# Patient Record
Sex: FEMALE | Race: ASIAN | NOT HISPANIC OR LATINO
[De-identification: names, ages, dates, MRNs, and addresses within clinical notes are randomized per-mention and may not be internally consistent; named-entity substitution may affect disease eponyms.]

---

## 2017-02-22 ENCOUNTER — RESULT REVIEW (OUTPATIENT)
Age: 77
End: 2017-02-22

## 2017-02-22 ENCOUNTER — INPATIENT (INPATIENT)
Facility: HOSPITAL | Age: 77
LOS: 11 days | Discharge: NURSING, REHAB OR LONG-TERM CARE | DRG: 239 | End: 2017-03-06
Attending: INTERNAL MEDICINE | Admitting: INTERNAL MEDICINE
Payer: MEDICAID

## 2017-02-22 VITALS
SYSTOLIC BLOOD PRESSURE: 112 MMHG | TEMPERATURE: 98 F | HEART RATE: 64 BPM | RESPIRATION RATE: 16 BRPM | OXYGEN SATURATION: 96 % | DIASTOLIC BLOOD PRESSURE: 64 MMHG

## 2017-02-22 DIAGNOSIS — E11.9 TYPE 2 DIABETES MELLITUS WITHOUT COMPLICATIONS: ICD-10-CM

## 2017-02-22 DIAGNOSIS — K92.2 GASTROINTESTINAL HEMORRHAGE, UNSPECIFIED: ICD-10-CM

## 2017-02-22 DIAGNOSIS — I96 GANGRENE, NOT ELSEWHERE CLASSIFIED: ICD-10-CM

## 2017-02-22 DIAGNOSIS — I25.10 ATHEROSCLEROTIC HEART DISEASE OF NATIVE CORONARY ARTERY WITHOUT ANGINA PECTORIS: ICD-10-CM

## 2017-02-22 PROBLEM — Z00.00 ENCOUNTER FOR PREVENTIVE HEALTH EXAMINATION: Status: ACTIVE | Noted: 2017-02-22

## 2017-02-22 LAB
ALBUMIN SERPL ELPH-MCNC: 3.1 G/DL — LOW (ref 3.3–5)
ALP SERPL-CCNC: 93 U/L — SIGNIFICANT CHANGE UP (ref 40–120)
ALT FLD-CCNC: 41 U/L RC — SIGNIFICANT CHANGE UP (ref 10–45)
ANION GAP SERPL CALC-SCNC: 13 MMOL/L — SIGNIFICANT CHANGE UP (ref 5–17)
APTT BLD: 30.4 SEC — SIGNIFICANT CHANGE UP (ref 27.5–37.4)
AST SERPL-CCNC: 47 U/L — HIGH (ref 10–40)
BASOPHILS # BLD AUTO: 0 K/UL — SIGNIFICANT CHANGE UP (ref 0–0.2)
BASOPHILS NFR BLD AUTO: 0 % — SIGNIFICANT CHANGE UP (ref 0–2)
BILIRUB SERPL-MCNC: 0.3 MG/DL — SIGNIFICANT CHANGE UP (ref 0.2–1.2)
BLD GP AB SCN SERPL QL: NEGATIVE — SIGNIFICANT CHANGE UP
BUN SERPL-MCNC: 26 MG/DL — HIGH (ref 7–23)
CALCIUM SERPL-MCNC: 9.5 MG/DL — SIGNIFICANT CHANGE UP (ref 8.4–10.5)
CHLORIDE SERPL-SCNC: 93 MMOL/L — LOW (ref 96–108)
CO2 SERPL-SCNC: 26 MMOL/L — SIGNIFICANT CHANGE UP (ref 22–31)
CREAT SERPL-MCNC: 0.76 MG/DL — SIGNIFICANT CHANGE UP (ref 0.5–1.3)
EOSINOPHIL # BLD AUTO: 0.1 K/UL — SIGNIFICANT CHANGE UP (ref 0–0.5)
EOSINOPHIL NFR BLD AUTO: 0.4 % — SIGNIFICANT CHANGE UP (ref 0–6)
GAS PNL BLDV: SIGNIFICANT CHANGE UP
GLUCOSE SERPL-MCNC: 140 MG/DL — HIGH (ref 70–99)
HCT VFR BLD CALC: 28.6 % — LOW (ref 34.5–45)
HCT VFR BLD CALC: 30.3 % — LOW (ref 34.5–45)
HGB BLD-MCNC: 10 G/DL — LOW (ref 11.5–15.5)
HGB BLD-MCNC: 9.4 G/DL — LOW (ref 11.5–15.5)
INR BLD: 1.24 RATIO — HIGH (ref 0.88–1.16)
LYMPHOCYTES # BLD AUTO: 13.6 % — SIGNIFICANT CHANGE UP (ref 13–44)
LYMPHOCYTES # BLD AUTO: 2 K/UL — SIGNIFICANT CHANGE UP (ref 1–3.3)
MCHC RBC-ENTMCNC: 31 PG — SIGNIFICANT CHANGE UP (ref 27–34)
MCHC RBC-ENTMCNC: 31 PG — SIGNIFICANT CHANGE UP (ref 27–34)
MCHC RBC-ENTMCNC: 32.9 GM/DL — SIGNIFICANT CHANGE UP (ref 32–36)
MCHC RBC-ENTMCNC: 32.9 GM/DL — SIGNIFICANT CHANGE UP (ref 32–36)
MCV RBC AUTO: 94.2 FL — SIGNIFICANT CHANGE UP (ref 80–100)
MCV RBC AUTO: 94.3 FL — SIGNIFICANT CHANGE UP (ref 80–100)
MONOCYTES # BLD AUTO: 0.7 K/UL — SIGNIFICANT CHANGE UP (ref 0–0.9)
MONOCYTES NFR BLD AUTO: 4.7 % — SIGNIFICANT CHANGE UP (ref 2–14)
NEUTROPHILS # BLD AUTO: 12 K/UL — HIGH (ref 1.8–7.4)
NEUTROPHILS NFR BLD AUTO: 81.3 % — HIGH (ref 43–77)
PLATELET # BLD AUTO: 389 K/UL — SIGNIFICANT CHANGE UP (ref 150–400)
PLATELET # BLD AUTO: 461 K/UL — HIGH (ref 150–400)
POTASSIUM SERPL-MCNC: 4.7 MMOL/L — SIGNIFICANT CHANGE UP (ref 3.5–5.3)
POTASSIUM SERPL-SCNC: 4.7 MMOL/L — SIGNIFICANT CHANGE UP (ref 3.5–5.3)
PROT SERPL-MCNC: 7.5 G/DL — SIGNIFICANT CHANGE UP (ref 6–8.3)
PROTHROM AB SERPL-ACNC: 13.6 SEC — HIGH (ref 10–13.1)
RBC # BLD: 3.03 M/UL — LOW (ref 3.8–5.2)
RBC # BLD: 3.22 M/UL — LOW (ref 3.8–5.2)
RBC # FLD: 13.7 % — SIGNIFICANT CHANGE UP (ref 10.3–14.5)
RBC # FLD: 13.8 % — SIGNIFICANT CHANGE UP (ref 10.3–14.5)
RH IG SCN BLD-IMP: NEGATIVE — SIGNIFICANT CHANGE UP
RH IG SCN BLD-IMP: NEGATIVE — SIGNIFICANT CHANGE UP
SODIUM SERPL-SCNC: 132 MMOL/L — LOW (ref 135–145)
TROPONIN T SERPL-MCNC: <0.01 NG/ML — SIGNIFICANT CHANGE UP (ref 0–0.06)
WBC # BLD: 14.8 K/UL — HIGH (ref 3.8–10.5)
WBC # BLD: 14.8 K/UL — HIGH (ref 3.8–10.5)
WBC # FLD AUTO: 14.8 K/UL — HIGH (ref 3.8–10.5)
WBC # FLD AUTO: 14.8 K/UL — HIGH (ref 3.8–10.5)

## 2017-02-22 PROCEDURE — 99285 EMERGENCY DEPT VISIT HI MDM: CPT | Mod: 25

## 2017-02-22 PROCEDURE — 93010 ELECTROCARDIOGRAM REPORT: CPT

## 2017-02-22 PROCEDURE — 71010: CPT | Mod: 26

## 2017-02-22 PROCEDURE — 74177 CT ABD & PELVIS W/CONTRAST: CPT | Mod: 26

## 2017-02-22 RX ORDER — GABAPENTIN 400 MG/1
600 CAPSULE ORAL
Qty: 0 | Refills: 0 | Status: DISCONTINUED | OUTPATIENT
Start: 2017-02-22 | End: 2017-02-27

## 2017-02-22 RX ORDER — PANTOPRAZOLE SODIUM 20 MG/1
8 TABLET, DELAYED RELEASE ORAL
Qty: 80 | Refills: 0 | Status: DISCONTINUED | OUTPATIENT
Start: 2017-02-22 | End: 2017-02-23

## 2017-02-22 RX ORDER — INSULIN LISPRO 100/ML
VIAL (ML) SUBCUTANEOUS
Qty: 0 | Refills: 0 | Status: DISCONTINUED | OUTPATIENT
Start: 2017-02-22 | End: 2017-02-27

## 2017-02-22 RX ORDER — SODIUM CHLORIDE 9 MG/ML
1000 INJECTION, SOLUTION INTRAVENOUS
Qty: 0 | Refills: 0 | Status: DISCONTINUED | OUTPATIENT
Start: 2017-02-22 | End: 2017-02-27

## 2017-02-22 RX ORDER — DEXTROSE 50 % IN WATER 50 %
25 SYRINGE (ML) INTRAVENOUS ONCE
Qty: 0 | Refills: 0 | Status: DISCONTINUED | OUTPATIENT
Start: 2017-02-22 | End: 2017-02-27

## 2017-02-22 RX ORDER — POLYETHYLENE GLYCOL 3350 17 G/17G
1 POWDER, FOR SOLUTION ORAL
Qty: 0 | Refills: 0 | COMMUNITY

## 2017-02-22 RX ORDER — OXYCODONE HYDROCHLORIDE 5 MG/1
5 TABLET ORAL EVERY 4 HOURS
Qty: 0 | Refills: 0 | Status: DISCONTINUED | OUTPATIENT
Start: 2017-02-22 | End: 2017-02-27

## 2017-02-22 RX ORDER — GLUCAGON INJECTION, SOLUTION 0.5 MG/.1ML
1 INJECTION, SOLUTION SUBCUTANEOUS ONCE
Qty: 0 | Refills: 0 | Status: DISCONTINUED | OUTPATIENT
Start: 2017-02-22 | End: 2017-02-27

## 2017-02-22 RX ORDER — PANTOPRAZOLE SODIUM 20 MG/1
80 TABLET, DELAYED RELEASE ORAL ONCE
Qty: 0 | Refills: 0 | Status: COMPLETED | OUTPATIENT
Start: 2017-02-22 | End: 2017-02-22

## 2017-02-22 RX ORDER — SODIUM CHLORIDE 9 MG/ML
1000 INJECTION INTRAMUSCULAR; INTRAVENOUS; SUBCUTANEOUS ONCE
Qty: 0 | Refills: 0 | Status: COMPLETED | OUTPATIENT
Start: 2017-02-22 | End: 2017-02-22

## 2017-02-22 RX ORDER — ATORVASTATIN CALCIUM 80 MG/1
40 TABLET, FILM COATED ORAL AT BEDTIME
Qty: 0 | Refills: 0 | Status: DISCONTINUED | OUTPATIENT
Start: 2017-02-22 | End: 2017-02-27

## 2017-02-22 RX ORDER — DEXTROSE 50 % IN WATER 50 %
1 SYRINGE (ML) INTRAVENOUS ONCE
Qty: 0 | Refills: 0 | Status: DISCONTINUED | OUTPATIENT
Start: 2017-02-22 | End: 2017-02-27

## 2017-02-22 RX ORDER — DEXTROSE 50 % IN WATER 50 %
12.5 SYRINGE (ML) INTRAVENOUS ONCE
Qty: 0 | Refills: 0 | Status: DISCONTINUED | OUTPATIENT
Start: 2017-02-22 | End: 2017-02-27

## 2017-02-22 RX ORDER — INSULIN LISPRO 100/ML
VIAL (ML) SUBCUTANEOUS AT BEDTIME
Qty: 0 | Refills: 0 | Status: DISCONTINUED | OUTPATIENT
Start: 2017-02-22 | End: 2017-02-27

## 2017-02-22 RX ORDER — OXYCODONE HYDROCHLORIDE 5 MG/1
10 TABLET ORAL EVERY 12 HOURS
Qty: 0 | Refills: 0 | Status: DISCONTINUED | OUTPATIENT
Start: 2017-02-22 | End: 2017-02-27

## 2017-02-22 RX ORDER — CARVEDILOL PHOSPHATE 80 MG/1
12.5 CAPSULE, EXTENDED RELEASE ORAL EVERY 12 HOURS
Qty: 0 | Refills: 0 | Status: DISCONTINUED | OUTPATIENT
Start: 2017-02-22 | End: 2017-02-27

## 2017-02-22 RX ORDER — SODIUM CHLORIDE 9 MG/ML
3 INJECTION INTRAMUSCULAR; INTRAVENOUS; SUBCUTANEOUS ONCE
Qty: 0 | Refills: 0 | Status: COMPLETED | OUTPATIENT
Start: 2017-02-22 | End: 2017-02-22

## 2017-02-22 RX ORDER — MORPHINE SULFATE 50 MG/1
4 CAPSULE, EXTENDED RELEASE ORAL ONCE
Qty: 0 | Refills: 0 | Status: DISCONTINUED | OUTPATIENT
Start: 2017-02-22 | End: 2017-02-22

## 2017-02-22 RX ORDER — LISINOPRIL 2.5 MG/1
5 TABLET ORAL DAILY
Qty: 0 | Refills: 0 | Status: DISCONTINUED | OUTPATIENT
Start: 2017-02-22 | End: 2017-02-27

## 2017-02-22 RX ORDER — HYDROMORPHONE HYDROCHLORIDE 2 MG/ML
1 INJECTION INTRAMUSCULAR; INTRAVENOUS; SUBCUTANEOUS EVERY 4 HOURS
Qty: 0 | Refills: 0 | Status: DISCONTINUED | OUTPATIENT
Start: 2017-02-22 | End: 2017-02-27

## 2017-02-22 RX ORDER — MORPHINE SULFATE 50 MG/1
0.5 CAPSULE, EXTENDED RELEASE ORAL ONCE
Qty: 0 | Refills: 0 | Status: DISCONTINUED | OUTPATIENT
Start: 2017-02-22 | End: 2017-02-22

## 2017-02-22 RX ADMIN — HYDROMORPHONE HYDROCHLORIDE 1 MILLIGRAM(S): 2 INJECTION INTRAMUSCULAR; INTRAVENOUS; SUBCUTANEOUS at 20:15

## 2017-02-22 RX ADMIN — MORPHINE SULFATE 4 MILLIGRAM(S): 50 CAPSULE, EXTENDED RELEASE ORAL at 15:03

## 2017-02-22 RX ADMIN — PANTOPRAZOLE SODIUM 10 MG/HR: 20 TABLET, DELAYED RELEASE ORAL at 09:43

## 2017-02-22 RX ADMIN — PANTOPRAZOLE SODIUM 10 MG/HR: 20 TABLET, DELAYED RELEASE ORAL at 15:09

## 2017-02-22 RX ADMIN — PANTOPRAZOLE SODIUM 10 MG/HR: 20 TABLET, DELAYED RELEASE ORAL at 11:12

## 2017-02-22 RX ADMIN — PANTOPRAZOLE SODIUM 10 MG/HR: 20 TABLET, DELAYED RELEASE ORAL at 12:02

## 2017-02-22 RX ADMIN — PANTOPRAZOLE SODIUM 80 MILLIGRAM(S): 20 TABLET, DELAYED RELEASE ORAL at 09:43

## 2017-02-22 RX ADMIN — SODIUM CHLORIDE 3 MILLILITER(S): 9 INJECTION INTRAMUSCULAR; INTRAVENOUS; SUBCUTANEOUS at 09:35

## 2017-02-22 RX ADMIN — OXYCODONE HYDROCHLORIDE 10 MILLIGRAM(S): 5 TABLET ORAL at 17:57

## 2017-02-22 RX ADMIN — PANTOPRAZOLE SODIUM 10 MG/HR: 20 TABLET, DELAYED RELEASE ORAL at 10:21

## 2017-02-22 RX ADMIN — PANTOPRAZOLE SODIUM 10 MG/HR: 20 TABLET, DELAYED RELEASE ORAL at 17:58

## 2017-02-22 RX ADMIN — HYDROMORPHONE HYDROCHLORIDE 1 MILLIGRAM(S): 2 INJECTION INTRAMUSCULAR; INTRAVENOUS; SUBCUTANEOUS at 19:56

## 2017-02-22 RX ADMIN — ATORVASTATIN CALCIUM 40 MILLIGRAM(S): 80 TABLET, FILM COATED ORAL at 22:10

## 2017-02-22 RX ADMIN — PANTOPRAZOLE SODIUM 10 MG/HR: 20 TABLET, DELAYED RELEASE ORAL at 15:57

## 2017-02-22 RX ADMIN — SODIUM CHLORIDE 1000 MILLILITER(S): 9 INJECTION INTRAMUSCULAR; INTRAVENOUS; SUBCUTANEOUS at 09:35

## 2017-02-22 RX ADMIN — PANTOPRAZOLE SODIUM 10 MG/HR: 20 TABLET, DELAYED RELEASE ORAL at 14:42

## 2017-02-22 RX ADMIN — PANTOPRAZOLE SODIUM 10 MG/HR: 20 TABLET, DELAYED RELEASE ORAL at 22:10

## 2017-02-22 RX ADMIN — MORPHINE SULFATE 4 MILLIGRAM(S): 50 CAPSULE, EXTENDED RELEASE ORAL at 15:09

## 2017-02-22 NOTE — ED PROVIDER NOTE - PMH
Coronary artery disease involving native coronary artery of native heart without angina pectoris    Gangrene of foot

## 2017-02-22 NOTE — H&P ADULT. - NEGATIVE CARDIOVASCULAR SYMPTOMS
no dyspnea on exertion/no orthopnea/no palpitations/no peripheral edema/no paroxysmal nocturnal dyspnea/no chest pain

## 2017-02-22 NOTE — PATIENT PROFILE ADULT. - VISION (WITH CORRECTIVE LENSES IF THE PATIENT USUALLY WEARS THEM):
Partially impaired: cannot see medication labels or newsprint, but can see obstacles in path, and the surrounding layout; can count fingers at arm's length/uses reading glasses but did not bring from home

## 2017-02-22 NOTE — H&P ADULT. - HISTORY OF PRESENT ILLNESS
77yof pmhx of HTN HLD CAD on ASA and plavix, hx of PVD, BIB EMS for nausea, vomiting with dark tarry stools since yesterday with hypotension. Pt is Ukrainian speaking, hx obtained from Daughter. No chest pain or sob. No sick contacts. remote hx of ?peptic ulcer per daughter. Recent RLE surgery for "blockage". No fever or chills.

## 2017-02-22 NOTE — H&P ADULT. - PROBLEM SELECTOR PLAN 2
Was seen at [facility] month agao and decided against surgery as very high risk patient per daughter. f/u with them .

## 2017-02-22 NOTE — PATIENT PROFILE ADULT. - NS PRO CONTRA FLU  NO ACCESS
daughter does not remember if she got it or not at this time- daughter will clarify with other siblings

## 2017-02-22 NOTE — ED PROVIDER NOTE - OBJECTIVE STATEMENT
77yof pmhx of HTN HLD CAD on ASA and plavix, hx of PVD, BIB EMS for nausea, vomiting with dark tarry stools since yesterday with hypotension. Pt is Luxembourgish speaking, hx obtained from Daughter. No chest pain or sob. No sick contacts. remote hx of ?peptic ulcer per daughter. Recent RLE surgery for "blockage". No fever or chills.

## 2017-02-22 NOTE — H&P ADULT. - ASSESSMENT
77yof pmhx of HTN HLD CAD on ASA and plavix, hx of PVD, BIB EMS for nausea, vomiting with dark tarry stools since yesterday with hypotension. Pt is Greenlandic speaking, hx obtained from Daughter. No chest pain or sob. No sick contacts. remote hx of ?peptic ulcer per daughter.  No fever or chills.

## 2017-02-22 NOTE — ED PROVIDER NOTE - ATTENDING CONTRIBUTION TO CARE
Patient from home.  Family noted large volume of dark stool in clothing this morning, called EMS.  Per family appears pale.  Possible history of prior GIB per daughter, history unclear, on ASA/plavix for CAD.  No sick contacts.  Reporting some nausea but no vomiting, no abdominal pain.  Per EMS hypotensive in field, improved with IVF.    On exam patient non toxic appearing, VS WNL, questionable pallor, RRR, CTABL, abdomen soft NT, ND.  Large amount of dark stool present in clothing, guiaic testing questionably positive.    DDx - GIB versus enteritis given unclear guaiac testing, given hypotensive in field and appearance of stool will start protonix IV, will also CT A/P to evaluate for evidence of colitis, labs, T&S, admission.

## 2017-02-22 NOTE — H&P ADULT. - RS GEN PE MLT RESP DETAILS PC
clear to auscultation bilaterally/good air movement/breath sounds equal/respirations non-labored/normal/airway patent

## 2017-02-22 NOTE — ED ADULT NURSE NOTE - OBJECTIVE STATEMENT
pt brought by ems for reported rectal bleeding this morning noted by family at home. arrives aao x 3, speaks Bulgarian, translating by family pt brought by ems for reported rectal bleeding this morning noted by family at home. arrives aao x 3, speaks Italian, translating by family. skin pale, cool, dry. no active bleeding, dry crusted dark brown stool to buttocks, skin intact. right leg bent, unable to straighten extremity. pt cachectic, weak. lethargic. family states pt has had similar episodes in the past for which she has receive transfusion. no c/o pain, no resp distress. placed on continuous cr monitor. iv line to left ac by ems patent, 2nd iv line established. Protonix gtt initiated as per MD order.

## 2017-02-23 ENCOUNTER — TRANSCRIPTION ENCOUNTER (OUTPATIENT)
Age: 77
End: 2017-02-23

## 2017-02-23 LAB
ANION GAP SERPL CALC-SCNC: 14 MMOL/L — SIGNIFICANT CHANGE UP (ref 5–17)
BUN SERPL-MCNC: 13 MG/DL — SIGNIFICANT CHANGE UP (ref 7–23)
CALCIUM SERPL-MCNC: 8.8 MG/DL — SIGNIFICANT CHANGE UP (ref 8.4–10.5)
CHLORIDE SERPL-SCNC: 101 MMOL/L — SIGNIFICANT CHANGE UP (ref 96–108)
CO2 SERPL-SCNC: 20 MMOL/L — LOW (ref 22–31)
CREAT SERPL-MCNC: 0.5 MG/DL — SIGNIFICANT CHANGE UP (ref 0.5–1.3)
GLUCOSE SERPL-MCNC: 80 MG/DL — SIGNIFICANT CHANGE UP (ref 70–99)
HBA1C BLD-MCNC: 6.3 % — HIGH (ref 4–5.6)
HCT VFR BLD CALC: 25.9 % — LOW (ref 34.5–45)
HCT VFR BLD CALC: 28.1 % — LOW (ref 34.5–45)
HCT VFR BLD CALC: 29 % — LOW (ref 34.5–45)
HCT VFR BLD CALC: 29.3 % — LOW (ref 34.5–45)
HGB BLD-MCNC: 8.7 G/DL — LOW (ref 11.5–15.5)
HGB BLD-MCNC: 8.9 G/DL — LOW (ref 11.5–15.5)
HGB BLD-MCNC: 9.4 G/DL — LOW (ref 11.5–15.5)
HGB BLD-MCNC: 9.7 G/DL — LOW (ref 11.5–15.5)
MCHC RBC-ENTMCNC: 30.1 PG — SIGNIFICANT CHANGE UP (ref 27–34)
MCHC RBC-ENTMCNC: 31.1 PG — SIGNIFICANT CHANGE UP (ref 27–34)
MCHC RBC-ENTMCNC: 31.5 PG — SIGNIFICANT CHANGE UP (ref 27–34)
MCHC RBC-ENTMCNC: 31.5 PG — SIGNIFICANT CHANGE UP (ref 27–34)
MCHC RBC-ENTMCNC: 31.7 GM/DL — LOW (ref 32–36)
MCHC RBC-ENTMCNC: 32.6 GM/DL — SIGNIFICANT CHANGE UP (ref 32–36)
MCHC RBC-ENTMCNC: 33.2 GM/DL — SIGNIFICANT CHANGE UP (ref 32–36)
MCHC RBC-ENTMCNC: 33.7 GM/DL — SIGNIFICANT CHANGE UP (ref 32–36)
MCV RBC AUTO: 93.7 FL — SIGNIFICANT CHANGE UP (ref 80–100)
MCV RBC AUTO: 94.9 FL — SIGNIFICANT CHANGE UP (ref 80–100)
MCV RBC AUTO: 94.9 FL — SIGNIFICANT CHANGE UP (ref 80–100)
MCV RBC AUTO: 95.3 FL — SIGNIFICANT CHANGE UP (ref 80–100)
PLATELET # BLD AUTO: 362 K/UL — SIGNIFICANT CHANGE UP (ref 150–400)
PLATELET # BLD AUTO: 385 K/UL — SIGNIFICANT CHANGE UP (ref 150–400)
PLATELET # BLD AUTO: 414 K/UL — HIGH (ref 150–400)
PLATELET # BLD AUTO: 414 K/UL — HIGH (ref 150–400)
POTASSIUM SERPL-MCNC: 4.3 MMOL/L — SIGNIFICANT CHANGE UP (ref 3.5–5.3)
POTASSIUM SERPL-SCNC: 4.3 MMOL/L — SIGNIFICANT CHANGE UP (ref 3.5–5.3)
RBC # BLD: 2.77 M/UL — LOW (ref 3.8–5.2)
RBC # BLD: 2.96 M/UL — LOW (ref 3.8–5.2)
RBC # BLD: 3.04 M/UL — LOW (ref 3.8–5.2)
RBC # BLD: 3.09 M/UL — LOW (ref 3.8–5.2)
RBC # FLD: 13.8 % — SIGNIFICANT CHANGE UP (ref 10.3–14.5)
RBC # FLD: 13.9 % — SIGNIFICANT CHANGE UP (ref 10.3–14.5)
RBC # FLD: 14 % — SIGNIFICANT CHANGE UP (ref 10.3–14.5)
RBC # FLD: 14.8 % — HIGH (ref 10.3–14.5)
SODIUM SERPL-SCNC: 135 MMOL/L — SIGNIFICANT CHANGE UP (ref 135–145)
WBC # BLD: 14.1 K/UL — HIGH (ref 3.8–10.5)
WBC # BLD: 15.5 K/UL — HIGH (ref 3.8–10.5)
WBC # BLD: 15.79 K/UL — HIGH (ref 3.8–10.5)
WBC # BLD: 15.8 K/UL — HIGH (ref 3.8–10.5)
WBC # FLD AUTO: 14.1 K/UL — HIGH (ref 3.8–10.5)
WBC # FLD AUTO: 15.5 K/UL — HIGH (ref 3.8–10.5)
WBC # FLD AUTO: 15.79 K/UL — HIGH (ref 3.8–10.5)
WBC # FLD AUTO: 15.8 K/UL — HIGH (ref 3.8–10.5)

## 2017-02-23 PROCEDURE — 88305 TISSUE EXAM BY PATHOLOGIST: CPT | Mod: 26

## 2017-02-23 PROCEDURE — 99221 1ST HOSP IP/OBS SF/LOW 40: CPT

## 2017-02-23 PROCEDURE — 88312 SPECIAL STAINS GROUP 1: CPT | Mod: 26

## 2017-02-23 RX ORDER — PANTOPRAZOLE SODIUM 20 MG/1
40 TABLET, DELAYED RELEASE ORAL DAILY
Qty: 0 | Refills: 0 | Status: DISCONTINUED | OUTPATIENT
Start: 2017-02-23 | End: 2017-02-27

## 2017-02-23 RX ADMIN — HYDROMORPHONE HYDROCHLORIDE 1 MILLIGRAM(S): 2 INJECTION INTRAMUSCULAR; INTRAVENOUS; SUBCUTANEOUS at 03:18

## 2017-02-23 RX ADMIN — CARVEDILOL PHOSPHATE 12.5 MILLIGRAM(S): 80 CAPSULE, EXTENDED RELEASE ORAL at 05:51

## 2017-02-23 RX ADMIN — GABAPENTIN 600 MILLIGRAM(S): 400 CAPSULE ORAL at 17:42

## 2017-02-23 RX ADMIN — HYDROMORPHONE HYDROCHLORIDE 1 MILLIGRAM(S): 2 INJECTION INTRAMUSCULAR; INTRAVENOUS; SUBCUTANEOUS at 21:39

## 2017-02-23 RX ADMIN — HYDROMORPHONE HYDROCHLORIDE 1 MILLIGRAM(S): 2 INJECTION INTRAMUSCULAR; INTRAVENOUS; SUBCUTANEOUS at 12:46

## 2017-02-23 RX ADMIN — HYDROMORPHONE HYDROCHLORIDE 1 MILLIGRAM(S): 2 INJECTION INTRAMUSCULAR; INTRAVENOUS; SUBCUTANEOUS at 22:00

## 2017-02-23 RX ADMIN — HYDROMORPHONE HYDROCHLORIDE 1 MILLIGRAM(S): 2 INJECTION INTRAMUSCULAR; INTRAVENOUS; SUBCUTANEOUS at 13:31

## 2017-02-23 RX ADMIN — PANTOPRAZOLE SODIUM 40 MILLIGRAM(S): 20 TABLET, DELAYED RELEASE ORAL at 12:46

## 2017-02-23 RX ADMIN — HYDROMORPHONE HYDROCHLORIDE 1 MILLIGRAM(S): 2 INJECTION INTRAMUSCULAR; INTRAVENOUS; SUBCUTANEOUS at 03:35

## 2017-02-23 RX ADMIN — CARVEDILOL PHOSPHATE 12.5 MILLIGRAM(S): 80 CAPSULE, EXTENDED RELEASE ORAL at 17:42

## 2017-02-23 RX ADMIN — ATORVASTATIN CALCIUM 40 MILLIGRAM(S): 80 TABLET, FILM COATED ORAL at 21:40

## 2017-02-23 RX ADMIN — LISINOPRIL 5 MILLIGRAM(S): 2.5 TABLET ORAL at 05:51

## 2017-02-23 RX ADMIN — OXYCODONE HYDROCHLORIDE 10 MILLIGRAM(S): 5 TABLET ORAL at 17:45

## 2017-02-23 RX ADMIN — OXYCODONE HYDROCHLORIDE 10 MILLIGRAM(S): 5 TABLET ORAL at 06:30

## 2017-02-23 RX ADMIN — OXYCODONE HYDROCHLORIDE 10 MILLIGRAM(S): 5 TABLET ORAL at 19:00

## 2017-02-23 RX ADMIN — OXYCODONE HYDROCHLORIDE 10 MILLIGRAM(S): 5 TABLET ORAL at 05:49

## 2017-02-23 RX ADMIN — GABAPENTIN 600 MILLIGRAM(S): 400 CAPSULE ORAL at 05:51

## 2017-02-23 NOTE — DISCHARGE NOTE ADULT - MEDICATION SUMMARY - MEDICATIONS TO CHANGE
I will SWITCH the dose or number of times a day I take the medications listed below when I get home from the hospital:    senna 17.2 mg oral tablet  -- 1 tab(s) by mouth once a day I will SWITCH the dose or number of times a day I take the medications listed below when I get home from the hospital:    lisinopril 5 mg oral tablet  -- 1 tab(s) by mouth once a day    MiraLax oral powder for reconstitution  -- 17 gram(s) by mouth 2 times a day    senna 17.2 mg oral tablet  -- 1 tab(s) by mouth once a day I will SWITCH the dose or number of times a day I take the medications listed below when I get home from the hospital:    MiraLax oral powder for reconstitution  -- 17 gram(s) by mouth 2 times a day    senna 17.2 mg oral tablet  -- 1 tab(s) by mouth once a day

## 2017-02-23 NOTE — DISCHARGE NOTE ADULT - PATIENT PORTAL LINK FT
“You can access the [facility] Patient Portal, offered by [facility], by registering with the following website: [email or web address]”

## 2017-02-23 NOTE — DISCHARGE NOTE ADULT - SECONDARY DIAGNOSIS.
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

## 2017-02-23 NOTE — DISCHARGE NOTE ADULT - HOSPITAL COURSE
Attending to complete 77yof pmhx of HTN HLD CAD on ASA and plavix, hx of PVD, BIB EMS for nausea, vomiting with dark tarry stools since yesterday with hypotension. Pt is Macedonian speaking, hx obtained from Daughter. No chest pain or sob. No sick contacts. remote hx of ?peptic ulcer per daughter. Recent RLE surgery for "blockage". No fever or chills.      Dxed with GI bleed s/p EGD,PVD with Gnagrene foot s/p AKA with CAD,Anemia with HTN. Followed by Vascular,GI,ID and Cardiology. EGD showed                                                                                          Impression:          - Normal esophagus.                       - A few recently bleeding angioectasias in the stomach. Treated with argon                        beam coagulation.                       - Mild gastritis.                       - Normal examined duodenum.  Recommendation:      - Return patient to hospital gay for ongoing care.                       - Resume regular diet.                       - Use Protonix (pantoprazole) 40 mg PO daily.                       - Await pathology results.                       - To treat accordingly if H. pylori present.                       - The findings and recommendations were discussed with the patient and the                        referring physician.                                                                                                        Attending Participation:       I personally performed the entire procedure.                                                                                                       ______________________  Joey Harris MD  2/23/2017 10:39:42 AM  This report has been signed electronically.   Had Echo and stress test before surgery . Did well postop so was dcd to Rehab.

## 2017-02-23 NOTE — DISCHARGE NOTE ADULT - PLAN OF CARE
No gi bleeding There are 2 common types of GI Bleed, Upper GI Bleed and Lower GI Bleed.  Upper GI Bleed affects the esophagus, stomach, and first part of the small intestine. Lower GI Bleed affects the colon and rectum.  Upper GI Bleed signs and symptoms to notify your Health Care Provider are vomiting blood, or coffee ground vomitus, and bowel movements that look like black tar.  Lower GI Bleed signs and symptoms to notify your health care provider are bright red bloody bowel movements.   Take your medications as prescribed by your Gastroenterologist.  If you have had an Endoscopy or Colonoscopy, follow up with your Gastroenterologist for Pathology results.  Avoid NSAIDs unless your Health Care Provider tells you that it is ok (Aspirin, Ibuprofen, Advil, Motrin, Aleve).  Follow up with your Gastroenterologist within 1-2 weeks of discharge. You had gastric AVMs cauterized Continue protonix  Follow up with GI in 2 weeks You had surgery done   complete antibiotics as [prescribed Follow up with Vascular surgery Dr Montaño in 10 days for staple removal Coronary artery disease is a condition where the arteries the supply the heart muscle get clogges with fatty deposits & puts you at risk for a heart attack  Call your doctor if you have any new pain, pressure, or discomfort in the center of your chest, pain, tingling or discomfort in arms, back, neck, jaw, or stomach, shortness of breath, nausea, vomiting, burping or heartburn, sweating, cold and clammy skin, racing or abnormal heartbeat for more than 10 minutes or if they keep coming & going.  Call 911 and do not tr to get to hospital by care  You can help yourself with lefestyle changes (quitting smoking if you smoke), eat lots of fruits & vegetables & low fat dairy products, not a lot of meat & fatty foods, walk or some form of physical activity most days of the week, lose weight if you are overweight  Take your cardiac medication as prescribed to lower cholesterol, to lower blood pressure, aspirin to prevent blood clots, and diabetes control  Make sure to keep appointments with doctor for cardiac follow up care Resolved;

## 2017-02-23 NOTE — DISCHARGE NOTE ADULT - MEDICATION SUMMARY - MEDICATIONS TO TAKE
I will START or STAY ON the medications listed below when I get home from the hospital:    oxyCODONE 15 mg oral tablet, extended release  -- 1 tab(s) by mouth every 12 hours  -- Indication: For Pain    aspirin 81 mg oral delayed release tablet  -- 1 tab(s) by mouth once a day  -- Indication: For CAD    lisinopril 5 mg oral tablet  -- 1 tab(s) by mouth once a day  -- Indication: For Hypertension    gabapentin 600 mg oral tablet  -- 1 tab(s) by mouth 2 times a day  -- Indication: For Neuropathy    metFORMIN 1000 mg oral tablet  -- 1 tab(s) by mouth 2 times a day  -- Indication: For Diabetes    atorvastatin 40 mg oral tablet  -- 1 tab(s) by mouth once a day (at bedtime)  -- Indication: For Hyperlipidemia    carvedilol 12.5 mg oral tablet  -- 1 tab(s) by mouth every 12 hours  -- Indication: For CAD    ferrous sulfate 324 mg (65 mg elemental iron) oral delayed release tablet  -- 1 tab(s) by mouth once a day  -- Indication: For Anemia    senna oral tablet  -- 2 tab(s) by mouth once a day (at bedtime)  -- Indication: For Constipation    MiraLax oral powder for reconstitution  -- 17 gram(s) by mouth 2 times a day  (hold for diarrhea)  -- Indication: For Costipation    Augmentin 875 mg-125 mg oral tablet  -- 1 tab(s) by mouth every 12 hours  x 5 days  -- Indication: For s/p AKA for gangrene    pantoprazole 40 mg oral delayed release tablet  -- 1 tab(s) by mouth once a day  -- Indication: For Gastritis    folic acid 1 mg oral tablet  -- 1 tab(s) by mouth once a day  -- Indication: For supplement I will START or STAY ON the medications listed below when I get home from the hospital:    oxyCODONE 15 mg oral tablet, extended release  -- 1 tab(s) by mouth every 12 hours  -- Indication: For Pain    aspirin 81 mg oral delayed release tablet  -- 1 tab(s) by mouth once a day  -- Indication: For CAD    oxyCODONE 5 mg oral tablet  -- 1 tab(s) by mouth every 6 hours, As Needed  breakthrough pain  -- Indication: For pain    lisinopril 5 mg oral tablet  -- 1 tab(s) by mouth once a day  -- Indication: For Hypertension    aluminum hydroxide-magnesium hydroxide 200 mg-200 mg/5 mL oral suspension  -- 30 milliliter(s) by mouth every 6 hours, As needed, Dyspepsia  -- Indication: For Dyspepsia    gabapentin 600 mg oral tablet  -- 1 tab(s) by mouth 2 times a day  -- Indication: For Neuropathy    metFORMIN 1000 mg oral tablet  -- 1 tab(s) by mouth 2 times a day  -- Indication: For Diabetes    atorvastatin 40 mg oral tablet  -- 1 tab(s) by mouth once a day (at bedtime)  -- Indication: For Hyperlipidemia    carvedilol 12.5 mg oral tablet  -- 1 tab(s) by mouth every 12 hours  -- Indication: For CAD    ferrous sulfate 324 mg (65 mg elemental iron) oral delayed release tablet  -- 1 tab(s) by mouth once a day  -- Indication: For Anemia    senna oral tablet  -- 2 tab(s) by mouth once a day (at bedtime)  -- Indication: For Constipation    MiraLax oral powder for reconstitution  -- 17 gram(s) by mouth 2 times a day  (hold for diarrhea)  -- Indication: For Costipation    Augmentin 875 mg-125 mg oral tablet  -- 1 tab(s) by mouth every 12 hours  x  4 days  -- Indication: For clint operative antibiotics    pantoprazole 40 mg oral delayed release tablet  -- 1 tab(s) by mouth once a day  -- Indication: For Gastritis    folic acid 1 mg oral tablet  -- 1 tab(s) by mouth once a day  -- Indication: For supplement I will START or STAY ON the medications listed below when I get home from the hospital:    oxyCODONE 15 mg oral tablet, extended release  -- 1 tab(s) by mouth every 12 hours  -- Indication: For Pain    aspirin 81 mg oral delayed release tablet  -- 1 tab(s) by mouth once a day  -- Indication: For CAD    oxyCODONE 5 mg oral tablet  -- 1 tab(s) by mouth every 6 hours, As Needed  breakthrough pain  -- Indication: For pain    lisinopril 20 mg oral tablet  -- 1 tab(s) by mouth once a day  -- Indication: For Hypertension    aluminum hydroxide-magnesium hydroxide 200 mg-200 mg/5 mL oral suspension  -- 30 milliliter(s) by mouth every 6 hours, As needed, Dyspepsia  -- Indication: For Dyspepsia    enoxaparin  -- 30 milligram(s) subcutaneously once a day  -- Indication: For DVT prophylaxis    gabapentin 600 mg oral tablet  -- 1 tab(s) by mouth 2 times a day  -- Indication: For Neuropathy    metFORMIN 1000 mg oral tablet  -- 1 tab(s) by mouth 2 times a day  -- Indication: For Diabetes    atorvastatin 40 mg oral tablet  -- 1 tab(s) by mouth once a day (at bedtime)  -- Indication: For Hyperlipidemia    carvedilol 12.5 mg oral tablet  -- 1 tab(s) by mouth every 12 hours  -- Indication: For CAD    ferrous sulfate 324 mg (65 mg elemental iron) oral delayed release tablet  -- 1 tab(s) by mouth once a day  -- Indication: For Anemia    senna oral tablet  -- 2 tab(s) by mouth once a day (at bedtime)  -- Indication: For Constipation    MiraLax oral powder for reconstitution  -- 17 gram(s) by mouth once a day  -- Indication: For Constipation    Augmentin 875 mg-125 mg oral tablet  -- 1 tab(s) by mouth every 12 hours  x 1 more day  -- Indication: For perioperative use    pantoprazole 40 mg oral delayed release tablet  -- 1 tab(s) by mouth once a day  -- Indication: For Gastritis    folic acid 1 mg oral tablet  -- 1 tab(s) by mouth once a day  -- Indication: For supplement I will START or STAY ON the medications listed below when I get home from the hospital:    oxyCODONE 15 mg oral tablet, extended release  -- 1 tab(s) by mouth every 12 hours  -- Indication: For Pain    aspirin 81 mg oral delayed release tablet  -- 1 tab(s) by mouth once a day  -- Indication: For CAD    oxyCODONE 5 mg oral tablet  -- 1 tab(s) by mouth every 6 hours, As Needed  breakthrough pain  -- Indication: For pain    lisinopril 5 mg oral tablet  -- 1 tab(s) by mouth once a day  -- Indication: For Hypertension    aluminum hydroxide-magnesium hydroxide 200 mg-200 mg/5 mL oral suspension  -- 30 milliliter(s) by mouth every 6 hours, As needed, Dyspepsia  -- Indication: For Dyspepsia    enoxaparin  -- 30 milligram(s) subcutaneously once a day  -- Indication: For DVT prophylaxis    gabapentin 600 mg oral tablet  -- 1 tab(s) by mouth 2 times a day  -- Indication: For Neuropathy    metFORMIN 1000 mg oral tablet  -- 1 tab(s) by mouth 2 times a day  -- Indication: For Diabetes    atorvastatin 40 mg oral tablet  -- 1 tab(s) by mouth once a day (at bedtime)  -- Indication: For Hyperlipidemia    carvedilol 12.5 mg oral tablet  -- 1 tab(s) by mouth every 12 hours  -- Indication: For CAD    ferrous sulfate 324 mg (65 mg elemental iron) oral delayed release tablet  -- 1 tab(s) by mouth once a day  -- Indication: For Anemia    senna oral tablet  -- 2 tab(s) by mouth once a day (at bedtime)  -- Indication: For Constipation    MiraLax oral powder for reconstitution  -- 17 gram(s) by mouth once a day  -- Indication: For Constipation    Augmentin 875 mg-125 mg oral tablet  -- 1 tab(s) by mouth every 12 hours  x 1 more day  -- Indication: For perioperative use    pantoprazole 40 mg oral delayed release tablet  -- 1 tab(s) by mouth once a day  -- Indication: For Gastritis    folic acid 1 mg oral tablet  -- 1 tab(s) by mouth once a day  -- Indication: For supplement

## 2017-02-23 NOTE — DISCHARGE NOTE ADULT - ADDITIONAL INSTRUCTIONS
Follow up with vascular surgery in 10 days;  Follow up with gastro enterology after Rehab Follow up with vascular surgery in 10 days;  Follow up with gastro enterology after Rehab  Follow up with cardiology for positive stress test Follow up with vascular surgery in 10 days for staple removal  Follow up with gastro enterology after Rehab  Follow up with cardiology for positive stress test

## 2017-02-23 NOTE — DISCHARGE NOTE ADULT - PROVIDER TOKENS
TOKEN:'[id number]:'[id number]' TOKEN:'[id number]:'[id number]:'[id number]'

## 2017-02-23 NOTE — DISCHARGE NOTE ADULT - VISION (WITH CORRECTIVE LENSES IF THE PATIENT USUALLY WEARS THEM):
uses reading glasses but did not bring from home/Partially impaired: cannot see medication labels or newsprint, but can see obstacles in path, and the surrounding layout; can count fingers at arm's length

## 2017-02-23 NOTE — DISCHARGE NOTE ADULT - CARE PROVIDER_API CALL
Pawan Montaño), Vascular Surgery  [address] [place] [address]  Phone: [phone]  Fax: [phone]    Florentino Chavira), Cardiovascular Disease; Internal Medicine  [address] [place] [address]  Phone: [phone]  Fax: [phone] Pawan Montaño), Vascular Surgery  [address] [place] [address]  Phone: [phone]  Fax: [phone]    Florentino Chavira), Cardiovascular Disease; Internal Medicine  [address] [place] [address]  Phone: [phone]  Fax: [phone]    Joey Harris), Gastroenterology; Internal Medicine  [address] [id number]  Phone: [phone]  Fax: [phone]

## 2017-02-23 NOTE — DISCHARGE NOTE ADULT - CARE PLAN
Principal Discharge DX:	Gastrointestinal hemorrhage, unspecified gastrointestinal hemorrhage type  Goal:	No gi bleeding  Instructions for follow-up, activity and diet:	There are 2 common types of GI Bleed, Upper GI Bleed and Lower GI Bleed.  Upper GI Bleed affects the esophagus, stomach, and first part of the small intestine. Lower GI Bleed affects the colon and rectum.  Upper GI Bleed signs and symptoms to notify your Health Care Provider are vomiting blood, or coffee ground vomitus, and bowel movements that look like black tar.  Lower GI Bleed signs and symptoms to notify your health care provider are bright red bloody bowel movements.   Take your medications as prescribed by your Gastroenterologist.  If you have had an Endoscopy or Colonoscopy, follow up with your Gastroenterologist for Pathology results.  Avoid NSAIDs unless your Health Care Provider tells you that it is ok (Aspirin, Ibuprofen, Advil, Motrin, Aleve).  Follow up with your Gastroenterologist within 1-2 weeks of discharge.  Secondary Diagnosis:	Gastric AVM  Instructions for follow-up, activity and diet:	You had gastric AVMs cauterized  Secondary Diagnosis:	Gastritis  Instructions for follow-up, activity and diet:	Continue protonix  Follow up with GI in 2 weeks  Secondary Diagnosis:	Gangrene of foot  Instructions for follow-up, activity and diet:	You had surgery done   complete antibiotics as [prescribed  Secondary Diagnosis:	S/P AKA (above knee amputation) unilateral, right  Instructions for follow-up, activity and diet:	Follow up with Vascular surgery Dr Montaño in 10 days for staple removal  Secondary Diagnosis:	Coronary artery disease involving native coronary artery of native heart without angina pectoris  Instructions for follow-up, activity and diet:	Coronary artery disease is a condition where the arteries the supply the heart muscle get clogges with fatty deposits & puts you at risk for a heart attack  Call your doctor if you have any new pain, pressure, or discomfort in the center of your chest, pain, tingling or discomfort in arms, back, neck, jaw, or stomach, shortness of breath, nausea, vomiting, burping or heartburn, sweating, cold and clammy skin, racing or abnormal heartbeat for more than 10 minutes or if they keep coming & going.  Call 911 and do not tr to get to hospital by care  You can help yourself with lefestyle changes (quitting smoking if you smoke), eat lots of fruits & vegetables & low fat dairy products, not a lot of meat & fatty foods, walk or some form of physical activity most days of the week, lose weight if you are overweight  Take your cardiac medication as prescribed to lower cholesterol, to lower blood pressure, aspirin to prevent blood clots, and diabetes control  Make sure to keep appointments with doctor for cardiac follow up care  Secondary Diagnosis:	Hyponatremia  Instructions for follow-up, activity and diet:	Resolved;

## 2017-02-23 NOTE — DISCHARGE NOTE ADULT - MEDICATION SUMMARY - MEDICATIONS TO STOP TAKING
I will STOP taking the medications listed below when I get home from the hospital:    clopidogrel 75 mg oral tablet  -- 1 tab(s) by mouth once a day    docusate sodium 100 mg oral capsule  -- 1 cap(s) by mouth 3 times a day    Percocet 10/325 oral tablet  -- 1 tab(s) by mouth every 4 hours, As Needed

## 2017-02-23 NOTE — DISCHARGE NOTE ADULT - CARE PROVIDERS DIRECT ADDRESSES
[email or web address],DirectAddress_Unknown [email or web address],DirectAddress_[email or web address],DirectAddress_Unknown

## 2017-02-24 LAB
ANION GAP SERPL CALC-SCNC: 12 MMOL/L — SIGNIFICANT CHANGE UP (ref 5–17)
APPEARANCE UR: CLEAR — SIGNIFICANT CHANGE UP
BILIRUB UR-MCNC: NEGATIVE — SIGNIFICANT CHANGE UP
BUN SERPL-MCNC: 9 MG/DL — SIGNIFICANT CHANGE UP (ref 7–23)
CALCIUM SERPL-MCNC: 8.4 MG/DL — SIGNIFICANT CHANGE UP (ref 8.4–10.5)
CHLORIDE SERPL-SCNC: 99 MMOL/L — SIGNIFICANT CHANGE UP (ref 96–108)
CO2 SERPL-SCNC: 21 MMOL/L — LOW (ref 22–31)
COLOR SPEC: YELLOW — SIGNIFICANT CHANGE UP
CREAT SERPL-MCNC: 0.35 MG/DL — LOW (ref 0.5–1.3)
DIFF PNL FLD: NEGATIVE — SIGNIFICANT CHANGE UP
GLUCOSE SERPL-MCNC: 95 MG/DL — SIGNIFICANT CHANGE UP (ref 70–99)
GLUCOSE UR QL: NEGATIVE — SIGNIFICANT CHANGE UP
HCT VFR BLD CALC: 25.1 % — LOW (ref 34.5–45)
HGB BLD-MCNC: 8.2 G/DL — LOW (ref 11.5–15.5)
KETONES UR-MCNC: ABNORMAL
LEUKOCYTE ESTERASE UR-ACNC: ABNORMAL
MCHC RBC-ENTMCNC: 30.1 PG — SIGNIFICANT CHANGE UP (ref 27–34)
MCHC RBC-ENTMCNC: 32.7 GM/DL — SIGNIFICANT CHANGE UP (ref 32–36)
MCV RBC AUTO: 92.3 FL — SIGNIFICANT CHANGE UP (ref 80–100)
NITRITE UR-MCNC: NEGATIVE — SIGNIFICANT CHANGE UP
PH UR: 6 — SIGNIFICANT CHANGE UP (ref 4.8–8)
PLATELET # BLD AUTO: 370 K/UL — SIGNIFICANT CHANGE UP (ref 150–400)
POTASSIUM SERPL-MCNC: 3.8 MMOL/L — SIGNIFICANT CHANGE UP (ref 3.5–5.3)
POTASSIUM SERPL-SCNC: 3.8 MMOL/L — SIGNIFICANT CHANGE UP (ref 3.5–5.3)
PROT UR-MCNC: SIGNIFICANT CHANGE UP
RBC # BLD: 2.72 M/UL — LOW (ref 3.8–5.2)
RBC # FLD: 14.8 % — HIGH (ref 10.3–14.5)
SODIUM SERPL-SCNC: 132 MMOL/L — LOW (ref 135–145)
SP GR SPEC: 1.02 — SIGNIFICANT CHANGE UP (ref 1.01–1.02)
SURGICAL PATHOLOGY STUDY: SIGNIFICANT CHANGE UP
UROBILINOGEN FLD QL: NEGATIVE — SIGNIFICANT CHANGE UP
WBC # BLD: 11.99 K/UL — HIGH (ref 3.8–10.5)
WBC # FLD AUTO: 11.99 K/UL — HIGH (ref 3.8–10.5)

## 2017-02-24 PROCEDURE — 99232 SBSQ HOSP IP/OBS MODERATE 35: CPT

## 2017-02-24 PROCEDURE — 78452 HT MUSCLE IMAGE SPECT MULT: CPT | Mod: 26

## 2017-02-24 PROCEDURE — 93016 CV STRESS TEST SUPVJ ONLY: CPT

## 2017-02-24 PROCEDURE — 93018 CV STRESS TEST I&R ONLY: CPT

## 2017-02-24 RX ADMIN — PANTOPRAZOLE SODIUM 40 MILLIGRAM(S): 20 TABLET, DELAYED RELEASE ORAL at 14:53

## 2017-02-24 RX ADMIN — OXYCODONE HYDROCHLORIDE 10 MILLIGRAM(S): 5 TABLET ORAL at 18:10

## 2017-02-24 RX ADMIN — OXYCODONE HYDROCHLORIDE 10 MILLIGRAM(S): 5 TABLET ORAL at 18:55

## 2017-02-24 RX ADMIN — GABAPENTIN 600 MILLIGRAM(S): 400 CAPSULE ORAL at 18:05

## 2017-02-24 RX ADMIN — CARVEDILOL PHOSPHATE 12.5 MILLIGRAM(S): 80 CAPSULE, EXTENDED RELEASE ORAL at 05:56

## 2017-02-24 RX ADMIN — GABAPENTIN 600 MILLIGRAM(S): 400 CAPSULE ORAL at 05:56

## 2017-02-24 RX ADMIN — HYDROMORPHONE HYDROCHLORIDE 1 MILLIGRAM(S): 2 INJECTION INTRAMUSCULAR; INTRAVENOUS; SUBCUTANEOUS at 05:54

## 2017-02-24 RX ADMIN — HYDROMORPHONE HYDROCHLORIDE 1 MILLIGRAM(S): 2 INJECTION INTRAMUSCULAR; INTRAVENOUS; SUBCUTANEOUS at 13:47

## 2017-02-24 RX ADMIN — OXYCODONE HYDROCHLORIDE 10 MILLIGRAM(S): 5 TABLET ORAL at 08:35

## 2017-02-24 RX ADMIN — CARVEDILOL PHOSPHATE 12.5 MILLIGRAM(S): 80 CAPSULE, EXTENDED RELEASE ORAL at 18:05

## 2017-02-24 RX ADMIN — HYDROMORPHONE HYDROCHLORIDE 1 MILLIGRAM(S): 2 INJECTION INTRAMUSCULAR; INTRAVENOUS; SUBCUTANEOUS at 13:32

## 2017-02-24 RX ADMIN — OXYCODONE HYDROCHLORIDE 10 MILLIGRAM(S): 5 TABLET ORAL at 09:20

## 2017-02-24 RX ADMIN — ATORVASTATIN CALCIUM 40 MILLIGRAM(S): 80 TABLET, FILM COATED ORAL at 22:12

## 2017-02-24 RX ADMIN — LISINOPRIL 5 MILLIGRAM(S): 2.5 TABLET ORAL at 05:56

## 2017-02-24 RX ADMIN — HYDROMORPHONE HYDROCHLORIDE 1 MILLIGRAM(S): 2 INJECTION INTRAMUSCULAR; INTRAVENOUS; SUBCUTANEOUS at 06:15

## 2017-02-24 NOTE — DIETITIAN INITIAL EVALUATION ADULT. - ORAL INTAKE PTA
Pt verifies poor po intakes PTA, but unable to provide reason for decreased po intakes and time frame. Pt states she consumes Korean cuisine at home - rice, vegetables, soups/poor

## 2017-02-24 NOTE — DIETITIAN INITIAL EVALUATION ADULT. - NS AS NUTRI INTERV MEALS SNACK
Encouraged pt to increase po intake of meals as tolerated and discussed importance of adequate nutrition intake. Recommend to liberalize diet to Mechanical Soft with no diet modification to help increase po intakes. Malnutrition sticker placed in chart, discussed with NP

## 2017-02-24 NOTE — DIETITIAN INITIAL EVALUATION ADULT. - PROBLEM SELECTOR PLAN 2
Was seen at [facility] month agao and decided against surgery as very high risk patient per daughter. f/u with them .

## 2017-02-24 NOTE — DIETITIAN INITIAL EVALUATION ADULT. - OTHER INFO
Nutrition consult received for BMI <18. Limited subjective information obtained at this time. Pt with poor appetite and po intakes, observed breakfast left untouched at visit. No GI distress at this time, +1 small BM yesterday. Pt missing all teeth and no dentures, has difficulty chewing. Denies swallowing difficulty. NKFA. PTA did not take any supplements and takes Metformin for diabetes.

## 2017-02-24 NOTE — DIETITIAN INITIAL EVALUATION ADULT. - ENERGY NEEDS
Height:62 inches, Weight: 86 pounds  BMI: 15.7 kg/m2 IBW: 110 pounds (+/-10%), %IBW:78%  Pertinent Info: Per chart, 76 y/o female with PMH of T2DM, CAD, HTN, admitted with upper GIB s/p EGD shows mild gastritis, right foot gangrene, and hypotention No edema, stage 1 bilateral heels pressure ulcers noted at this time.

## 2017-02-25 LAB
ANION GAP SERPL CALC-SCNC: 17 MMOL/L — SIGNIFICANT CHANGE UP (ref 5–17)
BUN SERPL-MCNC: 8 MG/DL — SIGNIFICANT CHANGE UP (ref 7–23)
CALCIUM SERPL-MCNC: 8.5 MG/DL — SIGNIFICANT CHANGE UP (ref 8.4–10.5)
CHLORIDE SERPL-SCNC: 99 MMOL/L — SIGNIFICANT CHANGE UP (ref 96–108)
CO2 SERPL-SCNC: 19 MMOL/L — LOW (ref 22–31)
CREAT SERPL-MCNC: 0.39 MG/DL — LOW (ref 0.5–1.3)
GLUCOSE SERPL-MCNC: 111 MG/DL — HIGH (ref 70–99)
HCT VFR BLD CALC: 30.1 % — LOW (ref 34.5–45)
HGB BLD-MCNC: 9.6 G/DL — LOW (ref 11.5–15.5)
MCHC RBC-ENTMCNC: 27.7 PG — SIGNIFICANT CHANGE UP (ref 27–34)
MCHC RBC-ENTMCNC: 31.9 GM/DL — LOW (ref 32–36)
MCV RBC AUTO: 87 FL — SIGNIFICANT CHANGE UP (ref 80–100)
PLATELET # BLD AUTO: 345 K/UL — SIGNIFICANT CHANGE UP (ref 150–400)
POTASSIUM SERPL-MCNC: 3.7 MMOL/L — SIGNIFICANT CHANGE UP (ref 3.5–5.3)
POTASSIUM SERPL-SCNC: 3.7 MMOL/L — SIGNIFICANT CHANGE UP (ref 3.5–5.3)
RBC # BLD: 3.46 M/UL — LOW (ref 3.8–5.2)
RBC # FLD: 21.4 % — HIGH (ref 10.3–14.5)
SODIUM SERPL-SCNC: 135 MMOL/L — SIGNIFICANT CHANGE UP (ref 135–145)
WBC # BLD: 13.48 K/UL — HIGH (ref 3.8–10.5)
WBC # FLD AUTO: 13.48 K/UL — HIGH (ref 3.8–10.5)

## 2017-02-25 RX ORDER — INSULIN LISPRO 100/ML
1 VIAL (ML) SUBCUTANEOUS ONCE
Qty: 0 | Refills: 0 | Status: DISCONTINUED | OUTPATIENT
Start: 2017-02-25 | End: 2017-02-25

## 2017-02-25 RX ORDER — ASPIRIN/CALCIUM CARB/MAGNESIUM 324 MG
81 TABLET ORAL DAILY
Qty: 0 | Refills: 0 | Status: DISCONTINUED | OUTPATIENT
Start: 2017-02-25 | End: 2017-02-27

## 2017-02-25 RX ORDER — INSULIN LISPRO 100/ML
1 VIAL (ML) SUBCUTANEOUS ONCE
Qty: 0 | Refills: 0 | Status: COMPLETED | OUTPATIENT
Start: 2017-02-25 | End: 2017-02-25

## 2017-02-25 RX ADMIN — HYDROMORPHONE HYDROCHLORIDE 1 MILLIGRAM(S): 2 INJECTION INTRAMUSCULAR; INTRAVENOUS; SUBCUTANEOUS at 08:02

## 2017-02-25 RX ADMIN — GABAPENTIN 600 MILLIGRAM(S): 400 CAPSULE ORAL at 05:52

## 2017-02-25 RX ADMIN — HYDROMORPHONE HYDROCHLORIDE 1 MILLIGRAM(S): 2 INJECTION INTRAMUSCULAR; INTRAVENOUS; SUBCUTANEOUS at 02:00

## 2017-02-25 RX ADMIN — HYDROMORPHONE HYDROCHLORIDE 1 MILLIGRAM(S): 2 INJECTION INTRAMUSCULAR; INTRAVENOUS; SUBCUTANEOUS at 16:23

## 2017-02-25 RX ADMIN — GABAPENTIN 600 MILLIGRAM(S): 400 CAPSULE ORAL at 17:49

## 2017-02-25 RX ADMIN — HYDROMORPHONE HYDROCHLORIDE 1 MILLIGRAM(S): 2 INJECTION INTRAMUSCULAR; INTRAVENOUS; SUBCUTANEOUS at 08:44

## 2017-02-25 RX ADMIN — CARVEDILOL PHOSPHATE 12.5 MILLIGRAM(S): 80 CAPSULE, EXTENDED RELEASE ORAL at 05:52

## 2017-02-25 RX ADMIN — LISINOPRIL 5 MILLIGRAM(S): 2.5 TABLET ORAL at 05:52

## 2017-02-25 RX ADMIN — OXYCODONE HYDROCHLORIDE 10 MILLIGRAM(S): 5 TABLET ORAL at 05:55

## 2017-02-25 RX ADMIN — Medication 1 UNIT(S): at 17:50

## 2017-02-25 RX ADMIN — OXYCODONE HYDROCHLORIDE 10 MILLIGRAM(S): 5 TABLET ORAL at 18:09

## 2017-02-25 RX ADMIN — PANTOPRAZOLE SODIUM 40 MILLIGRAM(S): 20 TABLET, DELAYED RELEASE ORAL at 12:00

## 2017-02-25 RX ADMIN — HYDROMORPHONE HYDROCHLORIDE 1 MILLIGRAM(S): 2 INJECTION INTRAMUSCULAR; INTRAVENOUS; SUBCUTANEOUS at 01:41

## 2017-02-25 RX ADMIN — OXYCODONE HYDROCHLORIDE 10 MILLIGRAM(S): 5 TABLET ORAL at 06:44

## 2017-02-25 RX ADMIN — Medication 81 MILLIGRAM(S): at 15:07

## 2017-02-25 RX ADMIN — OXYCODONE HYDROCHLORIDE 10 MILLIGRAM(S): 5 TABLET ORAL at 18:44

## 2017-02-25 RX ADMIN — ATORVASTATIN CALCIUM 40 MILLIGRAM(S): 80 TABLET, FILM COATED ORAL at 21:20

## 2017-02-25 RX ADMIN — HYDROMORPHONE HYDROCHLORIDE 1 MILLIGRAM(S): 2 INJECTION INTRAMUSCULAR; INTRAVENOUS; SUBCUTANEOUS at 15:14

## 2017-02-25 RX ADMIN — CARVEDILOL PHOSPHATE 12.5 MILLIGRAM(S): 80 CAPSULE, EXTENDED RELEASE ORAL at 21:20

## 2017-02-26 ENCOUNTER — RESULT REVIEW (OUTPATIENT)
Age: 77
End: 2017-02-26

## 2017-02-26 LAB
ANION GAP SERPL CALC-SCNC: 12 MMOL/L — SIGNIFICANT CHANGE UP (ref 5–17)
BUN SERPL-MCNC: 7 MG/DL — SIGNIFICANT CHANGE UP (ref 7–23)
CALCIUM SERPL-MCNC: 8.3 MG/DL — LOW (ref 8.4–10.5)
CHLORIDE SERPL-SCNC: 98 MMOL/L — SIGNIFICANT CHANGE UP (ref 96–108)
CO2 SERPL-SCNC: 22 MMOL/L — SIGNIFICANT CHANGE UP (ref 22–31)
CREAT SERPL-MCNC: 0.41 MG/DL — LOW (ref 0.5–1.3)
GLUCOSE SERPL-MCNC: 124 MG/DL — HIGH (ref 70–99)
HCT VFR BLD CALC: 30.7 % — LOW (ref 34.5–45)
HGB BLD-MCNC: 10 G/DL — LOW (ref 11.5–15.5)
MCHC RBC-ENTMCNC: 27.9 PG — SIGNIFICANT CHANGE UP (ref 27–34)
MCHC RBC-ENTMCNC: 32.6 GM/DL — SIGNIFICANT CHANGE UP (ref 32–36)
MCV RBC AUTO: 85.5 FL — SIGNIFICANT CHANGE UP (ref 80–100)
PLATELET # BLD AUTO: 385 K/UL — SIGNIFICANT CHANGE UP (ref 150–400)
POTASSIUM SERPL-MCNC: 3.8 MMOL/L — SIGNIFICANT CHANGE UP (ref 3.5–5.3)
POTASSIUM SERPL-SCNC: 3.8 MMOL/L — SIGNIFICANT CHANGE UP (ref 3.5–5.3)
RBC # BLD: 3.59 M/UL — LOW (ref 3.8–5.2)
RBC # FLD: 20.7 % — HIGH (ref 10.3–14.5)
SODIUM SERPL-SCNC: 132 MMOL/L — LOW (ref 135–145)
WBC # BLD: 14.22 K/UL — HIGH (ref 3.8–10.5)
WBC # FLD AUTO: 14.22 K/UL — HIGH (ref 3.8–10.5)

## 2017-02-26 PROCEDURE — 93926 LOWER EXTREMITY STUDY: CPT | Mod: 26,RT

## 2017-02-26 RX ORDER — SODIUM CHLORIDE 9 MG/ML
1000 INJECTION, SOLUTION INTRAVENOUS
Qty: 0 | Refills: 0 | Status: DISCONTINUED | OUTPATIENT
Start: 2017-02-26 | End: 2017-02-27

## 2017-02-26 RX ADMIN — CARVEDILOL PHOSPHATE 12.5 MILLIGRAM(S): 80 CAPSULE, EXTENDED RELEASE ORAL at 05:35

## 2017-02-26 RX ADMIN — GABAPENTIN 600 MILLIGRAM(S): 400 CAPSULE ORAL at 05:35

## 2017-02-26 RX ADMIN — HYDROMORPHONE HYDROCHLORIDE 1 MILLIGRAM(S): 2 INJECTION INTRAMUSCULAR; INTRAVENOUS; SUBCUTANEOUS at 19:59

## 2017-02-26 RX ADMIN — GABAPENTIN 600 MILLIGRAM(S): 400 CAPSULE ORAL at 17:14

## 2017-02-26 RX ADMIN — Medication 1: at 08:42

## 2017-02-26 RX ADMIN — OXYCODONE HYDROCHLORIDE 10 MILLIGRAM(S): 5 TABLET ORAL at 05:34

## 2017-02-26 RX ADMIN — ATORVASTATIN CALCIUM 40 MILLIGRAM(S): 80 TABLET, FILM COATED ORAL at 22:35

## 2017-02-26 RX ADMIN — Medication: at 12:51

## 2017-02-26 RX ADMIN — OXYCODONE HYDROCHLORIDE 10 MILLIGRAM(S): 5 TABLET ORAL at 18:14

## 2017-02-26 RX ADMIN — HYDROMORPHONE HYDROCHLORIDE 1 MILLIGRAM(S): 2 INJECTION INTRAMUSCULAR; INTRAVENOUS; SUBCUTANEOUS at 19:41

## 2017-02-26 RX ADMIN — OXYCODONE HYDROCHLORIDE 10 MILLIGRAM(S): 5 TABLET ORAL at 06:05

## 2017-02-26 RX ADMIN — LISINOPRIL 5 MILLIGRAM(S): 2.5 TABLET ORAL at 05:35

## 2017-02-26 RX ADMIN — PANTOPRAZOLE SODIUM 40 MILLIGRAM(S): 20 TABLET, DELAYED RELEASE ORAL at 11:27

## 2017-02-26 RX ADMIN — OXYCODONE HYDROCHLORIDE 10 MILLIGRAM(S): 5 TABLET ORAL at 17:14

## 2017-02-26 RX ADMIN — CARVEDILOL PHOSPHATE 12.5 MILLIGRAM(S): 80 CAPSULE, EXTENDED RELEASE ORAL at 17:14

## 2017-02-26 RX ADMIN — Medication 81 MILLIGRAM(S): at 11:26

## 2017-02-27 ENCOUNTER — TRANSCRIPTION ENCOUNTER (OUTPATIENT)
Age: 77
End: 2017-02-27

## 2017-02-27 LAB
ANION GAP SERPL CALC-SCNC: 12 MMOL/L — SIGNIFICANT CHANGE UP (ref 5–17)
ANION GAP SERPL CALC-SCNC: 12 MMOL/L — SIGNIFICANT CHANGE UP (ref 5–17)
APTT BLD: 31.1 SEC — SIGNIFICANT CHANGE UP (ref 27.5–37.4)
BLD GP AB SCN SERPL QL: NEGATIVE — SIGNIFICANT CHANGE UP
BUN SERPL-MCNC: 6 MG/DL — LOW (ref 7–23)
BUN SERPL-MCNC: 6 MG/DL — LOW (ref 7–23)
CALCIUM SERPL-MCNC: 8.1 MG/DL — LOW (ref 8.4–10.5)
CALCIUM SERPL-MCNC: 8.2 MG/DL — LOW (ref 8.4–10.5)
CHLORIDE SERPL-SCNC: 98 MMOL/L — SIGNIFICANT CHANGE UP (ref 96–108)
CHLORIDE SERPL-SCNC: 99 MMOL/L — SIGNIFICANT CHANGE UP (ref 96–108)
CO2 SERPL-SCNC: 22 MMOL/L — SIGNIFICANT CHANGE UP (ref 22–31)
CO2 SERPL-SCNC: 25 MMOL/L — SIGNIFICANT CHANGE UP (ref 22–31)
CREAT SERPL-MCNC: 0.31 MG/DL — LOW (ref 0.5–1.3)
CREAT SERPL-MCNC: 0.45 MG/DL — LOW (ref 0.5–1.3)
GLUCOSE SERPL-MCNC: 128 MG/DL — HIGH (ref 70–99)
GLUCOSE SERPL-MCNC: 154 MG/DL — HIGH (ref 70–99)
HCT VFR BLD CALC: 28 % — LOW (ref 34.5–45)
HCT VFR BLD CALC: 28.7 % — LOW (ref 34.5–45)
HGB BLD-MCNC: 9.1 G/DL — LOW (ref 11.5–15.5)
HGB BLD-MCNC: 9.6 G/DL — LOW (ref 11.5–15.5)
INR BLD: 1.34 RATIO — HIGH (ref 0.88–1.16)
MAGNESIUM SERPL-MCNC: 1.6 MG/DL — SIGNIFICANT CHANGE UP (ref 1.6–2.6)
MCHC RBC-ENTMCNC: 28.1 PG — SIGNIFICANT CHANGE UP (ref 27–34)
MCHC RBC-ENTMCNC: 29.6 PG — SIGNIFICANT CHANGE UP (ref 27–34)
MCHC RBC-ENTMCNC: 32.5 GM/DL — SIGNIFICANT CHANGE UP (ref 32–36)
MCHC RBC-ENTMCNC: 33.5 GM/DL — SIGNIFICANT CHANGE UP (ref 32–36)
MCV RBC AUTO: 86.4 FL — SIGNIFICANT CHANGE UP (ref 80–100)
MCV RBC AUTO: 88.3 FL — SIGNIFICANT CHANGE UP (ref 80–100)
PHOSPHATE SERPL-MCNC: 1.9 MG/DL — LOW (ref 2.5–4.5)
PHOSPHATE SERPL-MCNC: 2.4 MG/DL — LOW (ref 2.5–4.5)
PLATELET # BLD AUTO: 302 K/UL — SIGNIFICANT CHANGE UP (ref 150–400)
PLATELET # BLD AUTO: 304 K/UL — SIGNIFICANT CHANGE UP (ref 150–400)
POTASSIUM SERPL-MCNC: 3.7 MMOL/L — SIGNIFICANT CHANGE UP (ref 3.5–5.3)
POTASSIUM SERPL-MCNC: 3.9 MMOL/L — SIGNIFICANT CHANGE UP (ref 3.5–5.3)
POTASSIUM SERPL-SCNC: 3.7 MMOL/L — SIGNIFICANT CHANGE UP (ref 3.5–5.3)
POTASSIUM SERPL-SCNC: 3.9 MMOL/L — SIGNIFICANT CHANGE UP (ref 3.5–5.3)
PROTHROM AB SERPL-ACNC: 15.2 SEC — HIGH (ref 10–13.1)
RBC # BLD: 3.24 M/UL — LOW (ref 3.8–5.2)
RBC # BLD: 3.25 M/UL — LOW (ref 3.8–5.2)
RBC # FLD: 18.2 % — HIGH (ref 10.3–14.5)
RBC # FLD: 19.9 % — HIGH (ref 10.3–14.5)
RH IG SCN BLD-IMP: NEGATIVE — SIGNIFICANT CHANGE UP
SODIUM SERPL-SCNC: 133 MMOL/L — LOW (ref 135–145)
SODIUM SERPL-SCNC: 136 MMOL/L — SIGNIFICANT CHANGE UP (ref 135–145)
WBC # BLD: 11.69 K/UL — HIGH (ref 3.8–10.5)
WBC # BLD: 14.1 K/UL — HIGH (ref 3.8–10.5)
WBC # FLD AUTO: 11.69 K/UL — HIGH (ref 3.8–10.5)
WBC # FLD AUTO: 14.1 K/UL — HIGH (ref 3.8–10.5)

## 2017-02-27 PROCEDURE — 88307 TISSUE EXAM BY PATHOLOGIST: CPT | Mod: 26

## 2017-02-27 PROCEDURE — 88311 DECALCIFY TISSUE: CPT | Mod: 26

## 2017-02-27 PROCEDURE — 27590 AMPUTATE LEG AT THIGH: CPT | Mod: RT

## 2017-02-27 RX ORDER — MORPHINE SULFATE 50 MG/1
4 CAPSULE, EXTENDED RELEASE ORAL EVERY 4 HOURS
Qty: 0 | Refills: 0 | Status: DISCONTINUED | OUTPATIENT
Start: 2017-02-27 | End: 2017-03-03

## 2017-02-27 RX ORDER — MORPHINE SULFATE 50 MG/1
0.5 CAPSULE, EXTENDED RELEASE ORAL ONCE
Qty: 0 | Refills: 0 | Status: DISCONTINUED | OUTPATIENT
Start: 2017-02-27 | End: 2017-02-27

## 2017-02-27 RX ORDER — HYDROMORPHONE HYDROCHLORIDE 2 MG/ML
0.25 INJECTION INTRAMUSCULAR; INTRAVENOUS; SUBCUTANEOUS
Qty: 0 | Refills: 0 | Status: DISCONTINUED | OUTPATIENT
Start: 2017-02-27 | End: 2017-02-27

## 2017-02-27 RX ORDER — ONDANSETRON 8 MG/1
4 TABLET, FILM COATED ORAL EVERY 8 HOURS
Qty: 0 | Refills: 0 | Status: DISCONTINUED | OUTPATIENT
Start: 2017-02-27 | End: 2017-03-06

## 2017-02-27 RX ORDER — GABAPENTIN 400 MG/1
600 CAPSULE ORAL
Qty: 0 | Refills: 0 | Status: DISCONTINUED | OUTPATIENT
Start: 2017-02-27 | End: 2017-03-06

## 2017-02-27 RX ORDER — MORPHINE SULFATE 50 MG/1
2 CAPSULE, EXTENDED RELEASE ORAL ONCE
Qty: 0 | Refills: 0 | Status: DISCONTINUED | OUTPATIENT
Start: 2017-02-27 | End: 2017-02-27

## 2017-02-27 RX ORDER — MORPHINE SULFATE 50 MG/1
2 CAPSULE, EXTENDED RELEASE ORAL
Qty: 0 | Refills: 0 | Status: DISCONTINUED | OUTPATIENT
Start: 2017-02-27 | End: 2017-02-27

## 2017-02-27 RX ORDER — ONDANSETRON 8 MG/1
4 TABLET, FILM COATED ORAL EVERY 8 HOURS
Qty: 0 | Refills: 0 | Status: DISCONTINUED | OUTPATIENT
Start: 2017-02-27 | End: 2017-02-27

## 2017-02-27 RX ORDER — SODIUM CHLORIDE 9 MG/ML
1000 INJECTION, SOLUTION INTRAVENOUS
Qty: 0 | Refills: 0 | Status: DISCONTINUED | OUTPATIENT
Start: 2017-02-27 | End: 2017-02-28

## 2017-02-27 RX ORDER — INSULIN LISPRO 100/ML
VIAL (ML) SUBCUTANEOUS AT BEDTIME
Qty: 0 | Refills: 0 | Status: DISCONTINUED | OUTPATIENT
Start: 2017-02-27 | End: 2017-02-27

## 2017-02-27 RX ORDER — INSULIN LISPRO 100/ML
VIAL (ML) SUBCUTANEOUS AT BEDTIME
Qty: 0 | Refills: 0 | Status: DISCONTINUED | OUTPATIENT
Start: 2017-02-27 | End: 2017-03-06

## 2017-02-27 RX ORDER — ATORVASTATIN CALCIUM 80 MG/1
40 TABLET, FILM COATED ORAL AT BEDTIME
Qty: 0 | Refills: 0 | Status: DISCONTINUED | OUTPATIENT
Start: 2017-02-27 | End: 2017-03-06

## 2017-02-27 RX ORDER — PANTOPRAZOLE SODIUM 20 MG/1
40 TABLET, DELAYED RELEASE ORAL DAILY
Qty: 0 | Refills: 0 | Status: DISCONTINUED | OUTPATIENT
Start: 2017-02-27 | End: 2017-03-06

## 2017-02-27 RX ORDER — LISINOPRIL 2.5 MG/1
5 TABLET ORAL DAILY
Qty: 0 | Refills: 0 | Status: DISCONTINUED | OUTPATIENT
Start: 2017-02-27 | End: 2017-03-01

## 2017-02-27 RX ORDER — OXYCODONE HYDROCHLORIDE 5 MG/1
5 TABLET ORAL EVERY 4 HOURS
Qty: 0 | Refills: 0 | Status: DISCONTINUED | OUTPATIENT
Start: 2017-02-27 | End: 2017-02-27

## 2017-02-27 RX ORDER — INSULIN LISPRO 100/ML
VIAL (ML) SUBCUTANEOUS
Qty: 0 | Refills: 0 | Status: DISCONTINUED | OUTPATIENT
Start: 2017-02-27 | End: 2017-03-06

## 2017-02-27 RX ORDER — ASPIRIN/CALCIUM CARB/MAGNESIUM 324 MG
81 TABLET ORAL DAILY
Qty: 0 | Refills: 0 | Status: DISCONTINUED | OUTPATIENT
Start: 2017-02-27 | End: 2017-03-06

## 2017-02-27 RX ORDER — CARVEDILOL PHOSPHATE 80 MG/1
12.5 CAPSULE, EXTENDED RELEASE ORAL EVERY 12 HOURS
Qty: 0 | Refills: 0 | Status: DISCONTINUED | OUTPATIENT
Start: 2017-02-27 | End: 2017-03-06

## 2017-02-27 RX ORDER — CEFAZOLIN SODIUM 1 G
1000 VIAL (EA) INJECTION EVERY 8 HOURS
Qty: 0 | Refills: 0 | Status: COMPLETED | OUTPATIENT
Start: 2017-02-27 | End: 2017-02-28

## 2017-02-27 RX ORDER — MORPHINE SULFATE 50 MG/1
2 CAPSULE, EXTENDED RELEASE ORAL EVERY 4 HOURS
Qty: 0 | Refills: 0 | Status: DISCONTINUED | OUTPATIENT
Start: 2017-02-27 | End: 2017-03-03

## 2017-02-27 RX ORDER — ONDANSETRON 8 MG/1
4 TABLET, FILM COATED ORAL ONCE
Qty: 0 | Refills: 0 | Status: DISCONTINUED | OUTPATIENT
Start: 2017-02-27 | End: 2017-02-27

## 2017-02-27 RX ORDER — OXYCODONE HYDROCHLORIDE 5 MG/1
10 TABLET ORAL EVERY 6 HOURS
Qty: 0 | Refills: 0 | Status: DISCONTINUED | OUTPATIENT
Start: 2017-02-27 | End: 2017-02-27

## 2017-02-27 RX ADMIN — MORPHINE SULFATE 0.5 MILLIGRAM(S): 50 CAPSULE, EXTENDED RELEASE ORAL at 22:08

## 2017-02-27 RX ADMIN — LISINOPRIL 5 MILLIGRAM(S): 2.5 TABLET ORAL at 15:22

## 2017-02-27 RX ADMIN — GABAPENTIN 600 MILLIGRAM(S): 400 CAPSULE ORAL at 18:17

## 2017-02-27 RX ADMIN — HYDROMORPHONE HYDROCHLORIDE 1 MILLIGRAM(S): 2 INJECTION INTRAMUSCULAR; INTRAVENOUS; SUBCUTANEOUS at 00:33

## 2017-02-27 RX ADMIN — SODIUM CHLORIDE 100 MILLILITER(S): 9 INJECTION, SOLUTION INTRAVENOUS at 00:34

## 2017-02-27 RX ADMIN — MORPHINE SULFATE 2 MILLIGRAM(S): 50 CAPSULE, EXTENDED RELEASE ORAL at 13:59

## 2017-02-27 RX ADMIN — ATORVASTATIN CALCIUM 40 MILLIGRAM(S): 80 TABLET, FILM COATED ORAL at 21:53

## 2017-02-27 RX ADMIN — SODIUM CHLORIDE 75 MILLILITER(S): 9 INJECTION, SOLUTION INTRAVENOUS at 12:21

## 2017-02-27 RX ADMIN — MORPHINE SULFATE 4 MILLIGRAM(S): 50 CAPSULE, EXTENDED RELEASE ORAL at 23:47

## 2017-02-27 RX ADMIN — MORPHINE SULFATE 2 MILLIGRAM(S): 50 CAPSULE, EXTENDED RELEASE ORAL at 19:32

## 2017-02-27 RX ADMIN — Medication 100 MILLIGRAM(S): at 18:15

## 2017-02-27 RX ADMIN — HYDROMORPHONE HYDROCHLORIDE 1 MILLIGRAM(S): 2 INJECTION INTRAMUSCULAR; INTRAVENOUS; SUBCUTANEOUS at 00:58

## 2017-02-27 RX ADMIN — CARVEDILOL PHOSPHATE 12.5 MILLIGRAM(S): 80 CAPSULE, EXTENDED RELEASE ORAL at 18:17

## 2017-02-27 RX ADMIN — MORPHINE SULFATE 2 MILLIGRAM(S): 50 CAPSULE, EXTENDED RELEASE ORAL at 18:52

## 2017-02-27 RX ADMIN — MORPHINE SULFATE 2 MILLIGRAM(S): 50 CAPSULE, EXTENDED RELEASE ORAL at 13:29

## 2017-02-27 RX ADMIN — PANTOPRAZOLE SODIUM 40 MILLIGRAM(S): 20 TABLET, DELAYED RELEASE ORAL at 15:23

## 2017-02-27 RX ADMIN — MORPHINE SULFATE 2 MILLIGRAM(S): 50 CAPSULE, EXTENDED RELEASE ORAL at 15:44

## 2017-02-27 RX ADMIN — Medication 81 MILLIGRAM(S): at 15:22

## 2017-02-27 RX ADMIN — Medication 1: at 07:51

## 2017-02-27 RX ADMIN — MORPHINE SULFATE 0.5 MILLIGRAM(S): 50 CAPSULE, EXTENDED RELEASE ORAL at 21:38

## 2017-02-27 RX ADMIN — MORPHINE SULFATE 2 MILLIGRAM(S): 50 CAPSULE, EXTENDED RELEASE ORAL at 15:14

## 2017-02-27 RX ADMIN — HYDROMORPHONE HYDROCHLORIDE 0.25 MILLIGRAM(S): 2 INJECTION INTRAMUSCULAR; INTRAVENOUS; SUBCUTANEOUS at 13:33

## 2017-02-27 RX ADMIN — MORPHINE SULFATE 4 MILLIGRAM(S): 50 CAPSULE, EXTENDED RELEASE ORAL at 23:17

## 2017-02-27 RX ADMIN — HYDROMORPHONE HYDROCHLORIDE 0.25 MILLIGRAM(S): 2 INJECTION INTRAMUSCULAR; INTRAVENOUS; SUBCUTANEOUS at 13:03

## 2017-02-27 RX ADMIN — SODIUM CHLORIDE 75 MILLILITER(S): 9 INJECTION, SOLUTION INTRAVENOUS at 13:01

## 2017-02-28 LAB
ANION GAP SERPL CALC-SCNC: 11 MMOL/L — SIGNIFICANT CHANGE UP (ref 5–17)
BUN SERPL-MCNC: 6 MG/DL — LOW (ref 7–23)
CALCIUM SERPL-MCNC: 8.4 MG/DL — SIGNIFICANT CHANGE UP (ref 8.4–10.5)
CHLORIDE SERPL-SCNC: 96 MMOL/L — SIGNIFICANT CHANGE UP (ref 96–108)
CO2 SERPL-SCNC: 24 MMOL/L — SIGNIFICANT CHANGE UP (ref 22–31)
CREAT SERPL-MCNC: 0.4 MG/DL — LOW (ref 0.5–1.3)
GLUCOSE SERPL-MCNC: 138 MG/DL — HIGH (ref 70–99)
HCT VFR BLD CALC: 31.6 % — LOW (ref 34.5–45)
HGB BLD-MCNC: 10.3 G/DL — LOW (ref 11.5–15.5)
MCHC RBC-ENTMCNC: 27.9 PG — SIGNIFICANT CHANGE UP (ref 27–34)
MCHC RBC-ENTMCNC: 32.6 GM/DL — SIGNIFICANT CHANGE UP (ref 32–36)
MCV RBC AUTO: 85.6 FL — SIGNIFICANT CHANGE UP (ref 80–100)
PLATELET # BLD AUTO: 390 K/UL — SIGNIFICANT CHANGE UP (ref 150–400)
POTASSIUM SERPL-MCNC: 4 MMOL/L — SIGNIFICANT CHANGE UP (ref 3.5–5.3)
POTASSIUM SERPL-SCNC: 4 MMOL/L — SIGNIFICANT CHANGE UP (ref 3.5–5.3)
RBC # BLD: 3.69 M/UL — LOW (ref 3.8–5.2)
RBC # FLD: 19.7 % — HIGH (ref 10.3–14.5)
SODIUM SERPL-SCNC: 131 MMOL/L — LOW (ref 135–145)
WBC # BLD: 12.62 K/UL — HIGH (ref 3.8–10.5)
WBC # FLD AUTO: 12.62 K/UL — HIGH (ref 3.8–10.5)

## 2017-02-28 PROCEDURE — 93306 TTE W/DOPPLER COMPLETE: CPT | Mod: 26

## 2017-02-28 RX ORDER — CEFAZOLIN SODIUM 1 G
VIAL (EA) INJECTION
Qty: 0 | Refills: 0 | Status: DISCONTINUED | OUTPATIENT
Start: 2017-02-28 | End: 2017-03-06

## 2017-02-28 RX ORDER — CEFAZOLIN SODIUM 1 G
1000 VIAL (EA) INJECTION EVERY 12 HOURS
Qty: 0 | Refills: 0 | Status: DISCONTINUED | OUTPATIENT
Start: 2017-03-01 | End: 2017-03-06

## 2017-02-28 RX ORDER — OXYCODONE HYDROCHLORIDE 5 MG/1
15 TABLET ORAL EVERY 12 HOURS
Qty: 0 | Refills: 0 | Status: DISCONTINUED | OUTPATIENT
Start: 2017-02-28 | End: 2017-03-06

## 2017-02-28 RX ORDER — CEFAZOLIN SODIUM 1 G
1000 VIAL (EA) INJECTION ONCE
Qty: 0 | Refills: 0 | Status: COMPLETED | OUTPATIENT
Start: 2017-02-28 | End: 2017-02-28

## 2017-02-28 RX ORDER — SODIUM CHLORIDE 9 MG/ML
1000 INJECTION INTRAMUSCULAR; INTRAVENOUS; SUBCUTANEOUS
Qty: 0 | Refills: 0 | Status: DISCONTINUED | OUTPATIENT
Start: 2017-02-28 | End: 2017-03-02

## 2017-02-28 RX ADMIN — GABAPENTIN 600 MILLIGRAM(S): 400 CAPSULE ORAL at 17:29

## 2017-02-28 RX ADMIN — MORPHINE SULFATE 4 MILLIGRAM(S): 50 CAPSULE, EXTENDED RELEASE ORAL at 16:18

## 2017-02-28 RX ADMIN — Medication 100 MILLIGRAM(S): at 01:06

## 2017-02-28 RX ADMIN — OXYCODONE HYDROCHLORIDE 15 MILLIGRAM(S): 5 TABLET ORAL at 17:29

## 2017-02-28 RX ADMIN — MORPHINE SULFATE 4 MILLIGRAM(S): 50 CAPSULE, EXTENDED RELEASE ORAL at 11:46

## 2017-02-28 RX ADMIN — MORPHINE SULFATE 4 MILLIGRAM(S): 50 CAPSULE, EXTENDED RELEASE ORAL at 21:09

## 2017-02-28 RX ADMIN — LISINOPRIL 5 MILLIGRAM(S): 2.5 TABLET ORAL at 06:20

## 2017-02-28 RX ADMIN — MORPHINE SULFATE 4 MILLIGRAM(S): 50 CAPSULE, EXTENDED RELEASE ORAL at 20:38

## 2017-02-28 RX ADMIN — ATORVASTATIN CALCIUM 40 MILLIGRAM(S): 80 TABLET, FILM COATED ORAL at 21:51

## 2017-02-28 RX ADMIN — MORPHINE SULFATE 4 MILLIGRAM(S): 50 CAPSULE, EXTENDED RELEASE ORAL at 03:20

## 2017-02-28 RX ADMIN — OXYCODONE HYDROCHLORIDE 15 MILLIGRAM(S): 5 TABLET ORAL at 17:55

## 2017-02-28 RX ADMIN — MORPHINE SULFATE 4 MILLIGRAM(S): 50 CAPSULE, EXTENDED RELEASE ORAL at 03:50

## 2017-02-28 RX ADMIN — ONDANSETRON 4 MILLIGRAM(S): 8 TABLET, FILM COATED ORAL at 11:53

## 2017-02-28 RX ADMIN — MORPHINE SULFATE 2 MILLIGRAM(S): 50 CAPSULE, EXTENDED RELEASE ORAL at 09:00

## 2017-02-28 RX ADMIN — PANTOPRAZOLE SODIUM 40 MILLIGRAM(S): 20 TABLET, DELAYED RELEASE ORAL at 11:51

## 2017-02-28 RX ADMIN — CARVEDILOL PHOSPHATE 12.5 MILLIGRAM(S): 80 CAPSULE, EXTENDED RELEASE ORAL at 17:29

## 2017-02-28 RX ADMIN — MORPHINE SULFATE 4 MILLIGRAM(S): 50 CAPSULE, EXTENDED RELEASE ORAL at 12:15

## 2017-02-28 RX ADMIN — GABAPENTIN 600 MILLIGRAM(S): 400 CAPSULE ORAL at 06:20

## 2017-02-28 RX ADMIN — Medication 100 MILLIGRAM(S): at 13:01

## 2017-02-28 RX ADMIN — CARVEDILOL PHOSPHATE 12.5 MILLIGRAM(S): 80 CAPSULE, EXTENDED RELEASE ORAL at 06:20

## 2017-02-28 RX ADMIN — Medication 81 MILLIGRAM(S): at 11:51

## 2017-02-28 RX ADMIN — MORPHINE SULFATE 2 MILLIGRAM(S): 50 CAPSULE, EXTENDED RELEASE ORAL at 08:40

## 2017-02-28 RX ADMIN — SODIUM CHLORIDE 100 MILLILITER(S): 9 INJECTION INTRAMUSCULAR; INTRAVENOUS; SUBCUTANEOUS at 17:29

## 2017-02-28 RX ADMIN — MORPHINE SULFATE 2 MILLIGRAM(S): 50 CAPSULE, EXTENDED RELEASE ORAL at 14:00

## 2017-02-28 RX ADMIN — MORPHINE SULFATE 2 MILLIGRAM(S): 50 CAPSULE, EXTENDED RELEASE ORAL at 13:36

## 2017-02-28 RX ADMIN — MORPHINE SULFATE 4 MILLIGRAM(S): 50 CAPSULE, EXTENDED RELEASE ORAL at 16:50

## 2017-03-01 LAB
ANION GAP SERPL CALC-SCNC: 10 MMOL/L — SIGNIFICANT CHANGE UP (ref 5–17)
BUN SERPL-MCNC: 7 MG/DL — SIGNIFICANT CHANGE UP (ref 7–23)
CALCIUM SERPL-MCNC: 7.8 MG/DL — LOW (ref 8.4–10.5)
CHLORIDE SERPL-SCNC: 102 MMOL/L — SIGNIFICANT CHANGE UP (ref 96–108)
CO2 SERPL-SCNC: 23 MMOL/L — SIGNIFICANT CHANGE UP (ref 22–31)
CREAT SERPL-MCNC: 0.46 MG/DL — LOW (ref 0.5–1.3)
GLUCOSE SERPL-MCNC: 118 MG/DL — HIGH (ref 70–99)
HCT VFR BLD CALC: 27.3 % — LOW (ref 34.5–45)
HGB BLD-MCNC: 8.6 G/DL — LOW (ref 11.5–15.5)
MCHC RBC-ENTMCNC: 27.8 PG — SIGNIFICANT CHANGE UP (ref 27–34)
MCHC RBC-ENTMCNC: 31.5 GM/DL — LOW (ref 32–36)
MCV RBC AUTO: 88.3 FL — SIGNIFICANT CHANGE UP (ref 80–100)
PLATELET # BLD AUTO: 311 K/UL — SIGNIFICANT CHANGE UP (ref 150–400)
POTASSIUM SERPL-MCNC: 3.8 MMOL/L — SIGNIFICANT CHANGE UP (ref 3.5–5.3)
POTASSIUM SERPL-SCNC: 3.8 MMOL/L — SIGNIFICANT CHANGE UP (ref 3.5–5.3)
RBC # BLD: 3.09 M/UL — LOW (ref 3.8–5.2)
RBC # FLD: 19.8 % — HIGH (ref 10.3–14.5)
SODIUM SERPL-SCNC: 135 MMOL/L — SIGNIFICANT CHANGE UP (ref 135–145)
WBC # BLD: 8.92 K/UL — SIGNIFICANT CHANGE UP (ref 3.8–10.5)
WBC # FLD AUTO: 8.92 K/UL — SIGNIFICANT CHANGE UP (ref 3.8–10.5)

## 2017-03-01 RX ORDER — POLYETHYLENE GLYCOL 3350 17 G/17G
17 POWDER, FOR SOLUTION ORAL DAILY
Qty: 0 | Refills: 0 | Status: DISCONTINUED | OUTPATIENT
Start: 2017-03-01 | End: 2017-03-06

## 2017-03-01 RX ORDER — LISINOPRIL 2.5 MG/1
5 TABLET ORAL DAILY
Qty: 0 | Refills: 0 | Status: DISCONTINUED | OUTPATIENT
Start: 2017-03-01 | End: 2017-03-03

## 2017-03-01 RX ORDER — SENNA PLUS 8.6 MG/1
2 TABLET ORAL AT BEDTIME
Qty: 0 | Refills: 0 | Status: DISCONTINUED | OUTPATIENT
Start: 2017-03-01 | End: 2017-03-06

## 2017-03-01 RX ADMIN — SODIUM CHLORIDE 100 MILLILITER(S): 9 INJECTION INTRAMUSCULAR; INTRAVENOUS; SUBCUTANEOUS at 17:47

## 2017-03-01 RX ADMIN — MORPHINE SULFATE 2 MILLIGRAM(S): 50 CAPSULE, EXTENDED RELEASE ORAL at 00:57

## 2017-03-01 RX ADMIN — Medication 81 MILLIGRAM(S): at 12:22

## 2017-03-01 RX ADMIN — CARVEDILOL PHOSPHATE 12.5 MILLIGRAM(S): 80 CAPSULE, EXTENDED RELEASE ORAL at 17:47

## 2017-03-01 RX ADMIN — SENNA PLUS 2 TABLET(S): 8.6 TABLET ORAL at 21:21

## 2017-03-01 RX ADMIN — OXYCODONE HYDROCHLORIDE 15 MILLIGRAM(S): 5 TABLET ORAL at 17:47

## 2017-03-01 RX ADMIN — MORPHINE SULFATE 2 MILLIGRAM(S): 50 CAPSULE, EXTENDED RELEASE ORAL at 01:28

## 2017-03-01 RX ADMIN — OXYCODONE HYDROCHLORIDE 15 MILLIGRAM(S): 5 TABLET ORAL at 18:15

## 2017-03-01 RX ADMIN — Medication 100 MILLIGRAM(S): at 17:47

## 2017-03-01 RX ADMIN — OXYCODONE HYDROCHLORIDE 15 MILLIGRAM(S): 5 TABLET ORAL at 05:10

## 2017-03-01 RX ADMIN — LISINOPRIL 5 MILLIGRAM(S): 2.5 TABLET ORAL at 05:09

## 2017-03-01 RX ADMIN — Medication 100 MILLIGRAM(S): at 05:10

## 2017-03-01 RX ADMIN — MORPHINE SULFATE 4 MILLIGRAM(S): 50 CAPSULE, EXTENDED RELEASE ORAL at 14:22

## 2017-03-01 RX ADMIN — GABAPENTIN 600 MILLIGRAM(S): 400 CAPSULE ORAL at 17:47

## 2017-03-01 RX ADMIN — POLYETHYLENE GLYCOL 3350 17 GRAM(S): 17 POWDER, FOR SOLUTION ORAL at 12:22

## 2017-03-01 RX ADMIN — ATORVASTATIN CALCIUM 40 MILLIGRAM(S): 80 TABLET, FILM COATED ORAL at 21:21

## 2017-03-01 RX ADMIN — ONDANSETRON 4 MILLIGRAM(S): 8 TABLET, FILM COATED ORAL at 19:30

## 2017-03-01 RX ADMIN — MORPHINE SULFATE 4 MILLIGRAM(S): 50 CAPSULE, EXTENDED RELEASE ORAL at 22:00

## 2017-03-01 RX ADMIN — CARVEDILOL PHOSPHATE 12.5 MILLIGRAM(S): 80 CAPSULE, EXTENDED RELEASE ORAL at 05:09

## 2017-03-01 RX ADMIN — PANTOPRAZOLE SODIUM 40 MILLIGRAM(S): 20 TABLET, DELAYED RELEASE ORAL at 12:22

## 2017-03-01 RX ADMIN — MORPHINE SULFATE 4 MILLIGRAM(S): 50 CAPSULE, EXTENDED RELEASE ORAL at 14:50

## 2017-03-01 RX ADMIN — GABAPENTIN 600 MILLIGRAM(S): 400 CAPSULE ORAL at 05:09

## 2017-03-01 RX ADMIN — LISINOPRIL 5 MILLIGRAM(S): 2.5 TABLET ORAL at 17:47

## 2017-03-01 RX ADMIN — OXYCODONE HYDROCHLORIDE 15 MILLIGRAM(S): 5 TABLET ORAL at 06:30

## 2017-03-01 RX ADMIN — MORPHINE SULFATE 4 MILLIGRAM(S): 50 CAPSULE, EXTENDED RELEASE ORAL at 21:26

## 2017-03-01 RX ADMIN — Medication 1: at 12:22

## 2017-03-01 NOTE — PHYSICAL THERAPY INITIAL EVALUATION ADULT - ADDITIONAL COMMENTS
Pt speaks only Greek, social history obtained from daughter who was present at bedside. As per daughter, pt lives with her son and has been bedbound and has not ambulated since June. Pts daughter has been caring for her 8 hours a day 7 days a week and assisting her with ADLs. Pts daughter states that patient was not ambulating due to severe pain.

## 2017-03-01 NOTE — PHYSICAL THERAPY INITIAL EVALUATION ADULT - PERTINENT HX OF CURRENT PROBLEM, REHAB EVAL
Pt is a 77 y.o female with PMH of HTN, HLD, CAD, DM2, PVD, gangrene of R foot. Pt is s/p R AKA on 2/27. [facility] Arterial Duplex 2/26 indicated complete occlusive clot involving the right femoral artery bypass graft and popliteal artery

## 2017-03-02 LAB
ANION GAP SERPL CALC-SCNC: 10 MMOL/L — SIGNIFICANT CHANGE UP (ref 5–17)
BUN SERPL-MCNC: 5 MG/DL — LOW (ref 7–23)
CALCIUM SERPL-MCNC: 7.6 MG/DL — LOW (ref 8.4–10.5)
CHLORIDE SERPL-SCNC: 106 MMOL/L — SIGNIFICANT CHANGE UP (ref 96–108)
CO2 SERPL-SCNC: 23 MMOL/L — SIGNIFICANT CHANGE UP (ref 22–31)
CREAT SERPL-MCNC: 0.42 MG/DL — LOW (ref 0.5–1.3)
GLUCOSE SERPL-MCNC: 92 MG/DL — SIGNIFICANT CHANGE UP (ref 70–99)
HCT VFR BLD CALC: 25.4 % — LOW (ref 34.5–45)
HGB BLD-MCNC: 8.2 G/DL — LOW (ref 11.5–15.5)
MCHC RBC-ENTMCNC: 27.6 PG — SIGNIFICANT CHANGE UP (ref 27–34)
MCHC RBC-ENTMCNC: 32.3 GM/DL — SIGNIFICANT CHANGE UP (ref 32–36)
MCV RBC AUTO: 85.5 FL — SIGNIFICANT CHANGE UP (ref 80–100)
PLATELET # BLD AUTO: 319 K/UL — SIGNIFICANT CHANGE UP (ref 150–400)
POTASSIUM SERPL-MCNC: 3.7 MMOL/L — SIGNIFICANT CHANGE UP (ref 3.5–5.3)
POTASSIUM SERPL-SCNC: 3.7 MMOL/L — SIGNIFICANT CHANGE UP (ref 3.5–5.3)
RBC # BLD: 2.97 M/UL — LOW (ref 3.8–5.2)
RBC # FLD: 19.3 % — HIGH (ref 10.3–14.5)
SODIUM SERPL-SCNC: 139 MMOL/L — SIGNIFICANT CHANGE UP (ref 135–145)
SURGICAL PATHOLOGY STUDY: SIGNIFICANT CHANGE UP
WBC # BLD: 6.57 K/UL — SIGNIFICANT CHANGE UP (ref 3.8–10.5)
WBC # FLD AUTO: 6.57 K/UL — SIGNIFICANT CHANGE UP (ref 3.8–10.5)

## 2017-03-02 RX ORDER — LISINOPRIL 2.5 MG/1
1 TABLET ORAL
Qty: 0 | Refills: 0 | COMMUNITY
Start: 2017-03-02

## 2017-03-02 RX ORDER — SENNA PLUS 8.6 MG/1
1 TABLET ORAL
Qty: 0 | Refills: 0 | COMMUNITY

## 2017-03-02 RX ORDER — ASPIRIN/CALCIUM CARB/MAGNESIUM 324 MG
1 TABLET ORAL
Qty: 0 | Refills: 0 | COMMUNITY

## 2017-03-02 RX ORDER — NITROGLYCERIN 6.5 MG
1 CAPSULE, EXTENDED RELEASE ORAL
Qty: 0 | Refills: 0 | COMMUNITY

## 2017-03-02 RX ORDER — CLOPIDOGREL BISULFATE 75 MG/1
1 TABLET, FILM COATED ORAL
Qty: 0 | Refills: 0 | COMMUNITY

## 2017-03-02 RX ORDER — GABAPENTIN 400 MG/1
1 CAPSULE ORAL
Qty: 0 | Refills: 0 | COMMUNITY
Start: 2017-03-02

## 2017-03-02 RX ORDER — CARVEDILOL PHOSPHATE 80 MG/1
1 CAPSULE, EXTENDED RELEASE ORAL
Qty: 0 | Refills: 0 | COMMUNITY
Start: 2017-03-02

## 2017-03-02 RX ORDER — ASPIRIN/CALCIUM CARB/MAGNESIUM 324 MG
1 TABLET ORAL
Qty: 0 | Refills: 0 | COMMUNITY
Start: 2017-03-02

## 2017-03-02 RX ORDER — OXYCODONE HYDROCHLORIDE 5 MG/1
1 TABLET ORAL
Qty: 0 | Refills: 0 | COMMUNITY
Start: 2017-03-02

## 2017-03-02 RX ORDER — DOCUSATE SODIUM 100 MG
1 CAPSULE ORAL
Qty: 0 | Refills: 0 | COMMUNITY

## 2017-03-02 RX ORDER — CARVEDILOL PHOSPHATE 80 MG/1
1 CAPSULE, EXTENDED RELEASE ORAL
Qty: 0 | Refills: 0 | COMMUNITY

## 2017-03-02 RX ORDER — SENNA PLUS 8.6 MG/1
2 TABLET ORAL
Qty: 0 | Refills: 0 | COMMUNITY
Start: 2017-03-02

## 2017-03-02 RX ORDER — PANTOPRAZOLE SODIUM 20 MG/1
1 TABLET, DELAYED RELEASE ORAL
Qty: 0 | Refills: 0 | COMMUNITY

## 2017-03-02 RX ORDER — GABAPENTIN 400 MG/1
2 CAPSULE ORAL
Qty: 0 | Refills: 0 | COMMUNITY

## 2017-03-02 RX ORDER — POLYETHYLENE GLYCOL 3350 17 G/17G
17 POWDER, FOR SOLUTION ORAL
Qty: 0 | Refills: 0 | COMMUNITY

## 2017-03-02 RX ORDER — LISINOPRIL 2.5 MG/1
1 TABLET ORAL
Qty: 0 | Refills: 0 | COMMUNITY

## 2017-03-02 RX ORDER — PANTOPRAZOLE SODIUM 20 MG/1
1 TABLET, DELAYED RELEASE ORAL
Qty: 0 | Refills: 0 | COMMUNITY
Start: 2017-03-02

## 2017-03-02 RX ADMIN — MORPHINE SULFATE 4 MILLIGRAM(S): 50 CAPSULE, EXTENDED RELEASE ORAL at 15:00

## 2017-03-02 RX ADMIN — MORPHINE SULFATE 4 MILLIGRAM(S): 50 CAPSULE, EXTENDED RELEASE ORAL at 07:55

## 2017-03-02 RX ADMIN — POLYETHYLENE GLYCOL 3350 17 GRAM(S): 17 POWDER, FOR SOLUTION ORAL at 13:14

## 2017-03-02 RX ADMIN — ATORVASTATIN CALCIUM 40 MILLIGRAM(S): 80 TABLET, FILM COATED ORAL at 21:42

## 2017-03-02 RX ADMIN — MORPHINE SULFATE 4 MILLIGRAM(S): 50 CAPSULE, EXTENDED RELEASE ORAL at 08:15

## 2017-03-02 RX ADMIN — SENNA PLUS 2 TABLET(S): 8.6 TABLET ORAL at 21:42

## 2017-03-02 RX ADMIN — CARVEDILOL PHOSPHATE 12.5 MILLIGRAM(S): 80 CAPSULE, EXTENDED RELEASE ORAL at 06:09

## 2017-03-02 RX ADMIN — MORPHINE SULFATE 4 MILLIGRAM(S): 50 CAPSULE, EXTENDED RELEASE ORAL at 14:41

## 2017-03-02 RX ADMIN — OXYCODONE HYDROCHLORIDE 15 MILLIGRAM(S): 5 TABLET ORAL at 18:57

## 2017-03-02 RX ADMIN — Medication 81 MILLIGRAM(S): at 13:13

## 2017-03-02 RX ADMIN — Medication 100 MILLIGRAM(S): at 17:57

## 2017-03-02 RX ADMIN — LISINOPRIL 5 MILLIGRAM(S): 2.5 TABLET ORAL at 06:09

## 2017-03-02 RX ADMIN — OXYCODONE HYDROCHLORIDE 15 MILLIGRAM(S): 5 TABLET ORAL at 06:09

## 2017-03-02 RX ADMIN — MORPHINE SULFATE 4 MILLIGRAM(S): 50 CAPSULE, EXTENDED RELEASE ORAL at 02:34

## 2017-03-02 RX ADMIN — GABAPENTIN 600 MILLIGRAM(S): 400 CAPSULE ORAL at 06:09

## 2017-03-02 RX ADMIN — OXYCODONE HYDROCHLORIDE 15 MILLIGRAM(S): 5 TABLET ORAL at 17:57

## 2017-03-02 RX ADMIN — PANTOPRAZOLE SODIUM 40 MILLIGRAM(S): 20 TABLET, DELAYED RELEASE ORAL at 13:13

## 2017-03-02 RX ADMIN — OXYCODONE HYDROCHLORIDE 15 MILLIGRAM(S): 5 TABLET ORAL at 06:58

## 2017-03-02 RX ADMIN — MORPHINE SULFATE 4 MILLIGRAM(S): 50 CAPSULE, EXTENDED RELEASE ORAL at 03:04

## 2017-03-02 RX ADMIN — GABAPENTIN 600 MILLIGRAM(S): 400 CAPSULE ORAL at 17:57

## 2017-03-02 RX ADMIN — Medication 100 MILLIGRAM(S): at 06:09

## 2017-03-03 LAB
HCT VFR BLD CALC: 33.7 % — LOW (ref 34.5–45)
HGB BLD-MCNC: 11.4 G/DL — LOW (ref 11.5–15.5)
MCHC RBC-ENTMCNC: 30 PG — SIGNIFICANT CHANGE UP (ref 27–34)
MCHC RBC-ENTMCNC: 34 GM/DL — SIGNIFICANT CHANGE UP (ref 32–36)
MCV RBC AUTO: 88.5 FL — SIGNIFICANT CHANGE UP (ref 80–100)
PLATELET # BLD AUTO: 275 K/UL — SIGNIFICANT CHANGE UP (ref 150–400)
RBC # BLD: 3.81 M/UL — SIGNIFICANT CHANGE UP (ref 3.8–5.2)
RBC # FLD: 16.8 % — HIGH (ref 10.3–14.5)
WBC # BLD: 8.6 K/UL — SIGNIFICANT CHANGE UP (ref 3.8–10.5)
WBC # FLD AUTO: 8.6 K/UL — SIGNIFICANT CHANGE UP (ref 3.8–10.5)

## 2017-03-03 RX ORDER — OXYCODONE HYDROCHLORIDE 5 MG/1
5 TABLET ORAL EVERY 6 HOURS
Qty: 0 | Refills: 0 | Status: DISCONTINUED | OUTPATIENT
Start: 2017-03-03 | End: 2017-03-06

## 2017-03-03 RX ORDER — LISINOPRIL 2.5 MG/1
10 TABLET ORAL DAILY
Qty: 0 | Refills: 0 | Status: DISCONTINUED | OUTPATIENT
Start: 2017-03-03 | End: 2017-03-05

## 2017-03-03 RX ADMIN — OXYCODONE HYDROCHLORIDE 15 MILLIGRAM(S): 5 TABLET ORAL at 21:53

## 2017-03-03 RX ADMIN — CARVEDILOL PHOSPHATE 12.5 MILLIGRAM(S): 80 CAPSULE, EXTENDED RELEASE ORAL at 06:27

## 2017-03-03 RX ADMIN — MORPHINE SULFATE 2 MILLIGRAM(S): 50 CAPSULE, EXTENDED RELEASE ORAL at 04:45

## 2017-03-03 RX ADMIN — LISINOPRIL 10 MILLIGRAM(S): 2.5 TABLET ORAL at 17:34

## 2017-03-03 RX ADMIN — OXYCODONE HYDROCHLORIDE 5 MILLIGRAM(S): 5 TABLET ORAL at 19:24

## 2017-03-03 RX ADMIN — PANTOPRAZOLE SODIUM 40 MILLIGRAM(S): 20 TABLET, DELAYED RELEASE ORAL at 12:04

## 2017-03-03 RX ADMIN — Medication 100 MILLIGRAM(S): at 17:45

## 2017-03-03 RX ADMIN — MORPHINE SULFATE 2 MILLIGRAM(S): 50 CAPSULE, EXTENDED RELEASE ORAL at 04:06

## 2017-03-03 RX ADMIN — OXYCODONE HYDROCHLORIDE 15 MILLIGRAM(S): 5 TABLET ORAL at 22:55

## 2017-03-03 RX ADMIN — Medication 100 MILLIGRAM(S): at 06:27

## 2017-03-03 RX ADMIN — GABAPENTIN 600 MILLIGRAM(S): 400 CAPSULE ORAL at 06:26

## 2017-03-03 RX ADMIN — LISINOPRIL 5 MILLIGRAM(S): 2.5 TABLET ORAL at 06:27

## 2017-03-03 RX ADMIN — Medication 1: at 17:36

## 2017-03-03 RX ADMIN — POLYETHYLENE GLYCOL 3350 17 GRAM(S): 17 POWDER, FOR SOLUTION ORAL at 12:04

## 2017-03-03 RX ADMIN — SENNA PLUS 2 TABLET(S): 8.6 TABLET ORAL at 22:25

## 2017-03-03 RX ADMIN — Medication 81 MILLIGRAM(S): at 12:04

## 2017-03-03 RX ADMIN — ATORVASTATIN CALCIUM 40 MILLIGRAM(S): 80 TABLET, FILM COATED ORAL at 22:25

## 2017-03-03 RX ADMIN — OXYCODONE HYDROCHLORIDE 5 MILLIGRAM(S): 5 TABLET ORAL at 20:10

## 2017-03-03 RX ADMIN — CARVEDILOL PHOSPHATE 12.5 MILLIGRAM(S): 80 CAPSULE, EXTENDED RELEASE ORAL at 17:33

## 2017-03-03 RX ADMIN — GABAPENTIN 600 MILLIGRAM(S): 400 CAPSULE ORAL at 17:34

## 2017-03-04 RX ORDER — NIFEDIPINE 30 MG
30 TABLET, EXTENDED RELEASE 24 HR ORAL DAILY
Qty: 0 | Refills: 0 | Status: DISCONTINUED | OUTPATIENT
Start: 2017-03-04 | End: 2017-03-05

## 2017-03-04 RX ADMIN — CARVEDILOL PHOSPHATE 12.5 MILLIGRAM(S): 80 CAPSULE, EXTENDED RELEASE ORAL at 03:52

## 2017-03-04 RX ADMIN — SENNA PLUS 2 TABLET(S): 8.6 TABLET ORAL at 21:46

## 2017-03-04 RX ADMIN — POLYETHYLENE GLYCOL 3350 17 GRAM(S): 17 POWDER, FOR SOLUTION ORAL at 11:27

## 2017-03-04 RX ADMIN — PANTOPRAZOLE SODIUM 40 MILLIGRAM(S): 20 TABLET, DELAYED RELEASE ORAL at 11:27

## 2017-03-04 RX ADMIN — Medication 100 MILLIGRAM(S): at 17:40

## 2017-03-04 RX ADMIN — GABAPENTIN 600 MILLIGRAM(S): 400 CAPSULE ORAL at 17:13

## 2017-03-04 RX ADMIN — OXYCODONE HYDROCHLORIDE 15 MILLIGRAM(S): 5 TABLET ORAL at 06:00

## 2017-03-04 RX ADMIN — OXYCODONE HYDROCHLORIDE 5 MILLIGRAM(S): 5 TABLET ORAL at 16:55

## 2017-03-04 RX ADMIN — OXYCODONE HYDROCHLORIDE 15 MILLIGRAM(S): 5 TABLET ORAL at 19:24

## 2017-03-04 RX ADMIN — OXYCODONE HYDROCHLORIDE 15 MILLIGRAM(S): 5 TABLET ORAL at 18:42

## 2017-03-04 RX ADMIN — LISINOPRIL 10 MILLIGRAM(S): 2.5 TABLET ORAL at 05:11

## 2017-03-04 RX ADMIN — OXYCODONE HYDROCHLORIDE 15 MILLIGRAM(S): 5 TABLET ORAL at 05:11

## 2017-03-04 RX ADMIN — Medication 100 MILLIGRAM(S): at 05:12

## 2017-03-04 RX ADMIN — OXYCODONE HYDROCHLORIDE 5 MILLIGRAM(S): 5 TABLET ORAL at 17:55

## 2017-03-04 RX ADMIN — Medication 81 MILLIGRAM(S): at 11:27

## 2017-03-04 RX ADMIN — OXYCODONE HYDROCHLORIDE 5 MILLIGRAM(S): 5 TABLET ORAL at 02:15

## 2017-03-04 RX ADMIN — ATORVASTATIN CALCIUM 40 MILLIGRAM(S): 80 TABLET, FILM COATED ORAL at 21:46

## 2017-03-04 RX ADMIN — CARVEDILOL PHOSPHATE 12.5 MILLIGRAM(S): 80 CAPSULE, EXTENDED RELEASE ORAL at 17:13

## 2017-03-04 RX ADMIN — OXYCODONE HYDROCHLORIDE 5 MILLIGRAM(S): 5 TABLET ORAL at 01:29

## 2017-03-04 RX ADMIN — GABAPENTIN 600 MILLIGRAM(S): 400 CAPSULE ORAL at 05:11

## 2017-03-04 NOTE — PROVIDER CONTACT NOTE (OTHER) - ASSESSMENT
Pt has BP of 174/74, auscultated with stethoscope 172/74, HR 71. Pt is awake. Pt has been complaining of pain in her left knee

## 2017-03-04 NOTE — PROVIDER CONTACT NOTE (OTHER) - ACTION/TREATMENT ORDERED:
ZOIE Barksdale said give 1 BP med early then the other at regularly scheduled time. Re-evaluate in 1 hour.

## 2017-03-04 NOTE — PROVIDER CONTACT NOTE (OTHER) - ASSESSMENT
Pt had above knee amputation on right side but complaining of pain on left side. Pt on left knee has swelling and warmth. Pt on aspirin 81 mg as antiplatelet. Pt's daughter said pt hit the inside of her knee against the other leg which is the cause of the pain.

## 2017-03-05 LAB
ANION GAP SERPL CALC-SCNC: 12 MMOL/L — SIGNIFICANT CHANGE UP (ref 5–17)
BUN SERPL-MCNC: 7 MG/DL — SIGNIFICANT CHANGE UP (ref 7–23)
CALCIUM SERPL-MCNC: 8.2 MG/DL — LOW (ref 8.4–10.5)
CHLORIDE SERPL-SCNC: 102 MMOL/L — SIGNIFICANT CHANGE UP (ref 96–108)
CO2 SERPL-SCNC: 24 MMOL/L — SIGNIFICANT CHANGE UP (ref 22–31)
CREAT SERPL-MCNC: 0.29 MG/DL — LOW (ref 0.5–1.3)
GLUCOSE SERPL-MCNC: 90 MG/DL — SIGNIFICANT CHANGE UP (ref 70–99)
HCT VFR BLD CALC: 31.6 % — LOW (ref 34.5–45)
HGB BLD-MCNC: 10.5 G/DL — LOW (ref 11.5–15.5)
MCHC RBC-ENTMCNC: 28.7 PG — SIGNIFICANT CHANGE UP (ref 27–34)
MCHC RBC-ENTMCNC: 33.2 GM/DL — SIGNIFICANT CHANGE UP (ref 32–36)
MCV RBC AUTO: 86.3 FL — SIGNIFICANT CHANGE UP (ref 80–100)
PLATELET # BLD AUTO: 294 K/UL — SIGNIFICANT CHANGE UP (ref 150–400)
POTASSIUM SERPL-MCNC: 3.7 MMOL/L — SIGNIFICANT CHANGE UP (ref 3.5–5.3)
POTASSIUM SERPL-SCNC: 3.7 MMOL/L — SIGNIFICANT CHANGE UP (ref 3.5–5.3)
RBC # BLD: 3.66 M/UL — LOW (ref 3.8–5.2)
RBC # FLD: 17.9 % — HIGH (ref 10.3–14.5)
SODIUM SERPL-SCNC: 138 MMOL/L — SIGNIFICANT CHANGE UP (ref 135–145)
WBC # BLD: 7.81 K/UL — SIGNIFICANT CHANGE UP (ref 3.8–10.5)
WBC # FLD AUTO: 7.81 K/UL — SIGNIFICANT CHANGE UP (ref 3.8–10.5)

## 2017-03-05 RX ORDER — SODIUM CHLORIDE 9 MG/ML
500 INJECTION INTRAMUSCULAR; INTRAVENOUS; SUBCUTANEOUS ONCE
Qty: 0 | Refills: 0 | Status: COMPLETED | OUTPATIENT
Start: 2017-03-05 | End: 2017-03-05

## 2017-03-05 RX ORDER — LISINOPRIL 2.5 MG/1
20 TABLET ORAL DAILY
Qty: 0 | Refills: 0 | Status: DISCONTINUED | OUTPATIENT
Start: 2017-03-06 | End: 2017-03-06

## 2017-03-05 RX ORDER — ENOXAPARIN SODIUM 100 MG/ML
30 INJECTION SUBCUTANEOUS DAILY
Qty: 0 | Refills: 0 | Status: DISCONTINUED | OUTPATIENT
Start: 2017-03-05 | End: 2017-03-06

## 2017-03-05 RX ADMIN — CARVEDILOL PHOSPHATE 12.5 MILLIGRAM(S): 80 CAPSULE, EXTENDED RELEASE ORAL at 05:35

## 2017-03-05 RX ADMIN — ENOXAPARIN SODIUM 30 MILLIGRAM(S): 100 INJECTION SUBCUTANEOUS at 12:17

## 2017-03-05 RX ADMIN — Medication 81 MILLIGRAM(S): at 11:46

## 2017-03-05 RX ADMIN — Medication 1: at 12:15

## 2017-03-05 RX ADMIN — GABAPENTIN 600 MILLIGRAM(S): 400 CAPSULE ORAL at 05:35

## 2017-03-05 RX ADMIN — OXYCODONE HYDROCHLORIDE 15 MILLIGRAM(S): 5 TABLET ORAL at 06:11

## 2017-03-05 RX ADMIN — OXYCODONE HYDROCHLORIDE 15 MILLIGRAM(S): 5 TABLET ORAL at 19:23

## 2017-03-05 RX ADMIN — Medication 100 MILLIGRAM(S): at 17:39

## 2017-03-05 RX ADMIN — ATORVASTATIN CALCIUM 40 MILLIGRAM(S): 80 TABLET, FILM COATED ORAL at 22:37

## 2017-03-05 RX ADMIN — GABAPENTIN 600 MILLIGRAM(S): 400 CAPSULE ORAL at 17:39

## 2017-03-05 RX ADMIN — POLYETHYLENE GLYCOL 3350 17 GRAM(S): 17 POWDER, FOR SOLUTION ORAL at 11:46

## 2017-03-05 RX ADMIN — OXYCODONE HYDROCHLORIDE 15 MILLIGRAM(S): 5 TABLET ORAL at 17:39

## 2017-03-05 RX ADMIN — SODIUM CHLORIDE 1 MILLILITER(S): 9 INJECTION INTRAMUSCULAR; INTRAVENOUS; SUBCUTANEOUS at 12:17

## 2017-03-05 RX ADMIN — OXYCODONE HYDROCHLORIDE 15 MILLIGRAM(S): 5 TABLET ORAL at 05:34

## 2017-03-05 RX ADMIN — Medication 100 MILLIGRAM(S): at 05:35

## 2017-03-05 RX ADMIN — LISINOPRIL 10 MILLIGRAM(S): 2.5 TABLET ORAL at 05:35

## 2017-03-05 RX ADMIN — Medication 30 MILLIGRAM(S): at 05:35

## 2017-03-05 RX ADMIN — SENNA PLUS 2 TABLET(S): 8.6 TABLET ORAL at 22:37

## 2017-03-05 RX ADMIN — PANTOPRAZOLE SODIUM 40 MILLIGRAM(S): 20 TABLET, DELAYED RELEASE ORAL at 11:46

## 2017-03-06 VITALS
RESPIRATION RATE: 18 BRPM | HEART RATE: 55 BPM | TEMPERATURE: 98 F | DIASTOLIC BLOOD PRESSURE: 75 MMHG | OXYGEN SATURATION: 98 % | SYSTOLIC BLOOD PRESSURE: 120 MMHG

## 2017-03-06 LAB
ANION GAP SERPL CALC-SCNC: 13 MMOL/L — SIGNIFICANT CHANGE UP (ref 5–17)
BUN SERPL-MCNC: 7 MG/DL — SIGNIFICANT CHANGE UP (ref 7–23)
CALCIUM SERPL-MCNC: 8.4 MG/DL — SIGNIFICANT CHANGE UP (ref 8.4–10.5)
CHLORIDE SERPL-SCNC: 103 MMOL/L — SIGNIFICANT CHANGE UP (ref 96–108)
CO2 SERPL-SCNC: 23 MMOL/L — SIGNIFICANT CHANGE UP (ref 22–31)
CREAT SERPL-MCNC: 0.35 MG/DL — LOW (ref 0.5–1.3)
GLUCOSE SERPL-MCNC: 81 MG/DL — SIGNIFICANT CHANGE UP (ref 70–99)
HCT VFR BLD CALC: 31.7 % — LOW (ref 34.5–45)
HGB BLD-MCNC: 10 G/DL — LOW (ref 11.5–15.5)
MCHC RBC-ENTMCNC: 27.9 PG — SIGNIFICANT CHANGE UP (ref 27–34)
MCHC RBC-ENTMCNC: 31.5 GM/DL — LOW (ref 32–36)
MCV RBC AUTO: 88.3 FL — SIGNIFICANT CHANGE UP (ref 80–100)
PLATELET # BLD AUTO: 325 K/UL — SIGNIFICANT CHANGE UP (ref 150–400)
POTASSIUM SERPL-MCNC: 3.7 MMOL/L — SIGNIFICANT CHANGE UP (ref 3.5–5.3)
POTASSIUM SERPL-SCNC: 3.7 MMOL/L — SIGNIFICANT CHANGE UP (ref 3.5–5.3)
RBC # BLD: 3.59 M/UL — LOW (ref 3.8–5.2)
RBC # FLD: 18.2 % — HIGH (ref 10.3–14.5)
SODIUM SERPL-SCNC: 139 MMOL/L — SIGNIFICANT CHANGE UP (ref 135–145)
WBC # BLD: 6.09 K/UL — SIGNIFICANT CHANGE UP (ref 3.8–10.5)
WBC # FLD AUTO: 6.09 K/UL — SIGNIFICANT CHANGE UP (ref 3.8–10.5)

## 2017-03-06 RX ORDER — ENOXAPARIN SODIUM 100 MG/ML
30 INJECTION SUBCUTANEOUS
Qty: 0 | Refills: 0 | COMMUNITY
Start: 2017-03-06

## 2017-03-06 RX ORDER — POLYETHYLENE GLYCOL 3350 17 G/17G
17 POWDER, FOR SOLUTION ORAL
Qty: 0 | Refills: 0 | COMMUNITY

## 2017-03-06 RX ORDER — DOCUSATE SODIUM 100 MG
100 CAPSULE ORAL
Qty: 0 | Refills: 0 | Status: DISCONTINUED | OUTPATIENT
Start: 2017-03-06 | End: 2017-03-06

## 2017-03-06 RX ORDER — LISINOPRIL 2.5 MG/1
1 TABLET ORAL
Qty: 0 | Refills: 0 | COMMUNITY
Start: 2017-03-06

## 2017-03-06 RX ADMIN — ENOXAPARIN SODIUM 30 MILLIGRAM(S): 100 INJECTION SUBCUTANEOUS at 12:32

## 2017-03-06 RX ADMIN — OXYCODONE HYDROCHLORIDE 5 MILLIGRAM(S): 5 TABLET ORAL at 02:38

## 2017-03-06 RX ADMIN — OXYCODONE HYDROCHLORIDE 5 MILLIGRAM(S): 5 TABLET ORAL at 01:22

## 2017-03-06 RX ADMIN — Medication 10 MILLIGRAM(S): at 01:38

## 2017-03-06 RX ADMIN — OXYCODONE HYDROCHLORIDE 15 MILLIGRAM(S): 5 TABLET ORAL at 07:36

## 2017-03-06 RX ADMIN — GABAPENTIN 600 MILLIGRAM(S): 400 CAPSULE ORAL at 06:34

## 2017-03-06 RX ADMIN — OXYCODONE HYDROCHLORIDE 5 MILLIGRAM(S): 5 TABLET ORAL at 12:39

## 2017-03-06 RX ADMIN — OXYCODONE HYDROCHLORIDE 15 MILLIGRAM(S): 5 TABLET ORAL at 06:34

## 2017-03-06 RX ADMIN — Medication 81 MILLIGRAM(S): at 12:32

## 2017-03-06 RX ADMIN — Medication 100 MILLIGRAM(S): at 06:35

## 2017-03-06 RX ADMIN — POLYETHYLENE GLYCOL 3350 17 GRAM(S): 17 POWDER, FOR SOLUTION ORAL at 12:32

## 2017-03-06 RX ADMIN — PANTOPRAZOLE SODIUM 40 MILLIGRAM(S): 20 TABLET, DELAYED RELEASE ORAL at 12:32

## 2017-03-24 ENCOUNTER — APPOINTMENT (OUTPATIENT)
Dept: VASCULAR SURGERY | Facility: CLINIC | Age: 77
End: 2017-03-24

## 2017-03-24 VITALS — HEART RATE: 69 BPM | TEMPERATURE: 97.5 F | DIASTOLIC BLOOD PRESSURE: 65 MMHG | SYSTOLIC BLOOD PRESSURE: 108 MMHG

## 2017-03-24 DIAGNOSIS — I77.1 STRICTURE OF ARTERY: ICD-10-CM

## 2017-03-24 RX ORDER — METFORMIN HYDROCHLORIDE 1000 MG/1
1000 TABLET, COATED ORAL
Refills: 0 | Status: ACTIVE | COMMUNITY

## 2017-03-24 RX ORDER — LISINOPRIL 5 MG/1
5 TABLET ORAL
Refills: 0 | Status: ACTIVE | COMMUNITY

## 2017-03-24 RX ORDER — SENNOSIDES 8.6 MG TABLETS 8.6 MG/1
8.6 TABLET ORAL
Refills: 0 | Status: ACTIVE | COMMUNITY

## 2017-03-24 RX ORDER — ASPIRIN 81 MG
81 TABLET, DELAYED RELEASE (ENTERIC COATED) ORAL
Refills: 0 | Status: ACTIVE | COMMUNITY

## 2017-03-24 RX ORDER — LORATADINE 5 MG
17 TABLET,CHEWABLE ORAL
Refills: 0 | Status: ACTIVE | COMMUNITY

## 2017-03-24 RX ORDER — CARVEDILOL 12.5 MG/1
12.5 TABLET, FILM COATED ORAL
Refills: 0 | Status: ACTIVE | COMMUNITY

## 2017-04-18 ENCOUNTER — MEDICATION RENEWAL (OUTPATIENT)
Age: 77
End: 2017-04-18

## 2017-06-23 ENCOUNTER — APPOINTMENT (OUTPATIENT)
Dept: VASCULAR SURGERY | Facility: CLINIC | Age: 77
End: 2017-06-23

## 2017-07-23 PROCEDURE — 80048 BASIC METABOLIC PNL TOTAL CA: CPT

## 2017-07-23 PROCEDURE — 88305 TISSUE EXAM BY PATHOLOGIST: CPT

## 2017-07-23 PROCEDURE — 97162 PT EVAL MOD COMPLEX 30 MIN: CPT

## 2017-07-23 PROCEDURE — 84100 ASSAY OF PHOSPHORUS: CPT

## 2017-07-23 PROCEDURE — 86923 COMPATIBILITY TEST ELECTRIC: CPT

## 2017-07-23 PROCEDURE — 93926 LOWER EXTREMITY STUDY: CPT

## 2017-07-23 PROCEDURE — 99285 EMERGENCY DEPT VISIT HI MDM: CPT | Mod: 25

## 2017-07-23 PROCEDURE — 93923 UPR/LXTR ART STDY 3+ LVLS: CPT

## 2017-07-23 PROCEDURE — 86850 RBC ANTIBODY SCREEN: CPT

## 2017-07-23 PROCEDURE — P9016: CPT

## 2017-07-23 PROCEDURE — 83605 ASSAY OF LACTIC ACID: CPT

## 2017-07-23 PROCEDURE — 81001 URINALYSIS AUTO W/SCOPE: CPT

## 2017-07-23 PROCEDURE — 96376 TX/PRO/DX INJ SAME DRUG ADON: CPT

## 2017-07-23 PROCEDURE — 97110 THERAPEUTIC EXERCISES: CPT

## 2017-07-23 PROCEDURE — 86900 BLOOD TYPING SEROLOGIC ABO: CPT

## 2017-07-23 PROCEDURE — 97530 THERAPEUTIC ACTIVITIES: CPT

## 2017-07-23 PROCEDURE — 80053 COMPREHEN METABOLIC PANEL: CPT

## 2017-07-23 PROCEDURE — A9500: CPT

## 2017-07-23 PROCEDURE — 88311 DECALCIFY TISSUE: CPT

## 2017-07-23 PROCEDURE — 85730 THROMBOPLASTIN TIME PARTIAL: CPT

## 2017-07-23 PROCEDURE — 88307 TISSUE EXAM BY PATHOLOGIST: CPT

## 2017-07-23 PROCEDURE — 93017 CV STRESS TEST TRACING ONLY: CPT

## 2017-07-23 PROCEDURE — 74177 CT ABD & PELVIS W/CONTRAST: CPT

## 2017-07-23 PROCEDURE — A9505: CPT

## 2017-07-23 PROCEDURE — 96374 THER/PROPH/DIAG INJ IV PUSH: CPT | Mod: XU

## 2017-07-23 PROCEDURE — 82435 ASSAY OF BLOOD CHLORIDE: CPT

## 2017-07-23 PROCEDURE — 83735 ASSAY OF MAGNESIUM: CPT

## 2017-07-23 PROCEDURE — 93005 ELECTROCARDIOGRAM TRACING: CPT

## 2017-07-23 PROCEDURE — 84484 ASSAY OF TROPONIN QUANT: CPT

## 2017-07-23 PROCEDURE — 82803 BLOOD GASES ANY COMBINATION: CPT

## 2017-07-23 PROCEDURE — 93306 TTE W/DOPPLER COMPLETE: CPT

## 2017-07-23 PROCEDURE — 71045 X-RAY EXAM CHEST 1 VIEW: CPT

## 2017-07-23 PROCEDURE — 82272 OCCULT BLD FECES 1-3 TESTS: CPT

## 2017-07-23 PROCEDURE — 85027 COMPLETE CBC AUTOMATED: CPT

## 2017-07-23 PROCEDURE — 85610 PROTHROMBIN TIME: CPT

## 2017-07-23 PROCEDURE — 36430 TRANSFUSION BLD/BLD COMPNT: CPT

## 2017-07-23 PROCEDURE — 82947 ASSAY GLUCOSE BLOOD QUANT: CPT

## 2017-07-23 PROCEDURE — 84132 ASSAY OF SERUM POTASSIUM: CPT

## 2017-07-23 PROCEDURE — 85014 HEMATOCRIT: CPT

## 2017-07-23 PROCEDURE — 82330 ASSAY OF CALCIUM: CPT

## 2017-07-23 PROCEDURE — 83036 HEMOGLOBIN GLYCOSYLATED A1C: CPT

## 2017-07-23 PROCEDURE — 78452 HT MUSCLE IMAGE SPECT MULT: CPT

## 2017-07-23 PROCEDURE — 86901 BLOOD TYPING SEROLOGIC RH(D): CPT

## 2017-07-23 PROCEDURE — 88312 SPECIAL STAINS GROUP 1: CPT

## 2017-07-23 PROCEDURE — 84295 ASSAY OF SERUM SODIUM: CPT

## 2017-07-23 PROCEDURE — P9040: CPT

## 2017-08-27 NOTE — PHYSICAL THERAPY INITIAL EVALUATION ADULT - PHYSICAL ASSIST/NONPHYSICAL ASSIST: SIT/STAND, REHAB EVAL
1 person assist/verbal cues/nonverbal cues (demo/gestures) Principal Discharge DX:	Fall, initial encounter  Secondary Diagnosis:	Dementia with behavioral disturbance, unspecified dementia type

## 2017-08-30 ENCOUNTER — APPOINTMENT (OUTPATIENT)
Dept: UROLOGY | Facility: CLINIC | Age: 77
End: 2017-08-30

## 2017-09-06 ENCOUNTER — APPOINTMENT (OUTPATIENT)
Dept: UROLOGY | Facility: CLINIC | Age: 77
End: 2017-09-06
Payer: MEDICARE

## 2017-09-06 PROCEDURE — 99202 OFFICE O/P NEW SF 15 MIN: CPT

## 2017-09-06 PROCEDURE — 51798 US URINE CAPACITY MEASURE: CPT

## 2017-09-11 LAB
APPEARANCE: CLEAR
BACTERIA UR CULT: NORMAL
BACTERIA: NEGATIVE
BILIRUBIN URINE: NEGATIVE
BLOOD URINE: NEGATIVE
COLOR: YELLOW
CORE LAB FLUID CYTOLOGY: NORMAL
GLUCOSE QUALITATIVE U: NORMAL MG/DL
KETONES URINE: NEGATIVE
LEUKOCYTE ESTERASE URINE: NEGATIVE
MICROSCOPIC-UA: NORMAL
NITRITE URINE: NEGATIVE
PH URINE: 6
PROTEIN URINE: NEGATIVE MG/DL
RED BLOOD CELLS URINE: 1 /HPF
SPECIFIC GRAVITY URINE: 1.01
SQUAMOUS EPITHELIAL CELLS: 2 /HPF
UROBILINOGEN URINE: NORMAL MG/DL
WHITE BLOOD CELLS URINE: 1 /HPF

## 2017-11-28 ENCOUNTER — MEDICATION RENEWAL (OUTPATIENT)
Age: 77
End: 2017-11-28

## 2017-11-28 RX ORDER — CILOSTAZOL 50 MG/1
50 TABLET ORAL
Qty: 60 | Refills: 3 | Status: ACTIVE | COMMUNITY
Start: 2017-03-24 | End: 1900-01-01

## 2017-11-30 ENCOUNTER — RX RENEWAL (OUTPATIENT)
Age: 77
End: 2017-11-30

## 2018-04-06 RX ORDER — CIPROFLOXACIN LACTATE 400MG/40ML
1 VIAL (ML) INTRAVENOUS
Qty: 0 | Refills: 0 | COMMUNITY
Start: 2018-04-06

## 2018-04-10 ENCOUNTER — INPATIENT (INPATIENT)
Facility: HOSPITAL | Age: 78
LOS: 8 days | Discharge: NURSING, REHAB OR LONG-TERM CARE | DRG: 375 | End: 2018-04-19
Attending: INTERNAL MEDICINE | Admitting: INTERNAL MEDICINE
Payer: MEDICAID

## 2018-04-10 VITALS
OXYGEN SATURATION: 98 % | DIASTOLIC BLOOD PRESSURE: 55 MMHG | TEMPERATURE: 98 F | SYSTOLIC BLOOD PRESSURE: 113 MMHG | HEART RATE: 76 BPM | RESPIRATION RATE: 18 BRPM

## 2018-04-10 DIAGNOSIS — K92.2 GASTROINTESTINAL HEMORRHAGE, UNSPECIFIED: ICD-10-CM

## 2018-04-10 LAB
ALBUMIN SERPL ELPH-MCNC: 3.8 G/DL — SIGNIFICANT CHANGE UP (ref 3.3–5)
ALP SERPL-CCNC: 75 U/L — SIGNIFICANT CHANGE UP (ref 40–120)
ALT FLD-CCNC: 12 U/L RC — SIGNIFICANT CHANGE UP (ref 10–45)
ANION GAP SERPL CALC-SCNC: 12 MMOL/L — SIGNIFICANT CHANGE UP (ref 5–17)
APTT BLD: 32.5 SEC — SIGNIFICANT CHANGE UP (ref 27.5–37.4)
AST SERPL-CCNC: 13 U/L — SIGNIFICANT CHANGE UP (ref 10–40)
BASOPHILS # BLD AUTO: 0 K/UL — SIGNIFICANT CHANGE UP (ref 0–0.2)
BASOPHILS NFR BLD AUTO: 0.1 % — SIGNIFICANT CHANGE UP (ref 0–2)
BILIRUB SERPL-MCNC: 0.3 MG/DL — SIGNIFICANT CHANGE UP (ref 0.2–1.2)
BUN SERPL-MCNC: 15 MG/DL — SIGNIFICANT CHANGE UP (ref 7–23)
CALCIUM SERPL-MCNC: 9.1 MG/DL — SIGNIFICANT CHANGE UP (ref 8.4–10.5)
CHLORIDE SERPL-SCNC: 101 MMOL/L — SIGNIFICANT CHANGE UP (ref 96–108)
CO2 SERPL-SCNC: 26 MMOL/L — SIGNIFICANT CHANGE UP (ref 22–31)
CREAT SERPL-MCNC: 0.62 MG/DL — SIGNIFICANT CHANGE UP (ref 0.5–1.3)
EOSINOPHIL # BLD AUTO: 0.2 K/UL — SIGNIFICANT CHANGE UP (ref 0–0.5)
EOSINOPHIL NFR BLD AUTO: 1.5 % — SIGNIFICANT CHANGE UP (ref 0–6)
GAS PNL BLDV: SIGNIFICANT CHANGE UP
GLUCOSE SERPL-MCNC: 118 MG/DL — HIGH (ref 70–99)
HCT VFR BLD CALC: 33.8 % — LOW (ref 34.5–45)
HGB BLD-MCNC: 11.2 G/DL — LOW (ref 11.5–15.5)
INR BLD: 1.1 RATIO — SIGNIFICANT CHANGE UP (ref 0.88–1.16)
LYMPHOCYTES # BLD AUTO: 1.8 K/UL — SIGNIFICANT CHANGE UP (ref 1–3.3)
LYMPHOCYTES # BLD AUTO: 16.7 % — SIGNIFICANT CHANGE UP (ref 13–44)
MCHC RBC-ENTMCNC: 30.6 PG — SIGNIFICANT CHANGE UP (ref 27–34)
MCHC RBC-ENTMCNC: 33.2 GM/DL — SIGNIFICANT CHANGE UP (ref 32–36)
MCV RBC AUTO: 92.2 FL — SIGNIFICANT CHANGE UP (ref 80–100)
MONOCYTES # BLD AUTO: 0.7 K/UL — SIGNIFICANT CHANGE UP (ref 0–0.9)
MONOCYTES NFR BLD AUTO: 6.5 % — SIGNIFICANT CHANGE UP (ref 2–14)
NEUTROPHILS # BLD AUTO: 8.1 K/UL — HIGH (ref 1.8–7.4)
NEUTROPHILS NFR BLD AUTO: 75.2 % — SIGNIFICANT CHANGE UP (ref 43–77)
PLATELET # BLD AUTO: 298 K/UL — SIGNIFICANT CHANGE UP (ref 150–400)
POTASSIUM SERPL-MCNC: 3.6 MMOL/L — SIGNIFICANT CHANGE UP (ref 3.5–5.3)
POTASSIUM SERPL-SCNC: 3.6 MMOL/L — SIGNIFICANT CHANGE UP (ref 3.5–5.3)
PROT SERPL-MCNC: 7.6 G/DL — SIGNIFICANT CHANGE UP (ref 6–8.3)
PROTHROM AB SERPL-ACNC: 12 SEC — SIGNIFICANT CHANGE UP (ref 9.8–12.7)
RBC # BLD: 3.66 M/UL — LOW (ref 3.8–5.2)
RBC # FLD: 12.8 % — SIGNIFICANT CHANGE UP (ref 10.3–14.5)
SODIUM SERPL-SCNC: 139 MMOL/L — SIGNIFICANT CHANGE UP (ref 135–145)
TROPONIN T SERPL-MCNC: <0.01 NG/ML — SIGNIFICANT CHANGE UP (ref 0–0.06)
WBC # BLD: 10.8 K/UL — HIGH (ref 3.8–10.5)
WBC # FLD AUTO: 10.8 K/UL — HIGH (ref 3.8–10.5)

## 2018-04-10 PROCEDURE — 93010 ELECTROCARDIOGRAM REPORT: CPT

## 2018-04-10 PROCEDURE — 99285 EMERGENCY DEPT VISIT HI MDM: CPT | Mod: 25

## 2018-04-10 PROCEDURE — 74177 CT ABD & PELVIS W/CONTRAST: CPT | Mod: 26

## 2018-04-10 RX ORDER — POTASSIUM CHLORIDE 20 MEQ
10 PACKET (EA) ORAL ONCE
Qty: 0 | Refills: 0 | Status: DISCONTINUED | OUTPATIENT
Start: 2018-04-10 | End: 2018-04-10

## 2018-04-10 RX ORDER — PANTOPRAZOLE SODIUM 20 MG/1
40 TABLET, DELAYED RELEASE ORAL DAILY
Qty: 0 | Refills: 0 | Status: DISCONTINUED | OUTPATIENT
Start: 2018-04-10 | End: 2018-04-19

## 2018-04-10 RX ORDER — FERROUS SULFATE 325(65) MG
1 TABLET ORAL
Qty: 0 | Refills: 0 | COMMUNITY

## 2018-04-10 RX ORDER — METFORMIN HYDROCHLORIDE 850 MG/1
1 TABLET ORAL
Qty: 0 | Refills: 0 | COMMUNITY

## 2018-04-10 RX ORDER — SODIUM CHLORIDE 9 MG/ML
1000 INJECTION INTRAMUSCULAR; INTRAVENOUS; SUBCUTANEOUS ONCE
Qty: 0 | Refills: 0 | Status: COMPLETED | OUTPATIENT
Start: 2018-04-10 | End: 2018-04-10

## 2018-04-10 RX ORDER — POLYETHYLENE GLYCOL 3350 17 G/17G
17 POWDER, FOR SOLUTION ORAL
Qty: 0 | Refills: 0 | COMMUNITY

## 2018-04-10 RX ORDER — POTASSIUM CHLORIDE 20 MEQ
10 PACKET (EA) ORAL ONCE
Qty: 0 | Refills: 0 | Status: COMPLETED | OUTPATIENT
Start: 2018-04-10 | End: 2018-04-10

## 2018-04-10 RX ORDER — MORPHINE SULFATE 50 MG/1
2 CAPSULE, EXTENDED RELEASE ORAL EVERY 6 HOURS
Qty: 0 | Refills: 0 | Status: DISCONTINUED | OUTPATIENT
Start: 2018-04-10 | End: 2018-04-14

## 2018-04-10 RX ORDER — ACETAMINOPHEN 500 MG
1000 TABLET ORAL ONCE
Qty: 0 | Refills: 0 | Status: COMPLETED | OUTPATIENT
Start: 2018-04-10 | End: 2018-04-10

## 2018-04-10 RX ORDER — PANTOPRAZOLE SODIUM 20 MG/1
80 TABLET, DELAYED RELEASE ORAL ONCE
Qty: 0 | Refills: 0 | Status: COMPLETED | OUTPATIENT
Start: 2018-04-10 | End: 2018-04-10

## 2018-04-10 RX ORDER — MORPHINE SULFATE 50 MG/1
2 CAPSULE, EXTENDED RELEASE ORAL ONCE
Qty: 0 | Refills: 0 | Status: DISCONTINUED | OUTPATIENT
Start: 2018-04-10 | End: 2018-04-10

## 2018-04-10 RX ORDER — OXYCODONE HYDROCHLORIDE 5 MG/1
1 TABLET ORAL
Qty: 0 | Refills: 0 | COMMUNITY

## 2018-04-10 RX ORDER — POTASSIUM CHLORIDE 20 MEQ
40 PACKET (EA) ORAL ONCE
Qty: 0 | Refills: 0 | Status: DISCONTINUED | OUTPATIENT
Start: 2018-04-10 | End: 2018-04-10

## 2018-04-10 RX ORDER — SODIUM CHLORIDE 9 MG/ML
1000 INJECTION INTRAMUSCULAR; INTRAVENOUS; SUBCUTANEOUS
Qty: 0 | Refills: 0 | Status: DISCONTINUED | OUTPATIENT
Start: 2018-04-10 | End: 2018-04-19

## 2018-04-10 RX ADMIN — Medication 100 MILLIEQUIVALENT(S): at 11:35

## 2018-04-10 RX ADMIN — PANTOPRAZOLE SODIUM 80 MILLIGRAM(S): 20 TABLET, DELAYED RELEASE ORAL at 11:35

## 2018-04-10 RX ADMIN — Medication 1000 MILLIGRAM(S): at 19:19

## 2018-04-10 RX ADMIN — MORPHINE SULFATE 2 MILLIGRAM(S): 50 CAPSULE, EXTENDED RELEASE ORAL at 19:19

## 2018-04-10 RX ADMIN — MORPHINE SULFATE 2 MILLIGRAM(S): 50 CAPSULE, EXTENDED RELEASE ORAL at 18:02

## 2018-04-10 RX ADMIN — Medication 400 MILLIGRAM(S): at 18:02

## 2018-04-10 RX ADMIN — SODIUM CHLORIDE 1000 MILLILITER(S): 9 INJECTION INTRAMUSCULAR; INTRAVENOUS; SUBCUTANEOUS at 11:34

## 2018-04-10 NOTE — ED ADULT NURSE REASSESSMENT NOTE - NS ED NURSE REASSESS COMMENT FT1
Received report fromLaura RN in ER [facility]. No c/o pain or discomfort at this time, admitted and awaiting a bed assignment. Comfort and safety maintained.

## 2018-04-10 NOTE — ED PROVIDER NOTE - OBJECTIVE STATEMENT
79 yo female HTN DM CAD PVD s/p RLE amputation presenting with abdominal pain x 2 weeks worsening in the last few days.  endorses dark red to black stools. feels like achy sharp pain, 10/10 at its worst.  pain most severe at anus.  took tylenol at 6 am.  dark stools have been going on for a few months. 77 yo female HTN DM CAD PVD s/p RLE amputation presenting with abdominal pain x 2 weeks worsening in the last few days.  endorses dark red to black stools. feels like achy sharp pain, 10/10 at its worst, currently 1/10 in severity.  pain most severe at anus.  took tylenol at 6 am.  dark stools have been going on for a few months.  had colonoscopy 1.5 years ago.  does not want anything for pain right now.  has 10-20 episodes of bloody stools per day per daughter. patient has hx of hemorrhoids.    pcp- doctor ojvany

## 2018-04-10 NOTE — H&P ADULT - NSHPLABSRESULTS_GEN_ALL_CORE
11.2   10.8  )-----------( 298      ( 10 Apr 2018 11:02 )             33.8   04-10    139  |  101  |  15  ----------------------------<  118<H>  3.6   |  26  |  0.62    Ca    9.1      10 Apr 2018 11:02    TPro  7.6  /  Alb  3.8  /  TBili  0.3  /  DBili  x   /  AST  13  /  ALT  12  /  AlkPhos  75  04-10

## 2018-04-10 NOTE — ED ADULT NURSE NOTE - OBJECTIVE STATEMENT
78 y.o female presents c.o abdominal pain for weeks, increasing over the past few days. pain is in her lower abdomen and states there is a pressure there.  family states that she has been having dark red/black stools "for a while". patient is on blood thinning medication (unknown name). for pain patient has been taking tylenol, last took it around 0600 this morning. intermittent chills, constipation and diarrhea. had a colonoscopy a year and a half ago which family believes it was okay. poor historians.     hx of diabetes, htn, blockages in her heart (unknown), right lower extremity amputation s.p blockage, hld.  family wishes to translate for patient. 78 y.o female presents c.o abdominal pain for weeks, increasing over the past few days. pain is in her lower abdomen and states there is a pressure there.  family states that she has been having dark red/black stools "for a while". patient is on blood thinning medication (lovenox). for pain patient has been taking tylenol, last took it around 0600 this morning. intermittent chills, constipation and diarrhea. had a colonoscopy a year and a half ago which family believes it was okay. poor historians. abdomen is soft, wincing with palpation, vital signs are stable. denies chest pain/sob/urinary symptoms/fever.     hx of diabetes, htn, blockages in her heart (unknown), right lower extremity amputation s.p blockage, hld.  family wishes to translate for patient.

## 2018-04-10 NOTE — H&P ADULT - NSHPPHYSICALEXAM_GEN_ALL_CORE
pt. seen and examined, NAD    Vital Signs Last 24 Hrs  T(C): 36.7 (10 Apr 2018 22:58), Max: 37.1 (10 Apr 2018 19:01)  T(F): 98 (10 Apr 2018 22:58), Max: 98.7 (10 Apr 2018 19:01)  HR: 78 (10 Apr 2018 22:58) (63 - 82)  BP: 135/76 (10 Apr 2018 22:58) (105/69 - 137/55)  BP(mean): --  RR: 19 (10 Apr 2018 22:58) (16 - 22)  SpO2: 97% (10 Apr 2018 22:58) (90% - 99%)    heent: nc/at  neck: supple, no JVD  lungs: B/L clear, no w/r/r  heart: s1s2 nml  abd: soft, NABS, mild tenderness in LLQ  ext: RLE amputation  neuro: aaox3

## 2018-04-10 NOTE — ED ADULT NURSE REASSESSMENT NOTE - NS ED NURSE REASSESS COMMENT FT1
patient crying and states she is in pain. pain medications given per order and patient placed back on O2 for decreased room saturation.   oxygen saturation improved s.p intervention.   patient pending room placement.

## 2018-04-10 NOTE — ED PROVIDER NOTE - MEDICAL DECISION MAKING DETAILS
79 yo female with worsening abdominal pain; rule out intraabdominal pathology vs gi bleed; will obtain ctap labs fluids ekg --> re eval 79 yo female with worsening abdominal pain; rule out intraabdominal pathology vs gi bleed; will obtain ctap labs fluids ekg --> re duane mack - 74 f on loveox w dark stool - iv protoniox ck h/h, abd pain w constipationand diahrea - ct r/o colits - iv fluids achk lactate and admit

## 2018-04-10 NOTE — ED ADULT NURSE REASSESSMENT NOTE - NS ED NURSE REASSESS COMMENT FT1
patient is sitting up in bed, tolerating PO well. speaking with family. vital signs stable and patient in no acute distress. pending room assignment

## 2018-04-11 DIAGNOSIS — K92.2 GASTROINTESTINAL HEMORRHAGE, UNSPECIFIED: ICD-10-CM

## 2018-04-11 DIAGNOSIS — Z89.611 ACQUIRED ABSENCE OF RIGHT LEG ABOVE KNEE: Chronic | ICD-10-CM

## 2018-04-11 DIAGNOSIS — I25.10 ATHEROSCLEROTIC HEART DISEASE OF NATIVE CORONARY ARTERY WITHOUT ANGINA PECTORIS: ICD-10-CM

## 2018-04-11 DIAGNOSIS — E11.9 TYPE 2 DIABETES MELLITUS WITHOUT COMPLICATIONS: ICD-10-CM

## 2018-04-11 DIAGNOSIS — K62.9 DISEASE OF ANUS AND RECTUM, UNSPECIFIED: ICD-10-CM

## 2018-04-11 LAB
ALBUMIN SERPL ELPH-MCNC: 3.1 G/DL — LOW (ref 3.3–5)
ALP SERPL-CCNC: 63 U/L — SIGNIFICANT CHANGE UP (ref 40–120)
ALT FLD-CCNC: 10 U/L RC — SIGNIFICANT CHANGE UP (ref 10–45)
ANION GAP SERPL CALC-SCNC: 11 MMOL/L — SIGNIFICANT CHANGE UP (ref 5–17)
AST SERPL-CCNC: 9 U/L — LOW (ref 10–40)
BILIRUB SERPL-MCNC: 0.2 MG/DL — SIGNIFICANT CHANGE UP (ref 0.2–1.2)
BUN SERPL-MCNC: 8 MG/DL — SIGNIFICANT CHANGE UP (ref 7–23)
CALCIUM SERPL-MCNC: 8.4 MG/DL — SIGNIFICANT CHANGE UP (ref 8.4–10.5)
CHLORIDE SERPL-SCNC: 105 MMOL/L — SIGNIFICANT CHANGE UP (ref 96–108)
CO2 SERPL-SCNC: 25 MMOL/L — SIGNIFICANT CHANGE UP (ref 22–31)
CREAT SERPL-MCNC: 0.61 MG/DL — SIGNIFICANT CHANGE UP (ref 0.5–1.3)
GLUCOSE BLDC GLUCOMTR-MCNC: 107 MG/DL — HIGH (ref 70–99)
GLUCOSE BLDC GLUCOMTR-MCNC: 108 MG/DL — HIGH (ref 70–99)
GLUCOSE BLDC GLUCOMTR-MCNC: 147 MG/DL — HIGH (ref 70–99)
GLUCOSE SERPL-MCNC: 103 MG/DL — HIGH (ref 70–99)
HCT VFR BLD CALC: 30.2 % — LOW (ref 34.5–45)
HGB BLD-MCNC: 9.7 G/DL — LOW (ref 11.5–15.5)
MCHC RBC-ENTMCNC: 29.5 PG — SIGNIFICANT CHANGE UP (ref 27–34)
MCHC RBC-ENTMCNC: 32.1 GM/DL — SIGNIFICANT CHANGE UP (ref 32–36)
MCV RBC AUTO: 91.8 FL — SIGNIFICANT CHANGE UP (ref 80–100)
PLATELET # BLD AUTO: 262 K/UL — SIGNIFICANT CHANGE UP (ref 150–400)
POTASSIUM SERPL-MCNC: 3.6 MMOL/L — SIGNIFICANT CHANGE UP (ref 3.5–5.3)
POTASSIUM SERPL-SCNC: 3.6 MMOL/L — SIGNIFICANT CHANGE UP (ref 3.5–5.3)
PROT SERPL-MCNC: 6.5 G/DL — SIGNIFICANT CHANGE UP (ref 6–8.3)
RBC # BLD: 3.29 M/UL — LOW (ref 3.8–5.2)
RBC # FLD: 14.5 % — SIGNIFICANT CHANGE UP (ref 10.3–14.5)
SODIUM SERPL-SCNC: 141 MMOL/L — SIGNIFICANT CHANGE UP (ref 135–145)
WBC # BLD: 10.19 K/UL — SIGNIFICANT CHANGE UP (ref 3.8–10.5)
WBC # FLD AUTO: 10.19 K/UL — SIGNIFICANT CHANGE UP (ref 3.8–10.5)

## 2018-04-11 RX ORDER — DEXTROSE 50 % IN WATER 50 %
25 SYRINGE (ML) INTRAVENOUS ONCE
Qty: 0 | Refills: 0 | Status: DISCONTINUED | OUTPATIENT
Start: 2018-04-11 | End: 2018-04-19

## 2018-04-11 RX ORDER — INSULIN LISPRO 100/ML
VIAL (ML) SUBCUTANEOUS AT BEDTIME
Qty: 0 | Refills: 0 | Status: DISCONTINUED | OUTPATIENT
Start: 2018-04-11 | End: 2018-04-19

## 2018-04-11 RX ORDER — GLUCAGON INJECTION, SOLUTION 0.5 MG/.1ML
1 INJECTION, SOLUTION SUBCUTANEOUS ONCE
Qty: 0 | Refills: 0 | Status: DISCONTINUED | OUTPATIENT
Start: 2018-04-11 | End: 2018-04-19

## 2018-04-11 RX ORDER — CARVEDILOL PHOSPHATE 80 MG/1
12.5 CAPSULE, EXTENDED RELEASE ORAL EVERY 12 HOURS
Qty: 0 | Refills: 0 | Status: DISCONTINUED | OUTPATIENT
Start: 2018-04-11 | End: 2018-04-12

## 2018-04-11 RX ORDER — INSULIN LISPRO 100/ML
VIAL (ML) SUBCUTANEOUS
Qty: 0 | Refills: 0 | Status: DISCONTINUED | OUTPATIENT
Start: 2018-04-11 | End: 2018-04-19

## 2018-04-11 RX ORDER — SOD SULF/SODIUM/NAHCO3/KCL/PEG
4000 SOLUTION, RECONSTITUTED, ORAL ORAL ONCE
Qty: 0 | Refills: 0 | Status: COMPLETED | OUTPATIENT
Start: 2018-04-11 | End: 2018-04-11

## 2018-04-11 RX ORDER — SODIUM CHLORIDE 9 MG/ML
1000 INJECTION, SOLUTION INTRAVENOUS
Qty: 0 | Refills: 0 | Status: DISCONTINUED | OUTPATIENT
Start: 2018-04-11 | End: 2018-04-19

## 2018-04-11 RX ORDER — LISINOPRIL 2.5 MG/1
5 TABLET ORAL DAILY
Qty: 0 | Refills: 0 | Status: DISCONTINUED | OUTPATIENT
Start: 2018-04-11 | End: 2018-04-19

## 2018-04-11 RX ORDER — HYDROCORTISONE 1 %
1 OINTMENT (GRAM) TOPICAL DAILY
Qty: 0 | Refills: 0 | Status: DISCONTINUED | OUTPATIENT
Start: 2018-04-11 | End: 2018-04-19

## 2018-04-11 RX ORDER — SOD SULF/SODIUM/NAHCO3/KCL/PEG
1 SOLUTION, RECONSTITUTED, ORAL ORAL
Qty: 0 | Refills: 0 | Status: COMPLETED | OUTPATIENT
Start: 2018-04-11 | End: 2018-04-11

## 2018-04-11 RX ORDER — ATORVASTATIN CALCIUM 80 MG/1
40 TABLET, FILM COATED ORAL AT BEDTIME
Qty: 0 | Refills: 0 | Status: DISCONTINUED | OUTPATIENT
Start: 2018-04-11 | End: 2018-04-19

## 2018-04-11 RX ORDER — DEXTROSE 50 % IN WATER 50 %
12.5 SYRINGE (ML) INTRAVENOUS ONCE
Qty: 0 | Refills: 0 | Status: DISCONTINUED | OUTPATIENT
Start: 2018-04-11 | End: 2018-04-19

## 2018-04-11 RX ORDER — DEXTROSE 50 % IN WATER 50 %
1 SYRINGE (ML) INTRAVENOUS ONCE
Qty: 0 | Refills: 0 | Status: DISCONTINUED | OUTPATIENT
Start: 2018-04-11 | End: 2018-04-19

## 2018-04-11 RX ADMIN — MORPHINE SULFATE 2 MILLIGRAM(S): 50 CAPSULE, EXTENDED RELEASE ORAL at 17:57

## 2018-04-11 RX ADMIN — MORPHINE SULFATE 2 MILLIGRAM(S): 50 CAPSULE, EXTENDED RELEASE ORAL at 05:25

## 2018-04-11 RX ADMIN — Medication 4000 MILLILITER(S): at 15:01

## 2018-04-11 RX ADMIN — CARVEDILOL PHOSPHATE 12.5 MILLIGRAM(S): 80 CAPSULE, EXTENDED RELEASE ORAL at 05:25

## 2018-04-11 RX ADMIN — CARVEDILOL PHOSPHATE 12.5 MILLIGRAM(S): 80 CAPSULE, EXTENDED RELEASE ORAL at 17:11

## 2018-04-11 RX ADMIN — MORPHINE SULFATE 2 MILLIGRAM(S): 50 CAPSULE, EXTENDED RELEASE ORAL at 17:17

## 2018-04-11 RX ADMIN — PANTOPRAZOLE SODIUM 40 MILLIGRAM(S): 20 TABLET, DELAYED RELEASE ORAL at 11:10

## 2018-04-11 RX ADMIN — LISINOPRIL 5 MILLIGRAM(S): 2.5 TABLET ORAL at 05:25

## 2018-04-11 RX ADMIN — ATORVASTATIN CALCIUM 40 MILLIGRAM(S): 80 TABLET, FILM COATED ORAL at 21:27

## 2018-04-11 RX ADMIN — MORPHINE SULFATE 2 MILLIGRAM(S): 50 CAPSULE, EXTENDED RELEASE ORAL at 06:26

## 2018-04-11 RX ADMIN — Medication 1 APPLICATION(S): at 11:10

## 2018-04-11 NOTE — CONSULT NOTE ADULT - SUBJECTIVE AND OBJECTIVE BOX
CHIEF COMPLAINT:Patient is a 78y old  Female who presents with a chief complaint of rectal bleeding (10 Apr 2018 23:38)      HISTORY OF PRESENT ILLNESS:HPI:  78 year old woman with ?CAD, PAD s/p RLE amputation presents this admission with abdominal pain, bleeding per rectum for the past few months.  She has also noted decreased appetite and about a 7lb weight loss.  She was admitted to a hospital in [facility] a few years ago requiring ICU level care for her severe PAD and complications of vascular procedures.  At that time, her daughter believes she may have undergone a colonoscopy but she is not sure.  She has noted blood staining the toilet water and some clots on the toilet paper intermittently in the past few months and sometimes holds her mother's aspirin during these times.  She takes plavix daily.  She has also had decreased appetite with intermittent nausea in the past few months.  No shortness of breath, chest pain at this time.  Complains of mild right lower abdominal pain (10 Apr 2018 23:38)      PAST MEDICAL & SURGICAL HISTORY:  Gangrene of foot  Coronary artery disease involving native coronary artery of native heart without angina pectoris  Status post above knee amputation of right lower extremity          MEDICATIONS:  carvedilol 12.5 milliGRAM(s) Oral every 12 hours  lisinopril 5 milliGRAM(s) Oral daily        morphine  - Injectable 2 milliGRAM(s) IV Push every 6 hours PRN    pantoprazole  Injectable 40 milliGRAM(s) IV Push daily    atorvastatin 40 milliGRAM(s) Oral at bedtime    hydrocortisone 2.5% Rectal Cream 1 Application(s) Rectal daily  sodium chloride 0.9%. 1000 milliLiter(s) IV Continuous <Continuous>      FAMILY HISTORY:  No pertinent family history in first degree relatives      Non-contributory    SOCIAL HISTORY:    not active smoker    Allergies    No Known Allergies    Intolerances    	    REVIEW OF SYSTEMS:  CONSTITUTIONAL: No fever  EYES: No eye pain, visual disturbances, or discharge  ENMT:  No difficulty hearing, tinnitus  NECK: No pain or stiffness  RESPIRATORY: No cough, wheezing,  CARDIOVASCULAR: No chest pain, palpitations, passing out, dizziness, or leg swelling  GASTROINTESTINAL:  see hpi  GENITOURINARY: No dysuria, hematuria  NEUROLOGICAL: No stroke like symptoms  SKIN: No burning or lesions   LYMPH Nodes: No enlarged glands  ENDOCRINE: No heat or cold intolerance  MUSCULOSKELETAL: No joint pain or swelling  PSYCHIATRIC: No  anxiety, mood swings  HEME/LYMPH: No bleeding gums  ALLERY AND IMMUNOLOGIC: No hives or eczema	    All other ROS negative    PHYSICAL EXAM:  T(C): 36.7 (04-11-18 @ 21:53), Max: 36.9 (04-11-18 @ 13:23)  HR: 68 (04-11-18 @ 21:53) (66 - 78)  BP: 119/55 (04-11-18 @ 21:53) (97/60 - 135/76)  RR: 18 (04-11-18 @ 21:53) (18 - 20)  SpO2: 99% (04-11-18 @ 21:53) (96% - 99%)  Wt(kg): --  I&O's Summary    11 Apr 2018 07:01  -  11 Apr 2018 22:03  --------------------------------------------------------  IN: 1740 mL / OUT: 700 mL / NET: 1040 mL        Appearance: Normal	  HEENT:   Normal oral mucosa, EOMI	  Lymphatic: No lymphadenopathy  Cardiovascular: Normal S1 S2, No JVD, No murmurs, No edema  Respiratory: Lungs clear to auscultation	  Psychiatry: Alert, Mood & affect appropriate  Gastrointestinal:  Soft, Non-tender, + BS	  Skin: No rashes, No ecchymoses, No cyanosis	  Neurologic: Non-focal  Extremities:  AKA  Vascular: Peripheral pulses palpable	    	  	  CARDIAC MARKERS:  Troponin T, Serum: <0.01 ng/mL (04-10 @ 11:02)                                  9.7    10.19 )-----------( 262      ( 11 Apr 2018 07:27 )             30.2     04-11    141  |  105  |  8   ----------------------------<  103<H>  3.6   |  25  |  0.61    Ca    8.4      11 Apr 2018 06:31    TPro  6.5  /  Alb  3.1<L>  /  TBili  0.2  /  DBili  x   /  AST  9<L>  /  ALT  10  /  AlkPhos  63  04-11    HgA1c:   TSH:       EKG: nsr  Radiology:    CT Abdomen and Pelvis w/ Oral Cont and w/ IV Cont (04.10.18 @ 13:37) >  IMPRESSION:     Rectal neoplasm.    A 4 mm left lower lobe pulmonary nodule    Assessment /Plan:   Assessment and Plan:   · Assessment		  came in with [facility], CT shows rectal mass most likely malignancy     Problem/Plan - 1:  ·  Problem: Gastrointestinal hemorrhage, unspecified gastrointestinal hemorrhage type.  Plan: monitor H/H   s/p transfusion   Dr. Swartz scheduled for colonoscopy.      Problem/Plan - 2:  ·  Problem: Rectal mass.  Plan: seen by surgery  -GI w/u with biopsy for now.      Problem/Plan - 3:  ·  Problem: Coronary artery disease involving native coronary artery of native heart without angina pectoris.  Plan: stable  hold off on asa and Plavix, pt denies recent stent, would resume ASA after Bx. Will clarify CAD Hx with daughter      Problem/Plan - 4:  ·  Problem: Diabetes.  Plan: hold off on oral meds  FSBS with RIVERA ss.      Problem/Plan - 5:  ·  Problem: CAD (coronary artery disease).  Plan: stable, Will clarify CAD Hx with daughter and to determine need for DAPT   - TTE to further risk stratify           Florentino Feldman DO [facility]  [facility]  [phone]

## 2018-04-11 NOTE — CONSULT NOTE ADULT - ATTENDING COMMENTS
79 yo female w/ rectal mass found on CT highly suspicious for malignancy  -Colonoscopy for direct evaluation and biopsy  -I had a long conversation with the patient about her wishes and goals for treatment.  She does not wish to undergo extensive interventions, but does want to be comfortable.  We discussed the natural history of the disease process and possible treatments including chemotherapy, radiation and surgery  -will discuss further after colonscopy  -oncology and radiation evaluation

## 2018-04-11 NOTE — CONSULT NOTE ADULT - SUBJECTIVE AND OBJECTIVE BOX
Patient is a 78y Female     Patient is a 78y old  Turkish Female who presents with a chief complaint of rectal bleeding (10 Apr 2018 23:38) Speaks minimal English,  phone not available      HPI:  78 year old woman with CAD, PAD s/p RLE amputation presents this admission with abdominal pain, bleeding per rectum for the past few months.  She has also noted decreased appetite and about a 7lb weight loss.  She was admitted to a hospital in [facility] a few years ago requiring ICU level care for her severe PAD and complications of vascular procedures.  At that time, her daughter believes she may have undergone a colonoscopy but she is not sure.  She has noted blood staining the toilet water and some clots on the toilet paper intermittently in the past few months and sometimes holds her mother's aspirin during these times.  She takes plavix daily.  She has also had decreased appetite with intermittent nausea in the past few months.  No shortness of breath, chest pain at this time.  Complains of mild right lower abdominal pain (10 Apr 2018 23:38)      PAST MEDICAL & SURGICAL HISTORY:  Gangrene of foot  Coronary artery disease involving native coronary artery of native heart without angina pectoris  Status post above knee amputation of right lower extremity      MEDICATIONS  (STANDING):  atorvastatin 40 milliGRAM(s) Oral at bedtime  carvedilol 12.5 milliGRAM(s) Oral every 12 hours  hydrocortisone 2.5% Rectal Cream 1 Application(s) Rectal daily  lisinopril 5 milliGRAM(s) Oral daily  pantoprazole  Injectable 40 milliGRAM(s) IV Push daily  sodium chloride 0.9%. 1000 milliLiter(s) (75 mL/Hr) IV Continuous <Continuous>      Allergies    No Known Allergies    Intolerances        SOCIAL HISTORY:  Denies ETOh,Smoking,     FAMILY HISTORY:  No pertinent family history in first degree relatives      REVIEW OF SYSTEMS:    CONSTITUTIONAL: No weakness, fevers or chills  EYES/ENT: No visual changes;  No vertigo or throat pain   NECK: No pain or stiffness  RESPIRATORY: No cough, wheezing, hemoptysis; No shortness of breath  CARDIOVASCULAR: No chest pain or palpitations    GENITOURINARY: No dysuria, frequency or hematuria  NEUROLOGICAL: No numbness or weakness  SKIN: No itching, burning, rashes, or lesions   All other review of systems is negative unless indicated above.    VITAL:  T(C): , Max: 37.1 (04-10-18 @ 19:01)  T(F): , Max: 98.7 (04-10-18 @ 19:01)  HR: 71 (04-11-18 @ 04:26)  BP: 128/72 (04-11-18 @ 04:26)  BP(mean): --  RR: 20 (04-11-18 @ 04:26)  SpO2: 96% (04-11-18 @ 04:26)  Wt(kg): --    I and O's:    Height (cm): 160.02 (04-10 @ 22:58)  Weight (kg): 55.6 (04-10 @ 22:58)  BMI (kg/m2): 21.7 (04-10 @ 22:58)  BSA (m2): 1.57 (04-10 @ 22:58)    PHYSICAL EXAM:    Constitutional: NAD  HEENT: PERRLA,   Neck: No JVD  Respiratory: CTA B/L  Cardiovascular: S1 and S2  Gastrointestinal: BS+, soft, NT/ND  Extremities: No peripheral edema  Neurological: A/O x 3, no focal deficits  rectal-posterior wall mass, small amount of blood    LABS:                        11.2   10.8  )-----------( 298      ( 10 Apr 2018 11:02 )             33.8     04-11    141  |  105  |  8   ----------------------------<  103<H>  3.6   |  25  |  0.61    Ca    8.4      11 Apr 2018 06:31    TPro  6.5  /  Alb  3.1<L>  /  TBili  0.2  /  DBili  x   /  AST  9<L>  /  ALT  10  /  AlkPhos  63  04-11          RADIOLOGY & ADDITIONAL STUDIES:

## 2018-04-11 NOTE — CONSULT NOTE ADULT - SUBJECTIVE AND OBJECTIVE BOX
Colorectal Surgery Consult  Consulting surgical team: Colorectal surgery  Consulting attending: Dr. Sosa    HPI: 78 year old woman with CAD, PAD s/p RLE amputation presents this admission with abdominal pain, bleeding per rectum for the past few months.  She has also noted decreased appetite and about a 7lb weight loss.  She was admitted to a hospital in [facility] a few years ago requiring ICU level care for her severe PAD and complications of vascular procedures.  At that time, her daughter believes she may have undergone a colonoscopy but she is not sure.  She has noted blood staining the toilet water and some clots on the toilet paper intermittently in the past few months and sometimes holds her mother's aspirin during these times.  She takes plavix daily.  She has also had decreased appetite with intermittent nausea in the past few months.  No shortness of breath, chest pain at this time.  Complains of mild right lower abdominal pain.        PAST MEDICAL HISTORY:  Coronary artery disease involving native coronary artery on ASA/plavix      PAST SURGICAL HISTORY:  s/p RLE amputation    MEDICATIONS:  morphine  - Injectable 2 milliGRAM(s) IV Push every 6 hours PRN  pantoprazole  Injectable 40 milliGRAM(s) IV Push daily  sodium chloride 0.9%. 1000 milliLiter(s) IV Continuous <Continuous>    ALLERGIES:  No Known Allergies    PHYSICAL EXAM:  VITALS & I/Os:  Vital Signs Last 24 Hrs  T(C): 36.7 (10 Apr 2018 22:58), Max: 37.1 (10 Apr 2018 19:01)  T(F): 98 (10 Apr 2018 22:58), Max: 98.7 (10 Apr 2018 19:01)  HR: 78 (10 Apr 2018 22:58) (63 - 82)  BP: 135/76 (10 Apr 2018 22:58) (105/69 - 137/55)  BP(mean): --  RR: 19 (10 Apr 2018 22:58) (16 - 22)  SpO2: 97% (10 Apr 2018 22:58) (90% - 99%)    General: No acute distress, cachectic   Respiratory: Breathing unlabored  Cardiovascular: regular rate and rhythm  Abdominal: Soft, nondistended, nontender. No guarding.  Extremities: Warm, well perfused  Rectal: brown stool, no blood, external hemorrhoids, no masses appreciated    LABS:                        11.2   10.8  )-----------( 298      ( 10 Apr 2018 11:02 )             33.8     04-10    139  |  101  |  15  ----------------------------<  118<H>  3.6   |  26  |  0.62    Ca    9.1      10 Apr 2018 11:02    TPro  7.6  /  Alb  3.8  /  TBili  0.3  /  DBili  x   /  AST  13  /  ALT  12  /  AlkPhos  75  04-10    Lactate:  04-10 @ 11:02  1.4    PT/INR - ( 10 Apr 2018 11:02 )   PT: 12.0 sec;   INR: 1.10 ratio         PTT - ( 10 Apr 2018 11:02 )  PTT:32.5 sec    CARDIAC MARKERS ( 10 Apr 2018 11:02 )  x     / <0.01 ng/mL / x     / x     / x          IMAGING:  Ct abdomen pelvis showing a rectal neoplasm and a 4 mm left lower lobe pulmonary nodule. Colorectal Surgery Consult  Consulting surgical team: Colorectal surgery  Consulting attending: Dr. Sosa    HPI: 78 year old woman with CAD, PAD s/p RLE amputation presents this admission with abdominal pain, bleeding per rectum for the past few months.  She has also noted decreased appetite and about a 7lb weight loss.  She was admitted to a hospital in [facility] a few years ago requiring ICU level care for her severe PAD and complications of vascular procedures.  At that time, her daughter believes she may have undergone a colonoscopy but she is not sure.  She has noted blood staining the toilet water and some clots on the toilet paper intermittently in the past few months and sometimes holds her mother's aspirin during these times.  She takes plavix daily.  She has also had decreased appetite with intermittent nausea in the past few months.  No shortness of breath, chest pain at this time.  Complains of mild right lower abdominal pain.        PAST MEDICAL HISTORY:  Coronary artery disease involving native coronary artery on ASA/plavix      PAST SURGICAL HISTORY:  s/p RLE amputation    MEDICATIONS:  morphine  - Injectable 2 milliGRAM(s) IV Push every 6 hours PRN  pantoprazole  Injectable 40 milliGRAM(s) IV Push daily  sodium chloride 0.9%. 1000 milliLiter(s) IV Continuous <Continuous>    ALLERGIES:  No Known Allergies    PHYSICAL EXAM:  VITALS & I/Os:  Vital Signs Last 24 Hrs  T(C): 36.7 (10 Apr 2018 22:58), Max: 37.1 (10 Apr 2018 19:01)  T(F): 98 (10 Apr 2018 22:58), Max: 98.7 (10 Apr 2018 19:01)  HR: 78 (10 Apr 2018 22:58) (63 - 82)  BP: 135/76 (10 Apr 2018 22:58) (105/69 - 137/55)  BP(mean): --  RR: 19 (10 Apr 2018 22:58) (16 - 22)  SpO2: 97% (10 Apr 2018 22:58) (90% - 99%)    General: No acute distress, cachectic   Respiratory: Breathing unlabored  Cardiovascular: regular rate and rhythm  Abdominal: Soft, nondistended, nontender. No guarding.  Extremities: Warm, well perfused  Rectal: brown stool, no blood, external hemorrhoids, posterior rectal mass appreciated    LABS:                        11.2   10.8  )-----------( 298      ( 10 Apr 2018 11:02 )             33.8     04-10    139  |  101  |  15  ----------------------------<  118<H>  3.6   |  26  |  0.62    Ca    9.1      10 Apr 2018 11:02    TPro  7.6  /  Alb  3.8  /  TBili  0.3  /  DBili  x   /  AST  13  /  ALT  12  /  AlkPhos  75  04-10    Lactate:  04-10 @ 11:02  1.4    PT/INR - ( 10 Apr 2018 11:02 )   PT: 12.0 sec;   INR: 1.10 ratio         PTT - ( 10 Apr 2018 11:02 )  PTT:32.5 sec    CARDIAC MARKERS ( 10 Apr 2018 11:02 )  x     / <0.01 ng/mL / x     / x     / x          IMAGING:  Ct abdomen pelvis showing a rectal neoplasm and a 4 mm left lower lobe pulmonary nodule.

## 2018-04-11 NOTE — PROGRESS NOTE ADULT - SUBJECTIVE AND OBJECTIVE BOX
Patient is a 78y old  Female who presents with a chief complaint of rectal bleeding (10 Apr 2018 23:38)    pt. seen and examined, NAD    INTERVAL HPI/OVERNIGHT EVENTS:  T(C): 36.9 (04-11-18 @ 13:23), Max: 36.9 (04-11-18 @ 13:23)  HR: 68 (04-11-18 @ 17:06) (63 - 78)  BP: 127/57 (04-11-18 @ 17:06) (97/60 - 135/76)  RR: 20 (04-11-18 @ 13:23) (16 - 20)  SpO2: 97% (04-11-18 @ 13:23) (96% - 99%)  Wt(kg): --  I&O's Summary    11 Apr 2018 07:01  -  11 Apr 2018 20:01  --------------------------------------------------------  IN: 1740 mL / OUT: 700 mL / NET: 1040 mL        PAST MEDICAL & SURGICAL HISTORY:  Gangrene of foot  Coronary artery disease involving native coronary artery of native heart without angina pectoris  Status post above knee amputation of right lower extremity      SOCIAL HISTORY  Alcohol:  Tobacco:  Illicit substance use:    FAMILY HISTORY:    REVIEW OF SYSTEMS:  CONSTITUTIONAL: No fever, weight loss, or fatigue  EYES: No eye pain, visual disturbances, or discharge  ENMT:  No difficulty hearing, tinnitus, vertigo; No sinus or throat pain  NECK: No pain or stiffness  RESPIRATORY: No cough, wheezing, chills or hemoptysis; No shortness of breath  CARDIOVASCULAR: No chest pain, palpitations, dizziness, or leg swelling  GASTROINTESTINAL: No abdominal or epigastric pain. No nausea, vomiting, or hematemesis; No diarrhea or constipation. No melena or hematochezia.  GENITOURINARY: No dysuria, frequency, hematuria, or incontinence  NEUROLOGICAL: No headaches, memory loss, loss of strength, numbness, or tremors  SKIN: No itching, burning, rashes, or lesions   LYMPH NODES: No enlarged glands  ENDOCRINE: No heat or cold intolerance; No hair loss  MUSCULOSKELETAL: No joint pain or swelling; No muscle, back, or extremity pain  PSYCHIATRIC: No depression, anxiety, mood swings, or difficulty sleeping  HEME/LYMPH: No easy bruising, or bleeding gums  ALLERY AND IMMUNOLOGIC: No hives or eczema    RADIOLOGY & ADDITIONAL TESTS:    Imaging Personally Reviewed:  [ ] YES  [ ] NO    Consultant(s) Notes Reviewed:  [ ] YES  [ ] NO    PHYSICAL EXAM:  GENERAL: NAD, well-groomed, well-developed  HEAD:  Atraumatic, Normocephalic  EYES: EOMI, PERRLA, conjunctiva and sclera clear  ENMT: No tonsillar erythema, exudates, or enlargement; Moist mucous membranes, Good dentition, No lesions  NECK: Supple, No JVD, Normal thyroid  NERVOUS SYSTEM:  Alert & Oriented X3, Good concentration; Motor Strength 5/5 B/L upper and lower extremities; DTRs 2+ intact and symmetric  CHEST/LUNG: Clear to percussion bilaterally; No rales, rhonchi, wheezing, or rubs  HEART: Regular rate and rhythm; No murmurs, rubs, or gallops  ABDOMEN: Soft, Nontender, Nondistended; Bowel sounds present  EXTREMITIES: rt. LE amputation  LYMPH: No lymphadenopathy noted  SKIN: No rashes or lesions    LABS:                        9.7    10.19 )-----------( 262      ( 11 Apr 2018 07:27 )             30.2     04-11    141  |  105  |  8   ----------------------------<  103<H>  3.6   |  25  |  0.61    Ca    8.4      11 Apr 2018 06:31    TPro  6.5  /  Alb  3.1<L>  /  TBili  0.2  /  DBili  x   /  AST  9<L>  /  ALT  10  /  AlkPhos  63  04-11    PT/INR - ( 10 Apr 2018 11:02 )   PT: 12.0 sec;   INR: 1.10 ratio         PTT - ( 10 Apr 2018 11:02 )  PTT:32.5 sec    CAPILLARY BLOOD GLUCOSE      POCT Blood Glucose.: 147 mg/dL (11 Apr 2018 12:42)  POCT Blood Glucose.: 107 mg/dL (11 Apr 2018 08:31)            MEDICATIONS  (STANDING):  atorvastatin 40 milliGRAM(s) Oral at bedtime  carvedilol 12.5 milliGRAM(s) Oral every 12 hours  hydrocortisone 2.5% Rectal Cream 1 Application(s) Rectal daily  lisinopril 5 milliGRAM(s) Oral daily  pantoprazole  Injectable 40 milliGRAM(s) IV Push daily  sodium chloride 0.9%. 1000 milliLiter(s) (75 mL/Hr) IV Continuous <Continuous>    MEDICATIONS  (PRN):  morphine  - Injectable 2 milliGRAM(s) IV Push every 6 hours PRN Moderate Pain (4 - 6)      Care Discussed with Consultants/Other Providers [ ] YES  [ ] NO

## 2018-04-12 ENCOUNTER — RESULT REVIEW (OUTPATIENT)
Age: 78
End: 2018-04-12

## 2018-04-12 LAB
ANION GAP SERPL CALC-SCNC: 11 MMOL/L — SIGNIFICANT CHANGE UP (ref 5–17)
BUN SERPL-MCNC: 6 MG/DL — LOW (ref 7–23)
CALCIUM SERPL-MCNC: 7.8 MG/DL — LOW (ref 8.4–10.5)
CANCER AG19-9 SERPL-ACNC: 6 U/ML — SIGNIFICANT CHANGE UP
CEA SERPL-MCNC: 2.4 NG/ML — SIGNIFICANT CHANGE UP (ref 0–3.8)
CHLORIDE SERPL-SCNC: 104 MMOL/L — SIGNIFICANT CHANGE UP (ref 96–108)
CO2 SERPL-SCNC: 23 MMOL/L — SIGNIFICANT CHANGE UP (ref 22–31)
CREAT SERPL-MCNC: 0.58 MG/DL — SIGNIFICANT CHANGE UP (ref 0.5–1.3)
GLUCOSE BLDC GLUCOMTR-MCNC: 102 MG/DL — HIGH (ref 70–99)
GLUCOSE BLDC GLUCOMTR-MCNC: 269 MG/DL — HIGH (ref 70–99)
GLUCOSE BLDC GLUCOMTR-MCNC: 76 MG/DL — SIGNIFICANT CHANGE UP (ref 70–99)
GLUCOSE BLDC GLUCOMTR-MCNC: 88 MG/DL — SIGNIFICANT CHANGE UP (ref 70–99)
GLUCOSE BLDC GLUCOMTR-MCNC: 99 MG/DL — SIGNIFICANT CHANGE UP (ref 70–99)
GLUCOSE SERPL-MCNC: 104 MG/DL — HIGH (ref 70–99)
HBA1C BLD-MCNC: 7.2 % — HIGH (ref 4–5.6)
HCT VFR BLD CALC: 26.6 % — LOW (ref 34.5–45)
HGB BLD-MCNC: 8.7 G/DL — LOW (ref 11.5–15.5)
MCHC RBC-ENTMCNC: 29.6 PG — SIGNIFICANT CHANGE UP (ref 27–34)
MCHC RBC-ENTMCNC: 32.7 GM/DL — SIGNIFICANT CHANGE UP (ref 32–36)
MCV RBC AUTO: 90.5 FL — SIGNIFICANT CHANGE UP (ref 80–100)
PLATELET # BLD AUTO: 244 K/UL — SIGNIFICANT CHANGE UP (ref 150–400)
POTASSIUM SERPL-MCNC: 3.4 MMOL/L — LOW (ref 3.5–5.3)
POTASSIUM SERPL-SCNC: 3.4 MMOL/L — LOW (ref 3.5–5.3)
RBC # BLD: 2.94 M/UL — LOW (ref 3.8–5.2)
RBC # FLD: 14.7 % — HIGH (ref 10.3–14.5)
SODIUM SERPL-SCNC: 138 MMOL/L — SIGNIFICANT CHANGE UP (ref 135–145)
WBC # BLD: 8.65 K/UL — SIGNIFICANT CHANGE UP (ref 3.8–10.5)
WBC # FLD AUTO: 8.65 K/UL — SIGNIFICANT CHANGE UP (ref 3.8–10.5)

## 2018-04-12 PROCEDURE — 88305 TISSUE EXAM BY PATHOLOGIST: CPT | Mod: 26

## 2018-04-12 RX ORDER — POTASSIUM CHLORIDE 20 MEQ
10 PACKET (EA) ORAL ONCE
Qty: 0 | Refills: 0 | Status: COMPLETED | OUTPATIENT
Start: 2018-04-12 | End: 2018-04-12

## 2018-04-12 RX ORDER — CARVEDILOL PHOSPHATE 80 MG/1
12.5 CAPSULE, EXTENDED RELEASE ORAL EVERY 12 HOURS
Qty: 0 | Refills: 0 | Status: DISCONTINUED | OUTPATIENT
Start: 2018-04-12 | End: 2018-04-19

## 2018-04-12 RX ADMIN — ATORVASTATIN CALCIUM 40 MILLIGRAM(S): 80 TABLET, FILM COATED ORAL at 22:55

## 2018-04-12 RX ADMIN — PANTOPRAZOLE SODIUM 40 MILLIGRAM(S): 20 TABLET, DELAYED RELEASE ORAL at 11:11

## 2018-04-12 RX ADMIN — Medication 100 MILLIEQUIVALENT(S): at 11:11

## 2018-04-12 RX ADMIN — MORPHINE SULFATE 2 MILLIGRAM(S): 50 CAPSULE, EXTENDED RELEASE ORAL at 05:21

## 2018-04-12 RX ADMIN — Medication 1: at 22:55

## 2018-04-12 RX ADMIN — CARVEDILOL PHOSPHATE 12.5 MILLIGRAM(S): 80 CAPSULE, EXTENDED RELEASE ORAL at 05:06

## 2018-04-12 RX ADMIN — MORPHINE SULFATE 2 MILLIGRAM(S): 50 CAPSULE, EXTENDED RELEASE ORAL at 05:06

## 2018-04-12 RX ADMIN — MORPHINE SULFATE 2 MILLIGRAM(S): 50 CAPSULE, EXTENDED RELEASE ORAL at 18:33

## 2018-04-12 RX ADMIN — LISINOPRIL 5 MILLIGRAM(S): 2.5 TABLET ORAL at 05:06

## 2018-04-12 RX ADMIN — MORPHINE SULFATE 2 MILLIGRAM(S): 50 CAPSULE, EXTENDED RELEASE ORAL at 17:39

## 2018-04-12 RX ADMIN — Medication 1 APPLICATION(S): at 11:11

## 2018-04-12 NOTE — PROGRESS NOTE ADULT - SUBJECTIVE AND OBJECTIVE BOX
Pre-Endoscopy Evaluation      Referring Physician: Dr. Goldstein                                Procedure: Colonoscopy/Lower EUS    Indication for Procedure: Rectal bleeding, Abnormal CT    Pertinent History: 78 year old female with CAD, PAD s/p RLE amputation presents  with abdominal pain, bleeding per rectum for the past few months and was found to have rectal wall thickening on CT    Sedation by Anesthesia [X]    PAST MEDICAL & SURGICAL HISTORY:  Gangrene of foot  Coronary artery disease involving native coronary artery of native heart without angina pectoris  Status post above knee amputation of right lower extremity      PMH of Gastroparesis [ ]  Gastric Surgery [ ]  Gastric Outlet Obstruction [ ]    Allergies:    No Known Allergies    Intolerances:    Latex allergy: [ ] yes [X} no    Medications:MEDICATIONS  (STANDING):  atorvastatin 40 milliGRAM(s) Oral at bedtime  carvedilol 12.5 milliGRAM(s) Oral every 12 hours  dextrose 5%. 1000 milliLiter(s) (50 mL/Hr) IV Continuous <Continuous>  dextrose 50% Injectable 12.5 Gram(s) IV Push once  dextrose 50% Injectable 25 Gram(s) IV Push once  dextrose 50% Injectable 25 Gram(s) IV Push once  hydrocortisone 2.5% Rectal Cream 1 Application(s) Rectal daily  insulin lispro (HumaLOG) corrective regimen sliding scale   SubCutaneous three times a day before meals  insulin lispro (HumaLOG) corrective regimen sliding scale   SubCutaneous at bedtime  lisinopril 5 milliGRAM(s) Oral daily  pantoprazole  Injectable 40 milliGRAM(s) IV Push daily  sodium chloride 0.9%. 1000 milliLiter(s) (75 mL/Hr) IV Continuous <Continuous>    MEDICATIONS  (PRN):  dextrose Gel 1 Dose(s) Oral once PRN Blood Glucose LESS THAN 70 milliGRAM(s)/deciliter  glucagon  Injectable 1 milliGRAM(s) IntraMuscular once PRN Glucose LESS THAN 70 milligrams/deciliter  morphine  - Injectable 2 milliGRAM(s) IV Push every 6 hours PRN Moderate Pain (4 - 6)      Smoking: [ ] yes  [X] no    AICD/PPM: [ ] yes   [X] no    Pertinent lab data:                        8.7    8.65  )-----------( 244      ( 12 Apr 2018 09:30 )             26.6     04-12    138  |  104  |  6<L>  ----------------------------<  104<H>  3.4<L>   |  23  |  0.58    Ca    7.8<L>      12 Apr 2018 07:24    TPro  6.5  /  Alb  3.1<L>  /  TBili  0.2  /  DBili  x   /  AST  9<L>  /  ALT  10  /  AlkPhos  63  04-11    < from: Transthoracic Echocardiogram (02.28.17 @ 19:28) >  Conclusions:  1. Thickened mitral valve leaflets.  Prolapse of the  anterior leaflet of the mitral valve. Mild to moderate  mitral regurgitation(severity may be underestimated).  2. Normal left ventricular internal dimensions and wall  thicknesses.  3. Normal left ventricular systolic function. No segmental  wall motion abnormalities.  4. Mild diastolic dysfunction (Stage I).  5. Normal right ventricular size and function.  6. Mild tricuspid regurgitation.  7. Moderate pulmonic regurgitation.  ------------------------------------------------------------------------  Confirmed on  2/28/2017 - 16:12:59 by Andrew Goodman M.D.  ------------------------------------------------------------------------      < from: CT Abdomen and Pelvis w/ Oral Cont and w/ IV Cont (04.10.18 @ 13:37) >  IMPRESSION:     Rectal neoplasm.    A 4 mm left lower lobe pulmonary nodule.  ---------------------------------------------------------------------------------------------------      Physical Examination:  Daily Height in cm: 160.02 (12 Apr 2018 04:13)    Daily   Vital Signs Last 24 Hrs  T(C): 37.6 (12 Apr 2018 13:41), Max: 37.6 (12 Apr 2018 13:41)  T(F): 99.6 (12 Apr 2018 13:41), Max: 99.6 (12 Apr 2018 13:41)  HR: 78 (12 Apr 2018 13:41) (68 - 78)  BP: 151/76 (12 Apr 2018 13:41) (110/63 - 151/76)  BP(mean): --  RR: 18 (12 Apr 2018 13:41) (18 - 18)  SpO2: 99% (12 Apr 2018 13:41) (94% - 99%)    Drug Dosing Weight  Height (cm): 160.02 (12 Apr 2018 04:13)  Weight (kg): 56.6 (12 Apr 2018 04:13)  BMI (kg/m2): 22.1 (12 Apr 2018 04:13)  BSA (m2): 1.58 (12 Apr 2018 04:13)    Constitutional: NAD  Neck:  No JVD  Respiratory: CTAB/L  Cardiovascular: S1 and S2  Gastrointestinal: BS+, soft, NT/ND  Extremities: No peripheral edema  : No Saba  Skin: No rashes    Comments:    ASA Class: I [ ]  II [ ]  III [x]  IV [ ]    The patient is a suitable candidate for the planned procedure unless box checked [ ]  No, explain:

## 2018-04-12 NOTE — PROGRESS NOTE ADULT - SUBJECTIVE AND OBJECTIVE BOX
Patient is a 78y old  Female who presents with a chief complaint of rectal bleeding (10 Apr 2018 23:38)    pt. seen and examined, s/p colonoscopy    INTERVAL HPI/OVERNIGHT EVENTS:  T(C): 36.8 (04-12-18 @ 20:15), Max: 37.6 (04-12-18 @ 13:41)  HR: 85 (04-12-18 @ 20:15) (68 - 87)  BP: 150/79 (04-12-18 @ 20:15) (96/51 - 151/76)  RR: 18 (04-12-18 @ 20:16) (18 - 18)  SpO2: 95% (04-12-18 @ 20:16) (85% - 99%)  Wt(kg): --  I&O's Summary    11 Apr 2018 07:01  -  12 Apr 2018 07:00  --------------------------------------------------------  IN: 3360 mL / OUT: 1100 mL / NET: 2260 mL    12 Apr 2018 07:01  -  12 Apr 2018 21:42  --------------------------------------------------------  IN: 940 mL / OUT: 0 mL / NET: 940 mL        PAST MEDICAL & SURGICAL HISTORY:  Gangrene of foot  Coronary artery disease involving native coronary artery of native heart without angina pectoris  Status post above knee amputation of right lower extremity      SOCIAL HISTORY  Alcohol:  Tobacco:  Illicit substance use:    FAMILY HISTORY:    REVIEW OF SYSTEMS:  CONSTITUTIONAL: No fever, weight loss, or fatigue  EYES: No eye pain, visual disturbances, or discharge  ENMT:  No difficulty hearing, tinnitus, vertigo; No sinus or throat pain  NECK: No pain or stiffness  RESPIRATORY: No cough, wheezing, chills or hemoptysis; No shortness of breath  CARDIOVASCULAR: No chest pain, palpitations, dizziness, or leg swelling  GASTROINTESTINAL: No abdominal or epigastric pain. No nausea, vomiting, or hematemesis; No diarrhea or constipation. No melena or hematochezia.  GENITOURINARY: No dysuria, frequency, hematuria, or incontinence  NEUROLOGICAL: No headaches, memory loss, loss of strength, numbness, or tremors  SKIN: No itching, burning, rashes, or lesions   LYMPH NODES: No enlarged glands  ENDOCRINE: No heat or cold intolerance; No hair loss  MUSCULOSKELETAL: No joint pain or swelling; No muscle, back, or extremity pain  PSYCHIATRIC: No depression, anxiety, mood swings, or difficulty sleeping  HEME/LYMPH: No easy bruising, or bleeding gums  ALLERY AND IMMUNOLOGIC: No hives or eczema    RADIOLOGY & ADDITIONAL TESTS:    Imaging Personally Reviewed:  [ ] YES  [ ] NO    Consultant(s) Notes Reviewed:  [ ] YES  [ ] NO    PHYSICAL EXAM:  GENERAL: NAD, well-groomed, well-developed  HEAD:  Atraumatic, Normocephalic  EYES: EOMI, PERRLA, conjunctiva and sclera clear  ENMT: No tonsillar erythema, exudates, or enlargement; Moist mucous membranes, Good dentition, No lesions  NECK: Supple, No JVD, Normal thyroid  NERVOUS SYSTEM:  Alert & Oriented X3, Good concentration; Motor Strength 5/5 B/L upper and lower extremities; DTRs 2+ intact and symmetric  CHEST/LUNG: Clear to percussion bilaterally; No rales, rhonchi, wheezing, or rubs  HEART: Regular rate and rhythm; No murmurs, rubs, or gallops  ABDOMEN: Soft, Nontender, Nondistended; Bowel sounds present  EXTREMITIES:  2+ Peripheral Pulses, No clubbing, cyanosis, or edema  LYMPH: No lymphadenopathy noted  SKIN: No rashes or lesions    LABS:                        8.7    8.65  )-----------( 244      ( 12 Apr 2018 09:30 )             26.6     04-12    138  |  104  |  6<L>  ----------------------------<  104<H>  3.4<L>   |  23  |  0.58    Ca    7.8<L>      12 Apr 2018 07:24    TPro  6.5  /  Alb  3.1<L>  /  TBili  0.2  /  DBili  x   /  AST  9<L>  /  ALT  10  /  AlkPhos  63  04-11        CAPILLARY BLOOD GLUCOSE      POCT Blood Glucose.: 76 mg/dL (12 Apr 2018 17:32)  POCT Blood Glucose.: 88 mg/dL (12 Apr 2018 12:57)  POCT Blood Glucose.: 99 mg/dL (12 Apr 2018 08:38)  POCT Blood Glucose.: 102 mg/dL (12 Apr 2018 06:31)  POCT Blood Glucose.: 108 mg/dL (11 Apr 2018 22:27)            MEDICATIONS  (STANDING):  atorvastatin 40 milliGRAM(s) Oral at bedtime  carvedilol 12.5 milliGRAM(s) Oral every 12 hours  dextrose 5%. 1000 milliLiter(s) (50 mL/Hr) IV Continuous <Continuous>  dextrose 50% Injectable 12.5 Gram(s) IV Push once  dextrose 50% Injectable 25 Gram(s) IV Push once  dextrose 50% Injectable 25 Gram(s) IV Push once  hydrocortisone 2.5% Rectal Cream 1 Application(s) Rectal daily  insulin lispro (HumaLOG) corrective regimen sliding scale   SubCutaneous three times a day before meals  insulin lispro (HumaLOG) corrective regimen sliding scale   SubCutaneous at bedtime  lisinopril 5 milliGRAM(s) Oral daily  pantoprazole  Injectable 40 milliGRAM(s) IV Push daily  sodium chloride 0.9%. 1000 milliLiter(s) (75 mL/Hr) IV Continuous <Continuous>    MEDICATIONS  (PRN):  dextrose Gel 1 Dose(s) Oral once PRN Blood Glucose LESS THAN 70 milliGRAM(s)/deciliter  glucagon  Injectable 1 milliGRAM(s) IntraMuscular once PRN Glucose LESS THAN 70 milligrams/deciliter  morphine  - Injectable 2 milliGRAM(s) IV Push every 6 hours PRN Moderate Pain (4 - 6)      Care Discussed with Consultants/Other Providers [ ] YES  [ ] NO

## 2018-04-12 NOTE — PROGRESS NOTE ADULT - SUBJECTIVE AND OBJECTIVE BOX
[id number],   78yFemale followed for:  No Known Allergies    PAST MEDICAL & SURGICAL HISTORY:  Gangrene of foot  Coronary artery disease involving native coronary artery of native heart without angina pectoris  Status post above knee amputation of right lower extremity    FAMILY HISTORY:  No pertinent family history in first degree relatives    MEDICATIONS  (STANDING):  atorvastatin 40 milliGRAM(s) Oral at bedtime  carvedilol 12.5 milliGRAM(s) Oral every 12 hours  dextrose 5%. 1000 milliLiter(s) (50 mL/Hr) IV Continuous <Continuous>  dextrose 50% Injectable 12.5 Gram(s) IV Push once  dextrose 50% Injectable 25 Gram(s) IV Push once  dextrose 50% Injectable 25 Gram(s) IV Push once  hydrocortisone 2.5% Rectal Cream 1 Application(s) Rectal daily  insulin lispro (HumaLOG) corrective regimen sliding scale   SubCutaneous three times a day before meals  insulin lispro (HumaLOG) corrective regimen sliding scale   SubCutaneous at bedtime  lisinopril 5 milliGRAM(s) Oral daily  pantoprazole  Injectable 40 milliGRAM(s) IV Push daily  sodium chloride 0.9%. 1000 milliLiter(s) (75 mL/Hr) IV Continuous <Continuous>    MEDICATIONS  (PRN):  dextrose Gel 1 Dose(s) Oral once PRN Blood Glucose LESS THAN 70 milliGRAM(s)/deciliter  glucagon  Injectable 1 milliGRAM(s) IntraMuscular once PRN Glucose LESS THAN 70 milligrams/deciliter  morphine  - Injectable 2 milliGRAM(s) IV Push every 6 hours PRN Moderate Pain (4 - 6)      Vital Signs Last 24 Hrs  T(C): 36.7 (12 Apr 2018 05:02), Max: 36.9 (11 Apr 2018 13:23)  T(F): 98.1 (12 Apr 2018 05:02), Max: 98.4 (11 Apr 2018 13:23)  HR: 72 (12 Apr 2018 05:02) (66 - 72)  BP: 110/63 (12 Apr 2018 05:02) (97/60 - 127/57)  BP(mean): --  RR: 18 (12 Apr 2018 05:02) (18 - 20)  SpO2: 94% (12 Apr 2018 05:02) (94% - 99%)  nc/at  s1s2  cta  soft, nt, nd no guarding or rebound  no c/c/e    CBC Full  -  ( 12 Apr 2018 09:30 )  WBC Count : 8.65 K/uL  Hemoglobin : 8.7 g/dL  Hematocrit : 26.6 %  Platelet Count - Automated : 244 K/uL  Mean Cell Volume : 90.5 fl  Mean Cell Hemoglobin : 29.6 pg  Mean Cell Hemoglobin Concentration : 32.7 gm/dL  Auto Neutrophil # : x  Auto Lymphocyte # : x  Auto Monocyte # : x  Auto Eosinophil # : x  Auto Basophil # : x  Auto Neutrophil % : x  Auto Lymphocyte % : x  Auto Monocyte % : x  Auto Eosinophil % : x  Auto Basophil % : x    04-12    138  |  104  |  6<L>  ----------------------------<  104<H>  3.4<L>   |  23  |  0.58    Ca    7.8<L>      12 Apr 2018 07:24    TPro  6.5  /  Alb  3.1<L>  /  TBili  0.2  /  DBili  x   /  AST  9<L>  /  ALT  10  /  AlkPhos  63  04-11    PT/INR - ( 10 Apr 2018 11:02 )   PT: 12.0 sec;   INR: 1.10 ratio         PTT - ( 10 Apr 2018 11:02 )  PTT:32.5 sec

## 2018-04-12 NOTE — PROGRESS NOTE ADULT - SUBJECTIVE AND OBJECTIVE BOX
Patient is a 78y old  Female who presents with a chief complaint of rectal bleeding (10 Apr 2018 23:38)      INTERVAL HISTORY:  s/p colonoscopy    	  MEDICATIONS:  carvedilol 12.5 milliGRAM(s) Oral every 12 hours  lisinopril 5 milliGRAM(s) Oral daily        PHYSICAL EXAM:  T(C): 36.8 (04-12-18 @ 20:15), Max: 37.6 (04-12-18 @ 13:41)  HR: 85 (04-12-18 @ 20:15) (72 - 87)  BP: 150/79 (04-12-18 @ 20:15) (96/51 - 151/76)  RR: 18 (04-12-18 @ 20:16) (18 - 18)  SpO2: 95% (04-12-18 @ 20:16) (85% - 99%)  Wt(kg): --  I&O's Summary    11 Apr 2018 07:01  -  12 Apr 2018 07:00  --------------------------------------------------------  IN: 3360 mL / OUT: 1100 mL / NET: 2260 mL    12 Apr 2018 07:01  -  12 Apr 2018 22:01  --------------------------------------------------------  IN: 940 mL / OUT: 0 mL / NET: 940 mL      Height (cm): 160.02 (04-12 @ 04:13)  Weight (kg): 56.6 (04-12 @ 04:13)  BMI (kg/m2): 22.1 (04-12 @ 04:13)  BSA (m2): 1.58 (04-12 @ 04:13)    Appearance: Normal	  HEENT:    PERRL, EOMI	  Cardiovascular: Normal S1 S2, No JVD  Respiratory: Lungs clear to auscultation	  Psychiatry: Alert  Gastrointestinal:  Soft, Non-tender, + BS	  Skin: No rashes, No cyanosis  Extremities:  No edema of LE                                8.7    8.65  )-----------( 244      ( 12 Apr 2018 09:30 )             26.6     04-12    138  |  104  |  6<L>  ----------------------------<  104<H>  3.4<L>   |  23  |  0.58    Ca    7.8<L>      12 Apr 2018 07:24    TPro  6.5  /  Alb  3.1<L>  /  TBili  0.2  /  DBili  x   /  AST  9<L>  /  ALT  10  /  AlkPhos  63  04-11        Labs, imaging and telemetry personally reviewed      ASSESSMENT/PLAN: 	  Assessment and Plan:   · Assessment		  came in with [facility], CT shows rectal mass most likely malignancy     Problem/Plan - 1:  ·  Problem: Gastrointestinal hemorrhage, unspecified gastrointestinal hemorrhage type.  Plan: monitor H/H   s/p transfusion   s/p colonoscopy and Bx       Problem/Plan - 2:  ·  Problem: Rectal mass.  Plan: seen by surgery  -await pathology and onc/surg onc recs      Problem/Plan - 3:  ·  Problem: Coronary artery disease involving native coronary artery of native heart without angina pectoris.  Plan: stable  hold off on asa and Plavix, pt denies recent stent,   Please resume ASA .   Will clarify CAD Hx with daughter      Problem/Plan - 4:  ·  Problem: Diabetes.  Plan: hold off on oral meds  FSBS with RIVERA ss.      Problem/Plan - 5:  ·  Problem: CAD (coronary artery disease).  Plan: stable,   - resume ASA   - TTE to further risk stratify           Florentino Feldman DO [facility]  Cardiovascular Medicine  [phone]

## 2018-04-13 ENCOUNTER — TRANSCRIPTION ENCOUNTER (OUTPATIENT)
Age: 78
End: 2018-04-13

## 2018-04-13 ENCOUNTER — APPOINTMENT (OUTPATIENT)
Dept: ORTHOPEDIC SURGERY | Facility: CLINIC | Age: 78
End: 2018-04-13

## 2018-04-13 LAB
ANION GAP SERPL CALC-SCNC: 12 MMOL/L — SIGNIFICANT CHANGE UP (ref 5–17)
BUN SERPL-MCNC: 6 MG/DL — LOW (ref 7–23)
CALCIUM SERPL-MCNC: 8.1 MG/DL — LOW (ref 8.4–10.5)
CEA SERPL-MCNC: 2.3 NG/ML — SIGNIFICANT CHANGE UP (ref 0–3.8)
CHLORIDE SERPL-SCNC: 101 MMOL/L — SIGNIFICANT CHANGE UP (ref 96–108)
CO2 SERPL-SCNC: 23 MMOL/L — SIGNIFICANT CHANGE UP (ref 22–31)
CREAT SERPL-MCNC: 0.58 MG/DL — SIGNIFICANT CHANGE UP (ref 0.5–1.3)
GLUCOSE BLDC GLUCOMTR-MCNC: 101 MG/DL — HIGH (ref 70–99)
GLUCOSE BLDC GLUCOMTR-MCNC: 128 MG/DL — HIGH (ref 70–99)
GLUCOSE BLDC GLUCOMTR-MCNC: 137 MG/DL — HIGH (ref 70–99)
GLUCOSE BLDC GLUCOMTR-MCNC: 165 MG/DL — HIGH (ref 70–99)
GLUCOSE SERPL-MCNC: 92 MG/DL — SIGNIFICANT CHANGE UP (ref 70–99)
HCT VFR BLD CALC: 29.4 % — LOW (ref 34.5–45)
HGB BLD-MCNC: 9.5 G/DL — LOW (ref 11.5–15.5)
MCHC RBC-ENTMCNC: 29.7 PG — SIGNIFICANT CHANGE UP (ref 27–34)
MCHC RBC-ENTMCNC: 32.3 GM/DL — SIGNIFICANT CHANGE UP (ref 32–36)
MCV RBC AUTO: 91.9 FL — SIGNIFICANT CHANGE UP (ref 80–100)
PLATELET # BLD AUTO: 255 K/UL — SIGNIFICANT CHANGE UP (ref 150–400)
POTASSIUM SERPL-MCNC: 3.5 MMOL/L — SIGNIFICANT CHANGE UP (ref 3.5–5.3)
POTASSIUM SERPL-SCNC: 3.5 MMOL/L — SIGNIFICANT CHANGE UP (ref 3.5–5.3)
RBC # BLD: 3.2 M/UL — LOW (ref 3.8–5.2)
RBC # FLD: 13.9 % — SIGNIFICANT CHANGE UP (ref 10.3–14.5)
SODIUM SERPL-SCNC: 136 MMOL/L — SIGNIFICANT CHANGE UP (ref 135–145)
SURGICAL PATHOLOGY STUDY: SIGNIFICANT CHANGE UP
WBC # BLD: 9.76 K/UL — SIGNIFICANT CHANGE UP (ref 3.8–10.5)
WBC # FLD AUTO: 9.76 K/UL — SIGNIFICANT CHANGE UP (ref 3.8–10.5)

## 2018-04-13 PROCEDURE — 71250 CT THORAX DX C-: CPT | Mod: 26

## 2018-04-13 RX ORDER — ASPIRIN/CALCIUM CARB/MAGNESIUM 324 MG
81 TABLET ORAL DAILY
Qty: 0 | Refills: 0 | Status: DISCONTINUED | OUTPATIENT
Start: 2018-04-13 | End: 2018-04-19

## 2018-04-13 RX ADMIN — CARVEDILOL PHOSPHATE 12.5 MILLIGRAM(S): 80 CAPSULE, EXTENDED RELEASE ORAL at 17:49

## 2018-04-13 RX ADMIN — MORPHINE SULFATE 2 MILLIGRAM(S): 50 CAPSULE, EXTENDED RELEASE ORAL at 19:39

## 2018-04-13 RX ADMIN — MORPHINE SULFATE 2 MILLIGRAM(S): 50 CAPSULE, EXTENDED RELEASE ORAL at 03:34

## 2018-04-13 RX ADMIN — Medication 1: at 13:51

## 2018-04-13 RX ADMIN — MORPHINE SULFATE 2 MILLIGRAM(S): 50 CAPSULE, EXTENDED RELEASE ORAL at 09:15

## 2018-04-13 RX ADMIN — MORPHINE SULFATE 2 MILLIGRAM(S): 50 CAPSULE, EXTENDED RELEASE ORAL at 03:57

## 2018-04-13 RX ADMIN — ATORVASTATIN CALCIUM 40 MILLIGRAM(S): 80 TABLET, FILM COATED ORAL at 21:12

## 2018-04-13 RX ADMIN — LISINOPRIL 5 MILLIGRAM(S): 2.5 TABLET ORAL at 05:09

## 2018-04-13 RX ADMIN — Medication 1 APPLICATION(S): at 13:51

## 2018-04-13 RX ADMIN — Medication 0: at 22:35

## 2018-04-13 RX ADMIN — MORPHINE SULFATE 2 MILLIGRAM(S): 50 CAPSULE, EXTENDED RELEASE ORAL at 08:58

## 2018-04-13 RX ADMIN — MORPHINE SULFATE 2 MILLIGRAM(S): 50 CAPSULE, EXTENDED RELEASE ORAL at 19:03

## 2018-04-13 RX ADMIN — PANTOPRAZOLE SODIUM 40 MILLIGRAM(S): 20 TABLET, DELAYED RELEASE ORAL at 13:51

## 2018-04-13 RX ADMIN — Medication 81 MILLIGRAM(S): at 13:51

## 2018-04-13 RX ADMIN — CARVEDILOL PHOSPHATE 12.5 MILLIGRAM(S): 80 CAPSULE, EXTENDED RELEASE ORAL at 05:09

## 2018-04-13 NOTE — DISCHARGE NOTE ADULT - ADDITIONAL INSTRUCTIONS
Please call Gastroenterology office as listed below for an appointment for a followup gastroenterology procedure  Follow up with your Primary care doctor

## 2018-04-13 NOTE — DISCHARGE NOTE ADULT - CARE PROVIDER_API CALL
Alecia Davis), Hematology; Internal Medicine; Medical Oncology  [address] [place] [address]  Phone: [phone]  Fax: [phone]    Ryan Sosa), ColonRectal Surgery; Surgery  [facility]  [address] [place] [address]  Phone: [phone]  Fax: [phone] Alecia Davis), Hematology; Internal Medicine; Medical Oncology  [address] [place] [address]  Phone: [phone]  Fax: [phone]    Ryan Sosa), ColonRectal Surgery; Surgery  [facility]  [address] [place] [address]  Phone: [phone]  Fax: [phone]    Claudio Swartz (DO), Gastroenterology; Internal Medicine  [address] [place] [address]  Phone: [phone]  Fax: [phone]

## 2018-04-13 NOTE — PROGRESS NOTE ADULT - SUBJECTIVE AND OBJECTIVE BOX
COLORECTAL SURGERY  Pager: [phone]    INTERVAL EVENTS/SUBJECTIVE:   Underwent colonoscopy and lower endoscopy staging. Found to have a 10x5 circumferential, non-obstructing mid-rectum mass. Biopsied.  Reports having lower abdominal pain this AM.    ______________________________________________  OBJECTIVE:   T(C): 37.2 (04-13-18 @ 05:08), Max: 37.6 (04-12-18 @ 13:41)  HR: 74 (04-13-18 @ 05:08) (74 - 87)  BP: 144/62 (04-13-18 @ 05:08) (96/51 - 151/76)  RR: 18 (04-13-18 @ 05:08) (18 - 18)  SpO2: 94% (04-13-18 @ 05:08) (85% - 99%)  Wt(kg): --  CAPILLARY BLOOD GLUCOSE      POCT Blood Glucose.: 269 mg/dL (12 Apr 2018 22:16)  POCT Blood Glucose.: 76 mg/dL (12 Apr 2018 17:32)  POCT Blood Glucose.: 88 mg/dL (12 Apr 2018 12:57)  POCT Blood Glucose.: 99 mg/dL (12 Apr 2018 08:38)    I&O's Detail    12 Apr 2018 07:01  -  13 Apr 2018 07:00  --------------------------------------------------------  IN:    IV PiggyBack: 100 mL    Oral Fluid: 480 mL    sodium chloride 0.9%.: 1500 mL  Total IN: 2080 mL    OUT:  Total OUT: 0 mL    Total NET: 2080 mL          Physical exam:  Gen: NAD  Abdomen soft, nondistended, minimal lower abdominal tenderness  ______________________________________________  LABS:  CBC Full  -  ( 12 Apr 2018 09:30 )  WBC Count : 8.65 K/uL  Hemoglobin : 8.7 g/dL  Hematocrit : 26.6 %  Platelet Count - Automated : 244 K/uL  Mean Cell Volume : 90.5 fl  Mean Cell Hemoglobin : 29.6 pg  Mean Cell Hemoglobin Concentration : 32.7 gm/dL  Auto Neutrophil # : x  Auto Lymphocyte # : x  Auto Monocyte # : x  Auto Eosinophil # : x  Auto Basophil # : x  Auto Neutrophil % : x  Auto Lymphocyte % : x  Auto Monocyte % : x  Auto Eosinophil % : x  Auto Basophil % : x    04-12    138  |  104  |  6<L>  ----------------------------<  104<H>  3.4<L>   |  23  |  0.58    Ca    7.8<L>      12 Apr 2018 07:24    ______________________________________________  RADIOLOGY:  Colonoscopy (04.12.18 @ 15:37)   Findings:       An infiltrative non-obstructing large mass was found in the mid rectum. The mass was        circumferential. The mass measured ten cm in length. In addition,its diameter measured five        mm. Oozing was present. This was biopsied with a cold forceps for histology.                                                                                                        Impression:          - Malignant tumor in the mid rectum. Biopsied.                       - The examination was suspicious for a malignant-appearing tumor. Biopsied. COLORECTAL SURGERY  Pager: [phone]    INTERVAL EVENTS/SUBJECTIVE:   Underwent colonoscopy and lower endoscopy staging. Found to have a 10x5 circumferential, non-obstructing mid-rectum mass. Biopsied.  Reports having lower abdominal pain this AM.    ______________________________________________  OBJECTIVE:   T(C): 37.2 (04-13-18 @ 05:08), Max: 37.6 (04-12-18 @ 13:41)  HR: 74 (04-13-18 @ 05:08) (74 - 87)  BP: 144/62 (04-13-18 @ 05:08) (96/51 - 151/76)  RR: 18 (04-13-18 @ 05:08) (18 - 18)  SpO2: 94% (04-13-18 @ 05:08) (85% - 99%)  Wt(kg): --  CAPILLARY BLOOD GLUCOSE      POCT Blood Glucose.: 269 mg/dL (12 Apr 2018 22:16)  POCT Blood Glucose.: 76 mg/dL (12 Apr 2018 17:32)  POCT Blood Glucose.: 88 mg/dL (12 Apr 2018 12:57)  POCT Blood Glucose.: 99 mg/dL (12 Apr 2018 08:38)    I&O's Detail    12 Apr 2018 07:01  -  13 Apr 2018 07:00  --------------------------------------------------------  IN:    IV PiggyBack: 100 mL    Oral Fluid: 480 mL    sodium chloride 0.9%.: 1500 mL  Total IN: 2080 mL    OUT:  Total OUT: 0 mL    Total NET: 2080 mL          Physical exam:  Gen: NAD  Abdomen soft, nondistended, minimal lower abdominal tenderness  ______________________________________________  LABS:  CBC Full  -  ( 12 Apr 2018 09:30 )  WBC Count : 8.65 K/uL  Hemoglobin : 8.7 g/dL  Hematocrit : 26.6 %  Platelet Count - Automated : 244 K/uL  Mean Cell Volume : 90.5 fl  Mean Cell Hemoglobin : 29.6 pg  Mean Cell Hemoglobin Concentration : 32.7 gm/dL      04-12    138  |  104  |  6<L>  ----------------------------<  104<H>  3.4<L>   |  23  |  0.58    Ca    7.8<L>      12 Apr 2018 07:24    ______________________________________________  RADIOLOGY:  Colonoscopy (04.12.18 @ 15:37)   Findings:       An infiltrative non-obstructing large mass was found in the mid rectum. The mass was        circumferential. The mass measured ten cm in length. In addition,its diameter measured five        mm. Oozing was present. This was biopsied with a cold forceps for histology.                                                                                                        Impression:          - Malignant tumor in the mid rectum. Biopsied.                       - The examination was suspicious for a malignant-appearing tumor. Biopsied.

## 2018-04-13 NOTE — DISCHARGE NOTE ADULT - PATIENT PORTAL LINK FT
You can access the [facility] Patient Portal, offered by [facility], by registering with the following website: [email or web address]

## 2018-04-13 NOTE — DISCHARGE NOTE ADULT - PLAN OF CARE
resolved Follow up with your Primary care doctor in 1 week Follow up with Oncologist at [facility]-Dr. yRan Alston-[phone] in 1 week  For further work up and treatment  Follow up with Surgery- Dr. Sosa in 1 week Continue with Aspirin. Hold Plavix Condition resolved  Call Gastroenterology for followup procedure Follow up with Oncologist at [facility]-Dr. Ryan Alston-[phone]   For further work up and treatment  Follow up with Surgery- Dr. Sosa Condition resolved  Call Gastroenterology(Dr. Swartz) for appointment for possible sigmoidoscopy

## 2018-04-13 NOTE — DISCHARGE NOTE ADULT - SECONDARY DIAGNOSIS.
Rectal cancer Rectal mass Coronary artery disease involving native coronary artery of native heart without angina pectoris

## 2018-04-13 NOTE — DISCHARGE NOTE ADULT - HOSPITAL COURSE
came in with [facility], CT shows rectal mass most likely malignancy    Patient is a 77y/o Female who presents with a chief complaint of rectal bleeding     Bleeding resolved. cbc stabilized   s/p colonoscopy with biopsy of malignant tumor in the mid rectum on 4/12.   Seen by GI - biopsy not diagnostic - per GI pt will need repeat flex sig op for more tissue. Spoke wtih Pt grandson(David) to call for marcelo.  Seen by surgery/oncology

## 2018-04-13 NOTE — DISCHARGE NOTE ADULT - MEDICATION SUMMARY - MEDICATIONS TO TAKE
I will START or STAY ON the medications listed below when I get home from the hospital:    acetaminophen 325 mg oral tablet  -- 2 tab(s) by mouth every 6 hours, As Needed  -- Indication: For pain    aspirin 81 mg oral tablet, chewable  -- 1 tab(s) by mouth once a day  -- Indication: For blood thinner    lisinopril 5 mg oral tablet  -- 1 tab(s) by mouth once a day  -- Indication: For blood pressure    metFORMIN 1000 mg oral tablet  -- 1 tab(s) by mouth once a day  -- Indication: For Diabetes    atorvastatin 40 mg oral tablet  -- 1 tab(s) by mouth once a day (at bedtime)  -- Indication: For Cholesterol    carvedilol 12.5 mg oral tablet  -- 1 tab(s) by mouth every 12 hours  -- Indication: For blood pressure    hydrocortisone 2.5% rectal cream with applicator  -- 1 application rectally once a day  -- Indication: For Rectal cream    famotidine 20 mg oral tablet  -- 1 tab(s) by mouth once a day  -- Indication: For Stomach    ferrous sulfate 325 mg (65 mg elemental iron) oral tablet  -- 1 tab(s) by mouth once a day  -- Indication: For Anemia     docusate sodium 100 mg oral capsule  -- 1 cap(s) by mouth 2 times a day  -- Indication: For Stool softener    polyethylene glycol 3350 oral powder for reconstitution  -- 17 gram(s) by mouth once a day  -- Indication: For Constipation    senna oral tablet  -- 2 tab(s) by mouth once a day (at bedtime)  -- Indication: For Constipation    ocular lubricant ophthalmic solution  -- 1 drop(s) in each eye , As Needed  -- Indication: For eye drops    folic acid 1 mg oral tablet  -- 1 tab(s) by mouth once a day  -- Indication: For Supplement I will START or STAY ON the medications listed below when I get home from the hospital:    acetaminophen 325 mg oral tablet  -- 2 tab(s) by mouth every 6 hours, As Needed  -- Indication: For pain    aspirin 81 mg oral tablet, chewable  -- 1 tab(s) by mouth once a day  -- Indication: For blood thinner    oxyCODONE-acetaminophen 5 mg-325 mg oral tablet  -- 1 tab(s) by mouth every 6 hours, As needed, Moderate Pain (4 - 6)  -- Indication: For moderate pain    lisinopril 5 mg oral tablet  -- 1 tab(s) by mouth once a day  -- Indication: For blood pressure    metFORMIN 1000 mg oral tablet  -- 1 tab(s) by mouth once a day  -- Indication: For Diabetes    atorvastatin 40 mg oral tablet  -- 1 tab(s) by mouth once a day (at bedtime)  -- Indication: For Cholesterol    carvedilol 12.5 mg oral tablet  -- 1 tab(s) by mouth every 12 hours  -- Indication: For blood pressure    hydrocortisone 2.5% rectal cream with applicator  -- 1 application rectally once a day  -- Indication: For Rectal cream    famotidine 20 mg oral tablet  -- 1 tab(s) by mouth once a day  -- Indication: For Stomach    ferrous sulfate 325 mg (65 mg elemental iron) oral tablet  -- 1 tab(s) by mouth once a day  -- Indication: For Anemia     docusate sodium 100 mg oral capsule  -- 1 cap(s) by mouth 2 times a day  -- Indication: For Stool softener    polyethylene glycol 3350 oral powder for reconstitution  -- 17 gram(s) by mouth once a day  -- Indication: For Constipation    senna oral tablet  -- 2 tab(s) by mouth once a day (at bedtime)  -- Indication: For Constipation    ocular lubricant ophthalmic solution  -- 1 drop(s) in each eye , As Needed  -- Indication: For eye drops    folic acid 1 mg oral tablet  -- 1 tab(s) by mouth once a day  -- Indication: For Supplement

## 2018-04-13 NOTE — DISCHARGE NOTE ADULT - PROVIDER TOKENS
TOKEN:'[id number]',TOKEN:'[id number]' TOKEN:'[id number]',TOKEN:'[id number]',TOKEN:'[id number]'

## 2018-04-13 NOTE — PROGRESS NOTE ADULT - SUBJECTIVE AND OBJECTIVE BOX
Patient is a 78y old  Female who presents with a chief complaint of rectal bleeding (13 Apr 2018 17:01)      INTERVAL HISTORY:  feels ok  	  MEDICATIONS:  carvedilol 12.5 milliGRAM(s) Oral every 12 hours  lisinopril 5 milliGRAM(s) Oral daily        PHYSICAL EXAM:  T(C): 36.8 (04-13-18 @ 14:08), Max: 37.2 (04-13-18 @ 05:08)  HR: 67 (04-13-18 @ 14:08) (67 - 87)  BP: 131/69 (04-13-18 @ 14:08) (96/51 - 150/79)  RR: 18 (04-13-18 @ 14:08) (18 - 18)  SpO2: 96% (04-13-18 @ 14:08) (85% - 99%)  Wt(kg): --  I&O's Summary    12 Apr 2018 07:01  -  13 Apr 2018 07:00  --------------------------------------------------------  IN: 2080 mL / OUT: 0 mL / NET: 2080 mL    13 Apr 2018 07:01  -  13 Apr 2018 17:26  --------------------------------------------------------  IN: 360 mL / OUT: 300 mL / NET: 60 mL          Appearance: Normal	  HEENT:    PERRL, EOMI	  Cardiovascular: Normal S1 S2, No JVD  Respiratory: Lungs clear to auscultation	  Psychiatry: Alert  Gastrointestinal:  Soft, Non-tender, + BS	  Skin: No rashes, No cyanosis  Extremities:  No edema of LE                                9.5    9.76  )-----------( 255      ( 13 Apr 2018 09:36 )             29.4     04-13    136  |  101  |  6<L>  ----------------------------<  92  3.5   |  23  |  0.58    Ca    8.1<L>      13 Apr 2018 07:32          Labs, imaging and telemetry personally reviewed      Assessment and Plan:   · Assessment		  came in with [facility], CT shows rectal mass most likely malignancy     Problem/Plan - 1:  ·  Problem: Gastrointestinal hemorrhage, unspecified gastrointestinal hemorrhage type.  Plan: monitor H/H   s/p transfusion   s/p colonoscopy and Bx       Problem/Plan - 2:  ·  Problem: Rectal mass.  Plan: seen by surgery  -await pathology and onc/surg onc recs      Problem/Plan - 3:  ·  Problem: Coronary artery disease involving native coronary artery of native heart without angina pectoris.  Plan: stable  hold off on asa and Plavix,   Discussed with pts daughter, she confirms that pt has known CAD but has never been revascularized, no history of stent.   Given no stent or history of MI, will d/c Plavix to reduce incidence of bleed and continue with ASA 81mg          Problem/Plan - 4:  ·  Problem: Diabetes.  Plan: hold off on oral meds  FSBS with RIVERA ss.        Problem/Plan - 5:  ·  Problem: CAD (coronary artery disease).  Plan: stable,   - ASA   - TTE to further risk stratify   - Discussed with pts daughter, she confirms that pt has known CAD but has never been revascularized, no history of stent.   Given no stent or history of MI, will d/c Plavix to reduce incidence of bleed and continue with ASA 81mg             Florentino Feldman DO [facility]  Cardiovascular Medicine  [phone]

## 2018-04-13 NOTE — CONSULT NOTE ADULT - SUBJECTIVE AND OBJECTIVE BOX
Oncology Consult Note    History obtained from Hernandez dash, at patient's request    HPI:  78 year old Estonian-speaking woman with CAD, PAD s/p RLE amputation presents this admission with abdominal pain, bleeding per rectum for the past few months.  She has also noted decreased appetite and about a 7lb weight loss.  She was admitted to a hospital in [facility] a few years ago requiring ICU level care for her severe PAD and complications of vascular procedures.  At that time, her daughter believes she may have undergone a colonoscopy but she is not sure.  She has noted blood staining the toilet water and some clots on the toilet paper intermittently in the past few months and sometimes holds her mother's aspirin during these times.  She takes plavix daily.  She has also had decreased appetite with intermittent nausea in the past few months.  No shortness of breath, chest pain at this time.  Complains of mild right lower abdominal pain (10 Apr 2018 23:38)    Found to have rectal mass on CT imaging of abdomen/pelvis, underwent colonscopy, lower EUS with GI, which confirms large (10x5cm), circumferential rectal mass 4cm from anal verge, stage IIIB ([id number]) by EUS examination. Oncology consulted to discuss role of chemotherapy. Has home care from her daughter, not very ambulatory at home.       Allergies    No Known Allergies    Intolerances        MEDICATIONS  (STANDING):  aspirin  chewable 81 milliGRAM(s) Oral daily  atorvastatin 40 milliGRAM(s) Oral at bedtime  carvedilol 12.5 milliGRAM(s) Oral every 12 hours  dextrose 5%. 1000 milliLiter(s) (50 mL/Hr) IV Continuous <Continuous>  dextrose 50% Injectable 12.5 Gram(s) IV Push once  dextrose 50% Injectable 25 Gram(s) IV Push once  dextrose 50% Injectable 25 Gram(s) IV Push once  hydrocortisone 2.5% Rectal Cream 1 Application(s) Rectal daily  insulin lispro (HumaLOG) corrective regimen sliding scale   SubCutaneous three times a day before meals  insulin lispro (HumaLOG) corrective regimen sliding scale   SubCutaneous at bedtime  lisinopril 5 milliGRAM(s) Oral daily  pantoprazole  Injectable 40 milliGRAM(s) IV Push daily  sodium chloride 0.9%. 1000 milliLiter(s) (75 mL/Hr) IV Continuous <Continuous>    MEDICATIONS  (PRN):  dextrose Gel 1 Dose(s) Oral once PRN Blood Glucose LESS THAN 70 milliGRAM(s)/deciliter  glucagon  Injectable 1 milliGRAM(s) IntraMuscular once PRN Glucose LESS THAN 70 milligrams/deciliter  morphine  - Injectable 2 milliGRAM(s) IV Push every 6 hours PRN Moderate Pain (4 - 6)      PAST MEDICAL & SURGICAL HISTORY:  Gangrene of foot  Coronary artery disease involving native coronary artery of native heart without angina pectoris  Status post above knee amputation of right lower extremity      FAMILY HISTORY:  No pertinent family history in first degree relatives      SOCIAL HISTORY: No EtOH, no tobacco    REVIEW OF SYSTEMS:    CONSTITUTIONAL: No weakness, fevers or chills  EYES/ENT: No visual changes;  No vertigo or throat pain   NECK: No pain or stiffness  RESPIRATORY: No cough, wheezing, hemoptysis; No shortness of breath  CARDIOVASCULAR: No chest pain or palpitations  GASTROINTESTINAL: No abdominal or epigastric pain. No nausea, vomiting, or hematemesis; No diarrhea or constipation. +hematochezia  GENITOURINARY: No dysuria, frequency or hematuria  NEUROLOGICAL: No numbness or weakness  SKIN: No itching, burning, rashes, or lesions   All other review of systems is negative unless indicated above.        T(F): 98.9 (04-13-18 @ 05:08), Max: 99.6 (04-12-18 @ 13:41)  HR: 68 (04-13-18 @ 08:57)  BP: 132/63 (04-13-18 @ 08:57)  RR: 18 (04-13-18 @ 05:08)  SpO2: 94% (04-13-18 @ 05:08)  Wt(kg): --    GENERAL: Alert, awake, NAD, well-developed  HEAD:  Atraumatic, Normocephalic  EYES: EOMI, PERRLA, conjunctiva and sclera clear  NECK: Supple, No JVD  CHEST/LUNG: Clear to auscultation bilaterally; No wheeze  HEART: Regular rate and rhythm; No murmurs, rubs, or gallops  ABDOMEN: Soft, mildly tender to palpation diffusely, Nondistended; Bowel sounds present  EXTREMITIES:  RLE AKA  NEUROLOGY: non-focal  SKIN: No rashes or lesions                          9.5    9.76  )-----------( 255      ( 13 Apr 2018 09:36 )             29.4       04-13    136  |  101  |  6<L>  ----------------------------<  92  3.5   |  23  |  0.58    Ca    8.1<L>      13 Apr 2018 07:32      < from: CT Abdomen and Pelvis w/ Oral Cont and w/ IV Cont (04.10.18 @ 13:37) >  FINDINGS:    LOWER CHEST: Small calcifiedmediastinal and hilar nodes. A 4 mm left   lower lobe pulmonary nodule (2:13).    LIVER: Within normal limits.  BILE DUCTS: Normal caliber.  GALLBLADDER: Within normal limits.  SPLEEN: Within normal limits.  PANCREAS: Within normal limits.  ADRENALS:Mild thickening of the left adrenal gland.  KIDNEYS/URETERS: Within normal limits.    BLADDER: Within normal limits.  REPRODUCTIVE ORGANS: Hysterectomy.    BOWEL: No bowel obstruction. Markedly irregular rectal wall thickening   with nodularity in the mesorectal fascia, highly concerning for rectal   neoplasm. Appendix is normal.   PERITONEUM: No ascites.  VESSELS:  Atherosclerotic changes. Right lower extremity femoral artery   graft which appears occluded.  RETROPERITONEUM: A few subcentimeter short axis retroperitoneal nodes are   nonspecific.    ABDOMINAL WALL: Within normal limits.  BONES: Degenerative changes.    IMPRESSION:     Rectal neoplasm.    A 4 mm left lower lobe pulmonary nodule.    < end of copied text >    < from: Colonoscopy (04.12.18 @ 15:37) >    Findings:       An infiltrative non-obstructing large mass was found in the mid rectum. The mass was        circumferential. The mass measured ten cm in length. In addition,its diameter measured five        mm. Oozing was present. This was biopsied with a cold forceps for histology.                                                                                                        Impression:          - Malignant tumor in the mid rectum. Biopsied.                       - The examination was suspicious for a malignant-appearing tumor. Biopsied.  Recommendation:      - Observe patient in GI recovery unit for ongoing care.    < end of copied text >    < from: Lower EUS (04.12.18 @ 15:51) >         Impression:          - Rectal mass was visualized endosonographically. Tissue has not been                        obtained. However, the endosonographic appearance is highly suspicious for                        adenocarcinoma. This was staged uT3 uN1.                       - No specimens collected.    Recommendation:      - Await pathology results.    < end of copied text >

## 2018-04-13 NOTE — CONSULT NOTE ADULT - ASSESSMENT
suspicious for rectal cancer  pt will need colonoscopy once  services available  start prep today
77yo woman w/ CAD, PAD s/p AKA admitted with [facility], found to have large, non-obstructing rectal neoplasm concerning for malignancy.
78 year old woman with anemia, bleeding per rectum for many months presents with newly diagnosed rectal mass    - Recommend GI consult for colonoscopy, biopsy  - CEA level  - Discussed with colorectal surgery fellow    MARTY La MD PGY3 [phone]

## 2018-04-13 NOTE — DISCHARGE NOTE ADULT - CARE PROVIDERS DIRECT ADDRESSES
,[email or web address]

## 2018-04-13 NOTE — PROGRESS NOTE ADULT - SUBJECTIVE AND OBJECTIVE BOX
Patient is a 78y old  Female who presents with a chief complaint of rectal bleeding (13 Apr 2018 17:01)    pt. seen and examined, NAD    INTERVAL HPI/OVERNIGHT EVENTS:  T(C): 37.5 (04-13-18 @ 20:38), Max: 37.5 (04-13-18 @ 20:38)  HR: 68 (04-13-18 @ 20:38) (67 - 74)  BP: 138/55 (04-13-18 @ 20:38) (131/69 - 154/79)  RR: 18 (04-13-18 @ 20:38) (18 - 18)  SpO2: 95% (04-13-18 @ 20:38) (94% - 96%)  Wt(kg): --  I&O's Summary    12 Apr 2018 07:01  -  13 Apr 2018 07:00  --------------------------------------------------------  IN: 2080 mL / OUT: 0 mL / NET: 2080 mL    13 Apr 2018 07:01  -  14 Apr 2018 01:20  --------------------------------------------------------  IN: 600 mL / OUT: 700 mL / NET: -100 mL        PAST MEDICAL & SURGICAL HISTORY:  Gangrene of foot  Coronary artery disease involving native coronary artery of native heart without angina pectoris  Status post above knee amputation of right lower extremity      SOCIAL HISTORY  Alcohol:  Tobacco:  Illicit substance use:    FAMILY HISTORY:    REVIEW OF SYSTEMS:  CONSTITUTIONAL: No fever, weight loss, or fatigue  EYES: No eye pain, visual disturbances, or discharge  ENMT:  No difficulty hearing, tinnitus, vertigo; No sinus or throat pain  NECK: No pain or stiffness  RESPIRATORY: No cough, wheezing, chills or hemoptysis; No shortness of breath  CARDIOVASCULAR: No chest pain, palpitations, dizziness, or leg swelling  GASTROINTESTINAL: No abdominal or epigastric pain. No nausea, vomiting, or hematemesis; No diarrhea or constipation. No melena or hematochezia.  GENITOURINARY: No dysuria, frequency, hematuria, or incontinence  NEUROLOGICAL: No headaches, memory loss, loss of strength, numbness, or tremors  SKIN: No itching, burning, rashes, or lesions   LYMPH NODES: No enlarged glands  ENDOCRINE: No heat or cold intolerance; No hair loss  MUSCULOSKELETAL: No joint pain or swelling; No muscle, back, or extremity pain  PSYCHIATRIC: No depression, anxiety, mood swings, or difficulty sleeping  HEME/LYMPH: No easy bruising, or bleeding gums  ALLERY AND IMMUNOLOGIC: No hives or eczema    RADIOLOGY & ADDITIONAL TESTS:    Imaging Personally Reviewed:  [ ] YES  [ ] NO    Consultant(s) Notes Reviewed:  [ ] YES  [ ] NO    PHYSICAL EXAM:  GENERAL: NAD, well-groomed, well-developed  HEAD:  Atraumatic, Normocephalic  EYES: EOMI, PERRLA, conjunctiva and sclera clear  ENMT: No tonsillar erythema, exudates, or enlargement; Moist mucous membranes, Good dentition, No lesions  NECK: Supple, No JVD, Normal thyroid  NERVOUS SYSTEM:  Alert & Oriented X3, Good concentration; Motor Strength 5/5 B/L upper and lower extremities; DTRs 2+ intact and symmetric  CHEST/LUNG: Clear to percussion bilaterally; No rales, rhonchi, wheezing, or rubs  HEART: Regular rate and rhythm; No murmurs, rubs, or gallops  ABDOMEN: Soft, Nontender, Nondistended; Bowel sounds present  EXTREMITIES:  2+ Peripheral Pulses, No clubbing, cyanosis, or edema  LYMPH: No lymphadenopathy noted  SKIN: No rashes or lesions    LABS:                        9.5    9.76  )-----------( 255      ( 13 Apr 2018 09:36 )             29.4     04-13    136  |  101  |  6<L>  ----------------------------<  92  3.5   |  23  |  0.58    Ca    8.1<L>      13 Apr 2018 07:32          CAPILLARY BLOOD GLUCOSE      POCT Blood Glucose.: 128 mg/dL (13 Apr 2018 22:14)  POCT Blood Glucose.: 137 mg/dL (13 Apr 2018 17:58)  POCT Blood Glucose.: 165 mg/dL (13 Apr 2018 12:57)  POCT Blood Glucose.: 101 mg/dL (13 Apr 2018 08:32)            MEDICATIONS  (STANDING):  aspirin  chewable 81 milliGRAM(s) Oral daily  atorvastatin 40 milliGRAM(s) Oral at bedtime  carvedilol 12.5 milliGRAM(s) Oral every 12 hours  dextrose 5%. 1000 milliLiter(s) (50 mL/Hr) IV Continuous <Continuous>  dextrose 50% Injectable 12.5 Gram(s) IV Push once  dextrose 50% Injectable 25 Gram(s) IV Push once  dextrose 50% Injectable 25 Gram(s) IV Push once  hydrocortisone 2.5% Rectal Cream 1 Application(s) Rectal daily  insulin lispro (HumaLOG) corrective regimen sliding scale   SubCutaneous three times a day before meals  insulin lispro (HumaLOG) corrective regimen sliding scale   SubCutaneous at bedtime  lisinopril 5 milliGRAM(s) Oral daily  pantoprazole  Injectable 40 milliGRAM(s) IV Push daily  sodium chloride 0.9%. 1000 milliLiter(s) (75 mL/Hr) IV Continuous <Continuous>    MEDICATIONS  (PRN):  dextrose Gel 1 Dose(s) Oral once PRN Blood Glucose LESS THAN 70 milliGRAM(s)/deciliter  glucagon  Injectable 1 milliGRAM(s) IntraMuscular once PRN Glucose LESS THAN 70 milligrams/deciliter  morphine  - Injectable 2 milliGRAM(s) IV Push every 6 hours PRN Moderate Pain (4 - 6)      Care Discussed with Consultants/Other Providers [ ] YES  [ ] NO

## 2018-04-13 NOTE — DISCHARGE NOTE ADULT - CARE PLAN
Principal Discharge DX:	Gastrointestinal hemorrhage, unspecified gastrointestinal hemorrhage type  Goal:	resolved  Assessment and plan of treatment:	Follow up with your Primary care doctor in 1 week  Secondary Diagnosis:	Rectal cancer  Assessment and plan of treatment:	Follow up with Oncologist at [facility]-Dr. Ryan Alston-[phone] in 1 week  For further work up and treatment  Follow up with Surgery- Dr. Sosa in 1 week Principal Discharge DX:	Gastrointestinal hemorrhage, unspecified gastrointestinal hemorrhage type  Goal:	resolved  Assessment and plan of treatment:	Condition resolved  Call Gastroenterology for followup procedure  Secondary Diagnosis:	Rectal mass  Assessment and plan of treatment:	Follow up with Oncologist at [facility]-Dr. Ryan Alston-[phone]   For further work up and treatment  Follow up with Surgery- Dr. Sosa  Secondary Diagnosis:	Coronary artery disease involving native coronary artery of native heart without angina pectoris  Assessment and plan of treatment:	Continue with Aspirin. Hold Plavix Principal Discharge DX:	Gastrointestinal hemorrhage, unspecified gastrointestinal hemorrhage type  Goal:	resolved  Assessment and plan of treatment:	Condition resolved  Call Gastroenterology(Dr. Swartz) for appointment for possible sigmoidoscopy  Secondary Diagnosis:	Rectal mass  Assessment and plan of treatment:	Follow up with Oncologist at [facility]-Dr. Ryan Alston-[phone]   For further work up and treatment  Follow up with Surgery- Dr. Sosa  Secondary Diagnosis:	Coronary artery disease involving native coronary artery of native heart without angina pectoris  Assessment and plan of treatment:	Continue with Aspirin. Hold Plavix

## 2018-04-13 NOTE — CONSULT NOTE ADULT - PROBLEM SELECTOR RECOMMENDATION 9
-Awaiting pathology from colonoscopy. Highly suspicious for rectal cancer  -Please obtain CT chest (non-contrast) for complete staging  -Given apparent T3 nature of lesion, neoadjuvant chemotherapy is an appropriate consideration. An accurate assessment of the patient's performance status should be completed as an outpatient to allow for selection of a tolerable neoadjuvant regimen. She is definitely a candidate for some form of chemotherapy (infusional 5-FU, for instance).  -Pt to be referred to the [facility] ([phone]) by me to establish care. No plans for inpatient treatment.

## 2018-04-14 LAB
GLUCOSE BLDC GLUCOMTR-MCNC: 129 MG/DL — HIGH (ref 70–99)
GLUCOSE BLDC GLUCOMTR-MCNC: 156 MG/DL — HIGH (ref 70–99)
GLUCOSE BLDC GLUCOMTR-MCNC: 221 MG/DL — HIGH (ref 70–99)
GLUCOSE BLDC GLUCOMTR-MCNC: 231 MG/DL — HIGH (ref 70–99)

## 2018-04-14 RX ORDER — MORPHINE SULFATE 50 MG/1
2 CAPSULE, EXTENDED RELEASE ORAL EVERY 4 HOURS
Qty: 0 | Refills: 0 | Status: DISCONTINUED | OUTPATIENT
Start: 2018-04-14 | End: 2018-04-19

## 2018-04-14 RX ADMIN — PANTOPRAZOLE SODIUM 40 MILLIGRAM(S): 20 TABLET, DELAYED RELEASE ORAL at 13:05

## 2018-04-14 RX ADMIN — MORPHINE SULFATE 2 MILLIGRAM(S): 50 CAPSULE, EXTENDED RELEASE ORAL at 17:04

## 2018-04-14 RX ADMIN — MORPHINE SULFATE 2 MILLIGRAM(S): 50 CAPSULE, EXTENDED RELEASE ORAL at 10:03

## 2018-04-14 RX ADMIN — CARVEDILOL PHOSPHATE 12.5 MILLIGRAM(S): 80 CAPSULE, EXTENDED RELEASE ORAL at 05:36

## 2018-04-14 RX ADMIN — ATORVASTATIN CALCIUM 40 MILLIGRAM(S): 80 TABLET, FILM COATED ORAL at 21:21

## 2018-04-14 RX ADMIN — Medication 81 MILLIGRAM(S): at 13:04

## 2018-04-14 RX ADMIN — SODIUM CHLORIDE 75 MILLILITER(S): 9 INJECTION INTRAMUSCULAR; INTRAVENOUS; SUBCUTANEOUS at 05:37

## 2018-04-14 RX ADMIN — MORPHINE SULFATE 2 MILLIGRAM(S): 50 CAPSULE, EXTENDED RELEASE ORAL at 21:21

## 2018-04-14 RX ADMIN — MORPHINE SULFATE 2 MILLIGRAM(S): 50 CAPSULE, EXTENDED RELEASE ORAL at 21:48

## 2018-04-14 RX ADMIN — MORPHINE SULFATE 2 MILLIGRAM(S): 50 CAPSULE, EXTENDED RELEASE ORAL at 03:36

## 2018-04-14 RX ADMIN — CARVEDILOL PHOSPHATE 12.5 MILLIGRAM(S): 80 CAPSULE, EXTENDED RELEASE ORAL at 16:35

## 2018-04-14 RX ADMIN — MORPHINE SULFATE 2 MILLIGRAM(S): 50 CAPSULE, EXTENDED RELEASE ORAL at 16:34

## 2018-04-14 RX ADMIN — Medication 2: at 09:32

## 2018-04-14 RX ADMIN — Medication 1 APPLICATION(S): at 13:05

## 2018-04-14 RX ADMIN — MORPHINE SULFATE 2 MILLIGRAM(S): 50 CAPSULE, EXTENDED RELEASE ORAL at 10:19

## 2018-04-14 RX ADMIN — LISINOPRIL 5 MILLIGRAM(S): 2.5 TABLET ORAL at 05:36

## 2018-04-14 RX ADMIN — MORPHINE SULFATE 2 MILLIGRAM(S): 50 CAPSULE, EXTENDED RELEASE ORAL at 03:06

## 2018-04-14 NOTE — PROGRESS NOTE ADULT - SUBJECTIVE AND OBJECTIVE BOX
Patient is a 78y old  Female who presents with a chief complaint of rectal bleeding (13 Apr 2018 17:01)      INTERVAL HISTORY: feels ok    	  MEDICATIONS:  carvedilol 12.5 milliGRAM(s) Oral every 12 hours  lisinopril 5 milliGRAM(s) Oral daily        PHYSICAL EXAM:  T(C): 36.5 (04-14-18 @ 19:09), Max: 37.4 (04-14-18 @ 13:25)  HR: 78 (04-14-18 @ 19:09) (73 - 78)  BP: 168/71 (04-14-18 @ 19:09) (148/75 - 168/71)  RR: 18 (04-14-18 @ 19:09) (18 - 18)  SpO2: 95% (04-14-18 @ 19:09) (95% - 95%)  Wt(kg): --  I&O's Summary    13 Apr 2018 07:01  -  14 Apr 2018 07:00  --------------------------------------------------------  IN: 1740 mL / OUT: 1400 mL / NET: 340 mL    14 Apr 2018 07:01  -  14 Apr 2018 20:46  --------------------------------------------------------  IN: 600 mL / OUT: 0 mL / NET: 600 mL          Appearance: Normal	  HEENT:    PERRL, EOMI	  Cardiovascular: Normal S1 S2, No JVD  Respiratory: Lungs clear to auscultation	  Psychiatry: Alert  Gastrointestinal:  Soft, Non-tender, + BS	  Skin: No rashes, No cyanosis  Extremities:  No edema of LE                                9.5    9.76  )-----------( 255      ( 13 Apr 2018 09:36 )             29.4     04-13    136  |  101  |  6<L>  ----------------------------<  92  3.5   |  23  |  0.58    Ca    8.1<L>      13 Apr 2018 07:32          Labs, imaging and telemetry personally reviewed      ASSESSMENT/PLAN: 	  Assessment and Plan:   · Assessment		  came in with [facility], CT shows rectal mass most likely malignancy     Problem/Plan - 1:  ·  Problem: Gastrointestinal hemorrhage, unspecified gastrointestinal hemorrhage type.  Plan: monitor H/H   s/p transfusion   s/p colonoscopy and Bx       Problem/Plan - 2:  ·  Problem: Rectal mass.  Plan: seen by surgery  -await pathology and onc/surg onc recs      Problem/Plan - 3:  ·  Problem: Coronary artery disease involving native coronary artery of native heart without angina pectoris.  Plan: stable  hold off on asa and Plavix,   Discussed with pts daughter, she confirms that pt has known CAD but has never been revascularized, no history of stent.   Given no stent or history of MI, will d/c Plavix to reduce incidence of bleed and continue with ASA 81mg          Problem/Plan - 4:  ·  Problem: Diabetes.  Plan: hold off on oral meds  FSBS with [facility] ss.        Problem/Plan - 5:  ·  Problem: CAD (coronary artery disease).  Plan: stable,   - ASA   - TTE to further risk stratify   - Discussed with pts daughter, she confirms that pt has known CAD but has never been revascularized, no history of stent.   Given no stent or history of MI, will d/c Plavix to reduce incidence of bleed and continue with ASA 81mg           Florentino Feldman DO [facility]  Cardiovascular Medicine  [phone]

## 2018-04-14 NOTE — PROGRESS NOTE ADULT - SUBJECTIVE AND OBJECTIVE BOX
[facility] [id number],   78yFemale followed for:  No Known Allergies    PAST MEDICAL & SURGICAL HISTORY:  Gangrene of foot  Coronary artery disease involving native coronary artery of native heart without angina pectoris  Status post above knee amputation of right lower extremity    FAMILY HISTORY:  No pertinent family history in first degree relatives    MEDICATIONS  (STANDING):  aspirin  chewable 81 milliGRAM(s) Oral daily  atorvastatin 40 milliGRAM(s) Oral at bedtime  carvedilol 12.5 milliGRAM(s) Oral every 12 hours  dextrose 5%. 1000 milliLiter(s) (50 mL/Hr) IV Continuous <Continuous>  dextrose 50% Injectable 12.5 Gram(s) IV Push once  dextrose 50% Injectable 25 Gram(s) IV Push once  dextrose 50% Injectable 25 Gram(s) IV Push once  hydrocortisone 2.5% Rectal Cream 1 Application(s) Rectal daily  insulin lispro (HumaLOG) corrective regimen sliding scale   SubCutaneous three times a day before meals  insulin lispro (HumaLOG) corrective regimen sliding scale   SubCutaneous at bedtime  lisinopril 5 milliGRAM(s) Oral daily  pantoprazole  Injectable 40 milliGRAM(s) IV Push daily  sodium chloride 0.9%. 1000 milliLiter(s) (75 mL/Hr) IV Continuous <Continuous>    MEDICATIONS  (PRN):  dextrose Gel 1 Dose(s) Oral once PRN Blood Glucose LESS THAN 70 milliGRAM(s)/deciliter  glucagon  Injectable 1 milliGRAM(s) IntraMuscular once PRN Glucose LESS THAN 70 milligrams/deciliter  morphine  - Injectable 2 milliGRAM(s) IV Push every 4 hours PRN Moderate Pain (4 - 6)      Vital Signs Last 24 Hrs  T(C): 37 (14 Apr 2018 05:34), Max: 37.5 (13 Apr 2018 20:38)  T(F): 98.6 (14 Apr 2018 05:34), Max: 99.5 (13 Apr 2018 20:38)  HR: 73 (14 Apr 2018 05:34) (67 - 73)  BP: 149/72 (14 Apr 2018 05:34) (131/69 - 154/79)  BP(mean): --  RR: 18 (14 Apr 2018 05:34) (18 - 18)  SpO2: 95% (14 Apr 2018 05:34) (94% - 96%)  nc/at  s1s2  cta  soft, nt, nd no guarding or rebound  no c/c/e    CBC Full  -  ( 13 Apr 2018 09:36 )  WBC Count : 9.76 K/uL  Hemoglobin : 9.5 g/dL  Hematocrit : 29.4 %  Platelet Count - Automated : 255 K/uL  Mean Cell Volume : 91.9 fl  Mean Cell Hemoglobin : 29.7 pg  Mean Cell Hemoglobin Concentration : 32.3 gm/dL  Auto Neutrophil # : x  Auto Lymphocyte # : x  Auto Monocyte # : x  Auto Eosinophil # : x  Auto Basophil # : x  Auto Neutrophil % : x  Auto Lymphocyte % : x  Auto Monocyte % : x  Auto Eosinophil % : x  Auto Basophil % : x    04-13    136  |  101  |  6<L>  ----------------------------<  92  3.5   |  23  |  0.58    Ca    8.1<L>      13 Apr 2018 07:32

## 2018-04-14 NOTE — PROGRESS NOTE ADULT - SUBJECTIVE AND OBJECTIVE BOX
Patient is a 78y old  Female who presents with a chief complaint of rectal bleeding (13 Apr 2018 17:01)    pt. seen and examined, NAD      INTERVAL HPI/OVERNIGHT EVENTS:  T(C): 37.1 (04-14-18 @ 22:12), Max: 37.4 (04-14-18 @ 13:25)  HR: 81 (04-14-18 @ 22:12) (73 - 81)  BP: 168/79 (04-14-18 @ 22:12) (148/75 - 168/79)  RR: 18 (04-14-18 @ 22:12) (18 - 18)  SpO2: 93% (04-14-18 @ 22:12) (93% - 95%)  Wt(kg): --  I&O's Summary    13 Apr 2018 07:01  -  14 Apr 2018 07:00  --------------------------------------------------------  IN: 1740 mL / OUT: 1400 mL / NET: 340 mL    14 Apr 2018 07:01  -  15 Apr 2018 00:24  --------------------------------------------------------  IN: 840 mL / OUT: 0 mL / NET: 840 mL        PAST MEDICAL & SURGICAL HISTORY:  Gangrene of foot  Coronary artery disease involving native coronary artery of native heart without angina pectoris  Status post above knee amputation of right lower extremity      SOCIAL HISTORY  Alcohol:  Tobacco:  Illicit substance use:    FAMILY HISTORY:    REVIEW OF SYSTEMS:  CONSTITUTIONAL: No fever, weight loss, or fatigue  EYES: No eye pain, visual disturbances, or discharge  ENMT:  No difficulty hearing, tinnitus, vertigo; No sinus or throat pain  NECK: No pain or stiffness  RESPIRATORY: No cough, wheezing, chills or hemoptysis; No shortness of breath  CARDIOVASCULAR: No chest pain, palpitations, dizziness, or leg swelling  GASTROINTESTINAL: No abdominal or epigastric pain. No nausea, vomiting, or hematemesis; No diarrhea or constipation. No melena or hematochezia.  GENITOURINARY: No dysuria, frequency, hematuria, or incontinence  NEUROLOGICAL: No headaches, memory loss, loss of strength, numbness, or tremors  SKIN: No itching, burning, rashes, or lesions   LYMPH NODES: No enlarged glands  ENDOCRINE: No heat or cold intolerance; No hair loss  MUSCULOSKELETAL: No joint pain or swelling; No muscle, back, or extremity pain  PSYCHIATRIC: No depression, anxiety, mood swings, or difficulty sleeping  HEME/LYMPH: No easy bruising, or bleeding gums  ALLERY AND IMMUNOLOGIC: No hives or eczema    RADIOLOGY & ADDITIONAL TESTS:    Imaging Personally Reviewed:  [ ] YES  [ ] NO    Consultant(s) Notes Reviewed:  [ ] YES  [ ] NO    PHYSICAL EXAM:  GENERAL: NAD, well-groomed, well-developed  HEAD:  Atraumatic, Normocephalic  EYES: EOMI, PERRLA, conjunctiva and sclera clear  ENMT: No tonsillar erythema, exudates, or enlargement; Moist mucous membranes, Good dentition, No lesions  NECK: Supple, No JVD, Normal thyroid  NERVOUS SYSTEM:  Alert & Oriented X3, Good concentration; Motor Strength 5/5 B/L upper and lower extremities; DTRs 2+ intact and symmetric  CHEST/LUNG: Clear to percussion bilaterally; No rales, rhonchi, wheezing, or rubs  HEART: Regular rate and rhythm; No murmurs, rubs, or gallops  ABDOMEN: Soft, Nontender, Nondistended; Bowel sounds present  EXTREMITIES:  2+ Peripheral Pulses, No clubbing, cyanosis, or edema  LYMPH: No lymphadenopathy noted  SKIN: No rashes or lesions    LABS:                        9.5    9.76  )-----------( 255      ( 13 Apr 2018 09:36 )             29.4     04-13    136  |  101  |  6<L>  ----------------------------<  92  3.5   |  23  |  0.58    Ca    8.1<L>      13 Apr 2018 07:32          CAPILLARY BLOOD GLUCOSE      POCT Blood Glucose.: 221 mg/dL (14 Apr 2018 21:50)  POCT Blood Glucose.: 156 mg/dL (14 Apr 2018 17:55)  POCT Blood Glucose.: 129 mg/dL (14 Apr 2018 13:02)  POCT Blood Glucose.: 231 mg/dL (14 Apr 2018 09:25)            MEDICATIONS  (STANDING):  aspirin  chewable 81 milliGRAM(s) Oral daily  atorvastatin 40 milliGRAM(s) Oral at bedtime  carvedilol 12.5 milliGRAM(s) Oral every 12 hours  dextrose 5%. 1000 milliLiter(s) (50 mL/Hr) IV Continuous <Continuous>  dextrose 50% Injectable 12.5 Gram(s) IV Push once  dextrose 50% Injectable 25 Gram(s) IV Push once  dextrose 50% Injectable 25 Gram(s) IV Push once  hydrocortisone 2.5% Rectal Cream 1 Application(s) Rectal daily  insulin lispro (HumaLOG) corrective regimen sliding scale   SubCutaneous three times a day before meals  insulin lispro (HumaLOG) corrective regimen sliding scale   SubCutaneous at bedtime  lisinopril 5 milliGRAM(s) Oral daily  pantoprazole  Injectable 40 milliGRAM(s) IV Push daily  sodium chloride 0.9%. 1000 milliLiter(s) (75 mL/Hr) IV Continuous <Continuous>    MEDICATIONS  (PRN):  dextrose Gel 1 Dose(s) Oral once PRN Blood Glucose LESS THAN 70 milliGRAM(s)/deciliter  glucagon  Injectable 1 milliGRAM(s) IntraMuscular once PRN Glucose LESS THAN 70 milligrams/deciliter  morphine  - Injectable 2 milliGRAM(s) IV Push every 4 hours PRN Moderate Pain (4 - 6)      Care Discussed with Consultants/Other Providers [ ] YES  [ ] NO

## 2018-04-15 LAB
ANION GAP SERPL CALC-SCNC: 11 MMOL/L — SIGNIFICANT CHANGE UP (ref 5–17)
BUN SERPL-MCNC: <4 MG/DL — LOW (ref 7–23)
CALCIUM SERPL-MCNC: 8 MG/DL — LOW (ref 8.4–10.5)
CHLORIDE SERPL-SCNC: 103 MMOL/L — SIGNIFICANT CHANGE UP (ref 96–108)
CO2 SERPL-SCNC: 24 MMOL/L — SIGNIFICANT CHANGE UP (ref 22–31)
CREAT SERPL-MCNC: 0.55 MG/DL — SIGNIFICANT CHANGE UP (ref 0.5–1.3)
GLUCOSE BLDC GLUCOMTR-MCNC: 142 MG/DL — HIGH (ref 70–99)
GLUCOSE BLDC GLUCOMTR-MCNC: 157 MG/DL — HIGH (ref 70–99)
GLUCOSE BLDC GLUCOMTR-MCNC: 166 MG/DL — HIGH (ref 70–99)
GLUCOSE BLDC GLUCOMTR-MCNC: 237 MG/DL — HIGH (ref 70–99)
GLUCOSE SERPL-MCNC: 130 MG/DL — HIGH (ref 70–99)
HCT VFR BLD CALC: 27.4 % — LOW (ref 34.5–45)
HGB BLD-MCNC: 9.1 G/DL — LOW (ref 11.5–15.5)
MCHC RBC-ENTMCNC: 29.4 PG — SIGNIFICANT CHANGE UP (ref 27–34)
MCHC RBC-ENTMCNC: 33.2 GM/DL — SIGNIFICANT CHANGE UP (ref 32–36)
MCV RBC AUTO: 88.4 FL — SIGNIFICANT CHANGE UP (ref 80–100)
PLATELET # BLD AUTO: 254 K/UL — SIGNIFICANT CHANGE UP (ref 150–400)
POTASSIUM SERPL-MCNC: 3.2 MMOL/L — LOW (ref 3.5–5.3)
POTASSIUM SERPL-SCNC: 3.2 MMOL/L — LOW (ref 3.5–5.3)
RBC # BLD: 3.1 M/UL — LOW (ref 3.8–5.2)
RBC # FLD: 14 % — SIGNIFICANT CHANGE UP (ref 10.3–14.5)
SODIUM SERPL-SCNC: 138 MMOL/L — SIGNIFICANT CHANGE UP (ref 135–145)
WBC # BLD: 8.72 K/UL — SIGNIFICANT CHANGE UP (ref 3.8–10.5)
WBC # FLD AUTO: 8.72 K/UL — SIGNIFICANT CHANGE UP (ref 3.8–10.5)

## 2018-04-15 RX ORDER — SIMETHICONE 80 MG/1
80 TABLET, CHEWABLE ORAL ONCE
Qty: 0 | Refills: 0 | Status: COMPLETED | OUTPATIENT
Start: 2018-04-15 | End: 2018-04-15

## 2018-04-15 RX ADMIN — ATORVASTATIN CALCIUM 40 MILLIGRAM(S): 80 TABLET, FILM COATED ORAL at 21:08

## 2018-04-15 RX ADMIN — PANTOPRAZOLE SODIUM 40 MILLIGRAM(S): 20 TABLET, DELAYED RELEASE ORAL at 13:02

## 2018-04-15 RX ADMIN — MORPHINE SULFATE 2 MILLIGRAM(S): 50 CAPSULE, EXTENDED RELEASE ORAL at 03:12

## 2018-04-15 RX ADMIN — MORPHINE SULFATE 2 MILLIGRAM(S): 50 CAPSULE, EXTENDED RELEASE ORAL at 22:46

## 2018-04-15 RX ADMIN — LISINOPRIL 5 MILLIGRAM(S): 2.5 TABLET ORAL at 05:25

## 2018-04-15 RX ADMIN — Medication 1: at 18:42

## 2018-04-15 RX ADMIN — CARVEDILOL PHOSPHATE 12.5 MILLIGRAM(S): 80 CAPSULE, EXTENDED RELEASE ORAL at 05:25

## 2018-04-15 RX ADMIN — MORPHINE SULFATE 2 MILLIGRAM(S): 50 CAPSULE, EXTENDED RELEASE ORAL at 19:16

## 2018-04-15 RX ADMIN — CARVEDILOL PHOSPHATE 12.5 MILLIGRAM(S): 80 CAPSULE, EXTENDED RELEASE ORAL at 16:48

## 2018-04-15 RX ADMIN — MORPHINE SULFATE 2 MILLIGRAM(S): 50 CAPSULE, EXTENDED RELEASE ORAL at 01:59

## 2018-04-15 RX ADMIN — MORPHINE SULFATE 2 MILLIGRAM(S): 50 CAPSULE, EXTENDED RELEASE ORAL at 18:46

## 2018-04-15 RX ADMIN — Medication 81 MILLIGRAM(S): at 13:03

## 2018-04-15 RX ADMIN — SIMETHICONE 80 MILLIGRAM(S): 80 TABLET, CHEWABLE ORAL at 16:48

## 2018-04-15 RX ADMIN — Medication 2: at 13:02

## 2018-04-15 RX ADMIN — MORPHINE SULFATE 2 MILLIGRAM(S): 50 CAPSULE, EXTENDED RELEASE ORAL at 10:32

## 2018-04-15 RX ADMIN — MORPHINE SULFATE 2 MILLIGRAM(S): 50 CAPSULE, EXTENDED RELEASE ORAL at 06:20

## 2018-04-15 RX ADMIN — Medication 1 APPLICATION(S): at 13:02

## 2018-04-15 RX ADMIN — MORPHINE SULFATE 2 MILLIGRAM(S): 50 CAPSULE, EXTENDED RELEASE ORAL at 06:41

## 2018-04-15 RX ADMIN — MORPHINE SULFATE 2 MILLIGRAM(S): 50 CAPSULE, EXTENDED RELEASE ORAL at 10:16

## 2018-04-15 NOTE — PHYSICAL THERAPY INITIAL EVALUATION ADULT - PERTINENT HX OF CURRENT PROBLEM, REHAB EVAL
78 year old woman with CAD, PAD s/p JOSE BASSROBERTO presents this admission with abdominal pain, bleeding per rectum for the past few months.  She has also noted decreased appetite and about a 7lb weight loss.  She was admitted to a hospital in [facility] a few years ago requiring ICU level care for her severe PAD and complications of vascular procedures. CT shows rectal mass suspicious for malignancy. GI following.

## 2018-04-15 NOTE — PROGRESS NOTE ADULT - SUBJECTIVE AND OBJECTIVE BOX
Patient is a 78y old  Female who presents with a chief complaint of rectal bleeding (13 Apr 2018 17:01)    pt. seen and examined, NAD  INTERVAL HPI/OVERNIGHT EVENTS:  T(C): 37.2 (04-15-18 @ 16:47), Max: 37.2 (04-15-18 @ 16:47)  HR: 66 (04-15-18 @ 16:47) (66 - 76)  BP: 124/71 (04-15-18 @ 16:47) (118/51 - 169/65)  RR: 18 (04-15-18 @ 16:47) (18 - 18)  SpO2: 96% (04-15-18 @ 16:47) (93% - 96%)  Wt(kg): --  I&O's Summary    14 Apr 2018 07:01  -  15 Apr 2018 07:00  --------------------------------------------------------  IN: 1940 mL / OUT: 0 mL / NET: 1940 mL    15 Apr 2018 07:01  -  16 Apr 2018 03:10  --------------------------------------------------------  IN: 840 mL / OUT: 800 mL / NET: 40 mL        PAST MEDICAL & SURGICAL HISTORY:  Gangrene of foot  Coronary artery disease involving native coronary artery of native heart without angina pectoris  Status post above knee amputation of right lower extremity      SOCIAL HISTORY  Alcohol:  Tobacco:  Illicit substance use:    FAMILY HISTORY:    REVIEW OF SYSTEMS:  CONSTITUTIONAL: No fever, weight loss, or fatigue  EYES: No eye pain, visual disturbances, or discharge  ENMT:  No difficulty hearing, tinnitus, vertigo; No sinus or throat pain  NECK: No pain or stiffness  RESPIRATORY: No cough, wheezing, chills or hemoptysis; No shortness of breath  CARDIOVASCULAR: No chest pain, palpitations, dizziness, or leg swelling  GASTROINTESTINAL: No abdominal or epigastric pain. No nausea, vomiting, or hematemesis; No diarrhea or constipation. No melena or hematochezia.  GENITOURINARY: No dysuria, frequency, hematuria, or incontinence  NEUROLOGICAL: No headaches, memory loss, loss of strength, numbness, or tremors  SKIN: No itching, burning, rashes, or lesions   LYMPH NODES: No enlarged glands  ENDOCRINE: No heat or cold intolerance; No hair loss  MUSCULOSKELETAL: No joint pain or swelling; No muscle, back, or extremity pain  PSYCHIATRIC: No depression, anxiety, mood swings, or difficulty sleeping  HEME/LYMPH: No easy bruising, or bleeding gums  ALLERY AND IMMUNOLOGIC: No hives or eczema    RADIOLOGY & ADDITIONAL TESTS:    Imaging Personally Reviewed:  [ ] YES  [ ] NO    Consultant(s) Notes Reviewed:  [ ] YES  [ ] NO    PHYSICAL EXAM:  GENERAL: NAD, well-groomed, well-developed  HEAD:  Atraumatic, Normocephalic  EYES: EOMI, PERRLA, conjunctiva and sclera clear  ENMT: No tonsillar erythema, exudates, or enlargement; Moist mucous membranes, Good dentition, No lesions  NECK: Supple, No JVD, Normal thyroid  NERVOUS SYSTEM:  Alert & Oriented X3, Good concentration; Motor Strength 5/5 B/L upper and lower extremities; DTRs 2+ intact and symmetric  CHEST/LUNG: Clear to percussion bilaterally; No rales, rhonchi, wheezing, or rubs  HEART: Regular rate and rhythm; No murmurs, rubs, or gallops  ABDOMEN: Soft, Nontender, Nondistended; Bowel sounds present  EXTREMITIES:  2+ Peripheral Pulses, No clubbing, cyanosis, or edema  LYMPH: No lymphadenopathy noted  SKIN: No rashes or lesions    LABS:                        9.1    8.72  )-----------( 254      ( 15 Apr 2018 08:26 )             27.4     04-15    138  |  103  |  <4<L>  ----------------------------<  130<H>  3.2<L>   |  24  |  0.55    Ca    8.0<L>      15 Apr 2018 06:41          CAPILLARY BLOOD GLUCOSE      POCT Blood Glucose.: 166 mg/dL (15 Apr 2018 21:26)  POCT Blood Glucose.: 157 mg/dL (15 Apr 2018 18:23)  POCT Blood Glucose.: 237 mg/dL (15 Apr 2018 12:26)  POCT Blood Glucose.: 142 mg/dL (15 Apr 2018 09:01)            MEDICATIONS  (STANDING):  aspirin  chewable 81 milliGRAM(s) Oral daily  atorvastatin 40 milliGRAM(s) Oral at bedtime  carvedilol 12.5 milliGRAM(s) Oral every 12 hours  dextrose 5%. 1000 milliLiter(s) (50 mL/Hr) IV Continuous <Continuous>  dextrose 50% Injectable 12.5 Gram(s) IV Push once  dextrose 50% Injectable 25 Gram(s) IV Push once  dextrose 50% Injectable 25 Gram(s) IV Push once  docusate sodium 100 milliGRAM(s) Oral two times a day  hydrocortisone 2.5% Rectal Cream 1 Application(s) Rectal daily  insulin lispro (HumaLOG) corrective regimen sliding scale   SubCutaneous three times a day before meals  insulin lispro (HumaLOG) corrective regimen sliding scale   SubCutaneous at bedtime  lisinopril 5 milliGRAM(s) Oral daily  pantoprazole  Injectable 40 milliGRAM(s) IV Push daily  sodium chloride 0.9%. 1000 milliLiter(s) (75 mL/Hr) IV Continuous <Continuous>    MEDICATIONS  (PRN):  dextrose Gel 1 Dose(s) Oral once PRN Blood Glucose LESS THAN 70 milliGRAM(s)/deciliter  glucagon  Injectable 1 milliGRAM(s) IntraMuscular once PRN Glucose LESS THAN 70 milligrams/deciliter  morphine  - Injectable 2 milliGRAM(s) IV Push every 4 hours PRN Moderate Pain (4 - 6)      Care Discussed with Consultants/Other Providers [ ] YES  [ ] NO

## 2018-04-15 NOTE — PROVIDER CONTACT NOTE (MEDICATION) - ACTION/TREATMENT ORDERED:
NP will prescribe simethicone for now and will reassess.
have pt drink as much as she can. Stool must be clear

## 2018-04-15 NOTE — PHYSICAL THERAPY INITIAL EVALUATION ADULT - ADDITIONAL COMMENTS
(continuation of H&P) CT Chest: 6 mm spiculated nodule in the right upper lobe suspicious for primary lung neoplasm given background centrilobular emphysema instead of a metastasis. 3 mm groundglass nodule in the right upper lobe. (continuation of H&P) CT Chest: 6 mm spiculated nodule in the right upper lobe suspicious for primary lung neoplasm given background centrilobular emphysema instead of a metastasis. 3 mm groundglass nodule in the right upper lobe.  Per pt via Polish , pt lives with son in  a private house with 3 steps to enter and resided on main floor, used wheelchair for mobilization. Pt became sick and has not started prosthetic gait training but has prosthesis at home; pt unable to travel to outpatient.

## 2018-04-15 NOTE — PHYSICAL THERAPY INITIAL EVALUATION ADULT - ACTIVE RANGE OF MOTION EXAMINATION, REHAB EVAL
bilateral upper extremity Active ROM was WFL (within functional limits)/bilateral  lower extremity Active ROM was WFL (within functional limits)/R AKA

## 2018-04-15 NOTE — PROGRESS NOTE ADULT - SUBJECTIVE AND OBJECTIVE BOX
DINO SCHILLING:[id number],   78yFemale followed for:  No Known Allergies    PAST MEDICAL & SURGICAL HISTORY:  Gangrene of foot  Coronary artery disease involving native coronary artery of native heart without angina pectoris  Status post above knee amputation of right lower extremity    FAMILY HISTORY:  No pertinent family history in first degree relatives    MEDICATIONS  (STANDING):  aspirin  chewable 81 milliGRAM(s) Oral daily  atorvastatin 40 milliGRAM(s) Oral at bedtime  carvedilol 12.5 milliGRAM(s) Oral every 12 hours  dextrose 5%. 1000 milliLiter(s) (50 mL/Hr) IV Continuous <Continuous>  dextrose 50% Injectable 12.5 Gram(s) IV Push once  dextrose 50% Injectable 25 Gram(s) IV Push once  dextrose 50% Injectable 25 Gram(s) IV Push once  hydrocortisone 2.5% Rectal Cream 1 Application(s) Rectal daily  insulin lispro (HumaLOG) corrective regimen sliding scale   SubCutaneous three times a day before meals  insulin lispro (HumaLOG) corrective regimen sliding scale   SubCutaneous at bedtime  lisinopril 5 milliGRAM(s) Oral daily  pantoprazole  Injectable 40 milliGRAM(s) IV Push daily  sodium chloride 0.9%. 1000 milliLiter(s) (75 mL/Hr) IV Continuous <Continuous>    MEDICATIONS  (PRN):  dextrose Gel 1 Dose(s) Oral once PRN Blood Glucose LESS THAN 70 milliGRAM(s)/deciliter  glucagon  Injectable 1 milliGRAM(s) IntraMuscular once PRN Glucose LESS THAN 70 milligrams/deciliter  morphine  - Injectable 2 milliGRAM(s) IV Push every 4 hours PRN Moderate Pain (4 - 6)      Vital Signs Last 24 Hrs  T(C): 36.7 (15 Apr 2018 04:08), Max: 37.4 (14 Apr 2018 13:25)  T(F): 98 (15 Apr 2018 04:08), Max: 99.3 (14 Apr 2018 13:25)  HR: 76 (15 Apr 2018 04:08) (76 - 81)  BP: 145/78 (15 Apr 2018 04:08) (145/78 - 168/79)  BP(mean): --  RR: 18 (15 Apr 2018 04:08) (18 - 18)  SpO2: 95% (15 Apr 2018 04:08) (93% - 95%)  nc/at  s1s2  cta  soft, nt, nd no guarding or rebound  no c/c/e    CBC Full  -  ( 15 Apr 2018 08:26 )  WBC Count : 8.72 K/uL  Hemoglobin : 9.1 g/dL  Hematocrit : 27.4 %  Platelet Count - Automated : 254 K/uL  Mean Cell Volume : 88.4 fl  Mean Cell Hemoglobin : 29.4 pg  Mean Cell Hemoglobin Concentration : 33.2 gm/dL  Auto Neutrophil # : x  Auto Lymphocyte # : x  Auto Monocyte # : x  Auto Eosinophil # : x  Auto Basophil # : x  Auto Neutrophil % : x  Auto Lymphocyte % : x  Auto Monocyte % : x  Auto Eosinophil % : x  Auto Basophil % : x    04-15    138  |  103  |  <4<L>  ----------------------------<  130<H>  3.2<L>   |  24  |  0.55    Ca    8.0<L>      15 Apr 2018 06:41

## 2018-04-15 NOTE — PROGRESS NOTE ADULT - SUBJECTIVE AND OBJECTIVE BOX
Patient is a 78y old  Female who presents with a chief complaint of rectal bleeding (13 Apr 2018 17:01)      INTERVAL HISTORY:  feels ok  	  MEDICATIONS:  carvedilol 12.5 milliGRAM(s) Oral every 12 hours  lisinopril 5 milliGRAM(s) Oral daily        PHYSICAL EXAM:  T(C): 36.9 (04-15-18 @ 14:01), Max: 37.1 (04-14-18 @ 22:12)  HR: 70 (04-15-18 @ 14:01) (70 - 81)  BP: 118/51 (04-15-18 @ 14:01) (118/51 - 169/65)  RR: 18 (04-15-18 @ 14:01) (18 - 18)  SpO2: 93% (04-15-18 @ 14:01) (93% - 95%)  Wt(kg): --  I&O's Summary    14 Apr 2018 07:01  -  15 Apr 2018 07:00  --------------------------------------------------------  IN: 1940 mL / OUT: 0 mL / NET: 1940 mL    15 Apr 2018 07:01  -  15 Apr 2018 14:10  --------------------------------------------------------  IN: 600 mL / OUT: 0 mL / NET: 600 mL          Appearance: Normal	  HEENT:    PERRL, EOMI	  Cardiovascular: Normal S1 S2, No JVD  Respiratory: Lungs clear to auscultation	  Psychiatry: Alert  Gastrointestinal:  Soft, Non-tender, + BS	  Skin: No rashes, No cyanosis  Extremities:  No edema of LE                                9.1    8.72  )-----------( 254      ( 15 Apr 2018 08:26 )             27.4     04-15    138  |  103  |  <4<L>  ----------------------------<  130<H>  3.2<L>   |  24  |  0.55    Ca    8.0<L>      15 Apr 2018 06:41          Labs, imaging and telemetry personally reviewed      Assessment and Plan:   · Assessment		  came in with [facility], CT shows rectal mass most likely malignancy     Problem/Plan - 1:  ·  Problem: Gastrointestinal hemorrhage, unspecified gastrointestinal hemorrhage type.  Plan: monitor H/H   s/p transfusion   s/p colonoscopy and Bx       Problem/Plan - 2:  ·  Problem: Rectal mass.  Plan: seen by surgery  -await pathology and onc/surg onc recs      Problem/Plan - 3:  ·  Problem: Coronary artery disease involving native coronary artery of native heart without angina pectoris.  Plan: stable  hold off on asa and Plavix,   Discussed with pts daughter, she confirms that pt has known CAD but has never been revascularized, no history of stent.   Given no stent or history of MI, will d/c Plavix to reduce incidence of bleed and continue with ASA 81mg          Problem/Plan - 4:  ·  Problem: Diabetes.  Plan: hold off on oral meds  FSBS with RIVERA ss.        Problem/Plan - 5:  ·  Problem: CAD (coronary artery disease).  Plan: stable,   - ASA   - TTE to further risk stratify   - Discussed with pts daughter, she confirms that pt has known CAD but has never been revascularized, no history of stent.   Given no stent or history of MI, will d/c Plavix to reduce incidence of bleed and continue with ASA 81mg           Florentino Feldman DO [facility]  Cardiovascular Medicine  [phone]

## 2018-04-15 NOTE — PROVIDER CONTACT NOTE (MEDICATION) - SITUATION
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 Last BM on 4/13. Pt c/o feeling bloated, and abdominal pain.

## 2018-04-15 NOTE — PHYSICAL THERAPY INITIAL EVALUATION ADULT - BALANCE TRAINING, PT EVAL
Goal: Pt will demonstrate good sitting balance and fair static standing at RW (WB only LLE) in 2 weeks.

## 2018-04-16 LAB
ANION GAP SERPL CALC-SCNC: 9 MMOL/L — SIGNIFICANT CHANGE UP (ref 5–17)
BUN SERPL-MCNC: <4 MG/DL — LOW (ref 7–23)
CALCIUM SERPL-MCNC: 8 MG/DL — LOW (ref 8.4–10.5)
CHLORIDE SERPL-SCNC: 104 MMOL/L — SIGNIFICANT CHANGE UP (ref 96–108)
CO2 SERPL-SCNC: 24 MMOL/L — SIGNIFICANT CHANGE UP (ref 22–31)
CREAT SERPL-MCNC: 0.55 MG/DL — SIGNIFICANT CHANGE UP (ref 0.5–1.3)
GLUCOSE BLDC GLUCOMTR-MCNC: 106 MG/DL — HIGH (ref 70–99)
GLUCOSE BLDC GLUCOMTR-MCNC: 125 MG/DL — HIGH (ref 70–99)
GLUCOSE BLDC GLUCOMTR-MCNC: 164 MG/DL — HIGH (ref 70–99)
GLUCOSE BLDC GLUCOMTR-MCNC: 184 MG/DL — HIGH (ref 70–99)
GLUCOSE SERPL-MCNC: 118 MG/DL — HIGH (ref 70–99)
HCT VFR BLD CALC: 28 % — LOW (ref 34.5–45)
HGB BLD-MCNC: 9.1 G/DL — LOW (ref 11.5–15.5)
MCHC RBC-ENTMCNC: 29.8 PG — SIGNIFICANT CHANGE UP (ref 27–34)
MCHC RBC-ENTMCNC: 32.5 GM/DL — SIGNIFICANT CHANGE UP (ref 32–36)
MCV RBC AUTO: 91.8 FL — SIGNIFICANT CHANGE UP (ref 80–100)
PLATELET # BLD AUTO: 259 K/UL — SIGNIFICANT CHANGE UP (ref 150–400)
POTASSIUM SERPL-MCNC: 3.2 MMOL/L — LOW (ref 3.5–5.3)
POTASSIUM SERPL-SCNC: 3.2 MMOL/L — LOW (ref 3.5–5.3)
RBC # BLD: 3.05 M/UL — LOW (ref 3.8–5.2)
RBC # FLD: 14 % — SIGNIFICANT CHANGE UP (ref 10.3–14.5)
SODIUM SERPL-SCNC: 137 MMOL/L — SIGNIFICANT CHANGE UP (ref 135–145)
WBC # BLD: 7.78 K/UL — SIGNIFICANT CHANGE UP (ref 3.8–10.5)
WBC # FLD AUTO: 7.78 K/UL — SIGNIFICANT CHANGE UP (ref 3.8–10.5)

## 2018-04-16 PROCEDURE — 93306 TTE W/DOPPLER COMPLETE: CPT | Mod: 26

## 2018-04-16 RX ORDER — POLYETHYLENE GLYCOL 3350 17 G/17G
17 POWDER, FOR SOLUTION ORAL DAILY
Qty: 0 | Refills: 0 | Status: DISCONTINUED | OUTPATIENT
Start: 2018-04-16 | End: 2018-04-19

## 2018-04-16 RX ORDER — DOCUSATE SODIUM 100 MG
100 CAPSULE ORAL
Qty: 0 | Refills: 0 | Status: DISCONTINUED | OUTPATIENT
Start: 2018-04-16 | End: 2018-04-19

## 2018-04-16 RX ORDER — MORPHINE SULFATE 50 MG/1
2 CAPSULE, EXTENDED RELEASE ORAL ONCE
Qty: 0 | Refills: 0 | Status: DISCONTINUED | OUTPATIENT
Start: 2018-04-16 | End: 2018-04-16

## 2018-04-16 RX ORDER — POTASSIUM CHLORIDE 20 MEQ
20 PACKET (EA) ORAL
Qty: 0 | Refills: 0 | Status: COMPLETED | OUTPATIENT
Start: 2018-04-16 | End: 2018-04-16

## 2018-04-16 RX ORDER — SENNA PLUS 8.6 MG/1
2 TABLET ORAL AT BEDTIME
Qty: 0 | Refills: 0 | Status: DISCONTINUED | OUTPATIENT
Start: 2018-04-16 | End: 2018-04-19

## 2018-04-16 RX ADMIN — MORPHINE SULFATE 2 MILLIGRAM(S): 50 CAPSULE, EXTENDED RELEASE ORAL at 23:30

## 2018-04-16 RX ADMIN — Medication 1 APPLICATION(S): at 12:15

## 2018-04-16 RX ADMIN — MORPHINE SULFATE 2 MILLIGRAM(S): 50 CAPSULE, EXTENDED RELEASE ORAL at 23:13

## 2018-04-16 RX ADMIN — MORPHINE SULFATE 2 MILLIGRAM(S): 50 CAPSULE, EXTENDED RELEASE ORAL at 12:41

## 2018-04-16 RX ADMIN — Medication 20 MILLIEQUIVALENT(S): at 19:52

## 2018-04-16 RX ADMIN — Medication 100 MILLIGRAM(S): at 03:21

## 2018-04-16 RX ADMIN — Medication 81 MILLIGRAM(S): at 12:15

## 2018-04-16 RX ADMIN — SODIUM CHLORIDE 75 MILLILITER(S): 9 INJECTION INTRAMUSCULAR; INTRAVENOUS; SUBCUTANEOUS at 17:09

## 2018-04-16 RX ADMIN — MORPHINE SULFATE 2 MILLIGRAM(S): 50 CAPSULE, EXTENDED RELEASE ORAL at 04:57

## 2018-04-16 RX ADMIN — MORPHINE SULFATE 2 MILLIGRAM(S): 50 CAPSULE, EXTENDED RELEASE ORAL at 20:08

## 2018-04-16 RX ADMIN — CARVEDILOL PHOSPHATE 12.5 MILLIGRAM(S): 80 CAPSULE, EXTENDED RELEASE ORAL at 17:08

## 2018-04-16 RX ADMIN — PANTOPRAZOLE SODIUM 40 MILLIGRAM(S): 20 TABLET, DELAYED RELEASE ORAL at 12:16

## 2018-04-16 RX ADMIN — Medication 1: at 13:08

## 2018-04-16 RX ADMIN — ATORVASTATIN CALCIUM 40 MILLIGRAM(S): 80 TABLET, FILM COATED ORAL at 22:01

## 2018-04-16 RX ADMIN — MORPHINE SULFATE 2 MILLIGRAM(S): 50 CAPSULE, EXTENDED RELEASE ORAL at 00:29

## 2018-04-16 RX ADMIN — MORPHINE SULFATE 2 MILLIGRAM(S): 50 CAPSULE, EXTENDED RELEASE ORAL at 19:48

## 2018-04-16 RX ADMIN — SODIUM CHLORIDE 75 MILLILITER(S): 9 INJECTION INTRAMUSCULAR; INTRAVENOUS; SUBCUTANEOUS at 22:07

## 2018-04-16 RX ADMIN — Medication 20 MILLIEQUIVALENT(S): at 22:01

## 2018-04-16 RX ADMIN — SENNA PLUS 2 TABLET(S): 8.6 TABLET ORAL at 22:02

## 2018-04-16 RX ADMIN — LISINOPRIL 5 MILLIGRAM(S): 2.5 TABLET ORAL at 05:27

## 2018-04-16 RX ADMIN — CARVEDILOL PHOSPHATE 12.5 MILLIGRAM(S): 80 CAPSULE, EXTENDED RELEASE ORAL at 05:27

## 2018-04-16 RX ADMIN — POLYETHYLENE GLYCOL 3350 17 GRAM(S): 17 POWDER, FOR SOLUTION ORAL at 10:33

## 2018-04-16 RX ADMIN — Medication 100 MILLIGRAM(S): at 17:08

## 2018-04-16 RX ADMIN — Medication 1: at 17:51

## 2018-04-16 RX ADMIN — MORPHINE SULFATE 2 MILLIGRAM(S): 50 CAPSULE, EXTENDED RELEASE ORAL at 12:21

## 2018-04-16 RX ADMIN — Medication 20 MILLIEQUIVALENT(S): at 17:09

## 2018-04-16 RX ADMIN — MORPHINE SULFATE 2 MILLIGRAM(S): 50 CAPSULE, EXTENDED RELEASE ORAL at 03:22

## 2018-04-16 NOTE — PROGRESS NOTE ADULT - SUBJECTIVE AND OBJECTIVE BOX
[facility] [id number],   78yFemale followed for:  No Known Allergies    PAST MEDICAL & SURGICAL HISTORY:  Gangrene of foot  Coronary artery disease involving native coronary artery of native heart without angina pectoris  Status post above knee amputation of right lower extremity    FAMILY HISTORY:  No pertinent family history in first degree relatives    MEDICATIONS  (STANDING):  aspirin  chewable 81 milliGRAM(s) Oral daily  atorvastatin 40 milliGRAM(s) Oral at bedtime  carvedilol 12.5 milliGRAM(s) Oral every 12 hours  dextrose 5%. 1000 milliLiter(s) (50 mL/Hr) IV Continuous <Continuous>  dextrose 50% Injectable 12.5 Gram(s) IV Push once  dextrose 50% Injectable 25 Gram(s) IV Push once  dextrose 50% Injectable 25 Gram(s) IV Push once  docusate sodium 100 milliGRAM(s) Oral two times a day  hydrocortisone 2.5% Rectal Cream 1 Application(s) Rectal daily  insulin lispro (HumaLOG) corrective regimen sliding scale   SubCutaneous three times a day before meals  insulin lispro (HumaLOG) corrective regimen sliding scale   SubCutaneous at bedtime  lisinopril 5 milliGRAM(s) Oral daily  pantoprazole  Injectable 40 milliGRAM(s) IV Push daily  polyethylene glycol 3350 17 Gram(s) Oral daily  senna 2 Tablet(s) Oral at bedtime  sodium chloride 0.9%. 1000 milliLiter(s) (75 mL/Hr) IV Continuous <Continuous>    MEDICATIONS  (PRN):  dextrose Gel 1 Dose(s) Oral once PRN Blood Glucose LESS THAN 70 milliGRAM(s)/deciliter  glucagon  Injectable 1 milliGRAM(s) IntraMuscular once PRN Glucose LESS THAN 70 milligrams/deciliter  morphine  - Injectable 2 milliGRAM(s) IV Push every 4 hours PRN Moderate Pain (4 - 6)      Vital Signs Last 24 Hrs  T(C): 36.7 (16 Apr 2018 04:25), Max: 37.2 (15 Apr 2018 16:47)  T(F): 98 (16 Apr 2018 04:25), Max: 99 (15 Apr 2018 16:47)  HR: 72 (16 Apr 2018 04:25) (66 - 72)  BP: 140/76 (16 Apr 2018 04:25) (118/51 - 169/65)  BP(mean): --  RR: 18 (16 Apr 2018 04:25) (18 - 18)  SpO2: 95% (16 Apr 2018 04:25) (93% - 96%)  nc/at  s1s2  cta  soft, nt, nd no guarding or rebound  no c/c/e    CBC Full  -  ( 16 Apr 2018 07:31 )  WBC Count : 7.78 K/uL  Hemoglobin : 9.1 g/dL  Hematocrit : 28.0 %  Platelet Count - Automated : 259 K/uL  Mean Cell Volume : 91.8 fl  Mean Cell Hemoglobin : 29.8 pg  Mean Cell Hemoglobin Concentration : 32.5 gm/dL  Auto Neutrophil # : x  Auto Lymphocyte # : x  Auto Monocyte # : x  Auto Eosinophil # : x  Auto Basophil # : x  Auto Neutrophil % : x  Auto Lymphocyte % : x  Auto Monocyte % : x  Auto Eosinophil % : x  Auto Basophil % : x    04-16    137  |  104  |  <4<L>  ----------------------------<  118<H>  3.2<L>   |  24  |  0.55    Ca    8.0<L>      16 Apr 2018 06:56

## 2018-04-16 NOTE — PROGRESS NOTE ADULT - SUBJECTIVE AND OBJECTIVE BOX
Patient is a 78y old  Female who presents with a chief complaint of rectal bleeding (13 Apr 2018 17:01)    pt. seen and examined, NAD     INTERVAL HPI/OVERNIGHT EVENTS:  T(C): 37.1 (04-16-18 @ 19:28), Max: 37.1 (04-16-18 @ 19:28)  HR: 69 (04-16-18 @ 19:28) (65 - 72)  BP: 157/61 (04-16-18 @ 19:28) (140/76 - 157/61)  RR: 18 (04-16-18 @ 19:28) (18 - 18)  SpO2: 94% (04-16-18 @ 19:28) (94% - 96%)  Wt(kg): --  I&O's Summary    15 Apr 2018 07:01  -  16 Apr 2018 07:00  --------------------------------------------------------  IN: 1860 mL / OUT: 1500 mL / NET: 360 mL    16 Apr 2018 07:01  -  16 Apr 2018 23:30  --------------------------------------------------------  IN: 1230 mL / OUT: 600 mL / NET: 630 mL        PAST MEDICAL & SURGICAL HISTORY:  Gangrene of foot  Coronary artery disease involving native coronary artery of native heart without angina pectoris  Status post above knee amputation of right lower extremity      SOCIAL HISTORY  Alcohol:  Tobacco:  Illicit substance use:    FAMILY HISTORY:    REVIEW OF SYSTEMS:  CONSTITUTIONAL: No fever, weight loss, or fatigue  EYES: No eye pain, visual disturbances, or discharge  ENMT:  No difficulty hearing, tinnitus, vertigo; No sinus or throat pain  NECK: No pain or stiffness  RESPIRATORY: No cough, wheezing, chills or hemoptysis; No shortness of breath  CARDIOVASCULAR: No chest pain, palpitations, dizziness, or leg swelling  GASTROINTESTINAL: No abdominal or epigastric pain. No nausea, vomiting, or hematemesis; No diarrhea or constipation. No melena or hematochezia.  GENITOURINARY: No dysuria, frequency, hematuria, or incontinence  NEUROLOGICAL: No headaches, memory loss, loss of strength, numbness, or tremors  SKIN: No itching, burning, rashes, or lesions   LYMPH NODES: No enlarged glands  ENDOCRINE: No heat or cold intolerance; No hair loss  MUSCULOSKELETAL: No joint pain or swelling; No muscle, back, or extremity pain  PSYCHIATRIC: No depression, anxiety, mood swings, or difficulty sleeping  HEME/LYMPH: No easy bruising, or bleeding gums  ALLERY AND IMMUNOLOGIC: No hives or eczema    RADIOLOGY & ADDITIONAL TESTS:    Imaging Personally Reviewed:  [ ] YES  [ ] NO    Consultant(s) Notes Reviewed:  [ ] YES  [ ] NO    PHYSICAL EXAM:  GENERAL: NAD, well-groomed, well-developed  HEAD:  Atraumatic, Normocephalic  EYES: EOMI, PERRLA, conjunctiva and sclera clear  ENMT: No tonsillar erythema, exudates, or enlargement; Moist mucous membranes, Good dentition, No lesions  NECK: Supple, No JVD, Normal thyroid  NERVOUS SYSTEM:  Alert & Oriented X3, Good concentration; Motor Strength 5/5 B/L upper and lower extremities; DTRs 2+ intact and symmetric  CHEST/LUNG: Clear to percussion bilaterally; No rales, rhonchi, wheezing, or rubs  HEART: Regular rate and rhythm; No murmurs, rubs, or gallops  ABDOMEN: Soft, Nontender, Nondistended; Bowel sounds present  EXTREMITIES:  2+ Peripheral Pulses, No clubbing, cyanosis, or edema  LYMPH: No lymphadenopathy noted  SKIN: No rashes or lesions    LABS:                        9.1    7.78  )-----------( 259      ( 16 Apr 2018 07:31 )             28.0     04-16    137  |  104  |  <4<L>  ----------------------------<  118<H>  3.2<L>   |  24  |  0.55    Ca    8.0<L>      16 Apr 2018 06:56          CAPILLARY BLOOD GLUCOSE      POCT Blood Glucose.: 106 mg/dL (16 Apr 2018 21:39)  POCT Blood Glucose.: 164 mg/dL (16 Apr 2018 17:31)  POCT Blood Glucose.: 184 mg/dL (16 Apr 2018 12:39)  POCT Blood Glucose.: 125 mg/dL (16 Apr 2018 09:25)            MEDICATIONS  (STANDING):  aspirin  chewable 81 milliGRAM(s) Oral daily  atorvastatin 40 milliGRAM(s) Oral at bedtime  carvedilol 12.5 milliGRAM(s) Oral every 12 hours  dextrose 5%. 1000 milliLiter(s) (50 mL/Hr) IV Continuous <Continuous>  dextrose 50% Injectable 12.5 Gram(s) IV Push once  dextrose 50% Injectable 25 Gram(s) IV Push once  dextrose 50% Injectable 25 Gram(s) IV Push once  docusate sodium 100 milliGRAM(s) Oral two times a day  hydrocortisone 2.5% Rectal Cream 1 Application(s) Rectal daily  insulin lispro (HumaLOG) corrective regimen sliding scale   SubCutaneous three times a day before meals  insulin lispro (HumaLOG) corrective regimen sliding scale   SubCutaneous at bedtime  lisinopril 5 milliGRAM(s) Oral daily  pantoprazole  Injectable 40 milliGRAM(s) IV Push daily  polyethylene glycol 3350 17 Gram(s) Oral daily  senna 2 Tablet(s) Oral at bedtime  sodium chloride 0.9%. 1000 milliLiter(s) (75 mL/Hr) IV Continuous <Continuous>    MEDICATIONS  (PRN):  dextrose Gel 1 Dose(s) Oral once PRN Blood Glucose LESS THAN 70 milliGRAM(s)/deciliter  glucagon  Injectable 1 milliGRAM(s) IntraMuscular once PRN Glucose LESS THAN 70 milligrams/deciliter  morphine  - Injectable 2 milliGRAM(s) IV Push every 4 hours PRN Moderate Pain (4 - 6)      Care Discussed with Consultants/Other Providers [ ] YES  [ ] NO

## 2018-04-16 NOTE — PROGRESS NOTE ADULT - SUBJECTIVE AND OBJECTIVE BOX
Patient is a 78y old  Female who presents with a chief complaint of rectal bleeding (13 Apr 2018 17:01)      INTERVAL HISTORY: feels ok  	  MEDICATIONS:  carvedilol 12.5 milliGRAM(s) Oral every 12 hours  lisinopril 5 milliGRAM(s) Oral daily        PHYSICAL EXAM:  T(C): 37.1 (04-16-18 @ 19:28), Max: 37.1 (04-16-18 @ 19:28)  HR: 69 (04-16-18 @ 19:28) (65 - 72)  BP: 157/61 (04-16-18 @ 19:28) (140/76 - 157/61)  RR: 18 (04-16-18 @ 19:28) (18 - 18)  SpO2: 94% (04-16-18 @ 19:28) (94% - 96%)  Wt(kg): --  I&O's Summary    15 Apr 2018 07:01  -  16 Apr 2018 07:00  --------------------------------------------------------  IN: 1860 mL / OUT: 1500 mL / NET: 360 mL    16 Apr 2018 07:01  -  16 Apr 2018 21:37  --------------------------------------------------------  IN: 1230 mL / OUT: 400 mL / NET: 830 mL          Appearance: Normal	  HEENT:    PERRL, EOMI	  Cardiovascular:  S1 S2, No JVD  Respiratory: Lungs clear to auscultation	  Psychiatry: Alert  Gastrointestinal:  Soft, Non-tender, + BS	  Skin: No rashes, No cyanosis  Extremities:  No edema of LE                                9.1    7.78  )-----------( 259      ( 16 Apr 2018 07:31 )             28.0     04-16    137  |  104  |  <4<L>  ----------------------------<  118<H>  3.2<L>   |  24  |  0.55    Ca    8.0<L>      16 Apr 2018 06:56          Labs, imaging and telemetry personally reviewed      ASSESSMENT/PLAN: 	  Assessment and Plan:   · Assessment		  came in with [facility], CT shows rectal mass most likely malignancy     Problem/Plan - 1:  ·  Problem: Gastrointestinal hemorrhage, unspecified gastrointestinal hemorrhage type.  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  Plan: seen by surgery  - per onc/surg outpt follow up     Problem/Plan - 3:  ·  Problem: Coronary artery disease involving native coronary artery of native heart without angina pectoris.  Plan: stable  hold off on asa and Plavix,   Discussed with pts daughter, she confirms that pt has known CAD but has never been revascularized, no history of stent.   Given no stent or history of MI, will d/c Plavix to reduce incidence of bleed and continue with ASA 81mg   TTE unremarkable  NM stress test from last year with small area of mild ischemia - medically managed     Problem/Plan - 4:  ·  Problem: Diabetes.  Plan: hold off on oral meds  FSBS with RIVERA brandon.               Florentino Feldman DO [facility]  Cardiovascular Medicine  [phone]

## 2018-04-17 LAB
ANION GAP SERPL CALC-SCNC: 11 MMOL/L — SIGNIFICANT CHANGE UP (ref 5–17)
BUN SERPL-MCNC: 4 MG/DL — LOW (ref 7–23)
CALCIUM SERPL-MCNC: 8.6 MG/DL — SIGNIFICANT CHANGE UP (ref 8.4–10.5)
CHLORIDE SERPL-SCNC: 100 MMOL/L — SIGNIFICANT CHANGE UP (ref 96–108)
CO2 SERPL-SCNC: 24 MMOL/L — SIGNIFICANT CHANGE UP (ref 22–31)
CREAT SERPL-MCNC: 0.55 MG/DL — SIGNIFICANT CHANGE UP (ref 0.5–1.3)
GLUCOSE BLDC GLUCOMTR-MCNC: 106 MG/DL — HIGH (ref 70–99)
GLUCOSE BLDC GLUCOMTR-MCNC: 127 MG/DL — HIGH (ref 70–99)
GLUCOSE BLDC GLUCOMTR-MCNC: 221 MG/DL — HIGH (ref 70–99)
GLUCOSE BLDC GLUCOMTR-MCNC: 88 MG/DL — SIGNIFICANT CHANGE UP (ref 70–99)
GLUCOSE SERPL-MCNC: 132 MG/DL — HIGH (ref 70–99)
POTASSIUM SERPL-MCNC: 4.1 MMOL/L — SIGNIFICANT CHANGE UP (ref 3.5–5.3)
POTASSIUM SERPL-SCNC: 4.1 MMOL/L — SIGNIFICANT CHANGE UP (ref 3.5–5.3)
SODIUM SERPL-SCNC: 135 MMOL/L — SIGNIFICANT CHANGE UP (ref 135–145)

## 2018-04-17 RX ADMIN — Medication 1 APPLICATION(S): at 12:33

## 2018-04-17 RX ADMIN — POLYETHYLENE GLYCOL 3350 17 GRAM(S): 17 POWDER, FOR SOLUTION ORAL at 12:34

## 2018-04-17 RX ADMIN — MORPHINE SULFATE 2 MILLIGRAM(S): 50 CAPSULE, EXTENDED RELEASE ORAL at 08:30

## 2018-04-17 RX ADMIN — Medication 2: at 12:49

## 2018-04-17 RX ADMIN — PANTOPRAZOLE SODIUM 40 MILLIGRAM(S): 20 TABLET, DELAYED RELEASE ORAL at 12:34

## 2018-04-17 RX ADMIN — SENNA PLUS 2 TABLET(S): 8.6 TABLET ORAL at 23:09

## 2018-04-17 RX ADMIN — MORPHINE SULFATE 2 MILLIGRAM(S): 50 CAPSULE, EXTENDED RELEASE ORAL at 03:48

## 2018-04-17 RX ADMIN — Medication 100 MILLIGRAM(S): at 05:40

## 2018-04-17 RX ADMIN — Medication 81 MILLIGRAM(S): at 12:34

## 2018-04-17 RX ADMIN — CARVEDILOL PHOSPHATE 12.5 MILLIGRAM(S): 80 CAPSULE, EXTENDED RELEASE ORAL at 18:51

## 2018-04-17 RX ADMIN — MORPHINE SULFATE 2 MILLIGRAM(S): 50 CAPSULE, EXTENDED RELEASE ORAL at 04:08

## 2018-04-17 RX ADMIN — CARVEDILOL PHOSPHATE 12.5 MILLIGRAM(S): 80 CAPSULE, EXTENDED RELEASE ORAL at 05:40

## 2018-04-17 RX ADMIN — Medication 100 MILLIGRAM(S): at 18:51

## 2018-04-17 RX ADMIN — SODIUM CHLORIDE 75 MILLILITER(S): 9 INJECTION INTRAMUSCULAR; INTRAVENOUS; SUBCUTANEOUS at 23:09

## 2018-04-17 RX ADMIN — ATORVASTATIN CALCIUM 40 MILLIGRAM(S): 80 TABLET, FILM COATED ORAL at 23:09

## 2018-04-17 RX ADMIN — MORPHINE SULFATE 2 MILLIGRAM(S): 50 CAPSULE, EXTENDED RELEASE ORAL at 13:00

## 2018-04-17 RX ADMIN — LISINOPRIL 5 MILLIGRAM(S): 2.5 TABLET ORAL at 05:40

## 2018-04-17 RX ADMIN — MORPHINE SULFATE 2 MILLIGRAM(S): 50 CAPSULE, EXTENDED RELEASE ORAL at 12:33

## 2018-04-17 RX ADMIN — MORPHINE SULFATE 2 MILLIGRAM(S): 50 CAPSULE, EXTENDED RELEASE ORAL at 08:11

## 2018-04-17 RX ADMIN — MORPHINE SULFATE 2 MILLIGRAM(S): 50 CAPSULE, EXTENDED RELEASE ORAL at 19:13

## 2018-04-17 RX ADMIN — MORPHINE SULFATE 2 MILLIGRAM(S): 50 CAPSULE, EXTENDED RELEASE ORAL at 18:51

## 2018-04-17 NOTE — PROGRESS NOTE ADULT - SUBJECTIVE AND OBJECTIVE BOX
Patient is a 78y old  Female who presents with a chief complaint of rectal bleeding (13 Apr 2018 17:01)      INTERVAL HISTORY: no events  	  MEDICATIONS:  carvedilol 12.5 milliGRAM(s) Oral every 12 hours  lisinopril 5 milliGRAM(s) Oral daily        PHYSICAL EXAM:  T(C): 36.8 (04-17-18 @ 20:17), Max: 37.6 (04-17-18 @ 12:05)  HR: 73 (04-17-18 @ 20:17) (68 - 81)  BP: 126/66 (04-17-18 @ 20:17) (110/60 - 160/73)  RR: 18 (04-17-18 @ 20:17) (18 - 18)  SpO2: 95% (04-17-18 @ 20:17) (94% - 95%)  Wt(kg): --  I&O's Summary    16 Apr 2018 07:01  -  17 Apr 2018 07:00  --------------------------------------------------------  IN: 2590 mL / OUT: 600 mL / NET: 1990 mL    17 Apr 2018 07:01  -  17 Apr 2018 21:16  --------------------------------------------------------  IN: 120 mL / OUT: 600 mL / NET: -480 mL          Appearance: Normal	  HEENT:    PERRL, EOMI	  Cardiovascular:  S1 S2, No JVD  Respiratory: Lungs clear to auscultation	  Psychiatry: Alert  Gastrointestinal:  Soft, Non-tender, + BS	  Skin: No rashes, No cyanosis  Extremities:  No edema of LE                                9.1    7.78  )-----------( 259      ( 16 Apr 2018 07:31 )             28.0     04-17    135  |  100  |  4<L>  ----------------------------<  132<H>  4.1   |  24  |  0.55    Ca    8.6      17 Apr 2018 06:14          Labs, imaging and telemetry personally reviewed      ASSESSMENT/PLAN: 	  Assessment and Plan:   · Assessment		  came in with [facility], CT shows rectal mass most likely malignancy     Problem/Plan - 1:  ·  Problem: Gastrointestinal hemorrhage, unspecified gastrointestinal hemorrhage type.  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  Plan: seen by surgery  - per onc/surg outpt follow up     Problem/Plan - 3:  ·  Problem: Coronary artery disease involving native coronary artery of native heart without angina pectoris.  Plan: stable  hold off on asa and Plavix,   Discussed with pts daughter, she confirms that pt has known CAD but has never been revascularized, no history of stent.   Given no stent or history of MI, will d/c Plavix to reduce incidence of bleed and continue with ASA 81mg   TTE unremarkable  NM stress test from last year with small area of mild ischemia - medically managed with ASA, statin and Coreg     Problem/Plan - 4:  ·  Problem: Diabetes.  Plan: hold off on oral meds  FSBS with RIVERA Feldman DO [facility]  Cardiovascular Medicine  [phone]

## 2018-04-17 NOTE — PROGRESS NOTE ADULT - SUBJECTIVE AND OBJECTIVE BOX
INTERVAL HPI/OVERNIGHT EVENTS: still with pain, path shows high grade dysplasia    MEDICATIONS  (STANDING):  aspirin  chewable 81 milliGRAM(s) Oral daily  atorvastatin 40 milliGRAM(s) Oral at bedtime  carvedilol 12.5 milliGRAM(s) Oral every 12 hours  dextrose 5%. 1000 milliLiter(s) (50 mL/Hr) IV Continuous <Continuous>  dextrose 50% Injectable 12.5 Gram(s) IV Push once  dextrose 50% Injectable 25 Gram(s) IV Push once  dextrose 50% Injectable 25 Gram(s) IV Push once  docusate sodium 100 milliGRAM(s) Oral two times a day  hydrocortisone 2.5% Rectal Cream 1 Application(s) Rectal daily  insulin lispro (HumaLOG) corrective regimen sliding scale   SubCutaneous three times a day before meals  insulin lispro (HumaLOG) corrective regimen sliding scale   SubCutaneous at bedtime  lisinopril 5 milliGRAM(s) Oral daily  pantoprazole  Injectable 40 milliGRAM(s) IV Push daily  polyethylene glycol 3350 17 Gram(s) Oral daily  senna 2 Tablet(s) Oral at bedtime  sodium chloride 0.9%. 1000 milliLiter(s) (75 mL/Hr) IV Continuous <Continuous>    MEDICATIONS  (PRN):  dextrose Gel 1 Dose(s) Oral once PRN Blood Glucose LESS THAN 70 milliGRAM(s)/deciliter  glucagon  Injectable 1 milliGRAM(s) IntraMuscular once PRN Glucose LESS THAN 70 milligrams/deciliter  morphine  - Injectable 2 milliGRAM(s) IV Push every 4 hours PRN Moderate Pain (4 - 6)      Allergies    No Known Allergies    Intolerances            PHYSICAL EXAM:   Vital Signs:  Vital Signs Last 24 Hrs  T(C): 37 (17 Apr 2018 05:39), Max: 37.1 (16 Apr 2018 19:28)  T(F): 98.6 (17 Apr 2018 05:39), Max: 98.7 (16 Apr 2018 19:28)  HR: 81 (17 Apr 2018 05:39) (65 - 81)  BP: 160/73 (17 Apr 2018 05:39) (145/68 - 160/73)  BP(mean): --  RR: 18 (17 Apr 2018 05:39) (18 - 18)  SpO2: 94% (17 Apr 2018 05:39) (94% - 96%)  Daily     Daily     GENERAL:  no distress  HEENT:  NC/AT,  anicteric  CHEST:   no increased effort, breath sounds clear  HEART:  Regular rhythm  ABDOMEN:  Soft, non-tender, non-distended, normoactive bowel sounds,  no masses ,no hepato-splenomegaly, no signs of chronic liver disease  EXTEREMITIES:  no cyanosis      LABS:                        9.1    7.78  )-----------( 259      ( 16 Apr 2018 07:31 )             28.0     04-17    135  |  100  |  4<L>  ----------------------------<  132<H>  4.1   |  24  |  0.55    Ca    8.6      17 Apr 2018 06:14            RADIOLOGY & ADDITIONAL TESTS:

## 2018-04-17 NOTE — PROGRESS NOTE ADULT - SUBJECTIVE AND OBJECTIVE BOX
Patient is a 78y old  Female who presents with a chief complaint of rectal bleeding (13 Apr 2018 17:01)    pt. seen and examined, c/o abd pain , no n/v . no active bleeding in stools / per rectum     INTERVAL HPI/OVERNIGHT EVENTS:  T(C): 36.9 (04-17-18 @ 16:32), Max: 37.6 (04-17-18 @ 12:05)  HR: 68 (04-17-18 @ 16:32) (68 - 81)  BP: 110/60 (04-17-18 @ 16:32) (110/60 - 160/73)  RR: 18 (04-17-18 @ 16:32) (18 - 18)  SpO2: 95% (04-17-18 @ 16:32) (94% - 95%)  Wt(kg): --  I&O's Summary    16 Apr 2018 07:01  -  17 Apr 2018 07:00  --------------------------------------------------------  IN: 2590 mL / OUT: 600 mL / NET: 1990 mL    17 Apr 2018 07:01  -  17 Apr 2018 17:59  --------------------------------------------------------  IN: 120 mL / OUT: 300 mL / NET: -180 mL        PAST MEDICAL & SURGICAL HISTORY:  Gangrene of foot  Coronary artery disease involving native coronary artery of native heart without angina pectoris  Status post above knee amputation of right lower extremity      SOCIAL HISTORY  Alcohol:  Tobacco:  Illicit substance use:    FAMILY HISTORY:    REVIEW OF SYSTEMS:  CONSTITUTIONAL: No fever, weight loss, or fatigue  EYES: No eye pain, visual disturbances, or discharge  ENMT:  No difficulty hearing, tinnitus, vertigo; No sinus or throat pain  NECK: No pain or stiffness  RESPIRATORY: No cough, wheezing, chills or hemoptysis; No shortness of breath  CARDIOVASCULAR: No chest pain, palpitations, dizziness, or leg swelling  GASTROINTESTINAL: No abdominal or epigastric pain. No nausea, vomiting, or hematemesis; No diarrhea or constipation. No melena or hematochezia.  GENITOURINARY: No dysuria, frequency, hematuria, or incontinence  NEUROLOGICAL: No headaches, memory loss, loss of strength, numbness, or tremors  SKIN: No itching, burning, rashes, or lesions   LYMPH NODES: No enlarged glands  ENDOCRINE: No heat or cold intolerance; No hair loss  MUSCULOSKELETAL: No joint pain or swelling; No muscle, back, or extremity pain  PSYCHIATRIC: No depression, anxiety, mood swings, or difficulty sleeping  HEME/LYMPH: No easy bruising, or bleeding gums  ALLERY AND IMMUNOLOGIC: No hives or eczema    RADIOLOGY & ADDITIONAL TESTS:    Imaging Personally Reviewed:  [ ] YES  [ ] NO    Consultant(s) Notes Reviewed:  [ ] YES  [ ] NO    PHYSICAL EXAM:  GENERAL: NAD, well-groomed, well-developed  HEAD:  Atraumatic, Normocephalic  EYES: EOMI, PERRLA, conjunctiva and sclera clear  ENMT: No tonsillar erythema, exudates, or enlargement; Moist mucous membranes, Good dentition, No lesions  NECK: Supple, No JVD, Normal thyroid  NERVOUS SYSTEM:  Alert & Oriented X3, Good concentration; Motor Strength 5/5 B/L upper and lower extremities; DTRs 2+ intact and symmetric  CHEST/LUNG: Clear to percussion bilaterally; No rales, rhonchi, wheezing, or rubs  HEART: Regular rate and rhythm; No murmurs, rubs, or gallops  ABDOMEN: Soft, Nontender, Nondistended; Bowel sounds present  EXTREMITIES:  2+ Peripheral Pulses, No clubbing, cyanosis, or edema  LYMPH: No lymphadenopathy noted  SKIN: No rashes or lesions    LABS:                        9.1    7.78  )-----------( 259      ( 16 Apr 2018 07:31 )             28.0     04-17    135  |  100  |  4<L>  ----------------------------<  132<H>  4.1   |  24  |  0.55    Ca    8.6      17 Apr 2018 06:14          CAPILLARY BLOOD GLUCOSE      POCT Blood Glucose.: 88 mg/dL (17 Apr 2018 17:28)  POCT Blood Glucose.: 221 mg/dL (17 Apr 2018 12:34)  POCT Blood Glucose.: 127 mg/dL (17 Apr 2018 09:16)  POCT Blood Glucose.: 106 mg/dL (16 Apr 2018 21:39)            MEDICATIONS  (STANDING):  aspirin  chewable 81 milliGRAM(s) Oral daily  atorvastatin 40 milliGRAM(s) Oral at bedtime  carvedilol 12.5 milliGRAM(s) Oral every 12 hours  dextrose 5%. 1000 milliLiter(s) (50 mL/Hr) IV Continuous <Continuous>  dextrose 50% Injectable 12.5 Gram(s) IV Push once  dextrose 50% Injectable 25 Gram(s) IV Push once  dextrose 50% Injectable 25 Gram(s) IV Push once  docusate sodium 100 milliGRAM(s) Oral two times a day  hydrocortisone 2.5% Rectal Cream 1 Application(s) Rectal daily  insulin lispro (HumaLOG) corrective regimen sliding scale   SubCutaneous three times a day before meals  insulin lispro (HumaLOG) corrective regimen sliding scale   SubCutaneous at bedtime  lisinopril 5 milliGRAM(s) Oral daily  pantoprazole  Injectable 40 milliGRAM(s) IV Push daily  polyethylene glycol 3350 17 Gram(s) Oral daily  senna 2 Tablet(s) Oral at bedtime  sodium chloride 0.9%. 1000 milliLiter(s) (75 mL/Hr) IV Continuous <Continuous>    MEDICATIONS  (PRN):  dextrose Gel 1 Dose(s) Oral once PRN Blood Glucose LESS THAN 70 milliGRAM(s)/deciliter  glucagon  Injectable 1 milliGRAM(s) IntraMuscular once PRN Glucose LESS THAN 70 milligrams/deciliter  morphine  - Injectable 2 milliGRAM(s) IV Push every 4 hours PRN Moderate Pain (4 - 6)      Care Discussed with Consultants/Other Providers [ ] YES  [ ] NO

## 2018-04-18 LAB
ANION GAP SERPL CALC-SCNC: 11 MMOL/L — SIGNIFICANT CHANGE UP (ref 5–17)
BUN SERPL-MCNC: 6 MG/DL — LOW (ref 7–23)
CALCIUM SERPL-MCNC: 8.6 MG/DL — SIGNIFICANT CHANGE UP (ref 8.4–10.5)
CHLORIDE SERPL-SCNC: 101 MMOL/L — SIGNIFICANT CHANGE UP (ref 96–108)
CO2 SERPL-SCNC: 25 MMOL/L — SIGNIFICANT CHANGE UP (ref 22–31)
CREAT SERPL-MCNC: 0.63 MG/DL — SIGNIFICANT CHANGE UP (ref 0.5–1.3)
GLUCOSE BLDC GLUCOMTR-MCNC: 105 MG/DL — HIGH (ref 70–99)
GLUCOSE BLDC GLUCOMTR-MCNC: 135 MG/DL — HIGH (ref 70–99)
GLUCOSE BLDC GLUCOMTR-MCNC: 142 MG/DL — HIGH (ref 70–99)
GLUCOSE BLDC GLUCOMTR-MCNC: 238 MG/DL — HIGH (ref 70–99)
GLUCOSE SERPL-MCNC: 126 MG/DL — HIGH (ref 70–99)
POTASSIUM SERPL-MCNC: 3.8 MMOL/L — SIGNIFICANT CHANGE UP (ref 3.5–5.3)
POTASSIUM SERPL-SCNC: 3.8 MMOL/L — SIGNIFICANT CHANGE UP (ref 3.5–5.3)
SODIUM SERPL-SCNC: 137 MMOL/L — SIGNIFICANT CHANGE UP (ref 135–145)

## 2018-04-18 RX ADMIN — Medication 100 MILLIGRAM(S): at 05:09

## 2018-04-18 RX ADMIN — CARVEDILOL PHOSPHATE 12.5 MILLIGRAM(S): 80 CAPSULE, EXTENDED RELEASE ORAL at 17:59

## 2018-04-18 RX ADMIN — Medication 2: at 09:18

## 2018-04-18 RX ADMIN — MORPHINE SULFATE 2 MILLIGRAM(S): 50 CAPSULE, EXTENDED RELEASE ORAL at 00:21

## 2018-04-18 RX ADMIN — MORPHINE SULFATE 2 MILLIGRAM(S): 50 CAPSULE, EXTENDED RELEASE ORAL at 05:25

## 2018-04-18 RX ADMIN — MORPHINE SULFATE 2 MILLIGRAM(S): 50 CAPSULE, EXTENDED RELEASE ORAL at 17:57

## 2018-04-18 RX ADMIN — MORPHINE SULFATE 2 MILLIGRAM(S): 50 CAPSULE, EXTENDED RELEASE ORAL at 10:55

## 2018-04-18 RX ADMIN — MORPHINE SULFATE 2 MILLIGRAM(S): 50 CAPSULE, EXTENDED RELEASE ORAL at 18:20

## 2018-04-18 RX ADMIN — MORPHINE SULFATE 2 MILLIGRAM(S): 50 CAPSULE, EXTENDED RELEASE ORAL at 05:09

## 2018-04-18 RX ADMIN — ATORVASTATIN CALCIUM 40 MILLIGRAM(S): 80 TABLET, FILM COATED ORAL at 21:58

## 2018-04-18 RX ADMIN — Medication 81 MILLIGRAM(S): at 11:45

## 2018-04-18 RX ADMIN — SENNA PLUS 2 TABLET(S): 8.6 TABLET ORAL at 21:58

## 2018-04-18 RX ADMIN — Medication 1 APPLICATION(S): at 11:46

## 2018-04-18 RX ADMIN — PANTOPRAZOLE SODIUM 40 MILLIGRAM(S): 20 TABLET, DELAYED RELEASE ORAL at 11:45

## 2018-04-18 RX ADMIN — MORPHINE SULFATE 2 MILLIGRAM(S): 50 CAPSULE, EXTENDED RELEASE ORAL at 11:42

## 2018-04-18 RX ADMIN — POLYETHYLENE GLYCOL 3350 17 GRAM(S): 17 POWDER, FOR SOLUTION ORAL at 11:45

## 2018-04-18 RX ADMIN — CARVEDILOL PHOSPHATE 12.5 MILLIGRAM(S): 80 CAPSULE, EXTENDED RELEASE ORAL at 05:09

## 2018-04-18 RX ADMIN — Medication 100 MILLIGRAM(S): at 17:59

## 2018-04-18 RX ADMIN — SODIUM CHLORIDE 75 MILLILITER(S): 9 INJECTION INTRAMUSCULAR; INTRAVENOUS; SUBCUTANEOUS at 11:47

## 2018-04-18 RX ADMIN — MORPHINE SULFATE 2 MILLIGRAM(S): 50 CAPSULE, EXTENDED RELEASE ORAL at 00:40

## 2018-04-18 RX ADMIN — MORPHINE SULFATE 2 MILLIGRAM(S): 50 CAPSULE, EXTENDED RELEASE ORAL at 23:48

## 2018-04-18 RX ADMIN — LISINOPRIL 5 MILLIGRAM(S): 2.5 TABLET ORAL at 05:09

## 2018-04-18 NOTE — PROGRESS NOTE ADULT - PROBLEM SELECTOR PLAN 5
stable, will consult cardio dr. Florentino krishnan , in case if she has to go for surgery
stable, will consult cardio dr. Florentino krishnan , in case if she has to go for surgery
stable,
stable, will consult cardio dr. Florentino krishnan , in case if she has to go for surgery

## 2018-04-18 NOTE — PROGRESS NOTE ADULT - PROBLEM SELECTOR PROBLEM 5
CAD (coronary artery disease)

## 2018-04-18 NOTE — PROGRESS NOTE ADULT - PROBLEM SELECTOR PLAN 3
stable  hold off on asa and plavix

## 2018-04-18 NOTE — PROGRESS NOTE ADULT - SUBJECTIVE AND OBJECTIVE BOX
Patient is a 78y old  Female who presents with a chief complaint of rectal bleeding (13 Apr 2018 17:01)    pt. seen and examined, still some abd pain , no n/v     INTERVAL HPI/OVERNIGHT EVENTS:  T(C): 36.8 (04-18-18 @ 23:43), Max: 36.8 (04-18-18 @ 23:43)  HR: 96 (04-18-18 @ 23:43) (65 - 96)  BP: 142/73 (04-18-18 @ 23:43) (122/52 - 151/77)  RR: 18 (04-18-18 @ 23:43) (18 - 18)  SpO2: 97% (04-18-18 @ 23:43) (95% - 98%)  Wt(kg): --  I&O's Summary    17 Apr 2018 07:01  -  18 Apr 2018 07:00  --------------------------------------------------------  IN: 1380 mL / OUT: 900 mL / NET: 480 mL    18 Apr 2018 07:01  -  19 Apr 2018 02:32  --------------------------------------------------------  IN: 1380 mL / OUT: 600 mL / NET: 780 mL        PAST MEDICAL & SURGICAL HISTORY:  Gangrene of foot  Coronary artery disease involving native coronary artery of native heart without angina pectoris  Status post above knee amputation of right lower extremity      SOCIAL HISTORY  Alcohol:  Tobacco:  Illicit substance use:    FAMILY HISTORY:    REVIEW OF SYSTEMS:  CONSTITUTIONAL: No fever, weight loss, or fatigue  EYES: No eye pain, visual disturbances, or discharge  ENMT:  No difficulty hearing, tinnitus, vertigo; No sinus or throat pain  NECK: No pain or stiffness  RESPIRATORY: No cough, wheezing, chills or hemoptysis; No shortness of breath  CARDIOVASCULAR: No chest pain, palpitations, dizziness, or leg swelling  GASTROINTESTINAL: No abdominal or epigastric pain. No nausea, vomiting, or hematemesis; No diarrhea or constipation. No melena or hematochezia.  GENITOURINARY: No dysuria, frequency, hematuria, or incontinence  NEUROLOGICAL: No headaches, memory loss, loss of strength, numbness, or tremors  SKIN: No itching, burning, rashes, or lesions   LYMPH NODES: No enlarged glands  ENDOCRINE: No heat or cold intolerance; No hair loss  MUSCULOSKELETAL: No joint pain or swelling; No muscle, back, or extremity pain  PSYCHIATRIC: No depression, anxiety, mood swings, or difficulty sleeping  HEME/LYMPH: No easy bruising, or bleeding gums  ALLERY AND IMMUNOLOGIC: No hives or eczema    RADIOLOGY & ADDITIONAL TESTS:    Imaging Personally Reviewed:  [ ] YES  [ ] NO    Consultant(s) Notes Reviewed:  [ ] YES  [ ] NO    PHYSICAL EXAM:  GENERAL: NAD, well-groomed, well-developed  HEAD:  Atraumatic, Normocephalic  EYES: EOMI, PERRLA, conjunctiva and sclera clear  ENMT: No tonsillar erythema, exudates, or enlargement; Moist mucous membranes, Good dentition, No lesions  NECK: Supple, No JVD, Normal thyroid  NERVOUS SYSTEM:  Alert & Oriented X3, Good concentration; Motor Strength 5/5 B/L upper and lower extremities; DTRs 2+ intact and symmetric  CHEST/LUNG: Clear to percussion bilaterally; No rales, rhonchi, wheezing, or rubs  HEART: Regular rate and rhythm; No murmurs, rubs, or gallops  ABDOMEN: Soft, Nontender, Nondistended; Bowel sounds present  EXTREMITIES:  2+ Peripheral Pulses, No clubbing, cyanosis, or edema  LYMPH: No lymphadenopathy noted  SKIN: No rashes or lesions    LABS:    04-18    137  |  101  |  6<L>  ----------------------------<  126<H>  3.8   |  25  |  0.63    Ca    8.6      18 Apr 2018 07:08          CAPILLARY BLOOD GLUCOSE      POCT Blood Glucose.: 135 mg/dL (18 Apr 2018 21:44)  POCT Blood Glucose.: 105 mg/dL (18 Apr 2018 17:43)  POCT Blood Glucose.: 142 mg/dL (18 Apr 2018 13:09)  POCT Blood Glucose.: 238 mg/dL (18 Apr 2018 08:28)            MEDICATIONS  (STANDING):  aspirin  chewable 81 milliGRAM(s) Oral daily  atorvastatin 40 milliGRAM(s) Oral at bedtime  carvedilol 12.5 milliGRAM(s) Oral every 12 hours  dextrose 5%. 1000 milliLiter(s) (50 mL/Hr) IV Continuous <Continuous>  dextrose 50% Injectable 12.5 Gram(s) IV Push once  dextrose 50% Injectable 25 Gram(s) IV Push once  dextrose 50% Injectable 25 Gram(s) IV Push once  docusate sodium 100 milliGRAM(s) Oral two times a day  hydrocortisone 2.5% Rectal Cream 1 Application(s) Rectal daily  insulin lispro (HumaLOG) corrective regimen sliding scale   SubCutaneous three times a day before meals  insulin lispro (HumaLOG) corrective regimen sliding scale   SubCutaneous at bedtime  lisinopril 5 milliGRAM(s) Oral daily  pantoprazole  Injectable 40 milliGRAM(s) IV Push daily  polyethylene glycol 3350 17 Gram(s) Oral daily  senna 2 Tablet(s) Oral at bedtime  sodium chloride 0.9%. 1000 milliLiter(s) (75 mL/Hr) IV Continuous <Continuous>    MEDICATIONS  (PRN):  dextrose Gel 1 Dose(s) Oral once PRN Blood Glucose LESS THAN 70 milliGRAM(s)/deciliter  glucagon  Injectable 1 milliGRAM(s) IntraMuscular once PRN Glucose LESS THAN 70 milligrams/deciliter  morphine  - Injectable 2 milliGRAM(s) IV Push every 4 hours PRN Moderate Pain (4 - 6)      Care Discussed with Consultants/Other Providers [ ] YES  [ ] NO

## 2018-04-18 NOTE — PROGRESS NOTE ADULT - SUBJECTIVE AND OBJECTIVE BOX
Patient is a 78y old  Female who presents with a chief complaint of rectal bleeding (13 Apr 2018 17:01)      INTERVAL HISTORY: feels ok  	  MEDICATIONS:  carvedilol 12.5 milliGRAM(s) Oral every 12 hours  lisinopril 5 milliGRAM(s) Oral daily        PHYSICAL EXAM:  T(C): 36.4 (04-18-18 @ 05:08), Max: 36.9 (04-17-18 @ 16:32)  HR: 65 (04-18-18 @ 14:26) (65 - 73)  BP: 125/88 (04-18-18 @ 14:26) (110/60 - 145/67)  RR: 18 (04-18-18 @ 14:26) (18 - 18)  SpO2: 97% (04-18-18 @ 14:26) (95% - 97%)  Wt(kg): --  I&O's Summary    17 Apr 2018 07:01  -  18 Apr 2018 07:00  --------------------------------------------------------  IN: 1380 mL / OUT: 900 mL / NET: 480 mL    18 Apr 2018 07:01  -  18 Apr 2018 14:40  --------------------------------------------------------  IN: 360 mL / OUT: 0 mL / NET: 360 mL          Appearance: Normal	  HEENT:    PERRL, EOMI	  Cardiovascular:  S1 S2, No JVD  Respiratory: Lungs clear to auscultation	  Psychiatry: Alert  Gastrointestinal:  Soft, Non-tender, + BS	  Skin: No rashes, No cyanosis  Extremities:  No edema of LE            04-18    137  |  101  |  6<L>  ----------------------------<  126<H>  3.8   |  25  |  0.63    Ca    8.6      18 Apr 2018 07:08          Labs, imaging and telemetry personally reviewed      ASSESSMENT/PLAN: 	  Assessment and Plan:   · Assessment		  came in with [facility], CT shows rectal mass most likely malignancy     Problem/Plan - 1:  ·  Problem: Gastrointestinal hemorrhage, unspecified gastrointestinal hemorrhage type.  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  Plan: seen by surgery  - per onc/surg outpt follow up     Problem/Plan - 3:  ·  Problem: Coronary artery disease involving native coronary artery of native heart without angina pectoris.  Plan: stable  hold off on asa and Plavix,   Discussed with pts daughter, she confirms that pt has known CAD but has never been revascularized, no history of stent.   Given no stent or history of MI, will d/c Plavix to reduce incidence of bleed and continue with ASA 81mg   TTE unremarkable  NM stress test from last year with small area of mild ischemia - medically managed     Problem/Plan - 4:  ·  Problem: Diabetes.  Plan: hold off on oral meds  FSBS with [facility] brandon.           Florentino Feldman DO [facility]  Cardiovascular Medicine  [phone]

## 2018-04-18 NOTE — PROGRESS NOTE ADULT - ATTENDING COMMENTS
send tumour marker, will hold off on onchology eval till the biopsy of the mass is done
onchology consult called
GI plan for flex sig as out pt for repeat biopsy, plan for d/c home
GI plan for flex sig as out pt for repeat biopsy, plan for d/c home
as per GI , he thinks it is adenoCa, however he will speak to path , pt. is medically stable to be d/c home and f/u with onchology as out pt.
as per onchology can be d/c and follow as out pt. or if she stays in hospital , then consider repeat biopsy by GI: will d/w GI
pt. can be d/c to [facility] , f/u with onchology as out pt. can be started on percocet and laxative for pain control
plan for d/c to [facility] in am

## 2018-04-18 NOTE — PROGRESS NOTE ADULT - SUBJECTIVE AND OBJECTIVE BOX
[id number]   78yFemale followed for:  No Known Allergies    PAST MEDICAL & SURGICAL HISTORY:  Gangrene of foot  Coronary artery disease involving native coronary artery of native heart without angina pectoris  Status post above knee amputation of right lower extremity    FAMILY HISTORY:  No pertinent family history in first degree relatives    MEDICATIONS  (STANDING):  aspirin  chewable 81 milliGRAM(s) Oral daily  atorvastatin 40 milliGRAM(s) Oral at bedtime  carvedilol 12.5 milliGRAM(s) Oral every 12 hours  dextrose 5%. 1000 milliLiter(s) (50 mL/Hr) IV Continuous <Continuous>  dextrose 50% Injectable 12.5 Gram(s) IV Push once  dextrose 50% Injectable 25 Gram(s) IV Push once  dextrose 50% Injectable 25 Gram(s) IV Push once  docusate sodium 100 milliGRAM(s) Oral two times a day  hydrocortisone 2.5% Rectal Cream 1 Application(s) Rectal daily  insulin lispro (HumaLOG) corrective regimen sliding scale   SubCutaneous three times a day before meals  insulin lispro (HumaLOG) corrective regimen sliding scale   SubCutaneous at bedtime  lisinopril 5 milliGRAM(s) Oral daily  pantoprazole  Injectable 40 milliGRAM(s) IV Push daily  polyethylene glycol 3350 17 Gram(s) Oral daily  senna 2 Tablet(s) Oral at bedtime  sodium chloride 0.9%. 1000 milliLiter(s) (75 mL/Hr) IV Continuous <Continuous>    MEDICATIONS  (PRN):  dextrose Gel 1 Dose(s) Oral once PRN Blood Glucose LESS THAN 70 milliGRAM(s)/deciliter  glucagon  Injectable 1 milliGRAM(s) IntraMuscular once PRN Glucose LESS THAN 70 milligrams/deciliter  morphine  - Injectable 2 milliGRAM(s) IV Push every 4 hours PRN Moderate Pain (4 - 6)      Vital Signs Last 24 Hrs  T(C): 36.4 (18 Apr 2018 05:08), Max: 37.6 (17 Apr 2018 12:05)  T(F): 97.6 (18 Apr 2018 05:08), Max: 99.7 (17 Apr 2018 12:05)  HR: 69 (18 Apr 2018 05:08) (68 - 73)  BP: 145/67 (18 Apr 2018 05:08) (110/60 - 145/67)  BP(mean): --  RR: 18 (18 Apr 2018 05:08) (18 - 18)  SpO2: 95% (18 Apr 2018 05:08) (95% - 95%)  nc/at  s1s2  cta  soft, nt, nd no guarding or rebound  no c/c/e      04-18    137  |  101  |  6<L>  ----------------------------<  126<H>  3.8   |  25  |  0.63    Ca    8.6      18 Apr 2018 07:08

## 2018-04-18 NOTE — PROGRESS NOTE ADULT - PROBLEM SELECTOR PROBLEM 1
Gastrointestinal hemorrhage, unspecified gastrointestinal hemorrhage type

## 2018-04-18 NOTE — PROGRESS NOTE ADULT - PROBLEM SELECTOR PLAN 4
hold off on oral meds  FSBS with RIVER[facility] ss

## 2018-04-18 NOTE — PROGRESS NOTE ADULT - PROBLEM SELECTOR PLAN 1
monitor H/H   s/p transfusion   Dr. Swartz (GI ) , s/p colonoscopy with biopsy
monitor H/H   s/p transfusion   Dr. Swartz (GI ) consulted: scheduled for colonoscopy

## 2018-04-18 NOTE — PROGRESS NOTE ADULT - PROBLEM SELECTOR PLAN 2
seen by surgery  -GI w/u with biopsy for now
seen by surgery  -GI w/u with biopsy for now: biopsy results are not complete  seen by onchology: either can be d/c home and follow as out pt,. or will need repeat biopsy as result is not consistent
seen by surgery  -GI w/u with biopsy for now

## 2018-04-19 VITALS — HEART RATE: 70 BPM | SYSTOLIC BLOOD PRESSURE: 140 MMHG | DIASTOLIC BLOOD PRESSURE: 80 MMHG

## 2018-04-19 DIAGNOSIS — D50.0 IRON DEFICIENCY ANEMIA SECONDARY TO BLOOD LOSS (CHRONIC): ICD-10-CM

## 2018-04-19 LAB
GLUCOSE BLDC GLUCOMTR-MCNC: 118 MG/DL — HIGH (ref 70–99)
GLUCOSE BLDC GLUCOMTR-MCNC: 155 MG/DL — HIGH (ref 70–99)

## 2018-04-19 RX ORDER — OXYCODONE AND ACETAMINOPHEN 5; 325 MG/1; MG/1
1 TABLET ORAL EVERY 6 HOURS
Qty: 0 | Refills: 0 | Status: DISCONTINUED | OUTPATIENT
Start: 2018-04-19 | End: 2018-04-19

## 2018-04-19 RX ORDER — LISINOPRIL 2.5 MG/1
1 TABLET ORAL
Qty: 0 | Refills: 0 | COMMUNITY
Start: 2018-04-19

## 2018-04-19 RX ORDER — ATORVASTATIN CALCIUM 80 MG/1
1 TABLET, FILM COATED ORAL
Qty: 0 | Refills: 0 | COMMUNITY
Start: 2018-04-19

## 2018-04-19 RX ORDER — DOCUSATE SODIUM 100 MG
1 CAPSULE ORAL
Qty: 0 | Refills: 0 | COMMUNITY
Start: 2018-04-19

## 2018-04-19 RX ORDER — CLOPIDOGREL BISULFATE 75 MG/1
1 TABLET, FILM COATED ORAL
Qty: 0 | Refills: 0 | COMMUNITY

## 2018-04-19 RX ORDER — ASPIRIN/CALCIUM CARB/MAGNESIUM 324 MG
1 TABLET ORAL
Qty: 0 | Refills: 0 | COMMUNITY
Start: 2018-04-19

## 2018-04-19 RX ORDER — ATORVASTATIN CALCIUM 80 MG/1
1 TABLET, FILM COATED ORAL
Qty: 0 | Refills: 0 | COMMUNITY

## 2018-04-19 RX ORDER — CARVEDILOL PHOSPHATE 80 MG/1
1 CAPSULE, EXTENDED RELEASE ORAL
Qty: 0 | Refills: 0 | COMMUNITY
Start: 2018-04-19

## 2018-04-19 RX ORDER — POLYETHYLENE GLYCOL 3350 17 G/17G
17 POWDER, FOR SOLUTION ORAL
Qty: 0 | Refills: 0 | COMMUNITY
Start: 2018-04-19

## 2018-04-19 RX ORDER — DOCUSATE SODIUM 100 MG
1 CAPSULE ORAL
Qty: 0 | Refills: 0 | COMMUNITY

## 2018-04-19 RX ORDER — NITROGLYCERIN 6.5 MG
1 CAPSULE, EXTENDED RELEASE ORAL
Qty: 0 | Refills: 0 | COMMUNITY

## 2018-04-19 RX ORDER — LISINOPRIL 2.5 MG/1
1 TABLET ORAL
Qty: 0 | Refills: 0 | COMMUNITY

## 2018-04-19 RX ORDER — HYDROCORTISONE 1 %
1 OINTMENT (GRAM) TOPICAL
Qty: 0 | Refills: 0 | COMMUNITY
Start: 2018-04-19

## 2018-04-19 RX ORDER — HYDROCORTISONE 1 %
1 OINTMENT (GRAM) TOPICAL
Qty: 0 | Refills: 0 | COMMUNITY

## 2018-04-19 RX ADMIN — Medication 1: at 13:23

## 2018-04-19 RX ADMIN — CARVEDILOL PHOSPHATE 12.5 MILLIGRAM(S): 80 CAPSULE, EXTENDED RELEASE ORAL at 06:01

## 2018-04-19 RX ADMIN — Medication 1 APPLICATION(S): at 11:16

## 2018-04-19 RX ADMIN — MORPHINE SULFATE 2 MILLIGRAM(S): 50 CAPSULE, EXTENDED RELEASE ORAL at 06:01

## 2018-04-19 RX ADMIN — OXYCODONE AND ACETAMINOPHEN 1 TABLET(S): 5; 325 TABLET ORAL at 13:23

## 2018-04-19 RX ADMIN — Medication 100 MILLIGRAM(S): at 06:01

## 2018-04-19 RX ADMIN — MORPHINE SULFATE 2 MILLIGRAM(S): 50 CAPSULE, EXTENDED RELEASE ORAL at 07:13

## 2018-04-19 RX ADMIN — OXYCODONE AND ACETAMINOPHEN 1 TABLET(S): 5; 325 TABLET ORAL at 14:28

## 2018-04-19 RX ADMIN — PANTOPRAZOLE SODIUM 40 MILLIGRAM(S): 20 TABLET, DELAYED RELEASE ORAL at 11:16

## 2018-04-19 RX ADMIN — MORPHINE SULFATE 2 MILLIGRAM(S): 50 CAPSULE, EXTENDED RELEASE ORAL at 00:10

## 2018-04-19 RX ADMIN — LISINOPRIL 5 MILLIGRAM(S): 2.5 TABLET ORAL at 06:01

## 2018-04-19 RX ADMIN — Medication 81 MILLIGRAM(S): at 11:16

## 2018-04-19 NOTE — PROGRESS NOTE ADULT - PROVIDER SPECIALTY LIST ADULT
Cardiology
Colorectal Surgery
Gastroenterology
Internal Medicine

## 2018-04-19 NOTE — PROGRESS NOTE ADULT - SUBJECTIVE AND OBJECTIVE BOX
DINO [facility]:[id number],   78yFemale followed for:  No Known Allergies    PAST MEDICAL & SURGICAL HISTORY:  Gangrene of foot  Coronary artery disease involving native coronary artery of native heart without angina pectoris  Status post above knee amputation of right lower extremity    FAMILY HISTORY:  No pertinent family history in first degree relatives    MEDICATIONS  (STANDING):  aspirin  chewable 81 milliGRAM(s) Oral daily  atorvastatin 40 milliGRAM(s) Oral at bedtime  carvedilol 12.5 milliGRAM(s) Oral every 12 hours  dextrose 5%. 1000 milliLiter(s) (50 mL/Hr) IV Continuous <Continuous>  dextrose 50% Injectable 12.5 Gram(s) IV Push once  dextrose 50% Injectable 25 Gram(s) IV Push once  dextrose 50% Injectable 25 Gram(s) IV Push once  docusate sodium 100 milliGRAM(s) Oral two times a day  hydrocortisone 2.5% Rectal Cream 1 Application(s) Rectal daily  insulin lispro (HumaLOG) corrective regimen sliding scale   SubCutaneous three times a day before meals  insulin lispro (HumaLOG) corrective regimen sliding scale   SubCutaneous at bedtime  lisinopril 5 milliGRAM(s) Oral daily  pantoprazole  Injectable 40 milliGRAM(s) IV Push daily  polyethylene glycol 3350 17 Gram(s) Oral daily  senna 2 Tablet(s) Oral at bedtime  sodium chloride 0.9%. 1000 milliLiter(s) (75 mL/Hr) IV Continuous <Continuous>    MEDICATIONS  (PRN):  dextrose Gel 1 Dose(s) Oral once PRN Blood Glucose LESS THAN 70 milliGRAM(s)/deciliter  glucagon  Injectable 1 milliGRAM(s) IntraMuscular once PRN Glucose LESS THAN 70 milligrams/deciliter  morphine  - Injectable 2 milliGRAM(s) IV Push every 4 hours PRN Moderate Pain (4 - 6)      Vital Signs Last 24 Hrs  T(C): 36.8 (19 Apr 2018 05:56), Max: 36.8 (18 Apr 2018 23:43)  T(F): 98.3 (19 Apr 2018 05:56), Max: 98.3 (19 Apr 2018 05:56)  HR: 96 (18 Apr 2018 23:43) (65 - 96)  BP: 149/61 (19 Apr 2018 05:56) (125/88 - 151/77)  BP(mean): --  RR: 18 (19 Apr 2018 05:56) (18 - 18)  SpO2: 97% (19 Apr 2018 05:56) (97% - 98%)  nc/at  s1s2  cta  soft, nt, nd no guarding or rebound  no c/c/e      04-18    137  |  101  |  6<L>  ----------------------------<  126<H>  3.8   |  25  |  0.63    Ca    8.6      18 Apr 2018 07:08

## 2018-04-19 NOTE — DIETITIAN INITIAL EVALUATION ADULT. - ENERGY NEEDS
HT 5'3"  WT   124.3    BVC=334 lbs +/- 10%   BMI=22 HT 5'3"  WT   124.3    GTD=735 lbs +/- 10%   BMI=22  Chart review: 77 yo female HTN, DM, CAD, PVD, s/p RLE amputation. Admitted with  abdominal pain x 2 weeks worsening in the last few days;  endorses dark red to black stools; sharp pain, 10/10 at its worst, currently 1/10 in severity.  pain most severe at anus.  took tylenol at 6 am.  dark stools have been going on for a few months.  Endoscop showed large rectal mass, m

## 2018-04-19 NOTE — PROGRESS NOTE ADULT - SUBJECTIVE AND OBJECTIVE BOX
Patient is a 78y old  Female who presents with a chief complaint of rectal bleeding (13 Apr 2018 17:01)      INTERVAL HISTORY: feels well, awaiting discharge  	  MEDICATIONS:  carvedilol 12.5 milliGRAM(s) Oral every 12 hours  lisinopril 5 milliGRAM(s) Oral daily        PHYSICAL EXAM:  T(C): 36.8 (04-19-18 @ 13:14), Max: 36.8 (04-18-18 @ 23:43)  HR: 70 (04-19-18 @ 14:31) (66 - 96)  BP: 140/80 (04-19-18 @ 14:31) (140/80 - 162/68)  RR: 18 (04-19-18 @ 13:14) (18 - 18)  SpO2: 97% (04-19-18 @ 13:14) (97% - 97%)  Wt(kg): --  I&O's Summary    18 Apr 2018 07:01  -  19 Apr 2018 07:00  --------------------------------------------------------  IN: 2520 mL / OUT: 600 mL / NET: 1920 mL    19 Apr 2018 07:01  -  19 Apr 2018 21:17  --------------------------------------------------------  IN: 860 mL / OUT: 450 mL / NET: 410 mL          Appearance: Normal	  HEENT:    PERRL, EOMI	  Cardiovascular:  S1 S2, No JVD  Respiratory: Lungs clear to auscultation	  Psychiatry: Alert  Gastrointestinal:  Soft, Non-tender, + BS	  Skin: No rashes, No cyanosis  Extremities:  No edema of LE            04-18    137  |  101  |  6<L>  ----------------------------<  126<H>  3.8   |  25  |  0.63    Ca    8.6      18 Apr 2018 07:08          Labs, imaging and telemetry personally reviewed      ASSESSMENT/PLAN: 	  Assessment and Plan:   · Assessment		  came in with [facility], CT shows rectal mass most likely malignancy     Problem/Plan - 1:  ·  Problem: Gastrointestinal hemorrhage, unspecified gastrointestinal hemorrhage type.  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  Plan: seen by surgery  - per onc/surg outpt follow up     Problem/Plan - 3:  ·  Problem: Coronary artery disease involving native coronary artery of native heart without angina pectoris.  Plan: stable  hold off on asa and Plavix,   Discussed with pts daughter, she confirms that pt has known CAD but has never been revascularized, no history of stent.   Given no stent or history of MI, will d/c Plavix to reduce incidence of bleed and continue with ASA 81mg   TTE unremarkable  NM stress test from last year with small area of mild ischemia - medically managed     Problem/Plan - 4:  ·  Problem: Diabetes.  Plan: hold off on oral meds  FSBS with RIVERA ss.        Outpt follow up        Florentino Feldman DO [facility]  Cardiovascular Medicine  [phone]

## 2018-04-19 NOTE — PROGRESS NOTE ADULT - ASSESSMENT
came in with [facility], CT shows rectal mass most likely malignancy
came in with [facility], CT shows rectal mass most likely malignancy
T3N1 rectal cancer by EUS, await path  medical and radiation oncology evaluations
came in with [facility], CT shows rectal mass most likely malignancy
discharge planning, will speak with path team. endoscopically no question re: adenocarcinoma
for colon, eus today.  discussed with family
heme onc will have biopsies reviewed. pt with clear adeno ca.  flex can be done as outpatient if needed.
path to be reviewed, may need additional bx if cancer not seen given high clinical suspicion for colon cancer
pt with rectal cancer.  biopsy not diagnositc.  will awiat heme onc input vs, repeat flex sig op for more tissue.
spoke to hematology fellow.  there was no adenoma on colonosopcy, it was invasive carcinoma, recommend second path opinion/ deeper sections.  can repeat flex sig as outpatient.  pt with localy adavanced disease, will need neoadjuvant therapy (not surgery) as primary treatment.
spoke to [facility], they will do deeper sections, would prefer not to do another procedure, clearly adenocarcioma.  can be done as optpatient if needed.
Assessment/Plan: 78y Female with a non-obstructing rectal mass. Per previous discussion, she would like to minimize suffering. Given EUS and colonoscopy results, patient will require neoadjuvant chemoradiation prior to surgery.    - Follow up biopsy results from colonoscopy  - Follow up Heme-onc recommendations regarding neoadjuvant therapy  - Can follow up with Dr. Sosa as an outpatient    Discussed with Dr. Sosa  Pager [phone]
came in with [facility], CT shows rectal mass most likely malignancy

## 2018-05-07 ENCOUNTER — OUTPATIENT (OUTPATIENT)
Dept: OUTPATIENT SERVICES | Facility: HOSPITAL | Age: 78
LOS: 1 days | Discharge: HOME | End: 2018-05-07

## 2018-05-07 DIAGNOSIS — C19 MALIGNANT NEOPLASM OF RECTOSIGMOID JUNCTION: ICD-10-CM

## 2018-05-07 DIAGNOSIS — Z89.611 ACQUIRED ABSENCE OF RIGHT LEG ABOVE KNEE: Chronic | ICD-10-CM

## 2018-05-08 ENCOUNTER — APPOINTMENT (OUTPATIENT)
Dept: HEMATOLOGY ONCOLOGY | Facility: CLINIC | Age: 78
End: 2018-05-08
Payer: MEDICARE

## 2018-05-08 ENCOUNTER — INPATIENT (INPATIENT)
Facility: HOSPITAL | Age: 78
LOS: 14 days | Discharge: NURSING, REHAB OR LONG-TERM CARE | End: 2018-05-23
Attending: HOSPITALIST | Admitting: HOSPITALIST
Payer: MEDICAID

## 2018-05-08 VITALS
RESPIRATION RATE: 16 BRPM | SYSTOLIC BLOOD PRESSURE: 106 MMHG | TEMPERATURE: 98 F | OXYGEN SATURATION: 100 % | HEART RATE: 63 BPM | DIASTOLIC BLOOD PRESSURE: 47 MMHG

## 2018-05-08 VITALS
SYSTOLIC BLOOD PRESSURE: 110 MMHG | OXYGEN SATURATION: 99 % | DIASTOLIC BLOOD PRESSURE: 70 MMHG | HEART RATE: 68 BPM | TEMPERATURE: 98.2 F | RESPIRATION RATE: 16 BRPM

## 2018-05-08 DIAGNOSIS — Z87.891 PERSONAL HISTORY OF NICOTINE DEPENDENCE: ICD-10-CM

## 2018-05-08 DIAGNOSIS — Z86.39 PERSONAL HISTORY OF OTHER ENDOCRINE, NUTRITIONAL AND METABOLIC DISEASE: ICD-10-CM

## 2018-05-08 DIAGNOSIS — R91.1 SOLITARY PULMONARY NODULE: ICD-10-CM

## 2018-05-08 DIAGNOSIS — R62.7 ADULT FAILURE TO THRIVE: ICD-10-CM

## 2018-05-08 DIAGNOSIS — Z87.11 PERSONAL HISTORY OF PEPTIC ULCER DISEASE: ICD-10-CM

## 2018-05-08 DIAGNOSIS — E78.5 HYPERLIPIDEMIA, UNSPECIFIED: ICD-10-CM

## 2018-05-08 DIAGNOSIS — E11.59 TYPE 2 DIABETES MELLITUS WITH OTHER CIRCULATORY COMPLICATIONS: ICD-10-CM

## 2018-05-08 DIAGNOSIS — Z86.79 PERSONAL HISTORY OF OTHER DISEASES OF THE CIRCULATORY SYSTEM: ICD-10-CM

## 2018-05-08 DIAGNOSIS — Z87.19 PERSONAL HISTORY OF OTHER DISEASES OF THE DIGESTIVE SYSTEM: ICD-10-CM

## 2018-05-08 DIAGNOSIS — K59.01 SLOW TRANSIT CONSTIPATION: ICD-10-CM

## 2018-05-08 DIAGNOSIS — Z89.611 ACQUIRED ABSENCE OF RIGHT LEG ABOVE KNEE: Chronic | ICD-10-CM

## 2018-05-08 DIAGNOSIS — K62.9 DISEASE OF ANUS AND RECTUM, UNSPECIFIED: ICD-10-CM

## 2018-05-08 DIAGNOSIS — Z29.9 ENCOUNTER FOR PROPHYLACTIC MEASURES, UNSPECIFIED: ICD-10-CM

## 2018-05-08 DIAGNOSIS — I96 GANGRENE, NOT ELSEWHERE CLASSIFIED: ICD-10-CM

## 2018-05-08 DIAGNOSIS — Z78.9 OTHER SPECIFIED HEALTH STATUS: ICD-10-CM

## 2018-05-08 DIAGNOSIS — C20 MALIGNANT NEOPLASM OF RECTUM: ICD-10-CM

## 2018-05-08 DIAGNOSIS — I25.10 ATHEROSCLEROTIC HEART DISEASE OF NATIVE CORONARY ARTERY WITHOUT ANGINA PECTORIS: ICD-10-CM

## 2018-05-08 LAB
ALBUMIN SERPL ELPH-MCNC: 3 G/DL — LOW (ref 3.3–5)
ALP SERPL-CCNC: 84 U/L — SIGNIFICANT CHANGE UP (ref 40–120)
ALT FLD-CCNC: 10 U/L — SIGNIFICANT CHANGE UP (ref 4–33)
APTT BLD: 33.3 SEC — SIGNIFICANT CHANGE UP (ref 27.5–37.4)
AST SERPL-CCNC: 13 U/L — SIGNIFICANT CHANGE UP (ref 4–32)
BASOPHILS # BLD AUTO: 0.01 K/UL — SIGNIFICANT CHANGE UP (ref 0–0.2)
BASOPHILS NFR BLD AUTO: 0.1 % — SIGNIFICANT CHANGE UP (ref 0–2)
BILIRUB SERPL-MCNC: 0.3 MG/DL — SIGNIFICANT CHANGE UP (ref 0.2–1.2)
BUN SERPL-MCNC: 16 MG/DL — SIGNIFICANT CHANGE UP (ref 7–23)
CALCIUM SERPL-MCNC: 8.6 MG/DL — SIGNIFICANT CHANGE UP (ref 8.4–10.5)
CHLORIDE SERPL-SCNC: 93 MMOL/L — LOW (ref 98–107)
CO2 SERPL-SCNC: 27 MMOL/L — SIGNIFICANT CHANGE UP (ref 22–31)
CREAT SERPL-MCNC: 0.7 MG/DL — SIGNIFICANT CHANGE UP (ref 0.5–1.3)
EOSINOPHIL # BLD AUTO: 0.21 K/UL — SIGNIFICANT CHANGE UP (ref 0–0.5)
EOSINOPHIL NFR BLD AUTO: 2.1 % — SIGNIFICANT CHANGE UP (ref 0–6)
GLUCOSE SERPL-MCNC: 111 MG/DL — HIGH (ref 70–99)
HCT VFR BLD CALC: 29.5 % — LOW (ref 34.5–45)
HGB BLD-MCNC: 9.3 G/DL — LOW (ref 11.5–15.5)
IMM GRANULOCYTES # BLD AUTO: 0.05 # — SIGNIFICANT CHANGE UP
IMM GRANULOCYTES NFR BLD AUTO: 0.5 % — SIGNIFICANT CHANGE UP (ref 0–1.5)
INR BLD: 1.04 — SIGNIFICANT CHANGE UP (ref 0.88–1.17)
LYMPHOCYTES # BLD AUTO: 1.73 K/UL — SIGNIFICANT CHANGE UP (ref 1–3.3)
LYMPHOCYTES # BLD AUTO: 16.9 % — SIGNIFICANT CHANGE UP (ref 13–44)
MAGNESIUM SERPL-MCNC: 2.5 MG/DL — SIGNIFICANT CHANGE UP (ref 1.6–2.6)
MCHC RBC-ENTMCNC: 28.5 PG — SIGNIFICANT CHANGE UP (ref 27–34)
MCHC RBC-ENTMCNC: 31.5 % — LOW (ref 32–36)
MCV RBC AUTO: 90.5 FL — SIGNIFICANT CHANGE UP (ref 80–100)
MONOCYTES # BLD AUTO: 0.76 K/UL — SIGNIFICANT CHANGE UP (ref 0–0.9)
MONOCYTES NFR BLD AUTO: 7.4 % — SIGNIFICANT CHANGE UP (ref 2–14)
NEUTROPHILS # BLD AUTO: 7.47 K/UL — HIGH (ref 1.8–7.4)
NEUTROPHILS NFR BLD AUTO: 73 % — SIGNIFICANT CHANGE UP (ref 43–77)
NRBC # FLD: 0 — SIGNIFICANT CHANGE UP
PHOSPHATE SERPL-MCNC: 3 MG/DL — SIGNIFICANT CHANGE UP (ref 2.5–4.5)
PLATELET # BLD AUTO: 380 K/UL — SIGNIFICANT CHANGE UP (ref 150–400)
PMV BLD: 9.1 FL — SIGNIFICANT CHANGE UP (ref 7–13)
POTASSIUM SERPL-MCNC: 4.5 MMOL/L — SIGNIFICANT CHANGE UP (ref 3.5–5.3)
POTASSIUM SERPL-SCNC: 4.5 MMOL/L — SIGNIFICANT CHANGE UP (ref 3.5–5.3)
PROT SERPL-MCNC: 6.5 G/DL — SIGNIFICANT CHANGE UP (ref 6–8.3)
PROTHROM AB SERPL-ACNC: 12 SEC — SIGNIFICANT CHANGE UP (ref 9.8–13.1)
RBC # BLD: 3.26 M/UL — LOW (ref 3.8–5.2)
RBC # FLD: 15.2 % — HIGH (ref 10.3–14.5)
SODIUM SERPL-SCNC: 129 MMOL/L — LOW (ref 135–145)
WBC # BLD: 10.23 K/UL — SIGNIFICANT CHANGE UP (ref 3.8–10.5)
WBC # FLD AUTO: 10.23 K/UL — SIGNIFICANT CHANGE UP (ref 3.8–10.5)

## 2018-05-08 PROCEDURE — 99205 OFFICE O/P NEW HI 60 MIN: CPT

## 2018-05-08 PROCEDURE — 99223 1ST HOSP IP/OBS HIGH 75: CPT

## 2018-05-08 RX ORDER — OXYCODONE HYDROCHLORIDE 10 MG/1
10 TABLET, FILM COATED, EXTENDED RELEASE ORAL
Refills: 0 | Status: DISCONTINUED | COMMUNITY
End: 2018-05-08

## 2018-05-08 RX ORDER — DEXTROSE 50 % IN WATER 50 %
15 SYRINGE (ML) INTRAVENOUS ONCE
Qty: 0 | Refills: 0 | Status: DISCONTINUED | OUTPATIENT
Start: 2018-05-08 | End: 2018-05-23

## 2018-05-08 RX ORDER — FERROUS SULFATE 325(65) MG
325 TABLET ORAL DAILY
Qty: 0 | Refills: 0 | Status: DISCONTINUED | OUTPATIENT
Start: 2018-05-08 | End: 2018-05-23

## 2018-05-08 RX ORDER — SULFAMETHOXAZOLE AND TRIMETHOPRIM 800; 160 MG/1; MG/1
800-160 TABLET ORAL TWICE DAILY
Qty: 28 | Refills: 2 | Status: DISCONTINUED | COMMUNITY
Start: 2017-09-06 | End: 2018-05-08

## 2018-05-08 RX ORDER — INSULIN LISPRO 100/ML
VIAL (ML) SUBCUTANEOUS AT BEDTIME
Qty: 0 | Refills: 0 | Status: DISCONTINUED | OUTPATIENT
Start: 2018-05-08 | End: 2018-05-23

## 2018-05-08 RX ORDER — AMOXICILLIN AND CLAVULANATE POTASSIUM 875; 125 MG/1; 1/1
875-125 TABLET, FILM COATED ORAL
Refills: 0 | Status: DISCONTINUED | COMMUNITY
End: 2018-05-08

## 2018-05-08 RX ORDER — FOLIC ACID 0.8 MG
1 TABLET ORAL DAILY
Qty: 0 | Refills: 0 | Status: DISCONTINUED | OUTPATIENT
Start: 2018-05-08 | End: 2018-05-23

## 2018-05-08 RX ORDER — FAMOTIDINE 10 MG/ML
20 INJECTION INTRAVENOUS DAILY
Qty: 0 | Refills: 0 | Status: DISCONTINUED | OUTPATIENT
Start: 2018-05-08 | End: 2018-05-23

## 2018-05-08 RX ORDER — OXYCODONE HYDROCHLORIDE 5 MG/1
5 CAPSULE ORAL
Refills: 0 | Status: DISCONTINUED | COMMUNITY
End: 2018-05-08

## 2018-05-08 RX ORDER — GLUCAGON INJECTION, SOLUTION 0.5 MG/.1ML
1 INJECTION, SOLUTION SUBCUTANEOUS ONCE
Qty: 0 | Refills: 0 | Status: DISCONTINUED | OUTPATIENT
Start: 2018-05-08 | End: 2018-05-23

## 2018-05-08 RX ORDER — ONDANSETRON 8 MG/1
4 TABLET, FILM COATED ORAL EVERY 6 HOURS
Qty: 0 | Refills: 0 | Status: DISCONTINUED | OUTPATIENT
Start: 2018-05-08 | End: 2018-05-23

## 2018-05-08 RX ORDER — FOLIC ACID 1 MG/1
1 TABLET ORAL
Refills: 0 | Status: DISCONTINUED | COMMUNITY
End: 2018-05-08

## 2018-05-08 RX ORDER — HYDROCORTISONE 1 %
1 OINTMENT (GRAM) TOPICAL DAILY
Qty: 0 | Refills: 0 | Status: DISCONTINUED | OUTPATIENT
Start: 2018-05-08 | End: 2018-05-09

## 2018-05-08 RX ORDER — MORPHINE SULFATE 50 MG/1
4 CAPSULE, EXTENDED RELEASE ORAL EVERY 4 HOURS
Qty: 0 | Refills: 0 | Status: DISCONTINUED | OUTPATIENT
Start: 2018-05-08 | End: 2018-05-10

## 2018-05-08 RX ORDER — CHLORHEXIDINE GLUCONATE 4 %
325 (65 FE) LIQUID (ML) TOPICAL
Refills: 0 | Status: DISCONTINUED | COMMUNITY
End: 2018-05-08

## 2018-05-08 RX ORDER — SODIUM CHLORIDE 9 MG/ML
2000 INJECTION, SOLUTION INTRAVENOUS ONCE
Qty: 0 | Refills: 0 | Status: COMPLETED | OUTPATIENT
Start: 2018-05-08 | End: 2018-05-08

## 2018-05-08 RX ORDER — ACETAMINOPHEN 500 MG
650 TABLET ORAL EVERY 6 HOURS
Qty: 0 | Refills: 0 | Status: DISCONTINUED | OUTPATIENT
Start: 2018-05-08 | End: 2018-05-23

## 2018-05-08 RX ORDER — ENOXAPARIN SODIUM 100 MG/ML
40 INJECTION SUBCUTANEOUS EVERY 24 HOURS
Qty: 0 | Refills: 0 | Status: DISCONTINUED | OUTPATIENT
Start: 2018-05-08 | End: 2018-05-21

## 2018-05-08 RX ORDER — DOCUSATE SODIUM 100 MG
100 CAPSULE ORAL
Qty: 0 | Refills: 0 | Status: DISCONTINUED | OUTPATIENT
Start: 2018-05-08 | End: 2018-05-23

## 2018-05-08 RX ORDER — SODIUM CHLORIDE 9 MG/ML
1000 INJECTION, SOLUTION INTRAVENOUS
Qty: 0 | Refills: 0 | Status: DISCONTINUED | OUTPATIENT
Start: 2018-05-08 | End: 2018-05-23

## 2018-05-08 RX ORDER — DEXTROSE 50 % IN WATER 50 %
25 SYRINGE (ML) INTRAVENOUS ONCE
Qty: 0 | Refills: 0 | Status: DISCONTINUED | OUTPATIENT
Start: 2018-05-08 | End: 2018-05-23

## 2018-05-08 RX ORDER — MORPHINE SULFATE 50 MG/1
4 CAPSULE, EXTENDED RELEASE ORAL ONCE
Qty: 0 | Refills: 0 | Status: DISCONTINUED | OUTPATIENT
Start: 2018-05-08 | End: 2018-05-08

## 2018-05-08 RX ORDER — SENNA PLUS 8.6 MG/1
2 TABLET ORAL AT BEDTIME
Qty: 0 | Refills: 0 | Status: DISCONTINUED | OUTPATIENT
Start: 2018-05-08 | End: 2018-05-23

## 2018-05-08 RX ORDER — GABAPENTIN 600 MG/1
600 TABLET, COATED ORAL
Refills: 0 | Status: DISCONTINUED | COMMUNITY
End: 2018-05-08

## 2018-05-08 RX ORDER — ATORVASTATIN CALCIUM 80 MG/1
40 TABLET, FILM COATED ORAL AT BEDTIME
Qty: 0 | Refills: 0 | Status: DISCONTINUED | OUTPATIENT
Start: 2018-05-08 | End: 2018-05-23

## 2018-05-08 RX ORDER — MORPHINE SULFATE 50 MG/1
2 CAPSULE, EXTENDED RELEASE ORAL EVERY 4 HOURS
Qty: 0 | Refills: 0 | Status: DISCONTINUED | OUTPATIENT
Start: 2018-05-08 | End: 2018-05-10

## 2018-05-08 RX ORDER — OMEPRAZOLE MAGNESIUM 20 MG/1
20 TABLET, DELAYED RELEASE ORAL
Refills: 0 | Status: DISCONTINUED | COMMUNITY
End: 2018-05-08

## 2018-05-08 RX ORDER — MORPHINE SULFATE 50 MG/1
2 CAPSULE, EXTENDED RELEASE ORAL ONCE
Qty: 0 | Refills: 0 | Status: DISCONTINUED | OUTPATIENT
Start: 2018-05-08 | End: 2018-05-08

## 2018-05-08 RX ORDER — POLYETHYLENE GLYCOL 3350 17 G/17G
17 POWDER, FOR SOLUTION ORAL DAILY
Qty: 0 | Refills: 0 | Status: DISCONTINUED | OUTPATIENT
Start: 2018-05-08 | End: 2018-05-14

## 2018-05-08 RX ORDER — CARVEDILOL PHOSPHATE 80 MG/1
12.5 CAPSULE, EXTENDED RELEASE ORAL EVERY 12 HOURS
Qty: 0 | Refills: 0 | Status: DISCONTINUED | OUTPATIENT
Start: 2018-05-08 | End: 2018-05-23

## 2018-05-08 RX ORDER — DEXTROSE 50 % IN WATER 50 %
12.5 SYRINGE (ML) INTRAVENOUS ONCE
Qty: 0 | Refills: 0 | Status: DISCONTINUED | OUTPATIENT
Start: 2018-05-08 | End: 2018-05-23

## 2018-05-08 RX ORDER — ATORVASTATIN CALCIUM 40 MG/1
40 TABLET, FILM COATED ORAL
Refills: 0 | Status: DISCONTINUED | COMMUNITY
End: 2018-05-08

## 2018-05-08 RX ORDER — LISINOPRIL 2.5 MG/1
5 TABLET ORAL DAILY
Qty: 0 | Refills: 0 | Status: DISCONTINUED | OUTPATIENT
Start: 2018-05-08 | End: 2018-05-23

## 2018-05-08 RX ORDER — SODIUM CHLORIDE 9 MG/ML
1000 INJECTION INTRAMUSCULAR; INTRAVENOUS; SUBCUTANEOUS
Qty: 0 | Refills: 0 | Status: DISCONTINUED | OUTPATIENT
Start: 2018-05-08 | End: 2018-05-13

## 2018-05-08 RX ADMIN — SODIUM CHLORIDE 100 MILLILITER(S): 9 INJECTION INTRAMUSCULAR; INTRAVENOUS; SUBCUTANEOUS at 23:47

## 2018-05-08 RX ADMIN — ATORVASTATIN CALCIUM 40 MILLIGRAM(S): 80 TABLET, FILM COATED ORAL at 23:32

## 2018-05-08 RX ADMIN — MORPHINE SULFATE 2 MILLIGRAM(S): 50 CAPSULE, EXTENDED RELEASE ORAL at 21:07

## 2018-05-08 RX ADMIN — MORPHINE SULFATE 4 MILLIGRAM(S): 50 CAPSULE, EXTENDED RELEASE ORAL at 19:46

## 2018-05-08 RX ADMIN — MORPHINE SULFATE 4 MILLIGRAM(S): 50 CAPSULE, EXTENDED RELEASE ORAL at 20:24

## 2018-05-08 RX ADMIN — SODIUM CHLORIDE 2000 MILLILITER(S): 9 INJECTION, SOLUTION INTRAVENOUS at 17:32

## 2018-05-08 RX ADMIN — MORPHINE SULFATE 2 MILLIGRAM(S): 50 CAPSULE, EXTENDED RELEASE ORAL at 21:41

## 2018-05-08 NOTE — ED PROVIDER NOTE - OBJECTIVE STATEMENT
ekta providing translation.  coming in from [facility] office, .  patient with new diagnosis rectal mass.  not tolerating po at nursing home, emesis.  days of constipation followed by a day of multiple episodes of diarrhea. as per family patient with no change in mental status, is slightly more somnolent.

## 2018-05-08 NOTE — H&P ADULT - NSHPPHYSICALEXAM_GEN_ALL_CORE
Vital Signs Last 24 Hrs  T(C): 36.7 (08 May 2018 21:41), Max: 36.7 (08 May 2018 15:39)  T(F): 98.1 (08 May 2018 21:41), Max: 98.1 (08 May 2018 21:41)  HR: 88 (08 May 2018 21:41) (61 - 88)  BP: 158/63 (08 May 2018 21:41) (106/47 - 158/63)  BP(mean): --  RR: 18 (08 May 2018 21:41) (16 - 18)  SpO2: 92% (08 May 2018 21:41) (92% - 100%)

## 2018-05-08 NOTE — ED PROVIDER NOTE - MEDICAL DECISION MAKING DETAILS
Attending Note ([place]): patient with failure to thrive. recent diagnosis of rectal mass, biopsy non diagnostic.  not tolerating po.  admit.

## 2018-05-08 NOTE — H&P ADULT - HISTORY OF PRESENT ILLNESS
78  y.o. woman with history of PAD and rectal mass who was sent to the ER for evaluation of abdominal pain and rectal mass by her oncologist. Patient reports severe suprapubic pain, radiates to the rectum, aggravated with movement and palpation, alleviated with pain medications. Patient also reports nausea. As per daughter's daughter reports that the patient does not get out of bed. No other complaints at present.     I-stop Reference #: [id number]

## 2018-05-08 NOTE — H&P ADULT - ASSESSMENT
78 y.o. woman with multiple medical co-morbidities now with ongoing suprapubic pain likely secondary to rectal mass

## 2018-05-08 NOTE — H&P ADULT - LYMPHATIC
anterior cervical R/supraclavicular L/posterior cervical R/posterior cervical L/anterior cervical L/supraclavicular R

## 2018-05-08 NOTE — ED ADULT TRIAGE NOTE - CHIEF COMPLAINT QUOTE
Pt brought in for dehydration, nausea and diarrhea. Recently diagnosed with stage 3 rectal cancer. Has not started treatment yet.

## 2018-05-08 NOTE — H&P ADULT - RS GEN PE MLT RESP DETAILS PC
no subcutaneous emphysema/respirations non-labored/airway patent/breath sounds equal/good air movement/clear to auscultation bilaterally/no chest wall tenderness/no rhonchi/no intercostal retractions/no rales

## 2018-05-08 NOTE — H&P ADULT - PROBLEM SELECTOR PLAN 1
- Oncology evaluation in AM  - Pain control with tylenol and morphine  - Patient will likely require repeat Biopsy

## 2018-05-08 NOTE — ED ADULT NURSE REASSESSMENT NOTE - NS ED NURSE REASSESS COMMENT FT1
family at bedside, pt medicated for rectal pain, pt vitally stable, awake, in NAD, report given to RN on floor by day RN- waiting for transportation, will continue to monitor

## 2018-05-08 NOTE — H&P ADULT - PMH
Coronary artery disease involving native coronary artery of native heart without angina pectoris    Gangrene of foot    Rectal mass

## 2018-05-09 DIAGNOSIS — R53.81 OTHER MALAISE: ICD-10-CM

## 2018-05-09 DIAGNOSIS — R91.1 SOLITARY PULMONARY NODULE: ICD-10-CM

## 2018-05-09 DIAGNOSIS — R10.9 UNSPECIFIED ABDOMINAL PAIN: ICD-10-CM

## 2018-05-09 DIAGNOSIS — E43 UNSPECIFIED SEVERE PROTEIN-CALORIE MALNUTRITION: ICD-10-CM

## 2018-05-09 DIAGNOSIS — R11.2 NAUSEA WITH VOMITING, UNSPECIFIED: ICD-10-CM

## 2018-05-09 DIAGNOSIS — Z51.5 ENCOUNTER FOR PALLIATIVE CARE: ICD-10-CM

## 2018-05-09 LAB
BASOPHILS # BLD AUTO: 0.01 K/UL — SIGNIFICANT CHANGE UP (ref 0–0.2)
BASOPHILS NFR BLD AUTO: 0.1 % — SIGNIFICANT CHANGE UP (ref 0–2)
BUN SERPL-MCNC: 9 MG/DL — SIGNIFICANT CHANGE UP (ref 7–23)
CALCIUM SERPL-MCNC: 7.9 MG/DL — LOW (ref 8.4–10.5)
CHLORIDE SERPL-SCNC: 98 MMOL/L — SIGNIFICANT CHANGE UP (ref 98–107)
CO2 SERPL-SCNC: 24 MMOL/L — SIGNIFICANT CHANGE UP (ref 22–31)
CREAT SERPL-MCNC: 0.54 MG/DL — SIGNIFICANT CHANGE UP (ref 0.5–1.3)
EOSINOPHIL # BLD AUTO: 0.15 K/UL — SIGNIFICANT CHANGE UP (ref 0–0.5)
EOSINOPHIL NFR BLD AUTO: 1.4 % — SIGNIFICANT CHANGE UP (ref 0–6)
GLUCOSE BLDC GLUCOMTR-MCNC: 110 MG/DL — HIGH (ref 70–99)
GLUCOSE SERPL-MCNC: 90 MG/DL — SIGNIFICANT CHANGE UP (ref 70–99)
HBA1C BLD-MCNC: 6.6 % — HIGH (ref 4–5.6)
HCT VFR BLD CALC: 28.3 % — LOW (ref 34.5–45)
HGB BLD-MCNC: 9 G/DL — LOW (ref 11.5–15.5)
IMM GRANULOCYTES # BLD AUTO: 0.05 # — SIGNIFICANT CHANGE UP
IMM GRANULOCYTES NFR BLD AUTO: 0.5 % — SIGNIFICANT CHANGE UP (ref 0–1.5)
LYMPHOCYTES # BLD AUTO: 1.97 K/UL — SIGNIFICANT CHANGE UP (ref 1–3.3)
LYMPHOCYTES # BLD AUTO: 18.3 % — SIGNIFICANT CHANGE UP (ref 13–44)
MAGNESIUM SERPL-MCNC: 2 MG/DL — SIGNIFICANT CHANGE UP (ref 1.6–2.6)
MCHC RBC-ENTMCNC: 28.2 PG — SIGNIFICANT CHANGE UP (ref 27–34)
MCHC RBC-ENTMCNC: 31.8 % — LOW (ref 32–36)
MCV RBC AUTO: 88.7 FL — SIGNIFICANT CHANGE UP (ref 80–100)
MONOCYTES # BLD AUTO: 0.82 K/UL — SIGNIFICANT CHANGE UP (ref 0–0.9)
MONOCYTES NFR BLD AUTO: 7.6 % — SIGNIFICANT CHANGE UP (ref 2–14)
NEUTROPHILS # BLD AUTO: 7.74 K/UL — HIGH (ref 1.8–7.4)
NEUTROPHILS NFR BLD AUTO: 72.1 % — SIGNIFICANT CHANGE UP (ref 43–77)
NRBC # FLD: 0 — SIGNIFICANT CHANGE UP
PHOSPHATE SERPL-MCNC: 2.6 MG/DL — SIGNIFICANT CHANGE UP (ref 2.5–4.5)
PLATELET # BLD AUTO: 359 K/UL — SIGNIFICANT CHANGE UP (ref 150–400)
PMV BLD: 9.1 FL — SIGNIFICANT CHANGE UP (ref 7–13)
POTASSIUM SERPL-MCNC: 4 MMOL/L — SIGNIFICANT CHANGE UP (ref 3.5–5.3)
POTASSIUM SERPL-SCNC: 4 MMOL/L — SIGNIFICANT CHANGE UP (ref 3.5–5.3)
RBC # BLD: 3.19 M/UL — LOW (ref 3.8–5.2)
RBC # FLD: 15.3 % — HIGH (ref 10.3–14.5)
SODIUM SERPL-SCNC: 131 MMOL/L — LOW (ref 135–145)
WBC # BLD: 10.74 K/UL — HIGH (ref 3.8–10.5)
WBC # FLD AUTO: 10.74 K/UL — HIGH (ref 3.8–10.5)

## 2018-05-09 PROCEDURE — 99233 SBSQ HOSP IP/OBS HIGH 50: CPT

## 2018-05-09 PROCEDURE — 99223 1ST HOSP IP/OBS HIGH 75: CPT

## 2018-05-09 RX ORDER — MULTIVIT WITH MIN/MFOLATE/K2 340-15/3 G
200 POWDER (GRAM) ORAL ONCE
Qty: 0 | Refills: 0 | Status: COMPLETED | OUTPATIENT
Start: 2018-05-09 | End: 2018-05-09

## 2018-05-09 RX ADMIN — CARVEDILOL PHOSPHATE 12.5 MILLIGRAM(S): 80 CAPSULE, EXTENDED RELEASE ORAL at 17:07

## 2018-05-09 RX ADMIN — MORPHINE SULFATE 4 MILLIGRAM(S): 50 CAPSULE, EXTENDED RELEASE ORAL at 17:07

## 2018-05-09 RX ADMIN — LISINOPRIL 5 MILLIGRAM(S): 2.5 TABLET ORAL at 05:21

## 2018-05-09 RX ADMIN — MORPHINE SULFATE 4 MILLIGRAM(S): 50 CAPSULE, EXTENDED RELEASE ORAL at 21:42

## 2018-05-09 RX ADMIN — CARVEDILOL PHOSPHATE 12.5 MILLIGRAM(S): 80 CAPSULE, EXTENDED RELEASE ORAL at 05:21

## 2018-05-09 RX ADMIN — MORPHINE SULFATE 4 MILLIGRAM(S): 50 CAPSULE, EXTENDED RELEASE ORAL at 09:57

## 2018-05-09 RX ADMIN — MORPHINE SULFATE 4 MILLIGRAM(S): 50 CAPSULE, EXTENDED RELEASE ORAL at 17:40

## 2018-05-09 RX ADMIN — MORPHINE SULFATE 4 MILLIGRAM(S): 50 CAPSULE, EXTENDED RELEASE ORAL at 21:27

## 2018-05-09 RX ADMIN — ENOXAPARIN SODIUM 40 MILLIGRAM(S): 100 INJECTION SUBCUTANEOUS at 05:20

## 2018-05-09 RX ADMIN — Medication 1 MILLIGRAM(S): at 11:26

## 2018-05-09 RX ADMIN — Medication 100 MILLIGRAM(S): at 17:06

## 2018-05-09 RX ADMIN — ONDANSETRON 4 MILLIGRAM(S): 8 TABLET, FILM COATED ORAL at 21:38

## 2018-05-09 RX ADMIN — MORPHINE SULFATE 4 MILLIGRAM(S): 50 CAPSULE, EXTENDED RELEASE ORAL at 10:30

## 2018-05-09 RX ADMIN — ATORVASTATIN CALCIUM 40 MILLIGRAM(S): 80 TABLET, FILM COATED ORAL at 21:28

## 2018-05-09 RX ADMIN — MORPHINE SULFATE 4 MILLIGRAM(S): 50 CAPSULE, EXTENDED RELEASE ORAL at 04:10

## 2018-05-09 RX ADMIN — POLYETHYLENE GLYCOL 3350 17 GRAM(S): 17 POWDER, FOR SOLUTION ORAL at 11:26

## 2018-05-09 RX ADMIN — MORPHINE SULFATE 4 MILLIGRAM(S): 50 CAPSULE, EXTENDED RELEASE ORAL at 04:22

## 2018-05-09 RX ADMIN — FAMOTIDINE 20 MILLIGRAM(S): 10 INJECTION INTRAVENOUS at 11:26

## 2018-05-09 RX ADMIN — Medication 325 MILLIGRAM(S): at 11:26

## 2018-05-09 RX ADMIN — SENNA PLUS 2 TABLET(S): 8.6 TABLET ORAL at 21:28

## 2018-05-09 NOTE — CONSULT NOTE ADULT - PROBLEM SELECTOR RECOMMENDATION 9
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 Pt seen by Dr Okeefe as OP for initial visit yesterday and sent in to ED due to sx. Spoke with Dr Okeefe today regarding plan of care. She states pt needs tissue biopsy for definitive diagnosis before RT or chemo. If dx is as suspected will likely be candidate for palliative RT to the rectal mass and possibly oral chemo depending on discharge dispo and pt family goals. Prior to this hospital stay pt had been residing in NH, which would effect ability to provide chemo. In house onc following. Biopsy pending. RT consult once tissue diagnosis.

## 2018-05-09 NOTE — CONSULT NOTE ADULT - ASSESSMENT
79 y/o F, with PAD, CAD with recent admission to NS, with rectal pain, weight loss, s/p colonoscopy/lower EUS demonstrating rectal mass ( 10mm) -T3,N1,Mx s/p biopsy showing high grade dysplasia. Off note, staging CT chest showed right sided spiculated lesion ( 6mm),

## 2018-05-09 NOTE — PROGRESS NOTE ADULT - SUBJECTIVE AND OBJECTIVE BOX
Patient is a 78y old  Female who presents with a chief complaint of Abdominal pain (08 May 2018 22:37)      SUBJECTIVE / OVERNIGHT EVENTS:  no event    MEDICATIONS  (STANDING):  atorvastatin 40 milliGRAM(s) Oral at bedtime  carvedilol 12.5 milliGRAM(s) Oral every 12 hours  dextrose 5%. 1000 milliLiter(s) (50 mL/Hr) IV Continuous <Continuous>  dextrose 50% Injectable 12.5 Gram(s) IV Push once  dextrose 50% Injectable 25 Gram(s) IV Push once  dextrose 50% Injectable 25 Gram(s) IV Push once  docusate sodium 100 milliGRAM(s) Oral two times a day  enoxaparin Injectable 40 milliGRAM(s) SubCutaneous every 24 hours  famotidine    Tablet 20 milliGRAM(s) Oral daily  ferrous    sulfate 325 milliGRAM(s) Oral daily  folic acid 1 milliGRAM(s) Oral daily  insulin lispro (HumaLOG) corrective regimen sliding scale   SubCutaneous at bedtime  lisinopril 5 milliGRAM(s) Oral daily  polyethylene glycol 3350 17 Gram(s) Oral daily  senna 2 Tablet(s) Oral at bedtime  sodium chloride 0.9%. 1000 milliLiter(s) (100 mL/Hr) IV Continuous <Continuous>    MEDICATIONS  (PRN):  acetaminophen   Tablet. 650 milliGRAM(s) Oral every 6 hours PRN Mild Pain (1 - 3)  dextrose 40% Gel 15 Gram(s) Oral once PRN Blood Glucose LESS THAN 70 milliGRAM(s)/deciliter  glucagon  Injectable 1 milliGRAM(s) IntraMuscular once PRN Glucose LESS THAN 70 milligrams/deciliter  morphine  - Injectable 2 milliGRAM(s) IV Push every 4 hours PRN Moderate Pain (4 - 6)  morphine  - Injectable 4 milliGRAM(s) IV Push every 4 hours PRN Severe Pain (7 - 10)  ondansetron Injectable 4 milliGRAM(s) IV Push every 6 hours PRN Nausea      T(C): 36.7 (05-09-18 @ 13:14), Max: 36.7 (05-08-18 @ 15:39)  HR: 74 (05-09-18 @ 13:14) (61 - 90)  BP: 127/56 (05-09-18 @ 13:14) (106/47 - 173/74)  RR: 9 (05-09-18 @ 13:14) (9 - 18)  SpO2: 96% (05-09-18 @ 13:14) (92% - 100%)  CAPILLARY BLOOD GLUCOSE      POCT Blood Glucose.: 107 mg/dL (09 May 2018 12:27)  POCT Blood Glucose.: 110 mg/dL (09 May 2018 08:48)    I&O's Summary      PHYSICAL EXAM:  GENERAL: NAD  HEAD:  Atraumatic, Normocephalic  EYES: conjunctiva and sclera clear  NECK: Supple, No JVD  CHEST/LUNG: Clear to auscultation bilaterally; No wheeze  HEART: s1 s2, regular rhythm and rate   ABDOMEN: Soft, Nontender, Nondistended; Bowel sounds present  EXTREMITIES:  2+ Peripheral Pulses, No clubbing, cyanosis, or edema  PSYCH: AAOx3, calm   NEUROLOGY: non-focal  SKIN: No rashes or lesions    LABS:                        9.0    10.74 )-----------( 359      ( 09 May 2018 06:52 )             28.3     05-09    131<L>  |  98  |  9   ----------------------------<  90  4.0   |  24  |  0.54    Ca    7.9<L>      09 May 2018 06:52  Phos  2.6     05-09  Mg     2.0     05-09    TPro  6.5  /  Alb  3.0<L>  /  TBili  0.3  /  DBili  x   /  AST  13  /  ALT  10  /  AlkPhos  84  05-08    PT/INR - ( 08 May 2018 17:14 )   PT: 12.0 SEC;   INR: 1.04          PTT - ( 08 May 2018 17:14 )  PTT:33.3 SEC          RADIOLOGY & ADDITIONAL TESTS:    Imaging Personally Reviewed:    Consultant(s) Notes Reviewed:      Care Discussed with Consultants/Other Providers:

## 2018-05-09 NOTE — CONSULT NOTE ADULT - SUBJECTIVE AND OBJECTIVE BOX
HPI:  78  y.o. woman with history of PAD and rectal mass who was sent to the ER for evaluation of abdominal pain and rectal mass by her oncologist. Patient reports severe suprapubic pain, radiates to the rectum, aggravated with movement and palpation, alleviated with pain medications. Patient also reports nausea. As per daughter's daughter reports that the patient does not get out of bed. No other complaints at present.     I-stop Reference #: [id number] (08 May 2018 22:37)    PAST MEDICAL & SURGICAL HISTORY:  Rectal mass  Gangrene of foot  Coronary artery disease involving native coronary artery of native heart without angina pectoris  Status post above knee amputation of right lower extremity    FAMILY HISTORY:  No pertinent family history in first degree relatives    Social History  MEDICATIONS  (STANDING):  atorvastatin 40 milliGRAM(s) Oral at bedtime  carvedilol 12.5 milliGRAM(s) Oral every 12 hours  dextrose 5%. 1000 milliLiter(s) (50 mL/Hr) IV Continuous <Continuous>  dextrose 50% Injectable 12.5 Gram(s) IV Push once  dextrose 50% Injectable 25 Gram(s) IV Push once  dextrose 50% Injectable 25 Gram(s) IV Push once  docusate sodium 100 milliGRAM(s) Oral two times a day  enoxaparin Injectable 40 milliGRAM(s) SubCutaneous every 24 hours  famotidine    Tablet 20 milliGRAM(s) Oral daily  ferrous    sulfate 325 milliGRAM(s) Oral daily  folic acid 1 milliGRAM(s) Oral daily  insulin lispro (HumaLOG) corrective regimen sliding scale   SubCutaneous at bedtime  lisinopril 5 milliGRAM(s) Oral daily  polyethylene glycol 3350 17 Gram(s) Oral daily  senna 2 Tablet(s) Oral at bedtime  sodium chloride 0.9%. 1000 milliLiter(s) (100 mL/Hr) IV Continuous <Continuous>    MEDICATIONS  (PRN):  acetaminophen   Tablet. 650 milliGRAM(s) Oral every 6 hours PRN Mild Pain (1 - 3)  dextrose 40% Gel 15 Gram(s) Oral once PRN Blood Glucose LESS THAN 70 milliGRAM(s)/deciliter  glucagon  Injectable 1 milliGRAM(s) IntraMuscular once PRN Glucose LESS THAN 70 milligrams/deciliter  morphine  - Injectable 2 milliGRAM(s) IV Push every 4 hours PRN Moderate Pain (4 - 6)  morphine  - Injectable 4 milliGRAM(s) IV Push every 4 hours PRN Severe Pain (7 - 10)  ondansetron Injectable 4 milliGRAM(s) IV Push every 6 hours PRN Nausea    No Known Allergies    REVIEW OF SYSTEMS      General:	    Skin/Breast:  	  Ophthalmologic:  	  ENMT:	    Respiratory and Thorax:  	  Cardiovascular:	    Gastrointestinal:	    Genitourinary:	    Musculoskeletal:	    Neurological:	    Psychiatric:	    Hematology/Lymphatics:	    Endocrine:	    Allergic/Immunologic:	  Vital Signs Last 24 Hrs  T(C): 36.7 (09 May 2018 05:19), Max: 36.7 (08 May 2018 15:39)  T(F): 98.1 (09 May 2018 05:19), Max: 98.1 (08 May 2018 21:41)  HR: 87 (09 May 2018 05:19) (61 - 90)  BP: 165/69 (09 May 2018 05:19) (106/47 - 173/74)  BP(mean): --  RR: 18 (09 May 2018 05:19) (16 - 18)  SpO2: 93% (09 May 2018 05:19) (92% - 100%)  Physical Examination  Constitutional: Alert, oriented X3  Eyes: Normal  ENMT: normal  Neck: No lymphadneopathy  Respiratory: b/l clear to auscultation  Cardiovascular: S1,S2,M0  Abdomen: Soft, non tender, BS present  Gastrointestinal: soft, non tender, BS present  Extremities: soft, no edema  Neurological: intact  Skin: no petechiae  Musculoskeletal: normal  Psychiatric: normal                            9.0    10.74 )-----------( 359      ( 09 May 2018 06:52 )             28.3     05-09    131<L>  |  98  |  9   ----------------------------<  90  4.0   |  24  |  0.54    Ca    7.9<L>      09 May 2018 06:52  Phos  2.6     05-09  Mg     2.0     05-09    TPro  6.5  /  Alb  3.0<L>  /  TBili  0.3  /  DBili  x   /  AST  13  /  ALT  10  /  AlkPhos  84  05-08      Radiology  Assessment and Plan HPI:    Ms. Norton is a 78  y.o. woman with history of CAD, PAD s/p RLE amputation, who was recently admitted to [facility] with rectal pain and BRBPR s/p colonoscopy/ lower EUS- which showed rectal mass (T3,N1,MX), s/p biopsy which showed high grade dysplasia. Patient was d/c home with f/u at [facility] for consideration of treatment.     Since d/c patient has been feeling increase suprapubic pain, rectal pain, occasional blood per rectum. Pain is not relieved by pain medication. She also has increased nausea and decreased appetite. She has weight loss.     PAST MEDICAL & SURGICAL HISTORY:  Rectal mass  Gangrene of foot  Coronary artery disease involving native coronary artery of native heart without angina pectoris  Status post above knee amputation of right lower extremity    FAMILY HISTORY:  No pertinent family history in first degree relatives    Social History  MEDICATIONS  (STANDING):  atorvastatin 40 milliGRAM(s) Oral at bedtime  carvedilol 12.5 milliGRAM(s) Oral every 12 hours  dextrose 5%. 1000 milliLiter(s) (50 mL/Hr) IV Continuous <Continuous>  dextrose 50% Injectable 12.5 Gram(s) IV Push once  dextrose 50% Injectable 25 Gram(s) IV Push once  dextrose 50% Injectable 25 Gram(s) IV Push once  docusate sodium 100 milliGRAM(s) Oral two times a day  enoxaparin Injectable 40 milliGRAM(s) SubCutaneous every 24 hours  famotidine    Tablet 20 milliGRAM(s) Oral daily  ferrous    sulfate 325 milliGRAM(s) Oral daily  folic acid 1 milliGRAM(s) Oral daily  insulin lispro (HumaLOG) corrective regimen sliding scale   SubCutaneous at bedtime  lisinopril 5 milliGRAM(s) Oral daily  polyethylene glycol 3350 17 Gram(s) Oral daily  senna 2 Tablet(s) Oral at bedtime  sodium chloride 0.9%. 1000 milliLiter(s) (100 mL/Hr) IV Continuous <Continuous>    MEDICATIONS  (PRN):  acetaminophen   Tablet. 650 milliGRAM(s) Oral every 6 hours PRN Mild Pain (1 - 3)  dextrose 40% Gel 15 Gram(s) Oral once PRN Blood Glucose LESS THAN 70 milliGRAM(s)/deciliter  glucagon  Injectable 1 milliGRAM(s) IntraMuscular once PRN Glucose LESS THAN 70 milligrams/deciliter  morphine  - Injectable 2 milliGRAM(s) IV Push every 4 hours PRN Moderate Pain (4 - 6)  morphine  - Injectable 4 milliGRAM(s) IV Push every 4 hours PRN Severe Pain (7 - 10)  ondansetron Injectable 4 milliGRAM(s) IV Push every 6 hours PRN Nausea    No Known Allergies    REVIEW OF SYSTEMS    10 points ROS is negative except for the ones in HPI.  		  Vital Signs Last 24 Hrs  T(C): 36.7 (09 May 2018 05:19), Max: 36.7 (08 May 2018 15:39)  T(F): 98.1 (09 May 2018 05:19), Max: 98.1 (08 May 2018 21:41)  HR: 87 (09 May 2018 05:19) (61 - 90)  BP: 165/69 (09 May 2018 05:19) (106/47 - 173/74)  BP(mean): --  RR: 18 (09 May 2018 05:19) (16 - 18)  SpO2: 93% (09 May 2018 05:19) (92% - 100%)  Physical Examination  Constitutional: Alert, oriented X3  Eyes: Normal  ENMT: normal  Neck: No lymphadneopathy  Respiratory: b/l clear to auscultation  Cardiovascular: S1,S2,M0  Abdomen: Soft, non tender, BS present  Gastrointestinal: soft, non tender, BS present  Extremities: soft, no edema  Neurological: intact  Skin: no petechiae  Musculoskeletal: normal  Psychiatric: normal                            9.0    10.74 )-----------( 359      ( 09 May 2018 06:52 )             28.3     05-09    131<L>  |  98  |  9   ----------------------------<  90  4.0   |  24  |  0.54    Ca    7.9<L>      09 May 2018 06:52  Phos  2.6     05-09  Mg     2.0     05-09    TPro  6.5  /  Alb  3.0<L>  /  TBili  0.3  /  DBili  x   /  AST  13  /  ALT  10  /  AlkPhos  84  05-08      Radiology  Assessment and Plan HPI:    Ms. Norton is a 78  y.o. woman with history of CAD, PAD s/p RLE amputation, who was recently admitted to [facility] with rectal pain and BRBPR s/p colonoscopy/ lower EUS- which showed rectal mass (T3,N1,MX), s/p biopsy which showed high grade dysplasia. Patient was d/c home with f/u at [facility] for consideration of treatment.     Since d/c patient has been feeling increase suprapubic pain, rectal pain, occasional blood per rectum. Pain is not relieved by pain medication. She also has increased nausea and decreased appetite. She has weight loss.     PAST MEDICAL & SURGICAL HISTORY:  Rectal mass  Gangrene of foot  Coronary artery disease involving native coronary artery of native heart without angina pectoris  Status post above knee amputation of right lower extremity    FAMILY HISTORY:  No pertinent family history in first degree relatives    Social History  MEDICATIONS  (STANDING):  atorvastatin 40 milliGRAM(s) Oral at bedtime  carvedilol 12.5 milliGRAM(s) Oral every 12 hours  dextrose 5%. 1000 milliLiter(s) (50 mL/Hr) IV Continuous <Continuous>  dextrose 50% Injectable 12.5 Gram(s) IV Push once  dextrose 50% Injectable 25 Gram(s) IV Push once  dextrose 50% Injectable 25 Gram(s) IV Push once  docusate sodium 100 milliGRAM(s) Oral two times a day  enoxaparin Injectable 40 milliGRAM(s) SubCutaneous every 24 hours  famotidine    Tablet 20 milliGRAM(s) Oral daily  ferrous    sulfate 325 milliGRAM(s) Oral daily  folic acid 1 milliGRAM(s) Oral daily  insulin lispro (HumaLOG) corrective regimen sliding scale   SubCutaneous at bedtime  lisinopril 5 milliGRAM(s) Oral daily  polyethylene glycol 3350 17 Gram(s) Oral daily  senna 2 Tablet(s) Oral at bedtime  sodium chloride 0.9%. 1000 milliLiter(s) (100 mL/Hr) IV Continuous <Continuous>    MEDICATIONS  (PRN):  acetaminophen   Tablet. 650 milliGRAM(s) Oral every 6 hours PRN Mild Pain (1 - 3)  dextrose 40% Gel 15 Gram(s) Oral once PRN Blood Glucose LESS THAN 70 milliGRAM(s)/deciliter  glucagon  Injectable 1 milliGRAM(s) IntraMuscular once PRN Glucose LESS THAN 70 milligrams/deciliter  morphine  - Injectable 2 milliGRAM(s) IV Push every 4 hours PRN Moderate Pain (4 - 6)  morphine  - Injectable 4 milliGRAM(s) IV Push every 4 hours PRN Severe Pain (7 - 10)  ondansetron Injectable 4 milliGRAM(s) IV Push every 6 hours PRN Nausea    No Known Allergies    REVIEW OF SYSTEMS    10 points ROS is negative except for the ones in HPI.  		  Vital Signs Last 24 Hrs  T(C): 36.7 (09 May 2018 05:19), Max: 36.7 (08 May 2018 15:39)  T(F): 98.1 (09 May 2018 05:19), Max: 98.1 (08 May 2018 21:41)  HR: 87 (09 May 2018 05:19) (61 - 90)  BP: 165/69 (09 May 2018 05:19) (106/47 - 173/74)  BP(mean): --  RR: 18 (09 May 2018 05:19) (16 - 18)  SpO2: 93% (09 May 2018 05:19) (92% - 100%)    Physical Examination  Constitutional: Alert, oriented X3  Eyes: Normal  ENMT: normal  Neck: No lymphadneopathy  Respiratory: b/l clear to auscultation  Cardiovascular: S1,S2,M0  Abdomen: Soft, suprapubic tenderness+, BS present  Gastrointestinal: soft, non tender, BS present  Extremities: soft, no edema  Neurological: intact  Skin: no petechiae  Musculoskeletal: normal  Psychiatric: normal                            9.0    10.74 )-----------( 359      ( 09 May 2018 06:52 )             28.3     05-09    131<L>  |  98  |  9   ----------------------------<  90  4.0   |  24  |  0.54    Ca    7.9<L>      09 May 2018 06:52  Phos  2.6     05-09  Mg     2.0     05-09    TPro  6.5  /  Alb  3.0<L>  /  TBili  0.3  /  DBili  x   /  AST  13  /  ALT  10  /  AlkPhos  84  05-08      Radiology  Assessment and Plan

## 2018-05-09 NOTE — CONSULT NOTE ADULT - PROBLEM SELECTOR RECOMMENDATION 6
Met with pt at bedside utilizing NS  services.  ID [id number]. Pt states that she is unaware of her full medical diagnosis, prognosis, treatment plan and does not wish to know full details. She states that she prefers that her 2 children Vineet and Louann receive information and make decisions for her. When asked what her fears and concerns are, she states that she is concerned "to live longer than she thought." When I inquired as to what this means, she told me that her children want her to get treatment, but that she knows that there are many obstacles to her getting treatment. Will reach out to children today. Emotional support provided. Will continue to follow.

## 2018-05-09 NOTE — CONSULT NOTE ADULT - ASSESSMENT
locally advanced rectal cancer.  will give mag citrate and fleet enema, for colon tomorrow.  d/w daugther at bedise.not obstructed on exam.

## 2018-05-09 NOTE — CONSULT NOTE ADULT - PROBLEM SELECTOR RECOMMENDATION 2
Has been taking Percocet 5/325mg one tab Q 4 hours as OP with some relief of pain. As IP she has been receiving Morphine 4mg IV Q4 hours PRN. Received x2 in past 24 hours. Rec continue w/ IV morphine for time being.

## 2018-05-09 NOTE — CONSULT NOTE ADULT - SUBJECTIVE AND OBJECTIVE BOX
HPI:  78  y.o. woman with history of PAD and rectal mass who was sent to the ER for evaluation of abdominal pain and rectal mass by her oncologist. Patient reports severe suprapubic pain, radiates to the rectum, aggravated with movement and palpation, alleviated with pain medications. Patient also reports nausea. As per daughter's daughter reports that the patient does not get out of bed. No other complaints at present.       PERTINENT PMH REVIEWED:  [x ] YES [ ] NO           SOCIAL HISTORY:  Significant other/partner:  [ ] YES  [x ] NO- 40+ years            Children:  [x ] YES-1 son, 1 dtr  [ ] NO                   Pentecostal/Spirituality: Decline  Subtance hx:  [ ] YES   [x ] NO           Tobacco hx:  [x ] YES  [ ] NO             Alcohol hx: [ ] YES  [x ] NO       Home Opioid hx:  [ x] YES  [ ] NO iSTOP #[id number]  Living Situation: [ ] Home  [x ] Long term care-[facility] since April 19th, 2018  [ ] Rehab    REFERRALS:   [ ] Chaplaincy  [ ] Hospice  [ ] Child Life  [ ] Social Work  [ ] Case management [ ] Holistic Therapy     FAMILY HISTORY:  No pertinent family history in first degree relatives    [x ] Family history non contributory     BASELINE ADLs (prior to admission):  Independent [ ] moderately [ ] fully   Dependent   [x ] moderately [ ] fully    ADVANCE DIRECTIVES:  [ ] YES [x ] NO   DNR [ ] YES [x ] NO                      MOLST  [ ] YES [x ] NO    Living Will  [ ] YES [x ] NO    Health Care Proxy [ ] YES  [x ] NO      [x ] Surrogate son Vineet and daughter Louann                Phone#:    Allergies    No Known Allergies    Intolerances        MEDICATIONS  (STANDING):  atorvastatin 40 milliGRAM(s) Oral at bedtime  carvedilol 12.5 milliGRAM(s) Oral every 12 hours  dextrose 5%. 1000 milliLiter(s) (50 mL/Hr) IV Continuous <Continuous>  dextrose 50% Injectable 12.5 Gram(s) IV Push once  dextrose 50% Injectable 25 Gram(s) IV Push once  dextrose 50% Injectable 25 Gram(s) IV Push once  docusate sodium 100 milliGRAM(s) Oral two times a day  enoxaparin Injectable 40 milliGRAM(s) SubCutaneous every 24 hours  famotidine    Tablet 20 milliGRAM(s) Oral daily  ferrous    sulfate 325 milliGRAM(s) Oral daily  folic acid 1 milliGRAM(s) Oral daily  insulin lispro (HumaLOG) corrective regimen sliding scale   SubCutaneous at bedtime  lisinopril 5 milliGRAM(s) Oral daily  polyethylene glycol 3350 17 Gram(s) Oral daily  senna 2 Tablet(s) Oral at bedtime  sodium chloride 0.9%. 1000 milliLiter(s) (100 mL/Hr) IV Continuous <Continuous>    MEDICATIONS  (PRN):  acetaminophen   Tablet. 650 milliGRAM(s) Oral every 6 hours PRN Mild Pain (1 - 3)  dextrose 40% Gel 15 Gram(s) Oral once PRN Blood Glucose LESS THAN 70 milliGRAM(s)/deciliter  glucagon  Injectable 1 milliGRAM(s) IntraMuscular once PRN Glucose LESS THAN 70 milligrams/deciliter  morphine  - Injectable 2 milliGRAM(s) IV Push every 4 hours PRN Moderate Pain (4 - 6)  morphine  - Injectable 4 milliGRAM(s) IV Push every 4 hours PRN Severe Pain (7 - 10)  ondansetron Injectable 4 milliGRAM(s) IV Push every 6 hours PRN Nausea      PRESENT SYMPTOMS:  Source: [ ] Patient   [ ] Family   [ ] Team     Pain: [ ] YES [ ] NO  OLDCARTS:     Dyspnea: [ ] YES [ ] NO   Anxiety: [ ] YES [ ] NO  Fatigue: [ ] YES [ ] NO   Nausea: [ ] YES [ ] NO  Loss of appetite: [ ] YES [ ] NO   Constipation: [ ] YES [ ] NO     Other Symptoms:  [ ] All other review of systems negative   [ ] Unable to obtain due to poor mentation     Karnofsky Performance Score/Palliative Performance Status Version 2:         %  Protein Calorie Malnutrition:  [ ] Mild   [ ] Moderate   [ ] Severe     Vital Signs Last 24 Hrs  T(C): 36.7 (09 May 2018 05:19), Max: 36.7 (08 May 2018 15:39)  T(F): 98.1 (09 May 2018 05:19), Max: 98.1 (08 May 2018 21:41)  HR: 90 (09 May 2018 09:53) (61 - 90)  BP: 159/78 (09 May 2018 09:53) (106/47 - 173/74)  BP(mean): --  RR: 18 (09 May 2018 09:53) (16 - 18)  SpO2: 97% (09 May 2018 09:53) (92% - 100%)    Physical Exam:    General: [ ] Alert,  A&O x     [ ] lethargic   [ ] Agitated   [ ] Cachexia   HEENT: [ ] Normal   [ ] Dry mouth   [ ] ET Tube    [ ] Trach   Lungs: [ ] Clear [ ] Rhonchi  [ ] Crackles [ ] Wheezing [ ] Tachypnea  [ ] Audible excessive secretions   Cardiovascular:  [ ] Regular rate and rhythm  [ ] Irregular [ ] Tachycardia   [ ] Bradycardia   Abdomen: [ ] Soft  [ ] Distended  [ ]  [ ] +BS  [ ] Non tender [ ] Tender  [ ]PEG   [ ] NGT   Last BM:     Genitourinary:  [ ] Normal [ ] Incontinent   [ ] Oliguria/Anuria   [ ] Saba  Musculoskeletal:  [ ] Normal   [ ] Generalized weakness  [ ] Bedbound   Neurological: [ ] No focal deficits  [ ] Cognitive impairment     Skin: [ ] Normal   [ ] Pressure ulcers     LABS:                        9.0    10.74 )-----------( 359      ( 09 May 2018 06:52 )             28.3     05-09    131<L>  |  98  |  9   ----------------------------<  90  4.0   |  24  |  0.54    Ca    7.9<L>      09 May 2018 06:52  Phos  2.6     05-09  Mg     2.0     05-09    TPro  6.5  /  Alb  3.0<L>  /  TBili  0.3  /  DBili  x   /  AST  13  /  ALT  10  /  AlkPhos  84  05-08    PT/INR - ( 08 May 2018 17:14 )   PT: 12.0 SEC;   INR: 1.04          PTT - ( 08 May 2018 17:14 )  PTT:33.3 SEC    I&O's Summary      RADIOLOGY & ADDITIONAL STUDIES: HPI:  78  y.o. woman with history of PAD and rectal mass who was sent to the ER for evaluation of abdominal pain and rectal mass by her oncologist. Patient reports severe suprapubic pain, radiates to the rectum, aggravated with movement and palpation, alleviated with pain medications. Patient also reports nausea. As per daughter's daughter reports that the patient does not get out of bed. No other complaints at present.       PERTINENT PMH REVIEWED:  [x ] YES [ ] NO           SOCIAL HISTORY:  Significant other/partner:  [ ] YES  [x ] NO- 40+ years            Children:  [x ] YES-1 son, 1 dtr  [ ] NO                   Orthodoxy/Spirituality: Decline  Subtance hx:  [ ] YES   [x ] NO           Tobacco hx:  [x ] YES  [ ] NO             Alcohol hx: [ ] YES  [x ] NO       Home Opioid hx:  [ x] YES  [ ] NO iSTOP #[id number]  Living Situation: [ ] Home  [x ] Long term care-[facility] since April 19th, 2018  [ ] Rehab    REFERRALS:   [ ] Chaplaincy  [ ] Hospice  [ ] Child Life  [ ] Social Work  [ ] Case management [ ] Holistic Therapy     FAMILY HISTORY:  No pertinent family history in first degree relatives    [x ] Family history non contributory     BASELINE ADLs (prior to admission):  Independent [ ] moderately [ ] fully   Dependent   [x ] moderately [ ] fully    ADVANCE DIRECTIVES:  [ ] YES [x ] NO   DNR [ ] YES [x ] NO                      MOLST  [ ] YES [x ] NO    Living Will  [ ] YES [x ] NO    Health Care Proxy [ ] YES  [x ] NO      [x ] Surrogate son Vineet [phone], [phone] and daughter Louann  [phone]        Allergies    No Known Allergies    Intolerances        MEDICATIONS  (STANDING):  atorvastatin 40 milliGRAM(s) Oral at bedtime  carvedilol 12.5 milliGRAM(s) Oral every 12 hours  dextrose 5%. 1000 milliLiter(s) (50 mL/Hr) IV Continuous <Continuous>  dextrose 50% Injectable 12.5 Gram(s) IV Push once  dextrose 50% Injectable 25 Gram(s) IV Push once  dextrose 50% Injectable 25 Gram(s) IV Push once  docusate sodium 100 milliGRAM(s) Oral two times a day  enoxaparin Injectable 40 milliGRAM(s) SubCutaneous every 24 hours  famotidine    Tablet 20 milliGRAM(s) Oral daily  ferrous    sulfate 325 milliGRAM(s) Oral daily  folic acid 1 milliGRAM(s) Oral daily  insulin lispro (HumaLOG) corrective regimen sliding scale   SubCutaneous at bedtime  lisinopril 5 milliGRAM(s) Oral daily  polyethylene glycol 3350 17 Gram(s) Oral daily  senna 2 Tablet(s) Oral at bedtime  sodium chloride 0.9%. 1000 milliLiter(s) (100 mL/Hr) IV Continuous <Continuous>    MEDICATIONS  (PRN):  acetaminophen   Tablet. 650 milliGRAM(s) Oral every 6 hours PRN Mild Pain (1 - 3)  dextrose 40% Gel 15 Gram(s) Oral once PRN Blood Glucose LESS THAN 70 milliGRAM(s)/deciliter  glucagon  Injectable 1 milliGRAM(s) IntraMuscular once PRN Glucose LESS THAN 70 milligrams/deciliter  morphine  - Injectable 2 milliGRAM(s) IV Push every 4 hours PRN Moderate Pain (4 - 6)  morphine  - Injectable 4 milliGRAM(s) IV Push every 4 hours PRN Severe Pain (7 - 10)  ondansetron Injectable 4 milliGRAM(s) IV Push every 6 hours PRN Nausea      PRESENT SYMPTOMS:  Source: [x ] Patient   [ ] Family   [ ] Team     Pain: [x ] YES [ ] NO  OLDCARTS: months, lower abdomen/rectum, aching, moderate to severe, relief with Percocet and IV morphine    Dyspnea: [ ] YES [x ] NO   Anxiety: [ ] YES [ x] NO  Fatigue: [x ] YES [ ] NO   Nausea: [x ] YES [ ] NO  Loss of appetite: [x ] YES [ ] NO   Constipation: [ ] YES [x ] NO     Other Symptoms:  [x ] All other review of systems negative   [ ] Unable to obtain due to poor mentation     Karnofsky Performance Score/Palliative Performance Status Version 2:  50       %  Protein Calorie Malnutrition:  [ ] Mild   [ ] Moderate   [ x] Severe     Vital Signs Last 24 Hrs  T(C): 36.7 (09 May 2018 05:19), Max: 36.7 (08 May 2018 15:39)  T(F): 98.1 (09 May 2018 05:19), Max: 98.1 (08 May 2018 21:41)  HR: 90 (09 May 2018 09:53) (61 - 90)  BP: 159/78 (09 May 2018 09:53) (106/47 - 173/74)  BP(mean): --  RR: 18 (09 May 2018 09:53) (16 - 18)  SpO2: 97% (09 May 2018 09:53) (92% - 100%)    Physical Exam:    General: [x] Alert,  A&O x3     [ ] lethargic   [ ] Agitated   [ ] Cachexia   HEENT: [x ] Normal   [ ] Dry mouth   [ ] ET Tube    [ ] Trach   Lungs: [x ] Clear [ ] Rhonchi  [ ] Crackles [ ] Wheezing [ ] Tachypnea  [ ] Audible excessive secretions   Cardiovascular:  [ x] Regular rate and rhythm  [ ] Irregular [ ] Tachycardia   [ ] Bradycardia   Abdomen: [x ] Soft  [ ] Distended    [x ] +BS  [ ] Non tender [x ] Tender  [ ]PEG   [ ] NGT   Last BM: 5/9    Genitourinary:  [x ] Normal [ ] Incontinent   [ ] Oliguria/Anuria   [ ] Saba  Musculoskeletal:  [ ] Normal   [ ] Generalized weakness  [x ] Bedbound [x] R AKA  Neurological: [x ] No focal deficits  [ ] Cognitive impairment     Skin: [x ] Normal   [ ] Pressure ulcers     LABS:                        9.0    10.74 )-----------( 359      ( 09 May 2018 06:52 )             28.3     05-09    131<L>  |  98  |  9   ----------------------------<  90  4.0   |  24  |  0.54    Ca    7.9<L>      09 May 2018 06:52  Phos  2.6     05-09  Mg     2.0     05-09    TPro  6.5  /  Alb  3.0<L>  /  TBili  0.3  /  DBili  x   /  AST  13  /  ALT  10  /  AlkPhos  84  05-08    PT/INR - ( 08 May 2018 17:14 )   PT: 12.0 SEC;   INR: 1.04          PTT - ( 08 May 2018 17:14 )  PTT:33.3 SEC    I&O's Summary      RADIOLOGY & ADDITIONAL STUDIES: none new

## 2018-05-09 NOTE — CONSULT NOTE ADULT - PROBLEM SELECTOR RECOMMENDATION 4
Pt has lost 10kg since April 10th, 2018. Current wt 45.7 kg. BMI 18.4. Albumin 3.  Reports dec appetite and dec PO intake. States she can usually tolerate Thai food, Advised that her family may bring in food if she prefers. Dietician duane pending.

## 2018-05-09 NOTE — CONSULT NOTE ADULT - PROBLEM SELECTOR RECOMMENDATION 3
-Decreased appetite secondary to rectal mass and pain.   -Nutritional supplement.   -Adequate pain control. Anti emetic.   -Palliative consult for pain control.     D/W team    Please page at [phone] with questions or concerns.

## 2018-05-09 NOTE — CONSULT NOTE ADULT - PROBLEM SELECTOR RECOMMENDATION 9
-lower EUS showed rectal mass, with atleast T3,N1,Mx disease-however biopsy showed high grade dysplasia.  -Will need GI evaluation for re-biopsy, as we don't have a definitive diagnosis.  -If repeat biopsy is positive for adenocarcinoma, will plan for outpatient chemo/XRT -lower EUS showed rectal mass, with atleast T3,N1,Mx disease-however biopsy showed high grade dysplasia.  -Will need GI evaluation for re-biopsy, as we don't have a definitive diagnosis. May also need colonoscopy to r/o obstruction.  -If repeat biopsy is positive for adenocarcinoma, will plan for outpatient chemo/XRT -lower EUS showed rectal mass, with atleast T3,N1,Mx disease-however biopsy showed high grade dysplasia.  -Will need GI evaluation for re-biopsy, as we don't have a definitive diagnosis.    -If repeat biopsy is positive for adenocarcinoma, will plan for outpatient chemo/XRT if PS allows vs short course RT followed by resection

## 2018-05-09 NOTE — CONSULT NOTE ADULT - ASSESSMENT
78y F hx PAD, gangrene sp R AKA, rectal mass concerning for neoplasm, repeat biopsy pending. Sent in by oncology for abd pain, NV.

## 2018-05-09 NOTE — CONSULT NOTE ADULT - SUBJECTIVE AND OBJECTIVE BOX
DINO [facility] [place]:[id number],   78yFemale followed for:  No Known Allergies    PAST MEDICAL & SURGICAL HISTORY:  Rectal mass  Gangrene of foot  Coronary artery disease involving native coronary artery of native heart without angina pectoris  Status post above knee amputation of right lower extremity    FAMILY HISTORY:  No pertinent family history in first degree relatives    MEDICATIONS  (STANDING):  atorvastatin 40 milliGRAM(s) Oral at bedtime  carvedilol 12.5 milliGRAM(s) Oral every 12 hours  dextrose 5%. 1000 milliLiter(s) (50 mL/Hr) IV Continuous <Continuous>  dextrose 50% Injectable 12.5 Gram(s) IV Push once  dextrose 50% Injectable 25 Gram(s) IV Push once  dextrose 50% Injectable 25 Gram(s) IV Push once  docusate sodium 100 milliGRAM(s) Oral two times a day  enoxaparin Injectable 40 milliGRAM(s) SubCutaneous every 24 hours  famotidine    Tablet 20 milliGRAM(s) Oral daily  ferrous    sulfate 325 milliGRAM(s) Oral daily  folic acid 1 milliGRAM(s) Oral daily  insulin lispro (HumaLOG) corrective regimen sliding scale   SubCutaneous at bedtime  lisinopril 5 milliGRAM(s) Oral daily  polyethylene glycol 3350 17 Gram(s) Oral daily  senna 2 Tablet(s) Oral at bedtime  sodium chloride 0.9%. 1000 milliLiter(s) (100 mL/Hr) IV Continuous <Continuous>    MEDICATIONS  (PRN):  acetaminophen   Tablet. 650 milliGRAM(s) Oral every 6 hours PRN Mild Pain (1 - 3)  dextrose 40% Gel 15 Gram(s) Oral once PRN Blood Glucose LESS THAN 70 milliGRAM(s)/deciliter  glucagon  Injectable 1 milliGRAM(s) IntraMuscular once PRN Glucose LESS THAN 70 milligrams/deciliter  morphine  - Injectable 2 milliGRAM(s) IV Push every 4 hours PRN Moderate Pain (4 - 6)  morphine  - Injectable 4 milliGRAM(s) IV Push every 4 hours PRN Severe Pain (7 - 10)  ondansetron Injectable 4 milliGRAM(s) IV Push every 6 hours PRN Nausea      Vital Signs Last 24 Hrs  T(C): 36.7 (09 May 2018 13:14), Max: 36.7 (08 May 2018 21:41)  T(F): 98 (09 May 2018 13:14), Max: 98.1 (08 May 2018 21:41)  HR: 74 (09 May 2018 13:14) (70 - 90)  BP: 127/56 (09 May 2018 13:14) (127/56 - 173/74)  BP(mean): --  RR: 9 (09 May 2018 13:14) (9 - 18)  SpO2: 96% (09 May 2018 13:14) (92% - 100%)  nc/at  s1s2  cta  soft, nt, nd no guarding or rebound  no c/c/e    CBC Full  -  ( 09 May 2018 06:52 )  WBC Count : 10.74 K/uL  Hemoglobin : 9.0 g/dL  Hematocrit : 28.3 %  Platelet Count - Automated : 359 K/uL  Mean Cell Volume : 88.7 fL  Mean Cell Hemoglobin : 28.2 pg  Mean Cell Hemoglobin Concentration : 31.8 %  Auto Neutrophil # : 7.74 K/uL  Auto Lymphocyte # : 1.97 K/uL  Auto Monocyte # : 0.82 K/uL  Auto Eosinophil # : 0.15 K/uL  Auto Basophil # : 0.01 K/uL  Auto Neutrophil % : 72.1 %  Auto Lymphocyte % : 18.3 %  Auto Monocyte % : 7.6 %  Auto Eosinophil % : 1.4 %  Auto Basophil % : 0.1 %    05-09    131<L>  |  98  |  9   ----------------------------<  90  4.0   |  24  |  0.54    Ca    7.9<L>      09 May 2018 06:52  Phos  2.6     05-09  Mg     2.0     05-09    TPro  6.5  /  Alb  3.0<L>  /  TBili  0.3  /  DBili  x   /  AST  13  /  ALT  10  /  AlkPhos  84  05-08    PT/INR - ( 08 May 2018 17:14 )   PT: 12.0 SEC;   INR: 1.04          PTT - ( 08 May 2018 17:14 )  PTT:33.3 SEC

## 2018-05-09 NOTE — PROGRESS NOTE ADULT - PROBLEM SELECTOR PLAN 1
appreciate Onc input. Like to have repeat bx to confirm dx  - Pain control with morphine  GI ( Dr. Swartz) called for repeat Biopsy

## 2018-05-09 NOTE — CONSULT NOTE ADULT - PROBLEM SELECTOR RECOMMENDATION 2
-Spiculated lesion in RUL ( 6mm).  - -Spiculated lesion in RUL ( 6mm).  -Outpt f/u with CT chest in 1-3 months.

## 2018-05-10 ENCOUNTER — RESULT REVIEW (OUTPATIENT)
Age: 78
End: 2018-05-10

## 2018-05-10 LAB
BUN SERPL-MCNC: 5 MG/DL — LOW (ref 7–23)
CALCIUM SERPL-MCNC: 8 MG/DL — LOW (ref 8.4–10.5)
CHLORIDE SERPL-SCNC: 93 MMOL/L — LOW (ref 98–107)
CO2 SERPL-SCNC: 22 MMOL/L — SIGNIFICANT CHANGE UP (ref 22–31)
CREAT SERPL-MCNC: 0.5 MG/DL — SIGNIFICANT CHANGE UP (ref 0.5–1.3)
GLUCOSE SERPL-MCNC: 111 MG/DL — HIGH (ref 70–99)
HCT VFR BLD CALC: 28.1 % — LOW (ref 34.5–45)
HGB BLD-MCNC: 9.2 G/DL — LOW (ref 11.5–15.5)
MCHC RBC-ENTMCNC: 28.8 PG — SIGNIFICANT CHANGE UP (ref 27–34)
MCHC RBC-ENTMCNC: 32.7 % — SIGNIFICANT CHANGE UP (ref 32–36)
MCV RBC AUTO: 87.8 FL — SIGNIFICANT CHANGE UP (ref 80–100)
NRBC # FLD: 0 — SIGNIFICANT CHANGE UP
PLATELET # BLD AUTO: 384 K/UL — SIGNIFICANT CHANGE UP (ref 150–400)
PMV BLD: 8.9 FL — SIGNIFICANT CHANGE UP (ref 7–13)
POTASSIUM SERPL-MCNC: 3.4 MMOL/L — LOW (ref 3.5–5.3)
POTASSIUM SERPL-SCNC: 3.4 MMOL/L — LOW (ref 3.5–5.3)
RBC # BLD: 3.2 M/UL — LOW (ref 3.8–5.2)
RBC # FLD: 15.2 % — HIGH (ref 10.3–14.5)
SODIUM SERPL-SCNC: 130 MMOL/L — LOW (ref 135–145)
WBC # BLD: 12.58 K/UL — HIGH (ref 3.8–10.5)
WBC # FLD AUTO: 12.58 K/UL — HIGH (ref 3.8–10.5)

## 2018-05-10 PROCEDURE — 88305 TISSUE EXAM BY PATHOLOGIST: CPT | Mod: 26

## 2018-05-10 PROCEDURE — 99233 SBSQ HOSP IP/OBS HIGH 50: CPT

## 2018-05-10 RX ORDER — MORPHINE SULFATE 50 MG/1
2 CAPSULE, EXTENDED RELEASE ORAL EVERY 4 HOURS
Qty: 0 | Refills: 0 | Status: DISCONTINUED | OUTPATIENT
Start: 2018-05-10 | End: 2018-05-11

## 2018-05-10 RX ORDER — MORPHINE SULFATE 50 MG/1
4 CAPSULE, EXTENDED RELEASE ORAL EVERY 4 HOURS
Qty: 0 | Refills: 0 | Status: DISCONTINUED | OUTPATIENT
Start: 2018-05-10 | End: 2018-05-11

## 2018-05-10 RX ADMIN — ATORVASTATIN CALCIUM 40 MILLIGRAM(S): 80 TABLET, FILM COATED ORAL at 22:29

## 2018-05-10 RX ADMIN — MORPHINE SULFATE 4 MILLIGRAM(S): 50 CAPSULE, EXTENDED RELEASE ORAL at 19:41

## 2018-05-10 RX ADMIN — MORPHINE SULFATE 4 MILLIGRAM(S): 50 CAPSULE, EXTENDED RELEASE ORAL at 19:26

## 2018-05-10 RX ADMIN — MORPHINE SULFATE 4 MILLIGRAM(S): 50 CAPSULE, EXTENDED RELEASE ORAL at 23:44

## 2018-05-10 RX ADMIN — MORPHINE SULFATE 4 MILLIGRAM(S): 50 CAPSULE, EXTENDED RELEASE ORAL at 08:41

## 2018-05-10 RX ADMIN — Medication 1 ENEMA: at 05:03

## 2018-05-10 RX ADMIN — CARVEDILOL PHOSPHATE 12.5 MILLIGRAM(S): 80 CAPSULE, EXTENDED RELEASE ORAL at 05:38

## 2018-05-10 RX ADMIN — SODIUM CHLORIDE 100 MILLILITER(S): 9 INJECTION INTRAMUSCULAR; INTRAVENOUS; SUBCUTANEOUS at 22:29

## 2018-05-10 RX ADMIN — Medication 100 MILLIGRAM(S): at 05:38

## 2018-05-10 RX ADMIN — CARVEDILOL PHOSPHATE 12.5 MILLIGRAM(S): 80 CAPSULE, EXTENDED RELEASE ORAL at 18:17

## 2018-05-10 RX ADMIN — SODIUM CHLORIDE 100 MILLILITER(S): 9 INJECTION INTRAMUSCULAR; INTRAVENOUS; SUBCUTANEOUS at 05:38

## 2018-05-10 RX ADMIN — MORPHINE SULFATE 4 MILLIGRAM(S): 50 CAPSULE, EXTENDED RELEASE ORAL at 05:02

## 2018-05-10 RX ADMIN — MORPHINE SULFATE 4 MILLIGRAM(S): 50 CAPSULE, EXTENDED RELEASE ORAL at 09:56

## 2018-05-10 RX ADMIN — MORPHINE SULFATE 4 MILLIGRAM(S): 50 CAPSULE, EXTENDED RELEASE ORAL at 05:18

## 2018-05-10 NOTE — DIETITIAN INITIAL EVALUATION ADULT. - OTHER INFO
Nutrition consult received for unintentional weight loss. 77y/o F with medical history of PAD and rectal mass. Met with patient and her daughter at bedside. Noted with decreased appetite and PO intake 0-25%. Per daughter, patient was consuming spoonful at a time of rice, cream of rice PTA. C/o some occasional difficulty swallowing also reported. Patient consumed PO supplement Ensure Ensure Clear 1x daily (200 kcals, 7g protein). Per daughter, patient lost weight over the years. Last admission 4/12/18 124.7lbs documented per chart and current dosing weight noted to be 100.7lbs (5/8/18). Significant weight loss >19% over 1 month.  Case discussed with ADS.

## 2018-05-10 NOTE — DIETITIAN INITIAL EVALUATION ADULT. - NS AS NUTRI INTERV MEALS SNACK
discussed with ADS, 1. Recommend Speech and swallow consult due to some swallowing difficulty reported. Recommend consistency per SLP recs.2. Recommend advance diet as medically feasible to Regular. Would deter additional restriction consistent carbohydrate HbA1c 6.6H (5/9/18) and given medical condition. 3. When on clear liquid diet, please order PO supplement Ensure Clear 2x daily (400 kcals, 14g protein). and when advanced to regular diet please provide Ensure Enlive 240mls 3x daily (1050kcal, 60g protein).

## 2018-05-10 NOTE — CHART NOTE - NSCHARTNOTEFT_GEN_A_CORE
NUTRITION SERVICES                                                                                  MALNUTRITION ALERT     Attention Health Care Provider: Upon nutritional assessment by the Registered Dietitian your patient was determined to meet criteria / has evidence of the following diagnosis/diagnoses:    [ ] Mild Protein Calorie Malnutrition   [ ] Moderate Protein Calorie Malnutrition   [X ] Severe Protein Calorie Malnutrition   [ ] Unspecified Protein Calorie Malnutrition   [X ] Underweight / BMI <19  [ ] Morbid Obesity / BMI >40          PLAN OF CARE: Refer to Initial Dietitian Evaluation or Nutrition Follow-Up Documentation for Nutritional Recommendations.

## 2018-05-10 NOTE — DIETITIAN INITIAL EVALUATION ADULT. - PHYSICAL APPEARANCE
underweight/Nutrition focused physical exam conducted - found signs of malnutrition [ ]absent [ X]present Subcutaneous fat loss: [Severe ] Orbital fat pads region, [Severe ]Buccal fat region, [ Severe]Triceps region,  [ Severe]Ribs region  Muscle wasting: [Severe ]Temples region, [Severe ]Clavicle region, [ Severe]Shoulder region, [Severe ]Scapula region, [Severe ]Interosseous region,  [Severe ]thigh region, [Severe ]Calf region

## 2018-05-10 NOTE — DIETITIAN INITIAL EVALUATION ADULT. - PERTINENT MEDS FT
Lovenox, Morphine, Lipitor, Coreg, Colace, Pepcid, Ferrous sulfate, Folic acid, Humalog, Lisinopril, Zofran, Miralax, Senna

## 2018-05-10 NOTE — DIETITIAN INITIAL EVALUATION ADULT. - PERTINENT LABORATORY DATA
05-10 Na130 mmol/L<L> Glu 111 mg/dL<H> K+ 3.4 mmol/L<L> Cr  0.50 mg/dL BUN 5 mg/dL<L> 05-09 Phos 2.6 mg/dL 05-08 Alb 3.0 g/dL<L> 05-09 [id number] 6.6 %<H>

## 2018-05-10 NOTE — PROGRESS NOTE ADULT - SUBJECTIVE AND OBJECTIVE BOX
DINO [facility] [place]:[id number],   78yFemale followed for:  No Known Allergies    PAST MEDICAL & SURGICAL HISTORY:  Rectal mass  Gangrene of foot  Coronary artery disease involving native coronary artery of native heart without angina pectoris  Status post above knee amputation of right lower extremity    FAMILY HISTORY:  No pertinent family history in first degree relatives    MEDICATIONS  (STANDING):  atorvastatin 40 milliGRAM(s) Oral at bedtime  carvedilol 12.5 milliGRAM(s) Oral every 12 hours  dextrose 5%. 1000 milliLiter(s) (50 mL/Hr) IV Continuous <Continuous>  dextrose 50% Injectable 12.5 Gram(s) IV Push once  dextrose 50% Injectable 25 Gram(s) IV Push once  dextrose 50% Injectable 25 Gram(s) IV Push once  docusate sodium 100 milliGRAM(s) Oral two times a day  enoxaparin Injectable 40 milliGRAM(s) SubCutaneous every 24 hours  famotidine    Tablet 20 milliGRAM(s) Oral daily  ferrous    sulfate 325 milliGRAM(s) Oral daily  folic acid 1 milliGRAM(s) Oral daily  insulin lispro (HumaLOG) corrective regimen sliding scale   SubCutaneous at bedtime  lisinopril 5 milliGRAM(s) Oral daily  polyethylene glycol 3350 17 Gram(s) Oral daily  senna 2 Tablet(s) Oral at bedtime  sodium chloride 0.9%. 1000 milliLiter(s) (100 mL/Hr) IV Continuous <Continuous>    MEDICATIONS  (PRN):  acetaminophen   Tablet. 650 milliGRAM(s) Oral every 6 hours PRN Mild Pain (1 - 3)  dextrose 40% Gel 15 Gram(s) Oral once PRN Blood Glucose LESS THAN 70 milliGRAM(s)/deciliter  glucagon  Injectable 1 milliGRAM(s) IntraMuscular once PRN Glucose LESS THAN 70 milligrams/deciliter  morphine  - Injectable 2 milliGRAM(s) IV Push every 4 hours PRN Moderate Pain (4 - 6)  morphine  - Injectable 4 milliGRAM(s) IV Push every 4 hours PRN Severe Pain (7 - 10)  ondansetron Injectable 4 milliGRAM(s) IV Push every 6 hours PRN Nausea      Vital Signs Last 24 Hrs  T(C): 37.7 (10 May 2018 05:30), Max: 37.7 (10 May 2018 05:30)  T(F): 99.9 (10 May 2018 05:30), Max: 99.9 (10 May 2018 05:30)  HR: 83 (10 May 2018 05:30) (74 - 83)  BP: 154/64 (10 May 2018 05:30) (127/56 - 159/73)  BP(mean): --  RR: 18 (10 May 2018 05:30) (9 - 18)  SpO2: 95% (10 May 2018 05:30) (94% - 96%)  nc/at  s1s2  cta  soft, nt, nd no guarding or rebound  no c/c/e    CBC Full  -  ( 10 May 2018 05:50 )  WBC Count : 12.58 K/uL  Hemoglobin : 9.2 g/dL  Hematocrit : 28.1 %  Platelet Count - Automated : 384 K/uL  Mean Cell Volume : 87.8 fL  Mean Cell Hemoglobin : 28.8 pg  Mean Cell Hemoglobin Concentration : 32.7 %  Auto Neutrophil # : x  Auto Lymphocyte # : x  Auto Monocyte # : x  Auto Eosinophil # : x  Auto Basophil # : x  Auto Neutrophil % : x  Auto Lymphocyte % : x  Auto Monocyte % : x  Auto Eosinophil % : x  Auto Basophil % : x    05-10    130<L>  |  93<L>  |  5<L>  ----------------------------<  111<H>  3.4<L>   |  22  |  0.50    Ca    8.0<L>      10 May 2018 05:50  Phos  2.6     05-09  Mg     2.0     05-09    TPro  6.5  /  Alb  3.0<L>  /  TBili  0.3  /  DBili  x   /  AST  13  /  ALT  10  /  AlkPhos  84  05-08    PT/INR - ( 08 May 2018 17:14 )   PT: 12.0 SEC;   INR: 1.04          PTT - ( 08 May 2018 17:14 )  PTT:33.3 SEC

## 2018-05-11 DIAGNOSIS — E87.1 HYPO-OSMOLALITY AND HYPONATREMIA: ICD-10-CM

## 2018-05-11 DIAGNOSIS — C20 MALIGNANT NEOPLASM OF RECTUM: ICD-10-CM

## 2018-05-11 LAB
BUN SERPL-MCNC: 6 MG/DL — LOW (ref 7–23)
CALCIUM SERPL-MCNC: 8 MG/DL — LOW (ref 8.4–10.5)
CHLORIDE SERPL-SCNC: 93 MMOL/L — LOW (ref 98–107)
CO2 SERPL-SCNC: 21 MMOL/L — LOW (ref 22–31)
CREAT SERPL-MCNC: 0.52 MG/DL — SIGNIFICANT CHANGE UP (ref 0.5–1.3)
GLUCOSE SERPL-MCNC: 106 MG/DL — HIGH (ref 70–99)
HCT VFR BLD CALC: 28.2 % — LOW (ref 34.5–45)
HGB BLD-MCNC: 9.1 G/DL — LOW (ref 11.5–15.5)
MCHC RBC-ENTMCNC: 28.3 PG — SIGNIFICANT CHANGE UP (ref 27–34)
MCHC RBC-ENTMCNC: 32.3 % — SIGNIFICANT CHANGE UP (ref 32–36)
MCV RBC AUTO: 87.6 FL — SIGNIFICANT CHANGE UP (ref 80–100)
NRBC # FLD: 0 — SIGNIFICANT CHANGE UP
PLATELET # BLD AUTO: 386 K/UL — SIGNIFICANT CHANGE UP (ref 150–400)
PMV BLD: 9 FL — SIGNIFICANT CHANGE UP (ref 7–13)
POTASSIUM SERPL-MCNC: 3.6 MMOL/L — SIGNIFICANT CHANGE UP (ref 3.5–5.3)
POTASSIUM SERPL-SCNC: 3.6 MMOL/L — SIGNIFICANT CHANGE UP (ref 3.5–5.3)
RBC # BLD: 3.22 M/UL — LOW (ref 3.8–5.2)
RBC # FLD: 15.4 % — HIGH (ref 10.3–14.5)
SODIUM SERPL-SCNC: 130 MMOL/L — LOW (ref 135–145)
WBC # BLD: 12.63 K/UL — HIGH (ref 3.8–10.5)
WBC # FLD AUTO: 12.63 K/UL — HIGH (ref 3.8–10.5)

## 2018-05-11 PROCEDURE — 99233 SBSQ HOSP IP/OBS HIGH 50: CPT

## 2018-05-11 PROCEDURE — 99233 SBSQ HOSP IP/OBS HIGH 50: CPT | Mod: GC

## 2018-05-11 RX ORDER — MORPHINE SULFATE 50 MG/1
15 CAPSULE, EXTENDED RELEASE ORAL EVERY 4 HOURS
Qty: 0 | Refills: 0 | Status: DISCONTINUED | OUTPATIENT
Start: 2018-05-11 | End: 2018-05-17

## 2018-05-11 RX ADMIN — Medication 100 MILLIGRAM(S): at 17:47

## 2018-05-11 RX ADMIN — POLYETHYLENE GLYCOL 3350 17 GRAM(S): 17 POWDER, FOR SOLUTION ORAL at 12:18

## 2018-05-11 RX ADMIN — ENOXAPARIN SODIUM 40 MILLIGRAM(S): 100 INJECTION SUBCUTANEOUS at 06:02

## 2018-05-11 RX ADMIN — FAMOTIDINE 20 MILLIGRAM(S): 10 INJECTION INTRAVENOUS at 12:18

## 2018-05-11 RX ADMIN — SENNA PLUS 2 TABLET(S): 8.6 TABLET ORAL at 21:06

## 2018-05-11 RX ADMIN — CARVEDILOL PHOSPHATE 12.5 MILLIGRAM(S): 80 CAPSULE, EXTENDED RELEASE ORAL at 06:02

## 2018-05-11 RX ADMIN — LISINOPRIL 5 MILLIGRAM(S): 2.5 TABLET ORAL at 06:02

## 2018-05-11 RX ADMIN — Medication 325 MILLIGRAM(S): at 12:18

## 2018-05-11 RX ADMIN — MORPHINE SULFATE 4 MILLIGRAM(S): 50 CAPSULE, EXTENDED RELEASE ORAL at 00:00

## 2018-05-11 RX ADMIN — MORPHINE SULFATE 15 MILLIGRAM(S): 50 CAPSULE, EXTENDED RELEASE ORAL at 22:06

## 2018-05-11 RX ADMIN — MORPHINE SULFATE 15 MILLIGRAM(S): 50 CAPSULE, EXTENDED RELEASE ORAL at 17:30

## 2018-05-11 RX ADMIN — MORPHINE SULFATE 15 MILLIGRAM(S): 50 CAPSULE, EXTENDED RELEASE ORAL at 16:41

## 2018-05-11 RX ADMIN — CARVEDILOL PHOSPHATE 12.5 MILLIGRAM(S): 80 CAPSULE, EXTENDED RELEASE ORAL at 17:47

## 2018-05-11 RX ADMIN — MORPHINE SULFATE 2 MILLIGRAM(S): 50 CAPSULE, EXTENDED RELEASE ORAL at 10:51

## 2018-05-11 RX ADMIN — ATORVASTATIN CALCIUM 40 MILLIGRAM(S): 80 TABLET, FILM COATED ORAL at 21:06

## 2018-05-11 RX ADMIN — Medication 1 MILLIGRAM(S): at 12:18

## 2018-05-11 RX ADMIN — MORPHINE SULFATE 2 MILLIGRAM(S): 50 CAPSULE, EXTENDED RELEASE ORAL at 11:05

## 2018-05-11 RX ADMIN — MORPHINE SULFATE 15 MILLIGRAM(S): 50 CAPSULE, EXTENDED RELEASE ORAL at 21:06

## 2018-05-11 NOTE — PROGRESS NOTE ADULT - PROBLEM SELECTOR PLAN 2
likely secondary to malignancy  c/w supportive care poor oral intake vs SIADH  check urine sodium, osmo

## 2018-05-11 NOTE — CONSULT NOTE ADULT - ASSESSMENT
78F p/w rectal mass, biopsy pending; likely adenocarcinoma requiring resection.        -will discuss with Colorectal Attending.  -will continue to follow.       JOSE Porras MD (PGY2)    (Please page [facility] team Surgery, [phone] for questions/concerns.) 78F p/w rectal mass, biopsy pending; likely adenocarcinoma requiring resection.      -will continue to follow.     -please medically optimize for surgery, and document as such.  -please obtain Cardiology evaluation and risk stratification for possible surgery, and document as such.      (Discussed w/ Attending, Dr. NORMA Sosa.)        JOSE Porras MD (PGY2)    (Please page [facility] team Surgery, [phone] for questions/concerns.)

## 2018-05-11 NOTE — PROGRESS NOTE ADULT - PROBLEM SELECTOR PROBLEM 4
Type 2 diabetes mellitus with other circulatory complication, without long-term current use of insulin Coronary artery disease involving native coronary artery of native heart without angina pectoris

## 2018-05-11 NOTE — PHYSICAL THERAPY INITIAL EVALUATION ADULT - ADDITIONAL COMMENTS
Patient with wheelchair at bedside; as per CM patient with a lot of family support and is non-ambulatory. Unable to obtain with patient transfers independently or with assistance. Patient's family not at bedside

## 2018-05-11 NOTE — PROGRESS NOTE ADULT - SUBJECTIVE AND OBJECTIVE BOX
Oncology Follow-up    INTERVAL HPI/OVERNIGHT EVENTS:  Patient S&E at bedside. No o/n events, patient resting comfortably. No complaints at this time. Patient currently denies fever, chills, dizziness, weakness, CP, palpitations, SOB, cough, N/V/D/C, dysuria. Pt reports that she has had bloody bowel movements with dull lower abdominal pain.    VITAL SIGNS:  T(F): 98 (05-11-18 @ 14:45)  HR: 70 (05-11-18 @ 14:45)  BP: 128/61 (05-11-18 @ 14:45)  RR: 17 (05-11-18 @ 14:45)  SpO2: 96% (05-11-18 @ 14:45)  Wt(kg): --    PHYSICAL EXAM:    Constitutional: AAOx3, NAD,   Eyes: PERRL, EOMI, sclera non-icteric  Neck: supple, no masses, no JVD  Respiratory: CTA b/l, good air entry b/l, no wheezing, rhonchi, rales, with normal respiratory effort and no intercostal retractions  Cardiovascular: RRR, normal S1S2, no M/R/G  Gastrointestinal: soft, NTND, no masses palpable, BS normal in all four quadrants, no HSM  Extremities:  no c/c/e. s/p AKA RLE  Neurological: Grossly intact  Skin: Normal temperature    MEDICATIONS  (STANDING):  atorvastatin 40 milliGRAM(s) Oral at bedtime  carvedilol 12.5 milliGRAM(s) Oral every 12 hours  dextrose 5%. 1000 milliLiter(s) (50 mL/Hr) IV Continuous <Continuous>  dextrose 50% Injectable 12.5 Gram(s) IV Push once  dextrose 50% Injectable 25 Gram(s) IV Push once  dextrose 50% Injectable 25 Gram(s) IV Push once  docusate sodium 100 milliGRAM(s) Oral two times a day  enoxaparin Injectable 40 milliGRAM(s) SubCutaneous every 24 hours  famotidine    Tablet 20 milliGRAM(s) Oral daily  ferrous    sulfate 325 milliGRAM(s) Oral daily  folic acid 1 milliGRAM(s) Oral daily  insulin lispro (HumaLOG) corrective regimen sliding scale   SubCutaneous at bedtime  lisinopril 5 milliGRAM(s) Oral daily  polyethylene glycol 3350 17 Gram(s) Oral daily  senna 2 Tablet(s) Oral at bedtime  sodium chloride 0.9%. 1000 milliLiter(s) (100 mL/Hr) IV Continuous <Continuous>    MEDICATIONS  (PRN):  acetaminophen   Tablet. 650 milliGRAM(s) Oral every 6 hours PRN Mild Pain (1 - 3)  dextrose 40% Gel 15 Gram(s) Oral once PRN Blood Glucose LESS THAN 70 milliGRAM(s)/deciliter  glucagon  Injectable 1 milliGRAM(s) IntraMuscular once PRN Glucose LESS THAN 70 milligrams/deciliter  morphine  IR 15 milliGRAM(s) Oral every 4 hours PRN moderate to severe pain  ondansetron Injectable 4 milliGRAM(s) IV Push every 6 hours PRN Nausea      No Known Allergies      LABS:                        9.1    12.63 )-----------( 386      ( 11 May 2018 06:45 )             28.2     05-11    130<L>  |  93<L>  |  6<L>  ----------------------------<  106<H>  3.6   |  21<L>  |  0.52    Ca    8.0<L>      11 May 2018 06:45             RADIOLOGY & ADDITIONAL TESTS:  Studies reviewed.    Surgical Pathology Report:   ACCESSION No:  [id number]    DINO PRYOR                     1        Surgical Final Report          Final Diagnosis  Rectum, mass, biopsy  - Superficial fragments of adenocarcinoma, moderately  differentiated.    Comment:  Dr. RAUL Willett was notified of the diagnosis on 05/11/18.  This case was reviewed with Dr. OFELIA Gould on 05/11/18.    Verified by: Tricia Paige  (Electronic Signature)  Reported on: 05/11/18 14:41 EDT,[address] [place] [address]  [id number]  _________________________________________________________________    Clinical History  Adenocarcinoma, rectal mass    Specimen(s) Submitted  1- Rectal mass biopsy    Gross Description  The specimen is received in formalin and the specimen container  is labeled: Rectum mass biopsy.  It consists of 11 fragments of  soft white tissue ranging in size from 0.1  cm to 0.4 cm in  greatest dimension. Entirely submitted as follows: 1A= six tiny  fragments of soft tissue; 1B= five fragments of soft tissue.  Total cassettes-2    In addition to other data that may appear on the specimen  container, the label has been inspected to confirm the presence  of the patient's name and date of birth.  TK 05/10/18 18:06 (05.10.18 @ 15:30)

## 2018-05-11 NOTE — PROGRESS NOTE ADULT - SUBJECTIVE AND OBJECTIVE BOX
INTERVAL HPI/OVERNIGHT EVENTS: Pt used Morphine 4mg IV x 4 doses in past 24 hours. Will convert to PO today as pt had biopsy yesterday and will likely be DC in next few days. Pt dtr states Percocet and Oxycodone do not work well for pt. Will convert to PO morphine.     Allergies    No Known Allergies    Intolerances      ADVANCE DIRECTIVES:    DNR: [ ] YES [x ] NO           PRESENT SYMPTOMS:   SOURCE:  [x ] Patient   [ ] Family   [ ] Team     Pain:  [ ] YES [ ] NO  Dyspnea:  [ ] YES [ ] NO  Anxiety:  [ ] YES [ ] NO  Fatigue: [ ] YES [ ] NO  Nausea: [ ] YES [ ] NO  Loss of Appetite: [ ] YES [ ] NO  Constipation [ ] YES   [ ] No     OTHER SYMPTOMS:  [ ] All other ROS negative     [ ] Unable to obtain due to poor mentation    MEDICATIONS  (STANDING):  atorvastatin 40 milliGRAM(s) Oral at bedtime  carvedilol 12.5 milliGRAM(s) Oral every 12 hours  dextrose 5%. 1000 milliLiter(s) (50 mL/Hr) IV Continuous <Continuous>  dextrose 50% Injectable 12.5 Gram(s) IV Push once  dextrose 50% Injectable 25 Gram(s) IV Push once  dextrose 50% Injectable 25 Gram(s) IV Push once  docusate sodium 100 milliGRAM(s) Oral two times a day  enoxaparin Injectable 40 milliGRAM(s) SubCutaneous every 24 hours  famotidine    Tablet 20 milliGRAM(s) Oral daily  ferrous    sulfate 325 milliGRAM(s) Oral daily  folic acid 1 milliGRAM(s) Oral daily  insulin lispro (HumaLOG) corrective regimen sliding scale   SubCutaneous at bedtime  lisinopril 5 milliGRAM(s) Oral daily  polyethylene glycol 3350 17 Gram(s) Oral daily  senna 2 Tablet(s) Oral at bedtime  sodium chloride 0.9%. 1000 milliLiter(s) (100 mL/Hr) IV Continuous <Continuous>    MEDICATIONS  (PRN):  acetaminophen   Tablet. 650 milliGRAM(s) Oral every 6 hours PRN Mild Pain (1 - 3)  dextrose 40% Gel 15 Gram(s) Oral once PRN Blood Glucose LESS THAN 70 milliGRAM(s)/deciliter  glucagon  Injectable 1 milliGRAM(s) IntraMuscular once PRN Glucose LESS THAN 70 milligrams/deciliter  morphine  IR 15 milliGRAM(s) Oral every 4 hours PRN moderate to severe pain  ondansetron Injectable 4 milliGRAM(s) IV Push every 6 hours PRN Nausea      Karnofsky Performance Score/Palliative Performance Status Version 2:   50      %  Protein Calorie Malnutrition:  [ ] Mild   [ ] Moderate   [x ] Severe     Physical Exam:    General: [x] Alert,  A&O x3     [ ] lethargic   [ ] Agitated   [ ] Cachexia   HEENT: [x ] Normal   [ ] Dry mouth   [ ] ET Tube    [ ] Trach   Lungs: [x ] Clear [ ] Rhonchi  [ ] Crackles [ ] Wheezing [ ] Tachypnea  [ ] Audible excessive secretions   Cardiovascular:  [ x] Regular rate and rhythm  [ ] Irregular [ ] Tachycardia   [ ] Bradycardia   Abdomen: [x ] Soft  [ ] Distended    [x ] +BS  [ ] Non tender [x ] Tender  [ ]PEG   [ ] NGT   Last BM: 5/11   Genitourinary:  [x ] Normal [ ] Incontinent   [ ] Oliguria/Anuria   [ ] Saba  Musculoskeletal:  [ ] Normal   [ ] Generalized weakness  [x ] Bedbound [x] R AKA  Neurological: [x ] No focal deficits  [ ] Cognitive impairment     Skin: [x ] Normal   [ ] Pressure ulcers     Vital Signs Last 24 Hrs  T(C): 36.9 (11 May 2018 05:13), Max: 36.9 (11 May 2018 05:13)  T(F): 98.5 (11 May 2018 05:13), Max: 98.5 (11 May 2018 05:13)  HR: 76 (11 May 2018 05:13) (76 - 77)  BP: 145/58 (11 May 2018 05:13) (145/58 - 162/49)  BP(mean): --  RR: 18 (11 May 2018 05:13) (18 - 18)  SpO2: 96% (11 May 2018 05:13) (94% - 96%)    LABS:                        9.1    12.63 )-----------( 386      ( 11 May 2018 06:45 )             28.2     05-11    130<L>  |  93<L>  |  6<L>  ----------------------------<  106<H>  3.6   |  21<L>  |  0.52    Ca    8.0<L>      11 May 2018 06:45          I&O's Summary      RADIOLOGY & ADDITIONAL STUDIES:  < from: Flexible Sigmoidoscopy (05.10.18 @ 15:16) >    [facility]  _______________________________________________________________________________  Patient Name: Gonzalo Norton        Procedure Date: 5/10/2018 3:16 PM  MRN: [id number]                     Account Number: [id number]  YOB: 1940              Admit Type: Inpatient  Room: 1                               Gender: Female  Attending MD: LUX SWARTZ DO         _______________________________________________________________________________     Procedure:           Flexible Sigmoidoscopy  Indications:         Hematochezia, rectal mass  Providers:           LUX SWARTZ DO  Medicines:           None  Complications:       No immediate complications.  Procedure:           After obtaining informed consent, the endoscope was                        passed under direct vision. Throughout the procedure,                        the patient's blood pressure, pulse, and oxygen                        saturations were monitored continuously. The Endoscope                        was introduced through the anus and advanced to the                        splenic flexure.                                                                                   Findings:       An infiltrative non-obstructing large mass was found in the rectum. The        mass was circumferential. The mass measured five cm in length. In        addition, its diameter measured five mm. No bleeding was present.        Biopsies were taken with a cold forceps for histology.                                                                          Impression:          - Malignant tumor in the rectum. Biopsied.  Recommendation:      - Resume previous diet.               Attending Participation:       I personally performed the entire procedure.                                                                                     Lux Swartz  ________________  LUX SWARTZ DO  5/10/2018 3:48:50 PM  Number of Addenda: 0    Note Initiated On: 5/10/2018 3:16 PM    < end of copied text >

## 2018-05-11 NOTE — PROGRESS NOTE ADULT - PROBLEM SELECTOR PLAN 1
- Pain control with morphine  s/p repeat Biopsy by GI on 5/10. Path showed moderately differentiated adenocarcinoma.   I d/w oncology team, await further rec

## 2018-05-11 NOTE — PROGRESS NOTE ADULT - PROBLEM SELECTOR PLAN 6
Attempted to reach son Vineet, stated he was at work and could not talk. Called back later and he had stepped out. Was able to reach daughter Louann. Louann states that her mother had been at [facility] for LTC however it is not geographically close to daughter. Daughter would prefer location nearer to [place], specifically [facility]. This info was relayed to unit SW. Pt dtr is still on fence regarding plan for chemo. She is aware they are awaiting tissue biopsy results and then will need to FU with Dr Okeefe as OP. Per Dr Okeefe, plan is likely for oral chemo (Xeloda). Dtr states that she and her brother are unable to care for pt in the home and prefer for her to reside at NH, aware that this may pose an issue for any future plans for chemo.

## 2018-05-11 NOTE — PROGRESS NOTE ADULT - ASSESSMENT
77 y/o F, with PAD, CAD with recent admission to [facility], with rectal pain, weight loss, s/p colonoscopy/lower EUS demonstrating rectal mass ( 10mm) -T3,N1,Mx s/p biopsy showing high grade dysplasia. Of note, staging CT chest showed right sided spiculated lesion ( 6mm) - biopsy reveals moderately differentiated adenoca. Oncology was consulted for further evaluation for treatment options.

## 2018-05-11 NOTE — PROGRESS NOTE ADULT - PROBLEM SELECTOR PROBLEM 3
Coronary artery disease involving native coronary artery of native heart without angina pectoris Failure to thrive in adult

## 2018-05-11 NOTE — PROGRESS NOTE ADULT - PROBLEM SELECTOR PLAN 2
Currently on Morphine 4mg IV Q4 hours PRN. Received x4 in past 24 hours. Convert IV to PO in anticipation for DC. Morphine 15mg PO Q4 hours PRN.

## 2018-05-11 NOTE — PROGRESS NOTE ADULT - SUBJECTIVE AND OBJECTIVE BOX
Patient is a 78y old  Female who presents with a chief complaint of Abdominal pain (08 May 2018 22:37)      SUBJECTIVE / OVERNIGHT EVENTS:  Pt was interviewed via translation job # [id number] this morning. Pt denied pain     MEDICATIONS  (STANDING):  atorvastatin 40 milliGRAM(s) Oral at bedtime  carvedilol 12.5 milliGRAM(s) Oral every 12 hours  dextrose 5%. 1000 milliLiter(s) (50 mL/Hr) IV Continuous <Continuous>  dextrose 50% Injectable 12.5 Gram(s) IV Push once  dextrose 50% Injectable 25 Gram(s) IV Push once  dextrose 50% Injectable 25 Gram(s) IV Push once  docusate sodium 100 milliGRAM(s) Oral two times a day  enoxaparin Injectable 40 milliGRAM(s) SubCutaneous every 24 hours  famotidine    Tablet 20 milliGRAM(s) Oral daily  ferrous    sulfate 325 milliGRAM(s) Oral daily  folic acid 1 milliGRAM(s) Oral daily  insulin lispro (HumaLOG) corrective regimen sliding scale   SubCutaneous at bedtime  lisinopril 5 milliGRAM(s) Oral daily  polyethylene glycol 3350 17 Gram(s) Oral daily  senna 2 Tablet(s) Oral at bedtime  sodium chloride 0.9%. 1000 milliLiter(s) (100 mL/Hr) IV Continuous <Continuous>    MEDICATIONS  (PRN):  acetaminophen   Tablet. 650 milliGRAM(s) Oral every 6 hours PRN Mild Pain (1 - 3)  dextrose 40% Gel 15 Gram(s) Oral once PRN Blood Glucose LESS THAN 70 milliGRAM(s)/deciliter  glucagon  Injectable 1 milliGRAM(s) IntraMuscular once PRN Glucose LESS THAN 70 milligrams/deciliter  morphine  IR 15 milliGRAM(s) Oral every 4 hours PRN moderate to severe pain  ondansetron Injectable 4 milliGRAM(s) IV Push every 6 hours PRN Nausea      T(C): 36.7 (05-11-18 @ 14:45), Max: 36.9 (05-11-18 @ 05:13)  HR: 70 (05-11-18 @ 14:45) (70 - 77)  BP: 128/61 (05-11-18 @ 14:45) (128/61 - 162/49)  RR: 17 (05-11-18 @ 14:45) (17 - 18)  SpO2: 96% (05-11-18 @ 14:45) (94% - 96%)  CAPILLARY BLOOD GLUCOSE      POCT Blood Glucose.: 134 mg/dL (11 May 2018 12:25)  POCT Blood Glucose.: 116 mg/dL (11 May 2018 08:34)  POCT Blood Glucose.: 110 mg/dL (10 May 2018 22:18)  POCT Blood Glucose.: 83 mg/dL (10 May 2018 17:34)    I&O's Summary      PHYSICAL EXAM:  GENERAL: NAD, weak appearance   HEAD:  Atraumatic, Normocephalic  EYES:  conjunctiva and sclera clear  NECK: Supple, No JVD  CHEST/LUNG: Clear to auscultation bilaterally; No wheeze  HEART: s1 s2, regular rhythm and rate   ABDOMEN: Soft, Nontender, Nondistended; Bowel sounds present  EXTREMITIES:  2+ Peripheral Pulses, No clubbing, cyanosis, or edema  PSYCH: AAOx3, calm   NEUROLOGY: non-focal  SKIN: No rashes or lesions    LABS:                        9.1    12.63 )-----------( 386      ( 11 May 2018 06:45 )             28.2     05-11    130<L>  |  93<L>  |  6<L>  ----------------------------<  106<H>  3.6   |  21<L>  |  0.52    Ca    8.0<L>      11 May 2018 06:45                RADIOLOGY & ADDITIONAL TESTS:    Imaging Personally Reviewed:    Consultant(s) Notes Reviewed:      Care Discussed with Consultants/Other Providers:

## 2018-05-11 NOTE — PROGRESS NOTE ADULT - SUBJECTIVE AND OBJECTIVE BOX
[facility]:[id number],   78yFemale followed for:  No Known Allergies    PAST MEDICAL & SURGICAL HISTORY:  Rectal mass  Gangrene of foot  Coronary artery disease involving native coronary artery of native heart without angina pectoris  Status post above knee amputation of right lower extremity    FAMILY HISTORY:  No pertinent family history in first degree relatives    MEDICATIONS  (STANDING):  atorvastatin 40 milliGRAM(s) Oral at bedtime  carvedilol 12.5 milliGRAM(s) Oral every 12 hours  dextrose 5%. 1000 milliLiter(s) (50 mL/Hr) IV Continuous <Continuous>  dextrose 50% Injectable 12.5 Gram(s) IV Push once  dextrose 50% Injectable 25 Gram(s) IV Push once  dextrose 50% Injectable 25 Gram(s) IV Push once  docusate sodium 100 milliGRAM(s) Oral two times a day  enoxaparin Injectable 40 milliGRAM(s) SubCutaneous every 24 hours  famotidine    Tablet 20 milliGRAM(s) Oral daily  ferrous    sulfate 325 milliGRAM(s) Oral daily  folic acid 1 milliGRAM(s) Oral daily  insulin lispro (HumaLOG) corrective regimen sliding scale   SubCutaneous at bedtime  lisinopril 5 milliGRAM(s) Oral daily  polyethylene glycol 3350 17 Gram(s) Oral daily  senna 2 Tablet(s) Oral at bedtime  sodium chloride 0.9%. 1000 milliLiter(s) (100 mL/Hr) IV Continuous <Continuous>    MEDICATIONS  (PRN):  acetaminophen   Tablet. 650 milliGRAM(s) Oral every 6 hours PRN Mild Pain (1 - 3)  dextrose 40% Gel 15 Gram(s) Oral once PRN Blood Glucose LESS THAN 70 milliGRAM(s)/deciliter  glucagon  Injectable 1 milliGRAM(s) IntraMuscular once PRN Glucose LESS THAN 70 milligrams/deciliter  morphine  - Injectable 2 milliGRAM(s) IV Push every 4 hours PRN Moderate Pain (4 - 6)  morphine  - Injectable 4 milliGRAM(s) IV Push every 4 hours PRN Severe Pain (7 - 10)  ondansetron Injectable 4 milliGRAM(s) IV Push every 6 hours PRN Nausea      Vital Signs Last 24 Hrs  T(C): 36.9 (11 May 2018 05:13), Max: 36.9 (11 May 2018 05:13)  T(F): 98.5 (11 May 2018 05:13), Max: 98.5 (11 May 2018 05:13)  HR: 76 (11 May 2018 05:13) (76 - 77)  BP: 145/58 (11 May 2018 05:13) (135/62 - 162/49)  BP(mean): --  RR: 18 (11 May 2018 05:13) (17 - 18)  SpO2: 96% (11 May 2018 05:13) (94% - 96%)  nc/at  s1s2  cta  soft, nt, nd no guarding or rebound  no c/c/e    CBC Full  -  ( 10 May 2018 05:50 )  WBC Count : 12.58 K/uL  Hemoglobin : 9.2 g/dL  Hematocrit : 28.1 %  Platelet Count - Automated : 384 K/uL  Mean Cell Volume : 87.8 fL  Mean Cell Hemoglobin : 28.8 pg  Mean Cell Hemoglobin Concentration : 32.7 %  Auto Neutrophil # : x  Auto Lymphocyte # : x  Auto Monocyte # : x  Auto Eosinophil # : x  Auto Basophil # : x  Auto Neutrophil % : x  Auto Lymphocyte % : x  Auto Monocyte % : x  Auto Eosinophil % : x  Auto Basophil % : x    05-10    130<L>  |  93<L>  |  5<L>  ----------------------------<  111<H>  3.4<L>   |  22  |  0.50    Ca    8.0<L>      10 May 2018 05:50

## 2018-05-11 NOTE — PROGRESS NOTE ADULT - PROBLEM SELECTOR PLAN 1
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 Pt seen by Dr Okeefe as OP for initial visit prior to admission. She states pt needs tissue biopsy for definitive diagnosis before RT or chemo. If dx is as suspected will likely be candidate for palliative RT to the rectal mass and possibly oral chemo depending on discharge dispo and pt family goals. Prior to this hospital stay pt had been residing in NH, which may effect ability to provide chemo. In house onc following. Biopsy yesterday, results pending. RT consult once tissue diagnosis.

## 2018-05-11 NOTE — PROGRESS NOTE ADULT - PROBLEM SELECTOR PROBLEM 5
Slow transit constipation Type 2 diabetes mellitus with other circulatory complication, without long-term current use of insulin

## 2018-05-11 NOTE — CONSULT NOTE ADULT - SUBJECTIVE AND OBJECTIVE BOX
Colorectal Surgery Consult  A team [id number]    Consultant: Dr. NORMA Sosa    CC: 78y old Female admitted with a chief complaint of Abdominal pain (08 May 2018 22:37)    HPI:    PMHx: Rectal mass  Gangrene of foot  Coronary artery disease involving native coronary artery of native heart without angina pectoris    PSHx: Status post above knee amputation of right lower extremity  No significant past surgical history    Medications (inpatient): atorvastatin 40 milliGRAM(s) Oral at bedtime  carvedilol 12.5 milliGRAM(s) Oral every 12 hours  dextrose 5%. 1000 milliLiter(s) IV Continuous <Continuous>  dextrose 50% Injectable 12.5 Gram(s) IV Push once  dextrose 50% Injectable 25 Gram(s) IV Push once  dextrose 50% Injectable 25 Gram(s) IV Push once  docusate sodium 100 milliGRAM(s) Oral two times a day  enoxaparin Injectable 40 milliGRAM(s) SubCutaneous every 24 hours  famotidine    Tablet 20 milliGRAM(s) Oral daily  ferrous    sulfate 325 milliGRAM(s) Oral daily  folic acid 1 milliGRAM(s) Oral daily  insulin lispro (HumaLOG) corrective regimen sliding scale   SubCutaneous at bedtime  lisinopril 5 milliGRAM(s) Oral daily  polyethylene glycol 3350 17 Gram(s) Oral daily  senna 2 Tablet(s) Oral at bedtime  sodium chloride 0.9%. 1000 milliLiter(s) IV Continuous <Continuous>    Medications (PRN):acetaminophen   Tablet. 650 milliGRAM(s) Oral every 6 hours PRN  dextrose 40% Gel 15 Gram(s) Oral once PRN  glucagon  Injectable 1 milliGRAM(s) IntraMuscular once PRN  morphine  IR 15 milliGRAM(s) Oral every 4 hours PRN  ondansetron Injectable 4 milliGRAM(s) IV Push every 6 hours PRN    Allergies: No Known Allergies  (Intolerances: )  Social Hx:     Physical Exam  T(C): 36.7 (05-11-18 @ 14:45)  HR: 65 (05-11-18 @ 17:44) (65 - 77)  BP: 137/53 (05-11-18 @ 17:44) (128/61 - 154/62)  RR: 18 (05-11-18 @ 17:44) (17 - 18)  SpO2: 96% (05-11-18 @ 17:44) (94% - 96%)  Wt(kg): --  Tmax: T(C): , Max: 36.9 (05-11-18 @ 05:13)  Wt(kg): --    General: well developed, well nourished, NAD  Neuro: alert and oriented, no focal deficits, moves all extremities spontaneously  HEENT: NCAT, EOMI, anicteric, mucosa moist  Respiratory: airway patent, respirations unlabored  CVS: regular rate and rhythm  Abdomen: soft, nontender, nondistended  Extremities: no edema, sensation and movement grossly intact  Skin: warm, dry, appropriate color    Labs:                        9.1    12.63 )-----------( 386      ( 11 May 2018 06:45 )             28.2       05-11    130<L>  |  93<L>  |  6<L>  ----------------------------<  106<H>  3.6   |  21<L>  |  0.52    Ca    8.0<L>      11 May 2018 06:45              Imaging and other studies: Colorectal Surgery Consult  A team [id number]    Consultant: Dr. NORMA Sosa    CC: 78y old Female admitted with a chief complaint of Abdominal pain (08 May 2018 22:37)    HPI:  78F PMHx PAD, rectal mass who presented to [facility] ED the evening of 5/8/18, sent in by oncologist for evaluation of abdominal pain and rectal mass. Reports severe suprapubic pain, w/ radiation to rectum, aggravated with movement and palpation, alleviated with pain medications. Patient also reports nausea. As per daughter's daughter reports that the patient does not get out of bed. No other complaints at present.     In ED VS: T36.7, P63, /47, RR16, SpO2 100% on RA. Labs notable for h/h 9.3/29.5 Na 129, albumin 3.0. Patient admitted to floor    CT abd/pelvis 4/10/18 showed markedly irregular rectal wall thickening with nodularity in the mesorectal fascia, highly concerning for rectal neoplasm.     CT chest 4/13/18 showed Spiculated 6 mm right upper lobe posterior pulmonary nodule with surrounding ground glass, 3mm ground glass nodule in the right upper lobe,     On 5/10/18 Flexible sigmoidoscopy showed an infiltrative non-obstructing large circumferential mass in the rectum, measured five cm in length. Biopsies sent & pending. Prior biopsy in early April showed superficial fragments of adenoma w/ focal high grade dysplasia.     Hematology/oncology evaluated patient & discussed her possible treatment options. Colorectal Surgery consulted for possible resection.       PMHx: Rectal mass  Gangrene of foot  Coronary artery disease involving native coronary artery of native heart without angina pectoris    PSHx: Status post above knee amputation of right lower extremity  No significant past surgical history    Medications (inpatient): atorvastatin 40 milliGRAM(s) Oral at bedtime  carvedilol 12.5 milliGRAM(s) Oral every 12 hours  dextrose 5%. 1000 milliLiter(s) IV Continuous <Continuous>  dextrose 50% Injectable 12.5 Gram(s) IV Push once  dextrose 50% Injectable 25 Gram(s) IV Push once  dextrose 50% Injectable 25 Gram(s) IV Push once  docusate sodium 100 milliGRAM(s) Oral two times a day  enoxaparin Injectable 40 milliGRAM(s) SubCutaneous every 24 hours  famotidine    Tablet 20 milliGRAM(s) Oral daily  ferrous    sulfate 325 milliGRAM(s) Oral daily  folic acid 1 milliGRAM(s) Oral daily  insulin lispro (HumaLOG) corrective regimen sliding scale   SubCutaneous at bedtime  lisinopril 5 milliGRAM(s) Oral daily  polyethylene glycol 3350 17 Gram(s) Oral daily  senna 2 Tablet(s) Oral at bedtime  sodium chloride 0.9%. 1000 milliLiter(s) IV Continuous <Continuous>    Medications (PRN):acetaminophen   Tablet. 650 milliGRAM(s) Oral every 6 hours PRN  dextrose 40% Gel 15 Gram(s) Oral once PRN  glucagon  Injectable 1 milliGRAM(s) IntraMuscular once PRN  morphine  IR 15 milliGRAM(s) Oral every 4 hours PRN  ondansetron Injectable 4 milliGRAM(s) IV Push every 6 hours PRN    Allergies: No Known Allergies  (Intolerances: x)    Home Medications:  acetaminophen 325 mg oral tablet: 2 tab(s) orally every 6 hours, As Needed (11 Apr 2018 02:15)  aspirin 81 mg oral tablet, chewable: 1 tab(s) orally once a day (19 Apr 2018 11:02)  atorvastatin 40 mg oral tablet: 1 tab(s) orally once a day (at bedtime) (19 Apr 2018 11:02)  carvedilol 12.5 mg oral tablet: 1 tab(s) orally every 12 hours (19 Apr 2018 11:02)  docusate sodium 100 mg oral capsule: 1 cap(s) orally 2 times a day (19 Apr 2018 11:03)  famotidine 20 mg oral tablet: 1 tab(s) orally once a day (11 Apr 2018 02:15)  ferrous sulfate 325 mg (65 mg elemental iron) oral tablet: 1 tab(s) orally once a day (11 Apr 2018 02:15)  folic acid 1 mg oral tablet: 1 tab(s) orally once a day (11 Apr 2018 02:15)  hydrocortisone 2.5% rectal cream with applicator: 1 application rectal once a day (19 Apr 2018 11:03)  lisinopril 5 mg oral tablet: 1 tab(s) orally once a day (19 Apr 2018 11:02)  metFORMIN 1000 mg oral tablet: 1 tab(s) orally once a day (11 Apr 2018 02:15)  ocular lubricant ophthalmic solution: 1 drop(s) in each eye , As Needed (11 Apr 2018 02:15)  polyethylene glycol 3350 oral powder for reconstitution: 17 gram(s) orally once a day (19 Apr 2018 11:03)  senna oral tablet: 2 tab(s) orally once a day (at bedtime) (11 Apr 2018 02:15)      Social Hx: denies smoking/EtOH.       Physical Exam  T(C): 36.7 (05-11-18 @ 14:45)  HR: 65 (05-11-18 @ 17:44) (65 - 77)  BP: 137/53 (05-11-18 @ 17:44) (128/61 - 154/62)  RR: 18 (05-11-18 @ 17:44) (17 - 18)  SpO2: 96% (05-11-18 @ 17:44) (94% - 96%)  Wt(kg): --  Tmax: T(C): , Max: 36.9 (05-11-18 @ 05:13)  Wt(kg): --      General: NAD, thin appearing  Neuro: alert and oriented, no focal deficits, moves all extremities spontaneously  HEENT: NCAT, anicteric, mucosa moist  Respiratory: airway patent, respirations unlabored on RA  CVS: regular  Abdomen: soft, nontender, nondistended  Extremities: no LE edema, sensation and movement grossly intact  Skin: warm, dry, appropriate color  TOMÁS: deferred      Labs:                        9.1    12.63 )-----------( 386      ( 11 May 2018 06:45 )             28.2       05-11    130<L>  |  93<L>  |  6<L>  ----------------------------<  106<H>  3.6   |  21<L>  |  0.52    Ca    8.0<L>      11 May 2018 06:45        Imaging and other studies:  < from: CT Abdomen and Pelvis w/ Oral Cont and w/ IV Cont (04.10.18 @ 13:37) >  LOWER CHEST: Small calcifiedmediastinal and hilar nodes. A 4 mm left   lower lobe pulmonary nodule (2:13).    LIVER: Within normal limits.  BILE DUCTS: Normal caliber.  GALLBLADDER: Within normal limits.  SPLEEN: Within normal limits.  PANCREAS: Within normal limits.  ADRENALS:Mild thickening of the left adrenal gland.  KIDNEYS/URETERS: Within normal limits.    BLADDER: Within normal limits.  REPRODUCTIVE ORGANS: Hysterectomy.    BOWEL: No bowel obstruction. Markedly irregular rectal wall thickening   with nodularity in the mesorectal fascia, highly concerning for rectal   neoplasm. Appendix is normal.   PERITONEUM: No ascites.  VESSELS:  Atherosclerotic changes. Right lower extremity femoral artery   graft which appears occluded.  RETROPERITONEUM: A few subcentimeter short axis retroperitoneal nodes are   nonspecific.    ABDOMINAL WALL: Within normal limits.  BONES: Degenerative changes.    IMPRESSION:     Rectal neoplasm.    A 4 mm left lower lobe pulmonary nodule.    < end of copied text >    < from: CT Chest No Cont (04.13.18 @ 19:22) >  CHEST:     LUNGS AND LARGE AIRWAYS: Patent central airways.  Centrilobular   emphysema. Areas of bilateral subsegmental atelectasis. Spiculated 6 mm   right upper lobe posterior pulmonary nodule and (6:132) with surrounding   groundglass. 3 mm groundglass nodule in the right upper lobe (3:60).  PLEURA: Trace bilateral pleural effusions.  VESSELS: Atheromatous disease of aorta andits branches.  HEART: Left ventricular enlargement. No pericardial effusion. Calcified   coronary artery plaque.  MEDIASTINUM AND TASHA: Calcified hilar and mediastinal lymph nodes.  CHEST WALL AND LOWER NECK: Mildly asymmetrically enlarged right thyroid   gland which appears heterogeneous.  VISUALIZED UPPER ABDOMEN: Sludge.  BONES: Spinal degenerative changes.    IMPRESSION:     6 mm spiculated nodule in the right upper lobe suspicious for primary   lung neoplasm given background centrilobular emphysema instead of a   metastasis.    3 mm groundglass nodule in the right upper lobe.    < end of copied text >      < from: Transthoracic Echocardiogram (04.16.18 @ 07:08) >  Conclusions:  1. Normal left ventricular internal dimensions and wall  thicknesses.  2. Normal left ventricular systolic function. No segmental  wall motion abnormalities.  3. Normal diastolic function  4. Normal right ventricular sizeand systolic function.  5. Estimated pulmonary artery systolic pressure equals 36  mm Hg, assuming right atrial pressure equals 8 mm Hg,  consistent with borderline pulmonary pressures.    < end of copied text > Colorectal Surgery Consult  A team [id number]    Consultant: Dr. NORMA Sosa    CC: 78y old Female admitted with a chief complaint of Abdominal pain (08 May 2018 22:37)    HPI:  78F PMHx PAD, rectal mass who presented to [facility] ED the evening of 5/8/18, sent in by oncologist for evaluation of abdominal pain and rectal mass. Reports severe suprapubic pain, w/ radiation to rectum, aggravated with movement and palpation, alleviated with pain medications. Patient also reports nausea. As per daughter's daughter reports that the patient does not get out of bed. No other complaints at present.     In ED VS: T36.7, P63, /47, RR16, SpO2 100% on RA. Labs notable for h/h 9.3/29.5 Na 129, albumin 3.0. Patient admitted to floor    CT abd/pelvis 4/10/18 showed markedly irregular rectal wall thickening with nodularity in the mesorectal fascia, highly concerning for rectal neoplasm.     CT chest 4/13/18 showed Spiculated 6 mm right upper lobe posterior pulmonary nodule with surrounding ground glass, 3mm ground glass nodule in the right upper lobe,     On 5/10/18 Flexible sigmoidoscopy showed an infiltrative non-obstructing large circumferential mass in the rectum, measured five cm in length. Biopsies sent & pending. Prior biopsy in early April showed superficial fragments of adenoma w/ focal high grade dysplasia.     Hematology/oncology evaluated patient & have discussed her possible treatment options w/ her & her family; recommending short course of radiation followed by resection. Colorectal Surgery consulted for possible resection.       PMHx: Rectal mass  Gangrene of foot  Coronary artery disease involving native coronary artery of native heart without angina pectoris    PSHx: Status post above knee amputation of right lower extremity      Medications (inpatient): atorvastatin 40 milliGRAM(s) Oral at bedtime  carvedilol 12.5 milliGRAM(s) Oral every 12 hours  dextrose 5%. 1000 milliLiter(s) IV Continuous <Continuous>  dextrose 50% Injectable 12.5 Gram(s) IV Push once  dextrose 50% Injectable 25 Gram(s) IV Push once  dextrose 50% Injectable 25 Gram(s) IV Push once  docusate sodium 100 milliGRAM(s) Oral two times a day  enoxaparin Injectable 40 milliGRAM(s) SubCutaneous every 24 hours  famotidine    Tablet 20 milliGRAM(s) Oral daily  ferrous    sulfate 325 milliGRAM(s) Oral daily  folic acid 1 milliGRAM(s) Oral daily  insulin lispro (HumaLOG) corrective regimen sliding scale   SubCutaneous at bedtime  lisinopril 5 milliGRAM(s) Oral daily  polyethylene glycol 3350 17 Gram(s) Oral daily  senna 2 Tablet(s) Oral at bedtime  sodium chloride 0.9%. 1000 milliLiter(s) IV Continuous <Continuous>    Medications (PRN):acetaminophen   Tablet. 650 milliGRAM(s) Oral every 6 hours PRN  dextrose 40% Gel 15 Gram(s) Oral once PRN  glucagon  Injectable 1 milliGRAM(s) IntraMuscular once PRN  morphine  IR 15 milliGRAM(s) Oral every 4 hours PRN  ondansetron Injectable 4 milliGRAM(s) IV Push every 6 hours PRN    Allergies: No Known Allergies  (Intolerances: x)    Home Medications:  acetaminophen 325 mg oral tablet: 2 tab(s) orally every 6 hours, As Needed (11 Apr 2018 02:15)  aspirin 81 mg oral tablet, chewable: 1 tab(s) orally once a day (19 Apr 2018 11:02)  atorvastatin 40 mg oral tablet: 1 tab(s) orally once a day (at bedtime) (19 Apr 2018 11:02)  carvedilol 12.5 mg oral tablet: 1 tab(s) orally every 12 hours (19 Apr 2018 11:02)  docusate sodium 100 mg oral capsule: 1 cap(s) orally 2 times a day (19 Apr 2018 11:03)  famotidine 20 mg oral tablet: 1 tab(s) orally once a day (11 Apr 2018 02:15)  ferrous sulfate 325 mg (65 mg elemental iron) oral tablet: 1 tab(s) orally once a day (11 Apr 2018 02:15)  folic acid 1 mg oral tablet: 1 tab(s) orally once a day (11 Apr 2018 02:15)  hydrocortisone 2.5% rectal cream with applicator: 1 application rectal once a day (19 Apr 2018 11:03)  lisinopril 5 mg oral tablet: 1 tab(s) orally once a day (19 Apr 2018 11:02)  metFORMIN 1000 mg oral tablet: 1 tab(s) orally once a day (11 Apr 2018 02:15)  ocular lubricant ophthalmic solution: 1 drop(s) in each eye , As Needed (11 Apr 2018 02:15)  polyethylene glycol 3350 oral powder for reconstitution: 17 gram(s) orally once a day (19 Apr 2018 11:03)  senna oral tablet: 2 tab(s) orally once a day (at bedtime) (11 Apr 2018 02:15)      Social Hx: denies smoking/EtOH.       Physical Exam  T(C): 36.7 (05-11-18 @ 14:45)  HR: 65 (05-11-18 @ 17:44) (65 - 77)  BP: 137/53 (05-11-18 @ 17:44) (128/61 - 154/62)  RR: 18 (05-11-18 @ 17:44) (17 - 18)  SpO2: 96% (05-11-18 @ 17:44) (94% - 96%)  Wt(kg): --  Tmax: T(C): , Max: 36.9 (05-11-18 @ 05:13)  Wt(kg): --      General: NAD, thin appearing  Neuro: alert and oriented, no focal deficits, moves all extremities spontaneously  HEENT: NCAT, anicteric, mucosa moist  Respiratory: airway patent, respirations unlabored on RA  CVS: regular  Abdomen: soft, nontender, nondistended  Extremities: no LE edema, sensation and movement grossly intact  Skin: warm, dry, appropriate color  TOMÁS: deferred      Labs:                        9.1    12.63 )-----------( 386      ( 11 May 2018 06:45 )             28.2       05-11    130<L>  |  93<L>  |  6<L>  ----------------------------<  106<H>  3.6   |  21<L>  |  0.52    Ca    8.0<L>      11 May 2018 06:45        Imaging and other studies:  < from: CT Abdomen and Pelvis w/ Oral Cont and w/ IV Cont (04.10.18 @ 13:37) >  LOWER CHEST: Small calcifiedmediastinal and hilar nodes. A 4 mm left   lower lobe pulmonary nodule (2:13).    LIVER: Within normal limits.  BILE DUCTS: Normal caliber.  GALLBLADDER: Within normal limits.  SPLEEN: Within normal limits.  PANCREAS: Within normal limits.  ADRENALS:Mild thickening of the left adrenal gland.  KIDNEYS/URETERS: Within normal limits.    BLADDER: Within normal limits.  REPRODUCTIVE ORGANS: Hysterectomy.    BOWEL: No bowel obstruction. Markedly irregular rectal wall thickening   with nodularity in the mesorectal fascia, highly concerning for rectal   neoplasm. Appendix is normal.   PERITONEUM: No ascites.  VESSELS:  Atherosclerotic changes. Right lower extremity femoral artery   graft which appears occluded.  RETROPERITONEUM: A few subcentimeter short axis retroperitoneal nodes are   nonspecific.    ABDOMINAL WALL: Within normal limits.  BONES: Degenerative changes.    IMPRESSION:     Rectal neoplasm.    A 4 mm left lower lobe pulmonary nodule.    < end of copied text >    < from: CT Chest No Cont (04.13.18 @ 19:22) >  CHEST:     LUNGS AND LARGE AIRWAYS: Patent central airways.  Centrilobular   emphysema. Areas of bilateral subsegmental atelectasis. Spiculated 6 mm   right upper lobe posterior pulmonary nodule and (6:132) with surrounding   groundglass. 3 mm groundglass nodule in the right upper lobe (3:60).  PLEURA: Trace bilateral pleural effusions.  VESSELS: Atheromatous disease of aorta andits branches.  HEART: Left ventricular enlargement. No pericardial effusion. Calcified   coronary artery plaque.  MEDIASTINUM AND TASHA: Calcified hilar and mediastinal lymph nodes.  CHEST WALL AND LOWER NECK: Mildly asymmetrically enlarged right thyroid   gland which appears heterogeneous.  VISUALIZED UPPER ABDOMEN: Sludge.  BONES: Spinal degenerative changes.    IMPRESSION:     6 mm spiculated nodule in the right upper lobe suspicious for primary   lung neoplasm given background centrilobular emphysema instead of a   metastasis.    3 mm groundglass nodule in the right upper lobe.    < end of copied text >      < from: Transthoracic Echocardiogram (04.16.18 @ 07:08) >  Conclusions:  1. Normal left ventricular internal dimensions and wall  thicknesses.  2. Normal left ventricular systolic function. No segmental  wall motion abnormalities.  3. Normal diastolic function  4. Normal right ventricular sizeand systolic function.  5. Estimated pulmonary artery systolic pressure equals 36  mm Hg, assuming right atrial pressure equals 8 mm Hg,  consistent with borderline pulmonary pressures.    < end of copied text > Colorectal Surgery Consult  A team [id number]    Consultant: Dr. NORMA Sosa    CC: 78y old Female admitted with a chief complaint of Abdominal pain (08 May 2018 22:37)    HPI:  78F PMHx PAD, rectal mass who presented to [facility] ED the evening of 5/8/18, sent in by oncologist for evaluation of abdominal pain and rectal mass. Reports severe suprapubic pain, w/ radiation to rectum, aggravated with movement and palpation, alleviated with pain medications. Patient also reports nausea. As per daughter's daughter reports that the patient does not get out of bed. No other complaints at present.     In ED VS: T36.7, P63, /47, RR16, SpO2 100% on RA. Labs notable for h/h 9.3/29.5 Na 129, albumin 3.0. Patient admitted to floor    CT abd/pelvis 4/10/18 showed markedly irregular rectal wall thickening with nodularity in the mesorectal fascia, highly concerning for rectal neoplasm.     CT chest 4/13/18 showed Spiculated 6 mm right upper lobe posterior pulmonary nodule with surrounding ground glass, 3mm ground glass nodule in the right upper lobe,     On 5/10/18 Flexible sigmoidoscopy showed an infiltrative non-obstructing large circumferential mass in the rectum, measured five cm in length. Biopsies showing Superficial fragments of adenocarcinoma, moderately differentiated. Prior biopsy in early April showed superficial fragments of adenoma w/ focal high grade dysplasia.     Hematology/oncology evaluated patient & have discussed her possible treatment options w/ her & her family; recommending short course of radiation followed by resection. Colorectal Surgery consulted for possible resection.       PMHx: Rectal mass  Gangrene of foot  Coronary artery disease involving native coronary artery of native heart without angina pectoris    PSHx: Status post above knee amputation of right lower extremity      Medications (inpatient): atorvastatin 40 milliGRAM(s) Oral at bedtime  carvedilol 12.5 milliGRAM(s) Oral every 12 hours  dextrose 5%. 1000 milliLiter(s) IV Continuous <Continuous>  dextrose 50% Injectable 12.5 Gram(s) IV Push once  dextrose 50% Injectable 25 Gram(s) IV Push once  dextrose 50% Injectable 25 Gram(s) IV Push once  docusate sodium 100 milliGRAM(s) Oral two times a day  enoxaparin Injectable 40 milliGRAM(s) SubCutaneous every 24 hours  famotidine    Tablet 20 milliGRAM(s) Oral daily  ferrous    sulfate 325 milliGRAM(s) Oral daily  folic acid 1 milliGRAM(s) Oral daily  insulin lispro (HumaLOG) corrective regimen sliding scale   SubCutaneous at bedtime  lisinopril 5 milliGRAM(s) Oral daily  polyethylene glycol 3350 17 Gram(s) Oral daily  senna 2 Tablet(s) Oral at bedtime  sodium chloride 0.9%. 1000 milliLiter(s) IV Continuous <Continuous>    Medications (PRN):acetaminophen   Tablet. 650 milliGRAM(s) Oral every 6 hours PRN  dextrose 40% Gel 15 Gram(s) Oral once PRN  glucagon  Injectable 1 milliGRAM(s) IntraMuscular once PRN  morphine  IR 15 milliGRAM(s) Oral every 4 hours PRN  ondansetron Injectable 4 milliGRAM(s) IV Push every 6 hours PRN    Home Medications:  acetaminophen 325 mg oral tablet: 2 tab(s) orally every 6 hours, As Needed (11 Apr 2018 02:15)  aspirin 81 mg oral tablet, chewable: 1 tab(s) orally once a day (19 Apr 2018 11:02)  atorvastatin 40 mg oral tablet: 1 tab(s) orally once a day (at bedtime) (19 Apr 2018 11:02)  carvedilol 12.5 mg oral tablet: 1 tab(s) orally every 12 hours (19 Apr 2018 11:02)  docusate sodium 100 mg oral capsule: 1 cap(s) orally 2 times a day (19 Apr 2018 11:03)  famotidine 20 mg oral tablet: 1 tab(s) orally once a day (11 Apr 2018 02:15)  ferrous sulfate 325 mg (65 mg elemental iron) oral tablet: 1 tab(s) orally once a day (11 Apr 2018 02:15)  folic acid 1 mg oral tablet: 1 tab(s) orally once a day (11 Apr 2018 02:15)  hydrocortisone 2.5% rectal cream with applicator: 1 application rectal once a day (19 Apr 2018 11:03)  lisinopril 5 mg oral tablet: 1 tab(s) orally once a day (19 Apr 2018 11:02)  metFORMIN 1000 mg oral tablet: 1 tab(s) orally once a day (11 Apr 2018 02:15)  ocular lubricant ophthalmic solution: 1 drop(s) in each eye , As Needed (11 Apr 2018 02:15)  polyethylene glycol 3350 oral powder for reconstitution: 17 gram(s) orally once a day (19 Apr 2018 11:03)  senna oral tablet: 2 tab(s) orally once a day (at bedtime) (11 Apr 2018 02:15)      Allergies: No Known Allergies  (Intolerances: x)    Home Medications:  acetaminophen 325 mg oral tablet: 2 tab(s) orally every 6 hours, As Needed (11 Apr 2018 02:15)  aspirin 81 mg oral tablet, chewable: 1 tab(s) orally once a day (19 Apr 2018 11:02)  atorvastatin 40 mg oral tablet: 1 tab(s) orally once a day (at bedtime) (19 Apr 2018 11:02)  carvedilol 12.5 mg oral tablet: 1 tab(s) orally every 12 hours (19 Apr 2018 11:02)  docusate sodium 100 mg oral capsule: 1 cap(s) orally 2 times a day (19 Apr 2018 11:03)  famotidine 20 mg oral tablet: 1 tab(s) orally once a day (11 Apr 2018 02:15)  ferrous sulfate 325 mg (65 mg elemental iron) oral tablet: 1 tab(s) orally once a day (11 Apr 2018 02:15)  folic acid 1 mg oral tablet: 1 tab(s) orally once a day (11 Apr 2018 02:15)  hydrocortisone 2.5% rectal cream with applicator: 1 application rectal once a day (19 Apr 2018 11:03)  lisinopril 5 mg oral tablet: 1 tab(s) orally once a day (19 Apr 2018 11:02)  metFORMIN 1000 mg oral tablet: 1 tab(s) orally once a day (11 Apr 2018 02:15)  ocular lubricant ophthalmic solution: 1 drop(s) in each eye , As Needed (11 Apr 2018 02:15)  polyethylene glycol 3350 oral powder for reconstitution: 17 gram(s) orally once a day (19 Apr 2018 11:03)  senna oral tablet: 2 tab(s) orally once a day (at bedtime) (11 Apr 2018 02:15)      Social Hx: denies smoking/EtOH.       Physical Exam  T(C): 36.7 (05-11-18 @ 14:45)  HR: 65 (05-11-18 @ 17:44) (65 - 77)  BP: 137/53 (05-11-18 @ 17:44) (128/61 - 154/62)  RR: 18 (05-11-18 @ 17:44) (17 - 18)  SpO2: 96% (05-11-18 @ 17:44) (94% - 96%)  Wt(kg): --  Tmax: T(C): , Max: 36.9 (05-11-18 @ 05:13)  Wt(kg): --      General: NAD, thin appearing  Neuro: alert and oriented, no focal deficits, moves all extremities spontaneously  HEENT: NCAT, anicteric, mucosa moist  Respiratory: airway patent, respirations unlabored on RA  CVS: regular  Abdomen: soft, nontender, nondistended  Extremities: no LE edema, sensation and movement grossly intact  Skin: warm, dry, appropriate color  TOMÁS: deferred      Labs:                        9.1    12.63 )-----------( 386      ( 11 May 2018 06:45 )             28.2       05-11    130<L>  |  93<L>  |  6<L>  ----------------------------<  106<H>  3.6   |  21<L>  |  0.52    Ca    8.0<L>      11 May 2018 06:45        Surgical Pathology Report (05.10.18 @ 15:30)    Surgical Pathology Report:   ACCESSION No:  [id number]    Final Diagnosis  Rectum, mass, biopsy  - Superficial fragments of adenocarcinoma, moderately  differentiated.          Surgical Pathology Report (04.12.18 @ 16:20)    Surgical Pathology Report:   ACCESSION No:  [id number]      Final Diagnosis  Rectal mass, biopsy  - Superficial fragments of adenoma with focal high grade  dysplasia, see note    Note: The biopsy mayrepresent a superficial mucosal component of  a deeper, invasive underlying lesion. Clinical and endoscopic  correlation is recommended.          Imaging and other studies:  < from: CT Abdomen and Pelvis w/ Oral Cont and w/ IV Cont (04.10.18 @ 13:37) >  LOWER CHEST: Small calcifiedmediastinal and hilar nodes. A 4 mm left   lower lobe pulmonary nodule (2:13).    LIVER: Within normal limits.  BILE DUCTS: Normal caliber.  GALLBLADDER: Within normal limits.  SPLEEN: Within normal limits.  PANCREAS: Within normal limits.  ADRENALS:Mild thickening of the left adrenal gland.  KIDNEYS/URETERS: Within normal limits.    BLADDER: Within normal limits.  REPRODUCTIVE ORGANS: Hysterectomy.    BOWEL: No bowel obstruction. Markedly irregular rectal wall thickening   with nodularity in the mesorectal fascia, highly concerning for rectal   neoplasm. Appendix is normal.   PERITONEUM: No ascites.  VESSELS:  Atherosclerotic changes. Right lower extremity femoral artery   graft which appears occluded.  RETROPERITONEUM: A few subcentimeter short axis retroperitoneal nodes are   nonspecific.    ABDOMINAL WALL: Within normal limits.  BONES: Degenerative changes.    IMPRESSION:     Rectal neoplasm.    A 4 mm left lower lobe pulmonary nodule.    < end of copied text >    < from: CT Chest No Cont (04.13.18 @ 19:22) >  CHEST:     LUNGS AND LARGE AIRWAYS: Patent central airways.  Centrilobular   emphysema. Areas of bilateral subsegmental atelectasis. Spiculated 6 mm   right upper lobe posterior pulmonary nodule and (6:132) with surrounding   groundglass. 3 mm groundglass nodule in the right upper lobe (3:60).  PLEURA: Trace bilateral pleural effusions.  VESSELS: Atheromatous disease of aorta andits branches.  HEART: Left ventricular enlargement. No pericardial effusion. Calcified   coronary artery plaque.  MEDIASTINUM AND TASHA: Calcified hilar and mediastinal lymph nodes.  CHEST WALL AND LOWER NECK: Mildly asymmetrically enlarged right thyroid   gland which appears heterogeneous.  VISUALIZED UPPER ABDOMEN: Sludge.  BONES: Spinal degenerative changes.    IMPRESSION:     6 mm spiculated nodule in the right upper lobe suspicious for primary   lung neoplasm given background centrilobular emphysema instead of a   metastasis.    3 mm groundglass nodule in the right upper lobe.    < end of copied text >      < from: Transthoracic Echocardiogram (04.16.18 @ 07:08) >  Conclusions:  1. Normal left ventricular internal dimensions and wall  thicknesses.  2. Normal left ventricular systolic function. No segmental  wall motion abnormalities.  3. Normal diastolic function  4. Normal right ventricular sizeand systolic function.  5. Estimated pulmonary artery systolic pressure equals 36  mm Hg, assuming right atrial pressure equals 8 mm Hg,  consistent with borderline pulmonary pressures.    < end of copied text >

## 2018-05-11 NOTE — PHYSICAL THERAPY INITIAL EVALUATION ADULT - PERTINENT HX OF CURRENT PROBLEM, REHAB EVAL
78 y.o. woman with (+) Right AKA multiple medical co-morbidities now with ongoing suprapubic pain likely secondary to rectal mass

## 2018-05-11 NOTE — PROGRESS NOTE ADULT - PROBLEM SELECTOR PLAN 1
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  -Spoke to patient in Slovak (writer is fluent in Slovak/Dr. Raphael) and discussed with her about the current disease status and treatment plan. Patient is comprehensive, and willing to receive neoadjuvant chemotherapy and radiation therapy before the surgery, if indicated. She also wants to make her own medical decision and deemed to understand plans. I also discussed with her that the surgery that she may get after RT/chemo will include permanent colostomy placement and it may affect her quality of life. With all this information given, she is still willing to undergo chemo/RT + surgical resection and agreed with suggested plan. Please formally consult to surgical oncology team (Dr. Sosa was contacted, Dr. Ambrose spoke to him over the phone).  -Pt will likely get short course of pre-surgical RT followed by APR. Awaiting surg-onc recommendation. 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  -Spoke to patient in Greek (writer is fluent in Greek/Dr. Raphael) and discussed with her about the current disease status and treatment plan. Patient is comprehensive, and willing to receive neoadjuvant chemotherapy and radiation therapy before the surgery, if indicated. She also wants to make her own medical decision and deemed to fully understand plans. I also discussed with her that the surgery that she may get after RT/chemo will include permanent colostomy placement and it may affect her quality of life. With all this information given, she is still willing to undergo chemo/RT + surgical resection and agreed with suggested plan. Please formally consult to surgical oncology team (Dr. Sosa was contacted, Dr. Ambrose spoke to him over the phone).  -Pt will likely get short course of pre-surgical RT followed by APR. Awaiting surg-onc recommendation. 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  -Spoke to patient in Romanian (writer is fluent in Romanian/Dr. Raphael) and discussed with her about the current disease status and treatment plan. Patient is comprehensive, and willing to receive neoadjuvant chemotherapy and radiation therapy before the surgery, if indicated. She also wants to make her own medical decision and deemed to fully understand plans. I also discussed with her that the surgery that she may get after neoadjuvant treatment will include permanent colostomy placement and it may affect her quality of life. She was given about the options that include chemoradiation vs high dose radiation followed by the surgery. She is unsure about the chemotherapy but is willing to undergo radiation and surgery, knowing that she may have permanent colostomy after the surgery. Dr. Sosa was contacted by Dr. Ambrose and will see the patient.  -Pt will likely get short course of pre-surgical RT followed by APR, considering that she is a nursing home resident. Will wait for surg-onc recommendation.  -Based on the recommendation from surg-onc, will need to consult radiation oncology on Monday.  -Adequate pain control per primary team. 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  -Spoke to patient in Ukrainian (writer is fluent in Ukrainian/Dr. Raphael) and discussed with her about the current disease status and treatment plan. Patient is comprehensive, and willing to receive neoadjuvant chemotherapy and radiation therapy before the surgery, if indicated. She also wants to make her own medical decision and deemed to fully understand plans. I also discussed with her that the surgery that she may get after neoadjuvant treatment will include permanent colostomy placement and it may affect her quality of life. She was given about the options that include chemoradiation vs high dose radiation followed by the surgery. She is unsure about the chemotherapy but is willing to undergo radiation and surgery, knowing that she may have permanent colostomy in rest of her life. Dr. Sosa was contacted by Dr. Ambrose and will see the patient.  -Considering that she is a nursing home resident, pt will likely get short course of high dose RT followed by APR. Will wait for surg-onc recommendation.  -Based on the recommendation from surg-onc, will need to consult radiation oncology on Monday.  -Adequate pain control per primary team.

## 2018-05-12 LAB
BASOPHILS # BLD AUTO: 0.02 K/UL — SIGNIFICANT CHANGE UP (ref 0–0.2)
BASOPHILS NFR BLD AUTO: 0.2 % — SIGNIFICANT CHANGE UP (ref 0–2)
BUN SERPL-MCNC: 5 MG/DL — LOW (ref 7–23)
CALCIUM SERPL-MCNC: 8 MG/DL — LOW (ref 8.4–10.5)
CHLORIDE SERPL-SCNC: 94 MMOL/L — LOW (ref 98–107)
CO2 SERPL-SCNC: 25 MMOL/L — SIGNIFICANT CHANGE UP (ref 22–31)
CREAT SERPL-MCNC: 0.53 MG/DL — SIGNIFICANT CHANGE UP (ref 0.5–1.3)
EOSINOPHIL # BLD AUTO: 0.26 K/UL — SIGNIFICANT CHANGE UP (ref 0–0.5)
EOSINOPHIL NFR BLD AUTO: 2.3 % — SIGNIFICANT CHANGE UP (ref 0–6)
GLUCOSE SERPL-MCNC: 129 MG/DL — HIGH (ref 70–99)
HCT VFR BLD CALC: 27.2 % — LOW (ref 34.5–45)
HGB BLD-MCNC: 8.9 G/DL — LOW (ref 11.5–15.5)
IMM GRANULOCYTES # BLD AUTO: 0.09 # — SIGNIFICANT CHANGE UP
IMM GRANULOCYTES NFR BLD AUTO: 0.8 % — SIGNIFICANT CHANGE UP (ref 0–1.5)
LYMPHOCYTES # BLD AUTO: 1.66 K/UL — SIGNIFICANT CHANGE UP (ref 1–3.3)
LYMPHOCYTES # BLD AUTO: 14.6 % — SIGNIFICANT CHANGE UP (ref 13–44)
MCHC RBC-ENTMCNC: 28.2 PG — SIGNIFICANT CHANGE UP (ref 27–34)
MCHC RBC-ENTMCNC: 32.7 % — SIGNIFICANT CHANGE UP (ref 32–36)
MCV RBC AUTO: 86.1 FL — SIGNIFICANT CHANGE UP (ref 80–100)
MONOCYTES # BLD AUTO: 1.01 K/UL — HIGH (ref 0–0.9)
MONOCYTES NFR BLD AUTO: 8.9 % — SIGNIFICANT CHANGE UP (ref 2–14)
NEUTROPHILS # BLD AUTO: 8.3 K/UL — HIGH (ref 1.8–7.4)
NEUTROPHILS NFR BLD AUTO: 73.2 % — SIGNIFICANT CHANGE UP (ref 43–77)
NRBC # FLD: 0 — SIGNIFICANT CHANGE UP
OSMOLALITY UR: 260 MOSMO/KG — SIGNIFICANT CHANGE UP (ref 50–1200)
PLATELET # BLD AUTO: 410 K/UL — HIGH (ref 150–400)
PMV BLD: 9.1 FL — SIGNIFICANT CHANGE UP (ref 7–13)
POTASSIUM SERPL-MCNC: 3.5 MMOL/L — SIGNIFICANT CHANGE UP (ref 3.5–5.3)
POTASSIUM SERPL-SCNC: 3.5 MMOL/L — SIGNIFICANT CHANGE UP (ref 3.5–5.3)
RBC # BLD: 3.16 M/UL — LOW (ref 3.8–5.2)
RBC # FLD: 15.1 % — HIGH (ref 10.3–14.5)
SODIUM SERPL-SCNC: 132 MMOL/L — LOW (ref 135–145)
SODIUM UR-SCNC: 69 MMOL/L — SIGNIFICANT CHANGE UP
WBC # BLD: 11.34 K/UL — HIGH (ref 3.8–10.5)
WBC # FLD AUTO: 11.34 K/UL — HIGH (ref 3.8–10.5)

## 2018-05-12 PROCEDURE — 99233 SBSQ HOSP IP/OBS HIGH 50: CPT

## 2018-05-12 RX ORDER — INSULIN LISPRO 100/ML
VIAL (ML) SUBCUTANEOUS
Qty: 0 | Refills: 0 | Status: DISCONTINUED | OUTPATIENT
Start: 2018-05-12 | End: 2018-05-23

## 2018-05-12 RX ADMIN — SENNA PLUS 2 TABLET(S): 8.6 TABLET ORAL at 20:31

## 2018-05-12 RX ADMIN — FAMOTIDINE 20 MILLIGRAM(S): 10 INJECTION INTRAVENOUS at 11:37

## 2018-05-12 RX ADMIN — CARVEDILOL PHOSPHATE 12.5 MILLIGRAM(S): 80 CAPSULE, EXTENDED RELEASE ORAL at 06:27

## 2018-05-12 RX ADMIN — Medication 100 MILLIGRAM(S): at 17:32

## 2018-05-12 RX ADMIN — ENOXAPARIN SODIUM 40 MILLIGRAM(S): 100 INJECTION SUBCUTANEOUS at 06:27

## 2018-05-12 RX ADMIN — MORPHINE SULFATE 15 MILLIGRAM(S): 50 CAPSULE, EXTENDED RELEASE ORAL at 20:27

## 2018-05-12 RX ADMIN — ATORVASTATIN CALCIUM 40 MILLIGRAM(S): 80 TABLET, FILM COATED ORAL at 20:31

## 2018-05-12 RX ADMIN — LISINOPRIL 5 MILLIGRAM(S): 2.5 TABLET ORAL at 06:27

## 2018-05-12 RX ADMIN — MORPHINE SULFATE 15 MILLIGRAM(S): 50 CAPSULE, EXTENDED RELEASE ORAL at 10:10

## 2018-05-12 RX ADMIN — Medication 100 MILLIGRAM(S): at 06:27

## 2018-05-12 RX ADMIN — MORPHINE SULFATE 15 MILLIGRAM(S): 50 CAPSULE, EXTENDED RELEASE ORAL at 15:13

## 2018-05-12 RX ADMIN — MORPHINE SULFATE 15 MILLIGRAM(S): 50 CAPSULE, EXTENDED RELEASE ORAL at 02:27

## 2018-05-12 RX ADMIN — Medication 1: at 22:50

## 2018-05-12 RX ADMIN — MORPHINE SULFATE 15 MILLIGRAM(S): 50 CAPSULE, EXTENDED RELEASE ORAL at 16:20

## 2018-05-12 RX ADMIN — MORPHINE SULFATE 15 MILLIGRAM(S): 50 CAPSULE, EXTENDED RELEASE ORAL at 09:28

## 2018-05-12 RX ADMIN — MORPHINE SULFATE 15 MILLIGRAM(S): 50 CAPSULE, EXTENDED RELEASE ORAL at 21:27

## 2018-05-12 RX ADMIN — Medication 1 MILLIGRAM(S): at 11:37

## 2018-05-12 RX ADMIN — CARVEDILOL PHOSPHATE 12.5 MILLIGRAM(S): 80 CAPSULE, EXTENDED RELEASE ORAL at 17:32

## 2018-05-12 RX ADMIN — MORPHINE SULFATE 15 MILLIGRAM(S): 50 CAPSULE, EXTENDED RELEASE ORAL at 01:27

## 2018-05-12 RX ADMIN — Medication 325 MILLIGRAM(S): at 11:37

## 2018-05-12 NOTE — PROGRESS NOTE ADULT - ASSESSMENT
biopsy consistent with adenocarcinoma. pt is 78. ? neoadjuvant without surgery?  defer to team and patient preference.   functional status?

## 2018-05-12 NOTE — PROGRESS NOTE ADULT - SUBJECTIVE AND OBJECTIVE BOX
[place] [facility] [place][id number]   78yFemale followed for:  No Known Allergies    PAST MEDICAL & SURGICAL HISTORY:  Rectal mass  Gangrene of foot  Coronary artery disease involving native coronary artery of native heart without angina pectoris  Status post above knee amputation of right lower extremity    FAMILY HISTORY:  No pertinent family history in first degree relatives    MEDICATIONS  (STANDING):  atorvastatin 40 milliGRAM(s) Oral at bedtime  carvedilol 12.5 milliGRAM(s) Oral every 12 hours  dextrose 5%. 1000 milliLiter(s) (50 mL/Hr) IV Continuous <Continuous>  dextrose 50% Injectable 12.5 Gram(s) IV Push once  dextrose 50% Injectable 25 Gram(s) IV Push once  dextrose 50% Injectable 25 Gram(s) IV Push once  docusate sodium 100 milliGRAM(s) Oral two times a day  enoxaparin Injectable 40 milliGRAM(s) SubCutaneous every 24 hours  famotidine    Tablet 20 milliGRAM(s) Oral daily  ferrous    sulfate 325 milliGRAM(s) Oral daily  folic acid 1 milliGRAM(s) Oral daily  insulin lispro (HumaLOG) corrective regimen sliding scale   SubCutaneous at bedtime  lisinopril 5 milliGRAM(s) Oral daily  polyethylene glycol 3350 17 Gram(s) Oral daily  senna 2 Tablet(s) Oral at bedtime  sodium chloride 0.9%. 1000 milliLiter(s) (100 mL/Hr) IV Continuous <Continuous>    MEDICATIONS  (PRN):  acetaminophen   Tablet. 650 milliGRAM(s) Oral every 6 hours PRN Mild Pain (1 - 3)  dextrose 40% Gel 15 Gram(s) Oral once PRN Blood Glucose LESS THAN 70 milliGRAM(s)/deciliter  glucagon  Injectable 1 milliGRAM(s) IntraMuscular once PRN Glucose LESS THAN 70 milligrams/deciliter  morphine  IR 15 milliGRAM(s) Oral every 4 hours PRN moderate to severe pain  ondansetron Injectable 4 milliGRAM(s) IV Push every 6 hours PRN Nausea      Vital Signs Last 24 Hrs  T(C): 36.7 (11 May 2018 21:46), Max: 36.9 (11 May 2018 05:13)  T(F): 98.1 (11 May 2018 21:46), Max: 98.5 (11 May 2018 05:13)  HR: 67 (11 May 2018 21:46) (65 - 76)  BP: 119/67 (11 May 2018 21:46) (119/67 - 145/58)  BP(mean): --  RR: 18 (11 May 2018 21:46) (17 - 18)  SpO2: 96% (11 May 2018 21:46) (96% - 96%)  nc/at  s1s2  cta  soft, nt, nd no guarding or rebound  no c/c/e    CBC Full  -  ( 11 May 2018 06:45 )  WBC Count : 12.63 K/uL  Hemoglobin : 9.1 g/dL  Hematocrit : 28.2 %  Platelet Count - Automated : 386 K/uL  Mean Cell Volume : 87.6 fL  Mean Cell Hemoglobin : 28.3 pg  Mean Cell Hemoglobin Concentration : 32.3 %  Auto Neutrophil # : x  Auto Lymphocyte # : x  Auto Monocyte # : x  Auto Eosinophil # : x  Auto Basophil # : x  Auto Neutrophil % : x  Auto Lymphocyte % : x  Auto Monocyte % : x  Auto Eosinophil % : x  Auto Basophil % : x    05-11    130<L>  |  93<L>  |  6<L>  ----------------------------<  106<H>  3.6   |  21<L>  |  0.52    Ca    8.0<L>      11 May 2018 06:45

## 2018-05-12 NOTE — PROGRESS NOTE ADULT - PROBLEM SELECTOR PLAN 1
- Pain control with morphine  s/p repeat Biopsy by GI on 5/10. Path showed moderately differentiated adenocarcinoma.   I d/w oncology team, await further rec  cardiology consult to optimize for possible sx resection

## 2018-05-12 NOTE — PROGRESS NOTE ADULT - SUBJECTIVE AND OBJECTIVE BOX
Patient is a 78y old  Female who presents with a chief complaint of Abdominal pain (08 May 2018 22:37)      SUBJECTIVE / OVERNIGHT EVENTS:  no event    MEDICATIONS  (STANDING):  atorvastatin 40 milliGRAM(s) Oral at bedtime  carvedilol 12.5 milliGRAM(s) Oral every 12 hours  dextrose 5%. 1000 milliLiter(s) (50 mL/Hr) IV Continuous <Continuous>  dextrose 50% Injectable 12.5 Gram(s) IV Push once  dextrose 50% Injectable 25 Gram(s) IV Push once  dextrose 50% Injectable 25 Gram(s) IV Push once  docusate sodium 100 milliGRAM(s) Oral two times a day  enoxaparin Injectable 40 milliGRAM(s) SubCutaneous every 24 hours  famotidine    Tablet 20 milliGRAM(s) Oral daily  ferrous    sulfate 325 milliGRAM(s) Oral daily  folic acid 1 milliGRAM(s) Oral daily  insulin lispro (HumaLOG) corrective regimen sliding scale   SubCutaneous at bedtime  insulin lispro (HumaLOG) corrective regimen sliding scale   SubCutaneous three times a day before meals  lisinopril 5 milliGRAM(s) Oral daily  polyethylene glycol 3350 17 Gram(s) Oral daily  senna 2 Tablet(s) Oral at bedtime  sodium chloride 0.9%. 1000 milliLiter(s) (100 mL/Hr) IV Continuous <Continuous>    MEDICATIONS  (PRN):  acetaminophen   Tablet. 650 milliGRAM(s) Oral every 6 hours PRN Mild Pain (1 - 3)  dextrose 40% Gel 15 Gram(s) Oral once PRN Blood Glucose LESS THAN 70 milliGRAM(s)/deciliter  glucagon  Injectable 1 milliGRAM(s) IntraMuscular once PRN Glucose LESS THAN 70 milligrams/deciliter  morphine  IR 15 milliGRAM(s) Oral every 4 hours PRN moderate to severe pain  ondansetron Injectable 4 milliGRAM(s) IV Push every 6 hours PRN Nausea      T(C): 37.1 (05-12-18 @ 21:15), Max: 37.1 (05-12-18 @ 21:15)  HR: 82 (05-12-18 @ 21:15) (67 - 82)  BP: 137/51 (05-12-18 @ 21:15) (117/70 - 140/57)  RR: 18 (05-12-18 @ 21:15) (18 - 18)  SpO2: 97% (05-12-18 @ 21:15) (94% - 97%)  CAPILLARY BLOOD GLUCOSE      POCT Blood Glucose.: 106 mg/dL (12 May 2018 17:41)  POCT Blood Glucose.: 174 mg/dL (12 May 2018 12:13)  POCT Blood Glucose.: 150 mg/dL (12 May 2018 08:39)  POCT Blood Glucose.: 188 mg/dL (11 May 2018 22:02)    I&O's Summary      PHYSICAL EXAM:  GENERAL: NAD, well-developed  HEAD:  Atraumatic, Normocephalic  EYES: EOMI, PERRLA, conjunctiva and sclera clear  NECK: Supple, No JVD  CHEST/LUNG: Clear to auscultation bilaterally; No wheeze  HEART: s1 s2, regular rhythm and rate   ABDOMEN: Soft, Nontender, Nondistended; Bowel sounds present  EXTREMITIES:  2+ Peripheral Pulses, No clubbing, cyanosis, or edema  PSYCH: AAOx3, calm   NEUROLOGY: non-focal  SKIN: No rashes or lesions    LABS:                        8.9    11.34 )-----------( 410      ( 12 May 2018 05:20 )             27.2     05-12    132<L>  |  94<L>  |  5<L>  ----------------------------<  129<H>  3.5   |  25  |  0.53    Ca    8.0<L>      12 May 2018 05:20                RADIOLOGY & ADDITIONAL TESTS:    Imaging Personally Reviewed:    Consultant(s) Notes Reviewed:      Care Discussed with Consultants/Other Providers:

## 2018-05-13 LAB
BASOPHILS # BLD AUTO: 0.02 K/UL — SIGNIFICANT CHANGE UP (ref 0–0.2)
BASOPHILS NFR BLD AUTO: 0.2 % — SIGNIFICANT CHANGE UP (ref 0–2)
BUN SERPL-MCNC: 5 MG/DL — LOW (ref 7–23)
CALCIUM SERPL-MCNC: 7.9 MG/DL — LOW (ref 8.4–10.5)
CHLORIDE SERPL-SCNC: 91 MMOL/L — LOW (ref 98–107)
CO2 SERPL-SCNC: 26 MMOL/L — SIGNIFICANT CHANGE UP (ref 22–31)
CREAT SERPL-MCNC: 0.5 MG/DL — SIGNIFICANT CHANGE UP (ref 0.5–1.3)
EOSINOPHIL # BLD AUTO: 0.27 K/UL — SIGNIFICANT CHANGE UP (ref 0–0.5)
EOSINOPHIL NFR BLD AUTO: 2.3 % — SIGNIFICANT CHANGE UP (ref 0–6)
GLUCOSE SERPL-MCNC: 123 MG/DL — HIGH (ref 70–99)
HCT VFR BLD CALC: 28.2 % — LOW (ref 34.5–45)
HGB BLD-MCNC: 9.2 G/DL — LOW (ref 11.5–15.5)
IMM GRANULOCYTES # BLD AUTO: 0.1 # — SIGNIFICANT CHANGE UP
IMM GRANULOCYTES NFR BLD AUTO: 0.8 % — SIGNIFICANT CHANGE UP (ref 0–1.5)
LYMPHOCYTES # BLD AUTO: 19.8 % — SIGNIFICANT CHANGE UP (ref 13–44)
LYMPHOCYTES # BLD AUTO: 2.36 K/UL — SIGNIFICANT CHANGE UP (ref 1–3.3)
MCHC RBC-ENTMCNC: 28.4 PG — SIGNIFICANT CHANGE UP (ref 27–34)
MCHC RBC-ENTMCNC: 32.6 % — SIGNIFICANT CHANGE UP (ref 32–36)
MCV RBC AUTO: 87 FL — SIGNIFICANT CHANGE UP (ref 80–100)
MONOCYTES # BLD AUTO: 0.99 K/UL — HIGH (ref 0–0.9)
MONOCYTES NFR BLD AUTO: 8.3 % — SIGNIFICANT CHANGE UP (ref 2–14)
NEUTROPHILS # BLD AUTO: 8.16 K/UL — HIGH (ref 1.8–7.4)
NEUTROPHILS NFR BLD AUTO: 68.6 % — SIGNIFICANT CHANGE UP (ref 43–77)
NRBC # FLD: 0 — SIGNIFICANT CHANGE UP
PLATELET # BLD AUTO: 406 K/UL — HIGH (ref 150–400)
PMV BLD: 9.2 FL — SIGNIFICANT CHANGE UP (ref 7–13)
POTASSIUM SERPL-MCNC: 3.6 MMOL/L — SIGNIFICANT CHANGE UP (ref 3.5–5.3)
POTASSIUM SERPL-SCNC: 3.6 MMOL/L — SIGNIFICANT CHANGE UP (ref 3.5–5.3)
RBC # BLD: 3.24 M/UL — LOW (ref 3.8–5.2)
RBC # FLD: 15.5 % — HIGH (ref 10.3–14.5)
SODIUM SERPL-SCNC: 129 MMOL/L — LOW (ref 135–145)
WBC # BLD: 11.9 K/UL — HIGH (ref 3.8–10.5)
WBC # FLD AUTO: 11.9 K/UL — HIGH (ref 3.8–10.5)

## 2018-05-13 PROCEDURE — 93010 ELECTROCARDIOGRAM REPORT: CPT

## 2018-05-13 PROCEDURE — 99233 SBSQ HOSP IP/OBS HIGH 50: CPT

## 2018-05-13 PROCEDURE — 99222 1ST HOSP IP/OBS MODERATE 55: CPT | Mod: AI

## 2018-05-13 RX ORDER — SODIUM CHLORIDE 9 MG/ML
1 INJECTION INTRAMUSCULAR; INTRAVENOUS; SUBCUTANEOUS
Qty: 0 | Refills: 0 | Status: DISCONTINUED | OUTPATIENT
Start: 2018-05-13 | End: 2018-05-23

## 2018-05-13 RX ADMIN — POLYETHYLENE GLYCOL 3350 17 GRAM(S): 17 POWDER, FOR SOLUTION ORAL at 11:26

## 2018-05-13 RX ADMIN — ATORVASTATIN CALCIUM 40 MILLIGRAM(S): 80 TABLET, FILM COATED ORAL at 22:21

## 2018-05-13 RX ADMIN — CARVEDILOL PHOSPHATE 12.5 MILLIGRAM(S): 80 CAPSULE, EXTENDED RELEASE ORAL at 05:19

## 2018-05-13 RX ADMIN — LISINOPRIL 5 MILLIGRAM(S): 2.5 TABLET ORAL at 05:20

## 2018-05-13 RX ADMIN — Medication 100 MILLIGRAM(S): at 17:13

## 2018-05-13 RX ADMIN — CARVEDILOL PHOSPHATE 12.5 MILLIGRAM(S): 80 CAPSULE, EXTENDED RELEASE ORAL at 17:13

## 2018-05-13 RX ADMIN — ENOXAPARIN SODIUM 40 MILLIGRAM(S): 100 INJECTION SUBCUTANEOUS at 05:20

## 2018-05-13 RX ADMIN — MORPHINE SULFATE 15 MILLIGRAM(S): 50 CAPSULE, EXTENDED RELEASE ORAL at 12:07

## 2018-05-13 RX ADMIN — MORPHINE SULFATE 15 MILLIGRAM(S): 50 CAPSULE, EXTENDED RELEASE ORAL at 03:59

## 2018-05-13 RX ADMIN — MORPHINE SULFATE 15 MILLIGRAM(S): 50 CAPSULE, EXTENDED RELEASE ORAL at 15:17

## 2018-05-13 RX ADMIN — MORPHINE SULFATE 15 MILLIGRAM(S): 50 CAPSULE, EXTENDED RELEASE ORAL at 15:59

## 2018-05-13 RX ADMIN — MORPHINE SULFATE 15 MILLIGRAM(S): 50 CAPSULE, EXTENDED RELEASE ORAL at 11:25

## 2018-05-13 RX ADMIN — FAMOTIDINE 20 MILLIGRAM(S): 10 INJECTION INTRAVENOUS at 11:26

## 2018-05-13 RX ADMIN — SENNA PLUS 2 TABLET(S): 8.6 TABLET ORAL at 22:21

## 2018-05-13 RX ADMIN — Medication 2: at 12:31

## 2018-05-13 RX ADMIN — Medication 325 MILLIGRAM(S): at 11:26

## 2018-05-13 RX ADMIN — Medication 1 MILLIGRAM(S): at 11:26

## 2018-05-13 RX ADMIN — SODIUM CHLORIDE 1 GRAM(S): 9 INJECTION INTRAMUSCULAR; INTRAVENOUS; SUBCUTANEOUS at 17:13

## 2018-05-13 RX ADMIN — Medication 100 MILLIGRAM(S): at 05:20

## 2018-05-13 RX ADMIN — MORPHINE SULFATE 15 MILLIGRAM(S): 50 CAPSULE, EXTENDED RELEASE ORAL at 02:59

## 2018-05-13 NOTE — PROGRESS NOTE ADULT - SUBJECTIVE AND OBJECTIVE BOX
rectal mass.  Pt w/ some lethargy today per family    abd soft nt      Objective:    MEDICATIONS  (STANDING):  atorvastatin 40 milliGRAM(s) Oral at bedtime  carvedilol 12.5 milliGRAM(s) Oral every 12 hours  dextrose 5%. 1000 milliLiter(s) (50 mL/Hr) IV Continuous <Continuous>  dextrose 50% Injectable 12.5 Gram(s) IV Push once  dextrose 50% Injectable 25 Gram(s) IV Push once  dextrose 50% Injectable 25 Gram(s) IV Push once  docusate sodium 100 milliGRAM(s) Oral two times a day  enoxaparin Injectable 40 milliGRAM(s) SubCutaneous every 24 hours  famotidine    Tablet 20 milliGRAM(s) Oral daily  ferrous    sulfate 325 milliGRAM(s) Oral daily  folic acid 1 milliGRAM(s) Oral daily  insulin lispro (HumaLOG) corrective regimen sliding scale   SubCutaneous at bedtime  insulin lispro (HumaLOG) corrective regimen sliding scale   SubCutaneous three times a day before meals  lisinopril 5 milliGRAM(s) Oral daily  polyethylene glycol 3350 17 Gram(s) Oral daily  senna 2 Tablet(s) Oral at bedtime  sodium chloride 0.9%. 1000 milliLiter(s) (100 mL/Hr) IV Continuous <Continuous>    MEDICATIONS  (PRN):  acetaminophen   Tablet. 650 milliGRAM(s) Oral every 6 hours PRN Mild Pain (1 - 3)  dextrose 40% Gel 15 Gram(s) Oral once PRN Blood Glucose LESS THAN 70 milliGRAM(s)/deciliter  glucagon  Injectable 1 milliGRAM(s) IntraMuscular once PRN Glucose LESS THAN 70 milligrams/deciliter  morphine  IR 15 milliGRAM(s) Oral every 4 hours PRN moderate to severe pain  ondansetron Injectable 4 milliGRAM(s) IV Push every 6 hours PRN Nausea      Vital Signs Last 24 Hrs  T(C): 36.6 (13 May 2018 06:19), Max: 37.1 (12 May 2018 21:15)  T(F): 97.8 (13 May 2018 06:19), Max: 98.7 (12 May 2018 21:15)  HR: 77 (13 May 2018 06:19) (71 - 82)  BP: 120/71 (13 May 2018 06:19) (117/70 - 140/57)  BP(mean): --  RR: 20 (13 May 2018 06:19) (18 - 20)  SpO2: 95% (13 May 2018 06:19) (95% - 97%)    I&O's Detail      Daily     Daily     LABS:                        9.2    11.90 )-----------( 406      ( 13 May 2018 07:05 )             28.2     05-13    129<L>  |  91<L>  |  5<L>  ----------------------------<  123<H>  3.6   |  26  |  0.50    Ca    7.9<L>      13 May 2018 07:05            RADIOLOGY & ADDITIONAL STUDIES:

## 2018-05-13 NOTE — PROGRESS NOTE ADULT - ATTENDING COMMENTS
Rectal cancer  -I had a long discussion with the patient's family today about treatment options  -Pt will require some neoadjuvant therapy, will f/u w/ Onc/Rads for further planning  -Medical optimization  -dvt ppx

## 2018-05-13 NOTE — PROGRESS NOTE ADULT - PROBLEM SELECTOR PLAN 1
- Pain control with morphine  s/p repeat Biopsy by GI on 5/10. Path showed moderately differentiated adenocarcinoma.   f/u cardiology to optimize for possible sx resection  f/u Onc/Sx/GI

## 2018-05-13 NOTE — CONSULT NOTE ADULT - ASSESSMENT
PreOp  Based on current ACC/AHA guidelines, patient history and physical exam, the patient is considered to have elevated risk  will get stress test to make sure her ischemic size / burden has not increased since last year    CAD  stress test  cont statin  low dose asa once deemed safe    HTN  stable    DM  Monitor finger stick. Insulin coverage. Diabetic education and Diabetic diet. Consider nutrition consultation. PreOp  Based on current ACC/AHA guidelines, patient history and physical exam, the patient is considered to have elevated risk  Obtain EKG  will get stress test to make sure her ischemic size / burden has not increased since last year    CAD  stress test  cont statin  low dose asa once deemed safe    HTN  stable    DM  Monitor finger stick. Insulin coverage. Diabetic education and Diabetic diet. Consider nutrition consultation.

## 2018-05-13 NOTE — PROGRESS NOTE ADULT - SUBJECTIVE AND OBJECTIVE BOX
[facility] [facility] [place]:[id number],   78yFemale followed for:  No Known Allergies    PAST MEDICAL & SURGICAL HISTORY:  Rectal mass  Gangrene of foot  Coronary artery disease involving native coronary artery of native heart without angina pectoris  Status post above knee amputation of right lower extremity    FAMILY HISTORY:  No pertinent family history in first degree relatives    MEDICATIONS  (STANDING):  atorvastatin 40 milliGRAM(s) Oral at bedtime  carvedilol 12.5 milliGRAM(s) Oral every 12 hours  dextrose 5%. 1000 milliLiter(s) (50 mL/Hr) IV Continuous <Continuous>  dextrose 50% Injectable 12.5 Gram(s) IV Push once  dextrose 50% Injectable 25 Gram(s) IV Push once  dextrose 50% Injectable 25 Gram(s) IV Push once  docusate sodium 100 milliGRAM(s) Oral two times a day  enoxaparin Injectable 40 milliGRAM(s) SubCutaneous every 24 hours  famotidine    Tablet 20 milliGRAM(s) Oral daily  ferrous    sulfate 325 milliGRAM(s) Oral daily  folic acid 1 milliGRAM(s) Oral daily  insulin lispro (HumaLOG) corrective regimen sliding scale   SubCutaneous at bedtime  insulin lispro (HumaLOG) corrective regimen sliding scale   SubCutaneous three times a day before meals  lisinopril 5 milliGRAM(s) Oral daily  polyethylene glycol 3350 17 Gram(s) Oral daily  senna 2 Tablet(s) Oral at bedtime  sodium chloride 0.9%. 1000 milliLiter(s) (100 mL/Hr) IV Continuous <Continuous>    MEDICATIONS  (PRN):  acetaminophen   Tablet. 650 milliGRAM(s) Oral every 6 hours PRN Mild Pain (1 - 3)  dextrose 40% Gel 15 Gram(s) Oral once PRN Blood Glucose LESS THAN 70 milliGRAM(s)/deciliter  glucagon  Injectable 1 milliGRAM(s) IntraMuscular once PRN Glucose LESS THAN 70 milligrams/deciliter  morphine  IR 15 milliGRAM(s) Oral every 4 hours PRN moderate to severe pain  ondansetron Injectable 4 milliGRAM(s) IV Push every 6 hours PRN Nausea      Vital Signs Last 24 Hrs  T(C): 36.6 (13 May 2018 06:19), Max: 37.1 (12 May 2018 21:15)  T(F): 97.8 (13 May 2018 06:19), Max: 98.7 (12 May 2018 21:15)  HR: 77 (13 May 2018 06:19) (71 - 82)  BP: 120/71 (13 May 2018 06:19) (117/70 - 140/57)  BP(mean): --  RR: 20 (13 May 2018 06:19) (18 - 20)  SpO2: 95% (13 May 2018 06:19) (95% - 97%)  nc/at  s1s2  cta  soft, nt, nd no guarding or rebound  no c/c/e    CBC Full  -  ( 13 May 2018 07:05 )  WBC Count : 11.90 K/uL  Hemoglobin : 9.2 g/dL  Hematocrit : 28.2 %  Platelet Count - Automated : 406 K/uL  Mean Cell Volume : 87.0 fL  Mean Cell Hemoglobin : 28.4 pg  Mean Cell Hemoglobin Concentration : 32.6 %  Auto Neutrophil # : 8.16 K/uL  Auto Lymphocyte # : 2.36 K/uL  Auto Monocyte # : 0.99 K/uL  Auto Eosinophil # : 0.27 K/uL  Auto Basophil # : 0.02 K/uL  Auto Neutrophil % : 68.6 %  Auto Lymphocyte % : 19.8 %  Auto Monocyte % : 8.3 %  Auto Eosinophil % : 2.3 %  Auto Basophil % : 0.2 %    05-13    129<L>  |  91<L>  |  5<L>  ----------------------------<  123<H>  3.6   |  26  |  0.50    Ca    7.9<L>      13 May 2018 07:05

## 2018-05-13 NOTE — PROGRESS NOTE ADULT - ASSESSMENT
rectal carcinoma.  ? radioagion. d/w dr. ferreira. plan inpt radiation followed by surgery in 2 weeks? defer to team.  Pt with some element of rectal cancer causing consitpation. preppeed for colon 2 wks ago.  rec. mirlax bid.

## 2018-05-13 NOTE — CONSULT NOTE ADULT - SUBJECTIVE AND OBJECTIVE BOX
CHIEF COMPLAINT:Patient is a 78y old  Female who presents with a chief complaint of Abdominal pain (08 May 2018 22:37)      HISTORY OF PRESENT ILLNESS:  This is a pleasant woman with history as below admitted with abd pain   found to have rectal mass plan for surgery  denies any chest pain, sob, palpitation, dizziness or syncope.      PAST MEDICAL & SURGICAL HISTORY:  Rectal mass  Gangrene of foot  Coronary artery disease involving native coronary artery of native heart without angina pectoris  Status post above knee amputation of right lower extremity          MEDICATIONS:  carvedilol 12.5 milliGRAM(s) Oral every 12 hours  enoxaparin Injectable 40 milliGRAM(s) SubCutaneous every 24 hours  lisinopril 5 milliGRAM(s) Oral daily        acetaminophen   Tablet. 650 milliGRAM(s) Oral every 6 hours PRN  morphine  IR 15 milliGRAM(s) Oral every 4 hours PRN  ondansetron Injectable 4 milliGRAM(s) IV Push every 6 hours PRN    docusate sodium 100 milliGRAM(s) Oral two times a day  famotidine    Tablet 20 milliGRAM(s) Oral daily  polyethylene glycol 3350 17 Gram(s) Oral daily  senna 2 Tablet(s) Oral at bedtime    atorvastatin 40 milliGRAM(s) Oral at bedtime  dextrose 40% Gel 15 Gram(s) Oral once PRN  dextrose 50% Injectable 12.5 Gram(s) IV Push once  dextrose 50% Injectable 25 Gram(s) IV Push once  dextrose 50% Injectable 25 Gram(s) IV Push once  glucagon  Injectable 1 milliGRAM(s) IntraMuscular once PRN  insulin lispro (HumaLOG) corrective regimen sliding scale   SubCutaneous at bedtime  insulin lispro (HumaLOG) corrective regimen sliding scale   SubCutaneous three times a day before meals    dextrose 5%. 1000 milliLiter(s) IV Continuous <Continuous>  ferrous    sulfate 325 milliGRAM(s) Oral daily  folic acid 1 milliGRAM(s) Oral daily  sodium chloride 1 Gram(s) Oral two times a day      FAMILY HISTORY:  No pertinent family history in first degree relatives      Non-contributory    SOCIAL HISTORY:    No tobacco, drugs or etoh    Allergies    No Known Allergies    Intolerances    	    REVIEW OF SYSTEMS:  as above  The rest of the 14 points ROS reviewed and except above they are unremarkable.        PHYSICAL EXAM:  T(C): 36.6 (05-13-18 @ 06:19), Max: 37.1 (05-12-18 @ 21:15)  HR: 77 (05-13-18 @ 06:19) (71 - 82)  BP: 120/71 (05-13-18 @ 06:19) (117/70 - 140/57)  RR: 20 (05-13-18 @ 06:19) (18 - 20)  SpO2: 95% (05-13-18 @ 06:19) (95% - 97%)  Wt(kg): --  I&O's Summary      Appearance: Normal	  HEENT:   Normal oral mucosa, PERRL, EOMI	  Cardiovascular: Normal S1 S2,    Murmur:   Neck: JVP normal  Respiratory: Lungs clear to auscultation  Gastrointestinal:  Soft, Non-tender, + BS	  Skin: normal   Neuro: No gross deficits.   Psychiatry:  Mood & affect appropriate  Ext: No edema    LABS/DATA:    TELEMETRY: 	    ECG:  	   	  CARDIAC MARKERS:                                  9.2    11.90 )-----------( 406      ( 13 May 2018 07:05 )             28.2     05-13    129<L>  |  91<L>  |  5<L>  ----------------------------<  123<H>  3.6   |  26  |  0.50    Ca    7.9<L>      13 May 2018 07:05      proBNP:   Lipid Profile:   HgA1c:   TSH:     < from: Transthoracic Echocardiogram (04.16.18 @ 07:08) >  Conclusions:  1. Normal left ventricular internal dimensions and wall  thicknesses.  2. Normal left ventricular systolic function. No segmental  wall motion abnormalities.  3. Normal diastolic function  4. Normal right ventricular sizeand systolic function.  5. Estimated pulmonary artery systolic pressure equals 36  mm Hg, assuming right atrial pressure equals 8 mm Hg,  consistent with borderline pulmonary pressures.  *** Compared with echocardiogram of 2/28/2017, no  significant changes noted.    < end of copied text >  < from: Nuclear Stress Test-Pharmacologic (02.24.17 @ 12:28) >  IMPRESSIONS:Abnormal Study  * Chest Pain: No chest pain with administration of  Regadenoson.  * Symptom: shortness of breath.  * HR Response: Appropriate.  * BP Response: Appropriate.  * Heart Rhythm: Sinus Rhythm - 57 BPM.  * Baseline ECG: No significant ST abnormalities.  * ECG Changes: ST Depression: 2 mm upsloping in leads II ,  III, aVF, V4, V5, V6 started at 02:00 min of infusion and  persisted 01:30 min into post infusion.  * Arrhythmia: None.  * There is a small, mild defect in apical wall that is  reversible, suggestive of mild ischemia.  * Post-stress gated wall motion analysis was performed  (LVEF = 67 %;LVEDV = 40 ml.)  * No previous Nuclear/Stress exam.    < end of copied text >

## 2018-05-13 NOTE — PROGRESS NOTE ADULT - SUBJECTIVE AND OBJECTIVE BOX
Patient is a 78y old  Female who presents with a chief complaint of Abdominal pain (08 May 2018 22:37)      SUBJECTIVE / OVERNIGHT EVENTS:  Pt still has lower abdominal pain, controlled with current regimen     MEDICATIONS  (STANDING):  atorvastatin 40 milliGRAM(s) Oral at bedtime  carvedilol 12.5 milliGRAM(s) Oral every 12 hours  dextrose 5%. 1000 milliLiter(s) (50 mL/Hr) IV Continuous <Continuous>  dextrose 50% Injectable 12.5 Gram(s) IV Push once  dextrose 50% Injectable 25 Gram(s) IV Push once  dextrose 50% Injectable 25 Gram(s) IV Push once  docusate sodium 100 milliGRAM(s) Oral two times a day  enoxaparin Injectable 40 milliGRAM(s) SubCutaneous every 24 hours  famotidine    Tablet 20 milliGRAM(s) Oral daily  ferrous    sulfate 325 milliGRAM(s) Oral daily  folic acid 1 milliGRAM(s) Oral daily  insulin lispro (HumaLOG) corrective regimen sliding scale   SubCutaneous at bedtime  insulin lispro (HumaLOG) corrective regimen sliding scale   SubCutaneous three times a day before meals  lisinopril 5 milliGRAM(s) Oral daily  polyethylene glycol 3350 17 Gram(s) Oral daily  senna 2 Tablet(s) Oral at bedtime  sodium chloride 1 Gram(s) Oral two times a day    MEDICATIONS  (PRN):  acetaminophen   Tablet. 650 milliGRAM(s) Oral every 6 hours PRN Mild Pain (1 - 3)  dextrose 40% Gel 15 Gram(s) Oral once PRN Blood Glucose LESS THAN 70 milliGRAM(s)/deciliter  glucagon  Injectable 1 milliGRAM(s) IntraMuscular once PRN Glucose LESS THAN 70 milligrams/deciliter  morphine  IR 15 milliGRAM(s) Oral every 4 hours PRN moderate to severe pain  ondansetron Injectable 4 milliGRAM(s) IV Push every 6 hours PRN Nausea      T(C): 37.3 (05-13-18 @ 17:10), Max: 37.3 (05-13-18 @ 17:10)  HR: 72 (05-13-18 @ 17:10) (72 - 82)  BP: 139/54 (05-13-18 @ 17:10) (120/71 - 139/54)  RR: 18 (05-13-18 @ 17:10) (18 - 20)  SpO2: 99% (05-13-18 @ 17:10) (95% - 99%)  CAPILLARY BLOOD GLUCOSE      POCT Blood Glucose.: 127 mg/dL (13 May 2018 17:48)  POCT Blood Glucose.: 210 mg/dL (13 May 2018 12:17)  POCT Blood Glucose.: 131 mg/dL (13 May 2018 08:27)  POCT Blood Glucose.: 273 mg/dL (12 May 2018 22:34)    I&O's Summary      PHYSICAL EXAM:  GENERAL: NAD, well-developed  HEAD:  Atraumatic, Normocephalic  EYES: EOMI, PERRLA, conjunctiva and sclera clear  NECK: Supple, No JVD  CHEST/LUNG: Clear to auscultation bilaterally; No wheeze  HEART: s1 s2, regular rhythm and rate   ABDOMEN: Soft, Nontender, Nondistended; Bowel sounds present  EXTREMITIES:  2+ Peripheral Pulses, No clubbing, cyanosis, or edema  PSYCH: AAOx3, calm   NEUROLOGY: non-focal  SKIN: No rashes or lesions    LABS:                        9.2    11.90 )-----------( 406      ( 13 May 2018 07:05 )             28.2     05-13    129<L>  |  91<L>  |  5<L>  ----------------------------<  123<H>  3.6   |  26  |  0.50    Ca    7.9<L>      13 May 2018 07:05                RADIOLOGY & ADDITIONAL TESTS:    Imaging Personally Reviewed:    Consultant(s) Notes Reviewed:      Care Discussed with Consultants/Other Providers: Patient is a 78y old  Female who presents with a chief complaint of Abdominal pain (08 May 2018 22:37)      SUBJECTIVE / OVERNIGHT EVENTS:  Pt still has lower abdominal pain, controlled with current regimen     MEDICATIONS  (STANDING):  atorvastatin 40 milliGRAM(s) Oral at bedtime  carvedilol 12.5 milliGRAM(s) Oral every 12 hours  dextrose 5%. 1000 milliLiter(s) (50 mL/Hr) IV Continuous <Continuous>  dextrose 50% Injectable 12.5 Gram(s) IV Push once  dextrose 50% Injectable 25 Gram(s) IV Push once  dextrose 50% Injectable 25 Gram(s) IV Push once  docusate sodium 100 milliGRAM(s) Oral two times a day  enoxaparin Injectable 40 milliGRAM(s) SubCutaneous every 24 hours  famotidine    Tablet 20 milliGRAM(s) Oral daily  ferrous    sulfate 325 milliGRAM(s) Oral daily  folic acid 1 milliGRAM(s) Oral daily  insulin lispro (HumaLOG) corrective regimen sliding scale   SubCutaneous at bedtime  insulin lispro (HumaLOG) corrective regimen sliding scale   SubCutaneous three times a day before meals  lisinopril 5 milliGRAM(s) Oral daily  polyethylene glycol 3350 17 Gram(s) Oral daily  senna 2 Tablet(s) Oral at bedtime  sodium chloride 1 Gram(s) Oral two times a day    MEDICATIONS  (PRN):  acetaminophen   Tablet. 650 milliGRAM(s) Oral every 6 hours PRN Mild Pain (1 - 3)  dextrose 40% Gel 15 Gram(s) Oral once PRN Blood Glucose LESS THAN 70 milliGRAM(s)/deciliter  glucagon  Injectable 1 milliGRAM(s) IntraMuscular once PRN Glucose LESS THAN 70 milligrams/deciliter  morphine  IR 15 milliGRAM(s) Oral every 4 hours PRN moderate to severe pain  ondansetron Injectable 4 milliGRAM(s) IV Push every 6 hours PRN Nausea      T(C): 37.3 (05-13-18 @ 17:10), Max: 37.3 (05-13-18 @ 17:10)  HR: 72 (05-13-18 @ 17:10) (72 - 82)  BP: 139/54 (05-13-18 @ 17:10) (120/71 - 139/54)  RR: 18 (05-13-18 @ 17:10) (18 - 20)  SpO2: 99% (05-13-18 @ 17:10) (95% - 99%)  CAPILLARY BLOOD GLUCOSE      POCT Blood Glucose.: 127 mg/dL (13 May 2018 17:48)  POCT Blood Glucose.: 210 mg/dL (13 May 2018 12:17)  POCT Blood Glucose.: 131 mg/dL (13 May 2018 08:27)  POCT Blood Glucose.: 273 mg/dL (12 May 2018 22:34)    I&O's Summary      PHYSICAL EXAM:  GENERAL: NAD, well-developed  HEAD:  Atraumatic, Normocephalic  EYES: EOMI, PERRLA, conjunctiva and sclera clear  NECK: Supple, No JVD  CHEST/LUNG: Clear to auscultation bilaterally; No wheeze  HEART: s1 s2, regular rhythm and rate   ABDOMEN: Soft, mild tenderness in lower abd, no guarding or rebound, Nondistended; Bowel sounds present  EXTREMITIES:  2+ Peripheral Pulses, No clubbing, cyanosis, or edema  PSYCH: AAOx3, calm   NEUROLOGY: non-focal  SKIN: No rashes or lesions    LABS:                        9.2    11.90 )-----------( 406      ( 13 May 2018 07:05 )             28.2     05-13    129<L>  |  91<L>  |  5<L>  ----------------------------<  123<H>  3.6   |  26  |  0.50    Ca    7.9<L>      13 May 2018 07:05                RADIOLOGY & ADDITIONAL TESTS:    Imaging Personally Reviewed:    Consultant(s) Notes Reviewed:      Care Discussed with Consultants/Other Providers: Patient is a 78y old  Female who presents with a chief complaint of Abdominal pain (08 May 2018 22:37)      SUBJECTIVE / OVERNIGHT EVENTS:  Pt still has lower abdominal pain, controlled with current regimen     MEDICATIONS  (STANDING):  atorvastatin 40 milliGRAM(s) Oral at bedtime  carvedilol 12.5 milliGRAM(s) Oral every 12 hours  dextrose 5%. 1000 milliLiter(s) (50 mL/Hr) IV Continuous <Continuous>  dextrose 50% Injectable 12.5 Gram(s) IV Push once  dextrose 50% Injectable 25 Gram(s) IV Push once  dextrose 50% Injectable 25 Gram(s) IV Push once  docusate sodium 100 milliGRAM(s) Oral two times a day  enoxaparin Injectable 40 milliGRAM(s) SubCutaneous every 24 hours  famotidine    Tablet 20 milliGRAM(s) Oral daily  ferrous    sulfate 325 milliGRAM(s) Oral daily  folic acid 1 milliGRAM(s) Oral daily  insulin lispro (HumaLOG) corrective regimen sliding scale   SubCutaneous at bedtime  insulin lispro (HumaLOG) corrective regimen sliding scale   SubCutaneous three times a day before meals  lisinopril 5 milliGRAM(s) Oral daily  polyethylene glycol 3350 17 Gram(s) Oral daily  senna 2 Tablet(s) Oral at bedtime  sodium chloride 1 Gram(s) Oral two times a day    MEDICATIONS  (PRN):  acetaminophen   Tablet. 650 milliGRAM(s) Oral every 6 hours PRN Mild Pain (1 - 3)  dextrose 40% Gel 15 Gram(s) Oral once PRN Blood Glucose LESS THAN 70 milliGRAM(s)/deciliter  glucagon  Injectable 1 milliGRAM(s) IntraMuscular once PRN Glucose LESS THAN 70 milligrams/deciliter  morphine  IR 15 milliGRAM(s) Oral every 4 hours PRN moderate to severe pain  ondansetron Injectable 4 milliGRAM(s) IV Push every 6 hours PRN Nausea      T(C): 37.3 (05-13-18 @ 17:10), Max: 37.3 (05-13-18 @ 17:10)  HR: 72 (05-13-18 @ 17:10) (72 - 82)  BP: 139/54 (05-13-18 @ 17:10) (120/71 - 139/54)  RR: 18 (05-13-18 @ 17:10) (18 - 20)  SpO2: 99% (05-13-18 @ 17:10) (95% - 99%)  CAPILLARY BLOOD GLUCOSE      POCT Blood Glucose.: 127 mg/dL (13 May 2018 17:48)  POCT Blood Glucose.: 210 mg/dL (13 May 2018 12:17)  POCT Blood Glucose.: 131 mg/dL (13 May 2018 08:27)  POCT Blood Glucose.: 273 mg/dL (12 May 2018 22:34)    I&O's Summary      PHYSICAL EXAM:  GENERAL: NAD, well-developed  HEAD:  Atraumatic, Normocephalic  EYES: EOMI, PERRLA, conjunctiva and sclera clear  NECK: Supple, No JVD  CHEST/LUNG: Clear to auscultation bilaterally; No wheeze  HEART: s1 s2, regular rhythm and rate   ABDOMEN: Soft, mild tenderness in lower abd, no guarding or rebound, Nondistended; Bowel sounds present  EXTREMITIES:  s/p right AKA  PSYCH: AAOx3, calm   NEUROLOGY: non-focal  SKIN: No rashes or lesions    LABS:                        9.2    11.90 )-----------( 406      ( 13 May 2018 07:05 )             28.2     05-13    129<L>  |  91<L>  |  5<L>  ----------------------------<  123<H>  3.6   |  26  |  0.50    Ca    7.9<L>      13 May 2018 07:05                RADIOLOGY & ADDITIONAL TESTS:    Imaging Personally Reviewed:    Consultant(s) Notes Reviewed:      Care Discussed with Consultants/Other Providers:

## 2018-05-14 LAB
BASOPHILS # BLD AUTO: 0.02 K/UL — SIGNIFICANT CHANGE UP (ref 0–0.2)
BASOPHILS NFR BLD AUTO: 0.2 % — SIGNIFICANT CHANGE UP (ref 0–2)
BUN SERPL-MCNC: 6 MG/DL — LOW (ref 7–23)
CALCIUM SERPL-MCNC: 8.1 MG/DL — LOW (ref 8.4–10.5)
CHLORIDE SERPL-SCNC: 91 MMOL/L — LOW (ref 98–107)
CO2 SERPL-SCNC: 25 MMOL/L — SIGNIFICANT CHANGE UP (ref 22–31)
CREAT SERPL-MCNC: 0.51 MG/DL — SIGNIFICANT CHANGE UP (ref 0.5–1.3)
EOSINOPHIL # BLD AUTO: 0.38 K/UL — SIGNIFICANT CHANGE UP (ref 0–0.5)
EOSINOPHIL NFR BLD AUTO: 3.1 % — SIGNIFICANT CHANGE UP (ref 0–6)
GLUCOSE BLDC GLUCOMTR-MCNC: 116 MG/DL — HIGH (ref 70–99)
GLUCOSE BLDC GLUCOMTR-MCNC: 211 MG/DL — HIGH (ref 70–99)
GLUCOSE SERPL-MCNC: 117 MG/DL — HIGH (ref 70–99)
HCT VFR BLD CALC: 26.2 % — LOW (ref 34.5–45)
HGB BLD-MCNC: 8.6 G/DL — LOW (ref 11.5–15.5)
IMM GRANULOCYTES # BLD AUTO: 0.08 # — SIGNIFICANT CHANGE UP
IMM GRANULOCYTES NFR BLD AUTO: 0.6 % — SIGNIFICANT CHANGE UP (ref 0–1.5)
LYMPHOCYTES # BLD AUTO: 19.9 % — SIGNIFICANT CHANGE UP (ref 13–44)
LYMPHOCYTES # BLD AUTO: 2.47 K/UL — SIGNIFICANT CHANGE UP (ref 1–3.3)
MCHC RBC-ENTMCNC: 28.6 PG — SIGNIFICANT CHANGE UP (ref 27–34)
MCHC RBC-ENTMCNC: 32.8 % — SIGNIFICANT CHANGE UP (ref 32–36)
MCV RBC AUTO: 87 FL — SIGNIFICANT CHANGE UP (ref 80–100)
MONOCYTES # BLD AUTO: 1.05 K/UL — HIGH (ref 0–0.9)
MONOCYTES NFR BLD AUTO: 8.5 % — SIGNIFICANT CHANGE UP (ref 2–14)
NEUTROPHILS # BLD AUTO: 8.42 K/UL — HIGH (ref 1.8–7.4)
NEUTROPHILS NFR BLD AUTO: 67.7 % — SIGNIFICANT CHANGE UP (ref 43–77)
NRBC # FLD: 0 — SIGNIFICANT CHANGE UP
PLATELET # BLD AUTO: 391 K/UL — SIGNIFICANT CHANGE UP (ref 150–400)
PMV BLD: 8.9 FL — SIGNIFICANT CHANGE UP (ref 7–13)
POTASSIUM SERPL-MCNC: 4 MMOL/L — SIGNIFICANT CHANGE UP (ref 3.5–5.3)
POTASSIUM SERPL-SCNC: 4 MMOL/L — SIGNIFICANT CHANGE UP (ref 3.5–5.3)
RBC # BLD: 3.01 M/UL — LOW (ref 3.8–5.2)
RBC # FLD: 15.3 % — HIGH (ref 10.3–14.5)
SODIUM SERPL-SCNC: 129 MMOL/L — LOW (ref 135–145)
WBC # BLD: 12.42 K/UL — HIGH (ref 3.8–10.5)
WBC # FLD AUTO: 12.42 K/UL — HIGH (ref 3.8–10.5)

## 2018-05-14 PROCEDURE — 99222 1ST HOSP IP/OBS MODERATE 55: CPT | Mod: GC

## 2018-05-14 PROCEDURE — 99233 SBSQ HOSP IP/OBS HIGH 50: CPT

## 2018-05-14 RX ORDER — SODIUM CHLORIDE 9 MG/ML
500 INJECTION INTRAMUSCULAR; INTRAVENOUS; SUBCUTANEOUS ONCE
Qty: 0 | Refills: 0 | Status: COMPLETED | OUTPATIENT
Start: 2018-05-14 | End: 2018-05-14

## 2018-05-14 RX ORDER — POLYETHYLENE GLYCOL 3350 17 G/17G
17 POWDER, FOR SOLUTION ORAL
Qty: 0 | Refills: 0 | Status: DISCONTINUED | OUTPATIENT
Start: 2018-05-14 | End: 2018-05-19

## 2018-05-14 RX ADMIN — Medication 325 MILLIGRAM(S): at 11:59

## 2018-05-14 RX ADMIN — Medication 1 MILLIGRAM(S): at 11:59

## 2018-05-14 RX ADMIN — MORPHINE SULFATE 15 MILLIGRAM(S): 50 CAPSULE, EXTENDED RELEASE ORAL at 09:59

## 2018-05-14 RX ADMIN — CARVEDILOL PHOSPHATE 12.5 MILLIGRAM(S): 80 CAPSULE, EXTENDED RELEASE ORAL at 06:06

## 2018-05-14 RX ADMIN — LISINOPRIL 5 MILLIGRAM(S): 2.5 TABLET ORAL at 06:06

## 2018-05-14 RX ADMIN — MORPHINE SULFATE 15 MILLIGRAM(S): 50 CAPSULE, EXTENDED RELEASE ORAL at 06:57

## 2018-05-14 RX ADMIN — MORPHINE SULFATE 15 MILLIGRAM(S): 50 CAPSULE, EXTENDED RELEASE ORAL at 18:30

## 2018-05-14 RX ADMIN — MORPHINE SULFATE 15 MILLIGRAM(S): 50 CAPSULE, EXTENDED RELEASE ORAL at 19:17

## 2018-05-14 RX ADMIN — Medication 1: at 09:10

## 2018-05-14 RX ADMIN — SODIUM CHLORIDE 1000 MILLILITER(S): 9 INJECTION INTRAMUSCULAR; INTRAVENOUS; SUBCUTANEOUS at 23:50

## 2018-05-14 RX ADMIN — SODIUM CHLORIDE 1 GRAM(S): 9 INJECTION INTRAMUSCULAR; INTRAVENOUS; SUBCUTANEOUS at 06:06

## 2018-05-14 RX ADMIN — POLYETHYLENE GLYCOL 3350 17 GRAM(S): 17 POWDER, FOR SOLUTION ORAL at 17:30

## 2018-05-14 RX ADMIN — Medication 100 MILLIGRAM(S): at 06:06

## 2018-05-14 RX ADMIN — MORPHINE SULFATE 15 MILLIGRAM(S): 50 CAPSULE, EXTENDED RELEASE ORAL at 09:07

## 2018-05-14 RX ADMIN — FAMOTIDINE 20 MILLIGRAM(S): 10 INJECTION INTRAVENOUS at 11:59

## 2018-05-14 RX ADMIN — Medication 100 MILLIGRAM(S): at 17:30

## 2018-05-14 RX ADMIN — SODIUM CHLORIDE 1 GRAM(S): 9 INJECTION INTRAMUSCULAR; INTRAVENOUS; SUBCUTANEOUS at 17:30

## 2018-05-14 RX ADMIN — MORPHINE SULFATE 15 MILLIGRAM(S): 50 CAPSULE, EXTENDED RELEASE ORAL at 06:06

## 2018-05-14 RX ADMIN — CARVEDILOL PHOSPHATE 12.5 MILLIGRAM(S): 80 CAPSULE, EXTENDED RELEASE ORAL at 17:30

## 2018-05-14 RX ADMIN — ATORVASTATIN CALCIUM 40 MILLIGRAM(S): 80 TABLET, FILM COATED ORAL at 22:28

## 2018-05-14 RX ADMIN — ONDANSETRON 4 MILLIGRAM(S): 8 TABLET, FILM COATED ORAL at 18:20

## 2018-05-14 NOTE — PROGRESS NOTE ADULT - ATTENDING COMMENTS
Rectal  Cancer- Pain better controlled today.  f/u Rt and Onc  daughter Louann at bedside tel # [phone] Rectal  Cancer- Pain better controlled today.  f/u Rt and Onc  daughter Louann at bedside tel # [phone]    D/w Onc and Rt onc- positive Rectal Ca  Plan for Rt then out patient follow up for Surgery.  No Chemotherapy planned. Rectal  Cancer- Pain better controlled today.  f/u Rt and Onc  daughter Louann at bedside tel # [phone]    D/w Onc and Rt onc- positive Rectal Ca  Plan for Rt then out patient follow up for Surgery.  5 fraction of rt- out patient f/u with surgery  Dr Sosa  No Chemotherapy planned.  Plan d/w Louann  [phone]

## 2018-05-14 NOTE — PROGRESS NOTE ADULT - PROBLEM SELECTOR PLAN 2
-Spiculated lesion in RUL ( 6mm).  -Outpt f/u with CT chest in 1-3 months.    Please page at [phone] with questions or concerns.

## 2018-05-14 NOTE — PROGRESS NOTE ADULT - ASSESSMENT
PreOp  Based on current ACC/AHA guidelines, patient history and physical exam, the patient is considered to have elevated risk  will get stress test to make sure her ischemic size / burden has not increased since last year    CAD  stress test  cont statin  low dose asa once deemed safe    HTN  stable    DM  Monitor finger stick. Insulin coverage. Diabetic education and Diabetic diet. Consider nutrition consultation.

## 2018-05-14 NOTE — PROGRESS NOTE ADULT - SUBJECTIVE AND OBJECTIVE BOX
INTERVAL HPI/OVERNIGHT EVENTS:  Patient S&E at bedside.   No o/n events, continues to have minimal pain.     VITAL SIGNS:  T(F): 98.7 (05-14-18 @ 15:24)  HR: 81 (05-14-18 @ 15:24)  BP: 133/63 (05-14-18 @ 15:24)  RR: 18 (05-14-18 @ 15:24)  SpO2: 94% (05-14-18 @ 15:24)  Wt(kg): --    PHYSICAL EXAM:    Constitutional: WDWN, NAD,   Eyes: PERRL, EOMI, sclera non-icteric  Neck: supple, no masses, no JVD  Respiratory: CTA b/l, good air entry b/l,  Cardiovascular: RRR, normal S1S2, no M/R/G  Gastrointestinal: soft, BS present  Extremities: WWP, no c/c/e  Neurological: AAOx3  Skin: Normal temperature    MEDICATIONS  (STANDING):  atorvastatin 40 milliGRAM(s) Oral at bedtime  carvedilol 12.5 milliGRAM(s) Oral every 12 hours  dextrose 5%. 1000 milliLiter(s) (50 mL/Hr) IV Continuous <Continuous>  dextrose 50% Injectable 12.5 Gram(s) IV Push once  dextrose 50% Injectable 25 Gram(s) IV Push once  dextrose 50% Injectable 25 Gram(s) IV Push once  docusate sodium 100 milliGRAM(s) Oral two times a day  enoxaparin Injectable 40 milliGRAM(s) SubCutaneous every 24 hours  famotidine    Tablet 20 milliGRAM(s) Oral daily  ferrous    sulfate 325 milliGRAM(s) Oral daily  folic acid 1 milliGRAM(s) Oral daily  insulin lispro (HumaLOG) corrective regimen sliding scale   SubCutaneous at bedtime  insulin lispro (HumaLOG) corrective regimen sliding scale   SubCutaneous three times a day before meals  lisinopril 5 milliGRAM(s) Oral daily  polyethylene glycol 3350 17 Gram(s) Oral two times a day  senna 2 Tablet(s) Oral at bedtime  sodium chloride 1 Gram(s) Oral two times a day    MEDICATIONS  (PRN):  acetaminophen   Tablet. 650 milliGRAM(s) Oral every 6 hours PRN Mild Pain (1 - 3)  dextrose 40% Gel 15 Gram(s) Oral once PRN Blood Glucose LESS THAN 70 milliGRAM(s)/deciliter  glucagon  Injectable 1 milliGRAM(s) IntraMuscular once PRN Glucose LESS THAN 70 milligrams/deciliter  morphine  IR 15 milliGRAM(s) Oral every 4 hours PRN moderate to severe pain  ondansetron Injectable 4 milliGRAM(s) IV Push every 6 hours PRN Nausea      Allergies    No Known Allergies    Intolerances        LABS:                        8.6    12.42 )-----------( 391      ( 14 May 2018 06:35 )             26.2     05-14    129<L>  |  91<L>  |  6<L>  ----------------------------<  117<H>  4.0   |  25  |  0.51    Ca    8.1<L>      14 May 2018 06:35            RADIOLOGY & ADDITIONAL TESTS:  Studies reviewed.

## 2018-05-14 NOTE — CONSULT NOTE ADULT - SUBJECTIVE AND OBJECTIVE BOX
HPI:  78  y.o. woman with history of PAD and rectal mass (adenocarcinoma) who was sent to the ER for evaluation of abdominal pain and bleeding and painful rectal mass by her oncologist. Patient reports severe suprapubic pain, radiates to the rectum, aggravated with movement and palpation, alleviated with pain medications. Patient also reports nausea. As per daughter's daughter reports that the patient does not get out of bed. No other complaints at present.   At bedside she was very lethargic today and not interested in speaking.  I then called and spoke with her son Vineet.  He informed me she is interested in radiation treatment and would sign consent if we deemed her a candidate.     She was recently admitted to [facility] with rectal pain and BRBPR s/p colonoscopy/ lower EUS- which showed rectal mass (T3,N1,MX), s/p biopsy which showed high grade dysplasia. Patient was d/c home with f/u at [facility] for consideration of treatment.     Since d/c patient has been feeling increase suprapubic pain, rectal pain, occasional blood per rectum. Pain is not relieved by pain medication. She also has increased nausea and decreased appetite. She has weight loss.     Patient is comprehensive, and willing to receive neoadjuvant chemotherapy and radiation therapy before the surgery, if indicated. She also wants to make her own medical decision and deemed to fully understand plans. I also discussed with her that the surgery that she may get after neoadjuvant treatment will include permanent colostomy placement and it may affect her quality of life. She was given about the options that include chemo-radiation vs high dose radiation followed by the surgery. She is unsure about the chemotherapy but is willing to undergo radiation and surgery, knowing that she may have permanent colostomy in rest of her life. Dr. Sosa was contacted by Dr. Ambrose as well.         Allergies    No Known Allergies    Intolerances        ROS: [  ] Fever  [  ] Chills  [  ]Chest Pain [  ] SOB  [  ]Cough [  ] N/V  [  ] Diarrhea [  ]Constipation [X  ]Other ROS: abdominal pain  [  ] ROS otherwise negative    PAST MEDICAL & SURGICAL HISTORY:  Rectal mass  Gangrene of foot  Coronary artery disease involving native coronary artery of native heart without angina pectoris  Status post above knee amputation of right lower extremity  No significant past surgical history      FAMILY HISTORY:  No pertinent family history in first degree relatives      MEDICATIONS  (STANDING):  atorvastatin 40 milliGRAM(s) Oral at bedtime  carvedilol 12.5 milliGRAM(s) Oral every 12 hours  dextrose 5%. 1000 milliLiter(s) (50 mL/Hr) IV Continuous <Continuous>  dextrose 50% Injectable 12.5 Gram(s) IV Push once  dextrose 50% Injectable 25 Gram(s) IV Push once  dextrose 50% Injectable 25 Gram(s) IV Push once  docusate sodium 100 milliGRAM(s) Oral two times a day  enoxaparin Injectable 40 milliGRAM(s) SubCutaneous every 24 hours  famotidine    Tablet 20 milliGRAM(s) Oral daily  ferrous    sulfate 325 milliGRAM(s) Oral daily  folic acid 1 milliGRAM(s) Oral daily  insulin lispro (HumaLOG) corrective regimen sliding scale   SubCutaneous at bedtime  insulin lispro (HumaLOG) corrective regimen sliding scale   SubCutaneous three times a day before meals  lisinopril 5 milliGRAM(s) Oral daily  polyethylene glycol 3350 17 Gram(s) Oral two times a day  senna 2 Tablet(s) Oral at bedtime  sodium chloride 1 Gram(s) Oral two times a day    MEDICATIONS  (PRN):  acetaminophen   Tablet. 650 milliGRAM(s) Oral every 6 hours PRN Mild Pain (1 - 3)  dextrose 40% Gel 15 Gram(s) Oral once PRN Blood Glucose LESS THAN 70 milliGRAM(s)/deciliter  glucagon  Injectable 1 milliGRAM(s) IntraMuscular once PRN Glucose LESS THAN 70 milligrams/deciliter  morphine  IR 15 milliGRAM(s) Oral every 4 hours PRN moderate to severe pain  ondansetron Injectable 4 milliGRAM(s) IV Push every 6 hours PRN Nausea      PHYSICAL EXAM  Vital Signs Last 24 Hrs  T(C): 37.5 (14 May 2018 04:47), Max: 37.5 (14 May 2018 04:47)  T(F): 99.5 (14 May 2018 04:47), Max: 99.5 (14 May 2018 04:47)  HR: 71 (14 May 2018 04:47) (71 - 81)  BP: 137/52 (14 May 2018 04:47) (119/68 - 139/54)  BP(mean): --  RR: 18 (14 May 2018 04:47) (18 - 18)  SpO2: 97% (14 May 2018 04:47) (97% - 99%)    General: Well nourished, well developed, no acute distress  HEENT: NC/AT; EOMI, PERRL, sclera nonicteric; external ears normal; no rhinorrhea or epistaxis; mucous membranes moist; oropharynx clear and without erythema  CV: NR, RR; no appreciable r/m/g  Lungs: CTAB, no increased work of breathing  Abodmen: Bowel sounds present; soft, NTND  MSK: Vertebral spine non-tender to palpation  Neuro: AAOx3; cranial nerves II-XII intact; strength 5/5 in upper and lower extremities; sensation to light touch in tact bilaterally.  Psych: Full affect; mood congruent  Skin: no visible rashes on limited examination            ASSESSMENT/PLAN    DINO PRYRO is a 78y woman with abdominal pain and a rectal mass with pathology confirming adenocarcinoma moderately differentiated.  I spoke with her son (Vineet) who confirmed she is amenable to radiation treatment possibly followed by surgery with Dr. Sosa.  She does not seem interested in chemotherapy at this time.     We plan to begin her palliative treatment with a CT simulation possibly for tomorrow or Wednesday.     We discussed the use of palliative radiation in this setting, namely to improve quality of life through the reduction of symptoms.  We talked about the risks, benefits, acute and long term side effects, as well as expected treatment outcomes.  She was given the opportunity to ask questions, which were answered to his/her apparent satisfaction.      We will obtain written consent to proceed with radiation therapy. We will arrange for inpatient treatment.  Thank you for this consult.   We are available at: [phone].

## 2018-05-14 NOTE — PROGRESS NOTE ADULT - ASSESSMENT
79 y/o F, with PAD, CAD with recent admission to [facility], with rectal pain, weight loss, s/p colonoscopy/lower EUS demonstrating rectal mass ( 10mm) -T3,N1,Mx s/p biopsy showing high grade dysplasia. Of note, staging CT chest showed right sided spiculated lesion ( 6mm) - biopsy reveals moderately differentiated adenoca. Oncology was consulted for further evaluation for treatment options.

## 2018-05-14 NOTE — PROGRESS NOTE ADULT - SUBJECTIVE AND OBJECTIVE BOX
[facility]:[id number],   78yFemale followed for:  No Known Allergies    PAST MEDICAL & SURGICAL HISTORY:  Rectal mass  Gangrene of foot  Coronary artery disease involving native coronary artery of native heart without angina pectoris  Status post above knee amputation of right lower extremity    FAMILY HISTORY:  No pertinent family history in first degree relatives    MEDICATIONS  (STANDING):  atorvastatin 40 milliGRAM(s) Oral at bedtime  carvedilol 12.5 milliGRAM(s) Oral every 12 hours  dextrose 5%. 1000 milliLiter(s) (50 mL/Hr) IV Continuous <Continuous>  dextrose 50% Injectable 12.5 Gram(s) IV Push once  dextrose 50% Injectable 25 Gram(s) IV Push once  dextrose 50% Injectable 25 Gram(s) IV Push once  docusate sodium 100 milliGRAM(s) Oral two times a day  enoxaparin Injectable 40 milliGRAM(s) SubCutaneous every 24 hours  famotidine    Tablet 20 milliGRAM(s) Oral daily  ferrous    sulfate 325 milliGRAM(s) Oral daily  folic acid 1 milliGRAM(s) Oral daily  insulin lispro (HumaLOG) corrective regimen sliding scale   SubCutaneous at bedtime  insulin lispro (HumaLOG) corrective regimen sliding scale   SubCutaneous three times a day before meals  lisinopril 5 milliGRAM(s) Oral daily  polyethylene glycol 3350 17 Gram(s) Oral two times a day  senna 2 Tablet(s) Oral at bedtime  sodium chloride 1 Gram(s) Oral two times a day    MEDICATIONS  (PRN):  acetaminophen   Tablet. 650 milliGRAM(s) Oral every 6 hours PRN Mild Pain (1 - 3)  dextrose 40% Gel 15 Gram(s) Oral once PRN Blood Glucose LESS THAN 70 milliGRAM(s)/deciliter  glucagon  Injectable 1 milliGRAM(s) IntraMuscular once PRN Glucose LESS THAN 70 milligrams/deciliter  morphine  IR 15 milliGRAM(s) Oral every 4 hours PRN moderate to severe pain  ondansetron Injectable 4 milliGRAM(s) IV Push every 6 hours PRN Nausea      Vital Signs Last 24 Hrs  T(C): 37.5 (14 May 2018 04:47), Max: 37.5 (14 May 2018 04:47)  T(F): 99.5 (14 May 2018 04:47), Max: 99.5 (14 May 2018 04:47)  HR: 71 (14 May 2018 04:47) (71 - 81)  BP: 137/52 (14 May 2018 04:47) (119/68 - 139/54)  BP(mean): --  RR: 18 (14 May 2018 04:47) (18 - 18)  SpO2: 97% (14 May 2018 04:47) (97% - 99%)  nc/at  s1s2  cta  soft, nt, nd no guarding or rebound  no c/c/e    CBC Full  -  ( 14 May 2018 06:35 )  WBC Count : 12.42 K/uL  Hemoglobin : 8.6 g/dL  Hematocrit : 26.2 %  Platelet Count - Automated : 391 K/uL  Mean Cell Volume : 87.0 fL  Mean Cell Hemoglobin : 28.6 pg  Mean Cell Hemoglobin Concentration : 32.8 %  Auto Neutrophil # : 8.42 K/uL  Auto Lymphocyte # : 2.47 K/uL  Auto Monocyte # : 1.05 K/uL  Auto Eosinophil # : 0.38 K/uL  Auto Basophil # : 0.02 K/uL  Auto Neutrophil % : 67.7 %  Auto Lymphocyte % : 19.9 %  Auto Monocyte % : 8.5 %  Auto Eosinophil % : 3.1 %  Auto Basophil % : 0.2 %    05-14    129<L>  |  91<L>  |  6<L>  ----------------------------<  117<H>  4.0   |  25  |  0.51    Ca    8.1<L>      14 May 2018 06:35

## 2018-05-14 NOTE — PROGRESS NOTE ADULT - SUBJECTIVE AND OBJECTIVE BOX
Patient is a 78y old  Female who presents with a chief complaint of Abdominal pain (08 May 2018 22:37)      SUBJECTIVE / OVERNIGHT EVENTS:  Patient seen with daughter Louann at bedside [phone]  Louann translated and explained her overnight pelvic pain is better controlled today and patient is comfortable.    MEDICATIONS  (STANDING):  atorvastatin 40 milliGRAM(s) Oral at bedtime  carvedilol 12.5 milliGRAM(s) Oral every 12 hours  dextrose 5%. 1000 milliLiter(s) (50 mL/Hr) IV Continuous <Continuous>  dextrose 50% Injectable 12.5 Gram(s) IV Push once  dextrose 50% Injectable 25 Gram(s) IV Push once  dextrose 50% Injectable 25 Gram(s) IV Push once  docusate sodium 100 milliGRAM(s) Oral two times a day  enoxaparin Injectable 40 milliGRAM(s) SubCutaneous every 24 hours  famotidine    Tablet 20 milliGRAM(s) Oral daily  ferrous    sulfate 325 milliGRAM(s) Oral daily  folic acid 1 milliGRAM(s) Oral daily  insulin lispro (HumaLOG) corrective regimen sliding scale   SubCutaneous at bedtime  insulin lispro (HumaLOG) corrective regimen sliding scale   SubCutaneous three times a day before meals  lisinopril 5 milliGRAM(s) Oral daily  polyethylene glycol 3350 17 Gram(s) Oral two times a day  senna 2 Tablet(s) Oral at bedtime  sodium chloride 1 Gram(s) Oral two times a day    MEDICATIONS  (PRN):  acetaminophen   Tablet. 650 milliGRAM(s) Oral every 6 hours PRN Mild Pain (1 - 3)  dextrose 40% Gel 15 Gram(s) Oral once PRN Blood Glucose LESS THAN 70 milliGRAM(s)/deciliter  glucagon  Injectable 1 milliGRAM(s) IntraMuscular once PRN Glucose LESS THAN 70 milligrams/deciliter  morphine  IR 15 milliGRAM(s) Oral every 4 hours PRN moderate to severe pain  ondansetron Injectable 4 milliGRAM(s) IV Push every 6 hours PRN Nausea        CAPILLARY BLOOD GLUCOSE      POCT Blood Glucose.: 123 mg/dL (14 May 2018 12:38)  POCT Blood Glucose.: 152 mg/dL (14 May 2018 08:23)  POCT Blood Glucose.: 217 mg/dL (13 May 2018 22:06)  POCT Blood Glucose.: 127 mg/dL (13 May 2018 17:48)    I&O's Summary      PHYSICAL EXAM:  Vital Signs Last 24 Hrs  T(C): 37.5 (14 May 2018 04:47), Max: 37.5 (14 May 2018 04:47)  T(F): 99.5 (14 May 2018 04:47), Max: 99.5 (14 May 2018 04:47)  HR: 71 (14 May 2018 04:47) (71 - 81)  BP: 137/52 (14 May 2018 04:47) (119/68 - 139/54)  BP(mean): --  RR: 18 (14 May 2018 04:47) (18 - 18)  SpO2: 97% (14 May 2018 04:47) (97% - 99%)  GENERAL: NAD, well-developed  HEAD:  Atraumatic, Normocephalic  EYES: EOMI, PERRLA, conjunctiva and sclera clear  NECK: Supple, No JVD  CHEST/LUNG: Clear to auscultation bilaterally; No wheeze  HEART: Regular rate and rhythm; No murmurs, rubs, or gallops  ABDOMEN: Soft, Nontender, Nondistended; Bowel sounds present  EXTREMITIES:  2+ Peripheral Pulses, No clubbing, cyanosis, or edema  R LE AKA  PSYCH: AAOx3  NEUROLOGY: non-focal  SKIN: No rashes or lesions    LABS:                        8.6    12.42 )-----------( 391      ( 14 May 2018 06:35 )             26.2     05-14    129<L>  |  91<L>  |  6<L>  ----------------------------<  117<H>  4.0   |  25  |  0.51    Ca    8.1<L>      14 May 2018 06:35        RADIOLOGY & ADDITIONAL TESTS:  < from: CT Abdomen and Pelvis w/ Oral Cont and w/ IV Cont (04.10.18 @ 13:37) >  MPRESSION:     Rectal neoplasm.    A 4 mm left lower lobe pulmonary nodule.    < end of copied text >      Imaging Personally Reviewed:    Consultant(s) Notes Reviewed:      Care Discussed with Consultants/Other Providers:

## 2018-05-14 NOTE — PROGRESS NOTE ADULT - SUBJECTIVE AND OBJECTIVE BOX
Subjective: Patient seen and examined. No new events except as noted.     SUBJECTIVE/ROS:  no cp or sob       MEDICATIONS:  MEDICATIONS  (STANDING):  atorvastatin 40 milliGRAM(s) Oral at bedtime  carvedilol 12.5 milliGRAM(s) Oral every 12 hours  dextrose 5%. 1000 milliLiter(s) (50 mL/Hr) IV Continuous <Continuous>  dextrose 50% Injectable 12.5 Gram(s) IV Push once  dextrose 50% Injectable 25 Gram(s) IV Push once  dextrose 50% Injectable 25 Gram(s) IV Push once  docusate sodium 100 milliGRAM(s) Oral two times a day  enoxaparin Injectable 40 milliGRAM(s) SubCutaneous every 24 hours  famotidine    Tablet 20 milliGRAM(s) Oral daily  ferrous    sulfate 325 milliGRAM(s) Oral daily  folic acid 1 milliGRAM(s) Oral daily  insulin lispro (HumaLOG) corrective regimen sliding scale   SubCutaneous at bedtime  insulin lispro (HumaLOG) corrective regimen sliding scale   SubCutaneous three times a day before meals  lisinopril 5 milliGRAM(s) Oral daily  polyethylene glycol 3350 17 Gram(s) Oral two times a day  senna 2 Tablet(s) Oral at bedtime  sodium chloride 1 Gram(s) Oral two times a day      PHYSICAL EXAM:  T(C): 37.5 (05-14-18 @ 04:47), Max: 37.5 (05-14-18 @ 04:47)  HR: 71 (05-14-18 @ 04:47) (71 - 81)  BP: 137/52 (05-14-18 @ 04:47) (119/68 - 139/54)  RR: 18 (05-14-18 @ 04:47) (18 - 18)  SpO2: 97% (05-14-18 @ 04:47) (97% - 99%)  Wt(kg): --  I&O's Summary        JVP: Normal  Neck: supple  Lung: clear   CV: S1 S2 , Murmur:  Abd: soft  Ext: No edema  neuro: Awake / alert  Psych: flat affect  Skin: normal       LABS/DATA:    CARDIAC MARKERS:                                8.6    12.42 )-----------( 391      ( 14 May 2018 06:35 )             26.2     05-14    129<L>  |  91<L>  |  6<L>  ----------------------------<  117<H>  4.0   |  25  |  0.51    Ca    8.1<L>      14 May 2018 06:35      proBNP:   Lipid Profile:   HgA1c:   TSH:     TELE:  EKG:  Sinus rhythm , 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

## 2018-05-14 NOTE — PROGRESS NOTE ADULT - ASSESSMENT
78  y.o. woman with history of PAD and rectal mass who was sent to the ER for evaluation of abdominal pain and rectal mass by her oncologist. Patient reports severe suprapubic pain, radiates to the rectum, aggravated with movement and palpation, alleviated with pain medications. Seen by Pallitive pain, Oncology and Rt onc.  Pain is better controlled.  Bxp- Positive Rectal Cancer.

## 2018-05-14 NOTE — PROGRESS NOTE ADULT - PROBLEM SELECTOR PLAN 1
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  -Discussed with patient and family. She declined chemotherapy.   -Will recommend rad/onc evaluation for high dose XRT followed by surgery. Rad/onc and surg/onc on board. 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  -Discussed with patient and family. She declined chemotherapy.   -Will recommend rad/onc evaluation for short course high dose XRT followed by surgery. Rad/onc and surg/onc on board.

## 2018-05-15 LAB
BASOPHILS # BLD AUTO: 0.01 K/UL — SIGNIFICANT CHANGE UP (ref 0–0.2)
BASOPHILS NFR BLD AUTO: 0.1 % — SIGNIFICANT CHANGE UP (ref 0–2)
BUN SERPL-MCNC: 8 MG/DL — SIGNIFICANT CHANGE UP (ref 7–23)
CALCIUM SERPL-MCNC: 7.9 MG/DL — LOW (ref 8.4–10.5)
CHLORIDE SERPL-SCNC: 93 MMOL/L — LOW (ref 98–107)
CO2 SERPL-SCNC: 23 MMOL/L — SIGNIFICANT CHANGE UP (ref 22–31)
CREAT SERPL-MCNC: 0.54 MG/DL — SIGNIFICANT CHANGE UP (ref 0.5–1.3)
EOSINOPHIL # BLD AUTO: 0.25 K/UL — SIGNIFICANT CHANGE UP (ref 0–0.5)
EOSINOPHIL NFR BLD AUTO: 2.2 % — SIGNIFICANT CHANGE UP (ref 0–6)
GLUCOSE BLDC GLUCOMTR-MCNC: 110 MG/DL — HIGH (ref 70–99)
GLUCOSE BLDC GLUCOMTR-MCNC: 134 MG/DL — HIGH (ref 70–99)
GLUCOSE BLDC GLUCOMTR-MCNC: 194 MG/DL — HIGH (ref 70–99)
GLUCOSE BLDC GLUCOMTR-MCNC: 228 MG/DL — HIGH (ref 70–99)
GLUCOSE SERPL-MCNC: 99 MG/DL — SIGNIFICANT CHANGE UP (ref 70–99)
HCT VFR BLD CALC: 26.2 % — LOW (ref 34.5–45)
HGB BLD-MCNC: 8.4 G/DL — LOW (ref 11.5–15.5)
IMM GRANULOCYTES # BLD AUTO: 0.07 # — SIGNIFICANT CHANGE UP
IMM GRANULOCYTES NFR BLD AUTO: 0.6 % — SIGNIFICANT CHANGE UP (ref 0–1.5)
LYMPHOCYTES # BLD AUTO: 18.7 % — SIGNIFICANT CHANGE UP (ref 13–44)
LYMPHOCYTES # BLD AUTO: 2.15 K/UL — SIGNIFICANT CHANGE UP (ref 1–3.3)
MCHC RBC-ENTMCNC: 28.3 PG — SIGNIFICANT CHANGE UP (ref 27–34)
MCHC RBC-ENTMCNC: 32.1 % — SIGNIFICANT CHANGE UP (ref 32–36)
MCV RBC AUTO: 88.2 FL — SIGNIFICANT CHANGE UP (ref 80–100)
MONOCYTES # BLD AUTO: 1.06 K/UL — HIGH (ref 0–0.9)
MONOCYTES NFR BLD AUTO: 9.2 % — SIGNIFICANT CHANGE UP (ref 2–14)
NEUTROPHILS # BLD AUTO: 7.98 K/UL — HIGH (ref 1.8–7.4)
NEUTROPHILS NFR BLD AUTO: 69.2 % — SIGNIFICANT CHANGE UP (ref 43–77)
NRBC # FLD: 0 — SIGNIFICANT CHANGE UP
PLATELET # BLD AUTO: 370 K/UL — SIGNIFICANT CHANGE UP (ref 150–400)
PMV BLD: 8.8 FL — SIGNIFICANT CHANGE UP (ref 7–13)
POTASSIUM SERPL-MCNC: 4.1 MMOL/L — SIGNIFICANT CHANGE UP (ref 3.5–5.3)
POTASSIUM SERPL-SCNC: 4.1 MMOL/L — SIGNIFICANT CHANGE UP (ref 3.5–5.3)
RBC # BLD: 2.97 M/UL — LOW (ref 3.8–5.2)
RBC # FLD: 15.1 % — HIGH (ref 10.3–14.5)
SODIUM SERPL-SCNC: 127 MMOL/L — LOW (ref 135–145)
WBC # BLD: 11.52 K/UL — HIGH (ref 3.8–10.5)
WBC # FLD AUTO: 11.52 K/UL — HIGH (ref 3.8–10.5)

## 2018-05-15 PROCEDURE — 99233 SBSQ HOSP IP/OBS HIGH 50: CPT

## 2018-05-15 PROCEDURE — 77263 THER RADIOLOGY TX PLNG CPLX: CPT

## 2018-05-15 PROCEDURE — 93016 CV STRESS TEST SUPVJ ONLY: CPT | Mod: GC

## 2018-05-15 PROCEDURE — 78452 HT MUSCLE IMAGE SPECT MULT: CPT | Mod: 26

## 2018-05-15 PROCEDURE — 93018 CV STRESS TEST I&R ONLY: CPT | Mod: GC

## 2018-05-15 RX ORDER — ASPIRIN/CALCIUM CARB/MAGNESIUM 324 MG
81 TABLET ORAL DAILY
Qty: 0 | Refills: 0 | Status: DISCONTINUED | OUTPATIENT
Start: 2018-05-15 | End: 2018-05-15

## 2018-05-15 RX ORDER — SODIUM CHLORIDE 9 MG/ML
1000 INJECTION INTRAMUSCULAR; INTRAVENOUS; SUBCUTANEOUS
Qty: 0 | Refills: 0 | Status: DISCONTINUED | OUTPATIENT
Start: 2018-05-15 | End: 2018-05-17

## 2018-05-15 RX ORDER — SODIUM CHLORIDE 9 MG/ML
1000 INJECTION INTRAMUSCULAR; INTRAVENOUS; SUBCUTANEOUS
Qty: 0 | Refills: 0 | Status: DISCONTINUED | OUTPATIENT
Start: 2018-05-15 | End: 2018-05-15

## 2018-05-15 RX ADMIN — FAMOTIDINE 20 MILLIGRAM(S): 10 INJECTION INTRAVENOUS at 12:24

## 2018-05-15 RX ADMIN — Medication 100 MILLIGRAM(S): at 06:42

## 2018-05-15 RX ADMIN — MORPHINE SULFATE 15 MILLIGRAM(S): 50 CAPSULE, EXTENDED RELEASE ORAL at 04:23

## 2018-05-15 RX ADMIN — Medication 1: at 18:36

## 2018-05-15 RX ADMIN — ENOXAPARIN SODIUM 40 MILLIGRAM(S): 100 INJECTION SUBCUTANEOUS at 04:23

## 2018-05-15 RX ADMIN — Medication 100 MILLIGRAM(S): at 18:36

## 2018-05-15 RX ADMIN — MORPHINE SULFATE 15 MILLIGRAM(S): 50 CAPSULE, EXTENDED RELEASE ORAL at 18:37

## 2018-05-15 RX ADMIN — MORPHINE SULFATE 15 MILLIGRAM(S): 50 CAPSULE, EXTENDED RELEASE ORAL at 05:22

## 2018-05-15 RX ADMIN — SENNA PLUS 2 TABLET(S): 8.6 TABLET ORAL at 22:00

## 2018-05-15 RX ADMIN — ATORVASTATIN CALCIUM 40 MILLIGRAM(S): 80 TABLET, FILM COATED ORAL at 22:00

## 2018-05-15 RX ADMIN — Medication 1 MILLIGRAM(S): at 12:24

## 2018-05-15 RX ADMIN — MORPHINE SULFATE 15 MILLIGRAM(S): 50 CAPSULE, EXTENDED RELEASE ORAL at 12:22

## 2018-05-15 RX ADMIN — Medication 325 MILLIGRAM(S): at 12:24

## 2018-05-15 RX ADMIN — MORPHINE SULFATE 15 MILLIGRAM(S): 50 CAPSULE, EXTENDED RELEASE ORAL at 19:33

## 2018-05-15 RX ADMIN — SODIUM CHLORIDE 1 GRAM(S): 9 INJECTION INTRAMUSCULAR; INTRAVENOUS; SUBCUTANEOUS at 06:42

## 2018-05-15 RX ADMIN — SODIUM CHLORIDE 1 GRAM(S): 9 INJECTION INTRAMUSCULAR; INTRAVENOUS; SUBCUTANEOUS at 18:36

## 2018-05-15 RX ADMIN — MORPHINE SULFATE 15 MILLIGRAM(S): 50 CAPSULE, EXTENDED RELEASE ORAL at 13:20

## 2018-05-15 RX ADMIN — POLYETHYLENE GLYCOL 3350 17 GRAM(S): 17 POWDER, FOR SOLUTION ORAL at 06:42

## 2018-05-15 RX ADMIN — POLYETHYLENE GLYCOL 3350 17 GRAM(S): 17 POWDER, FOR SOLUTION ORAL at 18:37

## 2018-05-15 NOTE — PROGRESS NOTE ADULT - SUBJECTIVE AND OBJECTIVE BOX
Patient is a 78y old  Female who presents with a chief complaint of Abdominal pain (08 May 2018 22:37)      SUBJECTIVE / OVERNIGHT EVENTS:  Comfortable - No CP /sob pain.    MEDICATIONS  (STANDING):  aspirin Disintegrating Tablet 81 milliGRAM(s) Oral daily  atorvastatin 40 milliGRAM(s) Oral at bedtime  carvedilol 12.5 milliGRAM(s) Oral every 12 hours  dextrose 5%. 1000 milliLiter(s) (50 mL/Hr) IV Continuous <Continuous>  dextrose 50% Injectable 12.5 Gram(s) IV Push once  dextrose 50% Injectable 25 Gram(s) IV Push once  dextrose 50% Injectable 25 Gram(s) IV Push once  docusate sodium 100 milliGRAM(s) Oral two times a day  enoxaparin Injectable 40 milliGRAM(s) SubCutaneous every 24 hours  famotidine    Tablet 20 milliGRAM(s) Oral daily  ferrous    sulfate 325 milliGRAM(s) Oral daily  folic acid 1 milliGRAM(s) Oral daily  insulin lispro (HumaLOG) corrective regimen sliding scale   SubCutaneous at bedtime  insulin lispro (HumaLOG) corrective regimen sliding scale   SubCutaneous three times a day before meals  lisinopril 5 milliGRAM(s) Oral daily  polyethylene glycol 3350 17 Gram(s) Oral two times a day  senna 2 Tablet(s) Oral at bedtime  sodium chloride 1 Gram(s) Oral two times a day    MEDICATIONS  (PRN):  acetaminophen   Tablet. 650 milliGRAM(s) Oral every 6 hours PRN Mild Pain (1 - 3)  dextrose 40% Gel 15 Gram(s) Oral once PRN Blood Glucose LESS THAN 70 milliGRAM(s)/deciliter  glucagon  Injectable 1 milliGRAM(s) IntraMuscular once PRN Glucose LESS THAN 70 milligrams/deciliter  morphine  IR 15 milliGRAM(s) Oral every 4 hours PRN moderate to severe pain  ondansetron Injectable 4 milliGRAM(s) IV Push every 6 hours PRN Nausea      POCT Blood Glucose.: 110 mg/dL (15 May 2018 08:27)  POCT Blood Glucose.: 211 mg/dL (14 May 2018 22:07)  POCT Blood Glucose.: 116 mg/dL (14 May 2018 17:49)  POCT Blood Glucose.: 123 mg/dL (14 May 2018 12:38)    I&O's Summary      PHYSICAL EXAM:  Vital Signs Last 24 Hrs  T(C): 36.8 (15 May 2018 05:49), Max: 37.1 (14 May 2018 15:24)  T(F): 98.3 (15 May 2018 05:49), Max: 98.7 (14 May 2018 15:24)  HR: 75 (15 May 2018 05:49) (73 - 81)  BP: 110/49 (15 May 2018 05:49) (90/50 - 141/61)  BP(mean): --  RR: 18 (15 May 2018 05:49) (18 - 18)  SpO2: 96% (15 May 2018 05:49) (94% - 96%)  GENERAL: NAD, well-developed  HEAD:  Atraumatic, Normocephalic  EYES: EOMI, PERRLA, conjunctiva and sclera clear  NECK: Supple, No JVD  CHEST/LUNG: Clear to auscultation bilaterally; No wheeze  HEART: Regular rate and rhythm; No murmurs, rubs, or gallops  ABDOMEN: Soft, Nontender, Nondistended; Bowel sounds present  EXTREMITIES:  2+ Peripheral Pulses, No clubbing, cyanosis, or edema  PSYCH: AAOx3  NEUROLOGY: non-focal  SKIN: No rashes or lesions    LABS:                        8.4    11.52 )-----------( 370      ( 15 May 2018 06:10 )             26.2     05-15    127<L>  |  93<L>  |  8   ----------------------------<  99  4.1   |  23  |  0.54    Ca    7.9<L>      15 May 2018 06:10        RADIOLOGY & ADDITIONAL TESTS:    Imaging Personally Reviewed:    Consultant(s) Notes Reviewed:      Care Discussed with Consultants/Other Providers:

## 2018-05-15 NOTE — CHART NOTE - NSCHARTNOTEFT_GEN_A_CORE
Source: Patient [ ]    Family [ ]     other [ X] electronic chart, ADS, MD    Diet : Low fat, 800 Fluid restriction, Ensure Clear 3x daily (600 kcals, 21g protein).     Patient seen for nutrition follow-up/malnutrition. Patient remain lethargic, unable to engage in conversation. No family at bedside. Per RN, continues with poor PO intake, taking sips of PO supplement Ensure Clear. No nausea/vomiting/diarrhea/constipation or difficulty chewing and swallowing reported. Palliative care following. Case discussed with ADS to consider liberalizing diet to regular diet and SLP consult as mentioned per RDN note 5/10/18-- "Recommend Speech and swallow consult due to some swallowing difficulty reported. Recommend consistency per SLP recs".     Current Weight: Weight (kg): 45.7 (05-08 @ 21:41)      Pertinent Medications: atorvastatin  carvedilol  dextrose 5%.  dextrose 50% Injectable  dextrose 50% Injectable  dextrose 50% Injectable  docusate sodium  famotidine    Tablet  ferrous    sulfate  folic acid  insulin lispro (HumaLOG) corrective regimen sliding scale  insulin lispro (HumaLOG) corrective regimen sliding scale  lisinopril  polyethylene glycol 3350  senna  sodium chloride  sodium chloride 0.9%.    Pertinent Labs:  05-15 Na127 mmol/L<L> Glu 99 mg/dL K+ 4.1 mmol/L Cr  0.54 mg/dL BUN 8 mg/dL 05-09 Phos 2.6 mg/dL 05-08 Alb 3.0 g/dL<L> 05-09 [id number] 6.6 %<H>      Skin: intact    Estimated Needs:   [ X] no change since previous assessment  [ ] recalculated:       Previous Nutrition Diagnosis:      [ X] Malnutrition, severe     Nutrition Diagnosis is [X ] ongoing  [ ] resolved [ ] not applicable       Additional Recommendations:   1. As discussed with ADS, consider Speech and swallow evaluation. May liberalize diet to regular diet and d/c Low fat. Patient continues with poor PO intake.  2. Continue with PO supplement Ensure Clear 3x daily (600 kcals, 21g protein). as patient prefers Ensure Clear vs. Ensure Enlive.   3. Patient unable to continue with PO diet, consider alternate means of nutrition.  4. Monitor weights, labs, BM's, skin integrity, p.o. intake.   5. Please Encourage po intake, assist with meals and menu selections, provide alternatives PRN. Source: Patient [ ]    Family [ ]     other [ X] electronic chart, ADS, MD    Diet : Low fat, 800 Fluid restriction, Ensure Clear 3x daily (600 kcals, 21g protein).     Patient seen for nutrition follow-up/malnutrition. Patient remain lethargic, unable to engage in conversation. No family at bedside. Per RN, continues with poor PO intake, taking sips of PO supplement Ensure Clear. No nausea/vomiting/diarrhea/constipation or difficulty chewing and swallowing reported. Palliative care following. Case discussed with ADS to consider liberalizing diet to regular diet and SLP consult as mentioned per RDN note 5/10/18-- "Recommend Speech and swallow consult due to some swallowing difficulty reported. Recommend consistency per SLP recs".     Current Weight: Weight (kg): 45.7 (05-08 @ 21:41)      Pertinent Medications: atorvastatin  carvedilol  dextrose 5%.  dextrose 50% Injectable  dextrose 50% Injectable  dextrose 50% Injectable  docusate sodium  famotidine    Tablet  ferrous    sulfate  folic acid  insulin lispro (HumaLOG) corrective regimen sliding scale  insulin lispro (HumaLOG) corrective regimen sliding scale  lisinopril  polyethylene glycol 3350  senna  sodium chloride  sodium chloride 0.9%.    Pertinent Labs:  05-15 Na127 mmol/L<L> Glu 99 mg/dL K+ 4.1 mmol/L Cr  0.54 mg/dL BUN 8 mg/dL 05-09 Phos 2.6 mg/dL 05-08 Alb 3.0 g/dL<L> 05-09 [id number] 6.6 %<H>      Skin: intact    Estimated Needs:   [ X] no change since previous assessment  [ ] recalculated:       Previous Nutrition Diagnosis:      [ X] Malnutrition, severe     Nutrition Diagnosis is [X ] ongoing  [ ] resolved [ ] not applicable       Additional Recommendations:   1. As discussed with ADS, consider Speech and swallow evaluation. May liberalize diet to regular diet and d/c Low fat. Patient continues with poor PO intake. Fluid restriction per MD discretion.   2. Continue with PO supplement Ensure Clear 3x daily (600 kcals, 21g protein). as patient prefers Ensure Clear vs. Ensure Enlive.   3. Patient unable to continue with PO diet, consider alternate means of nutrition.  4. Monitor weights, labs, BM's, skin integrity, p.o. intake.   5. Please Encourage po intake, assist with meals and menu selections, provide alternatives PRN. Source: Patient [ ]    Family [ ]     other [ X] electronic chart, ADS, MD    Diet : Low fat, 800 Fluid restriction, Ensure Clear 3x daily (600 kcals, 21g protein).     Patient seen for nutrition follow-up/malnutrition. Patient remain lethargic, unable to engage in conversation. No family at bedside. Per RN, continues with poor PO intake, taking sips of PO supplement Ensure Clear. No nausea/vomiting/diarrhea/constipation or difficulty chewing and swallowing reported. Palliative care following. Case discussed with ADS to consider liberalizing diet to regular diet and SLP consult as mentioned per RDN note 5/10/18-- "Recommend Speech and swallow consult due to some swallowing difficulty reported. Recommend consistency per SLP recs".     Current Weight: Weight (kg): 45.7 (05-08 @ 21:41)      Pertinent Medications: atorvastatin  carvedilol  dextrose 5%.  dextrose 50% Injectable  dextrose 50% Injectable  dextrose 50% Injectable  docusate sodium  famotidine    Tablet  ferrous    sulfate  folic acid  insulin lispro (HumaLOG) corrective regimen sliding scale  insulin lispro (HumaLOG) corrective regimen sliding scale  lisinopril  polyethylene glycol 3350  senna  sodium chloride  sodium chloride 0.9%.    Pertinent Labs:  05-15 Na127 mmol/L<L> Glu 99 mg/dL K+ 4.1 mmol/L Cr  0.54 mg/dL BUN 8 mg/dL 05-09 Phos 2.6 mg/dL 05-08 Alb 3.0 g/dL<L> 05-09 [id number] 6.6 %<H>      Skin: intact    Estimated Needs:   [ X] no change since previous assessment  [ ] recalculated:       Previous Nutrition Diagnosis:      [ X] Malnutrition, severe     Nutrition Diagnosis is [X ] ongoing  [ ] resolved [ ] not applicable       Additional Recommendations:   1. As discussed with ADS, consider Speech and swallow evaluation. May liberalize diet to regular diet and d/c Low fat. Patient continues with poor PO intake. Fluid restriction per MD discretion.   2. Continue with PO supplement Ensure Clear 3x daily (600 kcals, 21g protein). as patient prefers Ensure Clear vs. Ensure Enlive.   3. Monitor weights, labs, BM's, skin integrity, p.o. intake.   4. Please Encourage po intake, assist with meals and menu selections, provide alternatives PRN.

## 2018-05-15 NOTE — PROGRESS NOTE ADULT - SUBJECTIVE AND OBJECTIVE BOX
Subjective: Patient seen and examined. No new events except as noted.     SUBJECTIVE/ROS:  no new events       MEDICATIONS:  MEDICATIONS  (STANDING):  atorvastatin 40 milliGRAM(s) Oral at bedtime  carvedilol 12.5 milliGRAM(s) Oral every 12 hours  dextrose 5%. 1000 milliLiter(s) (50 mL/Hr) IV Continuous <Continuous>  dextrose 50% Injectable 12.5 Gram(s) IV Push once  dextrose 50% Injectable 25 Gram(s) IV Push once  dextrose 50% Injectable 25 Gram(s) IV Push once  docusate sodium 100 milliGRAM(s) Oral two times a day  enoxaparin Injectable 40 milliGRAM(s) SubCutaneous every 24 hours  famotidine    Tablet 20 milliGRAM(s) Oral daily  ferrous    sulfate 325 milliGRAM(s) Oral daily  folic acid 1 milliGRAM(s) Oral daily  insulin lispro (HumaLOG) corrective regimen sliding scale   SubCutaneous at bedtime  insulin lispro (HumaLOG) corrective regimen sliding scale   SubCutaneous three times a day before meals  lisinopril 5 milliGRAM(s) Oral daily  polyethylene glycol 3350 17 Gram(s) Oral two times a day  senna 2 Tablet(s) Oral at bedtime  sodium chloride 1 Gram(s) Oral two times a day      PHYSICAL EXAM:  T(C): 36.8 (05-15-18 @ 05:49), Max: 37.1 (05-14-18 @ 15:24)  HR: 75 (05-15-18 @ 05:49) (73 - 81)  BP: 110/49 (05-15-18 @ 05:49) (90/50 - 141/61)  RR: 18 (05-15-18 @ 05:49) (18 - 18)  SpO2: 96% (05-15-18 @ 05:49) (94% - 96%)  Wt(kg): --  I&O's Summary        JVP: Normal  Neck: supple  Lung: clear   CV: S1 S2 , Murmur:  Abd: soft  Ext: No edema  neuro: Awake / alert  Psych: flat affect  Skin: normal       LABS/DATA:    CARDIAC MARKERS:                                8.4    11.52 )-----------( 370      ( 15 May 2018 06:10 )             26.2     05-15    127<L>  |  93<L>  |  8   ----------------------------<  99  4.1   |  23  |  0.54    Ca    7.9<L>      15 May 2018 06:10      proBNP:   Lipid Profile:   HgA1c:   TSH:     TELE:  EKG:

## 2018-05-15 NOTE — PROGRESS NOTE ADULT - SUBJECTIVE AND OBJECTIVE BOX
[facility] – [place]:[id number],   78yFemale followed for:  No Known Allergies    PAST MEDICAL & SURGICAL HISTORY:  Rectal mass  Gangrene of foot  Coronary artery disease involving native coronary artery of native heart without angina pectoris  Status post above knee amputation of right lower extremity    FAMILY HISTORY:  No pertinent family history in first degree relatives    MEDICATIONS  (STANDING):  atorvastatin 40 milliGRAM(s) Oral at bedtime  carvedilol 12.5 milliGRAM(s) Oral every 12 hours  dextrose 5%. 1000 milliLiter(s) (50 mL/Hr) IV Continuous <Continuous>  dextrose 50% Injectable 12.5 Gram(s) IV Push once  dextrose 50% Injectable 25 Gram(s) IV Push once  dextrose 50% Injectable 25 Gram(s) IV Push once  docusate sodium 100 milliGRAM(s) Oral two times a day  enoxaparin Injectable 40 milliGRAM(s) SubCutaneous every 24 hours  famotidine    Tablet 20 milliGRAM(s) Oral daily  ferrous    sulfate 325 milliGRAM(s) Oral daily  folic acid 1 milliGRAM(s) Oral daily  insulin lispro (HumaLOG) corrective regimen sliding scale   SubCutaneous at bedtime  insulin lispro (HumaLOG) corrective regimen sliding scale   SubCutaneous three times a day before meals  lisinopril 5 milliGRAM(s) Oral daily  polyethylene glycol 3350 17 Gram(s) Oral two times a day  senna 2 Tablet(s) Oral at bedtime  sodium chloride 1 Gram(s) Oral two times a day    MEDICATIONS  (PRN):  acetaminophen   Tablet. 650 milliGRAM(s) Oral every 6 hours PRN Mild Pain (1 - 3)  dextrose 40% Gel 15 Gram(s) Oral once PRN Blood Glucose LESS THAN 70 milliGRAM(s)/deciliter  glucagon  Injectable 1 milliGRAM(s) IntraMuscular once PRN Glucose LESS THAN 70 milligrams/deciliter  morphine  IR 15 milliGRAM(s) Oral every 4 hours PRN moderate to severe pain  ondansetron Injectable 4 milliGRAM(s) IV Push every 6 hours PRN Nausea      Vital Signs Last 24 Hrs  T(C): 36.8 (15 May 2018 05:49), Max: 37.1 (14 May 2018 15:24)  T(F): 98.3 (15 May 2018 05:49), Max: 98.7 (14 May 2018 15:24)  HR: 75 (15 May 2018 05:49) (73 - 81)  BP: 110/49 (15 May 2018 05:49) (90/50 - 141/61)  BP(mean): --  RR: 18 (15 May 2018 05:49) (18 - 18)  SpO2: 96% (15 May 2018 05:49) (94% - 96%)  nc/at  s1s2  cta  soft, nt, nd no guarding or rebound  no c/c/e    CBC Full  -  ( 15 May 2018 06:10 )  WBC Count : 11.52 K/uL  Hemoglobin : 8.4 g/dL  Hematocrit : 26.2 %  Platelet Count - Automated : 370 K/uL  Mean Cell Volume : 88.2 fL  Mean Cell Hemoglobin : 28.3 pg  Mean Cell Hemoglobin Concentration : 32.1 %  Auto Neutrophil # : 7.98 K/uL  Auto Lymphocyte # : 2.15 K/uL  Auto Monocyte # : 1.06 K/uL  Auto Eosinophil # : 0.25 K/uL  Auto Basophil # : 0.01 K/uL  Auto Neutrophil % : 69.2 %  Auto Lymphocyte % : 18.7 %  Auto Monocyte % : 9.2 %  Auto Eosinophil % : 2.2 %  Auto Basophil % : 0.1 %    05-15    127<L>  |  93<L>  |  8   ----------------------------<  99  4.1   |  23  |  0.54    Ca    7.9<L>      15 May 2018 06:10

## 2018-05-15 NOTE — CHART NOTE - NSCHARTNOTEFT_GEN_A_CORE
ADS NIGHT COVERAGE:    Notified by RN that pt's BP manually was 90/50, significantly lower than pt's baseline BP. Pt asymptomatic, family wants pt to get Morphine IR dose. NS bolus ordered, instructed RN not to give hold morphine for now. Reassess BP after bolus. Will continue to monitor.    LAURITA SILVEIRA PA-C  [id number]

## 2018-05-15 NOTE — PROGRESS NOTE ADULT - ASSESSMENT
PreOp  Based on current ACC/AHA guidelines, patient history and physical exam, the patient is considered to have elevated risk  will get stress test to make sure her ischemic size / burden has not increased since last year  discussed with grandson and family they agree     CAD  stress test  cont statin  low dose asa once deemed safe    HTN  stable    DM  Monitor finger stick. Insulin coverage. Diabetic education and Diabetic diet. Consider nutrition consultation.

## 2018-05-16 LAB
BUN SERPL-MCNC: 7 MG/DL — SIGNIFICANT CHANGE UP (ref 7–23)
CALCIUM SERPL-MCNC: 8.2 MG/DL — LOW (ref 8.4–10.5)
CHLORIDE SERPL-SCNC: 98 MMOL/L — SIGNIFICANT CHANGE UP (ref 98–107)
CO2 SERPL-SCNC: 24 MMOL/L — SIGNIFICANT CHANGE UP (ref 22–31)
CORTIS SERPL-MCNC: 9.9 UG/DL — SIGNIFICANT CHANGE UP (ref 2.7–18.4)
CREAT SERPL-MCNC: 0.47 MG/DL — LOW (ref 0.5–1.3)
GLUCOSE BLDC GLUCOMTR-MCNC: 120 MG/DL — HIGH (ref 70–99)
GLUCOSE BLDC GLUCOMTR-MCNC: 131 MG/DL — HIGH (ref 70–99)
GLUCOSE BLDC GLUCOMTR-MCNC: 171 MG/DL — HIGH (ref 70–99)
GLUCOSE BLDC GLUCOMTR-MCNC: 212 MG/DL — HIGH (ref 70–99)
GLUCOSE SERPL-MCNC: 107 MG/DL — HIGH (ref 70–99)
HCT VFR BLD CALC: 27 % — LOW (ref 34.5–45)
HGB BLD-MCNC: 8.5 G/DL — LOW (ref 11.5–15.5)
MCHC RBC-ENTMCNC: 27.9 PG — SIGNIFICANT CHANGE UP (ref 27–34)
MCHC RBC-ENTMCNC: 31.5 % — LOW (ref 32–36)
MCV RBC AUTO: 88.5 FL — SIGNIFICANT CHANGE UP (ref 80–100)
NRBC # FLD: 0 — SIGNIFICANT CHANGE UP
PLATELET # BLD AUTO: 399 K/UL — SIGNIFICANT CHANGE UP (ref 150–400)
PMV BLD: 9.4 FL — SIGNIFICANT CHANGE UP (ref 7–13)
POTASSIUM SERPL-MCNC: 4.2 MMOL/L — SIGNIFICANT CHANGE UP (ref 3.5–5.3)
POTASSIUM SERPL-SCNC: 4.2 MMOL/L — SIGNIFICANT CHANGE UP (ref 3.5–5.3)
RBC # BLD: 3.05 M/UL — LOW (ref 3.8–5.2)
RBC # FLD: 15.2 % — HIGH (ref 10.3–14.5)
SODIUM SERPL-SCNC: 133 MMOL/L — LOW (ref 135–145)
WBC # BLD: 12.87 K/UL — HIGH (ref 3.8–10.5)
WBC # FLD AUTO: 12.87 K/UL — HIGH (ref 3.8–10.5)

## 2018-05-16 PROCEDURE — 99233 SBSQ HOSP IP/OBS HIGH 50: CPT

## 2018-05-16 PROCEDURE — 77307 TELETHX ISODOSE PLAN CPLX: CPT | Mod: 26

## 2018-05-16 PROCEDURE — 77290 THER RAD SIMULAJ FIELD CPLX: CPT | Mod: 26

## 2018-05-16 PROCEDURE — 77334 RADIATION TREATMENT AID(S): CPT | Mod: 26

## 2018-05-16 RX ORDER — MIRTAZAPINE 45 MG/1
7.5 TABLET, ORALLY DISINTEGRATING ORAL AT BEDTIME
Qty: 0 | Refills: 0 | Status: DISCONTINUED | OUTPATIENT
Start: 2018-05-16 | End: 2018-05-17

## 2018-05-16 RX ADMIN — Medication 1 MILLIGRAM(S): at 13:09

## 2018-05-16 RX ADMIN — MORPHINE SULFATE 15 MILLIGRAM(S): 50 CAPSULE, EXTENDED RELEASE ORAL at 05:35

## 2018-05-16 RX ADMIN — MORPHINE SULFATE 15 MILLIGRAM(S): 50 CAPSULE, EXTENDED RELEASE ORAL at 00:30

## 2018-05-16 RX ADMIN — MORPHINE SULFATE 15 MILLIGRAM(S): 50 CAPSULE, EXTENDED RELEASE ORAL at 04:41

## 2018-05-16 RX ADMIN — ENOXAPARIN SODIUM 40 MILLIGRAM(S): 100 INJECTION SUBCUTANEOUS at 04:42

## 2018-05-16 RX ADMIN — MORPHINE SULFATE 15 MILLIGRAM(S): 50 CAPSULE, EXTENDED RELEASE ORAL at 18:35

## 2018-05-16 RX ADMIN — MORPHINE SULFATE 15 MILLIGRAM(S): 50 CAPSULE, EXTENDED RELEASE ORAL at 17:33

## 2018-05-16 RX ADMIN — LISINOPRIL 5 MILLIGRAM(S): 2.5 TABLET ORAL at 06:34

## 2018-05-16 RX ADMIN — MORPHINE SULFATE 15 MILLIGRAM(S): 50 CAPSULE, EXTENDED RELEASE ORAL at 13:10

## 2018-05-16 RX ADMIN — MORPHINE SULFATE 15 MILLIGRAM(S): 50 CAPSULE, EXTENDED RELEASE ORAL at 23:10

## 2018-05-16 RX ADMIN — ATORVASTATIN CALCIUM 40 MILLIGRAM(S): 80 TABLET, FILM COATED ORAL at 23:10

## 2018-05-16 RX ADMIN — POLYETHYLENE GLYCOL 3350 17 GRAM(S): 17 POWDER, FOR SOLUTION ORAL at 06:35

## 2018-05-16 RX ADMIN — Medication 1: at 17:55

## 2018-05-16 RX ADMIN — MORPHINE SULFATE 15 MILLIGRAM(S): 50 CAPSULE, EXTENDED RELEASE ORAL at 01:30

## 2018-05-16 RX ADMIN — SODIUM CHLORIDE 1 GRAM(S): 9 INJECTION INTRAMUSCULAR; INTRAVENOUS; SUBCUTANEOUS at 06:34

## 2018-05-16 RX ADMIN — MORPHINE SULFATE 15 MILLIGRAM(S): 50 CAPSULE, EXTENDED RELEASE ORAL at 14:00

## 2018-05-16 RX ADMIN — FAMOTIDINE 20 MILLIGRAM(S): 10 INJECTION INTRAVENOUS at 13:10

## 2018-05-16 RX ADMIN — SENNA PLUS 2 TABLET(S): 8.6 TABLET ORAL at 23:10

## 2018-05-16 RX ADMIN — MIRTAZAPINE 7.5 MILLIGRAM(S): 45 TABLET, ORALLY DISINTEGRATING ORAL at 23:10

## 2018-05-16 RX ADMIN — Medication 325 MILLIGRAM(S): at 13:09

## 2018-05-16 RX ADMIN — POLYETHYLENE GLYCOL 3350 17 GRAM(S): 17 POWDER, FOR SOLUTION ORAL at 17:45

## 2018-05-16 RX ADMIN — SODIUM CHLORIDE 1 GRAM(S): 9 INJECTION INTRAMUSCULAR; INTRAVENOUS; SUBCUTANEOUS at 17:43

## 2018-05-16 RX ADMIN — Medication 100 MILLIGRAM(S): at 06:34

## 2018-05-16 RX ADMIN — CARVEDILOL PHOSPHATE 12.5 MILLIGRAM(S): 80 CAPSULE, EXTENDED RELEASE ORAL at 17:43

## 2018-05-16 RX ADMIN — Medication 100 MILLIGRAM(S): at 17:43

## 2018-05-16 RX ADMIN — CARVEDILOL PHOSPHATE 12.5 MILLIGRAM(S): 80 CAPSULE, EXTENDED RELEASE ORAL at 06:34

## 2018-05-16 NOTE — PROGRESS NOTE ADULT - ASSESSMENT
PreOp  Based on current ACC/AHA guidelines, patient history and physical exam, the patient is considered to have elevated risk  stress test unremarkable   may proceed to OR     CAD  stress test  cont statin  low dose asa once deemed safe    HTN  stable    DM  Monitor finger stick. Insulin coverage. Diabetic education and Diabetic diet. Consider nutrition consultation.

## 2018-05-16 NOTE — PROGRESS NOTE ADULT - ASSESSMENT
locally advanced rectal cancer.  await plan.  radiation inpatient? op surgery in 2 weeks?  this was breifly discussed with dr. ferreira.  ? palliative resection?

## 2018-05-16 NOTE — PROGRESS NOTE ADULT - SUBJECTIVE AND OBJECTIVE BOX
[facility] – [place]:[id number],   78yFemale followed for:  No Known Allergies    PAST MEDICAL & SURGICAL HISTORY:  Rectal mass  Gangrene of foot  Coronary artery disease involving native coronary artery of native heart without angina pectoris  Status post above knee amputation of right lower extremity    FAMILY HISTORY:  No pertinent family history in first degree relatives    MEDICATIONS  (STANDING):  atorvastatin 40 milliGRAM(s) Oral at bedtime  carvedilol 12.5 milliGRAM(s) Oral every 12 hours  dextrose 5%. 1000 milliLiter(s) (50 mL/Hr) IV Continuous <Continuous>  dextrose 50% Injectable 12.5 Gram(s) IV Push once  dextrose 50% Injectable 25 Gram(s) IV Push once  dextrose 50% Injectable 25 Gram(s) IV Push once  docusate sodium 100 milliGRAM(s) Oral two times a day  enoxaparin Injectable 40 milliGRAM(s) SubCutaneous every 24 hours  famotidine    Tablet 20 milliGRAM(s) Oral daily  ferrous    sulfate 325 milliGRAM(s) Oral daily  folic acid 1 milliGRAM(s) Oral daily  insulin lispro (HumaLOG) corrective regimen sliding scale   SubCutaneous at bedtime  insulin lispro (HumaLOG) corrective regimen sliding scale   SubCutaneous three times a day before meals  lisinopril 5 milliGRAM(s) Oral daily  polyethylene glycol 3350 17 Gram(s) Oral two times a day  senna 2 Tablet(s) Oral at bedtime  sodium chloride 1 Gram(s) Oral two times a day  sodium chloride 0.9%. 1000 milliLiter(s) (50 mL/Hr) IV Continuous <Continuous>    MEDICATIONS  (PRN):  acetaminophen   Tablet. 650 milliGRAM(s) Oral every 6 hours PRN Mild Pain (1 - 3)  dextrose 40% Gel 15 Gram(s) Oral once PRN Blood Glucose LESS THAN 70 milliGRAM(s)/deciliter  glucagon  Injectable 1 milliGRAM(s) IntraMuscular once PRN Glucose LESS THAN 70 milligrams/deciliter  morphine  IR 15 milliGRAM(s) Oral every 4 hours PRN moderate to severe pain  ondansetron Injectable 4 milliGRAM(s) IV Push every 6 hours PRN Nausea      Vital Signs Last 24 Hrs  T(C): 36.7 (16 May 2018 04:37), Max: 36.7 (15 May 2018 15:03)  T(F): 98 (16 May 2018 04:37), Max: 98 (15 May 2018 15:03)  HR: 77 (16 May 2018 04:37) (67 - 77)  BP: 155/58 (16 May 2018 04:37) (99/47 - 155/58)  BP(mean): --  RR: 18 (16 May 2018 04:37) (18 - 18)  SpO2: 95% (16 May 2018 04:37) (95% - 98%)  nc/at  s1s2  cta  soft, nt, nd no guarding or rebound  no c/c/e    CBC Full  -  ( 16 May 2018 05:30 )  WBC Count : 12.87 K/uL  Hemoglobin : 8.5 g/dL  Hematocrit : 27.0 %  Platelet Count - Automated : 399 K/uL  Mean Cell Volume : 88.5 fL  Mean Cell Hemoglobin : 27.9 pg  Mean Cell Hemoglobin Concentration : 31.5 %  Auto Neutrophil # : x  Auto Lymphocyte # : x  Auto Monocyte # : x  Auto Eosinophil # : x  Auto Basophil # : x  Auto Neutrophil % : x  Auto Lymphocyte % : x  Auto Monocyte % : x  Auto Eosinophil % : x  Auto Basophil % : x    05-16    133<L>  |  98  |  7   ----------------------------<  107<H>  4.2   |  24  |  0.47<L>    Ca    8.2<L>      16 May 2018 05:30

## 2018-05-16 NOTE — CHART NOTE - NSCHARTNOTEFT_GEN_A_CORE
Colorectal Surgery Chart Note  A team [id number]    -Plan for radiation therapy as outpatient followed by resection.   -Please obtain pelvic MRI prior to discharge to evaluate for bladder involvement & operative planning.      JOSE Porras MD (PGY2)    (Please page [facility] team Surgery, [phone] for questions/concerns.) Colorectal Surgery Chart Note  A team [id number]    -Plan for radiation therapy as outpatient followed by resection.   -Please obtain pelvic MRI prior to discharge to evaluate for bladder involvement & operative planning.    (Spoke w/ ADS NP ~8am regarding above MRI.)      JOSE Porras MD (PGY2)    (Please page [facility] team Surgery, [phone] for questions/concerns.) Colorectal Surgery Chart Note  A team [id number]    -Plan for radiation therapy as outpatient followed by resection.   -Please obtain pelvic MRI prior to discharge to evaluate for bladder involvement & operative planning.    (Spoke w/ ADS NP ~8am regarding above MRI.)      JOSE Porras MD (PGY2)    (Please page [facility] team Surgery, [phone] for questions/concerns.)

## 2018-05-16 NOTE — PROGRESS NOTE ADULT - SUBJECTIVE AND OBJECTIVE BOX
Patient is a 78y old  Female who presents with a chief complaint of Abdominal pain (08 May 2018 22:37)      SUBJECTIVE / OVERNIGHT EVENTS:      MEDICATIONS  (STANDING):  atorvastatin 40 milliGRAM(s) Oral at bedtime  carvedilol 12.5 milliGRAM(s) Oral every 12 hours  dextrose 5%. 1000 milliLiter(s) (50 mL/Hr) IV Continuous <Continuous>  dextrose 50% Injectable 12.5 Gram(s) IV Push once  dextrose 50% Injectable 25 Gram(s) IV Push once  dextrose 50% Injectable 25 Gram(s) IV Push once  docusate sodium 100 milliGRAM(s) Oral two times a day  enoxaparin Injectable 40 milliGRAM(s) SubCutaneous every 24 hours  famotidine    Tablet 20 milliGRAM(s) Oral daily  ferrous    sulfate 325 milliGRAM(s) Oral daily  folic acid 1 milliGRAM(s) Oral daily  insulin lispro (HumaLOG) corrective regimen sliding scale   SubCutaneous at bedtime  insulin lispro (HumaLOG) corrective regimen sliding scale   SubCutaneous three times a day before meals  lisinopril 5 milliGRAM(s) Oral daily  polyethylene glycol 3350 17 Gram(s) Oral two times a day  senna 2 Tablet(s) Oral at bedtime  sodium chloride 1 Gram(s) Oral two times a day  sodium chloride 0.9%. 1000 milliLiter(s) (50 mL/Hr) IV Continuous <Continuous>    MEDICATIONS  (PRN):  acetaminophen   Tablet. 650 milliGRAM(s) Oral every 6 hours PRN Mild Pain (1 - 3)  dextrose 40% Gel 15 Gram(s) Oral once PRN Blood Glucose LESS THAN 70 milliGRAM(s)/deciliter  glucagon  Injectable 1 milliGRAM(s) IntraMuscular once PRN Glucose LESS THAN 70 milligrams/deciliter  morphine  IR 15 milliGRAM(s) Oral every 4 hours PRN moderate to severe pain  ondansetron Injectable 4 milliGRAM(s) IV Push every 6 hours PRN Nausea        CAPILLARY BLOOD GLUCOSE      POCT Blood Glucose.: 120 mg/dL (16 May 2018 08:32)  POCT Blood Glucose.: 228 mg/dL (15 May 2018 22:27)  POCT Blood Glucose.: 194 mg/dL (15 May 2018 17:39)  POCT Blood Glucose.: 134 mg/dL (15 May 2018 12:31)    I&O's Summary      PHYSICAL EXAM:  Vital Signs Last 24 Hrs  T(C): 36.7 (16 May 2018 04:37), Max: 36.7 (15 May 2018 15:03)  T(F): 98 (16 May 2018 04:37), Max: 98 (15 May 2018 15:03)  HR: 77 (16 May 2018 04:37) (67 - 77)  BP: 155/58 (16 May 2018 04:37) (99/47 - 155/58)  BP(mean): --  RR: 18 (16 May 2018 04:37) (18 - 18)  SpO2: 95% (16 May 2018 04:37) (95% - 98%)  GENERAL: NAD, well-developed  HEAD:  Atraumatic, Normocephalic  EYES: EOMI, PERRLA, conjunctiva and sclera clear  NECK: Supple, No JVD  CHEST/LUNG: Clear to auscultation bilaterally; No wheeze  HEART: Regular rate and rhythm; No murmurs, rubs, or gallops  ABDOMEN: Soft, Nontender, Nondistended; Bowel sounds present  EXTREMITIES:  2+ Peripheral Pulses, No clubbing, cyanosis, or edema  PSYCH: AAOx3  NEUROLOGY: non-focal  SKIN: No rashes or lesions    LABS:                        8.5    12.87 )-----------( 399      ( 16 May 2018 05:30 )             27.0     05-16    133<L>  |  98  |  7   ----------------------------<  107<H>  4.2   |  24  |  0.47<L>    Ca    8.2<L>      16 May 2018 05:30        RADIOLOGY & ADDITIONAL TESTS:    Imaging Personally Reviewed:    Consultant(s) Notes Reviewed:      Care Discussed with Consultants/Other Providers: Patient is a 78y old  Female who presents with a chief complaint of Abdominal pain (08 May 2018 22:37)      SUBJECTIVE / OVERNIGHT EVENTS:  Patient seen with daughter Louann at bedside.  Daughter wants to know why No CP /sob plan for chemotherapy ????- opncology will talk to her.  Explain plan for RT x 5 days then out patient f/u with Dr Sosa      MEDICATIONS  (STANDING):  atorvastatin 40 milliGRAM(s) Oral at bedtime  carvedilol 12.5 milliGRAM(s) Oral every 12 hours  dextrose 5%. 1000 milliLiter(s) (50 mL/Hr) IV Continuous <Continuous>  dextrose 50% Injectable 12.5 Gram(s) IV Push once  dextrose 50% Injectable 25 Gram(s) IV Push once  dextrose 50% Injectable 25 Gram(s) IV Push once  docusate sodium 100 milliGRAM(s) Oral two times a day  enoxaparin Injectable 40 milliGRAM(s) SubCutaneous every 24 hours  famotidine    Tablet 20 milliGRAM(s) Oral daily  ferrous    sulfate 325 milliGRAM(s) Oral daily  folic acid 1 milliGRAM(s) Oral daily  insulin lispro (HumaLOG) corrective regimen sliding scale   SubCutaneous at bedtime  insulin lispro (HumaLOG) corrective regimen sliding scale   SubCutaneous three times a day before meals  lisinopril 5 milliGRAM(s) Oral daily  polyethylene glycol 3350 17 Gram(s) Oral two times a day  senna 2 Tablet(s) Oral at bedtime  sodium chloride 1 Gram(s) Oral two times a day  sodium chloride 0.9%. 1000 milliLiter(s) (50 mL/Hr) IV Continuous <Continuous>    MEDICATIONS  (PRN):  acetaminophen   Tablet. 650 milliGRAM(s) Oral every 6 hours PRN Mild Pain (1 - 3)  dextrose 40% Gel 15 Gram(s) Oral once PRN Blood Glucose LESS THAN 70 milliGRAM(s)/deciliter  glucagon  Injectable 1 milliGRAM(s) IntraMuscular once PRN Glucose LESS THAN 70 milligrams/deciliter  morphine  IR 15 milliGRAM(s) Oral every 4 hours PRN moderate to severe pain  ondansetron Injectable 4 milliGRAM(s) IV Push every 6 hours PRN Nausea        CAPILLARY BLOOD GLUCOSE      POCT Blood Glucose.: 120 mg/dL (16 May 2018 08:32)  POCT Blood Glucose.: 228 mg/dL (15 May 2018 22:27)  POCT Blood Glucose.: 194 mg/dL (15 May 2018 17:39)  POCT Blood Glucose.: 134 mg/dL (15 May 2018 12:31)    I&O's Summary      PHYSICAL EXAM:  Vital Signs Last 24 Hrs  T(C): 36.7 (16 May 2018 04:37), Max: 36.7 (15 May 2018 15:03)  T(F): 98 (16 May 2018 04:37), Max: 98 (15 May 2018 15:03)  HR: 77 (16 May 2018 04:37) (67 - 77)  BP: 155/58 (16 May 2018 04:37) (99/47 - 155/58)  BP(mean): --  RR: 18 (16 May 2018 04:37) (18 - 18)  SpO2: 95% (16 May 2018 04:37) (95% - 98%)  GENERAL: NAD, well-developed  HEAD:  Atraumatic, Normocephalic  EYES: EOMI, PERRLA, conjunctiva and sclera clear  NECK: Supple, No JVD  CHEST/LUNG: Clear to auscultation bilaterally; No wheeze  HEART: Regular rate and rhythm; No murmurs, rubs, or gallops  ABDOMEN: Soft, Nontender, Nondistended; Bowel sounds present  EXTREMITIES:  2+ Peripheral Pulses, No clubbing, cyanosis, or edema  PSYCH: AAOx3  NEUROLOGY: non-focal  SKIN: No rashes or lesions    LABS:                        8.5    12.87 )-----------( 399      ( 16 May 2018 05:30 )             27.0     05-16    133<L>  |  98  |  7   ----------------------------<  107<H>  4.2   |  24  |  0.47<L>    Ca    8.2<L>      16 May 2018 05:30        RADIOLOGY & ADDITIONAL TESTS:    Imaging Personally Reviewed:    Consultant(s) Notes Reviewed:      Care Discussed with Consultants/Other Providers:  Onc, Rt- Onc

## 2018-05-16 NOTE — PROGRESS NOTE ADULT - ASSESSMENT
78  y.o. woman with history of PAD and rectal mass who was sent to the ER for evaluation of abdominal pain and rectal mass by her oncologist. Patient reports severe suprapubic pain, radiates to the rectum, aggravated with movement and palpation, alleviated with pain medications. Seen by Pallitive pain, Oncology and Rt onc.  Pain is better controlled.  Bxp- Positive Rectal Cancer. 78  y.o. woman with history of PAD and rectal mass who was sent to the ER for evaluation of abdominal pain and rectal mass by her oncologist. Patient reports severe suprapubic pain, radiates to the rectum, aggravated with movement and palpation, alleviated with pain medications. Seen by Pallitive pain, Oncology and Rt onc.  Pain is better controlled.  Bxp- Positive Rectal Cancer.  Need 5 days RT- plan to end about 5/24

## 2018-05-16 NOTE — SWALLOW BEDSIDE ASSESSMENT ADULT - COMMENTS
MD orders received. Chart reviewed. Bedside swallow evaluation deferred as patient is presently off unit for radiation therapy. SLP to follow up as schedule permits. RN aware.

## 2018-05-16 NOTE — PROGRESS NOTE ADULT - PROBLEM SELECTOR PLAN 2
likely SIADH   trial of salt tablet and fluid restriction   trend Na likely SIADH   trial of salt tablet and fluid restriction   trend Na- gentle IVF

## 2018-05-16 NOTE — PROGRESS NOTE ADULT - ATTENDING COMMENTS
Rectal cancer  -large locally advanced rectal cancer  -Due to social issues traditional neoadjuvant therapy would be very difficult to complete  -I have discussed this case at length w/ Oncology.  We will see if short course radiation could be performed  -Would obtain pelvic MRI to better evaluate tumor for possible subsequent resection  -Care per primary team

## 2018-05-16 NOTE — PROGRESS NOTE ADULT - SUBJECTIVE AND OBJECTIVE BOX
Subjective: Patient seen and examined. No new events except as noted.     SUBJECTIVE/ROS:  no new events       MEDICATIONS:  MEDICATIONS  (STANDING):  atorvastatin 40 milliGRAM(s) Oral at bedtime  carvedilol 12.5 milliGRAM(s) Oral every 12 hours  dextrose 5%. 1000 milliLiter(s) (50 mL/Hr) IV Continuous <Continuous>  dextrose 50% Injectable 12.5 Gram(s) IV Push once  dextrose 50% Injectable 25 Gram(s) IV Push once  dextrose 50% Injectable 25 Gram(s) IV Push once  docusate sodium 100 milliGRAM(s) Oral two times a day  enoxaparin Injectable 40 milliGRAM(s) SubCutaneous every 24 hours  famotidine    Tablet 20 milliGRAM(s) Oral daily  ferrous    sulfate 325 milliGRAM(s) Oral daily  folic acid 1 milliGRAM(s) Oral daily  insulin lispro (HumaLOG) corrective regimen sliding scale   SubCutaneous at bedtime  insulin lispro (HumaLOG) corrective regimen sliding scale   SubCutaneous three times a day before meals  lisinopril 5 milliGRAM(s) Oral daily  polyethylene glycol 3350 17 Gram(s) Oral two times a day  senna 2 Tablet(s) Oral at bedtime  sodium chloride 1 Gram(s) Oral two times a day  sodium chloride 0.9%. 1000 milliLiter(s) (50 mL/Hr) IV Continuous <Continuous>      PHYSICAL EXAM:  T(C): 36.7 (05-16-18 @ 04:37), Max: 36.7 (05-15-18 @ 15:03)  HR: 77 (05-16-18 @ 04:37) (67 - 77)  BP: 155/58 (05-16-18 @ 04:37) (99/47 - 155/58)  RR: 18 (05-16-18 @ 04:37) (18 - 18)  SpO2: 95% (05-16-18 @ 04:37) (95% - 98%)  Wt(kg): --  I&O's Summary        JVP: Normal  Neck: supple  Lung: clear   CV: S1 S2 , Murmur:  Abd: soft  Ext: No edema  neuro: Awake / alert  Psych: flat affect  Skin: normal       LABS/DATA:    CARDIAC MARKERS:                                8.5    12.87 )-----------( 399      ( 16 May 2018 05:30 )             27.0     05-16    133<L>  |  98  |  7   ----------------------------<  107<H>  4.2   |  24  |  0.47<L>    Ca    8.2<L>      16 May 2018 05:30      proBNP:   Lipid Profile:   HgA1c:   TSH:     TELE:  EKG:    < from: Nuclear Stress Test-Pharmacologic (05.15.18 @ 12:21) >  IMPRESSIONS:Normal Study  * The left ventricle was normal in size.  * Tracer uptake was homogeneous throughout the left  ventricle.  *Normal study; no evidence for myocardial infarction or  ischemia.  * Gated wall motion analysis was performed, and shows  normal wall motion.    < end of copied text >

## 2018-05-16 NOTE — PROGRESS NOTE ADULT - SUBJECTIVE AND OBJECTIVE BOX
no events    abd soft      Objective:    MEDICATIONS  (STANDING):  atorvastatin 40 milliGRAM(s) Oral at bedtime  carvedilol 12.5 milliGRAM(s) Oral every 12 hours  dextrose 5%. 1000 milliLiter(s) (50 mL/Hr) IV Continuous <Continuous>  dextrose 50% Injectable 12.5 Gram(s) IV Push once  dextrose 50% Injectable 25 Gram(s) IV Push once  dextrose 50% Injectable 25 Gram(s) IV Push once  docusate sodium 100 milliGRAM(s) Oral two times a day  enoxaparin Injectable 40 milliGRAM(s) SubCutaneous every 24 hours  famotidine    Tablet 20 milliGRAM(s) Oral daily  ferrous    sulfate 325 milliGRAM(s) Oral daily  folic acid 1 milliGRAM(s) Oral daily  insulin lispro (HumaLOG) corrective regimen sliding scale   SubCutaneous at bedtime  insulin lispro (HumaLOG) corrective regimen sliding scale   SubCutaneous three times a day before meals  lisinopril 5 milliGRAM(s) Oral daily  polyethylene glycol 3350 17 Gram(s) Oral two times a day  senna 2 Tablet(s) Oral at bedtime  sodium chloride 1 Gram(s) Oral two times a day  sodium chloride 0.9%. 1000 milliLiter(s) (50 mL/Hr) IV Continuous <Continuous>    MEDICATIONS  (PRN):  acetaminophen   Tablet. 650 milliGRAM(s) Oral every 6 hours PRN Mild Pain (1 - 3)  dextrose 40% Gel 15 Gram(s) Oral once PRN Blood Glucose LESS THAN 70 milliGRAM(s)/deciliter  glucagon  Injectable 1 milliGRAM(s) IntraMuscular once PRN Glucose LESS THAN 70 milligrams/deciliter  morphine  IR 15 milliGRAM(s) Oral every 4 hours PRN moderate to severe pain  ondansetron Injectable 4 milliGRAM(s) IV Push every 6 hours PRN Nausea      Vital Signs Last 24 Hrs  T(C): 36.7 (16 May 2018 04:37), Max: 36.7 (15 May 2018 15:03)  T(F): 98 (16 May 2018 04:37), Max: 98 (15 May 2018 15:03)  HR: 77 (16 May 2018 04:37) (67 - 77)  BP: 155/58 (16 May 2018 04:37) (99/47 - 155/58)  BP(mean): --  RR: 18 (16 May 2018 04:37) (18 - 18)  SpO2: 95% (16 May 2018 04:37) (95% - 98%)    I&O's Detail      Daily     Daily     LABS:                        8.5    12.87 )-----------( 399      ( 16 May 2018 05:30 )             27.0     05-16    133<L>  |  98  |  7   ----------------------------<  107<H>  4.2   |  24  |  0.47<L>    Ca    8.2<L>      16 May 2018 05:30            RADIOLOGY & ADDITIONAL STUDIES:

## 2018-05-16 NOTE — SWALLOW BEDSIDE ASSESSMENT ADULT - COMMENTS
SLP follow up as per plan. Unable to perform bedside swallow evaluation 2/2 patient refusing PO trials via turning head away and placing napkin over her mouth despite clinician encouragement. Patient's daughter present at bedside and presented patient with small cup sip of water which patient immediately spit out.    Recommendations:  1) Defer to MD to determine nutrition/hydration/medication plan as patient refused all PO trials with SLP this date  2) SLP intervention deemed no longer warranted by this service; MD to re-consult should patient cooperation improve.    Discussed with primary RN.

## 2018-05-16 NOTE — PROGRESS NOTE ADULT - PROBLEM SELECTOR PLAN 1
- Pain control with morphine  s/p repeat Biopsy by GI on 5/10. Path showed moderately differentiated adenocarcinoma.   f/u cardiology to optimize for possible sx resection  f/u Onc/Sx/GI - Pain control with morphine  s/p repeat Biopsy by GI on 5/10. Path showed moderately differentiated adenocarcinoma.   5 days RT ends 5/24, then surgery with Dr Sosa

## 2018-05-17 DIAGNOSIS — F06.31 MOOD DISORDER DUE TO KNOWN PHYSIOLOGICAL CONDITION WITH DEPRESSIVE FEATURES: ICD-10-CM

## 2018-05-17 LAB
APPEARANCE UR: CLEAR — SIGNIFICANT CHANGE UP
BASOPHILS # BLD AUTO: 0.02 K/UL — SIGNIFICANT CHANGE UP (ref 0–0.2)
BASOPHILS NFR BLD AUTO: 0.1 % — SIGNIFICANT CHANGE UP (ref 0–2)
BILIRUB UR-MCNC: NEGATIVE — SIGNIFICANT CHANGE UP
BLOOD UR QL VISUAL: NEGATIVE — SIGNIFICANT CHANGE UP
BUN SERPL-MCNC: 6 MG/DL — LOW (ref 7–23)
CALCIUM SERPL-MCNC: 8.2 MG/DL — LOW (ref 8.4–10.5)
CHLORIDE SERPL-SCNC: 96 MMOL/L — LOW (ref 98–107)
CO2 SERPL-SCNC: 25 MMOL/L — SIGNIFICANT CHANGE UP (ref 22–31)
COLOR SPEC: YELLOW — SIGNIFICANT CHANGE UP
CREAT SERPL-MCNC: 0.54 MG/DL — SIGNIFICANT CHANGE UP (ref 0.5–1.3)
EOSINOPHIL # BLD AUTO: 0.4 K/UL — SIGNIFICANT CHANGE UP (ref 0–0.5)
EOSINOPHIL NFR BLD AUTO: 2.9 % — SIGNIFICANT CHANGE UP (ref 0–6)
GLUCOSE BLDC GLUCOMTR-MCNC: 113 MG/DL — HIGH (ref 70–99)
GLUCOSE BLDC GLUCOMTR-MCNC: 140 MG/DL — HIGH (ref 70–99)
GLUCOSE BLDC GLUCOMTR-MCNC: 144 MG/DL — HIGH (ref 70–99)
GLUCOSE BLDC GLUCOMTR-MCNC: 298 MG/DL — HIGH (ref 70–99)
GLUCOSE SERPL-MCNC: 134 MG/DL — HIGH (ref 70–99)
GLUCOSE UR-MCNC: NEGATIVE — SIGNIFICANT CHANGE UP
HCT VFR BLD CALC: 25.9 % — LOW (ref 34.5–45)
HGB BLD-MCNC: 8.2 G/DL — LOW (ref 11.5–15.5)
IMM GRANULOCYTES # BLD AUTO: 0.07 # — SIGNIFICANT CHANGE UP
IMM GRANULOCYTES NFR BLD AUTO: 0.5 % — SIGNIFICANT CHANGE UP (ref 0–1.5)
KETONES UR-MCNC: NEGATIVE — SIGNIFICANT CHANGE UP
LEUKOCYTE ESTERASE UR-ACNC: NEGATIVE — SIGNIFICANT CHANGE UP
LYMPHOCYTES # BLD AUTO: 16.5 % — SIGNIFICANT CHANGE UP (ref 13–44)
LYMPHOCYTES # BLD AUTO: 2.24 K/UL — SIGNIFICANT CHANGE UP (ref 1–3.3)
MCHC RBC-ENTMCNC: 27.5 PG — SIGNIFICANT CHANGE UP (ref 27–34)
MCHC RBC-ENTMCNC: 31.7 % — LOW (ref 32–36)
MCV RBC AUTO: 86.9 FL — SIGNIFICANT CHANGE UP (ref 80–100)
MONOCYTES # BLD AUTO: 1.16 K/UL — HIGH (ref 0–0.9)
MONOCYTES NFR BLD AUTO: 8.5 % — SIGNIFICANT CHANGE UP (ref 2–14)
MUCOUS THREADS # UR AUTO: SIGNIFICANT CHANGE UP
NEUTROPHILS # BLD AUTO: 9.71 K/UL — HIGH (ref 1.8–7.4)
NEUTROPHILS NFR BLD AUTO: 71.5 % — SIGNIFICANT CHANGE UP (ref 43–77)
NITRITE UR-MCNC: NEGATIVE — SIGNIFICANT CHANGE UP
NON-SQ EPI CELLS # UR AUTO: <1 — SIGNIFICANT CHANGE UP
NRBC # FLD: 0 — SIGNIFICANT CHANGE UP
PH UR: 6.5 — SIGNIFICANT CHANGE UP (ref 4.6–8)
PLATELET # BLD AUTO: 372 K/UL — SIGNIFICANT CHANGE UP (ref 150–400)
PMV BLD: 9 FL — SIGNIFICANT CHANGE UP (ref 7–13)
POTASSIUM SERPL-MCNC: 3.6 MMOL/L — SIGNIFICANT CHANGE UP (ref 3.5–5.3)
POTASSIUM SERPL-SCNC: 3.6 MMOL/L — SIGNIFICANT CHANGE UP (ref 3.5–5.3)
PROT UR-MCNC: 10 MG/DL — SIGNIFICANT CHANGE UP
RBC # BLD: 2.98 M/UL — LOW (ref 3.8–5.2)
RBC # FLD: 15.2 % — HIGH (ref 10.3–14.5)
RBC CASTS # UR COMP ASSIST: SIGNIFICANT CHANGE UP (ref 0–?)
SODIUM SERPL-SCNC: 126 MMOL/L — LOW (ref 135–145)
SP GR SPEC: 1.02 — SIGNIFICANT CHANGE UP (ref 1–1.04)
UROBILINOGEN FLD QL: 4 MG/DL — HIGH
WBC # BLD: 13.6 K/UL — HIGH (ref 3.8–10.5)
WBC # FLD AUTO: 13.6 K/UL — HIGH (ref 3.8–10.5)
WBC UR QL: SIGNIFICANT CHANGE UP (ref 0–?)

## 2018-05-17 PROCEDURE — 77280 THER RAD SIMULAJ FIELD SMPL: CPT | Mod: 26

## 2018-05-17 PROCEDURE — 71045 X-RAY EXAM CHEST 1 VIEW: CPT | Mod: 26

## 2018-05-17 PROCEDURE — 99233 SBSQ HOSP IP/OBS HIGH 50: CPT

## 2018-05-17 RX ORDER — PIPERACILLIN AND TAZOBACTAM 4; .5 G/20ML; G/20ML
3.38 INJECTION, POWDER, LYOPHILIZED, FOR SOLUTION INTRAVENOUS EVERY 8 HOURS
Qty: 0 | Refills: 0 | Status: DISCONTINUED | OUTPATIENT
Start: 2018-05-17 | End: 2018-05-22

## 2018-05-17 RX ORDER — MORPHINE SULFATE 50 MG/1
15 CAPSULE, EXTENDED RELEASE ORAL EVERY 4 HOURS
Qty: 0 | Refills: 0 | Status: DISCONTINUED | OUTPATIENT
Start: 2018-05-17 | End: 2018-05-23

## 2018-05-17 RX ORDER — MIRTAZAPINE 45 MG/1
15 TABLET, ORALLY DISINTEGRATING ORAL AT BEDTIME
Qty: 0 | Refills: 0 | Status: DISCONTINUED | OUTPATIENT
Start: 2018-05-17 | End: 2018-05-17

## 2018-05-17 RX ORDER — ACETAMINOPHEN 500 MG
650 TABLET ORAL EVERY 6 HOURS
Qty: 0 | Refills: 0 | Status: DISCONTINUED | OUTPATIENT
Start: 2018-05-17 | End: 2018-05-23

## 2018-05-17 RX ORDER — MIRTAZAPINE 45 MG/1
15 TABLET, ORALLY DISINTEGRATING ORAL AT BEDTIME
Qty: 0 | Refills: 0 | Status: DISCONTINUED | OUTPATIENT
Start: 2018-05-17 | End: 2018-05-23

## 2018-05-17 RX ADMIN — CARVEDILOL PHOSPHATE 12.5 MILLIGRAM(S): 80 CAPSULE, EXTENDED RELEASE ORAL at 18:03

## 2018-05-17 RX ADMIN — Medication 1 MILLIGRAM(S): at 14:03

## 2018-05-17 RX ADMIN — SODIUM CHLORIDE 1 GRAM(S): 9 INJECTION INTRAMUSCULAR; INTRAVENOUS; SUBCUTANEOUS at 06:22

## 2018-05-17 RX ADMIN — MIRTAZAPINE 15 MILLIGRAM(S): 45 TABLET, ORALLY DISINTEGRATING ORAL at 21:56

## 2018-05-17 RX ADMIN — MORPHINE SULFATE 15 MILLIGRAM(S): 50 CAPSULE, EXTENDED RELEASE ORAL at 00:00

## 2018-05-17 RX ADMIN — Medication 100 MILLIGRAM(S): at 06:22

## 2018-05-17 RX ADMIN — Medication 325 MILLIGRAM(S): at 14:03

## 2018-05-17 RX ADMIN — ENOXAPARIN SODIUM 40 MILLIGRAM(S): 100 INJECTION SUBCUTANEOUS at 06:22

## 2018-05-17 RX ADMIN — SODIUM CHLORIDE 1 GRAM(S): 9 INJECTION INTRAMUSCULAR; INTRAVENOUS; SUBCUTANEOUS at 18:03

## 2018-05-17 RX ADMIN — Medication 1: at 22:29

## 2018-05-17 RX ADMIN — SENNA PLUS 2 TABLET(S): 8.6 TABLET ORAL at 21:56

## 2018-05-17 RX ADMIN — LISINOPRIL 5 MILLIGRAM(S): 2.5 TABLET ORAL at 06:23

## 2018-05-17 RX ADMIN — POLYETHYLENE GLYCOL 3350 17 GRAM(S): 17 POWDER, FOR SOLUTION ORAL at 06:22

## 2018-05-17 RX ADMIN — Medication 650 MILLIGRAM(S): at 18:03

## 2018-05-17 RX ADMIN — PIPERACILLIN AND TAZOBACTAM 25 GRAM(S): 4; .5 INJECTION, POWDER, LYOPHILIZED, FOR SOLUTION INTRAVENOUS at 19:42

## 2018-05-17 RX ADMIN — CARVEDILOL PHOSPHATE 12.5 MILLIGRAM(S): 80 CAPSULE, EXTENDED RELEASE ORAL at 06:22

## 2018-05-17 RX ADMIN — FAMOTIDINE 20 MILLIGRAM(S): 10 INJECTION INTRAVENOUS at 14:04

## 2018-05-17 RX ADMIN — ATORVASTATIN CALCIUM 40 MILLIGRAM(S): 80 TABLET, FILM COATED ORAL at 21:56

## 2018-05-17 NOTE — PROGRESS NOTE ADULT - ASSESSMENT
78  y.o. woman with history of PAD and rectal mass who was sent to the ER for evaluation of abdominal pain and rectal mass by her oncologist. Patient reports severe suprapubic pain, radiates to the rectum, aggravated with movement and palpation, alleviated with pain medications. Seen by Pallitive pain, Oncology and Rt onc.  Pain is better controlled.  Bxp- Positive Rectal Cancer.  Need 5 days RT- plan to end about 5/23

## 2018-05-17 NOTE — BEHAVIORAL HEALTH ASSESSMENT NOTE - NSBHCHARTREVIEWINVESTIGATE_PSY_A_CORE FT
Ventricular Rate 74 BPM    Atrial Rate 74 BPM    P-R Interval 148 ms    QRS Duration 80 ms    Q-T Interval 376 ms    QTC Calculation(Bezet) 417 ms    P Axis 43 degrees    R Axis 30 degrees    T Axis 45 degrees    Diagnosis Line Normal sinus rhythm  Septal infarct , age undetermined  Abnormal ECG

## 2018-05-17 NOTE — BEHAVIORAL HEALTH ASSESSMENT NOTE - SUMMARY
Pt is a 78  y.o. Portuguese speaking  woman, domicile at home with her son and daughter diony. No past psychiatric history, no history of substance abuse. PMHX of PAD and rectal mass who was sent to the ER for evaluation of abdominal pain and rectal mass by her oncologist. Per chart pt reports severe suprapubic pain, radiates to the rectum, aggravated with movement and palpation, alleviated with pain medications. Patient also reports nausea. As per daughter's daughter reports that the patient does not get out of bed. Psychiatry consulted for depression/ poor po intake    Seen and evaluated. Pt alert, moaning throughout interview. Collateral from daughter states pt behavior is the context of her having severe pain. Presentation likely depression due to medical condition  Plan  1. Continue Remeron 15mg qhs   2.Opitimze pain management   Psychiatry will follow

## 2018-05-17 NOTE — PROGRESS NOTE ADULT - ASSESSMENT
PreOp  Based on current ACC/AHA guidelines, patient history and physical exam, the patient is considered to have elevated risk  stress test unremarkable   may proceed to OR     CAD  stress test unremarkable   cont statin    HTN  stable    DM  Monitor finger stick. Insulin coverage. Diabetic education and Diabetic diet. Consider nutrition consultation.

## 2018-05-17 NOTE — BEHAVIORAL HEALTH ASSESSMENT NOTE - NSBHCHARTREVIEWLAB_PSY_A_CORE FT
05-17    126<L>  |  96<L>  |  6<L>  ----------------------------<  134<H>  3.6   |  25  |  0.54    Ca    8.2<L>      17 May 2018 09:40                          8.2    13.60 )-----------( 372      ( 17 May 2018 09:40 )             25.9

## 2018-05-17 NOTE — PROGRESS NOTE ADULT - PROBLEM SELECTOR PLAN 1
- Pain control with morphine  s/p repeat Biopsy by GI on 5/10. Path showed moderately differentiated adenocarcinoma.   5 days RT ends 5/24, then surgery with Dr Sosa

## 2018-05-17 NOTE — BEHAVIORAL HEALTH ASSESSMENT NOTE - CASE SUMMARY
Pt is a 78  y.o. Latvian speaking  woman, domicile at home with her son and daughter diony. No past psychiatric history, no history of substance abuse. PMHX of PAD and rectal mass who was sent to the ER for evaluation of abdominal pain and rectal mass by her oncologist. Per chart pt reports severe suprapubic pain, radiates to the rectum, aggravated with movement and palpation, alleviated with pain medications. Patient also reports nausea. As per daughter's daughter reports that the patient does not get out of bed. Psychiatry consulted for depression/ poor po intake.  Pt is scheduled for a head MRI.  She is very drowsy and not able to have much of a conversation now.  I agree with the Remeron 15mg in the evening.  Will follow.

## 2018-05-17 NOTE — PROGRESS NOTE ADULT - ATTENDING COMMENTS
increase Remeron to 15 mg qhs  100 % assistance with meals increase Remeron to 15 mg qhs  100 % assistance with meals  oob to chair with assistance QD increase Remeron to 15 mg qhs  100 % assistance with meals  oob to chair with assistance QD.    Addendum 3 pm  Tried with tranlator niurka Durand tranl # [id number]  Patient open eyes but goes back to sleep.  Patient did not cooperate with s/s eval today.  Psych consult called

## 2018-05-17 NOTE — BEHAVIORAL HEALTH ASSESSMENT NOTE - NSBHCHARTREVIEWVS_PSY_A_CORE FT
Vital Signs Last 24 Hrs  T(C): 38.9 (17 May 2018 18:00), Max: 38.9 (17 May 2018 18:00)  T(F): 102.1 (17 May 2018 18:00), Max: 102.1 (17 May 2018 18:00)  HR: 81 (17 May 2018 18:00) (72 - 81)  BP: 159/71 (17 May 2018 18:00) (99/47 - 159/71)  BP(mean): --  RR: 18 (17 May 2018 06:31) (18 - 18)  SpO2: 100% (17 May 2018 06:31) (96% - 100%)

## 2018-05-17 NOTE — PROGRESS NOTE ADULT - PROBLEM SELECTOR PLAN 3
likely secondary to malignancy  c/w supportive care  c/w Ensure  Remeron started to help with mood and appetite- inc to 15 mg qhs

## 2018-05-17 NOTE — PROGRESS NOTE ADULT - SUBJECTIVE AND OBJECTIVE BOX
[facility]:[id number],   78yFemale followed for:  No Known Allergies    PAST MEDICAL & SURGICAL HISTORY:  Rectal mass  Gangrene of foot  Coronary artery disease involving native coronary artery of native heart without angina pectoris  Status post above knee amputation of right lower extremity    FAMILY HISTORY:  No pertinent family history in first degree relatives    MEDICATIONS  (STANDING):  atorvastatin 40 milliGRAM(s) Oral at bedtime  carvedilol 12.5 milliGRAM(s) Oral every 12 hours  dextrose 5%. 1000 milliLiter(s) (50 mL/Hr) IV Continuous <Continuous>  dextrose 50% Injectable 12.5 Gram(s) IV Push once  dextrose 50% Injectable 25 Gram(s) IV Push once  dextrose 50% Injectable 25 Gram(s) IV Push once  docusate sodium 100 milliGRAM(s) Oral two times a day  enoxaparin Injectable 40 milliGRAM(s) SubCutaneous every 24 hours  famotidine    Tablet 20 milliGRAM(s) Oral daily  ferrous    sulfate 325 milliGRAM(s) Oral daily  folic acid 1 milliGRAM(s) Oral daily  insulin lispro (HumaLOG) corrective regimen sliding scale   SubCutaneous at bedtime  insulin lispro (HumaLOG) corrective regimen sliding scale   SubCutaneous three times a day before meals  lisinopril 5 milliGRAM(s) Oral daily  mirtazapine 7.5 milliGRAM(s) Oral at bedtime  polyethylene glycol 3350 17 Gram(s) Oral two times a day  senna 2 Tablet(s) Oral at bedtime  sodium chloride 1 Gram(s) Oral two times a day  sodium chloride 0.9%. 1000 milliLiter(s) (50 mL/Hr) IV Continuous <Continuous>    MEDICATIONS  (PRN):  acetaminophen   Tablet. 650 milliGRAM(s) Oral every 6 hours PRN Mild Pain (1 - 3)  dextrose 40% Gel 15 Gram(s) Oral once PRN Blood Glucose LESS THAN 70 milliGRAM(s)/deciliter  glucagon  Injectable 1 milliGRAM(s) IntraMuscular once PRN Glucose LESS THAN 70 milligrams/deciliter  morphine  IR 15 milliGRAM(s) Oral every 4 hours PRN moderate to severe pain  ondansetron Injectable 4 milliGRAM(s) IV Push every 6 hours PRN Nausea      Vital Signs Last 24 Hrs  T(C): 37.1 (17 May 2018 06:31), Max: 37.2 (16 May 2018 21:37)  T(F): 98.7 (17 May 2018 06:31), Max: 99 (16 May 2018 21:37)  HR: 77 (17 May 2018 06:31) (65 - 77)  BP: 150/58 (17 May 2018 06:31) (99/47 - 150/58)  BP(mean): --  RR: 18 (17 May 2018 06:31) (17 - 18)  SpO2: 100% (17 May 2018 06:31) (95% - 100%)  nc/at  s1s2  cta  soft, nt, nd no guarding or rebound  no c/c/e    CBC Full  -  ( 16 May 2018 05:30 )  WBC Count : 12.87 K/uL  Hemoglobin : 8.5 g/dL  Hematocrit : 27.0 %  Platelet Count - Automated : 399 K/uL  Mean Cell Volume : 88.5 fL  Mean Cell Hemoglobin : 27.9 pg  Mean Cell Hemoglobin Concentration : 31.5 %  Auto Neutrophil # : x  Auto Lymphocyte # : x  Auto Monocyte # : x  Auto Eosinophil # : x  Auto Basophil # : x  Auto Neutrophil % : x  Auto Lymphocyte % : x  Auto Monocyte % : x  Auto Eosinophil % : x  Auto Basophil % : x    05-16    133<L>  |  98  |  7   ----------------------------<  107<H>  4.2   |  24  |  0.47<L>    Ca    8.2<L>      16 May 2018 05:30

## 2018-05-17 NOTE — PROGRESS NOTE ADULT - SUBJECTIVE AND OBJECTIVE BOX
Subjective: Patient seen and examined. No new events except as noted.     SUBJECTIVE/ROS:  NAD      MEDICATIONS:  MEDICATIONS  (STANDING):  atorvastatin 40 milliGRAM(s) Oral at bedtime  carvedilol 12.5 milliGRAM(s) Oral every 12 hours  dextrose 5%. 1000 milliLiter(s) (50 mL/Hr) IV Continuous <Continuous>  dextrose 50% Injectable 12.5 Gram(s) IV Push once  dextrose 50% Injectable 25 Gram(s) IV Push once  dextrose 50% Injectable 25 Gram(s) IV Push once  docusate sodium 100 milliGRAM(s) Oral two times a day  enoxaparin Injectable 40 milliGRAM(s) SubCutaneous every 24 hours  famotidine    Tablet 20 milliGRAM(s) Oral daily  ferrous    sulfate 325 milliGRAM(s) Oral daily  folic acid 1 milliGRAM(s) Oral daily  insulin lispro (HumaLOG) corrective regimen sliding scale   SubCutaneous at bedtime  insulin lispro (HumaLOG) corrective regimen sliding scale   SubCutaneous three times a day before meals  lisinopril 5 milliGRAM(s) Oral daily  mirtazapine 15 milliGRAM(s) Oral at bedtime  polyethylene glycol 3350 17 Gram(s) Oral two times a day  senna 2 Tablet(s) Oral at bedtime  sodium chloride 1 Gram(s) Oral two times a day      PHYSICAL EXAM:  T(C): 37.1 (05-17-18 @ 06:31), Max: 37.2 (05-16-18 @ 21:37)  HR: 77 (05-17-18 @ 06:31) (72 - 77)  BP: 150/58 (05-17-18 @ 06:31) (99/47 - 150/58)  RR: 18 (05-17-18 @ 06:31) (18 - 18)  SpO2: 100% (05-17-18 @ 06:31) (96% - 100%)  Wt(kg): --  I&O's Summary        JVP: Normal  Neck: supple  Lung: clear   CV: S1 S2 , Murmur:  Abd: soft  Ext: No edema  neuro: Awake / alert  Psych: flat affect  Skin: normal     LABS/DATA:    CARDIAC MARKERS:                                8.2    13.60 )-----------( 372      ( 17 May 2018 09:40 )             25.9     05-17    126<L>  |  96<L>  |  6<L>  ----------------------------<  134<H>  3.6   |  25  |  0.54    Ca    8.2<L>      17 May 2018 09:40      proBNP:   Lipid Profile:   HgA1c:   TSH:     TELE:  EKG:

## 2018-05-17 NOTE — BEHAVIORAL HEALTH ASSESSMENT NOTE - HPI (INCLUDE ILLNESS QUALITY, SEVERITY, DURATION, TIMING, CONTEXT, MODIFYING FACTORS, ASSOCIATED SIGNS AND SYMPTOMS)
Pt is a 78  y.o. Croatian speaking  woman, domiciled at home with her son and daughter diony. No past psychiatric history, no history of substance abuse. PMHX of PAD and rectal mass who was sent to the ER for evaluation of abdominal pain and rectal mass by her oncologist. Per chart pt reports severe suprapubic pain, radiates to the rectum, aggravated with movement and palpation, alleviated with pain medications. Patient also reports nausea. As per daughter's daughter reports that the patient does not get out of bed. Psychiatry consulted for depression/ poor po intake    Seen and examined.  phone utilized #[phone]. Pt moaning through out interview,  not able to engage pt in interview.  Per staff, pt minimally verbal, moans frequently through out day.    Collateral received from pt's daughter Louann. Daughter denies pt having  past psychiatric history, treatment, hospitalization, suicide attempt, or substance use.  States pt is at baseline pt is a&o x3, in good spirts, sleeps well  and is able to care for self. Denies any presence of mood or psychotic symptoms. States that pt is currently in allot of pain. States her moaning and not eating is in the context of pain. States that she has no concerns for her mother psychiatrically.

## 2018-05-17 NOTE — PROGRESS NOTE ADULT - SUBJECTIVE AND OBJECTIVE BOX
Patient is a 78y old  Female who presents with a chief complaint of Abdominal pain (08 May 2018 22:37)      SUBJECTIVE / OVERNIGHT EVENTS:  Patient is comfortable.  Notes she is eating.  PCA notes she eats but not all her food    MEDICATIONS  (STANDING):  atorvastatin 40 milliGRAM(s) Oral at bedtime  carvedilol 12.5 milliGRAM(s) Oral every 12 hours  dextrose 5%. 1000 milliLiter(s) (50 mL/Hr) IV Continuous <Continuous>  dextrose 50% Injectable 12.5 Gram(s) IV Push once  dextrose 50% Injectable 25 Gram(s) IV Push once  dextrose 50% Injectable 25 Gram(s) IV Push once  docusate sodium 100 milliGRAM(s) Oral two times a day  enoxaparin Injectable 40 milliGRAM(s) SubCutaneous every 24 hours  famotidine    Tablet 20 milliGRAM(s) Oral daily  ferrous    sulfate 325 milliGRAM(s) Oral daily  folic acid 1 milliGRAM(s) Oral daily  insulin lispro (HumaLOG) corrective regimen sliding scale   SubCutaneous at bedtime  insulin lispro (HumaLOG) corrective regimen sliding scale   SubCutaneous three times a day before meals  lisinopril 5 milliGRAM(s) Oral daily  mirtazapine 15 milliGRAM(s) Oral at bedtime  polyethylene glycol 3350 17 Gram(s) Oral two times a day  senna 2 Tablet(s) Oral at bedtime  sodium chloride 1 Gram(s) Oral two times a day  sodium chloride 0.9%. 1000 milliLiter(s) (50 mL/Hr) IV Continuous <Continuous>    MEDICATIONS  (PRN):  acetaminophen   Tablet. 650 milliGRAM(s) Oral every 6 hours PRN Mild Pain (1 - 3)  dextrose 40% Gel 15 Gram(s) Oral once PRN Blood Glucose LESS THAN 70 milliGRAM(s)/deciliter  glucagon  Injectable 1 milliGRAM(s) IntraMuscular once PRN Glucose LESS THAN 70 milligrams/deciliter  morphine  IR 15 milliGRAM(s) Oral every 4 hours PRN moderate to severe pain  ondansetron Injectable 4 milliGRAM(s) IV Push every 6 hours PRN Nausea        CAPILLARY BLOOD GLUCOSE      POCT Blood Glucose.: 113 mg/dL (17 May 2018 08:47)  POCT Blood Glucose.: 212 mg/dL (16 May 2018 22:06)  POCT Blood Glucose.: 171 mg/dL (16 May 2018 17:48)    I&O's Summary      PHYSICAL EXAM:  Vital Signs Last 24 Hrs  T(C): 37.1 (17 May 2018 06:31), Max: 37.2 (16 May 2018 21:37)  T(F): 98.7 (17 May 2018 06:31), Max: 99 (16 May 2018 21:37)  HR: 77 (17 May 2018 06:31) (72 - 77)  BP: 150/58 (17 May 2018 06:31) (99/47 - 150/58)  BP(mean): --  RR: 18 (17 May 2018 06:31) (18 - 18)  SpO2: 100% (17 May 2018 06:31) (96% - 100%)  GENERAL: NAD, well-developed  HEAD:  Atraumatic, Normocephalic  EYES: EOMI, PERRLA, conjunctiva and sclera clear  NECK: Supple, No JVD  CHEST/LUNG: Clear to auscultation bilaterally; No wheeze  HEART: Regular rate and rhythm; No murmurs, rubs, or gallops  ABDOMEN: Soft, Nontender, Nondistended; Bowel sounds present  EXTREMITIES:  2+ Peripheral Pulses, No clubbing, cyanosis, or edema  R AKA amputation  PSYCH: AAOx3  NEUROLOGY: non-focal  SKIN: No rashes or lesions    LABS:                        8.2    13.60 )-----------( 372      ( 17 May 2018 09:40 )             25.9     05-17    126<L>  |  96<L>  |  6<L>  ----------------------------<  134<H>  3.6   |  25  |  0.54    Ca    8.2<L>      17 May 2018 09:40        RADIOLOGY & ADDITIONAL TESTS:    Imaging Personally Reviewed:    Consultant(s) Notes Reviewed:      Care Discussed with Consultants/Other Providers:  Rt- on, Onc, surgery, card

## 2018-05-18 DIAGNOSIS — A41.9 SEPSIS, UNSPECIFIED ORGANISM: ICD-10-CM

## 2018-05-18 LAB
B PERT DNA SPEC QL NAA+PROBE: SIGNIFICANT CHANGE UP
BASOPHILS # BLD AUTO: 0.03 K/UL — SIGNIFICANT CHANGE UP (ref 0–0.2)
BASOPHILS NFR BLD AUTO: 0.2 % — SIGNIFICANT CHANGE UP (ref 0–2)
BUN SERPL-MCNC: 7 MG/DL — SIGNIFICANT CHANGE UP (ref 7–23)
C PNEUM DNA SPEC QL NAA+PROBE: NOT DETECTED — SIGNIFICANT CHANGE UP
CALCIUM SERPL-MCNC: 8.2 MG/DL — LOW (ref 8.4–10.5)
CHLORIDE SERPL-SCNC: 94 MMOL/L — LOW (ref 98–107)
CO2 SERPL-SCNC: 23 MMOL/L — SIGNIFICANT CHANGE UP (ref 22–31)
CREAT SERPL-MCNC: 0.49 MG/DL — LOW (ref 0.5–1.3)
EOSINOPHIL # BLD AUTO: 0.06 K/UL — SIGNIFICANT CHANGE UP (ref 0–0.5)
EOSINOPHIL NFR BLD AUTO: 0.5 % — SIGNIFICANT CHANGE UP (ref 0–6)
FLUAV H1 2009 PAND RNA SPEC QL NAA+PROBE: NOT DETECTED — SIGNIFICANT CHANGE UP
FLUAV H1 RNA SPEC QL NAA+PROBE: NOT DETECTED — SIGNIFICANT CHANGE UP
FLUAV H3 RNA SPEC QL NAA+PROBE: NOT DETECTED — SIGNIFICANT CHANGE UP
FLUAV SUBTYP SPEC NAA+PROBE: SIGNIFICANT CHANGE UP
FLUBV RNA SPEC QL NAA+PROBE: NOT DETECTED — SIGNIFICANT CHANGE UP
GLUCOSE BLDC GLUCOMTR-MCNC: 101 MG/DL — HIGH (ref 70–99)
GLUCOSE BLDC GLUCOMTR-MCNC: 128 MG/DL — HIGH (ref 70–99)
GLUCOSE BLDC GLUCOMTR-MCNC: 130 MG/DL — HIGH (ref 70–99)
GLUCOSE BLDC GLUCOMTR-MCNC: 156 MG/DL — HIGH (ref 70–99)
GLUCOSE SERPL-MCNC: 119 MG/DL — HIGH (ref 70–99)
HADV DNA SPEC QL NAA+PROBE: NOT DETECTED — SIGNIFICANT CHANGE UP
HCOV 229E RNA SPEC QL NAA+PROBE: NOT DETECTED — SIGNIFICANT CHANGE UP
HCOV HKU1 RNA SPEC QL NAA+PROBE: NOT DETECTED — SIGNIFICANT CHANGE UP
HCOV NL63 RNA SPEC QL NAA+PROBE: NOT DETECTED — SIGNIFICANT CHANGE UP
HCOV OC43 RNA SPEC QL NAA+PROBE: NOT DETECTED — SIGNIFICANT CHANGE UP
HCT VFR BLD CALC: 27.5 % — LOW (ref 34.5–45)
HGB BLD-MCNC: 8.8 G/DL — LOW (ref 11.5–15.5)
HMPV RNA SPEC QL NAA+PROBE: NOT DETECTED — SIGNIFICANT CHANGE UP
HPIV1 RNA SPEC QL NAA+PROBE: NOT DETECTED — SIGNIFICANT CHANGE UP
HPIV2 RNA SPEC QL NAA+PROBE: NOT DETECTED — SIGNIFICANT CHANGE UP
HPIV3 RNA SPEC QL NAA+PROBE: NOT DETECTED — SIGNIFICANT CHANGE UP
HPIV4 RNA SPEC QL NAA+PROBE: NOT DETECTED — SIGNIFICANT CHANGE UP
IMM GRANULOCYTES # BLD AUTO: 0.07 # — SIGNIFICANT CHANGE UP
IMM GRANULOCYTES NFR BLD AUTO: 0.5 % — SIGNIFICANT CHANGE UP (ref 0–1.5)
LYMPHOCYTES # BLD AUTO: 1.28 K/UL — SIGNIFICANT CHANGE UP (ref 1–3.3)
LYMPHOCYTES # BLD AUTO: 9.8 % — LOW (ref 13–44)
M PNEUMO DNA SPEC QL NAA+PROBE: NOT DETECTED — SIGNIFICANT CHANGE UP
MCHC RBC-ENTMCNC: 27.6 PG — SIGNIFICANT CHANGE UP (ref 27–34)
MCHC RBC-ENTMCNC: 32 % — SIGNIFICANT CHANGE UP (ref 32–36)
MCV RBC AUTO: 86.2 FL — SIGNIFICANT CHANGE UP (ref 80–100)
MONOCYTES # BLD AUTO: 0.79 K/UL — SIGNIFICANT CHANGE UP (ref 0–0.9)
MONOCYTES NFR BLD AUTO: 6 % — SIGNIFICANT CHANGE UP (ref 2–14)
NEUTROPHILS # BLD AUTO: 10.84 K/UL — HIGH (ref 1.8–7.4)
NEUTROPHILS NFR BLD AUTO: 83 % — HIGH (ref 43–77)
NRBC # FLD: 0 — SIGNIFICANT CHANGE UP
PLATELET # BLD AUTO: 383 K/UL — SIGNIFICANT CHANGE UP (ref 150–400)
PMV BLD: 9.3 FL — SIGNIFICANT CHANGE UP (ref 7–13)
POTASSIUM SERPL-MCNC: 3.5 MMOL/L — SIGNIFICANT CHANGE UP (ref 3.5–5.3)
POTASSIUM SERPL-SCNC: 3.5 MMOL/L — SIGNIFICANT CHANGE UP (ref 3.5–5.3)
PROCALCITONIN SERPL-MCNC: 0.11 NG/ML — HIGH (ref 0–0.04)
RBC # BLD: 3.19 M/UL — LOW (ref 3.8–5.2)
RBC # FLD: 14.8 % — HIGH (ref 10.3–14.5)
RSV RNA SPEC QL NAA+PROBE: NOT DETECTED — SIGNIFICANT CHANGE UP
RV+EV RNA SPEC QL NAA+PROBE: NOT DETECTED — SIGNIFICANT CHANGE UP
SODIUM SERPL-SCNC: 130 MMOL/L — LOW (ref 135–145)
SPECIMEN SOURCE: SIGNIFICANT CHANGE UP
SPECIMEN SOURCE: SIGNIFICANT CHANGE UP
TSH SERPL-MCNC: 1 UIU/ML — SIGNIFICANT CHANGE UP (ref 0.27–4.2)
WBC # BLD: 13.07 K/UL — HIGH (ref 3.8–10.5)
WBC # FLD AUTO: 13.07 K/UL — HIGH (ref 3.8–10.5)

## 2018-05-18 PROCEDURE — 72196 MRI PELVIS W/DYE: CPT | Mod: 26

## 2018-05-18 PROCEDURE — 99222 1ST HOSP IP/OBS MODERATE 55: CPT | Mod: GC

## 2018-05-18 PROCEDURE — 90792 PSYCH DIAG EVAL W/MED SRVCS: CPT

## 2018-05-18 PROCEDURE — 71045 X-RAY EXAM CHEST 1 VIEW: CPT | Mod: 26

## 2018-05-18 PROCEDURE — 77431 RADIATION THERAPY MANAGEMENT: CPT

## 2018-05-18 PROCEDURE — 99233 SBSQ HOSP IP/OBS HIGH 50: CPT

## 2018-05-18 RX ADMIN — Medication 100 MILLIGRAM(S): at 05:40

## 2018-05-18 RX ADMIN — CARVEDILOL PHOSPHATE 12.5 MILLIGRAM(S): 80 CAPSULE, EXTENDED RELEASE ORAL at 05:40

## 2018-05-18 RX ADMIN — MORPHINE SULFATE 15 MILLIGRAM(S): 50 CAPSULE, EXTENDED RELEASE ORAL at 09:24

## 2018-05-18 RX ADMIN — MORPHINE SULFATE 15 MILLIGRAM(S): 50 CAPSULE, EXTENDED RELEASE ORAL at 20:52

## 2018-05-18 RX ADMIN — SODIUM CHLORIDE 1 GRAM(S): 9 INJECTION INTRAMUSCULAR; INTRAVENOUS; SUBCUTANEOUS at 17:41

## 2018-05-18 RX ADMIN — SODIUM CHLORIDE 1 GRAM(S): 9 INJECTION INTRAMUSCULAR; INTRAVENOUS; SUBCUTANEOUS at 05:40

## 2018-05-18 RX ADMIN — PIPERACILLIN AND TAZOBACTAM 25 GRAM(S): 4; .5 INJECTION, POWDER, LYOPHILIZED, FOR SOLUTION INTRAVENOUS at 19:49

## 2018-05-18 RX ADMIN — ENOXAPARIN SODIUM 40 MILLIGRAM(S): 100 INJECTION SUBCUTANEOUS at 05:40

## 2018-05-18 RX ADMIN — PIPERACILLIN AND TAZOBACTAM 25 GRAM(S): 4; .5 INJECTION, POWDER, LYOPHILIZED, FOR SOLUTION INTRAVENOUS at 12:37

## 2018-05-18 RX ADMIN — ATORVASTATIN CALCIUM 40 MILLIGRAM(S): 80 TABLET, FILM COATED ORAL at 21:21

## 2018-05-18 RX ADMIN — LISINOPRIL 5 MILLIGRAM(S): 2.5 TABLET ORAL at 05:40

## 2018-05-18 RX ADMIN — MORPHINE SULFATE 15 MILLIGRAM(S): 50 CAPSULE, EXTENDED RELEASE ORAL at 21:52

## 2018-05-18 RX ADMIN — ONDANSETRON 4 MILLIGRAM(S): 8 TABLET, FILM COATED ORAL at 13:17

## 2018-05-18 RX ADMIN — MORPHINE SULFATE 15 MILLIGRAM(S): 50 CAPSULE, EXTENDED RELEASE ORAL at 15:43

## 2018-05-18 RX ADMIN — CARVEDILOL PHOSPHATE 12.5 MILLIGRAM(S): 80 CAPSULE, EXTENDED RELEASE ORAL at 17:41

## 2018-05-18 RX ADMIN — POLYETHYLENE GLYCOL 3350 17 GRAM(S): 17 POWDER, FOR SOLUTION ORAL at 05:40

## 2018-05-18 RX ADMIN — MIRTAZAPINE 15 MILLIGRAM(S): 45 TABLET, ORALLY DISINTEGRATING ORAL at 21:21

## 2018-05-18 RX ADMIN — PIPERACILLIN AND TAZOBACTAM 25 GRAM(S): 4; .5 INJECTION, POWDER, LYOPHILIZED, FOR SOLUTION INTRAVENOUS at 05:00

## 2018-05-18 RX ADMIN — Medication 325 MILLIGRAM(S): at 12:37

## 2018-05-18 RX ADMIN — MORPHINE SULFATE 15 MILLIGRAM(S): 50 CAPSULE, EXTENDED RELEASE ORAL at 15:13

## 2018-05-18 RX ADMIN — Medication 1 MILLIGRAM(S): at 12:38

## 2018-05-18 RX ADMIN — MORPHINE SULFATE 15 MILLIGRAM(S): 50 CAPSULE, EXTENDED RELEASE ORAL at 09:54

## 2018-05-18 RX ADMIN — FAMOTIDINE 20 MILLIGRAM(S): 10 INJECTION INTRAVENOUS at 12:38

## 2018-05-18 NOTE — PROGRESS NOTE ADULT - SUBJECTIVE AND OBJECTIVE BOX
Patient is a 78y old  Female who presents with a chief complaint of Abdominal pain (08 May 2018 22:37)      HPI:  78  y.o. woman with history of PAD and rectal mass who was sent to the ER for evaluation of abdominal pain and rectal mass by her oncologist. Patient reports severe suprapubic pain, radiates to the rectum, aggravated with movement and palpation, alleviated with pain medications. Patient also reports nausea. As per daughter's daughter reports that the patient does not get out of bed. No other complaints at present.     I-stop Reference #: [id number] (08 May 2018 22:37)      PAST MEDICAL & SURGICAL HISTORY:  Rectal mass  Gangrene of foot  Coronary artery disease involving native coronary artery of native heart without angina pectoris  Status post above knee amputation of right lower extremity      MEDICATIONS  (STANDING):  atorvastatin 40 milliGRAM(s) Oral at bedtime  carvedilol 12.5 milliGRAM(s) Oral every 12 hours  dextrose 5%. 1000 milliLiter(s) (50 mL/Hr) IV Continuous <Continuous>  dextrose 50% Injectable 12.5 Gram(s) IV Push once  dextrose 50% Injectable 25 Gram(s) IV Push once  dextrose 50% Injectable 25 Gram(s) IV Push once  docusate sodium 100 milliGRAM(s) Oral two times a day  enoxaparin Injectable 40 milliGRAM(s) SubCutaneous every 24 hours  famotidine    Tablet 20 milliGRAM(s) Oral daily  ferrous    sulfate 325 milliGRAM(s) Oral daily  folic acid 1 milliGRAM(s) Oral daily  insulin lispro (HumaLOG) corrective regimen sliding scale   SubCutaneous at bedtime  insulin lispro (HumaLOG) corrective regimen sliding scale   SubCutaneous three times a day before meals  lisinopril 5 milliGRAM(s) Oral daily  mirtazapine 15 milliGRAM(s) Oral at bedtime  piperacillin/tazobactam IVPB. 3.375 Gram(s) IV Intermittent every 8 hours  polyethylene glycol 3350 17 Gram(s) Oral two times a day  senna 2 Tablet(s) Oral at bedtime  sodium chloride 1 Gram(s) Oral two times a day      Allergies    No Known Allergies    Intolerances        SOCIAL HISTORY:  Denies ETOh,Smoking,     FAMILY HISTORY:  No pertinent family history in first degree relatives      REVIEW OF SYSTEMS:    CONSTITUTIONAL: No weakness, fevers or chills  EYES/ENT: No visual changes;  No vertigo or throat pain   NECK: No pain or stiffness  RESPIRATORY: No cough, wheezing, hemoptysis; No shortness of breath  CARDIOVASCULAR: No chest pain or palpitations  GASTROINTESTINAL: No abdominal or epigastric pain. No nausea, vomiting, or hematemesis; No diarrhea or constipation. No melena or hematochezia.  GENITOURINARY: No dysuria, frequency or hematuria  NEUROLOGICAL: No numbness or weakness  SKIN: No itching, burning, rashes, or lesions   All other review of systems is negative unless indicated above.    VITAL:  T(C): , Max: 38.9 (05-17-18 @ 18:00)  T(F): , Max: 102.1 (05-17-18 @ 18:00)  HR: 73 (05-18-18 @ 05:24)  BP: 140/55 (05-18-18 @ 05:24)  BP(mean): --  RR: 18 (05-18-18 @ 05:24)  SpO2: 96% (05-18-18 @ 05:24)  Wt(kg): --    I and O's:        PHYSICAL EXAM:    Constitutional: NAD  HEENT: PERRLA,   Neck: No JVD  Respiratory: CTA B/L  Cardiovascular: S1 and S2  Gastrointestinal: BS+, soft, NT/ND  Extremities: No peripheral edema  Neurological: A/O x 3, no focal deficits  Psychiatric: Normal mood, normal affect  : No Saba  Skin: No rashes  Access: Not applicable  Back: No CVA tenderness    LABS:                        8.2    13.60 )-----------( 372      ( 17 May 2018 09:40 )             25.9     05-17    126<L>  |  96<L>  |  6<L>  ----------------------------<  134<H>  3.6   |  25  |  0.54    Ca    8.2<L>      17 May 2018 09:40            RADIOLOGY & ADDITIONAL STUDIES:

## 2018-05-18 NOTE — CONSULT NOTE ADULT - CONSULT REQUESTED DATE/TIME
09-May-2018 08:59
09-May-2018 19:05
11-May-2018 18:20
13-May-2018 13:09
14-May-2018 13:30
18-May-2018 10:49
09-May-2018 11:00

## 2018-05-18 NOTE — PROGRESS NOTE ADULT - ATTENDING COMMENTS
Rectal cancer  -Pt currently on short course radiation treatment  -Would obtain MRI of pelvis to better evaluate tumor   -Pain control  -DVT ppx

## 2018-05-18 NOTE — CONSULT NOTE ADULT - SUBJECTIVE AND OBJECTIVE BOX
HPI:  78F with PAD and Rectal adenocarcinoma was referred to the ED by her oncologist 5/15/18 for suprapubic pain         PAST MEDICAL & SURGICAL HISTORY:  Rectal mass  Gangrene of foot  Coronary artery disease involving native coronary artery of native heart without angina pectoris  Status post above knee amputation of right lower extremity      Allergies    No Known Allergies    Intolerances        ANTIMICROBIALS:  piperacillin/tazobactam IVPB. 3.375 every 8 hours      OTHER MEDS:  acetaminophen   Tablet 650 milliGRAM(s) Oral every 6 hours PRN  acetaminophen   Tablet. 650 milliGRAM(s) Oral every 6 hours PRN  atorvastatin 40 milliGRAM(s) Oral at bedtime  carvedilol 12.5 milliGRAM(s) Oral every 12 hours  dextrose 40% Gel 15 Gram(s) Oral once PRN  dextrose 5%. 1000 milliLiter(s) IV Continuous <Continuous>  dextrose 50% Injectable 12.5 Gram(s) IV Push once  dextrose 50% Injectable 25 Gram(s) IV Push once  dextrose 50% Injectable 25 Gram(s) IV Push once  docusate sodium 100 milliGRAM(s) Oral two times a day  enoxaparin Injectable 40 milliGRAM(s) SubCutaneous every 24 hours  famotidine    Tablet 20 milliGRAM(s) Oral daily  ferrous    sulfate 325 milliGRAM(s) Oral daily  folic acid 1 milliGRAM(s) Oral daily  glucagon  Injectable 1 milliGRAM(s) IntraMuscular once PRN  insulin lispro (HumaLOG) corrective regimen sliding scale   SubCutaneous at bedtime  insulin lispro (HumaLOG) corrective regimen sliding scale   SubCutaneous three times a day before meals  lisinopril 5 milliGRAM(s) Oral daily  mirtazapine 15 milliGRAM(s) Oral at bedtime  morphine  IR 15 milliGRAM(s) Oral every 4 hours PRN  ondansetron Injectable 4 milliGRAM(s) IV Push every 6 hours PRN  polyethylene glycol 3350 17 Gram(s) Oral two times a day  senna 2 Tablet(s) Oral at bedtime  sodium chloride 1 Gram(s) Oral two times a day      SOCIAL HISTORY:    Marital Status:  (   )    (  ) Single    (   )    (  )   Occupation:   Lives with: (  ) alone  (  ) children   (  ) spouse   (  ) parents  (  ) other    Substance Use (street drugs): (  ) never used  (  ) other:  Tobacco Usage:  (  ) never smoked   (   ) former smoker   (   ) current smoker  (     ) pack year  (        ) last cigarette date  Alcohol Usage: Social EtOH    FAMILY HISTORY:  No pertinent family history in first degree relatives      ROS:  Unobtainable because:   All other systems negative     Constitutional: no fever, no chills, no weight loss, no night sweats  HEENT:  no vision changes, no sore throat, no rhinorrhea  Cardiovascular:  no chest pain, no palpitation  Respiratory:  no SOB, no cough  GI:  no abd pain, no vomiting, no diarrhea  urinary: no dysuria, no hematuria, no flank pain  musculoskeletal:  no joint pain, no joint swelling  skin:  no rash  neurology:  no headache, no seizure, no change in mental status    Physical Exam:    General:    NAD, non toxic, A&O x3  HEENT:   no oropharyngeal lesions, no LAD, neck supple  Cardiovascular:    regular S1,S2, no murmur  Respiratory:   clear b/l, no wheezing  abd:   soft, BS +, not tender, no hepatosplenomegaly  :     no CVAT, no spurapubic tenderness, no buck  Musculoskeletal : no joint swelling, no edema  Skin:    no rash  Neurologic:     no focal deficits      Drug Dosing Weight  Height (cm): 157.48 (08 May 2018 21:41)  Weight (kg): 45.7 (08 May 2018 21:41)  BMI (kg/m2): 18.4 (08 May 2018 21:41)  BSA (m2): 1.43 (08 May 2018 21:41)    Vital Signs Last 24 Hrs  T(F): 98.4 (18 @ 05:24), Max: 102.1 (18 @ 18:00)    Vital Signs Last 24 Hrs  HR: 73 (18 @ 05:24) (63 - 81)  BP: 140/55 (18 @ 05:24) (105/74 - 159/71)  RR: 18 (18 @ 05:24)  SpO2: 96% (18 @ 05:24) (96% - 99%)  Wt(kg): --                          8.8    13.07 )-----------( 383      ( 18 May 2018 06:35 )             27.5       -    130<L>  |  94<L>  |  7   ----------------------------<  119<H>  3.5   |  23  |  0.49<L>    Ca    8.2<L>      18 May 2018 06:35        Urinalysis Basic - ( 17 May 2018 18:50 )    Color: YELLOW / Appearance: CLEAR / S.016 / pH: 6.5  Gluc: NEGATIVE / Ketone: NEGATIVE  / Bili: NEGATIVE / Urobili: 4 mg/dL   Blood: NEGATIVE / Protein: 10 mg/dL / Nitrite: NEGATIVE   Leuk Esterase: NEGATIVE / RBC: 0-2 / WBC 2-5   Sq Epi: x / Non Sq Epi: x / Bacteria: x        MICROBIOLOGY:  v              RADIOLOGY: HPI:  78F with HTN, HLD, CAD, PAD s/p right AKA 2017, and upper GI bleed was recently diagnosed with Rectal adenocarcinoma and referred to the ED by her oncologist 5/15/18 for pain associated with the rectal mass. She has not begun chemotherapy but has begun radiation therapy. Developed new fever to 102.1F  evening and Zosyn was started and ID consulted.   Upper sorbian  2026.  This morning she complains of diffuse aching pain to her upper back and shoulder. She suspects she slept awkwardly. She did not have this pain until today.   Constipation, no abdominal pain or diarrhea. Nausea without vomiting.   Chronic dry cough, not new. Dry mouth but no sores, gum pain or sore throat. No runny nose.   No dysuria.     PAST MEDICAL & SURGICAL HISTORY:  Rectal mass  Gangrene of foot  Coronary artery disease involving native coronary artery of native heart without angina pectoris  Status post above knee amputation of right lower extremity    Allergies    No Known Allergies    Intolerances    ANTIMICROBIALS:  piperacillin/tazobactam IVPB. 3.375 every 8 hours      OTHER MEDS:  acetaminophen   Tablet 650 milliGRAM(s) Oral every 6 hours PRN  acetaminophen   Tablet. 650 milliGRAM(s) Oral every 6 hours PRN  atorvastatin 40 milliGRAM(s) Oral at bedtime  carvedilol 12.5 milliGRAM(s) Oral every 12 hours  dextrose 40% Gel 15 Gram(s) Oral once PRN  dextrose 5%. 1000 milliLiter(s) IV Continuous <Continuous>  dextrose 50% Injectable 12.5 Gram(s) IV Push once  dextrose 50% Injectable 25 Gram(s) IV Push once  dextrose 50% Injectable 25 Gram(s) IV Push once  docusate sodium 100 milliGRAM(s) Oral two times a day  enoxaparin Injectable 40 milliGRAM(s) SubCutaneous every 24 hours  famotidine    Tablet 20 milliGRAM(s) Oral daily  ferrous    sulfate 325 milliGRAM(s) Oral daily  folic acid 1 milliGRAM(s) Oral daily  glucagon  Injectable 1 milliGRAM(s) IntraMuscular once PRN  insulin lispro (HumaLOG) corrective regimen sliding scale   SubCutaneous at bedtime  insulin lispro (HumaLOG) corrective regimen sliding scale   SubCutaneous three times a day before meals  lisinopril 5 milliGRAM(s) Oral daily  mirtazapine 15 milliGRAM(s) Oral at bedtime  morphine  IR 15 milliGRAM(s) Oral every 4 hours PRN  ondansetron Injectable 4 milliGRAM(s) IV Push every 6 hours PRN  polyethylene glycol 3350 17 Gram(s) Oral two times a day  senna 2 Tablet(s) Oral at bedtime  sodium chloride 1 Gram(s) Oral two times a day      SOCIAL HISTORY: Nonsmoker. [place]ame to [place] in .     FAMILY HISTORY:  No pertinent family history in first degree relatives    ROS:  All other systems negative   Constitutional: chills yesterday with fever. tired.   HEENT:  no sore throat, no rhinorrhea  Cardiovascular:  no chest pain, no palpitation  Respiratory:  no SOB. chronic cough   GI:  as per HPI   urinary: no dysuria, no hematuria, no flank pain  musculoskeletal:  as per HPI   skin:  no rash  neurology:  mild headache, intermittent     Physical Exam:  General: thin frail, fatigued. NAD, non toxic. talks slowly and quietly   HEENT: clear conjunctiva, no sinus tenderness, no LAD. dry oral mucosa but no focal lesions  Cardiovascular:  regular rate and rhythm   Respiratory: nonlabored on room air, clear b/l, no wheezing  abd: slight tenderness to right abdomen, soft, bowel sounds present  : no suprapubic tenderness, no buck  Musculoskeletal: right BKA stump clean intact, no erythema. no joint swelling  Skin:    no rash  Neurologic:     no focal deficits  psych: cooperative but withdrawn   vascular: no edema. no phlebitis     Drug Dosing Weight  Height (cm): 157.48 (08 May 2018 21:41)  Weight (kg): 45.7 (08 May 2018 21:41)  BMI (kg/m2): 18.4 (08 May 2018 21:41)  BSA (m2): 1.43 (08 May 2018 21:41)    Vital Signs Last 24 Hrs  T(F): 98.4 (18 @ 05:24), Max: 102.1 (18 @ 18:00)    Vital Signs Last 24 Hrs  HR: 73 (18 @ 05:24) (63 - 81)  BP: 140/55 (18 @ 05:24) (105/74 - 159/71)  RR: 18 (18 @ 05:24)  SpO2: 96% (18 @ 05:24) (96% - 99%)  Wt(kg): --                          8.8    13.07 )-----------( 383      ( 18 May 2018 06:35 )             27.5       05-18    130<L>  |  94<L>  |  7   ----------------------------<  119<H>  3.5   |  23  |  0.49<L>    Ca    8.2<L>      18 May 2018 06:35        Urinalysis Basic - ( 17 May 2018 18:50 )    Color: YELLOW / Appearance: CLEAR / S.016 / pH: 6.5  Gluc: NEGATIVE / Ketone: NEGATIVE  / Bili: NEGATIVE / Urobili: 4 mg/dL   Blood: NEGATIVE / Protein: 10 mg/dL / Nitrite: NEGATIVE   Leuk Esterase: NEGATIVE / RBC: 0-2 / WBC 2-5   Sq Epi: x / Non Sq Epi: x / Bacteria: x        MICROBIOLOGY:  Blood cultures in lab     Urine culture in lab    RADIOLOGY:  CT Abdomen and Pelvis w/ Oral Cont and w/ IV Cont (04.10.18 @ 13:37)   Rectal neoplasm.  A 4 mm left lower lobe pulmonary nodule.

## 2018-05-18 NOTE — CONSULT NOTE ADULT - ASSESSMENT
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 Now undergoing radiation therapy. New fever to 102.1F 5/17 evening, began on Zosyn. Some abdominal tenderness, remains soft, CT abdomen in April normal aside from the rectal mass. Chronic intermittent cough, not troubling to patient. Otherwise nonfocal. UA negative and no dysuria.     Recommend  -f/u blood cultures   -check RVP   -can continue Zosyn for now   -if fevers recur, repeat CT abdomen pelvis     Discussed with Dr Dash and primary team

## 2018-05-18 NOTE — PROGRESS NOTE ADULT - SUBJECTIVE AND OBJECTIVE BOX
Pt febrile overnight.  Complains of persistent pain    abd soft nt/nd      Objective:    MEDICATIONS  (STANDING):  atorvastatin 40 milliGRAM(s) Oral at bedtime  carvedilol 12.5 milliGRAM(s) Oral every 12 hours  dextrose 5%. 1000 milliLiter(s) (50 mL/Hr) IV Continuous <Continuous>  dextrose 50% Injectable 12.5 Gram(s) IV Push once  dextrose 50% Injectable 25 Gram(s) IV Push once  dextrose 50% Injectable 25 Gram(s) IV Push once  docusate sodium 100 milliGRAM(s) Oral two times a day  enoxaparin Injectable 40 milliGRAM(s) SubCutaneous every 24 hours  famotidine    Tablet 20 milliGRAM(s) Oral daily  ferrous    sulfate 325 milliGRAM(s) Oral daily  folic acid 1 milliGRAM(s) Oral daily  insulin lispro (HumaLOG) corrective regimen sliding scale   SubCutaneous at bedtime  insulin lispro (HumaLOG) corrective regimen sliding scale   SubCutaneous three times a day before meals  lisinopril 5 milliGRAM(s) Oral daily  mirtazapine 15 milliGRAM(s) Oral at bedtime  piperacillin/tazobactam IVPB. 3.375 Gram(s) IV Intermittent every 8 hours  polyethylene glycol 3350 17 Gram(s) Oral two times a day  senna 2 Tablet(s) Oral at bedtime  sodium chloride 1 Gram(s) Oral two times a day    MEDICATIONS  (PRN):  acetaminophen   Tablet 650 milliGRAM(s) Oral every 6 hours PRN For Temp greater than 38 C (100.4 F)  acetaminophen   Tablet. 650 milliGRAM(s) Oral every 6 hours PRN Mild Pain (1 - 3)  dextrose 40% Gel 15 Gram(s) Oral once PRN Blood Glucose LESS THAN 70 milliGRAM(s)/deciliter  glucagon  Injectable 1 milliGRAM(s) IntraMuscular once PRN Glucose LESS THAN 70 milligrams/deciliter  morphine  IR 15 milliGRAM(s) Oral every 4 hours PRN moderate to severe pain  ondansetron Injectable 4 milliGRAM(s) IV Push every 6 hours PRN Nausea      Vital Signs Last 24 Hrs  T(C): 36.9 (18 May 2018 05:24), Max: 38.9 (17 May 2018 18:00)  T(F): 98.4 (18 May 2018 05:24), Max: 102.1 (17 May 2018 18:00)  HR: 73 (18 May 2018 05:24) (63 - 81)  BP: 140/55 (18 May 2018 05:24) (105/74 - 159/71)  BP(mean): --  RR: 18 (18 May 2018 05:24) (18 - 18)  SpO2: 96% (18 May 2018 05:24) (96% - 99%)    I&O's Detail      Daily     Daily     LABS:                        8.8    13.07 )-----------( 383      ( 18 May 2018 06:35 )             27.5     05-18    130<L>  |  94<L>  |  7   ----------------------------<  119<H>  3.5   |  23  |  0.49<L>    Ca    8.2<L>      18 May 2018 06:35        Urinalysis Basic - ( 17 May 2018 18:50 )    Color: YELLOW / Appearance: CLEAR / S.016 / pH: 6.5  Gluc: NEGATIVE / Ketone: NEGATIVE  / Bili: NEGATIVE / Urobili: 4 mg/dL   Blood: NEGATIVE / Protein: 10 mg/dL / Nitrite: NEGATIVE   Leuk Esterase: NEGATIVE / RBC: 0-2 / WBC 2-5   Sq Epi: x / Non Sq Epi: x / Bacteria: x        RADIOLOGY & ADDITIONAL STUDIES:

## 2018-05-18 NOTE — PROGRESS NOTE ADULT - ASSESSMENT
78  y.o. woman with history of PAD and rectal mass who was sent to the ER for evaluation of abdominal pain and rectal mass by her oncologist. Patient reports severe suprapubic pain, radiates to the rectum, aggravated with movement and palpation, alleviated with pain medications. Seen by Pallitive pain, Oncology and Rt onc.  Pain is better controlled.  Bxp- Positive Rectal Cancer.  Need 5 days RT- plan to end about 5/23  Patient lethargic with Fever/ leukocytosis Severe sepsis 78  y.o. woman with history of PAD and rectal mass who was sent to the ER for evaluation of abdominal pain and rectal mass by her oncologist. Patient reports severe suprapubic pain, radiates to the rectum, aggravated with movement and palpation, alleviated with pain medications. Seen by Pallitive pain, Oncology and Rt onc.  Pain is better controlled.  Bxp- Positive Rectal Cancer.  Need 5 days RT- plan to end about 5/23  Patient lethargic with Fever/ leukocytosis Severe sepsis- now lethargy improved with iv zosyn

## 2018-05-18 NOTE — PROGRESS NOTE ADULT - SUBJECTIVE AND OBJECTIVE BOX
Patient is a 78y old  Female who presents with a chief complaint of Abdominal pain (08 May 2018 22:37)      SUBJECTIVE / OVERNIGHT EVENTS:  now septic and on ABX    MEDICATIONS  (STANDING):  atorvastatin 40 milliGRAM(s) Oral at bedtime  carvedilol 12.5 milliGRAM(s) Oral every 12 hours  dextrose 5%. 1000 milliLiter(s) (50 mL/Hr) IV Continuous <Continuous>  dextrose 50% Injectable 12.5 Gram(s) IV Push once  dextrose 50% Injectable 25 Gram(s) IV Push once  dextrose 50% Injectable 25 Gram(s) IV Push once  docusate sodium 100 milliGRAM(s) Oral two times a day  enoxaparin Injectable 40 milliGRAM(s) SubCutaneous every 24 hours  famotidine    Tablet 20 milliGRAM(s) Oral daily  ferrous    sulfate 325 milliGRAM(s) Oral daily  folic acid 1 milliGRAM(s) Oral daily  insulin lispro (HumaLOG) corrective regimen sliding scale   SubCutaneous at bedtime  insulin lispro (HumaLOG) corrective regimen sliding scale   SubCutaneous three times a day before meals  lisinopril 5 milliGRAM(s) Oral daily  mirtazapine 15 milliGRAM(s) Oral at bedtime  piperacillin/tazobactam IVPB. 3.375 Gram(s) IV Intermittent every 8 hours  polyethylene glycol 3350 17 Gram(s) Oral two times a day  senna 2 Tablet(s) Oral at bedtime  sodium chloride 1 Gram(s) Oral two times a day    MEDICATIONS  (PRN):  acetaminophen   Tablet 650 milliGRAM(s) Oral every 6 hours PRN For Temp greater than 38 C (100.4 F)  acetaminophen   Tablet. 650 milliGRAM(s) Oral every 6 hours PRN Mild Pain (1 - 3)  dextrose 40% Gel 15 Gram(s) Oral once PRN Blood Glucose LESS THAN 70 milliGRAM(s)/deciliter  glucagon  Injectable 1 milliGRAM(s) IntraMuscular once PRN Glucose LESS THAN 70 milligrams/deciliter  morphine  IR 15 milliGRAM(s) Oral every 4 hours PRN moderate to severe pain  ondansetron Injectable 4 milliGRAM(s) IV Push every 6 hours PRN Nausea        CAPILLARY BLOOD GLUCOSE      POCT Blood Glucose.: 128 mg/dL (18 May 2018 08:10)  POCT Blood Glucose.: 298 mg/dL (17 May 2018 22:23)  POCT Blood Glucose.: 144 mg/dL (17 May 2018 18:05)  POCT Blood Glucose.: 140 mg/dL (17 May 2018 13:45)    I&O's Summary      PHYSICAL EXAM:  Vital Signs Last 24 Hrs  T(C): 36.9 (18 May 2018 05:24), Max: 38.9 (17 May 2018 18:00)  T(F): 98.4 (18 May 2018 05:24), Max: 102.1 (17 May 2018 18:00)  HR: 73 (18 May 2018 05:24) (63 - 81)  BP: 140/55 (18 May 2018 05:24) (105/74 - 159/71)  BP(mean): --  RR: 18 (18 May 2018 05:24) (18 - 18)  SpO2: 96% (18 May 2018 05:24) (96% - 99%)  GENERAL: NAD, well-developed  HEAD:  Atraumatic, Normocephalic  EYES: EOMI, PERRLA, conjunctiva and sclera clear  NECK: Supple, No JVD  CHEST/LUNG: Clear to auscultation bilaterally; No wheeze  HEART: Regular rate and rhythm; No murmurs, rubs, or gallops  ABDOMEN: Soft, Nontender, Nondistended; Bowel sounds present  EXTREMITIES:  2+ Peripheral Pulses, No clubbing, cyanosis, or edema  PSYCH: AAOx3  NEUROLOGY: non-focal  SKIN: No rashes or lesions    LABS:                        8.8    13.07 )-----------( 383      ( 18 May 2018 06:35 )             27.5     05-18    130<L>  |  94<L>  |  7   ----------------------------<  119<H>  3.5   |  23  |  0.49<L>    Ca    8.2<L>      18 May 2018 06:35      Urinalysis Basic - ( 17 May 2018 18:50 )    Color: YELLOW / Appearance: CLEAR / S.016 / pH: 6.5  Gluc: NEGATIVE / Ketone: NEGATIVE  / Bili: NEGATIVE / Urobili: 4 mg/dL   Blood: NEGATIVE / Protein: 10 mg/dL / Nitrite: NEGATIVE   Leuk Esterase: NEGATIVE / RBC: 0-2 / WBC 2-5   Sq Epi: x / Non Sq Epi: x / Bacteria: x        RADIOLOGY & ADDITIONAL TESTS:    Imaging Personally Reviewed:    Consultant(s) Notes Reviewed:      Care Discussed with Consultants/Other Providers: Patient is a 78y old  Female who presents with a chief complaint of Abdominal pain (08 May 2018 22:37)      SUBJECTIVE / OVERNIGHT EVENTS:  now septic and on ABX    MEDICATIONS  (STANDING):  atorvastatin 40 milliGRAM(s) Oral at bedtime  carvedilol 12.5 milliGRAM(s) Oral every 12 hours  dextrose 5%. 1000 milliLiter(s) (50 mL/Hr) IV Continuous <Continuous>  dextrose 50% Injectable 12.5 Gram(s) IV Push once  dextrose 50% Injectable 25 Gram(s) IV Push once  dextrose 50% Injectable 25 Gram(s) IV Push once  docusate sodium 100 milliGRAM(s) Oral two times a day  enoxaparin Injectable 40 milliGRAM(s) SubCutaneous every 24 hours  famotidine    Tablet 20 milliGRAM(s) Oral daily  ferrous    sulfate 325 milliGRAM(s) Oral daily  folic acid 1 milliGRAM(s) Oral daily  insulin lispro (HumaLOG) corrective regimen sliding scale   SubCutaneous at bedtime  insulin lispro (HumaLOG) corrective regimen sliding scale   SubCutaneous three times a day before meals  lisinopril 5 milliGRAM(s) Oral daily  mirtazapine 15 milliGRAM(s) Oral at bedtime  piperacillin/tazobactam IVPB. 3.375 Gram(s) IV Intermittent every 8 hours  polyethylene glycol 3350 17 Gram(s) Oral two times a day  senna 2 Tablet(s) Oral at bedtime  sodium chloride 1 Gram(s) Oral two times a day    MEDICATIONS  (PRN):  acetaminophen   Tablet 650 milliGRAM(s) Oral every 6 hours PRN For Temp greater than 38 C (100.4 F)  acetaminophen   Tablet. 650 milliGRAM(s) Oral every 6 hours PRN Mild Pain (1 - 3)  dextrose 40% Gel 15 Gram(s) Oral once PRN Blood Glucose LESS THAN 70 milliGRAM(s)/deciliter  glucagon  Injectable 1 milliGRAM(s) IntraMuscular once PRN Glucose LESS THAN 70 milligrams/deciliter  morphine  IR 15 milliGRAM(s) Oral every 4 hours PRN moderate to severe pain  ondansetron Injectable 4 milliGRAM(s) IV Push every 6 hours PRN Nausea        CAPILLARY BLOOD GLUCOSE      POCT Blood Glucose.: 128 mg/dL (18 May 2018 08:10)  POCT Blood Glucose.: 298 mg/dL (17 May 2018 22:23)  POCT Blood Glucose.: 144 mg/dL (17 May 2018 18:05)  POCT Blood Glucose.: 140 mg/dL (17 May 2018 13:45)    I&O's Summary      PHYSICAL EXAM:  Vital Signs Last 24 Hrs  T(C): 36.9 (18 May 2018 05:24), Max: 38.9 (17 May 2018 18:00)  T(F): 98.4 (18 May 2018 05:24), Max: 102.1 (17 May 2018 18:00)  HR: 73 (18 May 2018 05:24) (63 - 81)  BP: 140/55 (18 May 2018 05:24) (105/74 - 159/71)  BP(mean): --  RR: 18 (18 May 2018 05:24) (18 - 18)  SpO2: 96% (18 May 2018 05:24) (96% - 99%)  GENERAL: NAD, well-developed  HEAD:  Atraumatic, Normocephalic  more alert todasy  EYES: EOMI, PERRLA, conjunctiva and sclera clear  NECK: Supple, No JVD  CHEST/LUNG: Clear to auscultation bilaterally; No wheeze  HEART: Regular rate and rhythm; No murmurs, rubs, or gallops  ABDOMEN: Soft, Nontender, Nondistended; Bowel sounds present  EXTREMITIES:  2+ Peripheral Pulses, No clubbing, cyanosis, or edema  R Leg AkA  PSYCH: AAOx3  NEUROLOGY: non-focal  SKIN: No rashes or lesions    LABS:                        8.8    13.07 )-----------( 383      ( 18 May 2018 06:35 )             27.5     05-18    130<L>  |  94<L>  |  7   ----------------------------<  119<H>  3.5   |  23  |  0.49<L>    Ca    8.2<L>      18 May 2018 06:35      Urinalysis Basic - ( 17 May 2018 18:50 )    Color: YELLOW / Appearance: CLEAR / S.016 / pH: 6.5  Gluc: NEGATIVE / Ketone: NEGATIVE  / Bili: NEGATIVE / Urobili: 4 mg/dL   Blood: NEGATIVE / Protein: 10 mg/dL / Nitrite: NEGATIVE   Leuk Esterase: NEGATIVE / RBC: 0-2 / WBC 2-5   Sq Epi: x / Non Sq Epi: x / Bacteria: x        RADIOLOGY & ADDITIONAL TESTS:    Imaging Personally Reviewed:    Consultant(s) Notes Reviewed:      Care Discussed with Consultants/Other Providers:

## 2018-05-18 NOTE — PROGRESS NOTE ADULT - SUBJECTIVE AND OBJECTIVE BOX
Subjective: Patient seen and examined. No new events except as noted.     SUBJECTIVE/ROS:  NAD    MEDICATIONS:  MEDICATIONS  (STANDING):  atorvastatin 40 milliGRAM(s) Oral at bedtime  carvedilol 12.5 milliGRAM(s) Oral every 12 hours  dextrose 5%. 1000 milliLiter(s) (50 mL/Hr) IV Continuous <Continuous>  dextrose 50% Injectable 12.5 Gram(s) IV Push once  dextrose 50% Injectable 25 Gram(s) IV Push once  dextrose 50% Injectable 25 Gram(s) IV Push once  docusate sodium 100 milliGRAM(s) Oral two times a day  enoxaparin Injectable 40 milliGRAM(s) SubCutaneous every 24 hours  famotidine    Tablet 20 milliGRAM(s) Oral daily  ferrous    sulfate 325 milliGRAM(s) Oral daily  folic acid 1 milliGRAM(s) Oral daily  insulin lispro (HumaLOG) corrective regimen sliding scale   SubCutaneous at bedtime  insulin lispro (HumaLOG) corrective regimen sliding scale   SubCutaneous three times a day before meals  lisinopril 5 milliGRAM(s) Oral daily  mirtazapine 15 milliGRAM(s) Oral at bedtime  piperacillin/tazobactam IVPB. 3.375 Gram(s) IV Intermittent every 8 hours  polyethylene glycol 3350 17 Gram(s) Oral two times a day  senna 2 Tablet(s) Oral at bedtime  sodium chloride 1 Gram(s) Oral two times a day      PHYSICAL EXAM:  T(C): 36.9 (05-18-18 @ 05:24), Max: 38.9 (05-17-18 @ 18:00)  HR: 73 (05-18-18 @ 05:24) (63 - 81)  BP: 140/55 (05-18-18 @ 05:24) (105/74 - 159/71)  RR: 18 (05-18-18 @ 05:24) (18 - 18)  SpO2: 96% (05-18-18 @ 05:24) (96% - 99%)  Wt(kg): --  I&O's Summary        JVP: Normal  Neck: supple  Lung: clear   CV: S1 S2 , Murmur:  Abd: soft  Ext: No edema  neuro: Awake / alert  Psych: flat affect  Skin: normal       LABS/DATA:    CARDIAC MARKERS:                                8.8    13.07 )-----------( 383      ( 18 May 2018 06:35 )             27.5     05-18    130<L>  |  94<L>  |  7   ----------------------------<  119<H>  3.5   |  23  |  0.49<L>    Ca    8.2<L>      18 May 2018 06:35      proBNP:   Lipid Profile:   HgA1c:   TSH: Thyroid Stimulating Hormone, Serum: 1.00 uIU/mL (05-18 @ 06:35)      TELE:  EKG:

## 2018-05-18 NOTE — CONSULT NOTE ADULT - ATTENDING COMMENTS
Agree with PA note above.   In brief, pt is a 77 yo with locally advanced rectal cancer with pain and bleeding. She is a suitable candidate for palliation radiotherapy. She may have additional options for treatment including palliative diverting colostomy and possibly chemotherapy. She and her family agreed to palliative RT, which will be arranged.
Patient seen and examined  Above verified  Agree with assessment and plan as detailed above  Will tailor plan for ID issues  per course,results.Will defer to primary team on management of other issues.    ID service  will cover patient over weekend.Please call ID fellow on call if issues or questions.
Rectal adenocarcinoma  -Large rectal mass.  Would require APR for excision, however, pt w/ mmp.  Will discuss further w/ Oncology about albaro adjuvant therapy (standard or short course).  -PT will need medical optimization.  If no resection, may require proximal diversion for this large mass  -Will discuss further w/ primary team
Patient seen and examined.  Agree with fellow note.

## 2018-05-19 LAB
ALBUMIN SERPL ELPH-MCNC: 2.4 G/DL — LOW (ref 3.3–5)
ALP SERPL-CCNC: 62 U/L — SIGNIFICANT CHANGE UP (ref 40–120)
ALT FLD-CCNC: 10 U/L — SIGNIFICANT CHANGE UP (ref 4–33)
AST SERPL-CCNC: 18 U/L — SIGNIFICANT CHANGE UP (ref 4–32)
BASOPHILS # BLD AUTO: 0.03 K/UL — SIGNIFICANT CHANGE UP (ref 0–0.2)
BASOPHILS NFR BLD AUTO: 0.2 % — SIGNIFICANT CHANGE UP (ref 0–2)
BILIRUB SERPL-MCNC: 0.3 MG/DL — SIGNIFICANT CHANGE UP (ref 0.2–1.2)
BUN SERPL-MCNC: 9 MG/DL — SIGNIFICANT CHANGE UP (ref 7–23)
BUN SERPL-MCNC: 9 MG/DL — SIGNIFICANT CHANGE UP (ref 7–23)
CALCIUM SERPL-MCNC: 8.2 MG/DL — LOW (ref 8.4–10.5)
CALCIUM SERPL-MCNC: 8.2 MG/DL — LOW (ref 8.4–10.5)
CHLORIDE SERPL-SCNC: 97 MMOL/L — LOW (ref 98–107)
CHLORIDE SERPL-SCNC: 97 MMOL/L — LOW (ref 98–107)
CO2 SERPL-SCNC: 25 MMOL/L — SIGNIFICANT CHANGE UP (ref 22–31)
CO2 SERPL-SCNC: 25 MMOL/L — SIGNIFICANT CHANGE UP (ref 22–31)
CREAT SERPL-MCNC: 0.55 MG/DL — SIGNIFICANT CHANGE UP (ref 0.5–1.3)
CREAT SERPL-MCNC: 0.55 MG/DL — SIGNIFICANT CHANGE UP (ref 0.5–1.3)
EOSINOPHIL # BLD AUTO: 0.27 K/UL — SIGNIFICANT CHANGE UP (ref 0–0.5)
EOSINOPHIL NFR BLD AUTO: 2 % — SIGNIFICANT CHANGE UP (ref 0–6)
GLUCOSE BLDC GLUCOMTR-MCNC: 106 MG/DL — HIGH (ref 70–99)
GLUCOSE BLDC GLUCOMTR-MCNC: 122 MG/DL — HIGH (ref 70–99)
GLUCOSE BLDC GLUCOMTR-MCNC: 142 MG/DL — HIGH (ref 70–99)
GLUCOSE BLDC GLUCOMTR-MCNC: 268 MG/DL — HIGH (ref 70–99)
GLUCOSE SERPL-MCNC: 95 MG/DL — SIGNIFICANT CHANGE UP (ref 70–99)
GLUCOSE SERPL-MCNC: 95 MG/DL — SIGNIFICANT CHANGE UP (ref 70–99)
HCT VFR BLD CALC: 27.6 % — LOW (ref 34.5–45)
HGB BLD-MCNC: 8.8 G/DL — LOW (ref 11.5–15.5)
IMM GRANULOCYTES # BLD AUTO: 0.06 # — SIGNIFICANT CHANGE UP
IMM GRANULOCYTES NFR BLD AUTO: 0.4 % — SIGNIFICANT CHANGE UP (ref 0–1.5)
LYMPHOCYTES # BLD AUTO: 1.6 K/UL — SIGNIFICANT CHANGE UP (ref 1–3.3)
LYMPHOCYTES # BLD AUTO: 11.8 % — LOW (ref 13–44)
MAGNESIUM SERPL-MCNC: 2.2 MG/DL — SIGNIFICANT CHANGE UP (ref 1.6–2.6)
MCHC RBC-ENTMCNC: 27.6 PG — SIGNIFICANT CHANGE UP (ref 27–34)
MCHC RBC-ENTMCNC: 31.9 % — LOW (ref 32–36)
MCV RBC AUTO: 86.5 FL — SIGNIFICANT CHANGE UP (ref 80–100)
MONOCYTES # BLD AUTO: 0.94 K/UL — HIGH (ref 0–0.9)
MONOCYTES NFR BLD AUTO: 6.9 % — SIGNIFICANT CHANGE UP (ref 2–14)
NEUTROPHILS # BLD AUTO: 10.69 K/UL — HIGH (ref 1.8–7.4)
NEUTROPHILS NFR BLD AUTO: 78.7 % — HIGH (ref 43–77)
NRBC # FLD: 0 — SIGNIFICANT CHANGE UP
PLATELET # BLD AUTO: 396 K/UL — SIGNIFICANT CHANGE UP (ref 150–400)
PMV BLD: 9.1 FL — SIGNIFICANT CHANGE UP (ref 7–13)
POTASSIUM SERPL-MCNC: 3.3 MMOL/L — LOW (ref 3.5–5.3)
POTASSIUM SERPL-MCNC: 3.3 MMOL/L — LOW (ref 3.5–5.3)
POTASSIUM SERPL-SCNC: 3.3 MMOL/L — LOW (ref 3.5–5.3)
POTASSIUM SERPL-SCNC: 3.3 MMOL/L — LOW (ref 3.5–5.3)
PROT SERPL-MCNC: 6 G/DL — SIGNIFICANT CHANGE UP (ref 6–8.3)
RBC # BLD: 3.19 M/UL — LOW (ref 3.8–5.2)
RBC # FLD: 15.2 % — HIGH (ref 10.3–14.5)
SODIUM SERPL-SCNC: 133 MMOL/L — LOW (ref 135–145)
SODIUM SERPL-SCNC: 133 MMOL/L — LOW (ref 135–145)
WBC # BLD: 13.59 K/UL — HIGH (ref 3.8–10.5)
WBC # FLD AUTO: 13.59 K/UL — HIGH (ref 3.8–10.5)

## 2018-05-19 PROCEDURE — 99232 SBSQ HOSP IP/OBS MODERATE 35: CPT

## 2018-05-19 PROCEDURE — 99233 SBSQ HOSP IP/OBS HIGH 50: CPT

## 2018-05-19 RX ORDER — NYSTATIN 500MM UNIT
500000 POWDER (EA) MISCELLANEOUS
Qty: 0 | Refills: 0 | Status: DISCONTINUED | OUTPATIENT
Start: 2018-05-19 | End: 2018-05-23

## 2018-05-19 RX ORDER — POTASSIUM CHLORIDE 20 MEQ
40 PACKET (EA) ORAL ONCE
Qty: 0 | Refills: 0 | Status: COMPLETED | OUTPATIENT
Start: 2018-05-19 | End: 2018-05-19

## 2018-05-19 RX ORDER — POTASSIUM CHLORIDE 20 MEQ
40 PACKET (EA) ORAL EVERY 4 HOURS
Qty: 0 | Refills: 0 | Status: DISCONTINUED | OUTPATIENT
Start: 2018-05-19 | End: 2018-05-19

## 2018-05-19 RX ORDER — SODIUM CHLORIDE 9 MG/ML
1000 INJECTION INTRAMUSCULAR; INTRAVENOUS; SUBCUTANEOUS
Qty: 0 | Refills: 0 | Status: DISCONTINUED | OUTPATIENT
Start: 2018-05-19 | End: 2018-05-23

## 2018-05-19 RX ORDER — MORPHINE SULFATE 50 MG/1
15 CAPSULE, EXTENDED RELEASE ORAL EVERY 8 HOURS
Qty: 0 | Refills: 0 | Status: DISCONTINUED | OUTPATIENT
Start: 2018-05-19 | End: 2018-05-23

## 2018-05-19 RX ADMIN — CARVEDILOL PHOSPHATE 12.5 MILLIGRAM(S): 80 CAPSULE, EXTENDED RELEASE ORAL at 17:50

## 2018-05-19 RX ADMIN — Medication 40 MILLIEQUIVALENT(S): at 10:41

## 2018-05-19 RX ADMIN — MORPHINE SULFATE 15 MILLIGRAM(S): 50 CAPSULE, EXTENDED RELEASE ORAL at 21:33

## 2018-05-19 RX ADMIN — MORPHINE SULFATE 15 MILLIGRAM(S): 50 CAPSULE, EXTENDED RELEASE ORAL at 14:22

## 2018-05-19 RX ADMIN — MIRTAZAPINE 15 MILLIGRAM(S): 45 TABLET, ORALLY DISINTEGRATING ORAL at 21:32

## 2018-05-19 RX ADMIN — LISINOPRIL 5 MILLIGRAM(S): 2.5 TABLET ORAL at 06:02

## 2018-05-19 RX ADMIN — CARVEDILOL PHOSPHATE 12.5 MILLIGRAM(S): 80 CAPSULE, EXTENDED RELEASE ORAL at 06:02

## 2018-05-19 RX ADMIN — PIPERACILLIN AND TAZOBACTAM 25 GRAM(S): 4; .5 INJECTION, POWDER, LYOPHILIZED, FOR SOLUTION INTRAVENOUS at 06:01

## 2018-05-19 RX ADMIN — FAMOTIDINE 20 MILLIGRAM(S): 10 INJECTION INTRAVENOUS at 14:21

## 2018-05-19 RX ADMIN — Medication 40 MILLIEQUIVALENT(S): at 09:36

## 2018-05-19 RX ADMIN — SODIUM CHLORIDE 1 GRAM(S): 9 INJECTION INTRAMUSCULAR; INTRAVENOUS; SUBCUTANEOUS at 06:02

## 2018-05-19 RX ADMIN — MORPHINE SULFATE 15 MILLIGRAM(S): 50 CAPSULE, EXTENDED RELEASE ORAL at 21:38

## 2018-05-19 RX ADMIN — MORPHINE SULFATE 15 MILLIGRAM(S): 50 CAPSULE, EXTENDED RELEASE ORAL at 10:32

## 2018-05-19 RX ADMIN — SODIUM CHLORIDE 1 GRAM(S): 9 INJECTION INTRAMUSCULAR; INTRAVENOUS; SUBCUTANEOUS at 17:50

## 2018-05-19 RX ADMIN — MORPHINE SULFATE 15 MILLIGRAM(S): 50 CAPSULE, EXTENDED RELEASE ORAL at 11:30

## 2018-05-19 RX ADMIN — PIPERACILLIN AND TAZOBACTAM 25 GRAM(S): 4; .5 INJECTION, POWDER, LYOPHILIZED, FOR SOLUTION INTRAVENOUS at 21:31

## 2018-05-19 RX ADMIN — Medication 1: at 22:09

## 2018-05-19 RX ADMIN — Medication 500000 UNIT(S): at 17:50

## 2018-05-19 RX ADMIN — ENOXAPARIN SODIUM 40 MILLIGRAM(S): 100 INJECTION SUBCUTANEOUS at 06:02

## 2018-05-19 RX ADMIN — ATORVASTATIN CALCIUM 40 MILLIGRAM(S): 80 TABLET, FILM COATED ORAL at 21:32

## 2018-05-19 RX ADMIN — PIPERACILLIN AND TAZOBACTAM 25 GRAM(S): 4; .5 INJECTION, POWDER, LYOPHILIZED, FOR SOLUTION INTRAVENOUS at 14:23

## 2018-05-19 NOTE — PROGRESS NOTE ADULT - SUBJECTIVE AND OBJECTIVE BOX
[facility] [place]:[id number],   78yFemale followed for:  No Known Allergies    PAST MEDICAL & SURGICAL HISTORY:  Rectal mass  Gangrene of foot  Coronary artery disease involving native coronary artery of native heart without angina pectoris  Status post above knee amputation of right lower extremity    FAMILY HISTORY:  No pertinent family history in first degree relatives    MEDICATIONS  (STANDING):  atorvastatin 40 milliGRAM(s) Oral at bedtime  carvedilol 12.5 milliGRAM(s) Oral every 12 hours  dextrose 5%. 1000 milliLiter(s) (50 mL/Hr) IV Continuous <Continuous>  dextrose 50% Injectable 12.5 Gram(s) IV Push once  dextrose 50% Injectable 25 Gram(s) IV Push once  dextrose 50% Injectable 25 Gram(s) IV Push once  docusate sodium 100 milliGRAM(s) Oral two times a day  enoxaparin Injectable 40 milliGRAM(s) SubCutaneous every 24 hours  famotidine    Tablet 20 milliGRAM(s) Oral daily  ferrous    sulfate 325 milliGRAM(s) Oral daily  folic acid 1 milliGRAM(s) Oral daily  insulin lispro (HumaLOG) corrective regimen sliding scale   SubCutaneous at bedtime  insulin lispro (HumaLOG) corrective regimen sliding scale   SubCutaneous three times a day before meals  lisinopril 5 milliGRAM(s) Oral daily  mirtazapine 15 milliGRAM(s) Oral at bedtime  piperacillin/tazobactam IVPB. 3.375 Gram(s) IV Intermittent every 8 hours  polyethylene glycol 3350 17 Gram(s) Oral two times a day  potassium chloride    Tablet ER 40 milliEquivalent(s) Oral every 4 hours  senna 2 Tablet(s) Oral at bedtime  sodium chloride 1 Gram(s) Oral two times a day    MEDICATIONS  (PRN):  acetaminophen   Tablet 650 milliGRAM(s) Oral every 6 hours PRN For Temp greater than 38 C (100.4 F)  acetaminophen   Tablet. 650 milliGRAM(s) Oral every 6 hours PRN Mild Pain (1 - 3)  dextrose 40% Gel 15 Gram(s) Oral once PRN Blood Glucose LESS THAN 70 milliGRAM(s)/deciliter  glucagon  Injectable 1 milliGRAM(s) IntraMuscular once PRN Glucose LESS THAN 70 milligrams/deciliter  morphine  IR 15 milliGRAM(s) Oral every 4 hours PRN moderate to severe pain  ondansetron Injectable 4 milliGRAM(s) IV Push every 6 hours PRN Nausea      Vital Signs Last 24 Hrs  T(C): 37.2 (19 May 2018 05:34), Max: 37.2 (19 May 2018 05:34)  T(F): 99 (19 May 2018 05:34), Max: 99 (19 May 2018 05:34)  HR: 79 (19 May 2018 05:34) (64 - 79)  BP: 143/50 (19 May 2018 05:34) (111/50 - 143/50)  BP(mean): --  RR: 18 (19 May 2018 05:34) (18 - 19)  SpO2: 95% (19 May 2018 05:34) (95% - 96%)  nc/at  s1s2  cta  soft, nt, nd no guarding or rebound  no c/c/e    CBC Full  -  ( 19 May 2018 06:16 )  WBC Count : 13.59 K/uL  Hemoglobin : 8.8 g/dL  Hematocrit : 27.6 %  Platelet Count - Automated : 396 K/uL  Mean Cell Volume : 86.5 fL  Mean Cell Hemoglobin : 27.6 pg  Mean Cell Hemoglobin Concentration : 31.9 %  Auto Neutrophil # : 10.69 K/uL  Auto Lymphocyte # : 1.60 K/uL  Auto Monocyte # : 0.94 K/uL  Auto Eosinophil # : 0.27 K/uL  Auto Basophil # : 0.03 K/uL  Auto Neutrophil % : 78.7 %  Auto Lymphocyte % : 11.8 %  Auto Monocyte % : 6.9 %  Auto Eosinophil % : 2.0 %  Auto Basophil % : 0.2 %    05-19    133<L>  |  97<L>  |  9   ----------------------------<  95  3.3<L>   |  25  |  0.55    Ca    8.2<L>      19 May 2018 06:16    TPro  6.0  /  Alb  2.4<L>  /  TBili  0.3  /  DBili  x   /  AST  18  /  ALT  10  /  AlkPhos  62  05-19

## 2018-05-19 NOTE — PROGRESS NOTE ADULT - ASSESSMENT
78  y.o. woman with history of PAD and rectal mass who was sent to the ER for evaluation of abdominal pain and rectal mass by her oncologist. Patient reports severe suprapubic pain, radiates to the rectum, aggravated with movement and palpation, alleviated with pain medications. Seen by Pallitive pain, Oncology and Rt onc.  Bxp- Positive Rectal Cancer.  Need 5 days RT- plan to end about 5/23  MRI pelvis Multiple new cystic lesions to the   left and posterior to the rectum may represent abscesses, in the right   clinical setting. Neoplastic extension is also possible. Mild enhancement   at the base of the urinary bladder may be reactive, however, minimal   involvement with disease is not excluded. No focal bladder lesion.

## 2018-05-19 NOTE — PROGRESS NOTE ADULT - SUBJECTIVE AND OBJECTIVE BOX
Patient is a 78y old  Female who presents with a chief complaint of Abdominal pain (08 May 2018 22:37)      SUBJECTIVE / OVERNIGHT EVENTS: pt appears uncomfortable; per dgtr who helped speak w/ dgtr- pt in pain in pelvis "all the time", sharp.  no appetite, no intake in 5 days- dgtr requesting for IV fluids.  dry mouth    MEDICATIONS  (STANDING):  atorvastatin 40 milliGRAM(s) Oral at bedtime  carvedilol 12.5 milliGRAM(s) Oral every 12 hours  dextrose 5%. 1000 milliLiter(s) (50 mL/Hr) IV Continuous <Continuous>  dextrose 50% Injectable 12.5 Gram(s) IV Push once  dextrose 50% Injectable 25 Gram(s) IV Push once  dextrose 50% Injectable 25 Gram(s) IV Push once  docusate sodium 100 milliGRAM(s) Oral two times a day  enoxaparin Injectable 40 milliGRAM(s) SubCutaneous every 24 hours  famotidine    Tablet 20 milliGRAM(s) Oral daily  ferrous    sulfate 325 milliGRAM(s) Oral daily  folic acid 1 milliGRAM(s) Oral daily  insulin lispro (HumaLOG) corrective regimen sliding scale   SubCutaneous at bedtime  insulin lispro (HumaLOG) corrective regimen sliding scale   SubCutaneous three times a day before meals  lisinopril 5 milliGRAM(s) Oral daily  mirtazapine 15 milliGRAM(s) Oral at bedtime  morphine ER Tablet 15 milliGRAM(s) Oral every 8 hours  nystatin    Suspension 615887 Unit(s) Oral four times a day  piperacillin/tazobactam IVPB. 3.375 Gram(s) IV Intermittent every 8 hours  senna 2 Tablet(s) Oral at bedtime  sodium chloride 1 Gram(s) Oral two times a day  sodium chloride 0.9%. 1000 milliLiter(s) (50 mL/Hr) IV Continuous <Continuous>    MEDICATIONS  (PRN):  acetaminophen   Tablet 650 milliGRAM(s) Oral every 6 hours PRN For Temp greater than 38 C (100.4 F)  acetaminophen   Tablet. 650 milliGRAM(s) Oral every 6 hours PRN Mild Pain (1 - 3)  dextrose 40% Gel 15 Gram(s) Oral once PRN Blood Glucose LESS THAN 70 milliGRAM(s)/deciliter  glucagon  Injectable 1 milliGRAM(s) IntraMuscular once PRN Glucose LESS THAN 70 milligrams/deciliter  morphine  IR 15 milliGRAM(s) Oral every 4 hours PRN moderate to severe pain  ondansetron Injectable 4 milliGRAM(s) IV Push every 6 hours PRN Nausea      Meds ordered within last 24hours  potassium chloride    Tablet ER:   40 milliEquivalent(s), Oral, every 4 hours, Stop After 2 Doses  Administration Instructions: swallow whole * don't crush/chew  Provider's Contact #: [phone] ( @ 08:54)  potassium chloride   Powder: [Known as KLOR-CON POWDER]  40 milliEquivalent(s), Oral, once, Stop After 1 Doses  Administration Instructions: Dissolve contents of 1 packet in at least 4 oz. of water or other beverage.  Provider's Contact #: [phone] ( @ 10:02)  morphine ER Tablet: [Known as MS CONTIN]  15 milliGRAM(s), Oral, every 8 hours  Administration Instructions: swallow whole * don't crush/chew  Provider's Contact #: [phone] ( @ 11:39)  nystatin    Suspension:   500,000 Unit(s), Oral, four times a day, Stop After 14 Days  Indication: thrush  Administration Instructions: shake well  Dispose unused medication in BLACK bin.  Provider's Contact #: [phone] ( @ 11:40)  sodium chloride 0.9%.: Solution, 1000 milliLiter(s) infuse at 50 mL/Hr, Stop After 20 Hours  Provider's Contact #: [phone] ( @ 11:43)      T(C): 37.2 (18 @ 05:34), Max: 37.2 (18 @ 05:34)  HR: 79 (18 @ 05:34) (64 - 79)  BP: 143/50 (18 @ 05:34) (111/50 - 143/50)  RR: 18 (18 @ 05:34) (18 - 19)  SpO2: 95% (18 @ 05:34) (95% - 96%)    CAPILLARY BLOOD GLUCOSE      POCT Blood Glucose.: 142 mg/dL (19 May 2018 08:38)  POCT Blood Glucose.: 156 mg/dL (18 May 2018 21:58)  POCT Blood Glucose.: 101 mg/dL (18 May 2018 17:07)  POCT Blood Glucose.: 130 mg/dL (18 May 2018 12:07)    I&O's Summary      PHYSICAL EXAM:  GENERAL: in bed eyes closed, brows furrowed, appears in distress d/t pain  CHEST/LUNG: Clear to auscultation bilaterally; No wheeze  HEART: Regular rate and rhythm; No murmurs, rubs, or gallops  ABDOMEN: Soft, Nontender, Nondistended; Bowel sounds present  EXTREMITIES:  No clubbing, cyanosis, or edema LLE +R bka stump      LABS:                        8.8    13.59 )-----------( 396      ( 19 May 2018 06:16 )             27.6         133<L>  |  97<L>  |  9   ----------------------------<  95  3.3<L>   |  25  |  0.55    Ca    8.2<L>      19 May 2018 06:16  Mg     2.2         TPro  6.0  /  Alb  2.4<L>  /  TBili  0.3  /  DBili  x   /  AST  18  /  ALT  10  /  AlkPhos  62            Urinalysis Basic - ( 17 May 2018 18:50 )    Color: YELLOW / Appearance: CLEAR / S.016 / pH: 6.5  Gluc: NEGATIVE / Ketone: NEGATIVE  / Bili: NEGATIVE / Urobili: 4 mg/dL   Blood: NEGATIVE / Protein: 10 mg/dL / Nitrite: NEGATIVE   Leuk Esterase: NEGATIVE / RBC: 0-2 / WBC 2-5   Sq Epi: x / Non Sq Epi: x / Bacteria: x        RADIOLOGY & ADDITIONAL TESTS:    Imaging Personally Reviewed:    Consultant(s) Notes Reviewed:      Care Discussed with Consultants/Other Providers:

## 2018-05-19 NOTE — PROGRESS NOTE ADULT - PROBLEM SELECTOR PLAN 3
likely secondary to malignancy  c/w supportive care  c/w Ensure  Remeron started to help with mood and appetite- inc to 15 mg qhs  trial of nystatin for oral thrush

## 2018-05-19 NOTE — PROGRESS NOTE ADULT - ATTENDING COMMENTS
72 year old with rectal cancer with fever and leukocytosis    MRI raises concern for abscess vs tumor extension    Continue zosyn    Surgery follow up re MRI findings

## 2018-05-19 NOTE — PROGRESS NOTE ADULT - ASSESSMENT
PreOp  Based on current ACC/AHA guidelines, patient history and physical exam, the patient is considered to have elevated risk  stress test unremarkable   may proceed to OR     CAD  stress test unremarkable   cont statin    HTN  stable    DM  Monitor finger stick. Insulin coverage. Diabetic education and Diabetic diet. Consider nutrition consultation.     rectal ca with perirectal abscess  on anbx  fu with ID

## 2018-05-19 NOTE — PROGRESS NOTE ADULT - SUBJECTIVE AND OBJECTIVE BOX
Follow Up:  Fever    Interval History/ROS: Overnight no chills or fevers.    Allergies  No Known Allergies        ANTIMICROBIALS:  piperacillin/tazobactam IVPB. 3.375 every 8 hours      OTHER MEDS:  MEDICATIONS  (STANDING):  acetaminophen   Tablet 650 every 6 hours PRN  acetaminophen   Tablet. 650 every 6 hours PRN  atorvastatin 40 at bedtime  carvedilol 12.5 every 12 hours  dextrose 40% Gel 15 once PRN  dextrose 50% Injectable 12.5 once  dextrose 50% Injectable 25 once  dextrose 50% Injectable 25 once  docusate sodium 100 two times a day  enoxaparin Injectable 40 every 24 hours  famotidine    Tablet 20 daily  glucagon  Injectable 1 once PRN  insulin lispro (HumaLOG) corrective regimen sliding scale  at bedtime  insulin lispro (HumaLOG) corrective regimen sliding scale  three times a day before meals  lisinopril 5 daily  mirtazapine 15 at bedtime  morphine  IR 15 every 4 hours PRN  ondansetron Injectable 4 every 6 hours PRN  polyethylene glycol 3350 17 two times a day  senna 2 at bedtime      Vital Signs Last 24 Hrs  T(C): 37.2 (19 May 2018 05:34), Max: 37.2 (19 May 2018 05:34)  T(F): 99 (19 May 2018 05:34), Max: 99 (19 May 2018 05:34)  HR: 79 (19 May 2018 05:34) (64 - 79)  BP: 143/50 (19 May 2018 05:34) (111/50 - 143/50)  BP(mean): --  RR: 18 (19 May 2018 05:34) (18 - 19)  SpO2: 95% (19 May 2018 05:34) (95% - 96%)    PHYSICAL EXAM:  General: non-toxic  HEAD/EYES: anicteric, PERRL  ENT:  supple  Cardiovascular:   S1, S2  Respiratory:  clear bilaterally  GI:  soft, non-tender, normal bowel sounds  :  no CVA tenderness   Musculoskeletal:  no synovitis  Neurologic:  grossly non-focal  Skin:  no rash  Lymph: no lymphadenopathy  Psychiatric:  appropriate affect  Vascular:  no phlebitis                                8.8    13.59 )-----------( 396      ( 19 May 2018 06:16 )             27.6       05-19    133<L>  |  97<L>  |  9   ----------------------------<  95  3.3<L>   |  25  |  0.55    Ca    8.2<L>      19 May 2018 06:16  Mg     2.2         TPro  6.0  /  Alb  2.4<L>  /  TBili  0.3  /  DBili  x   /  AST  18  /  ALT  10  /  AlkPhos  62        Urinalysis Basic - ( 17 May 2018 18:50 )    Color: YELLOW / Appearance: CLEAR / S.016 / pH: 6.5  Gluc: NEGATIVE / Ketone: NEGATIVE  / Bili: NEGATIVE / Urobili: 4 mg/dL   Blood: NEGATIVE / Protein: 10 mg/dL / Nitrite: NEGATIVE   Leuk Esterase: NEGATIVE / RBC: 0-2 / WBC 2-5   Sq Epi: x / Non Sq Epi: x / Bacteria: x        MICROBIOLOGY:  v  BLOOD PERIPHERAL  18 --  --  --    RADIOLOGY:    EXAM:  MR PELVIS IC    PROCEDURE DATE:  May 18 2018   Known rectal malignancy. Multiple new cystic lesions to the   left and posterior to the rectum may represent abscesses, in the right   clinical setting. Neoplastic extension is also possible. Mild enhancement   at the base of the urinary bladder may be reactive, however, minimal   involvement with disease is not excluded. No focal bladder lesion. Follow Up:  Fever    Interval History/ROS: (Daughter helped translate) Overnight no chills or fevers. Continues to have significant lower abdominal pain. Notes nausea    Allergies  No Known Allergies        ANTIMICROBIALS:  piperacillin/tazobactam IVPB. 3.375 every 8 hours      OTHER MEDS:  MEDICATIONS  (STANDING):  acetaminophen   Tablet 650 every 6 hours PRN  acetaminophen   Tablet. 650 every 6 hours PRN  atorvastatin 40 at bedtime  carvedilol 12.5 every 12 hours  dextrose 40% Gel 15 once PRN  dextrose 50% Injectable 12.5 once  dextrose 50% Injectable 25 once  dextrose 50% Injectable 25 once  docusate sodium 100 two times a day  enoxaparin Injectable 40 every 24 hours  famotidine    Tablet 20 daily  glucagon  Injectable 1 once PRN  insulin lispro (HumaLOG) corrective regimen sliding scale  at bedtime  insulin lispro (HumaLOG) corrective regimen sliding scale  three times a day before meals  lisinopril 5 daily  mirtazapine 15 at bedtime  morphine  IR 15 every 4 hours PRN  ondansetron Injectable 4 every 6 hours PRN  polyethylene glycol 3350 17 two times a day  senna 2 at bedtime      Vital Signs Last 24 Hrs  T(C): 37.2 (19 May 2018 05:34), Max: 37.2 (19 May 2018 05:34)  T(F): 99 (19 May 2018 05:34), Max: 99 (19 May 2018 05:34)  HR: 79 (19 May 2018 05:34) (64 - 79)  BP: 143/50 (19 May 2018 05:34) (111/50 - 143/50)  BP(mean): --  RR: 18 (19 May 2018 05:34) (18 - 19)  SpO2: 95% (19 May 2018 05:34) (95% - 96%)    PHYSICAL EXAMINATION:  General: Alert and Awake, In mild distress  HEENT: PERRL, EOMI, No subconjunctival hemorrhages, Oropharynx Clear, MMM  Neck: Supple, No KIKO  Cardiac: RRR, No M/R/G  Resp: CTAB, No Wh/Rh/Ra  Abdomen: NBS, ND, Tenderness to palpation in LLQ and RLQ, No HSM, No rigidity or guarding  MSK: right BKA stump clean intact, no erythema. no joint swelling  Skin: No rashes or lesions. Skin is warm and dry to the touch.   Neuro: Alert and Awake. CN 2-12 Grossly intact. Moves all four extremities spontaneously.                          8.8    13.59 )-----------( 396      ( 19 May 2018 06:16 )             27.6           133<L>  |  97<L>  |  9   ----------------------------<  95  3.3<L>   |  25  |  0.55    Ca    8.2<L>      19 May 2018 06:16  Mg     2.2         TPro  6.0  /  Alb  2.4<L>  /  TBili  0.3  /  DBili  x   /  AST  18  /  ALT  10  /  AlkPhos  62        Urinalysis Basic - ( 17 May 2018 18:50 )    Color: YELLOW / Appearance: CLEAR / S.016 / pH: 6.5  Gluc: NEGATIVE / Ketone: NEGATIVE  / Bili: NEGATIVE / Urobili: 4 mg/dL   Blood: NEGATIVE / Protein: 10 mg/dL / Nitrite: NEGATIVE   Leuk Esterase: NEGATIVE / RBC: 0-2 / WBC 2-5   Sq Epi: x / Non Sq Epi: x / Bacteria: x        MICROBIOLOGY:  v  BLOOD PERIPHERAL  18 --  --  --    RADIOLOGY:    EXAM:  MR PELVIS IC    PROCEDURE DATE:  May 18 2018   Known rectal malignancy. Multiple new cystic lesions to the   left and posterior to the rectum may represent abscesses, in the right   clinical setting. Neoplastic extension is also possible. Mild enhancement   at the base of the urinary bladder may be reactive, however, minimal   involvement with disease is not excluded. No focal bladder lesion.

## 2018-05-19 NOTE — PROGRESS NOTE ADULT - SUBJECTIVE AND OBJECTIVE BOX
Subjective: Patient seen and examined. No new events except as noted.     SUBJECTIVE/ROS:  no cp       MEDICATIONS:  MEDICATIONS  (STANDING):  atorvastatin 40 milliGRAM(s) Oral at bedtime  carvedilol 12.5 milliGRAM(s) Oral every 12 hours  dextrose 5%. 1000 milliLiter(s) (50 mL/Hr) IV Continuous <Continuous>  dextrose 50% Injectable 12.5 Gram(s) IV Push once  dextrose 50% Injectable 25 Gram(s) IV Push once  dextrose 50% Injectable 25 Gram(s) IV Push once  docusate sodium 100 milliGRAM(s) Oral two times a day  enoxaparin Injectable 40 milliGRAM(s) SubCutaneous every 24 hours  famotidine    Tablet 20 milliGRAM(s) Oral daily  ferrous    sulfate 325 milliGRAM(s) Oral daily  folic acid 1 milliGRAM(s) Oral daily  insulin lispro (HumaLOG) corrective regimen sliding scale   SubCutaneous at bedtime  insulin lispro (HumaLOG) corrective regimen sliding scale   SubCutaneous three times a day before meals  lisinopril 5 milliGRAM(s) Oral daily  mirtazapine 15 milliGRAM(s) Oral at bedtime  piperacillin/tazobactam IVPB. 3.375 Gram(s) IV Intermittent every 8 hours  polyethylene glycol 3350 17 Gram(s) Oral two times a day  senna 2 Tablet(s) Oral at bedtime  sodium chloride 1 Gram(s) Oral two times a day      PHYSICAL EXAM:  T(C): 37.2 (05-19-18 @ 05:34), Max: 37.2 (05-19-18 @ 05:34)  HR: 79 (05-19-18 @ 05:34) (64 - 79)  BP: 143/50 (05-19-18 @ 05:34) (111/50 - 143/50)  RR: 18 (05-19-18 @ 05:34) (18 - 19)  SpO2: 95% (05-19-18 @ 05:34) (95% - 96%)  Wt(kg): --  I&O's Summary        JVP: Normal  Neck: supple  Lung: clear   CV: S1 S2 , Murmur:  Abd: soft  Ext: No edema  neuro: Awake / alert  Psych: flat affect  Skin: normal       LABS/DATA:    CARDIAC MARKERS:                                8.8    13.59 )-----------( 396      ( 19 May 2018 06:16 )             27.6     05-19    133<L>  |  97<L>  |  9   ----------------------------<  95  3.3<L>   |  25  |  0.55    Ca    8.2<L>      19 May 2018 06:16  Mg     2.2     05-19    TPro  6.0  /  Alb  2.4<L>  /  TBili  0.3  /  DBili  x   /  AST  18  /  ALT  10  /  AlkPhos  62  05-19    proBNP:   Lipid Profile:   HgA1c:   TSH:     TELE:  EKG:

## 2018-05-19 NOTE — PROGRESS NOTE ADULT - ASSESSMENT
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 Now undergoing radiation therapy. New fever to 102.1F on 5/17 evening so started on Zosyn. Now afebrile but with MRI Pelvis revealing new cystic lesions left and posterior to the rectum which may represent abscesses. Leukocytosis increased to 13.59. RVP negative. Blood cultures with NGTD    Overall, fever in context of rectal cancer with new finding concerning for perirectal abscess. 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 Now undergoing radiation therapy. New fever to 102.1F on 5/17 evening so started on Zosyn. Now afebrile but with MRI Pelvis revealing new cystic lesions left and posterior to the rectum which may represent abscesses. Leukocytosis increased to 13.59. RVP negative. Blood cultures with NGTD    Overall, fever in context of rectal cancer with new finding concerning for perirectal abscess. Would continue Zosyn but would followup recommendations from colorectal surgerty    --Continue Zosyn 3.375 mg IV Q8H  --F/U Colorectal Surgery recommendations regarding MRI findings  --F/U Prelim Blood Cultures

## 2018-05-19 NOTE — PROGRESS NOTE ADULT - PROBLEM SELECTOR PLAN 1
- Pain control with morphine- started MS contin 15mg q8h as pt has used 60mg in the last 24h  s/p repeat Biopsy by GI on 5/10. Path showed moderately differentiated adenocarcinoma.   5 days RT ends 5/24, then surgery with Dr Sosa  will call colorectal surg re: pelvic MRI findings ?abscesses

## 2018-05-20 LAB
ALBUMIN SERPL ELPH-MCNC: 2.3 G/DL — LOW (ref 3.3–5)
ALP SERPL-CCNC: 73 U/L — SIGNIFICANT CHANGE UP (ref 40–120)
ALT FLD-CCNC: 9 U/L — SIGNIFICANT CHANGE UP (ref 4–33)
AST SERPL-CCNC: 21 U/L — SIGNIFICANT CHANGE UP (ref 4–32)
BILIRUB SERPL-MCNC: 0.4 MG/DL — SIGNIFICANT CHANGE UP (ref 0.2–1.2)
BUN SERPL-MCNC: 6 MG/DL — LOW (ref 7–23)
BUN SERPL-MCNC: 6 MG/DL — LOW (ref 7–23)
CALCIUM SERPL-MCNC: 7.8 MG/DL — LOW (ref 8.4–10.5)
CALCIUM SERPL-MCNC: 7.8 MG/DL — LOW (ref 8.4–10.5)
CHLORIDE SERPL-SCNC: 98 MMOL/L — SIGNIFICANT CHANGE UP (ref 98–107)
CHLORIDE SERPL-SCNC: 98 MMOL/L — SIGNIFICANT CHANGE UP (ref 98–107)
CO2 SERPL-SCNC: 18 MMOL/L — LOW (ref 22–31)
CO2 SERPL-SCNC: 18 MMOL/L — LOW (ref 22–31)
CREAT SERPL-MCNC: 0.53 MG/DL — SIGNIFICANT CHANGE UP (ref 0.5–1.3)
CREAT SERPL-MCNC: 0.53 MG/DL — SIGNIFICANT CHANGE UP (ref 0.5–1.3)
GLUCOSE BLDC GLUCOMTR-MCNC: 107 MG/DL — HIGH (ref 70–99)
GLUCOSE BLDC GLUCOMTR-MCNC: 114 MG/DL — HIGH (ref 70–99)
GLUCOSE BLDC GLUCOMTR-MCNC: 142 MG/DL — HIGH (ref 70–99)
GLUCOSE BLDC GLUCOMTR-MCNC: 246 MG/DL — HIGH (ref 70–99)
GLUCOSE SERPL-MCNC: 92 MG/DL — SIGNIFICANT CHANGE UP (ref 70–99)
GLUCOSE SERPL-MCNC: 92 MG/DL — SIGNIFICANT CHANGE UP (ref 70–99)
HCT VFR BLD CALC: 23.8 % — LOW (ref 34.5–45)
HGB BLD-MCNC: 7.5 G/DL — LOW (ref 11.5–15.5)
MCHC RBC-ENTMCNC: 27.6 PG — SIGNIFICANT CHANGE UP (ref 27–34)
MCHC RBC-ENTMCNC: 31.5 % — LOW (ref 32–36)
MCV RBC AUTO: 87.5 FL — SIGNIFICANT CHANGE UP (ref 80–100)
NRBC # FLD: 0 — SIGNIFICANT CHANGE UP
PLATELET # BLD AUTO: 336 K/UL — SIGNIFICANT CHANGE UP (ref 150–400)
PMV BLD: 8.8 FL — SIGNIFICANT CHANGE UP (ref 7–13)
POTASSIUM SERPL-MCNC: 4.1 MMOL/L — SIGNIFICANT CHANGE UP (ref 3.5–5.3)
POTASSIUM SERPL-MCNC: 4.1 MMOL/L — SIGNIFICANT CHANGE UP (ref 3.5–5.3)
POTASSIUM SERPL-SCNC: 4.1 MMOL/L — SIGNIFICANT CHANGE UP (ref 3.5–5.3)
POTASSIUM SERPL-SCNC: 4.1 MMOL/L — SIGNIFICANT CHANGE UP (ref 3.5–5.3)
PROT SERPL-MCNC: 6.2 G/DL — SIGNIFICANT CHANGE UP (ref 6–8.3)
RBC # BLD: 2.72 M/UL — LOW (ref 3.8–5.2)
RBC # FLD: 15.4 % — HIGH (ref 10.3–14.5)
SODIUM SERPL-SCNC: 131 MMOL/L — LOW (ref 135–145)
SODIUM SERPL-SCNC: 131 MMOL/L — LOW (ref 135–145)
WBC # BLD: 12.88 K/UL — HIGH (ref 3.8–10.5)
WBC # FLD AUTO: 12.88 K/UL — HIGH (ref 3.8–10.5)

## 2018-05-20 PROCEDURE — 99233 SBSQ HOSP IP/OBS HIGH 50: CPT

## 2018-05-20 RX ADMIN — FAMOTIDINE 20 MILLIGRAM(S): 10 INJECTION INTRAVENOUS at 11:36

## 2018-05-20 RX ADMIN — PIPERACILLIN AND TAZOBACTAM 25 GRAM(S): 4; .5 INJECTION, POWDER, LYOPHILIZED, FOR SOLUTION INTRAVENOUS at 14:13

## 2018-05-20 RX ADMIN — Medication 325 MILLIGRAM(S): at 11:36

## 2018-05-20 RX ADMIN — MORPHINE SULFATE 15 MILLIGRAM(S): 50 CAPSULE, EXTENDED RELEASE ORAL at 05:40

## 2018-05-20 RX ADMIN — MORPHINE SULFATE 15 MILLIGRAM(S): 50 CAPSULE, EXTENDED RELEASE ORAL at 21:28

## 2018-05-20 RX ADMIN — MORPHINE SULFATE 15 MILLIGRAM(S): 50 CAPSULE, EXTENDED RELEASE ORAL at 13:24

## 2018-05-20 RX ADMIN — Medication 1 MILLIGRAM(S): at 11:36

## 2018-05-20 RX ADMIN — MORPHINE SULFATE 15 MILLIGRAM(S): 50 CAPSULE, EXTENDED RELEASE ORAL at 08:17

## 2018-05-20 RX ADMIN — MORPHINE SULFATE 15 MILLIGRAM(S): 50 CAPSULE, EXTENDED RELEASE ORAL at 14:01

## 2018-05-20 RX ADMIN — ATORVASTATIN CALCIUM 40 MILLIGRAM(S): 80 TABLET, FILM COATED ORAL at 21:29

## 2018-05-20 RX ADMIN — MORPHINE SULFATE 15 MILLIGRAM(S): 50 CAPSULE, EXTENDED RELEASE ORAL at 14:13

## 2018-05-20 RX ADMIN — MIRTAZAPINE 15 MILLIGRAM(S): 45 TABLET, ORALLY DISINTEGRATING ORAL at 21:29

## 2018-05-20 RX ADMIN — MORPHINE SULFATE 15 MILLIGRAM(S): 50 CAPSULE, EXTENDED RELEASE ORAL at 21:42

## 2018-05-20 RX ADMIN — Medication 500000 UNIT(S): at 17:28

## 2018-05-20 RX ADMIN — SODIUM CHLORIDE 1 GRAM(S): 9 INJECTION INTRAMUSCULAR; INTRAVENOUS; SUBCUTANEOUS at 06:04

## 2018-05-20 RX ADMIN — ENOXAPARIN SODIUM 40 MILLIGRAM(S): 100 INJECTION SUBCUTANEOUS at 06:04

## 2018-05-20 RX ADMIN — LISINOPRIL 5 MILLIGRAM(S): 2.5 TABLET ORAL at 06:04

## 2018-05-20 RX ADMIN — PIPERACILLIN AND TAZOBACTAM 25 GRAM(S): 4; .5 INJECTION, POWDER, LYOPHILIZED, FOR SOLUTION INTRAVENOUS at 21:28

## 2018-05-20 RX ADMIN — CARVEDILOL PHOSPHATE 12.5 MILLIGRAM(S): 80 CAPSULE, EXTENDED RELEASE ORAL at 06:04

## 2018-05-20 RX ADMIN — MORPHINE SULFATE 15 MILLIGRAM(S): 50 CAPSULE, EXTENDED RELEASE ORAL at 04:47

## 2018-05-20 RX ADMIN — Medication 500000 UNIT(S): at 06:05

## 2018-05-20 RX ADMIN — SODIUM CHLORIDE 50 MILLILITER(S): 9 INJECTION INTRAMUSCULAR; INTRAVENOUS; SUBCUTANEOUS at 06:04

## 2018-05-20 RX ADMIN — CARVEDILOL PHOSPHATE 12.5 MILLIGRAM(S): 80 CAPSULE, EXTENDED RELEASE ORAL at 17:28

## 2018-05-20 RX ADMIN — SODIUM CHLORIDE 1 GRAM(S): 9 INJECTION INTRAMUSCULAR; INTRAVENOUS; SUBCUTANEOUS at 17:28

## 2018-05-20 RX ADMIN — Medication 500000 UNIT(S): at 11:36

## 2018-05-20 RX ADMIN — MORPHINE SULFATE 15 MILLIGRAM(S): 50 CAPSULE, EXTENDED RELEASE ORAL at 06:04

## 2018-05-20 RX ADMIN — PIPERACILLIN AND TAZOBACTAM 25 GRAM(S): 4; .5 INJECTION, POWDER, LYOPHILIZED, FOR SOLUTION INTRAVENOUS at 06:04

## 2018-05-20 NOTE — PROGRESS NOTE ADULT - ASSESSMENT
PreOp  Based on current ACC/AHA guidelines, patient history and physical exam, the patient is considered to have elevated risk  stress test unremarkable   may proceed to OR     CAD  stress test unremarkable   cont statin    HTN  stable    DM  Monitor finger stick. Insulin coverage. Diabetic education and Diabetic diet. Consider nutrition consultation.     rectal ca with perirectal abscess  on anbx  fu with ID/sugery  pain control

## 2018-05-20 NOTE — PROGRESS NOTE ADULT - ATTENDING COMMENTS
< from: MR Pelvis w/ IV Cont (05.18.18 @ 16:34) >    IMPRESSION: Known rectal malignancy. Multiple new cystic lesions to the   left and posterior to therectum may represent abscesses, in the right   clinical setting. Neoplastic extension is also possible. Mild enhancement   at the base of the urinary bladder may be reactive, however, minimal   involvement with disease is not excluded. No focal bladder lesion.    Surgery to eval < from: MR Pelvis w/ IV Cont (05.18.18 @ 16:34) >    IMPRESSION: Known rectal malignancy. Multiple new cystic lesions to the   left and posterior to therectum may represent abscesses, in the right   clinical setting. Neoplastic extension is also possible. Mild enhancement   at the base of the urinary bladder may be reactive, however, minimal   involvement with disease is not excluded. No focal bladder lesion.    Surgery to eval- rec hold RT    Radiology to hold RT until further eval of abbess and coordination with Surgery- surgery input appreciated.

## 2018-05-20 NOTE — PROVIDER CONTACT NOTE (OTHER) - ACTION/TREATMENT ORDERED:
NS Bolus  Hold PRN pain medication for now
New CBC ordered.
ADS [id number] [facility] aware.
Blood cultures, urine cultures, chest x-ray, antibiotics

## 2018-05-20 NOTE — PROGRESS NOTE ADULT - SUBJECTIVE AND OBJECTIVE BOX
Colorectal Surgery ( A team [id number])    Pt seen and examined. no complaints.    Objective:   Vitals:   Vital Signs Last 24 Hrs  T(C): 37.5 (20 May 2018 05:45), Max: 37.5 (20 May 2018 05:45)  T(F): 99.5 (20 May 2018 05:45), Max: 99.5 (20 May 2018 05:45)  HR: 74 (20 May 2018 05:45) (65 - 79)  BP: 153/48 (20 May 2018 05:45) (134/49 - 153/48)  BP(mean): --  RR: 18 (20 May 2018 05:45) (18 - 19)  SpO2: 97% (20 May 2018 05:45) (95% - 97%)  In/Outs:    Physical Exam  General: NAD   Abdomen: soft, NT, ND    MEDICATIONS  (STANDING):  atorvastatin 40 milliGRAM(s) Oral at bedtime  carvedilol 12.5 milliGRAM(s) Oral every 12 hours  dextrose 5%. 1000 milliLiter(s) (50 mL/Hr) IV Continuous <Continuous>  dextrose 50% Injectable 12.5 Gram(s) IV Push once  dextrose 50% Injectable 25 Gram(s) IV Push once  dextrose 50% Injectable 25 Gram(s) IV Push once  docusate sodium 100 milliGRAM(s) Oral two times a day  enoxaparin Injectable 40 milliGRAM(s) SubCutaneous every 24 hours  famotidine    Tablet 20 milliGRAM(s) Oral daily  ferrous    sulfate 325 milliGRAM(s) Oral daily  folic acid 1 milliGRAM(s) Oral daily  insulin lispro (HumaLOG) corrective regimen sliding scale   SubCutaneous at bedtime  insulin lispro (HumaLOG) corrective regimen sliding scale   SubCutaneous three times a day before meals  lisinopril 5 milliGRAM(s) Oral daily  mirtazapine 15 milliGRAM(s) Oral at bedtime  morphine ER Tablet 15 milliGRAM(s) Oral every 8 hours  nystatin    Suspension 494057 Unit(s) Oral four times a day  piperacillin/tazobactam IVPB. 3.375 Gram(s) IV Intermittent every 8 hours  senna 2 Tablet(s) Oral at bedtime  sodium chloride 1 Gram(s) Oral two times a day  sodium chloride 0.9%. 1000 milliLiter(s) (50 mL/Hr) IV Continuous <Continuous>    MEDICATIONS  (PRN):  acetaminophen   Tablet 650 milliGRAM(s) Oral every 6 hours PRN For Temp greater than 38 C (100.4 F)  acetaminophen   Tablet. 650 milliGRAM(s) Oral every 6 hours PRN Mild Pain (1 - 3)  dextrose 40% Gel 15 Gram(s) Oral once PRN Blood Glucose LESS THAN 70 milliGRAM(s)/deciliter  glucagon  Injectable 1 milliGRAM(s) IntraMuscular once PRN Glucose LESS THAN 70 milligrams/deciliter  morphine  IR 15 milliGRAM(s) Oral every 4 hours PRN moderate to severe pain  ondansetron Injectable 4 milliGRAM(s) IV Push every 6 hours PRN Nausea      LABS:                        7.5    12.88 )-----------( 336      ( 20 May 2018 07:30 )             23.8     05-20    131<L>  |  98  |  6<L>  ----------------------------<  92  4.1   |  18<L>  |  0.53    Ca    7.8<L>      20 May 2018 06:05  Mg     2.2     05-19    TPro  6.2  /  Alb  2.3<L>  /  TBili  0.4  /  DBili  x   /  AST  21  /  ALT  9   /  AlkPhos  73  05-20    RADIOLOGY & ADDITIONAL STUDIES:      Patient is a 78y old Female with adavanced rectal cancer on short course of RT  New MRI findings of perirectal cysts/abscesses  - would review MRI findings with RAD/ONC to reassess safety of continuing RT  - continue abx  - no emergent surgical intervention  - d/w radiology- MRI was done w IV contrast, no further imaging indicated at this point. May need interval imaging.  - d/w Dr. Osmel MD, PhD  General Surgery Chief Resident   [phone]

## 2018-05-20 NOTE — PROGRESS NOTE ADULT - SUBJECTIVE AND OBJECTIVE BOX
[place] [facility] [id number]   78[place]Female followed for:  No Known Allergies    PAST MEDICAL & SURGICAL HISTORY:  Rectal mass  Gangrene of foot  Coronary artery disease involving native coronary artery of native heart without angina pectoris  Status post above knee amputation of right lower extremity    FAMILY HISTORY:  No pertinent family history in first degree relatives    MEDICATIONS  (STANDING):  atorvastatin 40 milliGRAM(s) Oral at bedtime  carvedilol 12.5 milliGRAM(s) Oral every 12 hours  dextrose 5%. 1000 milliLiter(s) (50 mL/Hr) IV Continuous <Continuous>  dextrose 50% Injectable 12.5 Gram(s) IV Push once  dextrose 50% Injectable 25 Gram(s) IV Push once  dextrose 50% Injectable 25 Gram(s) IV Push once  docusate sodium 100 milliGRAM(s) Oral two times a day  enoxaparin Injectable 40 milliGRAM(s) SubCutaneous every 24 hours  famotidine    Tablet 20 milliGRAM(s) Oral daily  ferrous    sulfate 325 milliGRAM(s) Oral daily  folic acid 1 milliGRAM(s) Oral daily  insulin lispro (HumaLOG) corrective regimen sliding scale   SubCutaneous at bedtime  insulin lispro (HumaLOG) corrective regimen sliding scale   SubCutaneous three times a day before meals  lisinopril 5 milliGRAM(s) Oral daily  mirtazapine 15 milliGRAM(s) Oral at bedtime  morphine ER Tablet 15 milliGRAM(s) Oral every 8 hours  nystatin    Suspension [id number] Unit(s) Oral four times a day  piperacillin/tazobactam IVPB. 3.375 Gram(s) IV Intermittent every 8 hours  senna 2 Tablet(s) Oral at bedtime  sodium chloride 1 Gram(s) Oral two times a day  sodium chloride 0.9%. 1000 milliLiter(s) (50 mL/Hr) IV Continuous <Continuous>    MEDICATIONS  (PRN):  acetaminophen   Tablet 650 milliGRAM(s) Oral every 6 hours PRN For Temp greater than 38 C (100.4 F)  acetaminophen   Tablet. 650 milliGRAM(s) Oral every 6 hours PRN Mild Pain (1 - 3)  dextrose 40% Gel 15 Gram(s) Oral once PRN Blood Glucose LESS THAN 70 milliGRAM(s)/deciliter  glucagon  Injectable 1 milliGRAM(s) IntraMuscular once PRN Glucose LESS THAN 70 milligrams/deciliter  morphine  IR 15 milliGRAM(s) Oral every 4 hours PRN moderate to severe pain  ondansetron Injectable 4 milliGRAM(s) IV Push every 6 hours PRN Nausea      Vital Signs Last 24 Hrs  T(C): 37.5 (20 May 2018 05:45), Max: 37.5 (20 May 2018 05:45)  T(F): 99.5 (20 May 2018 05:45), Max: 99.5 (20 May 2018 05:45)  HR: 74 (20 May 2018 05:45) (65 - 79)  BP: 153/48 (20 May 2018 05:45) (134/49 - 153/48)  BP(mean): --  RR: 18 (20 May 2018 05:45) (18 - 19)  SpO2: 97% (20 May 2018 05:45) (95% - 97%)  nc/at  s1s2  cta  soft, nt, nd no guarding or rebound  no c/c/e    CBC Full  -  ( 20 May 2018 07:30 )  WBC Count : 12.88 K/uL  Hemoglobin : 7.5 g/dL  Hematocrit : 23.8 %  Platelet Count - Automated : 336 K/uL  Mean Cell Volume : 87.5 fL  Mean Cell Hemoglobin : 27.6 pg  Mean Cell Hemoglobin Concentration : 31.5 %  Auto Neutrophil # : x  Auto Lymphocyte # : x  Auto Monocyte # : x  Auto Eosinophil # : x  Auto Basophil # : x  Auto Neutrophil % : x  Auto Lymphocyte % : x  Auto Monocyte % : x  Auto Eosinophil % : x  Auto Basophil % : x    05-20    131<L>  |  98  |  6<L>  ----------------------------<  92  4.1   |  18<L>  |  0.53    Ca    7.8<L>      20 May 2018 06:05  Mg     2.2     05-19    TPro  6.2  /  Alb  2.3<L>  /  TBili  0.4  /  DBili  x   /  AST  21  /  ALT  9   /  AlkPhos  73  05-20

## 2018-05-20 NOTE — PROGRESS NOTE ADULT - SUBJECTIVE AND OBJECTIVE BOX
Subjective: Patient seen and examined. No new events except as noted.     SUBJECTIVE/ROS:  no new events   appears in pain       MEDICATIONS:  MEDICATIONS  (STANDING):  atorvastatin 40 milliGRAM(s) Oral at bedtime  carvedilol 12.5 milliGRAM(s) Oral every 12 hours  dextrose 5%. 1000 milliLiter(s) (50 mL/Hr) IV Continuous <Continuous>  dextrose 50% Injectable 12.5 Gram(s) IV Push once  dextrose 50% Injectable 25 Gram(s) IV Push once  dextrose 50% Injectable 25 Gram(s) IV Push once  docusate sodium 100 milliGRAM(s) Oral two times a day  enoxaparin Injectable 40 milliGRAM(s) SubCutaneous every 24 hours  famotidine    Tablet 20 milliGRAM(s) Oral daily  ferrous    sulfate 325 milliGRAM(s) Oral daily  folic acid 1 milliGRAM(s) Oral daily  insulin lispro (HumaLOG) corrective regimen sliding scale   SubCutaneous at bedtime  insulin lispro (HumaLOG) corrective regimen sliding scale   SubCutaneous three times a day before meals  lisinopril 5 milliGRAM(s) Oral daily  mirtazapine 15 milliGRAM(s) Oral at bedtime  morphine ER Tablet 15 milliGRAM(s) Oral every 8 hours  nystatin    Suspension 969020 Unit(s) Oral four times a day  piperacillin/tazobactam IVPB. 3.375 Gram(s) IV Intermittent every 8 hours  senna 2 Tablet(s) Oral at bedtime  sodium chloride 1 Gram(s) Oral two times a day  sodium chloride 0.9%. 1000 milliLiter(s) (50 mL/Hr) IV Continuous <Continuous>      PHYSICAL EXAM:  T(C): 37.5 (05-20-18 @ 05:45), Max: 37.5 (05-20-18 @ 05:45)  HR: 74 (05-20-18 @ 05:45) (65 - 79)  BP: 153/48 (05-20-18 @ 05:45) (134/49 - 153/48)  RR: 18 (05-20-18 @ 05:45) (18 - 19)  SpO2: 97% (05-20-18 @ 05:45) (95% - 97%)  Wt(kg): --  I&O's Summary      JVP: Normal  Neck: supple  Lung: clear   CV: S1 S2 , Murmur:  Abd: soft  Ext: No edema  neuro: Awake / alert  Psych: flat affect  Skin: normal     LABS/DATA:    CARDIAC MARKERS:                                7.5    12.88 )-----------( 336      ( 20 May 2018 07:30 )             23.8     05-20    131<L>  |  98  |  6<L>  ----------------------------<  92  4.1   |  18<L>  |  0.53    Ca    7.8<L>      20 May 2018 06:05  Mg     2.2     05-19    TPro  6.2  /  Alb  2.3<L>  /  TBili  0.4  /  DBili  x   /  AST  21  /  ALT  9   /  AlkPhos  73  05-20    proBNP:   Lipid Profile:   HgA1c:   TSH:     TELE:  EKG:

## 2018-05-20 NOTE — PROVIDER CONTACT NOTE (OTHER) - SITUATION
Pt with low BP 90/50 manually  No complaints verbalized
Gasper Webb (Hematology) reported clotted CBC. Order cancelled. New order needed.
Patient scheduled for procedure in AM, Bowel prep to be done.
Temp of 102.1

## 2018-05-20 NOTE — PROGRESS NOTE ADULT - SUBJECTIVE AND OBJECTIVE BOX
Patient is a 78y old  Female who presents with a chief complaint of Abdominal pain (08 May 2018 22:37)      SUBJECTIVE / OVERNIGHT EVENTS:  still with pelvic pain helped with pain medication.  Surgery caled for abcess seen on MRI    MEDICATIONS  (STANDING):  atorvastatin 40 milliGRAM(s) Oral at bedtime  carvedilol 12.5 milliGRAM(s) Oral every 12 hours  dextrose 5%. 1000 milliLiter(s) (50 mL/Hr) IV Continuous <Continuous>  dextrose 50% Injectable 12.5 Gram(s) IV Push once  dextrose 50% Injectable 25 Gram(s) IV Push once  dextrose 50% Injectable 25 Gram(s) IV Push once  docusate sodium 100 milliGRAM(s) Oral two times a day  enoxaparin Injectable 40 milliGRAM(s) SubCutaneous every 24 hours  famotidine    Tablet 20 milliGRAM(s) Oral daily  ferrous    sulfate 325 milliGRAM(s) Oral daily  folic acid 1 milliGRAM(s) Oral daily  insulin lispro (HumaLOG) corrective regimen sliding scale   SubCutaneous at bedtime  insulin lispro (HumaLOG) corrective regimen sliding scale   SubCutaneous three times a day before meals  lisinopril 5 milliGRAM(s) Oral daily  mirtazapine 15 milliGRAM(s) Oral at bedtime  morphine ER Tablet 15 milliGRAM(s) Oral every 8 hours  nystatin    Suspension [id number] Unit(s) Oral four times a day  piperacillin/tazobactam IVPB. 3.375 Gram(s) IV Intermittent every 8 hours  senna 2 Tablet(s) Oral at bedtime  sodium chloride 1 Gram(s) Oral two times a day  sodium chloride 0.9%. 1000 milliLiter(s) (50 mL/Hr) IV Continuous <Continuous>    MEDICATIONS  (PRN):  acetaminophen   Tablet 650 milliGRAM(s) Oral every 6 hours PRN For Temp greater than 38 C (100.4 F)  acetaminophen   Tablet. 650 milliGRAM(s) Oral every 6 hours PRN Mild Pain (1 - 3)  dextrose 40% Gel 15 Gram(s) Oral once PRN Blood Glucose LESS THAN 70 milliGRAM(s)/deciliter  glucagon  Injectable 1 milliGRAM(s) IntraMuscular once PRN Glucose LESS THAN 70 milligrams/deciliter  morphine  IR 15 milliGRAM(s) Oral every 4 hours PRN moderate to severe pain  ondansetron Injectable 4 milliGRAM(s) IV Push every 6 hours PRN Nausea        CAPILLARY BLOOD GLUCOSE      POCT Blood Glucose.: 114 mg/dL (20 May 2018 08:40)  POCT Blood Glucose.: 268 mg/dL (19 May 2018 22:04)  POCT Blood Glucose.: 106 mg/dL (19 May 2018 17:47)  POCT Blood Glucose.: 122 mg/dL (19 May 2018 12:31)    I&O's Summary      PHYSICAL EXAM:  Vital Signs Last 24 Hrs  T(C): 37.5 (20 May 2018 05:45), Max: 37.5 (20 May 2018 05:45)  T(F): 99.5 (20 May 2018 05:45), Max: 99.5 (20 May 2018 05:45)  HR: 74 (20 May 2018 05:45) (65 - 79)  BP: 153/48 (20 May 2018 05:45) (134/49 - 153/48)  BP(mean): --  RR: 18 (20 May 2018 05:45) (18 - 19)  SpO2: 97% (20 May 2018 05:45) (95% - 97%)  GENERAL: NAD, well-developed  HEAD:  Atraumatic, Normocephalic  EYES: EOMI, PERRLA, conjunctiva and sclera clear  NECK: Supple, No JVD  CHEST/LUNG: Clear to auscultation bilaterally; No wheeze  HEART: Regular rate and rhythm; No murmurs, rubs, or gallops  ABDOMEN: Soft, Nontender, Nondistended; Bowel sounds present  EXTREMITIES:  2+ Peripheral Pulses, No clubbing, cyanosis, or edema  PSYCH: AAOx3  NEUROLOGY: non-focal  SKIN: No rashes or lesions    LABS:                        7.5    12.88 )-----------( 336      ( 20 May 2018 07:30 )             23.8     05-20    131<L>  |  98  |  6<L>  ----------------------------<  92  4.1   |  18<L>  |  0.53    Ca    7.8<L>      20 May 2018 06:05  Mg     2.2     05-19    TPro  6.2  /  Alb  2.3<L>  /  TBili  0.4  /  DBili  x   /  AST  21  /  ALT  9   /  AlkPhos  73  05-20        RADIOLOGY & ADDITIONAL TESTS:  < from: MR Pelvis w/ IV Cont (05.18.18 @ 16:34) >  IMPRESSION: Known rectal malignancy. Multiple new cystic lesions to the   left and posterior to therectum may represent abscesses, in the right   clinical setting. Neoplastic extension is also possible. Mild enhancement   at the base of the urinary bladder may be reactive, however, minimal   involvement with disease is not excluded. No focal bladder lesion.      < end of copied text >      Imaging Personally Reviewed:    Consultant(s) Notes Reviewed:      Care Discussed with Consultants/Other Providers:

## 2018-05-21 ENCOUNTER — TRANSCRIPTION ENCOUNTER (OUTPATIENT)
Age: 78
End: 2018-05-21

## 2018-05-21 LAB
ALBUMIN SERPL ELPH-MCNC: 2.5 G/DL — LOW (ref 3.3–5)
ALP SERPL-CCNC: 63 U/L — SIGNIFICANT CHANGE UP (ref 40–120)
ALT FLD-CCNC: 11 U/L — SIGNIFICANT CHANGE UP (ref 4–33)
AST SERPL-CCNC: 23 U/L — SIGNIFICANT CHANGE UP (ref 4–32)
BASOPHILS # BLD AUTO: 0.02 K/UL — SIGNIFICANT CHANGE UP (ref 0–0.2)
BASOPHILS NFR BLD AUTO: 0.2 % — SIGNIFICANT CHANGE UP (ref 0–2)
BILIRUB SERPL-MCNC: 0.3 MG/DL — SIGNIFICANT CHANGE UP (ref 0.2–1.2)
BUN SERPL-MCNC: 5 MG/DL — LOW (ref 7–23)
BUN SERPL-MCNC: 5 MG/DL — LOW (ref 7–23)
CALCIUM SERPL-MCNC: 7.7 MG/DL — LOW (ref 8.4–10.5)
CALCIUM SERPL-MCNC: 7.7 MG/DL — LOW (ref 8.4–10.5)
CHLORIDE SERPL-SCNC: 98 MMOL/L — SIGNIFICANT CHANGE UP (ref 98–107)
CHLORIDE SERPL-SCNC: 98 MMOL/L — SIGNIFICANT CHANGE UP (ref 98–107)
CO2 SERPL-SCNC: 21 MMOL/L — LOW (ref 22–31)
CO2 SERPL-SCNC: 21 MMOL/L — LOW (ref 22–31)
CREAT SERPL-MCNC: 0.54 MG/DL — SIGNIFICANT CHANGE UP (ref 0.5–1.3)
CREAT SERPL-MCNC: 0.54 MG/DL — SIGNIFICANT CHANGE UP (ref 0.5–1.3)
EOSINOPHIL # BLD AUTO: 0.28 K/UL — SIGNIFICANT CHANGE UP (ref 0–0.5)
EOSINOPHIL NFR BLD AUTO: 2.3 % — SIGNIFICANT CHANGE UP (ref 0–6)
GLUCOSE BLDC GLUCOMTR-MCNC: 117 MG/DL — HIGH (ref 70–99)
GLUCOSE BLDC GLUCOMTR-MCNC: 146 MG/DL — HIGH (ref 70–99)
GLUCOSE BLDC GLUCOMTR-MCNC: 161 MG/DL — HIGH (ref 70–99)
GLUCOSE BLDC GLUCOMTR-MCNC: 283 MG/DL — HIGH (ref 70–99)
GLUCOSE SERPL-MCNC: 101 MG/DL — HIGH (ref 70–99)
GLUCOSE SERPL-MCNC: 101 MG/DL — HIGH (ref 70–99)
HCT VFR BLD CALC: 23.8 % — LOW (ref 34.5–45)
HGB BLD-MCNC: 7.7 G/DL — LOW (ref 11.5–15.5)
IMM GRANULOCYTES # BLD AUTO: 0.08 # — SIGNIFICANT CHANGE UP
IMM GRANULOCYTES NFR BLD AUTO: 0.6 % — SIGNIFICANT CHANGE UP (ref 0–1.5)
LYMPHOCYTES # BLD AUTO: 1.87 K/UL — SIGNIFICANT CHANGE UP (ref 1–3.3)
LYMPHOCYTES # BLD AUTO: 15.1 % — SIGNIFICANT CHANGE UP (ref 13–44)
MCHC RBC-ENTMCNC: 28.2 PG — SIGNIFICANT CHANGE UP (ref 27–34)
MCHC RBC-ENTMCNC: 32.4 % — SIGNIFICANT CHANGE UP (ref 32–36)
MCV RBC AUTO: 87.2 FL — SIGNIFICANT CHANGE UP (ref 80–100)
MONOCYTES # BLD AUTO: 0.68 K/UL — SIGNIFICANT CHANGE UP (ref 0–0.9)
MONOCYTES NFR BLD AUTO: 5.5 % — SIGNIFICANT CHANGE UP (ref 2–14)
NEUTROPHILS # BLD AUTO: 9.48 K/UL — HIGH (ref 1.8–7.4)
NEUTROPHILS NFR BLD AUTO: 76.3 % — SIGNIFICANT CHANGE UP (ref 43–77)
NRBC # FLD: 0 — SIGNIFICANT CHANGE UP
PLATELET # BLD AUTO: 330 K/UL — SIGNIFICANT CHANGE UP (ref 150–400)
PMV BLD: 9.1 FL — SIGNIFICANT CHANGE UP (ref 7–13)
POTASSIUM SERPL-MCNC: 3.7 MMOL/L — SIGNIFICANT CHANGE UP (ref 3.5–5.3)
POTASSIUM SERPL-MCNC: 3.7 MMOL/L — SIGNIFICANT CHANGE UP (ref 3.5–5.3)
POTASSIUM SERPL-SCNC: 3.7 MMOL/L — SIGNIFICANT CHANGE UP (ref 3.5–5.3)
POTASSIUM SERPL-SCNC: 3.7 MMOL/L — SIGNIFICANT CHANGE UP (ref 3.5–5.3)
PROT SERPL-MCNC: 6.1 G/DL — SIGNIFICANT CHANGE UP (ref 6–8.3)
RBC # BLD: 2.73 M/UL — LOW (ref 3.8–5.2)
RBC # FLD: 15.2 % — HIGH (ref 10.3–14.5)
SODIUM SERPL-SCNC: 133 MMOL/L — LOW (ref 135–145)
SODIUM SERPL-SCNC: 133 MMOL/L — LOW (ref 135–145)
WBC # BLD: 12.41 K/UL — HIGH (ref 3.8–10.5)
WBC # FLD AUTO: 12.41 K/UL — HIGH (ref 3.8–10.5)

## 2018-05-21 PROCEDURE — 99233 SBSQ HOSP IP/OBS HIGH 50: CPT

## 2018-05-21 RX ADMIN — FAMOTIDINE 20 MILLIGRAM(S): 10 INJECTION INTRAVENOUS at 11:30

## 2018-05-21 RX ADMIN — LISINOPRIL 5 MILLIGRAM(S): 2.5 TABLET ORAL at 06:13

## 2018-05-21 RX ADMIN — PIPERACILLIN AND TAZOBACTAM 25 GRAM(S): 4; .5 INJECTION, POWDER, LYOPHILIZED, FOR SOLUTION INTRAVENOUS at 21:17

## 2018-05-21 RX ADMIN — MORPHINE SULFATE 15 MILLIGRAM(S): 50 CAPSULE, EXTENDED RELEASE ORAL at 06:13

## 2018-05-21 RX ADMIN — Medication 500000 UNIT(S): at 17:23

## 2018-05-21 RX ADMIN — Medication 100 MILLIGRAM(S): at 17:24

## 2018-05-21 RX ADMIN — SODIUM CHLORIDE 1 GRAM(S): 9 INJECTION INTRAMUSCULAR; INTRAVENOUS; SUBCUTANEOUS at 06:13

## 2018-05-21 RX ADMIN — PIPERACILLIN AND TAZOBACTAM 25 GRAM(S): 4; .5 INJECTION, POWDER, LYOPHILIZED, FOR SOLUTION INTRAVENOUS at 06:13

## 2018-05-21 RX ADMIN — CARVEDILOL PHOSPHATE 12.5 MILLIGRAM(S): 80 CAPSULE, EXTENDED RELEASE ORAL at 17:23

## 2018-05-21 RX ADMIN — MORPHINE SULFATE 15 MILLIGRAM(S): 50 CAPSULE, EXTENDED RELEASE ORAL at 14:05

## 2018-05-21 RX ADMIN — CARVEDILOL PHOSPHATE 12.5 MILLIGRAM(S): 80 CAPSULE, EXTENDED RELEASE ORAL at 06:13

## 2018-05-21 RX ADMIN — Medication 1 MILLIGRAM(S): at 11:30

## 2018-05-21 RX ADMIN — Medication 325 MILLIGRAM(S): at 11:30

## 2018-05-21 RX ADMIN — PIPERACILLIN AND TAZOBACTAM 25 GRAM(S): 4; .5 INJECTION, POWDER, LYOPHILIZED, FOR SOLUTION INTRAVENOUS at 14:05

## 2018-05-21 RX ADMIN — SENNA PLUS 2 TABLET(S): 8.6 TABLET ORAL at 21:18

## 2018-05-21 RX ADMIN — MORPHINE SULFATE 15 MILLIGRAM(S): 50 CAPSULE, EXTENDED RELEASE ORAL at 22:17

## 2018-05-21 RX ADMIN — MIRTAZAPINE 15 MILLIGRAM(S): 45 TABLET, ORALLY DISINTEGRATING ORAL at 21:19

## 2018-05-21 RX ADMIN — ENOXAPARIN SODIUM 40 MILLIGRAM(S): 100 INJECTION SUBCUTANEOUS at 06:13

## 2018-05-21 RX ADMIN — MORPHINE SULFATE 15 MILLIGRAM(S): 50 CAPSULE, EXTENDED RELEASE ORAL at 06:14

## 2018-05-21 RX ADMIN — Medication 1: at 13:08

## 2018-05-21 RX ADMIN — SODIUM CHLORIDE 1 GRAM(S): 9 INJECTION INTRAMUSCULAR; INTRAVENOUS; SUBCUTANEOUS at 17:23

## 2018-05-21 RX ADMIN — Medication 1: at 22:20

## 2018-05-21 RX ADMIN — MORPHINE SULFATE 15 MILLIGRAM(S): 50 CAPSULE, EXTENDED RELEASE ORAL at 21:17

## 2018-05-21 RX ADMIN — ATORVASTATIN CALCIUM 40 MILLIGRAM(S): 80 TABLET, FILM COATED ORAL at 21:19

## 2018-05-21 RX ADMIN — Medication 500000 UNIT(S): at 06:13

## 2018-05-21 NOTE — DISCHARGE NOTE ADULT - OTHER SIGNIFICANT FINDINGS
Bxp- Positive Rectal Cancer.  Needed  5 days RT- plan to end about 5/23- but was stopped secondary to Rectal Abscess after  3/5  MRI pelvis Multiple new cystic lesions to the   left and posterior to the rectum may represent abscesses, in the right   clinical setting. Neoplastic extension is also possible. Mild enhancement   at the base of the urinary bladder may be reactive, however, minimal   involvement with disease is not excluded. No focal bladder lesion.  Picc line today 5/23 then NH with picc line to complete 14 days of Iv zosyn.

## 2018-05-21 NOTE — DISCHARGE NOTE ADULT - MEDICATION SUMMARY - MEDICATIONS TO STOP TAKING
I will STOP taking the medications listed below when I get home from the hospital:    senna oral tablet  -- 2 tab(s) by mouth once a day (at bedtime)    aspirin 81 mg oral tablet, chewable  -- 1 tab(s) by mouth once a day    docusate sodium 100 mg oral capsule  -- 1 cap(s) by mouth 2 times a day    polyethylene glycol 3350 oral powder for reconstitution  -- 17 gram(s) by mouth once a day    oxyCODONE-acetaminophen 5 mg-325 mg oral tablet  -- 1 tab(s) by mouth every 4 hours -Moderate Pain (4 - 6) MDD:6

## 2018-05-21 NOTE — DISCHARGE NOTE ADULT - REASON FOR ADMISSION
Rectal cancer requiring RT and Surgery.  Rectal Abbess in area of Cancer  Failure to thrivr  Protein calorie malnutrition

## 2018-05-21 NOTE — PROGRESS NOTE ADULT - SUBJECTIVE AND OBJECTIVE BOX
Patient is a 78y old  Female who presents with a chief complaint of Abdominal pain (08 May 2018 22:37)      SUBJECTIVE / OVERNIGHT EVENTS:  Patient seen with daughter Louann at bedside.  Explain Rt on hold sec to Abbess on Mri in rectal mass area.  Pain is well controlled        MEDICATIONS  (STANDING):  atorvastatin 40 milliGRAM(s) Oral at bedtime  carvedilol 12.5 milliGRAM(s) Oral every 12 hours  dextrose 5%. 1000 milliLiter(s) (50 mL/Hr) IV Continuous <Continuous>  dextrose 50% Injectable 12.5 Gram(s) IV Push once  dextrose 50% Injectable 25 Gram(s) IV Push once  dextrose 50% Injectable 25 Gram(s) IV Push once  docusate sodium 100 milliGRAM(s) Oral two times a day  enoxaparin Injectable 40 milliGRAM(s) SubCutaneous every 24 hours  famotidine    Tablet 20 milliGRAM(s) Oral daily  ferrous    sulfate 325 milliGRAM(s) Oral daily  folic acid 1 milliGRAM(s) Oral daily  insulin lispro (HumaLOG) corrective regimen sliding scale   SubCutaneous at bedtime  insulin lispro (HumaLOG) corrective regimen sliding scale   SubCutaneous three times a day before meals  lisinopril 5 milliGRAM(s) Oral daily  mirtazapine 15 milliGRAM(s) Oral at bedtime  morphine ER Tablet 15 milliGRAM(s) Oral every 8 hours  nystatin    Suspension [id number] Unit(s) Oral four times a day  piperacillin/tazobactam IVPB. 3.375 Gram(s) IV Intermittent every 8 hours  senna 2 Tablet(s) Oral at bedtime  sodium chloride 1 Gram(s) Oral two times a day  sodium chloride 0.9%. 1000 milliLiter(s) (50 mL/Hr) IV Continuous <Continuous>    MEDICATIONS  (PRN):  acetaminophen   Tablet 650 milliGRAM(s) Oral every 6 hours PRN For Temp greater than 38 C (100.4 F)  acetaminophen   Tablet. 650 milliGRAM(s) Oral every 6 hours PRN Mild Pain (1 - 3)  dextrose 40% Gel 15 Gram(s) Oral once PRN Blood Glucose LESS THAN 70 milliGRAM(s)/deciliter  glucagon  Injectable 1 milliGRAM(s) IntraMuscular once PRN Glucose LESS THAN 70 milligrams/deciliter  morphine  IR 15 milliGRAM(s) Oral every 4 hours PRN moderate to severe pain  ondansetron Injectable 4 milliGRAM(s) IV Push every 6 hours PRN Nausea        CAPILLARY BLOOD GLUCOSE      POCT Blood Glucose.: 146 mg/dL (21 May 2018 08:25)  POCT Blood Glucose.: 246 mg/dL (20 May 2018 22:07)  POCT Blood Glucose.: 142 mg/dL (20 May 2018 17:30)  POCT Blood Glucose.: 107 mg/dL (20 May 2018 12:31)    I&O's Summary      PHYSICAL EXAM:  Vital Signs Last 24 Hrs  T(C): 37.2 (21 May 2018 06:08), Max: 37.8 (20 May 2018 21:20)  T(F): 98.9 (21 May 2018 06:08), Max: 100 (20 May 2018 21:20)  HR: 81 (21 May 2018 06:08) (65 - 81)  BP: 149/64 (21 May 2018 06:08) (116/45 - 149/64)  BP(mean): --  RR: 18 (21 May 2018 06:08) (17 - 18)  SpO2: 100% (21 May 2018 06:08) (96% - 100%)  GENERAL: NAD, well-developed  HEAD:  Atraumatic, Normocephalic  EYES: EOMI, PERRLA, conjunctiva and sclera clear  NECK: Supple, No JVD  CHEST/LUNG: Clear to auscultation bilaterally; No wheeze  HEART: Regular rate and rhythm; No murmurs, rubs, or gallops  ABDOMEN: Soft, Nontender, Nondistended; Bowel sounds present  EXTREMITIES:  2+ Peripheral Pulses, No clubbing, cyanosis, or edema  R leg BKA  PSYCH: AAOx3  NEUROLOGY: non-focal  SKIN: No rashes or lesions    LABS:                        7.7    12.41 )-----------( 330      ( 21 May 2018 06:11 )             23.8     05-21    133<L>  |  98  |  5<L>  ----------------------------<  101<H>  3.7   |  21<L>  |  0.54    Ca    7.7<L>      21 May 2018 06:11    TPro  6.1  /  Alb  2.5<L>  /  TBili  0.3  /  DBili  x   /  AST  23  /  ALT  11  /  AlkPhos  63  05-21              RADIOLOGY & ADDITIONAL TESTS:    Imaging Personally Reviewed:    Consultant(s) Notes Reviewed:      Care Discussed with Consultants/Other Providers:

## 2018-05-21 NOTE — PROGRESS NOTE ADULT - SUBJECTIVE AND OBJECTIVE BOX
[facility] – [place]:[id number],   78yFemale followed for:  No Known Allergies    PAST MEDICAL & SURGICAL HISTORY:  Rectal mass  Gangrene of foot  Coronary artery disease involving native coronary artery of native heart without angina pectoris  Status post above knee amputation of right lower extremity    FAMILY HISTORY:  No pertinent family history in first degree relatives    MEDICATIONS  (STANDING):  atorvastatin 40 milliGRAM(s) Oral at bedtime  carvedilol 12.5 milliGRAM(s) Oral every 12 hours  dextrose 5%. 1000 milliLiter(s) (50 mL/Hr) IV Continuous <Continuous>  dextrose 50% Injectable 12.5 Gram(s) IV Push once  dextrose 50% Injectable 25 Gram(s) IV Push once  dextrose 50% Injectable 25 Gram(s) IV Push once  docusate sodium 100 milliGRAM(s) Oral two times a day  enoxaparin Injectable 40 milliGRAM(s) SubCutaneous every 24 hours  famotidine    Tablet 20 milliGRAM(s) Oral daily  ferrous    sulfate 325 milliGRAM(s) Oral daily  folic acid 1 milliGRAM(s) Oral daily  insulin lispro (HumaLOG) corrective regimen sliding scale   SubCutaneous at bedtime  insulin lispro (HumaLOG) corrective regimen sliding scale   SubCutaneous three times a day before meals  lisinopril 5 milliGRAM(s) Oral daily  mirtazapine 15 milliGRAM(s) Oral at bedtime  morphine ER Tablet 15 milliGRAM(s) Oral every 8 hours  nystatin    Suspension [id number] Unit(s) Oral four times a day  piperacillin/tazobactam IVPB. 3.375 Gram(s) IV Intermittent every 8 hours  senna 2 Tablet(s) Oral at bedtime  sodium chloride 1 Gram(s) Oral two times a day  sodium chloride 0.9%. 1000 milliLiter(s) (50 mL/Hr) IV Continuous <Continuous>    MEDICATIONS  (PRN):  acetaminophen   Tablet 650 milliGRAM(s) Oral every 6 hours PRN For Temp greater than 38 C (100.4 F)  acetaminophen   Tablet. 650 milliGRAM(s) Oral every 6 hours PRN Mild Pain (1 - 3)  dextrose 40% Gel 15 Gram(s) Oral once PRN Blood Glucose LESS THAN 70 milliGRAM(s)/deciliter  glucagon  Injectable 1 milliGRAM(s) IntraMuscular once PRN Glucose LESS THAN 70 milligrams/deciliter  morphine  IR 15 milliGRAM(s) Oral every 4 hours PRN moderate to severe pain  ondansetron Injectable 4 milliGRAM(s) IV Push every 6 hours PRN Nausea      Vital Signs Last 24 Hrs  T(C): 37.2 (21 May 2018 06:08), Max: 37.8 (20 May 2018 21:20)  T(F): 98.9 (21 May 2018 06:08), Max: 100 (20 May 2018 21:20)  HR: 81 (21 May 2018 06:08) (65 - 81)  BP: 149/64 (21 May 2018 06:08) (116/45 - 149/64)  BP(mean): --  RR: 18 (21 May 2018 06:08) (17 - 18)  SpO2: 100% (21 May 2018 06:08) (96% - 100%)  nc/at  s1s2  cta  soft, nt, nd no guarding or rebound  no c/c/e    CBC Full  -  ( 21 May 2018 06:11 )  WBC Count : 12.41 K/uL  Hemoglobin : 7.7 g/dL  Hematocrit : 23.8 %  Platelet Count - Automated : 330 K/uL  Mean Cell Volume : 87.2 fL  Mean Cell Hemoglobin : 28.2 pg  Mean Cell Hemoglobin Concentration : 32.4 %  Auto Neutrophil # : 9.48 K/uL  Auto Lymphocyte # : 1.87 K/uL  Auto Monocyte # : 0.68 K/uL  Auto Eosinophil # : 0.28 K/uL  Auto Basophil # : 0.02 K/uL  Auto Neutrophil % : 76.3 %  Auto Lymphocyte % : 15.1 %  Auto Monocyte % : 5.5 %  Auto Eosinophil % : 2.3 %  Auto Basophil % : 0.2 %    05-21    133<L>  |  98  |  5<L>  ----------------------------<  101<H>  3.7   |  21<L>  |  0.54    Ca    7.7<L>      21 May 2018 06:11    TPro  6.1  /  Alb  2.5<L>  /  TBili  0.3  /  DBili  x   /  AST  23  /  ALT  11  /  AlkPhos  63  05-21

## 2018-05-21 NOTE — DISCHARGE NOTE ADULT - CARE PLAN
Principal Discharge DX:	Failure to thrive in adult  Goal:	Eat small meals frequently throughout the day  Assessment and plan of treatment:	You have been started on Remeron to increase appetite.  Continue with regular diet and Ensure supplements for additional calories and protein.  Secondary Diagnosis:	Rectal mass  Assessment and plan of treatment:	You were diagnosed with Adenocarcinoma and will need to follow up with radiology-oncology within 2 weeks of discharge for further medical management. Please make an appointment with Dr. Okeefe, Oncology within 2 weeks of discharge.  Secondary Diagnosis:	Hyponatremia  Assessment and plan of treatment:	Continue Salt tablets as prescribed.  Monitor sodium level at rehab center within 2-3 days.  Goal:	Rectal Abscess-Sepsis  Assessment and plan of treatment:	Continue Zosyn IV antibiotic for a total of 14 days for rectal abscess.  Completion date June 5.    You will need imaging after completion of antibiotics to ensure resolution of abscess.

## 2018-05-21 NOTE — DISCHARGE NOTE ADULT - MEDICATION SUMMARY - MEDICATIONS TO TAKE
I will START or STAY ON the medications listed below when I get home from the hospital:    acetaminophen 325 mg oral tablet  -- 2 tab(s) by mouth every 6 hours, As needed, Mild Pain (1 - 3)  -- Indication: For Pain    morphine 15 mg oral tablet  -- 1 tab(s) by mouth every 4 hours, As needed, moderate to severe pain  -- Indication: For Pain    morphine 15 mg/12 hr oral tablet, extended release  -- 1 tab(s) by mouth every 8 hours  -- Indication: For Pain    lisinopril 5 mg oral tablet  -- 1 tab(s) by mouth once a day  -- Indication: For Hypertension    mirtazapine 15 mg oral tablet  -- 1 tab(s) by mouth once a day (at bedtime)  -- Indication: For Failure to thrive in adult    metFORMIN 1000 mg oral tablet  -- 1 tab(s) by mouth once a day  -- Indication: For DM    nystatin 100,000 units/mL oral suspension  -- 5 milliliter(s) by mouth 4 times a day  -- Indication: For Prophylactic measure    atorvastatin 40 mg oral tablet  -- 1 tab(s) by mouth once a day (at bedtime)  -- Indication: For Hyperlipidemia, unspecified hyperlipidemia type    carvedilol 12.5 mg oral tablet  -- 1 tab(s) by mouth every 12 hours  -- Indication: For Hypertension    famotidine 20 mg oral tablet  -- 1 tab(s) by mouth once a day  -- Indication: For Prophylactic measure    ferrous sulfate 325 mg (65 mg elemental iron) oral tablet  -- 1 tab(s) by mouth once a day  -- Indication: For Prophylactic measure    sodium chloride 1 g oral tablet  -- 1 tab(s) by mouth 2 times a day  -- Indication: For Hyponatremia    piperacillin-tazobactam 2 g-0.25 g intravenous injection  -- 3.375 gram(s) intravenous every 8 hours  -- Indication: For Abscess    folic acid 1 mg oral tablet  -- 1 tab(s) by mouth once a day  -- Indication: For Prophylactic measure

## 2018-05-21 NOTE — PROGRESS NOTE ADULT - ASSESSMENT
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 Now undergoing radiation therapy. New fever to 102.1F on 5/17 evening so started on Zosyn. Now afebrile but with MRI Pelvis revealing new cystic lesions left and posterior to the rectum which may represent abscesses.  No other foci apparent  Cultures negative  CRS input noted-no intervention  Rt held  Would continue zosyn for now  If stable about 2 week course with final duration depending on clinical and radiological resolution  Case d/w Dr jasmine  Will Follow.  Beeper [id number]/afterhours/No response-[id number]

## 2018-05-21 NOTE — DISCHARGE NOTE ADULT - PLAN OF CARE
Eat small meals frequently throughout the day You have been started on Remeron to increase appetite.  Continue with regular diet and Ensure supplements for additional calories and protein. You were diagnosed with Adenocarcinoma and will need to follow up with radiology-oncology within 2 weeks of discharge for further medical management. Please make an appointment with Dr. Okeefe, Oncology within 2 weeks of discharge. Continue Salt tablets as prescribed.  Monitor sodium level at rehab center within 2-3 days. Rectal Abscess-Sepsis Continue Zosyn IV antibiotic for a total of 14 days for rectal abscess.  Completion date June 5.    You will need imaging after completion of antibiotics to ensure resolution of abscess.

## 2018-05-21 NOTE — PROGRESS NOTE ADULT - ASSESSMENT
PreOp  Based on current ACC/AHA guidelines, patient history and physical exam, the patient is considered to have elevated risk  stress test unremarkable   may proceed to OR if indicated     CAD  stress test unremarkable   cont statin    HTN  stable    DM  Monitor finger stick. Insulin coverage. Diabetic education and Diabetic diet. Consider nutrition consultation.     rectal ca with perirectal abscess  on anbx  fu with ID/sugery  pain control

## 2018-05-21 NOTE — DISCHARGE NOTE ADULT - CARE PROVIDERS DIRECT ADDRESSES
[email or web address]

## 2018-05-21 NOTE — PROGRESS NOTE ADULT - SUBJECTIVE AND OBJECTIVE BOX
Patient is a 78y old  Female who presents with a chief complaint of Abdominal pain (08 May 2018 22:37)    Being followed by ID for clint-rectal abscess    Interval history:comfortable  denies pain   No other acute events      ROS:  Noobserved cough or diarrhea     Antimicrobials:    nystatin    Suspension 958490 Unit(s) Oral four times a day  piperacillin/tazobactam IVPB. 3.375 Gram(s) IV Intermittent every 8 hours      other medications reviewed    Vital Signs Last 24 Hrs  T(C): 37.2 (05-21-18 @ 06:08), Max: 37.8 (05-20-18 @ 21:20)  T(F): 98.9 (05-21-18 @ 06:08), Max: 100 (05-20-18 @ 21:20)  HR: 81 (05-21-18 @ 06:08) (65 - 81)  BP: 149/64 (05-21-18 @ 06:08) (116/45 - 149/64)  BP(mean): --  RR: 18 (05-21-18 @ 06:08) (17 - 18)  SpO2: 100% (05-21-18 @ 06:08) (96% - 100%)    Physical Exam:    Constitutional well preserved,comfortable,pleasant    HEENT PERRLA EOMI,No pallor or icterus    No oral exudate or erythema    Neck supple no JVD or LN    Chest Good AE,CTA    CVS RRR S1 S2 WNl No murmur or rub or gallop    Abd soft BS normal No tenderness no masses    Ext No cyanosis clubbing or edema    IV site no erythema tenderness or discharge    Joints no swelling or LOM    CNS AAO X 3 no focal    Lab Data:                          7.7    12.41 )-----------( 330      ( 21 May 2018 06:11 )             23.8   WBC Count: 12.41 (05-21-18 @ 06:11)  WBC Count: 12.88 (05-20-18 @ 07:30)  WBC Count: 13.59 (05-19-18 @ 06:16)  WBC Count: 13.07 (05-18-18 @ 06:35)  WBC Count: 13.60 (05-17-18 @ 09:40)  WBC Count: 12.87 (05-16-18 @ 05:30)  WBC Count: 11.52 (05-15-18 @ 06:10)      05-21    133<L>  |  98  |  5<L>  ----------------------------<  101<H>  3.7   |  21<L>  |  0.54    Ca    7.7<L>      21 May 2018 06:11    TPro  6.1  /  Alb  2.5<L>  /  TBili  0.3  /  DBili  x   /  AST  23  /  ALT  11  /  AlkPhos  63  05-21        Culture - Blood (collected 17 May 2018 19:58)  Source: BLOOD  Preliminary Report (20 May 2018 19:58):    NO ORGANISMS ISOLATED    NO ORGANISMS ISOLATED AT 72 HRS.    Culture - Blood (collected 17 May 2018 19:58)  Source: BLOOD PERIPHERAL  Preliminary Report (20 May 2018 19:58):    NO ORGANISMS ISOLATED    NO ORGANISMS ISOLATED AT 72 HRS.          < from: Xray Chest 1 View- PORTABLE-Urgent (05.18.18 @ 19:32) >  IMPRESSION:  Follow-up small left effusion.    < end of copied text >      < from: MR Pelvis w/ IV Cont (05.18.18 @ 16:34) >    IMPRESSION: Known rectal malignancy. Multiple new cystic lesions to the   left and posterior to therectum may represent abscesses, in the right   clinical setting. Neoplastic extension is also possible. Mild enhancement   at the base of the urinary bladder may be reactive, however, minimal   involvement with disease is not excluded. No focal bladder lesion.          < end of copied text >

## 2018-05-21 NOTE — CHART NOTE - NSCHARTNOTEFT_GEN_A_CORE
Patient Age:  78    Patient Gender: Female    Procedure (including site/side if known): PICC    Diagnosis/Indication: rectal mass w/ ? abscess vs lytic lesion    Interventional Radiology Attending Physician: N/A    Ordering Attending Physician: Dr. Honeycutt    Pertinent medical history:  79 y/o female hx PAD, rectal mass w/ ? abscess vs lytic lesion awaiting PICC for continuous Abx.       Pertinent Labs: WBC 12.41, Hgb 7.7, Hgb 23.8, plt 330                         Na 133, K 3.7, Cr 0.54, Bun5      Patient and Family aware? Yes- Will need daughter to translate      Attending/Resident/NP/PA-  NP Rochelle    Contact:  [phone]                             Date:   5/21/18                                 time:15:52 Patient Age:  78    Patient Gender: Female    Procedure (including site/side if known): PICC    Diagnosis/Indication: rectal mass w/ ? abscess vs lytic lesion    Interventional Radiology Attending Physician: N/A    Ordering Attending Physician: Dr. Honeycutt    Pertinent medical history:  77 y/o female hx PAD, rectal mass w/ ? abscess vs lytic lesion awaiting PICC for continuous Abx.       Pertinent Labs: WBC 12.41, Hgb 7.7, Hgb 23.8, plt 330                         Na 133, K 3.7, Cr 0.54, Bun5      Patient and Family aware? Yes- Will need daughter to translate- [facility]       Attending/Resident/NP/PA-  NP Rochelle    Contact:  [phone]                             Date:   5/21/18                                 time:15:52

## 2018-05-21 NOTE — DISCHARGE NOTE ADULT - PATIENT PORTAL LINK FT
You can access the [facility] Patient Portal, offered by [facility], by registering with the following website: [email or web address]

## 2018-05-21 NOTE — PROGRESS NOTE ADULT - PROBLEM SELECTOR PLAN 9
Now on Zosyn  f/u cultures  f/u cxr Now on Zosyn  Resolved sepsis sec to rectal abcess- need 2 weeks of iv antibiotics.

## 2018-05-21 NOTE — DISCHARGE NOTE ADULT - CARE PROVIDER_API CALL
Charmaine Okeefe (), HematologyOncology; Internal Medicine  [address] [place] [address]  Phone: [phone]  Fax: [phone]    Ryan Sosa), Surgery  [address] [place] [address]  Phone: [phone]  Fax: [phone]

## 2018-05-21 NOTE — PROGRESS NOTE ADULT - SUBJECTIVE AND OBJECTIVE BOX
Subjective: Patient seen and examined. No new events except as noted.     SUBJECTIVE/ROS:  has mild nausea  family at bedside  No chest pain, dyspnea, palpitation, or dizziness.       MEDICATIONS:  MEDICATIONS  (STANDING):  atorvastatin 40 milliGRAM(s) Oral at bedtime  carvedilol 12.5 milliGRAM(s) Oral every 12 hours  dextrose 5%. 1000 milliLiter(s) (50 mL/Hr) IV Continuous <Continuous>  dextrose 50% Injectable 12.5 Gram(s) IV Push once  dextrose 50% Injectable 25 Gram(s) IV Push once  dextrose 50% Injectable 25 Gram(s) IV Push once  docusate sodium 100 milliGRAM(s) Oral two times a day  enoxaparin Injectable 40 milliGRAM(s) SubCutaneous every 24 hours  famotidine    Tablet 20 milliGRAM(s) Oral daily  ferrous    sulfate 325 milliGRAM(s) Oral daily  folic acid 1 milliGRAM(s) Oral daily  insulin lispro (HumaLOG) corrective regimen sliding scale   SubCutaneous at bedtime  insulin lispro (HumaLOG) corrective regimen sliding scale   SubCutaneous three times a day before meals  lisinopril 5 milliGRAM(s) Oral daily  mirtazapine 15 milliGRAM(s) Oral at bedtime  morphine ER Tablet 15 milliGRAM(s) Oral every 8 hours  nystatin    Suspension 902390 Unit(s) Oral four times a day  piperacillin/tazobactam IVPB. 3.375 Gram(s) IV Intermittent every 8 hours  senna 2 Tablet(s) Oral at bedtime  sodium chloride 1 Gram(s) Oral two times a day  sodium chloride 0.9%. 1000 milliLiter(s) (50 mL/Hr) IV Continuous <Continuous>      PHYSICAL EXAM:  T(C): 37.2 (05-21-18 @ 06:08), Max: 37.8 (05-20-18 @ 21:20)  HR: 81 (05-21-18 @ 06:08) (65 - 81)  BP: 149/64 (05-21-18 @ 06:08) (116/45 - 149/64)  RR: 18 (05-21-18 @ 06:08) (17 - 18)  SpO2: 100% (05-21-18 @ 06:08) (96% - 100%)  Wt(kg): --  I&O's Summary      JVP: Normal  Neck: supple  Lung: clear   CV: S1 S2 , Murmur:  Abd: soft  Ext: No edema  neuro: Awake / alert  Psych: flat affect  Skin: normal       LABS/DATA:    CARDIAC MARKERS:                                7.7    12.41 )-----------( 330      ( 21 May 2018 06:11 )             23.8     05-21    133<L>  |  98  |  5<L>  ----------------------------<  101<H>  3.7   |  21<L>  |  0.54    Ca    7.7<L>      21 May 2018 06:11    TPro  6.1  /  Alb  2.5<L>  /  TBili  0.3  /  DBili  x   /  AST  23  /  ALT  11  /  AlkPhos  63  05-21    proBNP:   Lipid Profile:   HgA1c:   TSH:     TELE:  EKG:

## 2018-05-21 NOTE — DISCHARGE NOTE ADULT - HOSPITAL COURSE
78  y.o. woman with history of PAD and rectal mass who was sent to the ER for evaluation of abdominal pain and rectal mass by her oncologist. Patient reports severe suprapubic pain, radiates to the rectum, aggravated with movement and palpation, alleviated with pain medications. Patient also reports nausea. As per daughter's daughter reports that the patient does not get out of bed. No other complaints at present.   Hospital Course:  Patient was seen by Oncology and recommended No Chemotherapy .  Rt- Oncology recommended 5 frac of RT.  She was seen by surgery Dr Sosa with rec to complete rT and out patient f/u after for Surgery.  Patient was started on RT on ... and was to complete RT on 5/23/18 but was noted to have fever on.... and started iv Zosyn .  Blood cultures were negative, urine- clear, Cxr clear. ID was called and rec Imaging of A/p.   Mri of A/P showed-  < from: MR Pelvis w/ IV Cont (05.18.18 @ 16:34) >    IMPRESSION: Known rectal malignancy. Multiple new cystic lesions to the   left and posterior to therectum may represent abscesses, in the right   clinical setting. Neoplastic extension is also possible. Mild enhancement   at the base of the urinary bladder may be reactive, however, minimal   involvement with disease is not excluded. No focal bladder lesion.    Surgery rec to hold RT. Surgery planned for No CP /sob intervention until Abscess cleared.  RT was stopped and Id recommended 14 days of Iv abx.  Picc line was placed on.............. and to continue with abx at NH via Picc then re-imaging eval with MRI to asses resolution of abbess before getting RT or Surgery. 78  y.o. woman with history of PAD and rectal mass who was sent to the ER for evaluation of abdominal pain and rectal mass by her oncologist. Patient reports severe suprapubic pain, radiates to the rectum, aggravated with movement and palpation, alleviated with pain medications. Patient also reports nausea. As per daughter's daughter reports that the patient does not get out of bed. No other complaints at present.   Hospital Course:  Patient was seen by Oncology and recommended No Chemotherapy .  Rt- Oncology recommended 5 frac of RT.  She was seen by surgery Dr Sosa with rec to complete rT and out patient f/u after for Surgery.  Patient was started on RT on ... and was to complete RT on 5/23/18 but was noted to have fever on.... and started iv Zosyn .  Blood cultures were negative, urine- clear, Cxr clear. ID was called and rec Imaging of A/p.   Mri of A/P showed-  < from: MR Pelvis w/ IV Cont (05.18.18 @ 16:34) >    IMPRESSION: Known rectal malignancy. Multiple new cystic lesions to the   left and posterior to therectum may represent abscesses, in the right   clinical setting. Neoplastic extension is also possible. Mild enhancement   at the base of the urinary bladder may be reactive, however, minimal   involvement with disease is not excluded. No focal bladder lesion.    Surgery rec to hold RT. Surgery planned for No  intervention until Abscess cleared.  Diagnosed with protein calorie malnutrition.  RT was stopped and Id recommended 14 days of Iv abx until .....  Picc line was placed on 5/23/18 and to continue with abx at NH via Picc then re-imaging eval with MRI to asses resolution of abbess before getting RT or Surgery.  Out patient f/u with Surgery Dr Sosa and ID and RT and Oncology Dr Okeefe.

## 2018-05-21 NOTE — PROGRESS NOTE ADULT - ATTENDING COMMENTS
Rectal abcess.  RT on Hold Rectal abbess.  RT on Hold    5/21/18  Addendum.  Called daughter Louann [phone]  She understands No  surgery or RT with this abscess- patient is to complete 14 days of ABX via Picc line.  Agree to Picc line and will f/u with ID and Dr Okeefe and surgery for Repeat Mri after antibiotics is completed. Rectal abbess.  RT on Hold    5/21/18  Addendum.- 2 nd look  Spoke with daughter Louann [phone]  She understands No  surgery or RT with this abscess- patient is to complete 14 days of ABX via Picc line.  Agree to Picc line and will f/u with ID and Dr Okeefe and surgery for Repeat Mri after antibiotics is completed.  Picc line ord.

## 2018-05-22 LAB
ALBUMIN SERPL ELPH-MCNC: 2.4 G/DL — LOW (ref 3.3–5)
ALP SERPL-CCNC: 63 U/L — SIGNIFICANT CHANGE UP (ref 40–120)
ALT FLD-CCNC: 25 U/L — SIGNIFICANT CHANGE UP (ref 4–33)
APTT BLD: 32 SEC — SIGNIFICANT CHANGE UP (ref 27.5–37.4)
AST SERPL-CCNC: 43 U/L — HIGH (ref 4–32)
BACTERIA BLD CULT: SIGNIFICANT CHANGE UP
BACTERIA BLD CULT: SIGNIFICANT CHANGE UP
BASOPHILS # BLD AUTO: 0.03 K/UL — SIGNIFICANT CHANGE UP (ref 0–0.2)
BASOPHILS NFR BLD AUTO: 0.3 % — SIGNIFICANT CHANGE UP (ref 0–2)
BILIRUB SERPL-MCNC: 0.3 MG/DL — SIGNIFICANT CHANGE UP (ref 0.2–1.2)
BUN SERPL-MCNC: 4 MG/DL — LOW (ref 7–23)
BUN SERPL-MCNC: 4 MG/DL — LOW (ref 7–23)
CALCIUM SERPL-MCNC: 8.1 MG/DL — LOW (ref 8.4–10.5)
CALCIUM SERPL-MCNC: 8.1 MG/DL — LOW (ref 8.4–10.5)
CHLORIDE SERPL-SCNC: 96 MMOL/L — LOW (ref 98–107)
CHLORIDE SERPL-SCNC: 96 MMOL/L — LOW (ref 98–107)
CO2 SERPL-SCNC: 22 MMOL/L — SIGNIFICANT CHANGE UP (ref 22–31)
CO2 SERPL-SCNC: 22 MMOL/L — SIGNIFICANT CHANGE UP (ref 22–31)
CREAT SERPL-MCNC: 0.5 MG/DL — SIGNIFICANT CHANGE UP (ref 0.5–1.3)
CREAT SERPL-MCNC: 0.5 MG/DL — SIGNIFICANT CHANGE UP (ref 0.5–1.3)
EOSINOPHIL # BLD AUTO: 0.24 K/UL — SIGNIFICANT CHANGE UP (ref 0–0.5)
EOSINOPHIL NFR BLD AUTO: 2.3 % — SIGNIFICANT CHANGE UP (ref 0–6)
GLUCOSE BLDC GLUCOMTR-MCNC: 114 MG/DL — HIGH (ref 70–99)
GLUCOSE BLDC GLUCOMTR-MCNC: 118 MG/DL — HIGH (ref 70–99)
GLUCOSE BLDC GLUCOMTR-MCNC: 140 MG/DL — HIGH (ref 70–99)
GLUCOSE BLDC GLUCOMTR-MCNC: 293 MG/DL — HIGH (ref 70–99)
GLUCOSE SERPL-MCNC: 92 MG/DL — SIGNIFICANT CHANGE UP (ref 70–99)
GLUCOSE SERPL-MCNC: 92 MG/DL — SIGNIFICANT CHANGE UP (ref 70–99)
HCT VFR BLD CALC: 24.4 % — LOW (ref 34.5–45)
HGB BLD-MCNC: 7.9 G/DL — LOW (ref 11.5–15.5)
IMM GRANULOCYTES # BLD AUTO: 0.09 # — SIGNIFICANT CHANGE UP
IMM GRANULOCYTES NFR BLD AUTO: 0.9 % — SIGNIFICANT CHANGE UP (ref 0–1.5)
INR BLD: 1.26 — HIGH (ref 0.88–1.17)
LYMPHOCYTES # BLD AUTO: 1.53 K/UL — SIGNIFICANT CHANGE UP (ref 1–3.3)
LYMPHOCYTES # BLD AUTO: 14.7 % — SIGNIFICANT CHANGE UP (ref 13–44)
MCHC RBC-ENTMCNC: 28.1 PG — SIGNIFICANT CHANGE UP (ref 27–34)
MCHC RBC-ENTMCNC: 32.4 % — SIGNIFICANT CHANGE UP (ref 32–36)
MCV RBC AUTO: 86.8 FL — SIGNIFICANT CHANGE UP (ref 80–100)
MONOCYTES # BLD AUTO: 0.63 K/UL — SIGNIFICANT CHANGE UP (ref 0–0.9)
MONOCYTES NFR BLD AUTO: 6.1 % — SIGNIFICANT CHANGE UP (ref 2–14)
NEUTROPHILS # BLD AUTO: 7.86 K/UL — HIGH (ref 1.8–7.4)
NEUTROPHILS NFR BLD AUTO: 75.7 % — SIGNIFICANT CHANGE UP (ref 43–77)
NRBC # FLD: 0 — SIGNIFICANT CHANGE UP
PLATELET # BLD AUTO: 327 K/UL — SIGNIFICANT CHANGE UP (ref 150–400)
PMV BLD: 9 FL — SIGNIFICANT CHANGE UP (ref 7–13)
POTASSIUM SERPL-MCNC: 3.6 MMOL/L — SIGNIFICANT CHANGE UP (ref 3.5–5.3)
POTASSIUM SERPL-MCNC: 3.6 MMOL/L — SIGNIFICANT CHANGE UP (ref 3.5–5.3)
POTASSIUM SERPL-SCNC: 3.6 MMOL/L — SIGNIFICANT CHANGE UP (ref 3.5–5.3)
POTASSIUM SERPL-SCNC: 3.6 MMOL/L — SIGNIFICANT CHANGE UP (ref 3.5–5.3)
PROT SERPL-MCNC: 6.1 G/DL — SIGNIFICANT CHANGE UP (ref 6–8.3)
PROTHROM AB SERPL-ACNC: 14.5 SEC — HIGH (ref 9.8–13.1)
RBC # BLD: 2.81 M/UL — LOW (ref 3.8–5.2)
RBC # FLD: 14.9 % — HIGH (ref 10.3–14.5)
SODIUM SERPL-SCNC: 129 MMOL/L — LOW (ref 135–145)
SODIUM SERPL-SCNC: 129 MMOL/L — LOW (ref 135–145)
WBC # BLD: 10.38 K/UL — SIGNIFICANT CHANGE UP (ref 3.8–10.5)
WBC # FLD AUTO: 10.38 K/UL — SIGNIFICANT CHANGE UP (ref 3.8–10.5)

## 2018-05-22 PROCEDURE — 99232 SBSQ HOSP IP/OBS MODERATE 35: CPT

## 2018-05-22 PROCEDURE — 99233 SBSQ HOSP IP/OBS HIGH 50: CPT

## 2018-05-22 RX ORDER — PIPERACILLIN AND TAZOBACTAM 4; .5 G/20ML; G/20ML
3.38 INJECTION, POWDER, LYOPHILIZED, FOR SOLUTION INTRAVENOUS EVERY 8 HOURS
Qty: 0 | Refills: 0 | Status: DISCONTINUED | OUTPATIENT
Start: 2018-05-22 | End: 2018-05-23

## 2018-05-22 RX ADMIN — MORPHINE SULFATE 15 MILLIGRAM(S): 50 CAPSULE, EXTENDED RELEASE ORAL at 21:42

## 2018-05-22 RX ADMIN — MIRTAZAPINE 15 MILLIGRAM(S): 45 TABLET, ORALLY DISINTEGRATING ORAL at 21:40

## 2018-05-22 RX ADMIN — Medication 500000 UNIT(S): at 18:15

## 2018-05-22 RX ADMIN — MORPHINE SULFATE 15 MILLIGRAM(S): 50 CAPSULE, EXTENDED RELEASE ORAL at 13:55

## 2018-05-22 RX ADMIN — ATORVASTATIN CALCIUM 40 MILLIGRAM(S): 80 TABLET, FILM COATED ORAL at 21:40

## 2018-05-22 RX ADMIN — Medication 100 MILLIGRAM(S): at 05:58

## 2018-05-22 RX ADMIN — Medication 500000 UNIT(S): at 12:09

## 2018-05-22 RX ADMIN — Medication 1 MILLIGRAM(S): at 12:09

## 2018-05-22 RX ADMIN — FAMOTIDINE 20 MILLIGRAM(S): 10 INJECTION INTRAVENOUS at 12:09

## 2018-05-22 RX ADMIN — CARVEDILOL PHOSPHATE 12.5 MILLIGRAM(S): 80 CAPSULE, EXTENDED RELEASE ORAL at 05:58

## 2018-05-22 RX ADMIN — Medication 500000 UNIT(S): at 05:58

## 2018-05-22 RX ADMIN — LISINOPRIL 5 MILLIGRAM(S): 2.5 TABLET ORAL at 05:58

## 2018-05-22 RX ADMIN — SENNA PLUS 2 TABLET(S): 8.6 TABLET ORAL at 21:41

## 2018-05-22 RX ADMIN — MORPHINE SULFATE 15 MILLIGRAM(S): 50 CAPSULE, EXTENDED RELEASE ORAL at 05:58

## 2018-05-22 RX ADMIN — Medication 325 MILLIGRAM(S): at 12:09

## 2018-05-22 RX ADMIN — Medication 500000 UNIT(S): at 23:35

## 2018-05-22 RX ADMIN — MORPHINE SULFATE 15 MILLIGRAM(S): 50 CAPSULE, EXTENDED RELEASE ORAL at 21:41

## 2018-05-22 RX ADMIN — SODIUM CHLORIDE 1 GRAM(S): 9 INJECTION INTRAMUSCULAR; INTRAVENOUS; SUBCUTANEOUS at 05:58

## 2018-05-22 RX ADMIN — Medication 1: at 22:38

## 2018-05-22 RX ADMIN — SODIUM CHLORIDE 1 GRAM(S): 9 INJECTION INTRAMUSCULAR; INTRAVENOUS; SUBCUTANEOUS at 18:15

## 2018-05-22 RX ADMIN — PIPERACILLIN AND TAZOBACTAM 25 GRAM(S): 4; .5 INJECTION, POWDER, LYOPHILIZED, FOR SOLUTION INTRAVENOUS at 21:40

## 2018-05-22 RX ADMIN — PIPERACILLIN AND TAZOBACTAM 25 GRAM(S): 4; .5 INJECTION, POWDER, LYOPHILIZED, FOR SOLUTION INTRAVENOUS at 05:59

## 2018-05-22 RX ADMIN — PIPERACILLIN AND TAZOBACTAM 25 GRAM(S): 4; .5 INJECTION, POWDER, LYOPHILIZED, FOR SOLUTION INTRAVENOUS at 13:56

## 2018-05-22 RX ADMIN — CARVEDILOL PHOSPHATE 12.5 MILLIGRAM(S): 80 CAPSULE, EXTENDED RELEASE ORAL at 18:15

## 2018-05-22 RX ADMIN — MORPHINE SULFATE 15 MILLIGRAM(S): 50 CAPSULE, EXTENDED RELEASE ORAL at 06:58

## 2018-05-22 NOTE — PROGRESS NOTE ADULT - SUBJECTIVE AND OBJECTIVE BOX
Patient is a 78y old  Female who presents with a chief complaint of Abdominal pain (08 May 2018 22:37)    Being followed by ID for pelvic perirectal abscess    Interval history:  No other acute events      ROS:  Noobserved cough or pain  comfortable       Antimicrobials:    nystatin    Suspension 290779 Unit(s) Oral four times a day  piperacillin/tazobactam IVPB. 3.375 Gram(s) IV Intermittent every 8 hours      other medications reviewed    Vital Signs Last 24 Hrs  T(C): 37.3 (05-22-18 @ 05:42), Max: 37.3 (05-22-18 @ 05:42)  T(F): 99.2 (05-22-18 @ 05:42), Max: 99.2 (05-22-18 @ 05:42)  HR: 78 (05-22-18 @ 05:42) (71 - 78)  BP: 152/58 (05-22-18 @ 05:42) (125/52 - 152/58)  BP(mean): --  RR: 18 (05-22-18 @ 05:42) (17 - 18)  SpO2: 96% (05-22-18 @ 05:42) (93% - 97%)    Physical Exam:    Constitutional well preserved,comfortable,pleasant    HEENT PERRLA ,No pallor or icterus    No oral exudate or erythema        Chest Good AE,CTA    CVS RRR S1 S2 WNl No murmur or rub or gallop    Abd soft BS normal No tenderness no masses    Ext No cyanosis clubbing or edema    IV site no erythema tenderness or discharge    Joints no swelling or LOM    CNS alert awake moving all 4 ext    Lab Data:                          7.9    10.38 )-----------( 327      ( 22 May 2018 06:05 )             24.4       05-22    129<L>  |  96<L>  |  4<L>  ----------------------------<  92  3.6   |  22  |  0.50    Ca    8.1<L>      22 May 2018 06:05    TPro  6.1  /  Alb  2.4<L>  /  TBili  0.3  /  DBili  x   /  AST  43<H>  /  ALT  25  /  AlkPhos  63  05-22        Culture - Blood (collected 17 May 2018 19:58)  Source: BLOOD  Preliminary Report (21 May 2018 19:58):    NO ORGANISMS ISOLATED    NO ORGANISMS ISOLATED AT 96 HOURS    Culture - Blood (collected 17 May 2018 19:58)  Source: BLOOD PERIPHERAL  Preliminary Report (21 May 2018 19:58):    NO ORGANISMS ISOLATED    NO ORGANISMS ISOLATED AT 96 HOURS

## 2018-05-22 NOTE — PROGRESS NOTE ADULT - PROBLEM SELECTOR PLAN 9
Now on Zosyn  Resolved sepsis sec to rectal abcess- need 2 weeks of iv antibiotics.  Picc line today- then NH

## 2018-05-22 NOTE — PROGRESS NOTE ADULT - SUBJECTIVE AND OBJECTIVE BOX
Patient is a 78y old  Female who presents with a chief complaint of Abdominal pain (08 May 2018 22:37)      SUBJECTIVE / OVERNIGHT EVENTS:  Patient seen with daughter gabriel at bedside.  Daughter understands plan for PICC and abx x 2 weeks and f/u imaging before Rt or OR    MEDICATIONS  (STANDING):  atorvastatin 40 milliGRAM(s) Oral at bedtime  carvedilol 12.5 milliGRAM(s) Oral every 12 hours  dextrose 5%. 1000 milliLiter(s) (50 mL/Hr) IV Continuous <Continuous>  dextrose 50% Injectable 12.5 Gram(s) IV Push once  dextrose 50% Injectable 25 Gram(s) IV Push once  dextrose 50% Injectable 25 Gram(s) IV Push once  docusate sodium 100 milliGRAM(s) Oral two times a day  famotidine    Tablet 20 milliGRAM(s) Oral daily  ferrous    sulfate 325 milliGRAM(s) Oral daily  folic acid 1 milliGRAM(s) Oral daily  insulin lispro (HumaLOG) corrective regimen sliding scale   SubCutaneous at bedtime  insulin lispro (HumaLOG) corrective regimen sliding scale   SubCutaneous three times a day before meals  lisinopril 5 milliGRAM(s) Oral daily  mirtazapine 15 milliGRAM(s) Oral at bedtime  morphine ER Tablet 15 milliGRAM(s) Oral every 8 hours  nystatin    Suspension 283940 Unit(s) Oral four times a day  piperacillin/tazobactam IVPB. 3.375 Gram(s) IV Intermittent every 8 hours  senna 2 Tablet(s) Oral at bedtime  sodium chloride 1 Gram(s) Oral two times a day  sodium chloride 0.9%. 1000 milliLiter(s) (50 mL/Hr) IV Continuous <Continuous>    MEDICATIONS  (PRN):  acetaminophen   Tablet 650 milliGRAM(s) Oral every 6 hours PRN For Temp greater than 38 C (100.4 F)  acetaminophen   Tablet. 650 milliGRAM(s) Oral every 6 hours PRN Mild Pain (1 - 3)  dextrose 40% Gel 15 Gram(s) Oral once PRN Blood Glucose LESS THAN 70 milliGRAM(s)/deciliter  glucagon  Injectable 1 milliGRAM(s) IntraMuscular once PRN Glucose LESS THAN 70 milligrams/deciliter  morphine  IR 15 milliGRAM(s) Oral every 4 hours PRN moderate to severe pain  ondansetron Injectable 4 milliGRAM(s) IV Push every 6 hours PRN Nausea        CAPILLARY BLOOD GLUCOSE      POCT Blood Glucose.: 118 mg/dL (22 May 2018 08:15)  POCT Blood Glucose.: 283 mg/dL (21 May 2018 22:13)  POCT Blood Glucose.: 117 mg/dL (21 May 2018 17:41)  POCT Blood Glucose.: 161 mg/dL (21 May 2018 12:55)    I&O's Summary      PHYSICAL EXAM:  Vital Signs Last 24 Hrs  T(C): 37.3 (22 May 2018 05:42), Max: 37.3 (22 May 2018 05:42)  T(F): 99.2 (22 May 2018 05:42), Max: 99.2 (22 May 2018 05:42)  HR: 78 (22 May 2018 05:42) (71 - 78)  BP: 152/58 (22 May 2018 05:42) (125/52 - 152/58)  BP(mean): --  RR: 18 (22 May 2018 05:42) (17 - 18)  SpO2: 96% (22 May 2018 05:42) (93% - 97%)  GENERAL: NAD, well-developed  HEAD:  Atraumatic, Normocephalic  EYES: EOMI, PERRLA, conjunctiva and sclera clear  NECK: Supple, No JVD  CHEST/LUNG: Clear to auscultation bilaterally; No wheeze  HEART: Regular rate and rhythm; No murmurs, rubs, or gallops  ABDOMEN: Soft, Nontender, Nondistended; Bowel sounds present  EXTREMITIES:  2+ Peripheral Pulses, No clubbing, cyanosis, or edema  R AKA  PSYCH: AAOx3  NEUROLOGY: non-focal  SKIN: No rashes or lesions    LABS:                        7.9    10.38 )-----------( 327      ( 22 May 2018 06:05 )             24.4     05-22    129<L>  |  96<L>  |  4<L>  ----------------------------<  92  3.6   |  22  |  0.50    Ca    8.1<L>      22 May 2018 06:05    TPro  6.1  /  Alb  2.4<L>  /  TBili  0.3  /  DBili  x   /  AST  43<H>  /  ALT  25  /  AlkPhos  63  05-22    PT/INR - ( 22 May 2018 06:05 )   PT: 14.5 SEC;   INR: 1.26          PTT - ( 22 May 2018 06:05 )  PTT:32.0 SEC          RADIOLOGY & ADDITIONAL TESTS:    Imaging Personally Reviewed:    Consultant(s) Notes Reviewed:      Care Discussed with Consultants/Other Providers:

## 2018-05-22 NOTE — PROGRESS NOTE ADULT - SUBJECTIVE AND OBJECTIVE BOX
[place] [facility] [id number]   78yFemale followed for:  No Known Allergies    PAST MEDICAL & SURGICAL HISTORY:  Rectal mass  Gangrene of foot  Coronary artery disease involving native coronary artery of native heart without angina pectoris  Status post above knee amputation of right lower extremity    FAMILY HISTORY:  No pertinent family history in first degree relatives    MEDICATIONS  (STANDING):  atorvastatin 40 milliGRAM(s) Oral at bedtime  carvedilol 12.5 milliGRAM(s) Oral every 12 hours  dextrose 5%. 1000 milliLiter(s) (50 mL/Hr) IV Continuous <Continuous>  dextrose 50% Injectable 12.5 Gram(s) IV Push once  dextrose 50% Injectable 25 Gram(s) IV Push once  dextrose 50% Injectable 25 Gram(s) IV Push once  docusate sodium 100 milliGRAM(s) Oral two times a day  famotidine    Tablet 20 milliGRAM(s) Oral daily  ferrous    sulfate 325 milliGRAM(s) Oral daily  folic acid 1 milliGRAM(s) Oral daily  insulin lispro (HumaLOG) corrective regimen sliding scale   SubCutaneous at bedtime  insulin lispro (HumaLOG) corrective regimen sliding scale   SubCutaneous three times a day before meals  lisinopril 5 milliGRAM(s) Oral daily  mirtazapine 15 milliGRAM(s) Oral at bedtime  morphine ER Tablet 15 milliGRAM(s) Oral every 8 hours  nystatin    Suspension 421315 Unit(s) Oral four times a day  piperacillin/tazobactam IVPB. 3.375 Gram(s) IV Intermittent every 8 hours  senna 2 Tablet(s) Oral at bedtime  sodium chloride 1 Gram(s) Oral two times a day  sodium chloride 0.9%. 1000 milliLiter(s) (50 mL/Hr) IV Continuous <Continuous>    MEDICATIONS  (PRN):  acetaminophen   Tablet 650 milliGRAM(s) Oral every 6 hours PRN For Temp greater than 38 C (100.4 F)  acetaminophen   Tablet. 650 milliGRAM(s) Oral every 6 hours PRN Mild Pain (1 - 3)  dextrose 40% Gel 15 Gram(s) Oral once PRN Blood Glucose LESS THAN 70 milliGRAM(s)/deciliter  glucagon  Injectable 1 milliGRAM(s) IntraMuscular once PRN Glucose LESS THAN 70 milligrams/deciliter  morphine  IR 15 milliGRAM(s) Oral every 4 hours PRN moderate to severe pain  ondansetron Injectable 4 milliGRAM(s) IV Push every 6 hours PRN Nausea      Vital Signs Last 24 Hrs  T(C): 37.3 (22 May 2018 05:42), Max: 37.3 (22 May 2018 05:42)  T(F): 99.2 (22 May 2018 05:42), Max: 99.2 (22 May 2018 05:42)  HR: 78 (22 May 2018 05:42) (71 - 78)  BP: 152/58 (22 May 2018 05:42) (125/52 - 152/58)  BP(mean): --  RR: 18 (22 May 2018 05:42) (17 - 18)  SpO2: 96% (22 May 2018 05:42) (93% - 97%)  nc/at  s1s2  cta  soft, nt, nd no guarding or rebound  no c/c/e    CBC Full  -  ( 22 May 2018 06:05 )  WBC Count : 10.38 K/uL  Hemoglobin : 7.9 g/dL  Hematocrit : 24.4 %  Platelet Count - Automated : 327 K/uL  Mean Cell Volume : 86.8 fL  Mean Cell Hemoglobin : 28.1 pg  Mean Cell Hemoglobin Concentration : 32.4 %  Auto Neutrophil # : 7.86 K/uL  Auto Lymphocyte # : 1.53 K/uL  Auto Monocyte # : 0.63 K/uL  Auto Eosinophil # : 0.24 K/uL  Auto Basophil # : 0.03 K/uL  Auto Neutrophil % : 75.7 %  Auto Lymphocyte % : 14.7 %  Auto Monocyte % : 6.1 %  Auto Eosinophil % : 2.3 %  Auto Basophil % : 0.3 %    05-22    129<L>  |  96<L>  |  4<L>  ----------------------------<  92  3.6   |  22  |  0.50    Ca    8.1<L>      22 May 2018 06:05    TPro  6.1  /  Alb  2.4<L>  /  TBili  0.3  /  DBili  x   /  AST  43<H>  /  ALT  25  /  AlkPhos  63  05-22    PT/INR - ( 22 May 2018 06:05 )   PT: 14.5 SEC;   INR: 1.26          PTT - ( 22 May 2018 06:05 )  PTT:32.0 SEC

## 2018-05-22 NOTE — PROGRESS NOTE ADULT - SUBJECTIVE AND OBJECTIVE BOX
Colorectal Surgery Progress Note  A team [id number]    Subjective/Interval events:  -plan for PICC, 2weeks IV abx, & repeat imaging for clint-rectal abscesses  -Holding off on further radiation or surgery pending abx  -afebrile      Objective:  Vital Signs Last 24 Hrs  T(C): 36.4 (22 May 2018 14:10), Max: 37.3 (22 May 2018 05:42)  T(F): 97.5 (22 May 2018 14:10), Max: 99.2 (22 May 2018 05:42)  HR: 68 (22 May 2018 14:10) (68 - 78)  BP: 134/47 (22 May 2018 14:10) (125/52 - 152/58)  BP(mean): --  RR: 19 (22 May 2018 14:10) (18 - 19)  SpO2: 97% (22 May 2018 14:10) (93% - 97%)      I&O's Detail: none      PE:  General: NAD, thin appearing  Neuro: alert and oriented, no focal deficits, moves all extremities spontaneously  HEENT: NCAT, anicteric, mucosa moist  Respiratory: respirations unlabored on RA  Abdomen: soft, nontender, nondistended  Extremities: no LE edema, sensation and movement grossly intact  Skin: warm, dry, appropriate color      MEDICATIONS  (STANDING):  atorvastatin 40 milliGRAM(s) Oral at bedtime  carvedilol 12.5 milliGRAM(s) Oral every 12 hours  dextrose 5%. 1000 milliLiter(s) (50 mL/Hr) IV Continuous <Continuous>  dextrose 50% Injectable 12.5 Gram(s) IV Push once  dextrose 50% Injectable 25 Gram(s) IV Push once  dextrose 50% Injectable 25 Gram(s) IV Push once  docusate sodium 100 milliGRAM(s) Oral two times a day  famotidine    Tablet 20 milliGRAM(s) Oral daily  ferrous    sulfate 325 milliGRAM(s) Oral daily  folic acid 1 milliGRAM(s) Oral daily  insulin lispro (HumaLOG) corrective regimen sliding scale   SubCutaneous at bedtime  insulin lispro (HumaLOG) corrective regimen sliding scale   SubCutaneous three times a day before meals  lisinopril 5 milliGRAM(s) Oral daily  mirtazapine 15 milliGRAM(s) Oral at bedtime  morphine ER Tablet 15 milliGRAM(s) Oral every 8 hours  nystatin    Suspension 279356 Unit(s) Oral four times a day  piperacillin/tazobactam IVPB. 3.375 Gram(s) IV Intermittent every 8 hours  senna 2 Tablet(s) Oral at bedtime  sodium chloride 1 Gram(s) Oral two times a day  sodium chloride 0.9%. 1000 milliLiter(s) (50 mL/Hr) IV Continuous <Continuous>    MEDICATIONS  (PRN):  acetaminophen   Tablet 650 milliGRAM(s) Oral every 6 hours PRN For Temp greater than 38 C (100.4 F)  acetaminophen   Tablet. 650 milliGRAM(s) Oral every 6 hours PRN Mild Pain (1 - 3)  dextrose 40% Gel 15 Gram(s) Oral once PRN Blood Glucose LESS THAN 70 milliGRAM(s)/deciliter  glucagon  Injectable 1 milliGRAM(s) IntraMuscular once PRN Glucose LESS THAN 70 milligrams/deciliter  morphine  IR 15 milliGRAM(s) Oral every 4 hours PRN moderate to severe pain  ondansetron Injectable 4 milliGRAM(s) IV Push every 6 hours PRN Nausea      Labs:                        7.9    10.38 )-----------( 327      ( 22 May 2018 06:05 )             24.4                         9.1    12.63 )-----------( 386      ( 11 May 2018 06:45 )             28.2         05-11    130<L>  |  93<L>  |  6<L>  ----------------------------<  106<H>  3.6   |  21<L>  |  0.52    Ca    8.0<L>      11 May 2018 06:45      05-22    129<L>  |  96<L>  |  4<L>  ----------------------------<  92  3.6   |  22  |  0.50    Ca    8.1<L>      22 May 2018 06:05    TPro  6.1  /  Alb  2.4<L>  /  TBili  0.3  /  DBili  x   /  AST  43<H>  /  ALT  25  /  AlkPhos  63  05-22      PT/INR - ( 22 May 2018 06:05 )   PT: 14.5 SEC;   INR: 1.26        PTT - ( 22 May 2018 06:05 )  PTT:32.0 SEC            Surgical Pathology Report (05.10.18 @ 15:30)    Surgical Pathology Report:   ACCESSION No:  [id number]    Final Diagnosis  Rectum, mass, biopsy  - Superficial fragments of adenocarcinoma, moderately  differentiated.          Surgical Pathology Report (04.12.18 @ 16:20)    Surgical Pathology Report:   ACCESSION No:  10 [id number]      Final Diagnosis  Rectal mass, biopsy  - Superficial fragments of adenoma with focal high grade  dysplasia, see note    Note: The biopsy mayrepresent a superficial mucosal component of  a deeper, invasive underlying lesion. Clinical and endoscopic  correlation is recommended.          Imaging and other studies:  CT Abdomen and Pelvis w/ Oral Cont and w/ IV Cont (04.10.18 @ 13:37) >  LOWER CHEST: Small calcifiedmediastinal and hilar nodes. A 4 mm left   lower lobe pulmonary nodule (2:13).    LIVER: Within normal limits.  BILE DUCTS: Normal caliber.  GALLBLADDER: Within normal limits.  SPLEEN: Within normal limits.  PANCREAS: Within normal limits.  ADRENALS:Mild thickening of the left adrenal gland.  KIDNEYS/URETERS: Within normal limits.    BLADDER: Within normal limits.  REPRODUCTIVE ORGANS: Hysterectomy.    BOWEL: No bowel obstruction. Markedly irregular rectal wall thickening   with nodularity in the mesorectal fascia, highly concerning for rectal   neoplasm. Appendix is normal.   PERITONEUM: No ascites.  VESSELS:  Atherosclerotic changes. Right lower extremity femoral artery   graft which appears occluded.  RETROPERITONEUM: A few subcentimeter short axis retroperitoneal nodes are   nonspecific.    ABDOMINAL WALL: Within normal limits.  BONES: Degenerative changes.    IMPRESSION:     Rectal neoplasm.    A 4 mm left lower lobe pulmonary nodule.        CT Chest No Cont (04.13.18 @ 19:22) >  CHEST:     LUNGS AND LARGE AIRWAYS: Patent central airways.  Centrilobular   emphysema. Areas of bilateral subsegmental atelectasis. Spiculated 6 mm   right upper lobe posterior pulmonary nodule and (6:132) with surrounding   groundglass. 3 mm groundglass nodule in the right upper lobe (3:60).  PLEURA: Trace bilateral pleural effusions.  VESSELS: Atheromatous disease of aorta andits branches.  HEART: Left ventricular enlargement. No pericardial effusion. Calcified   coronary artery plaque.  MEDIASTINUM AND TASHA: Calcified hilar and mediastinal lymph nodes.  CHEST WALL AND LOWER NECK: Mildly asymmetrically enlarged right thyroid   gland which appears heterogeneous.  VISUALIZED UPPER ABDOMEN: Sludge.  BONES: Spinal degenerative changes.    IMPRESSION:     6 mm spiculated nodule in the right upper lobe suspicious for primary   lung neoplasm given background centrilobular emphysema instead of a   metastasis.    3 mm groundglass nodule in the right upper lobe.          Transthoracic Echocardiogram (04.16.18 @ 07:08) >  Conclusions:  1. Normal left ventricular internal dimensions and wall  thicknesses.  2. Normal left ventricular systolic function. No segmental  wall motion abnormalities.  3. Normal diastolic function  4. Normal right ventricular sizeand systolic function.  5. Estimated pulmonary artery systolic pressure equals 36  mm Hg, assuming right atrial pressure equals 8 mm Hg,  consistent with borderline pulmonary pressures.            MR Pelvis w/ IV Cont (05.18.18 @ 16:34) >  REPRODUCTIVE ORGANS: Status post hysterectomy. Both ovaries are atrophic   and normal in appearance.    BLADDER: Linear enhancement of the wall of the base of the urinary   bladder. No focal lesion within the bladder.    LYMPH NODES: No obvious lymphadenopathy.    VISUALIZED PORTIONS:    ABDOMINAL ORGANS: Gallstones.  BOWEL: Markedly irregular and abnormal appearance of the rectum with   irregular mural thickening and enhancement. New since the prior exam are   multiple rim-enhancing fluid containing lesions posterior to the rectum   in the presacral space and to the left of the rectum in the mesorectal   fascia. All of these lesions are likely communicating with each other and   collectively measure approximately 6.2 x 2.9 cm. These lesions approach   the base of the urinary bladder.  PERITONEUM: No ascites.  VESSELS: A femoral stent and agraft in the right groin are partially   imaged.  ABDOMINAL WALL: Within normal limits.  BONES: Degenerative changes of the visualized axial spine.    IMPRESSION: Known rectal malignancy. Multiple new cystic lesions to the   left and posterior to therectum may represent abscesses, in the right   clinical setting. Neoplastic extension is also possible. Mild enhancement   at the base of the urinary bladder may be reactive, however, minimal   involvement with disease is not excluded. No focal bladder lesion.

## 2018-05-22 NOTE — PROGRESS NOTE ADULT - PROBLEM SELECTOR PLAN 1
- Abess on mRI.  Antibioticss x 2 weeks then re- image. - Abscess  on mRI.  Antibioticss x 2 weeks then re- image.

## 2018-05-22 NOTE — PROGRESS NOTE ADULT - ASSESSMENT
78  y.o. woman with history of PAD and rectal mass who was sent to the ER for evaluation of abdominal pain and rectal mass by her oncologist. Patient reports severe suprapubic pain, radiates to the rectum, aggravated with movement and palpation, alleviated with pain medications. Seen by Pallitive pain, Oncology and Rt onc.  Bxp- Positive Rectal Cancer.  Need 5 days RT- plan to end about 5/23  MRI pelvis Multiple new cystic lesions to the   left and posterior to the rectum may represent abscesses, in the right   clinical setting. Neoplastic extension is also possible. Mild enhancement   at the base of the urinary bladder may be reactive, however, minimal   involvement with disease is not excluded. No focal bladder lesion. 78  y.o. woman with history of PAD and rectal mass who was sent to the ER for evaluation of abdominal pain and rectal mass by her oncologist. Patient reports severe suprapubic pain, radiates to the rectum, aggravated with movement and palpation, alleviated with pain medications. Seen by Pallitive pain, Oncology and Rt onc.  Bxp- Positive Rectal Cancer.  Neeedd 5 days RT- plan to end about 5/23- but was stopped secondary to Rectal Abscess after  3/5  MRI pelvis Multiple new cystic lesions to the   left and posterior to the rectum may represent abscesses, in the right   clinical setting. Neoplastic extension is also possible. Mild enhancement   at the base of the urinary bladder may be reactive, however, minimal   involvement with disease is not excluded. No focal bladder lesion.

## 2018-05-22 NOTE — PROGRESS NOTE ADULT - SUBJECTIVE AND OBJECTIVE BOX
Subjective: Patient seen and examined. No new events except as noted.     SUBJECTIVE/ROS:  no new events       MEDICATIONS:  MEDICATIONS  (STANDING):  atorvastatin 40 milliGRAM(s) Oral at bedtime  carvedilol 12.5 milliGRAM(s) Oral every 12 hours  dextrose 5%. 1000 milliLiter(s) (50 mL/Hr) IV Continuous <Continuous>  dextrose 50% Injectable 12.5 Gram(s) IV Push once  dextrose 50% Injectable 25 Gram(s) IV Push once  dextrose 50% Injectable 25 Gram(s) IV Push once  docusate sodium 100 milliGRAM(s) Oral two times a day  famotidine    Tablet 20 milliGRAM(s) Oral daily  ferrous    sulfate 325 milliGRAM(s) Oral daily  folic acid 1 milliGRAM(s) Oral daily  insulin lispro (HumaLOG) corrective regimen sliding scale   SubCutaneous at bedtime  insulin lispro (HumaLOG) corrective regimen sliding scale   SubCutaneous three times a day before meals  lisinopril 5 milliGRAM(s) Oral daily  mirtazapine 15 milliGRAM(s) Oral at bedtime  morphine ER Tablet 15 milliGRAM(s) Oral every 8 hours  nystatin    Suspension [id number] Unit(s) Oral four times a day  piperacillin/tazobactam IVPB. 3.375 Gram(s) IV Intermittent every 8 hours  senna 2 Tablet(s) Oral at bedtime  sodium chloride 1 Gram(s) Oral two times a day  sodium chloride 0.9%. 1000 milliLiter(s) (50 mL/Hr) IV Continuous <Continuous>      PHYSICAL EXAM:  T(C): 36.4 (05-22-18 @ 14:10), Max: 37.3 (05-22-18 @ 05:42)  HR: 68 (05-22-18 @ 14:10) (68 - 78)  BP: 134/47 (05-22-18 @ 14:10) (125/52 - 152/58)  RR: 19 (05-22-18 @ 14:10) (18 - 19)  SpO2: 97% (05-22-18 @ 14:10) (93% - 97%)  Wt(kg): --  I&O's Summary    JVP: Normal  Neck: supple  Lung: clear   CV: S1 S2 , Murmur:  Abd: soft  Ext: No edema  neuro: Awake / alert  Psych: flat affect  Skin: normal       LABS/DATA:    CARDIAC MARKERS:                                7.9    10.38 )-----------( 327      ( 22 May 2018 06:05 )             24.4     05-22    129<L>  |  96<L>  |  4<L>  ----------------------------<  92  3.6   |  22  |  0.50    Ca    8.1<L>      22 May 2018 06:05    TPro  6.1  /  Alb  2.4<L>  /  TBili  0.3  /  DBili  x   /  AST  43<H>  /  ALT  25  /  AlkPhos  63  05-22    proBNP:   Lipid Profile:   HgA1c:   TSH:     TELE:  EKG:

## 2018-05-22 NOTE — PROGRESS NOTE ADULT - ASSESSMENT
PreOp  Based on current ACC/AHA guidelines, patient history and physical exam, the patient is considered to have elevated risk  stress test unremarkable   may proceed to OR however on hold for now as per surgery     CAD  stress test unremarkable   cont statin    HTN  stable    DM  Monitor finger stick. Insulin coverage. Diabetic education and Diabetic diet. Consider nutrition consultation.     rectal ca with perirectal abscess  on anbx  fu with ID/sugery  pain control

## 2018-05-22 NOTE — PROGRESS NOTE ADULT - ASSESSMENT
78F p/w rectal adenocarcinoma mass, holding radiation treatment 2/2 clint-rectal abscess; for future resection vs. proximal diversion once radiation treatment complete.       -pending discharge tomorrow on IV abx via PICC for 2 weeks then (per primary team) repeat imaging.  -patient should follow-up w/ Dr. Madelyn Sosa as outpatient once repeat imaging complete to discuss possible surgery after radiation treatment (contact information below):      Madelyn Sosa MD    [facility]  [address] [place] [address]    Tel: [phone]  Fax: [phone]      JOSE Porras MD (PGY2)    (Please page [facility] A team Surgery, [phone] for questions/concerns.)

## 2018-05-22 NOTE — PROGRESS NOTE ADULT - ASSESSMENT
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 Now undergoing radiation therapy. New fever to 102.1F on 5/17 evening so started on Zosyn. Now afebrile but with MRI Pelvis revealing new cystic lesions left and posterior to the rectum which may represent abscesses.  No other foci apparent  Cultures negative  CRS input noted-no intervention  Rt held  Would continue zosyn for now  If stable about 2 week course with final duration depending on clinical and radiological resolution  plan as detailed earlier  Patient for [facility]  DALJIT BARAHONA to arrange for CT-patient can follow in ID clinic after Ct done  Will tailor plan for ID issues  per course,results.Will defer to primary team on management of other issues.

## 2018-05-23 VITALS — HEART RATE: 72 BPM | SYSTOLIC BLOOD PRESSURE: 130 MMHG | DIASTOLIC BLOOD PRESSURE: 54 MMHG

## 2018-05-23 LAB
ALBUMIN SERPL ELPH-MCNC: 2.3 G/DL — LOW (ref 3.3–5)
ALP SERPL-CCNC: 62 U/L — SIGNIFICANT CHANGE UP (ref 40–120)
ALT FLD-CCNC: 22 U/L — SIGNIFICANT CHANGE UP (ref 4–33)
AST SERPL-CCNC: 37 U/L — HIGH (ref 4–32)
BASOPHILS # BLD AUTO: 0.03 K/UL — SIGNIFICANT CHANGE UP (ref 0–0.2)
BASOPHILS NFR BLD AUTO: 0.3 % — SIGNIFICANT CHANGE UP (ref 0–2)
BILIRUB SERPL-MCNC: 0.3 MG/DL — SIGNIFICANT CHANGE UP (ref 0.2–1.2)
BUN SERPL-MCNC: 6 MG/DL — LOW (ref 7–23)
CALCIUM SERPL-MCNC: 8 MG/DL — LOW (ref 8.4–10.5)
CHLORIDE SERPL-SCNC: 95 MMOL/L — LOW (ref 98–107)
CO2 SERPL-SCNC: 22 MMOL/L — SIGNIFICANT CHANGE UP (ref 22–31)
CREAT SERPL-MCNC: 0.52 MG/DL — SIGNIFICANT CHANGE UP (ref 0.5–1.3)
EOSINOPHIL # BLD AUTO: 0.25 K/UL — SIGNIFICANT CHANGE UP (ref 0–0.5)
EOSINOPHIL NFR BLD AUTO: 2.8 % — SIGNIFICANT CHANGE UP (ref 0–6)
GLUCOSE BLDC GLUCOMTR-MCNC: 109 MG/DL — HIGH (ref 70–99)
GLUCOSE BLDC GLUCOMTR-MCNC: 177 MG/DL — HIGH (ref 70–99)
GLUCOSE SERPL-MCNC: 80 MG/DL — SIGNIFICANT CHANGE UP (ref 70–99)
HCT VFR BLD CALC: 25 % — LOW (ref 34.5–45)
HGB BLD-MCNC: 8.1 G/DL — LOW (ref 11.5–15.5)
IMM GRANULOCYTES # BLD AUTO: 0.1 # — SIGNIFICANT CHANGE UP
IMM GRANULOCYTES NFR BLD AUTO: 1.1 % — SIGNIFICANT CHANGE UP (ref 0–1.5)
LYMPHOCYTES # BLD AUTO: 1.44 K/UL — SIGNIFICANT CHANGE UP (ref 1–3.3)
LYMPHOCYTES # BLD AUTO: 16.3 % — SIGNIFICANT CHANGE UP (ref 13–44)
MCHC RBC-ENTMCNC: 27.9 PG — SIGNIFICANT CHANGE UP (ref 27–34)
MCHC RBC-ENTMCNC: 32.4 % — SIGNIFICANT CHANGE UP (ref 32–36)
MCV RBC AUTO: 86.2 FL — SIGNIFICANT CHANGE UP (ref 80–100)
MONOCYTES # BLD AUTO: 0.65 K/UL — SIGNIFICANT CHANGE UP (ref 0–0.9)
MONOCYTES NFR BLD AUTO: 7.4 % — SIGNIFICANT CHANGE UP (ref 2–14)
NEUTROPHILS # BLD AUTO: 6.35 K/UL — SIGNIFICANT CHANGE UP (ref 1.8–7.4)
NEUTROPHILS NFR BLD AUTO: 72.1 % — SIGNIFICANT CHANGE UP (ref 43–77)
NRBC # FLD: 0 — SIGNIFICANT CHANGE UP
PLATELET # BLD AUTO: 338 K/UL — SIGNIFICANT CHANGE UP (ref 150–400)
PMV BLD: 9 FL — SIGNIFICANT CHANGE UP (ref 7–13)
POTASSIUM SERPL-MCNC: 3.9 MMOL/L — SIGNIFICANT CHANGE UP (ref 3.5–5.3)
POTASSIUM SERPL-SCNC: 3.9 MMOL/L — SIGNIFICANT CHANGE UP (ref 3.5–5.3)
PROT SERPL-MCNC: 6.4 G/DL — SIGNIFICANT CHANGE UP (ref 6–8.3)
RBC # BLD: 2.9 M/UL — LOW (ref 3.8–5.2)
RBC # FLD: 15 % — HIGH (ref 10.3–14.5)
SODIUM SERPL-SCNC: 131 MMOL/L — LOW (ref 135–145)
WBC # BLD: 8.82 K/UL — SIGNIFICANT CHANGE UP (ref 3.8–10.5)
WBC # FLD AUTO: 8.82 K/UL — SIGNIFICANT CHANGE UP (ref 3.8–10.5)

## 2018-05-23 PROCEDURE — 96375 TX/PRO/DX INJ NEW DRUG ADDON: CPT

## 2018-05-23 PROCEDURE — 85730 THROMBOPLASTIN TIME PARTIAL: CPT

## 2018-05-23 PROCEDURE — 80048 BASIC METABOLIC PNL TOTAL CA: CPT

## 2018-05-23 PROCEDURE — 76937 US GUIDE VASCULAR ACCESS: CPT | Mod: 26

## 2018-05-23 PROCEDURE — 97162 PT EVAL MOD COMPLEX 30 MIN: CPT

## 2018-05-23 PROCEDURE — 96374 THER/PROPH/DIAG INJ IV PUSH: CPT | Mod: XU

## 2018-05-23 PROCEDURE — 71250 CT THORAX DX C-: CPT

## 2018-05-23 PROCEDURE — 74177 CT ABD & PELVIS W/CONTRAST: CPT

## 2018-05-23 PROCEDURE — 82330 ASSAY OF CALCIUM: CPT

## 2018-05-23 PROCEDURE — 99232 SBSQ HOSP IP/OBS MODERATE 35: CPT

## 2018-05-23 PROCEDURE — 82803 BLOOD GASES ANY COMBINATION: CPT

## 2018-05-23 PROCEDURE — 82378 CARCINOEMBRYONIC ANTIGEN: CPT

## 2018-05-23 PROCEDURE — 82272 OCCULT BLD FECES 1-3 TESTS: CPT

## 2018-05-23 PROCEDURE — 77001 FLUOROGUIDE FOR VEIN DEVICE: CPT | Mod: 26,GC

## 2018-05-23 PROCEDURE — 85014 HEMATOCRIT: CPT

## 2018-05-23 PROCEDURE — 80053 COMPREHEN METABOLIC PANEL: CPT

## 2018-05-23 PROCEDURE — 85027 COMPLETE CBC AUTOMATED: CPT

## 2018-05-23 PROCEDURE — 99285 EMERGENCY DEPT VISIT HI MDM: CPT | Mod: 25

## 2018-05-23 PROCEDURE — 84132 ASSAY OF SERUM POTASSIUM: CPT

## 2018-05-23 PROCEDURE — 84295 ASSAY OF SERUM SODIUM: CPT

## 2018-05-23 PROCEDURE — 83605 ASSAY OF LACTIC ACID: CPT

## 2018-05-23 PROCEDURE — 93005 ELECTROCARDIOGRAM TRACING: CPT

## 2018-05-23 PROCEDURE — 82947 ASSAY GLUCOSE BLOOD QUANT: CPT

## 2018-05-23 PROCEDURE — 84484 ASSAY OF TROPONIN QUANT: CPT

## 2018-05-23 PROCEDURE — 86301 IMMUNOASSAY TUMOR CA 19-9: CPT

## 2018-05-23 PROCEDURE — 83036 HEMOGLOBIN GLYCOSYLATED A1C: CPT

## 2018-05-23 PROCEDURE — 93306 TTE W/DOPPLER COMPLETE: CPT

## 2018-05-23 PROCEDURE — 99239 HOSP IP/OBS DSCHRG MGMT >30: CPT

## 2018-05-23 PROCEDURE — 82962 GLUCOSE BLOOD TEST: CPT

## 2018-05-23 PROCEDURE — 82435 ASSAY OF BLOOD CHLORIDE: CPT

## 2018-05-23 PROCEDURE — 85610 PROTHROMBIN TIME: CPT

## 2018-05-23 PROCEDURE — 36569 INSJ PICC 5 YR+ W/O IMAGING: CPT

## 2018-05-23 PROCEDURE — 88305 TISSUE EXAM BY PATHOLOGIST: CPT

## 2018-05-23 RX ORDER — NYSTATIN 500MM UNIT
5 POWDER (EA) MISCELLANEOUS
Qty: 0 | Refills: 0 | COMMUNITY
Start: 2018-05-23

## 2018-05-23 RX ORDER — MORPHINE SULFATE 50 MG/1
1 CAPSULE, EXTENDED RELEASE ORAL
Qty: 0 | Refills: 0 | COMMUNITY
Start: 2018-05-23

## 2018-05-23 RX ORDER — MIRTAZAPINE 45 MG/1
1 TABLET, ORALLY DISINTEGRATING ORAL
Qty: 0 | Refills: 0 | COMMUNITY
Start: 2018-05-23

## 2018-05-23 RX ORDER — PIPERACILLIN AND TAZOBACTAM 4; .5 G/20ML; G/20ML
3.38 INJECTION, POWDER, LYOPHILIZED, FOR SOLUTION INTRAVENOUS
Qty: 0 | Refills: 0 | COMMUNITY
Start: 2018-05-23

## 2018-05-23 RX ORDER — SODIUM CHLORIDE 9 MG/ML
1 INJECTION INTRAMUSCULAR; INTRAVENOUS; SUBCUTANEOUS
Qty: 0 | Refills: 0 | COMMUNITY
Start: 2018-05-23

## 2018-05-23 RX ORDER — ACETAMINOPHEN 500 MG
2 TABLET ORAL
Qty: 0 | Refills: 0 | COMMUNITY

## 2018-05-23 RX ORDER — FAMOTIDINE 10 MG/ML
1 INJECTION INTRAVENOUS
Qty: 0 | Refills: 0 | COMMUNITY

## 2018-05-23 RX ORDER — FAMOTIDINE 10 MG/ML
1 INJECTION INTRAVENOUS
Qty: 0 | Refills: 0 | COMMUNITY
Start: 2018-05-23

## 2018-05-23 RX ORDER — ACETAMINOPHEN 500 MG
2 TABLET ORAL
Qty: 0 | Refills: 0 | COMMUNITY
Start: 2018-05-23

## 2018-05-23 RX ORDER — FOLIC ACID 0.8 MG
1 TABLET ORAL
Qty: 0 | Refills: 0 | COMMUNITY

## 2018-05-23 RX ADMIN — Medication 100 MILLIGRAM(S): at 05:57

## 2018-05-23 RX ADMIN — SODIUM CHLORIDE 1 GRAM(S): 9 INJECTION INTRAMUSCULAR; INTRAVENOUS; SUBCUTANEOUS at 05:56

## 2018-05-23 RX ADMIN — PIPERACILLIN AND TAZOBACTAM 25 GRAM(S): 4; .5 INJECTION, POWDER, LYOPHILIZED, FOR SOLUTION INTRAVENOUS at 05:58

## 2018-05-23 RX ADMIN — MORPHINE SULFATE 15 MILLIGRAM(S): 50 CAPSULE, EXTENDED RELEASE ORAL at 15:54

## 2018-05-23 RX ADMIN — Medication 1 MILLIGRAM(S): at 11:37

## 2018-05-23 RX ADMIN — Medication 500000 UNIT(S): at 11:37

## 2018-05-23 RX ADMIN — Medication 1: at 13:07

## 2018-05-23 RX ADMIN — MORPHINE SULFATE 15 MILLIGRAM(S): 50 CAPSULE, EXTENDED RELEASE ORAL at 12:24

## 2018-05-23 RX ADMIN — FAMOTIDINE 20 MILLIGRAM(S): 10 INJECTION INTRAVENOUS at 11:38

## 2018-05-23 RX ADMIN — PIPERACILLIN AND TAZOBACTAM 25 GRAM(S): 4; .5 INJECTION, POWDER, LYOPHILIZED, FOR SOLUTION INTRAVENOUS at 13:40

## 2018-05-23 RX ADMIN — MORPHINE SULFATE 15 MILLIGRAM(S): 50 CAPSULE, EXTENDED RELEASE ORAL at 06:01

## 2018-05-23 RX ADMIN — Medication 100 MILLIGRAM(S): at 17:12

## 2018-05-23 RX ADMIN — MORPHINE SULFATE 15 MILLIGRAM(S): 50 CAPSULE, EXTENDED RELEASE ORAL at 11:37

## 2018-05-23 RX ADMIN — MORPHINE SULFATE 15 MILLIGRAM(S): 50 CAPSULE, EXTENDED RELEASE ORAL at 05:56

## 2018-05-23 RX ADMIN — Medication 325 MILLIGRAM(S): at 11:37

## 2018-05-23 RX ADMIN — CARVEDILOL PHOSPHATE 12.5 MILLIGRAM(S): 80 CAPSULE, EXTENDED RELEASE ORAL at 05:57

## 2018-05-23 RX ADMIN — LISINOPRIL 5 MILLIGRAM(S): 2.5 TABLET ORAL at 05:56

## 2018-05-23 RX ADMIN — SODIUM CHLORIDE 1 GRAM(S): 9 INJECTION INTRAMUSCULAR; INTRAVENOUS; SUBCUTANEOUS at 17:11

## 2018-05-23 RX ADMIN — Medication 500000 UNIT(S): at 17:12

## 2018-05-23 RX ADMIN — CARVEDILOL PHOSPHATE 12.5 MILLIGRAM(S): 80 CAPSULE, EXTENDED RELEASE ORAL at 17:12

## 2018-05-23 RX ADMIN — Medication 500000 UNIT(S): at 05:56

## 2018-05-23 NOTE — PROGRESS NOTE ADULT - ASSESSMENT
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 Now undergoing radiation therapy. New fever to 102.1F on 5/17 evening so started on Zosyn. Now afebrile but with MRI Pelvis revealing new cystic lesions left and posterior to the rectum which may represent abscesses.  No other foci apparent  Cultures negative  CRS input noted-no intervention  Rt held  Would continue zosyn for now  If stable about 2 week course with final duration depending on clinical and radiological resolution  plan as detailed earlier  Patient for [facility] MD to arrange for CT-patient can follow in ID clinic after Ct done  Will tailor plan for ID issues  per course,results.Will defer to primary team on management of other issues.  case d/w Dr jasmine

## 2018-05-23 NOTE — PROGRESS NOTE ADULT - SUBJECTIVE AND OBJECTIVE BOX
Patient is a 78y old  Female who presents with a chief complaint of Abdominal pain (08 May 2018 22:37)      SUBJECTIVE / OVERNIGHT EVENTS:  Patient is comfortable.  Seen with daughter at bedside- going to get picc line now.    MEDICATIONS  (STANDING):  atorvastatin 40 milliGRAM(s) Oral at bedtime  carvedilol 12.5 milliGRAM(s) Oral every 12 hours  dextrose 5%. 1000 milliLiter(s) (50 mL/Hr) IV Continuous <Continuous>  dextrose 50% Injectable 12.5 Gram(s) IV Push once  dextrose 50% Injectable 25 Gram(s) IV Push once  dextrose 50% Injectable 25 Gram(s) IV Push once  docusate sodium 100 milliGRAM(s) Oral two times a day  famotidine    Tablet 20 milliGRAM(s) Oral daily  ferrous    sulfate 325 milliGRAM(s) Oral daily  folic acid 1 milliGRAM(s) Oral daily  insulin lispro (HumaLOG) corrective regimen sliding scale   SubCutaneous at bedtime  insulin lispro (HumaLOG) corrective regimen sliding scale   SubCutaneous three times a day before meals  lisinopril 5 milliGRAM(s) Oral daily  mirtazapine 15 milliGRAM(s) Oral at bedtime  morphine ER Tablet 15 milliGRAM(s) Oral every 8 hours  nystatin    Suspension [id number] Unit(s) Oral four times a day  piperacillin/tazobactam IVPB. 3.375 Gram(s) IV Intermittent every 8 hours  senna 2 Tablet(s) Oral at bedtime  sodium chloride 1 Gram(s) Oral two times a day  sodium chloride 0.9%. 1000 milliLiter(s) (50 mL/Hr) IV Continuous <Continuous>    MEDICATIONS  (PRN):  acetaminophen   Tablet 650 milliGRAM(s) Oral every 6 hours PRN For Temp greater than 38 C (100.4 F)  acetaminophen   Tablet. 650 milliGRAM(s) Oral every 6 hours PRN Mild Pain (1 - 3)  dextrose 40% Gel 15 Gram(s) Oral once PRN Blood Glucose LESS THAN 70 milliGRAM(s)/deciliter  glucagon  Injectable 1 milliGRAM(s) IntraMuscular once PRN Glucose LESS THAN 70 milligrams/deciliter  morphine  IR 15 milliGRAM(s) Oral every 4 hours PRN moderate to severe pain  ondansetron Injectable 4 milliGRAM(s) IV Push every 6 hours PRN Nausea        CAPILLARY BLOOD GLUCOSE      POCT Blood Glucose.: 109 mg/dL (23 May 2018 08:42)  POCT Blood Glucose.: 293 mg/dL (22 May 2018 22:22)  POCT Blood Glucose.: 114 mg/dL (22 May 2018 18:10)  POCT Blood Glucose.: 140 mg/dL (22 May 2018 12:00)    I&O's Summary      PHYSICAL EXAM:  Vital Signs Last 24 Hrs  T(C): 36.8 (23 May 2018 05:31), Max: 36.8 (22 May 2018 21:29)  T(F): 98.2 (23 May 2018 05:31), Max: 98.2 (22 May 2018 21:29)  HR: 74 (23 May 2018 05:31) (68 - 76)  BP: 129/52 (23 May 2018 05:31) (121/48 - 134/47)  BP(mean): --  RR: 18 (23 May 2018 05:31) (18 - 19)  SpO2: 96% (23 May 2018 05:31) (94% - 97%)  GENERAL: NAD, well-developed  HEAD:  Atraumatic, Normocephalic  EYES: EOMI, PERRLA, conjunctiva and sclera clear  NECK: Supple, No JVD  CHEST/LUNG: Clear to auscultation bilaterally; No wheeze  HEART: Regular rate and rhythm; No murmurs, rubs, or gallops  ABDOMEN: Soft, Nontender, Nondistended; Bowel sounds present  EXTREMITIES:  2+ Peripheral Pulses, No clubbing, cyanosis, or edema  PSYCH: AAOx3  NEUROLOGY: non-focal  SKIN: No rashes or lesions    LABS:                        8.1    8.82  )-----------( 338      ( 23 May 2018 05:55 )             25.0     05-23    131<L>  |  95<L>  |  6<L>  ----------------------------<  80  3.9   |  22  |  0.52    Ca    8.0<L>      23 May 2018 05:55    TPro  6.4  /  Alb  2.3<L>  /  TBili  0.3  /  DBili  x   /  AST  37<H>  /  ALT  22  /  AlkPhos  62  05-23    PT/INR - ( 22 May 2018 06:05 )   PT: 14.5 SEC;   INR: 1.26          PTT - ( 22 May 2018 06:05 )  PTT:32.0 SEC          RADIOLOGY & ADDITIONAL TESTS:    Imaging Personally Reviewed:    Consultant(s) Notes Reviewed:      Care Discussed with Consultants/Other Providers:

## 2018-05-23 NOTE — CHART NOTE - NSCHARTNOTEFT_GEN_A_CORE
Source: Patient [X ]    Family [ ]     other [ X] RN, electronic chart     Diet : Soft, Low fat, 800mL fluid restriction, Ensure Enlive 240mls 3x daily (1050kcal, 60g protein). per MD    Initial Dietitian Evaluation 2/2 to extended length of stay/malnutrition follow-up. Patient continues with poor appetite and suboptimal PO intake but eating a bit better. Consuming most foods bought by family and PO supplement Ensure-RN confirmed. No GI distress (nausea/vomiting/diarrhea/constipation) or difficulty chewing and swallowing reported at this time. Planned d/c to rehab reported.        Current Weight: 45.7 (05-08 @ 21:41) No new weight documented.      Pertinent Medications: atorvastatin  carvedilol  dextrose 5%.  dextrose 50% Injectable  dextrose 50% Injectable  dextrose 50% Injectable  docusate sodium  famotidine    Tablet  ferrous    sulfate  folic acid  insulin lispro (HumaLOG) corrective regimen sliding scale  insulin lispro (HumaLOG) corrective regimen sliding scale  lisinopril  mirtazapine  morphine ER Tablet  senna  sodium chloride  sodium chloride 0.9%.    Pertinent Labs:  05-23 Na131 mmol/L<L> Glu 80 mg/dL K+ 3.9 mmol/L Cr  0.52 mg/dL BUN 6 mg/dL<L> 05-23 Alb 2.3 g/dL<L> 05-09 [id number] 6.6 %<H>      Skin: intact    Estimated Needs:   [ X] no change since previous assessment  [ ] recalculated    Previous Nutrition Diagnosis:      [ ] Malnutrition, severe     Nutrition Diagnosis is [X ] ongoing  [ ] resolved [ ] not applicable     Additional Recommendations:   Nutrition Diagnosis is [X ] ongoing  [ ] resolved [ ] not applicable       Additional Recommendations:   1. Consider liberalize diet to regular diet and d/c Low fat. Patient continues with poor PO intake. Fluid restriction per MD discretion.   2. Continue with PO supplement Ensure Enlive 240mls 3x daily (1050kcal, 60g protein).   3. Monitor weights, labs, BM's, skin integrity, p.o. intake.   4. Please Encourage po intake, assist with meals and menu selections, provide alternatives PRN.

## 2018-05-23 NOTE — PROGRESS NOTE ADULT - PROBLEM SELECTOR PROBLEM 1
Rectal mass
Rectal cancer
Rectal cancer
Rectal mass

## 2018-05-23 NOTE — PROGRESS NOTE ADULT - PROBLEM SELECTOR PLAN 8
- Lovenox 40 mg s/c daily

## 2018-05-23 NOTE — PROGRESS NOTE ADULT - SUBJECTIVE AND OBJECTIVE BOX
Patient is a 78y old  Female who presents with a chief complaint of Rectal cancer requiring RT and Surgery.  Rectal Abbess in area of Cancer    Being followed by ID for rectal abscess    Interval history:  No acute events      ROS:  No cough,SOB,CP  No N/V/D./abd pain  No other complaints      Antimicrobials:    nystatin    Suspension 329748 Unit(s) Oral four times a day  piperacillin/tazobactam IVPB. 3.375 Gram(s) IV Intermittent every 8 hours    Other medications reviewed    Vital Signs Last 24 Hrs  T(C): 36.8 (05-23-18 @ 05:31), Max: 36.8 (05-22-18 @ 21:29)  T(F): 98.2 (05-23-18 @ 05:31), Max: 98.2 (05-22-18 @ 21:29)  HR: 74 (05-23-18 @ 05:31) (68 - 76)  BP: 129/52 (05-23-18 @ 05:31) (121/48 - 134/47)  BP(mean): --  RR: 18 (05-23-18 @ 05:31) (18 - 19)  SpO2: 96% (05-23-18 @ 05:31) (94% - 97%)    Physical Exam:        HEENT PERRLA EOMI    No oral exudate or erythema    Chest Good AE,CTA    CVS RRR S1 S2 WNl No murmur or rub or gallop    Abd soft BS normal No tenderness no masses    IV site no erythema tenderness or discharge    CNS AAO X 3 no focal    Lab Data:                          8.1    8.82  )-----------( 338      ( 23 May 2018 05:55 )             25.0       05-23    131<L>  |  95<L>  |  6<L>  ----------------------------<  80  3.9   |  22  |  0.52    Ca    8.0<L>      23 May 2018 05:55    TPro  6.4  /  Alb  2.3<L>  /  TBili  0.3  /  DBili  x   /  AST  37<H>  /  ALT  22  /  AlkPhos  62  05-23

## 2018-05-23 NOTE — PROGRESS NOTE ADULT - PROBLEM SELECTOR PROBLEM 8
Prophylactic measure

## 2018-05-23 NOTE — PROGRESS NOTE ADULT - SUBJECTIVE AND OBJECTIVE BOX
Subjective: Patient seen and examined. No new events except as noted.     SUBJECTIVE/ROS:  no new events       MEDICATIONS:  MEDICATIONS  (STANDING):  atorvastatin 40 milliGRAM(s) Oral at bedtime  carvedilol 12.5 milliGRAM(s) Oral every 12 hours  dextrose 5%. 1000 milliLiter(s) (50 mL/Hr) IV Continuous <Continuous>  dextrose 50% Injectable 12.5 Gram(s) IV Push once  dextrose 50% Injectable 25 Gram(s) IV Push once  dextrose 50% Injectable 25 Gram(s) IV Push once  docusate sodium 100 milliGRAM(s) Oral two times a day  famotidine    Tablet 20 milliGRAM(s) Oral daily  ferrous    sulfate 325 milliGRAM(s) Oral daily  folic acid 1 milliGRAM(s) Oral daily  insulin lispro (HumaLOG) corrective regimen sliding scale   SubCutaneous at bedtime  insulin lispro (HumaLOG) corrective regimen sliding scale   SubCutaneous three times a day before meals  lisinopril 5 milliGRAM(s) Oral daily  mirtazapine 15 milliGRAM(s) Oral at bedtime  morphine ER Tablet 15 milliGRAM(s) Oral every 8 hours  nystatin    Suspension 847130 Unit(s) Oral four times a day  piperacillin/tazobactam IVPB. 3.375 Gram(s) IV Intermittent every 8 hours  senna 2 Tablet(s) Oral at bedtime  sodium chloride 1 Gram(s) Oral two times a day  sodium chloride 0.9%. 1000 milliLiter(s) (50 mL/Hr) IV Continuous <Continuous>      PHYSICAL EXAM:  T(C): 36.8 (05-23-18 @ 05:31), Max: 36.8 (05-22-18 @ 21:29)  HR: 74 (05-23-18 @ 05:31) (68 - 76)  BP: 129/52 (05-23-18 @ 05:31) (121/48 - 134/47)  RR: 18 (05-23-18 @ 05:31) (18 - 19)  SpO2: 96% (05-23-18 @ 05:31) (94% - 97%)  Wt(kg): --  I&O's Summary      JVP: Normal  Neck: supple  Lung: clear   CV: S1 S2 , Murmur:  Abd: soft  Ext: No edema  neuro: Awake   Psych: flat affect  Skin: normal     LABS/DATA:    CARDIAC MARKERS:                                8.1    8.82  )-----------( 338      ( 23 May 2018 05:55 )             25.0     05-23    131<L>  |  95<L>  |  6<L>  ----------------------------<  80  3.9   |  22  |  0.52    Ca    8.0<L>      23 May 2018 05:55    TPro  6.4  /  Alb  2.3<L>  /  TBili  0.3  /  DBili  x   /  AST  37<H>  /  ALT  22  /  AlkPhos  62  05-23    proBNP:   Lipid Profile:   HgA1c:   TSH:     TELE:  EKG:

## 2018-05-23 NOTE — PROGRESS NOTE ADULT - SUBJECTIVE AND OBJECTIVE BOX
[facility] [place]:[id number],   78yFemale followed for:  No Known Allergies    PAST MEDICAL & SURGICAL HISTORY:  Rectal mass  Gangrene of foot  Coronary artery disease involving native coronary artery of native heart without angina pectoris  Status post above knee amputation of right lower extremity    FAMILY HISTORY:  No pertinent family history in first degree relatives    MEDICATIONS  (STANDING):  atorvastatin 40 milliGRAM(s) Oral at bedtime  carvedilol 12.5 milliGRAM(s) Oral every 12 hours  dextrose 5%. 1000 milliLiter(s) (50 mL/Hr) IV Continuous <Continuous>  dextrose 50% Injectable 12.5 Gram(s) IV Push once  dextrose 50% Injectable 25 Gram(s) IV Push once  dextrose 50% Injectable 25 Gram(s) IV Push once  docusate sodium 100 milliGRAM(s) Oral two times a day  famotidine    Tablet 20 milliGRAM(s) Oral daily  ferrous    sulfate 325 milliGRAM(s) Oral daily  folic acid 1 milliGRAM(s) Oral daily  insulin lispro (HumaLOG) corrective regimen sliding scale   SubCutaneous at bedtime  insulin lispro (HumaLOG) corrective regimen sliding scale   SubCutaneous three times a day before meals  lisinopril 5 milliGRAM(s) Oral daily  mirtazapine 15 milliGRAM(s) Oral at bedtime  morphine ER Tablet 15 milliGRAM(s) Oral every 8 hours  nystatin    Suspension 018270 Unit(s) Oral four times a day  piperacillin/tazobactam IVPB. 3.375 Gram(s) IV Intermittent every 8 hours  senna 2 Tablet(s) Oral at bedtime  sodium chloride 1 Gram(s) Oral two times a day  sodium chloride 0.9%. 1000 milliLiter(s) (50 mL/Hr) IV Continuous <Continuous>    MEDICATIONS  (PRN):  acetaminophen   Tablet 650 milliGRAM(s) Oral every 6 hours PRN For Temp greater than 38 C (100.4 F)  acetaminophen   Tablet. 650 milliGRAM(s) Oral every 6 hours PRN Mild Pain (1 - 3)  dextrose 40% Gel 15 Gram(s) Oral once PRN Blood Glucose LESS THAN 70 milliGRAM(s)/deciliter  glucagon  Injectable 1 milliGRAM(s) IntraMuscular once PRN Glucose LESS THAN 70 milligrams/deciliter  morphine  IR 15 milliGRAM(s) Oral every 4 hours PRN moderate to severe pain  ondansetron Injectable 4 milliGRAM(s) IV Push every 6 hours PRN Nausea      Vital Signs Last 24 Hrs  T(C): 36.8 (23 May 2018 05:31), Max: 36.8 (22 May 2018 21:29)  T(F): 98.2 (23 May 2018 05:31), Max: 98.2 (22 May 2018 21:29)  HR: 74 (23 May 2018 05:31) (68 - 76)  BP: 129/52 (23 May 2018 05:31) (121/48 - 134/47)  BP(mean): --  RR: 18 (23 May 2018 05:31) (18 - 19)  SpO2: 96% (23 May 2018 05:31) (94% - 97%)  nc/at  s1s2  cta  soft, nt, nd no guarding or rebound  no c/c/e    CBC Full  -  ( 23 May 2018 05:55 )  WBC Count : 8.82 K/uL  Hemoglobin : 8.1 g/dL  Hematocrit : 25.0 %  Platelet Count - Automated : 338 K/uL  Mean Cell Volume : 86.2 fL  Mean Cell Hemoglobin : 27.9 pg  Mean Cell Hemoglobin Concentration : 32.4 %  Auto Neutrophil # : 6.35 K/uL  Auto Lymphocyte # : 1.44 K/uL  Auto Monocyte # : 0.65 K/uL  Auto Eosinophil # : 0.25 K/uL  Auto Basophil # : 0.03 K/uL  Auto Neutrophil % : 72.1 %  Auto Lymphocyte % : 16.3 %  Auto Monocyte % : 7.4 %  Auto Eosinophil % : 2.8 %  Auto Basophil % : 0.3 %    05-23    131<L>  |  95<L>  |  6<L>  ----------------------------<  80  3.9   |  22  |  0.52    Ca    8.0<L>      23 May 2018 05:55    TPro  6.4  /  Alb  2.3<L>  /  TBili  0.3  /  DBili  x   /  AST  37<H>  /  ALT  22  /  AlkPhos  62  05-23    PT/INR - ( 22 May 2018 06:05 )   PT: 14.5 SEC;   INR: 1.26          PTT - ( 22 May 2018 06:05 )  PTT:32.0 SEC

## 2018-05-23 NOTE — PROGRESS NOTE ADULT - ASSESSMENT
PreOp  Based on current ACC/AHA guidelines, patient history and physical exam, the patient is considered to have elevated risk  stress test unremarkable   may proceed to OR however on hold for now as per surgery     CAD  stress test unremarkable   cont statin    HTN  stable    DM  Monitor finger stick. Insulin coverage. Diabetic education and Diabetic diet. Consider nutrition consultation.     rectal ca with perirectal abscess  on anbx  fu with ID/sugery  pain control     Anemia  Monitor hemoglobin, transfuse as needed.

## 2018-05-23 NOTE — PROGRESS NOTE ADULT - ASSESSMENT
78  y.o. woman with history of PAD and rectal mass who was sent to the ER for evaluation of abdominal pain and rectal mass by her oncologist. Patient reports severe suprapubic pain, radiates to the rectum, aggravated with movement and palpation, alleviated with pain medications. Seen by Pallitive pain, Oncology and Rt onc.  Bxp- Positive Rectal Cancer.  Neeedd 5 days RT- plan to end about 5/23- but was stopped secondary to Rectal Abscess after  3/5  MRI pelvis Multiple new cystic lesions to the   left and posterior to the rectum may represent abscesses, in the right   clinical setting. Neoplastic extension is also possible. Mild enhancement   at the base of the urinary bladder may be reactive, however, minimal   involvement with disease is not excluded. No focal bladder lesion.  Picc line today 5/23 then NH with picc line to complete 14 days of Iv zosyn.

## 2018-05-23 NOTE — PROGRESS NOTE ADULT - PROVIDER SPECIALTY LIST ADULT
Cardiology
Colorectal Surgery
Gastroenterology
Heme/Onc
Heme/Onc
Hospitalist
Infectious Disease
Palliative Care
Gastroenterology
Cardiology
Gastroenterology
Infectious Disease
Hospitalist

## 2018-05-26 RX ORDER — CYPROHEPTADINE HYDROCHLORIDE 4 MG/1
1 TABLET ORAL
Qty: 0 | Refills: 0 | COMMUNITY
Start: 2018-05-26 | End: 2018-06-09

## 2018-05-30 ENCOUNTER — RESULT REVIEW (OUTPATIENT)
Age: 78
End: 2018-05-30

## 2018-05-30 ENCOUNTER — APPOINTMENT (OUTPATIENT)
Dept: HEMATOLOGY ONCOLOGY | Facility: CLINIC | Age: 78
End: 2018-05-30
Payer: MEDICARE

## 2018-05-30 ENCOUNTER — LABORATORY RESULT (OUTPATIENT)
Age: 78
End: 2018-05-30

## 2018-05-30 VITALS
RESPIRATION RATE: 16 BRPM | WEIGHT: 87.72 LBS | SYSTOLIC BLOOD PRESSURE: 110 MMHG | OXYGEN SATURATION: 96 % | TEMPERATURE: 98 F | HEART RATE: 72 BPM | DIASTOLIC BLOOD PRESSURE: 64 MMHG

## 2018-05-30 DIAGNOSIS — N73.9 FEMALE PELVIC INFLAMMATORY DISEASE, UNSPECIFIED: ICD-10-CM

## 2018-05-30 DIAGNOSIS — Z87.448 PERSONAL HISTORY OF OTHER DISEASES OF URINARY SYSTEM: ICD-10-CM

## 2018-05-30 DIAGNOSIS — D64.9 ANEMIA, UNSPECIFIED: ICD-10-CM

## 2018-05-30 DIAGNOSIS — Z87.898 PERSONAL HISTORY OF OTHER SPECIFIED CONDITIONS: ICD-10-CM

## 2018-05-30 DIAGNOSIS — Z87.440 PERSONAL HISTORY OF URINARY (TRACT) INFECTIONS: ICD-10-CM

## 2018-05-30 LAB
BASOPHILS # BLD AUTO: 0 K/UL — SIGNIFICANT CHANGE UP (ref 0–0.2)
BASOPHILS NFR BLD AUTO: 0.4 % — SIGNIFICANT CHANGE UP (ref 0–2)
EOSINOPHIL # BLD AUTO: 0.4 K/UL — SIGNIFICANT CHANGE UP (ref 0–0.5)
EOSINOPHIL NFR BLD AUTO: 4.2 % — SIGNIFICANT CHANGE UP (ref 0–6)
HCT VFR BLD CALC: 26.1 % — LOW (ref 34.5–45)
HGB BLD-MCNC: 8.8 G/DL — LOW (ref 11.5–15.5)
LYMPHOCYTES # BLD AUTO: 1.4 K/UL — SIGNIFICANT CHANGE UP (ref 1–3.3)
LYMPHOCYTES # BLD AUTO: 15.9 % — SIGNIFICANT CHANGE UP (ref 13–44)
MCHC RBC-ENTMCNC: 29.6 PG — SIGNIFICANT CHANGE UP (ref 27–34)
MCHC RBC-ENTMCNC: 33.8 G/DL — SIGNIFICANT CHANGE UP (ref 32–36)
MCV RBC AUTO: 87.4 FL — SIGNIFICANT CHANGE UP (ref 80–100)
MONOCYTES # BLD AUTO: 0.6 K/UL — SIGNIFICANT CHANGE UP (ref 0–0.9)
MONOCYTES NFR BLD AUTO: 7 % — SIGNIFICANT CHANGE UP (ref 2–14)
NEUTROPHILS # BLD AUTO: 6.3 K/UL — SIGNIFICANT CHANGE UP (ref 1.8–7.4)
NEUTROPHILS NFR BLD AUTO: 72.6 % — SIGNIFICANT CHANGE UP (ref 43–77)
PLATELET # BLD AUTO: 330 K/UL — SIGNIFICANT CHANGE UP (ref 150–400)
RBC # BLD: 2.98 M/UL — LOW (ref 3.8–5.2)
RBC # FLD: 15.3 % — HIGH (ref 10.3–14.5)
WBC # BLD: 8.7 K/UL — SIGNIFICANT CHANGE UP (ref 3.8–10.5)
WBC # FLD AUTO: 8.7 K/UL — SIGNIFICANT CHANGE UP (ref 3.8–10.5)

## 2018-05-30 PROCEDURE — 99215 OFFICE O/P EST HI 40 MIN: CPT

## 2018-06-07 RX ORDER — ERTAPENEM SODIUM 1 G/1
1 INJECTION, POWDER, LYOPHILIZED, FOR SOLUTION INTRAMUSCULAR; INTRAVENOUS
Qty: 0 | Refills: 0 | COMMUNITY
Start: 2018-06-07 | End: 2018-06-14

## 2018-06-08 ENCOUNTER — INPATIENT (INPATIENT)
Facility: HOSPITAL | Age: 78
LOS: 24 days | Discharge: NURSING, REHAB OR LONG-TERM CARE | DRG: 329 | End: 2018-07-03
Attending: INTERNAL MEDICINE | Admitting: INTERNAL MEDICINE
Payer: MEDICAID

## 2018-06-08 VITALS
HEART RATE: 64 BPM | RESPIRATION RATE: 18 BRPM | OXYGEN SATURATION: 97 % | SYSTOLIC BLOOD PRESSURE: 108 MMHG | TEMPERATURE: 98 F | DIASTOLIC BLOOD PRESSURE: 70 MMHG

## 2018-06-08 DIAGNOSIS — Z89.611 ACQUIRED ABSENCE OF RIGHT LEG ABOVE KNEE: Chronic | ICD-10-CM

## 2018-06-08 DIAGNOSIS — R50.9 FEVER, UNSPECIFIED: ICD-10-CM

## 2018-06-08 PROBLEM — Z87.440 HISTORY OF URINARY TRACT INFECTION: Status: RESOLVED | Noted: 2017-09-06 | Resolved: 2018-06-08

## 2018-06-08 PROBLEM — C20 RECTAL CANCER: Status: ACTIVE | Noted: 2018-06-08

## 2018-06-08 PROBLEM — Z87.898 HISTORY OF URINARY FREQUENCY: Status: RESOLVED | Noted: 2017-09-06 | Resolved: 2018-06-08

## 2018-06-08 PROBLEM — Z86.79 HISTORY OF PERIPHERAL ARTERIAL DISEASE: Status: RESOLVED | Noted: 2018-06-08 | Resolved: 2018-06-08

## 2018-06-08 PROBLEM — I96 GANGRENE OF LOWER EXTREMITY: Status: RESOLVED | Noted: 2018-06-08 | Resolved: 2018-06-08

## 2018-06-08 PROBLEM — D64.9 ANEMIA: Status: ACTIVE | Noted: 2018-06-08

## 2018-06-08 PROBLEM — Z87.11 HISTORY OF PEPTIC ULCER: Status: RESOLVED | Noted: 2018-06-08 | Resolved: 2018-06-08

## 2018-06-08 PROBLEM — Z86.79 HISTORY OF CORONARY ARTERY DISEASE: Status: RESOLVED | Noted: 2018-06-08 | Resolved: 2018-06-08

## 2018-06-08 PROBLEM — N73.9 PELVIC ABSCESS IN FEMALE: Status: ACTIVE | Noted: 2018-06-03

## 2018-06-08 PROBLEM — R91.1 PULMONARY NODULE: Status: ACTIVE | Noted: 2018-06-08

## 2018-06-08 PROBLEM — Z87.448 HISTORY OF HEMATURIA: Status: RESOLVED | Noted: 2017-09-06 | Resolved: 2018-06-08

## 2018-06-08 LAB
ALBUMIN SERPL ELPH-MCNC: 2.5 G/DL — LOW (ref 3.3–5)
ALBUMIN SERPL ELPH-MCNC: 3.3 G/DL
ALP BLD-CCNC: 74 U/L
ALP SERPL-CCNC: 64 U/L — SIGNIFICANT CHANGE UP (ref 40–120)
ALT FLD-CCNC: 19 U/L — SIGNIFICANT CHANGE UP (ref 10–45)
ALT SERPL-CCNC: 17 U/L
ANION GAP SERPL CALC-SCNC: 11 MMOL/L — SIGNIFICANT CHANGE UP (ref 5–17)
ANION GAP SERPL CALC-SCNC: 15 MMOL/L
APTT BLD: 34.9 SEC
AST SERPL-CCNC: 23 U/L
AST SERPL-CCNC: 67 U/L — HIGH (ref 10–40)
BASOPHILS # BLD AUTO: 0 K/UL — SIGNIFICANT CHANGE UP (ref 0–0.2)
BASOPHILS NFR BLD AUTO: 0.1 % — SIGNIFICANT CHANGE UP (ref 0–2)
BILIRUB SERPL-MCNC: 0.2 MG/DL
BILIRUB SERPL-MCNC: 0.4 MG/DL — SIGNIFICANT CHANGE UP (ref 0.2–1.2)
BUN SERPL-MCNC: 10 MG/DL
BUN SERPL-MCNC: 10 MG/DL — SIGNIFICANT CHANGE UP (ref 7–23)
CALCIUM SERPL-MCNC: 8 MG/DL — LOW (ref 8.4–10.5)
CALCIUM SERPL-MCNC: 8.9 MG/DL
CEA SERPL-MCNC: 3.3 NG/ML
CHLORIDE SERPL-SCNC: 97 MMOL/L
CHLORIDE SERPL-SCNC: 97 MMOL/L — SIGNIFICANT CHANGE UP (ref 96–108)
CO2 SERPL-SCNC: 21 MMOL/L — LOW (ref 22–31)
CO2 SERPL-SCNC: 23 MMOL/L
CREAT SERPL-MCNC: 0.42 MG/DL — LOW (ref 0.5–1.3)
CREAT SERPL-MCNC: 0.62 MG/DL
EOSINOPHIL # BLD AUTO: 0.1 K/UL — SIGNIFICANT CHANGE UP (ref 0–0.5)
EOSINOPHIL NFR BLD AUTO: 0.8 % — SIGNIFICANT CHANGE UP (ref 0–6)
GAS PNL BLDV: SIGNIFICANT CHANGE UP
GLUCOSE SERPL-MCNC: 146 MG/DL
GLUCOSE SERPL-MCNC: 98 MG/DL — SIGNIFICANT CHANGE UP (ref 70–99)
HCT VFR BLD CALC: 25 % — LOW (ref 34.5–45)
HGB BLD-MCNC: 8.2 G/DL — LOW (ref 11.5–15.5)
LYMPHOCYTES # BLD AUTO: 0.7 K/UL — LOW (ref 1–3.3)
LYMPHOCYTES # BLD AUTO: 5.2 % — LOW (ref 13–44)
MCHC RBC-ENTMCNC: 29.8 PG — SIGNIFICANT CHANGE UP (ref 27–34)
MCHC RBC-ENTMCNC: 33 GM/DL — SIGNIFICANT CHANGE UP (ref 32–36)
MCV RBC AUTO: 90.3 FL — SIGNIFICANT CHANGE UP (ref 80–100)
MONOCYTES # BLD AUTO: 0.3 K/UL — SIGNIFICANT CHANGE UP (ref 0–0.9)
MONOCYTES NFR BLD AUTO: 2 % — SIGNIFICANT CHANGE UP (ref 2–14)
NEUTROPHILS # BLD AUTO: 12.8 K/UL — HIGH (ref 1.8–7.4)
NEUTROPHILS NFR BLD AUTO: 91.8 % — HIGH (ref 43–77)
PLATELET # BLD AUTO: 284 K/UL — SIGNIFICANT CHANGE UP (ref 150–400)
POTASSIUM SERPL-MCNC: 3.5 MMOL/L — SIGNIFICANT CHANGE UP (ref 3.5–5.3)
POTASSIUM SERPL-MCNC: 5.7 MMOL/L — HIGH (ref 3.5–5.3)
POTASSIUM SERPL-SCNC: 3.5 MMOL/L — SIGNIFICANT CHANGE UP (ref 3.5–5.3)
POTASSIUM SERPL-SCNC: 4.4 MMOL/L
POTASSIUM SERPL-SCNC: 5.7 MMOL/L — HIGH (ref 3.5–5.3)
PROT SERPL-MCNC: 6.7 G/DL — SIGNIFICANT CHANGE UP (ref 6–8.3)
PROT SERPL-MCNC: 6.8 G/DL
RBC # BLD: 2.77 M/UL — LOW (ref 3.8–5.2)
RBC # FLD: 16.1 % — HIGH (ref 10.3–14.5)
SODIUM SERPL-SCNC: 129 MMOL/L — LOW (ref 135–145)
SODIUM SERPL-SCNC: 135 MMOL/L
WBC # BLD: 14 K/UL — HIGH (ref 3.8–10.5)
WBC # FLD AUTO: 14 K/UL — HIGH (ref 3.8–10.5)

## 2018-06-08 PROCEDURE — 74177 CT ABD & PELVIS W/CONTRAST: CPT | Mod: 26

## 2018-06-08 PROCEDURE — 44144 PARTIAL REMOVAL OF COLON: CPT

## 2018-06-08 PROCEDURE — 71045 X-RAY EXAM CHEST 1 VIEW: CPT | Mod: 26

## 2018-06-08 PROCEDURE — 99285 EMERGENCY DEPT VISIT HI MDM: CPT

## 2018-06-08 PROCEDURE — 71260 CT THORAX DX C+: CPT | Mod: 26

## 2018-06-08 PROCEDURE — 99222 1ST HOSP IP/OBS MODERATE 55: CPT

## 2018-06-08 RX ORDER — PIPERACILLIN AND TAZOBACTAM 4; .5 G/20ML; G/20ML
3.38 INJECTION, POWDER, LYOPHILIZED, FOR SOLUTION INTRAVENOUS ONCE
Qty: 0 | Refills: 0 | Status: COMPLETED | OUTPATIENT
Start: 2018-06-08 | End: 2018-06-08

## 2018-06-08 RX ORDER — FAMOTIDINE 20 MG/1
20 TABLET, FILM COATED ORAL
Qty: 30 | Refills: 0 | Status: ACTIVE | COMMUNITY
Start: 2018-06-08

## 2018-06-08 RX ORDER — NYSTATIN 500MM UNIT
500000 POWDER (EA) MISCELLANEOUS
Qty: 0 | Refills: 0 | Status: DISCONTINUED | OUTPATIENT
Start: 2018-06-08 | End: 2018-06-16

## 2018-06-08 RX ORDER — ACETAMINOPHEN 500 MG
650 TABLET ORAL EVERY 6 HOURS
Qty: 0 | Refills: 0 | Status: DISCONTINUED | OUTPATIENT
Start: 2018-06-08 | End: 2018-06-20

## 2018-06-08 RX ORDER — INFLUENZA VIRUS VACCINE 15; 15; 15; 15 UG/.5ML; UG/.5ML; UG/.5ML; UG/.5ML
0.5 SUSPENSION INTRAMUSCULAR ONCE
Qty: 0 | Refills: 0 | Status: COMPLETED | OUTPATIENT
Start: 2018-06-08 | End: 2018-06-08

## 2018-06-08 RX ORDER — MAGNESIUM HYDROXIDE 400 MG/5ML
1200 SUSPENSION, ORAL (FINAL DOSE FORM) ORAL
Qty: 355 | Refills: 1 | Status: ACTIVE | COMMUNITY
Start: 2018-06-08

## 2018-06-08 RX ORDER — OXYCODONE HYDROCHLORIDE AND ACETAMINOPHEN 5; 325 MG/1; MG/1
5-325 TABLET ORAL
Qty: 40 | Refills: 0 | Status: DISCONTINUED | COMMUNITY
Start: 2018-06-08 | End: 2018-06-08

## 2018-06-08 RX ORDER — FERROUS SULFATE 325(65) MG
1 TABLET ORAL
Qty: 0 | Refills: 0 | COMMUNITY

## 2018-06-08 RX ORDER — CARVEDILOL PHOSPHATE 80 MG/1
12.5 CAPSULE, EXTENDED RELEASE ORAL EVERY 12 HOURS
Qty: 0 | Refills: 0 | Status: DISCONTINUED | OUTPATIENT
Start: 2018-06-08 | End: 2018-06-12

## 2018-06-08 RX ORDER — MORPHINE SULFATE 50 MG/1
15 CAPSULE, EXTENDED RELEASE ORAL EVERY 4 HOURS
Qty: 0 | Refills: 0 | Status: DISCONTINUED | OUTPATIENT
Start: 2018-06-08 | End: 2018-06-11

## 2018-06-08 RX ORDER — HEPARIN SODIUM 5000 [USP'U]/ML
5000 INJECTION INTRAVENOUS; SUBCUTANEOUS EVERY 12 HOURS
Qty: 0 | Refills: 0 | Status: DISCONTINUED | OUTPATIENT
Start: 2018-06-08 | End: 2018-06-20

## 2018-06-08 RX ORDER — VANCOMYCIN HCL 1 G
1000 VIAL (EA) INTRAVENOUS EVERY 24 HOURS
Qty: 0 | Refills: 0 | Status: DISCONTINUED | OUTPATIENT
Start: 2018-06-08 | End: 2018-06-16

## 2018-06-08 RX ORDER — PIPERACILLIN AND TAZOBACTAM 4; .5 G/20ML; G/20ML
3.38 INJECTION, POWDER, LYOPHILIZED, FOR SOLUTION INTRAVENOUS EVERY 8 HOURS
Qty: 0 | Refills: 0 | Status: DISCONTINUED | OUTPATIENT
Start: 2018-06-08 | End: 2018-06-16

## 2018-06-08 RX ORDER — FAMOTIDINE 10 MG/ML
20 INJECTION INTRAVENOUS DAILY
Qty: 0 | Refills: 0 | Status: DISCONTINUED | OUTPATIENT
Start: 2018-06-08 | End: 2018-06-20

## 2018-06-08 RX ORDER — LISINOPRIL 2.5 MG/1
5 TABLET ORAL DAILY
Qty: 0 | Refills: 0 | Status: DISCONTINUED | OUTPATIENT
Start: 2018-06-08 | End: 2018-06-12

## 2018-06-08 RX ORDER — METFORMIN HYDROCHLORIDE 850 MG/1
1000 TABLET ORAL DAILY
Qty: 0 | Refills: 0 | Status: DISCONTINUED | OUTPATIENT
Start: 2018-06-08 | End: 2018-06-10

## 2018-06-08 RX ORDER — MORPHINE SULFATE 50 MG/1
15 CAPSULE, EXTENDED RELEASE ORAL EVERY 8 HOURS
Qty: 0 | Refills: 0 | Status: DISCONTINUED | OUTPATIENT
Start: 2018-06-08 | End: 2018-06-11

## 2018-06-08 RX ORDER — ATORVASTATIN CALCIUM 80 MG/1
40 TABLET, FILM COATED ORAL AT BEDTIME
Qty: 0 | Refills: 0 | Status: DISCONTINUED | OUTPATIENT
Start: 2018-06-08 | End: 2018-06-20

## 2018-06-08 RX ORDER — ATORVASTATIN CALCIUM 40 MG/1
40 TABLET, FILM COATED ORAL DAILY
Refills: 0 | Status: DISCONTINUED | COMMUNITY
Start: 2018-06-08 | End: 2018-06-08

## 2018-06-08 RX ORDER — MORPHINE SULFATE 15 MG/1
15 TABLET, FILM COATED, EXTENDED RELEASE ORAL
Qty: 60 | Refills: 0 | Status: ACTIVE | COMMUNITY
Start: 2018-06-08

## 2018-06-08 RX ORDER — VANCOMYCIN HCL 1 G
1000 VIAL (EA) INTRAVENOUS ONCE
Qty: 0 | Refills: 0 | Status: COMPLETED | OUTPATIENT
Start: 2018-06-08 | End: 2018-06-08

## 2018-06-08 RX ORDER — MIRTAZAPINE 45 MG/1
15 TABLET, ORALLY DISINTEGRATING ORAL AT BEDTIME
Qty: 0 | Refills: 0 | Status: DISCONTINUED | OUTPATIENT
Start: 2018-06-08 | End: 2018-06-20

## 2018-06-08 RX ORDER — CHLORHEXIDINE GLUCONATE 4 %
325 (65 FE) LIQUID (ML) TOPICAL DAILY
Qty: 30 | Refills: 3 | Status: DISCONTINUED | COMMUNITY
Start: 2018-06-08 | End: 2018-06-08

## 2018-06-08 RX ORDER — SODIUM CHLORIDE 9 MG/ML
1000 INJECTION INTRAMUSCULAR; INTRAVENOUS; SUBCUTANEOUS ONCE
Qty: 0 | Refills: 0 | Status: COMPLETED | OUTPATIENT
Start: 2018-06-08 | End: 2018-06-08

## 2018-06-08 RX ORDER — MORPHINE SULFATE 15 MG/1
15 TABLET ORAL
Refills: 0 | Status: ACTIVE | COMMUNITY
Start: 2018-06-08

## 2018-06-08 RX ADMIN — PIPERACILLIN AND TAZOBACTAM 25 GRAM(S): 4; .5 INJECTION, POWDER, LYOPHILIZED, FOR SOLUTION INTRAVENOUS at 22:17

## 2018-06-08 RX ADMIN — Medication 250 MILLIGRAM(S): at 18:13

## 2018-06-08 RX ADMIN — ATORVASTATIN CALCIUM 40 MILLIGRAM(S): 80 TABLET, FILM COATED ORAL at 21:28

## 2018-06-08 RX ADMIN — PIPERACILLIN AND TAZOBACTAM 200 GRAM(S): 4; .5 INJECTION, POWDER, LYOPHILIZED, FOR SOLUTION INTRAVENOUS at 15:48

## 2018-06-08 RX ADMIN — MORPHINE SULFATE 15 MILLIGRAM(S): 50 CAPSULE, EXTENDED RELEASE ORAL at 21:28

## 2018-06-08 RX ADMIN — CARVEDILOL PHOSPHATE 12.5 MILLIGRAM(S): 80 CAPSULE, EXTENDED RELEASE ORAL at 22:17

## 2018-06-08 RX ADMIN — SODIUM CHLORIDE 1000 MILLILITER(S): 9 INJECTION INTRAMUSCULAR; INTRAVENOUS; SUBCUTANEOUS at 14:56

## 2018-06-08 RX ADMIN — Medication 500000 UNIT(S): at 22:19

## 2018-06-08 RX ADMIN — HEPARIN SODIUM 5000 UNIT(S): 5000 INJECTION INTRAVENOUS; SUBCUTANEOUS at 21:28

## 2018-06-08 RX ADMIN — MIRTAZAPINE 15 MILLIGRAM(S): 45 TABLET, ORALLY DISINTEGRATING ORAL at 21:28

## 2018-06-08 NOTE — ED PROVIDER NOTE - ATTENDING CONTRIBUTION TO CARE
Patient with history of colon Ca, was receiving radiation therapy but on hold for rectal abscesses, on Abx while in nursing home over past two days having no appetite, worsening fevers/chills, reported low blood pressure per son.    On exam patient ill appearing, sleepy but arousable, vital signs within normal limits, RRR S1/S2, lungs non focal but shallow respirations, abdomen soft, non distended, moving all extremities.    Concern for progression of rectal abscesses or new infectious etiology, plan for labs, CT C/A/P to evaluate for sources, cultures, continue IV ABx, admission.

## 2018-06-08 NOTE — ED PROVIDER NOTE - OBJECTIVE STATEMENT
78 YOF p/w chills and fever. Pt has hx of colon cancer with rectal abscess being treated outpatient with picc line and zosyn. Pt endorses mild abdominal pain lower quadrant and rectal pain. Pt denies back pain, denies cough.

## 2018-06-08 NOTE — ED PROVIDER NOTE - CONSTITUTIONAL, MLM
normal... ill appearing, awake, alert, oriented to person, place, time/situation and in no apparent distress.

## 2018-06-08 NOTE — H&P ADULT - NSHPLABSRESULTS_GEN_ALL_CORE
8.2    14.0  )-----------( 284      ( 08 Jun 2018 15:02 )             25.0               06-08    129<L>  |  97  |  10  ----------------------------<  98  5.7<H>   |  21<L>  |  0.42<L>    Ca    8.0<L>      08 Jun 2018 15:02    TPro  6.7  /  Alb  2.5<L>  /  TBili  0.4  /  DBili  x   /  AST  67<H>  /  ALT  19  /  AlkPhos  64  06-08

## 2018-06-08 NOTE — CONSULT NOTE ADULT - ASSESSMENT
78 year old Romansh-speaking woman with CAD, PAD s/p RLE amputation presents this admission with abdominal pain, bleeding per rectum for the past few months.  rectal mass on CT imaging of abdomen/pelvis, underwent colonscopy, lower EUS with GI, which confirms large (10x5cm), circumferential rectal mass 4cm from anal verge, stage IIIB ([id number]) by EUS examination.   s/p RT  Recent clint-rectal abscess V neoplasm  Seen by surgery  Improved on abx  Discharged on zosyn with plan for interval surgery-but now again with fever  SIRS  clint-rectal induration but could be due to RT  PICC line no s/s external infection  CXR,UA clear  ?Abscess  ?PICC related BSI  ?Other occult process   ?Tumor related    Rec:  1) Blood ,urine Cx  2) CT abd ,pelvis  3) Empiric vanco + zosyn  4) Consider CRS eval after CT  5) Will tailor plan for ID issues  per course,results.Will defer to primary team on management of other issues.  Case d/w Dr Montgomery,med NP  Will tailor plan for ID issues  per course,results.Will defer to primary team on management of other issues..wk

## 2018-06-08 NOTE — ED ADULT NURSE NOTE - OBJECTIVE STATEMENT
78 year old female alert arrived by EMS from [facility] for fever and shivering and increased fatigue.  Patient was in hospital 2 weeks ago per daughter for infection and was discharged with PICC line and IV abx.  Patient has been receiving abx and IV hydration at the NH.  Patient was found to have a temp of 99.9 at NH.  Patient has rectal ca with radiation, last radiation treatment per daughter was 4 weeks ago.  Patient has soft non-tender abdomen, but is on Morphine for pain control.  Patient was reported to have a positive cxr for pna.  NH reports minimal urinary output despite IV hydration.  Patient has right AKA and Stage one on sacrum.  L/s clear b/l.  Patient denies; chest pain, shortness of breath.  Patient safety ensured.

## 2018-06-08 NOTE — CONSULT NOTE ADULT - ASSESSMENT
pt with htn, hld, dm 2,  pad, right femoral A graft. occluded,   recent d x of rectal cancer, s/p RT  also  with lung mass on ct , ?  primary lung cancer,  also  was  on zosyn  fpr  rectal abscesses  now with fevers/ chills  labs  pending   ct abdomen /  chest, pending   may need mri abd/pelvis   ID dr cherry    surg onc dr pj ferreira,/  onc dr maik benitez   dvt ppx   follow  c/s   on  zosyn   further  plans depend  on  labs/ imaging studies    < from: CT Chest No Cont (04.13.18 @ 19:22) >    IMPRESSION:     6 mm spiculated nodule in the right upper lobe suspicious for primary   lung neoplasm given background centrilobular emphysema instead of a   metastasis.    3 mm groundglass nodule in the right upper lobe.      < from: CT Abdomen and Pelvis w/ Oral Cont and w/ IV Cont (04.10.18 @ 13:37) >    IMPRESSION:     Rectal neoplasm.    A 4 mm left lower lobe pulmonary nodule.    These findings were discussed with Dr. Meng4/10/2018 1:57 PM by Dr. Saba with read back confirmation.          < end of copied text > pt with htn, hld, dm 2,  pad, right femoral A graft. occluded,   recent d x of rectal cancer, s/p RT  also  with lung mass on ct , ?  primary lung cancer,  also  was  on zosyn  fpr  rectal abscesses  now with fevers/ chills  labs  pending   ct abdomen /  chest, pending   may need mri abd/pelvis   ID dr cherry    surg onc dr pj ferreira,/  onc dr maik benitez   dvt ppx,  pain meds/  laxatives   follow  c/s   on  zosyn   further  plans depend  on  labs/ imaging studies    < from: CT Chest No Cont (04.13.18 @ 19:22) >    IMPRESSION:     6 mm spiculated nodule in the right upper lobe suspicious for primary   lung neoplasm given background centrilobular emphysema instead of a   metastasis.    3 mm groundglass nodule in the right upper lobe.      < from: CT Abdomen and Pelvis w/ Oral Cont and w/ IV Cont (04.10.18 @ 13:37) >    IMPRESSION:     Rectal neoplasm.    A 4 mm left lower lobe pulmonary nodule.    These findings were discussed with Dr. Meng4/10/2018 1:57 PM by Dr. Saba with read back confirmation.          < end of copied text >

## 2018-06-08 NOTE — CONSULT NOTE ADULT - SUBJECTIVE AND OBJECTIVE BOX
Patient is a 78y old  Female who presents with a chief complaint of fever (08 Jun 2018 15:36)    From HPI" HPI:  78 YOF p/w chills and fever. Pt has hx of colon cancer with rectal abscess being treated outpatient with picc line and zosyn. Pt endorses mild abdominal pain lower quadrant and rectal pain. Pt denies back pain, denies cough. (08 Jun 2018 15:36)  "    Above verified-agree with above unless noted below.  patient with h/o CRS Ca was recently on RT  Diaqgnosed with rectal abscess on MRI  Seen by surgery  No drainage  Improved on zosyn  Given 2 week course via picc at NH  was switched to po augmentin with plan for interval imaging(Rehab was unable to do CT immediately)  Had fever to 101 yesterday Brought to ER  \Daughter and son at bedside translating  recd vanco and zosyn     ID consulted     PAST MEDICAL & SURGICAL HISTORY:  Rectal mass  Gangrene of foot  Coronary artery disease involving native coronary artery of native heart without angina pectoris  Status post above knee amputation of right lower extremity      Social history:    no  Smoking,    ETOH,      IVDU       FAMILY HISTORY:  No pertinent family history in first degree relatives  - Reviewed,Non contributory     REVIEW OF SYSTEMS  patient despite  very little verbal response  Denies cough rhinorrhea, urinary complaints  chronic abd pain-unchanged  No rectal bleeding  Constipated  No PICC site pain  Other ROS negative     Allergies    No Known Allergies    Intolerances        Antimicrobials:    nystatin    Suspension 614330 Unit(s) Oral four times a day  piperacillin/tazobactam IVPB. 3.375 Gram(s) IV Intermittent once  piperacillin/tazobactam IVPB. 3.375 Gram(s) IV Intermittent every 8 hours  vancomycin  IVPB 1000 milliGRAM(s) IV Intermittent once  vancomycin  IVPB 1000 milliGRAM(s) IV Intermittent every 24 hours    Other medications reviewed      Vital Signs Last 24 Hrs  T(C): 36.4 (08 Jun 2018 14:00), Max: 36.9 (08 Jun 2018 13:28)  T(F): 97.5 (08 Jun 2018 14:00), Max: 98.4 (08 Jun 2018 13:28)  HR: 65 (08 Jun 2018 14:00) (64 - 65)  BP: 101/45 (08 Jun 2018 14:00) (101/45 - 108/70)  BP(mean): --  RR: 16 (08 Jun 2018 14:00) (16 - 18)  SpO2: 100% (08 Jun 2018 14:00) (97% - 100%)    PHYSICAL EXAM:Pleasant patient in no acute distress.      Constitutional:Comfortable.Awake and alert  + cachexia     Eyes:PERRL EOMI.NO discharge or conjunctival injection    ENMT:No sinus tenderness.No thrush.No pharyngeal exudate or erythema.Fair dental hygiene    Neck:Supple,No LN,no JVD      Respiratory:Good air entry bilaterally,CTA    Cardiovascular:S1 S2 wnl, No murmurs,rub or gallops    Gastrointestinal:Soft BS(+) no tenderness no masses ,No rebound or guarding    Genitourinary:No CVA tendereness     Rectal:Only external-induration R perianal area no discharge     Extremities:Left BKA  Left PICC no erythema or tenderness    Vascular:peripheral pulses felt    Neurological:AAO X 1,No grossly focal deficits    Skin:No rash     Lymph Nodes:No palpable LNs    Musculoskeletal:No joint swelling or LOM    Psychiatric:Affect normal.          Labs:                            8.2    14.0  )-----------( 284      ( 08 Jun 2018 15:02 )             25.0         06-08    129<L>  |  97  |  10  ----------------------------<  98  5.7<H>   |  21<L>  |  0.42<L>    Ca    8.0<L>      08 Jun 2018 15:02    TPro  6.7  /  Alb  2.5<L>  /  TBili  0.4  /  DBili  x   /  AST  67<H>  /  ALT  19  /  AlkPhos  64  06-08        Urinalysis (05.17.18 @ 18:50)    Color: YELLOW    Urine Appearance: CLEAR    Glucose: NEGATIVE    Bilirubin: NEGATIVE    Ketone - Urine: NEGATIVE    Specific Gravity: 1.016    Blood: NEGATIVE    pH - Urine: 6.5    Protein, Urine: 10 mg/dL    Urobilinogen: 4 mg/dL    Nitrite: NEGATIVE    Leukocyte Esterase Concentration: NEGATIVE    Red Blood Cell - Urine: 0-2    White Blood Cell - Urine: 2-5    Mucus: FEW    Non-Squamous Epithelial: <1          < from: Xray Chest 1 View- PORTABLE-Urgent (06.08.18 @ 15:18) >    IMPRESSION:    Clear lungs.        < end of copied text >    < from: MR Pelvis w/ IV Cont (05.18.18 @ 16:34) >    IMPRESSION: Known rectal malignancy. Multiple new cystic lesions to the   left and posterior to therectum may represent abscesses, in the right   clinical setting. Neoplastic extension is also possible. Mild enhancement   at the base of the urinary bladder may be reactive, however, minimal   involvement with disease is not excluded. No focal bladder lesion.    < end of copied text > Patient is a 78y old  Female who presents with a chief complaint of fever (08 Jun 2018 15:36)    From HPI" HPI:  78 YOF p/w chills and fever. Pt has hx of colon cancer with rectal abscess being treated outpatient with picc line and zosyn. Pt endorses mild abdominal pain lower quadrant and rectal pain. Pt denies back pain, denies cough. (08 Jun 2018 15:36)  "    Above verified-agree with above unless noted below.  patient with h/o CRS Ca was recently on RT  Diaqgnosed with rectal abscess on MRI  Seen by surgery  No drainage  Improved on zosyn  Given 2 week course via picc at NH  was switched to po augmentin with plan for interval imaging(Rehab was unable to do CT immediately)  Had fever to 101 yesterday Brought to ER  \Daughter and son at bedside translating  recd vanco and zosyn     ID consulted     PAST MEDICAL & SURGICAL HISTORY:  Rectal mass  Gangrene of foot  Coronary artery disease involving native coronary artery of native heart without angina pectoris  Status post above knee amputation of right lower extremity      Social history:    no  Smoking,    ETOH,      IVDU       FAMILY HISTORY:  No pertinent family history in first degree relatives  - Reviewed,Non contributory     REVIEW OF SYSTEMS  patient despite  very little verbal response  Denies cough rhinorrhea, urinary complaints  chronic abd pain-unchanged  No rectal bleeding  Constipated  No PICC site pain  Other ROS negative     Allergies    No Known Allergies    Intolerances        Antimicrobials:    nystatin    Suspension 115819 Unit(s) Oral four times a day  piperacillin/tazobactam IVPB. 3.375 Gram(s) IV Intermittent once  piperacillin/tazobactam IVPB. 3.375 Gram(s) IV Intermittent every 8 hours  vancomycin  IVPB 1000 milliGRAM(s) IV Intermittent once  vancomycin  IVPB 1000 milliGRAM(s) IV Intermittent every 24 hours    Other medications reviewed      Vital Signs Last 24 Hrs  T(C): 36.4 (08 Jun 2018 14:00), Max: 36.9 (08 Jun 2018 13:28)  T(F): 97.5 (08 Jun 2018 14:00), Max: 98.4 (08 Jun 2018 13:28)  HR: 65 (08 Jun 2018 14:00) (64 - 65)  BP: 101/45 (08 Jun 2018 14:00) (101/45 - 108/70)  BP(mean): --  RR: 16 (08 Jun 2018 14:00) (16 - 18)  SpO2: 100% (08 Jun 2018 14:00) (97% - 100%)    PHYSICAL EXAM:Pleasant patient in no acute distress.      Constitutional:Comfortable.Awake and alert  + cachexia     Eyes:PERRL EOMI.NO discharge or conjunctival injection    ENMT:No sinus tenderness.No thrush.No pharyngeal exudate or erythema.Fair dental hygiene    Neck:Supple,No LN,no JVD      Respiratory:Good air entry bilaterally,CTA    Cardiovascular:S1 S2 wnl, No murmurs,rub or gallops    Gastrointestinal:Soft BS(+) no tenderness no masses ,No rebound or guarding    Genitourinary:No CVA tendereness     Rectal:Only external-induration R perianal area no discharge     Extremities:R BKA  Left PICC no erythema or tenderness    Vascular:peripheral pulses felt    Neurological:AAO X 1,No grossly focal deficits    Skin:No rash     Lymph Nodes:No palpable LNs    Musculoskeletal:No joint swelling or LOM    Psychiatric:Affect normal.          Labs:                            8.2    14.0  )-----------( 284      ( 08 Jun 2018 15:02 )             25.0         06-08    129<L>  |  97  |  10  ----------------------------<  98  5.7<H>   |  21<L>  |  0.42<L>    Ca    8.0<L>      08 Jun 2018 15:02    TPro  6.7  /  Alb  2.5<L>  /  TBili  0.4  /  DBili  x   /  AST  67<H>  /  ALT  19  /  AlkPhos  64  06-08        Urinalysis (05.17.18 @ 18:50)    Color: YELLOW    Urine Appearance: CLEAR    Glucose: NEGATIVE    Bilirubin: NEGATIVE    Ketone - Urine: NEGATIVE    Specific Gravity: 1.016    Blood: NEGATIVE    pH - Urine: 6.5    Protein, Urine: 10 mg/dL    Urobilinogen: 4 mg/dL    Nitrite: NEGATIVE    Leukocyte Esterase Concentration: NEGATIVE    Red Blood Cell - Urine: 0-2    White Blood Cell - Urine: 2-5    Mucus: FEW    Non-Squamous Epithelial: <1          < from: Xray Chest 1 View- PORTABLE-Urgent (06.08.18 @ 15:18) >    IMPRESSION:    Clear lungs.        < end of copied text >    < from: MR Pelvis w/ IV Cont (05.18.18 @ 16:34) >    IMPRESSION: Known rectal malignancy. Multiple new cystic lesions to the   left and posterior to therectum may represent abscesses, in the right   clinical setting. Neoplastic extension is also possible. Mild enhancement   at the base of the urinary bladder may be reactive, however, minimal   involvement with disease is not excluded. No focal bladder lesion.    < end of copied text >

## 2018-06-08 NOTE — H&P ADULT - ASSESSMENT
78F with rectal cancer adm with fever  ID eval  cont IV zosyn until ID sees  surg-onc eval after imaging  CT abd pelvis, r/o abscess CT chest r/o PNA  cont prior meds  IVF  oncology f/u

## 2018-06-08 NOTE — CONSULT NOTE ADULT - SUBJECTIVE AND OBJECTIVE BOX
pt  with  h/o  pad, right leg,,  occluded  right femoral A  graft   recent  d x of rectal   cancer,  seen by  oncology dr maik delgado/  dr pj ferreira,   at [facility]   s/p  RT to rectal  area, per daughter.   on hold now    was  d/c to rehab with picc line/ ab,/  zosyn. per  dr cherry,  for  rectal abscesses    and was  on oral a b per  family, at n home   now in  er,  with fevers/  chills   awaiting labs/  imaging of  abdomen   daughter at bedside    REVIEW OF SYSTEMS:  CONSTITUTIONAL: No weakness,   fevers      RESPIRATORY:  No    shortness of breath  , no  wheezing  CARDIOVASCULAR: No chest pain,   no  palpitations, no  cough  GASTROINTESTINAL:   mild  abdominal  pain, no  vomiting, no  diarrhea  NEUROLOGICAL: No  focal  weakness    PHYSICAL EXAMINATION:  Vital Signs Last 24 Hrs  T(C): 36.9 (08 Jun 2018 13:28), Max: 36.9 (08 Jun 2018 13:28)  T(F): 98.4 (08 Jun 2018 13:28), Max: 98.4 (08 Jun 2018 13:28)  HR: 64 (08 Jun 2018 13:28) (64 - 64)  BP: 108/70 (08 Jun 2018 13:28) (108/70 - 108/70)  BP(mean): --  RR: 18 (08 Jun 2018 13:28) (18 - 18)  SpO2: 97% (08 Jun 2018 13:28) (97% - 97%)  CAPILLARY BLOOD GLUCOSE      GENERAL: NAD, well-groomed,  HEAD:  atraumatic, normocephalic  EYES: sclera anicteric  ENMT: mucous membranes moist  NECK: supple, No JVD  CHEST/LUNG: clear to auscultation bilaterally;    no      rales   ,   no rhonchi,   HEART: normal S1, S2  ABDOMEN: BS+, soft, ND, NT   EXTREMITIES:    no    edema    b/l LEs  NEURO: awake, ,     moves all extremities  SKIN: no     rash    LABS:    pending                    Hemoglobin A1C, Whole Blood: 6.6 % (05-09 @ 06:52)    Thyroid Stimulating Hormone, Serum: 1.00 uIU/mL (05-18 @ 06:35)          < from: CT Chest No Cont (04.13.18 @ 19:22) >    IMPRESSION:     6 mm spiculated nodule in the right upper lobe suspicious for primary   lung neoplasm given background centrilobular emphysema instead of a   metastasis.    3 mm groundglass nodule in the right upper lobe.            < end of copied text >

## 2018-06-08 NOTE — ED PROVIDER NOTE - NS ED ROS FT
CONSTITUTIONAL: +fevers, + chills  Eyes: no visual changes  Ears: no ear drainage, no ear pain  Nose: no nasal congestion  Mouth/Throat: no sore throat  Cardiovascular: No Chest pain  Respiratory: No SOB  Gastrointestinal: No n/v/d, + abd pain  Genitourinary: no dysuria, no hematuria  SKIN: no rashes.  NEURO: no headache  PSYCHIATRIC: no known mental health issues.

## 2018-06-09 LAB
BASOPHILS # BLD AUTO: 0 K/UL — SIGNIFICANT CHANGE UP (ref 0–0.2)
BASOPHILS NFR BLD AUTO: 0.1 % — SIGNIFICANT CHANGE UP (ref 0–2)
EOSINOPHIL # BLD AUTO: 0.2 K/UL — SIGNIFICANT CHANGE UP (ref 0–0.5)
EOSINOPHIL NFR BLD AUTO: 2.1 % — SIGNIFICANT CHANGE UP (ref 0–6)
GLUCOSE BLDC GLUCOMTR-MCNC: 103 MG/DL — HIGH (ref 70–99)
GLUCOSE BLDC GLUCOMTR-MCNC: 131 MG/DL — HIGH (ref 70–99)
GLUCOSE BLDC GLUCOMTR-MCNC: 162 MG/DL — HIGH (ref 70–99)
HCT VFR BLD CALC: 23.6 % — LOW (ref 34.5–45)
HGB BLD-MCNC: 7.8 G/DL — LOW (ref 11.5–15.5)
LYMPHOCYTES # BLD AUTO: 1.5 K/UL — SIGNIFICANT CHANGE UP (ref 1–3.3)
LYMPHOCYTES # BLD AUTO: 12.9 % — LOW (ref 13–44)
MCHC RBC-ENTMCNC: 29.6 PG — SIGNIFICANT CHANGE UP (ref 27–34)
MCHC RBC-ENTMCNC: 32.9 GM/DL — SIGNIFICANT CHANGE UP (ref 32–36)
MCV RBC AUTO: 90 FL — SIGNIFICANT CHANGE UP (ref 80–100)
MONOCYTES # BLD AUTO: 0.6 K/UL — SIGNIFICANT CHANGE UP (ref 0–0.9)
MONOCYTES NFR BLD AUTO: 4.8 % — SIGNIFICANT CHANGE UP (ref 2–14)
NEUTROPHILS # BLD AUTO: 9.3 K/UL — HIGH (ref 1.8–7.4)
NEUTROPHILS NFR BLD AUTO: 80 % — HIGH (ref 43–77)
PLATELET # BLD AUTO: 280 K/UL — SIGNIFICANT CHANGE UP (ref 150–400)
RBC # BLD: 2.63 M/UL — LOW (ref 3.8–5.2)
RBC # FLD: 16 % — HIGH (ref 10.3–14.5)
WBC # BLD: 11.6 K/UL — HIGH (ref 3.8–10.5)
WBC # FLD AUTO: 11.6 K/UL — HIGH (ref 3.8–10.5)

## 2018-06-09 PROCEDURE — 99232 SBSQ HOSP IP/OBS MODERATE 35: CPT

## 2018-06-09 RX ORDER — SODIUM CHLORIDE 9 MG/ML
1000 INJECTION, SOLUTION INTRAVENOUS
Qty: 0 | Refills: 0 | Status: DISCONTINUED | OUTPATIENT
Start: 2018-06-09 | End: 2018-07-03

## 2018-06-09 RX ORDER — DEXTROSE 50 % IN WATER 50 %
25 SYRINGE (ML) INTRAVENOUS ONCE
Qty: 0 | Refills: 0 | Status: DISCONTINUED | OUTPATIENT
Start: 2018-06-09 | End: 2018-07-03

## 2018-06-09 RX ORDER — DEXTROSE 50 % IN WATER 50 %
15 SYRINGE (ML) INTRAVENOUS ONCE
Qty: 0 | Refills: 0 | Status: DISCONTINUED | OUTPATIENT
Start: 2018-06-09 | End: 2018-07-03

## 2018-06-09 RX ORDER — DEXTROSE 50 % IN WATER 50 %
12.5 SYRINGE (ML) INTRAVENOUS ONCE
Qty: 0 | Refills: 0 | Status: DISCONTINUED | OUTPATIENT
Start: 2018-06-09 | End: 2018-07-03

## 2018-06-09 RX ORDER — INSULIN LISPRO 100/ML
VIAL (ML) SUBCUTANEOUS AT BEDTIME
Qty: 0 | Refills: 0 | Status: DISCONTINUED | OUTPATIENT
Start: 2018-06-09 | End: 2018-07-03

## 2018-06-09 RX ORDER — GLUCAGON INJECTION, SOLUTION 0.5 MG/.1ML
1 INJECTION, SOLUTION SUBCUTANEOUS ONCE
Qty: 0 | Refills: 0 | Status: DISCONTINUED | OUTPATIENT
Start: 2018-06-09 | End: 2018-07-03

## 2018-06-09 RX ORDER — INSULIN LISPRO 100/ML
VIAL (ML) SUBCUTANEOUS
Qty: 0 | Refills: 0 | Status: DISCONTINUED | OUTPATIENT
Start: 2018-06-09 | End: 2018-07-03

## 2018-06-09 RX ADMIN — MORPHINE SULFATE 15 MILLIGRAM(S): 50 CAPSULE, EXTENDED RELEASE ORAL at 08:18

## 2018-06-09 RX ADMIN — MIRTAZAPINE 15 MILLIGRAM(S): 45 TABLET, ORALLY DISINTEGRATING ORAL at 22:55

## 2018-06-09 RX ADMIN — MORPHINE SULFATE 15 MILLIGRAM(S): 50 CAPSULE, EXTENDED RELEASE ORAL at 06:01

## 2018-06-09 RX ADMIN — MORPHINE SULFATE 15 MILLIGRAM(S): 50 CAPSULE, EXTENDED RELEASE ORAL at 22:55

## 2018-06-09 RX ADMIN — METFORMIN HYDROCHLORIDE 1000 MILLIGRAM(S): 850 TABLET ORAL at 12:42

## 2018-06-09 RX ADMIN — Medication 500000 UNIT(S): at 17:50

## 2018-06-09 RX ADMIN — ATORVASTATIN CALCIUM 40 MILLIGRAM(S): 80 TABLET, FILM COATED ORAL at 22:55

## 2018-06-09 RX ADMIN — MORPHINE SULFATE 15 MILLIGRAM(S): 50 CAPSULE, EXTENDED RELEASE ORAL at 09:00

## 2018-06-09 RX ADMIN — Medication 250 MILLIGRAM(S): at 17:50

## 2018-06-09 RX ADMIN — CARVEDILOL PHOSPHATE 12.5 MILLIGRAM(S): 80 CAPSULE, EXTENDED RELEASE ORAL at 05:31

## 2018-06-09 RX ADMIN — Medication 500000 UNIT(S): at 12:43

## 2018-06-09 RX ADMIN — FAMOTIDINE 20 MILLIGRAM(S): 10 INJECTION INTRAVENOUS at 12:42

## 2018-06-09 RX ADMIN — Medication 500000 UNIT(S): at 05:31

## 2018-06-09 RX ADMIN — PIPERACILLIN AND TAZOBACTAM 25 GRAM(S): 4; .5 INJECTION, POWDER, LYOPHILIZED, FOR SOLUTION INTRAVENOUS at 14:26

## 2018-06-09 RX ADMIN — CARVEDILOL PHOSPHATE 12.5 MILLIGRAM(S): 80 CAPSULE, EXTENDED RELEASE ORAL at 17:50

## 2018-06-09 RX ADMIN — HEPARIN SODIUM 5000 UNIT(S): 5000 INJECTION INTRAVENOUS; SUBCUTANEOUS at 17:50

## 2018-06-09 RX ADMIN — MORPHINE SULFATE 15 MILLIGRAM(S): 50 CAPSULE, EXTENDED RELEASE ORAL at 23:55

## 2018-06-09 RX ADMIN — HEPARIN SODIUM 5000 UNIT(S): 5000 INJECTION INTRAVENOUS; SUBCUTANEOUS at 05:32

## 2018-06-09 RX ADMIN — Medication 500000 UNIT(S): at 23:04

## 2018-06-09 RX ADMIN — PIPERACILLIN AND TAZOBACTAM 25 GRAM(S): 4; .5 INJECTION, POWDER, LYOPHILIZED, FOR SOLUTION INTRAVENOUS at 05:31

## 2018-06-09 RX ADMIN — LISINOPRIL 5 MILLIGRAM(S): 2.5 TABLET ORAL at 05:31

## 2018-06-09 RX ADMIN — PIPERACILLIN AND TAZOBACTAM 25 GRAM(S): 4; .5 INJECTION, POWDER, LYOPHILIZED, FOR SOLUTION INTRAVENOUS at 22:55

## 2018-06-09 RX ADMIN — MORPHINE SULFATE 15 MILLIGRAM(S): 50 CAPSULE, EXTENDED RELEASE ORAL at 15:00

## 2018-06-09 RX ADMIN — MORPHINE SULFATE 15 MILLIGRAM(S): 50 CAPSULE, EXTENDED RELEASE ORAL at 14:26

## 2018-06-09 RX ADMIN — MORPHINE SULFATE 15 MILLIGRAM(S): 50 CAPSULE, EXTENDED RELEASE ORAL at 05:31

## 2018-06-09 NOTE — PROGRESS NOTE ADULT - SUBJECTIVE AND OBJECTIVE BOX
np  cp/sob/ abd pain    REVIEW OF SYSTEMS:  CONSTITUTIONAL: No weakness,  no   fevers      RESPIRATORY:  No    shortness of breath  , no  wheezing  CARDIOVASCULAR: No chest pain,   no  palpitations, no  cough  GASTROINTESTINAL: No abdominal  pain, no  vomiting, no  diarrhea  NEUROLOGICAL: No  focal  weakness    MEDICATIONS  (STANDING):  atorvastatin 40 milliGRAM(s) Oral at bedtime  carvedilol 12.5 milliGRAM(s) Oral every 12 hours  famotidine    Tablet 20 milliGRAM(s) Oral daily  heparin  Injectable 5000 Unit(s) SubCutaneous every 12 hours  influenza   Vaccine 0.5 milliLiter(s) IntraMuscular once  lisinopril 5 milliGRAM(s) Oral daily  metFORMIN 1000 milliGRAM(s) Oral daily  mirtazapine 15 milliGRAM(s) Oral at bedtime  morphine ER Tablet 15 milliGRAM(s) Oral every 8 hours  nystatin    Suspension 219611 Unit(s) Oral four times a day  piperacillin/tazobactam IVPB. 3.375 Gram(s) IV Intermittent every 8 hours  vancomycin  IVPB 1000 milliGRAM(s) IV Intermittent every 24 hours    MEDICATIONS  (PRN):  acetaminophen   Tablet 650 milliGRAM(s) Oral every 6 hours PRN Mild Pain (1 - 3)  morphine  IR 15 milliGRAM(s) Oral every 4 hours PRN moderate to severe pain      Vital Signs Last 24 Hrs  T(C): 37.3 (09 Jun 2018 05:28), Max: 37.3 (09 Jun 2018 05:28)  T(F): 99.2 (09 Jun 2018 05:28), Max: 99.2 (09 Jun 2018 05:28)  HR: 83 (09 Jun 2018 05:28) (64 - 88)  BP: 121/64 (09 Jun 2018 05:28) (100/57 - 127/67)  BP(mean): --  RR: 18 (09 Jun 2018 05:28) (16 - 18)  SpO2: 94% (09 Jun 2018 05:28) (94% - 100%)  CAPILLARY BLOOD GLUCOSE        I&O's Summary    08 Jun 2018 07:01  -  09 Jun 2018 07:00  --------------------------------------------------------  IN: 420 mL / OUT: 0 mL / NET: 420 mL        PHYSICAL EXAM:  HEAD:  Atraumatic, Normocephalic  NECK: Supple, No   JVD  CHEST/LUNG:   no     rales,     no,    rhonchi  HEART: Regular rate and rhythm;         murmur  ABDOMEN: Soft, Nontender, ;   EXTREMITIES:        edema  NEUROLOGY:  alert    LABS:                        8.2    14.0  )-----------( 284      ( 08 Jun 2018 15:02 )             25.0     06-08    x   |  x   |  x   ----------------------------<  x   3.5   |  x   |  x     Ca    8.0<L>      08 Jun 2018 15:02    TPro  6.7  /  Alb  2.5<L>  /  TBili  0.4  /  DBili  x   /  AST  67<H>  /  ALT  19  /  AlkPhos  64  06-08 06-08 @ 15:02  3.7  75    Hemoglobin A1C, Whole Blood: 6.6 % (05-09 @ 06:52)    Thyroid Stimulating Hormone, Serum: 1.00 uIU/mL (05-18 @ 06:35)          Consultant(s) Notes Reviewed:      Care Discussed with Consultants/Other Providers:

## 2018-06-09 NOTE — PROGRESS NOTE ADULT - ASSESSMENT
78F with rectal cancer adm with fever,  afebrile  now  cont IV zosyn/  vanco   CT abd pelvis,, as  noted  cont prior meds  IVF      < from: CT Abdomen and Pelvis w/ IV Cont (06.08.18 @ 16:45) >  pondylolysis at this level.    IMPRESSION: Known rectal neoplasm. Slight interval improvement in fluid   collections to the left of the rectum.  Stable spiculated nodule in the right upper lobe, which is suspicious for   neoplasm. Surrounding groundglass opacities may be infectious.                < end of copied text >

## 2018-06-09 NOTE — PROGRESS NOTE ADULT - SUBJECTIVE AND OBJECTIVE BOX
Patient is a 78y old  Female who presents with a chief complaint of fever (08 Jun 2018 15:36)    Being followed by ID for fevers      pt feeling weak  daughter at bedside  denies cough    PAST MEDICAL & SURGICAL HISTORY:  Rectal mass  Gangrene of foot  Coronary artery disease involving native coronary artery of native heart without angina pectoris  Status post above knee amputation of right lower extremity      Antimicrobials:    nystatin    Suspension 911978 Unit(s) Oral four times a day  piperacillin/tazobactam IVPB. 3.375 Gram(s) IV Intermittent every 8 hours  vancomycin  IVPB 1000 milliGRAM(s) IV Intermittent every 24 hours    MEDICATIONS  (STANDING):  atorvastatin 40 milliGRAM(s) Oral at bedtime  carvedilol 12.5 milliGRAM(s) Oral every 12 hours  famotidine    Tablet 20 milliGRAM(s) Oral daily  heparin  Injectable 5000 Unit(s) SubCutaneous every 12 hours  influenza   Vaccine 0.5 milliLiter(s) IntraMuscular once  lisinopril 5 milliGRAM(s) Oral daily  metFORMIN 1000 milliGRAM(s) Oral daily  mirtazapine 15 milliGRAM(s) Oral at bedtime  morphine ER Tablet 15 milliGRAM(s) Oral every 8 hours  nystatin    Suspension 892902 Unit(s) Oral four times a day  piperacillin/tazobactam IVPB. 3.375 Gram(s) IV Intermittent every 8 hours  vancomycin  IVPB 1000 milliGRAM(s) IV Intermittent every 24 hours      Vital Signs Last 24 Hrs  T(C): 37.3 (06-09-18 @ 05:28), Max: 37.3 (06-09-18 @ 05:28)  T(F): 99.2 (06-09-18 @ 05:28), Max: 99.2 (06-09-18 @ 05:28)  HR: 83 (06-09-18 @ 05:28) (64 - 88)  BP: 121/64 (06-09-18 @ 05:28) (100/57 - 127/67)  BP(mean): --  RR: 18 (06-09-18 @ 05:28) (16 - 18)  SpO2: 94% (06-09-18 @ 05:28) (94% - 100%)    Physical Exam:    thin    HEENT PERRLA EOMI,No pallor or icterus    No oral exudate or erythema    Neck supple no JVD or LN    Chest Good AE,CTA    CVS RRR S1 S2 WNl     Abd soft BS normal  rectal area inspected    Ext No cyanosis clubbing or edema    PICC site no erythema tenderness or discharge    Joints no swelling or LOM    CNS AAO X 3 no focal    Lab Data:                          8.2    14.0  )-----------( 284      ( 08 Jun 2018 15:02 )             25.0       06-08    x   |  x   |  x   ----------------------------<  x   3.5   |  x   |  x     Ca    8.0<L>      08 Jun 2018 15:02    TPro  6.7  /  Alb  2.5<L>  /  TBili  0.4  /  DBili  x   /  AST  67<H>  /  ALT  19  /  AlkPhos  64  06-08          < from: CT Abdomen and Pelvis w/ IV Cont (06.08.18 @ 16:45) >  EXAM:  CT CHEST IC                          EXAM:  CT ABDOMEN AND PELVIS IC                            PROCEDURE DATE:  06/08/2018            INTERPRETATION:  CLINICAL INFORMATION: Abdominal pain. Rectal cancer.     COMPARISON: 4/10/2018, 4/13/2018, 5/18/2018.    PROCEDURE:   CT of the Chest, Abdomen and Pelvis was performed with intravenous   contrast.   Intravenous contrast: 90 ml Omnipaque 350. 10 ml discarded.  Oral contrast: None.  Sagittal and coronal reformats were performed.    FINDINGS:    CHEST:     LUNGS AND LARGE AIRWAYS: Patent central airways. Unchanged spiculated   nodule in the right upper lobe. Increased surrounding groundglass   opacity. Likely a small focus of subpleural atelectasis in the right   lower lobe adjacent to the diaphragm. Bibasilar subsegmental atelectasis.   Mild centrilobular emphysema.   PLEURA: No pleural effusion.       VESSELS: Mild vascular calcifications.   MEDIASTINUM: Heart size is normal. No pericardial effusion. No   mediastinal, hilar, axillary or supraclavicular lymphadenopathy. Left   PICC with tip in the distal SVC. Calcified lymph nodes in the mediastinum   and the bro[place]nn.  CHEST WALL AND LOWER NECK: Within normal limits.        ABDOMEN AND PELVIS:    LIVER: No focal lesion.      BILE DUCTS: Normal caliber.  GALLBLADDER: Unremarkable.  SPLEEN: Unremarkable.  PANCREAS: Unremarkable.  ADRENALS: Stable 10 mm nodule in the left adrenal gland.  KIDNEYS/URETERS: No hydronephrosis.  No focal lesion.    BLADDER: Thickening of the wall of the base of urinary bladder  REPRODUCTIVE ORGANS: Hysterectomy. No adnexal mass.    BOWEL: Marked rectal wall thickening and irregularity consistent with   known neoplasm. Adjacent fatty stranding and trace amount of free fluid.   Slight interval improvement in rim enhancing fluid collections to the   left of the rectum. PERITONEUM/RETROPERITONEUM: No pneumoperitoneum,   ascites, or lymphadenopathy.  VESSELS:  No evidence of aortic aneurysm. Marked vascular calcifications.   Partially imaged are 2 grafts in the right groin which are occluded,   unchanged.  BONES/SOFT TISSUES: Mild spondylolisthesis of L4 on L5 secondary to   spondylolysis at this level.    IMPRESSION: Known rectal neoplasm. Slight interval improvement in fluid   collections to the left of the rectum.  Stable spiculated nodule in the right upper lobe, which is suspicious for   neoplasm. Surrounding groundglass opacities may be infectious.                    KIMMIE ANGEL M.D. ATTENDING RADIOLOGIST  This document has been electronically signed. Jun 8 2018  5:07PM              < end of copied text >

## 2018-06-09 NOTE — PROGRESS NOTE ADULT - ASSESSMENT
78 year old Khmer-speaking woman with CAD, PAD s/p RLE amputation presents this admission with abdominal pain, bleeding per rectum for the past few months.  rectal mass on CT imaging of abdomen/pelvis, underwent colonscopy, lower EUS with GI, which confirms large (10x5cm), circumferential rectal mass 4cm from anal verge, stage IIIB (T3N1MX) by EUS examination.   s/p RT  Recent clint-rectal abscess V neoplasm  Seen by surgery  Improved on abx  Discharged on zosyn with plan for interval surgery-but now again with fever  SIRS  clint-rectal induration but could be due to RT  PICC line no s/s external infection  CXR,UA clear, but possible pneumonia on CT  ?Abscess  ?PICC related BSI  ?Other occult process   ?Tumor related    Rec:  1) Blood ,urine Cx    2) Empiric vanco + zosyn- monitor vanco level closely  3) Consider CRS eval after CT    ID service will be covering over the weekend. Please call for acute issues or questions. [phone]

## 2018-06-09 NOTE — PROGRESS NOTE ADULT - SUBJECTIVE AND OBJECTIVE BOX
- Patient seen and examined.  - In summary, patient is a 78 year old woman who presented with fever (2018 15:36)  - Today, patient is without complaints.         *****MEDICATIONS:    MEDICATIONS  (STANDING):  atorvastatin 40 milliGRAM(s) Oral at bedtime  carvedilol 12.5 milliGRAM(s) Oral every 12 hours  famotidine    Tablet 20 milliGRAM(s) Oral daily  heparin  Injectable 5000 Unit(s) SubCutaneous every 12 hours  influenza   Vaccine 0.5 milliLiter(s) IntraMuscular once  lisinopril 5 milliGRAM(s) Oral daily  metFORMIN 1000 milliGRAM(s) Oral daily  mirtazapine 15 milliGRAM(s) Oral at bedtime  morphine ER Tablet 15 milliGRAM(s) Oral every 8 hours  nystatin    Suspension 457830 Unit(s) Oral four times a day  piperacillin/tazobactam IVPB. 3.375 Gram(s) IV Intermittent every 8 hours  vancomycin  IVPB 1000 milliGRAM(s) IV Intermittent every 24 hours    MEDICATIONS  (PRN):  acetaminophen   Tablet 650 milliGRAM(s) Oral every 6 hours PRN Mild Pain (1 - 3)  morphine  IR 15 milliGRAM(s) Oral every 4 hours PRN moderate to severe pain           ***** REVIEW OF SYSTEM:  GEN: no fever, no chills, no pain  RESP: no SOB, no cough, no sputum  CVS: no chest pain, no palpitations, no edema  GI: no abdominal pain, no nausea, no vomiting, no constipation, no diarrhea  : no dysuria, no frequency  NEURO: no headache, no dizziness  PSYCH: no depression, not anxious  Derm : no itching, no rash         ***** VITAL SIGNS:  T(F): 99.2 (18 @ 05:28), Max: 99.2 (18 @ 05:28)  HR: 83 (18 @ 05:28) (64 - 88)  BP: 121/64 (18 @ 05:28) (100/57 - 127/67)  RR: 18 (18 @ 05:28) (16 - 18)  SpO2: 94% (18 @ 05:28) (94% - 100%)  Wt(kg): --  ,   I&O's Summary    2018 07:01  -  2018 07:00  --------------------------------------------------------  IN: 420 mL / OUT: 0 mL / NET: 420 mL             *****PHYSICAL EXAM:  GEN: A&O X 3 , NAD , comfortable  HEENT: NCAT, EOMI, MMM, no icterus  NECK: Supple, No JVD  CVS: S1S2 , regular , No M/R/G appreciated  PULM: CTA B/L,  no W/R/R appreciated  ABD.: soft. non tender, non distended,  bowel sounds present  Extrem: intact pulses , no edema noted  Derm: No rash or ecchymosis noted  PSYCH: normal mood, no depression, not anxious         *****LAB AND IMAGIN.2    14.0  )-----------( 284      ( 2018 15:02 )             25.0               06-08    x   |  x   |  x   ----------------------------<  x   3.5   |  x   |  x     Ca    8.0<L>      2018 15:02    TPro  6.7  /  Alb  2.5<L>  /  TBili  0.4  /  DBili  x   /  AST  67<H>  /  ALT  19  /  AlkPhos  64  06-08                         [All pertinent recent Imaging/Reports reviewed]         *****A S S E S S M E N T   A N D   P L A N :  78F with rectal cancer adm with fever  abx per ID  CT noted  cont current meds  IVF  oncology f/u        __________________________  YON Metcalf D.O.

## 2018-06-10 LAB
ANION GAP SERPL CALC-SCNC: 8 MMOL/L — SIGNIFICANT CHANGE UP (ref 5–17)
BLD GP AB SCN SERPL QL: NEGATIVE — SIGNIFICANT CHANGE UP
BUN SERPL-MCNC: 7 MG/DL — SIGNIFICANT CHANGE UP (ref 7–23)
CALCIUM SERPL-MCNC: 7.7 MG/DL — LOW (ref 8.4–10.5)
CHLORIDE SERPL-SCNC: 97 MMOL/L — SIGNIFICANT CHANGE UP (ref 96–108)
CO2 SERPL-SCNC: 26 MMOL/L — SIGNIFICANT CHANGE UP (ref 22–31)
CREAT SERPL-MCNC: 0.48 MG/DL — LOW (ref 0.5–1.3)
GLUCOSE BLDC GLUCOMTR-MCNC: 119 MG/DL — HIGH (ref 70–99)
GLUCOSE BLDC GLUCOMTR-MCNC: 165 MG/DL — HIGH (ref 70–99)
GLUCOSE BLDC GLUCOMTR-MCNC: 245 MG/DL — HIGH (ref 70–99)
GLUCOSE BLDC GLUCOMTR-MCNC: 93 MG/DL — SIGNIFICANT CHANGE UP (ref 70–99)
GLUCOSE SERPL-MCNC: 80 MG/DL — SIGNIFICANT CHANGE UP (ref 70–99)
HCT VFR BLD CALC: 23.5 % — LOW (ref 34.5–45)
HGB BLD-MCNC: 7.6 G/DL — LOW (ref 11.5–15.5)
MCHC RBC-ENTMCNC: 28.8 PG — SIGNIFICANT CHANGE UP (ref 27–34)
MCHC RBC-ENTMCNC: 32.5 GM/DL — SIGNIFICANT CHANGE UP (ref 32–36)
MCV RBC AUTO: 88.5 FL — SIGNIFICANT CHANGE UP (ref 80–100)
PLATELET # BLD AUTO: 225 K/UL — SIGNIFICANT CHANGE UP (ref 150–400)
POTASSIUM SERPL-MCNC: 3.3 MMOL/L — LOW (ref 3.5–5.3)
POTASSIUM SERPL-SCNC: 3.3 MMOL/L — LOW (ref 3.5–5.3)
RBC # BLD: 2.65 M/UL — LOW (ref 3.8–5.2)
RBC # FLD: 15.8 % — HIGH (ref 10.3–14.5)
RH IG SCN BLD-IMP: NEGATIVE — SIGNIFICANT CHANGE UP
SODIUM SERPL-SCNC: 131 MMOL/L — LOW (ref 135–145)
WBC # BLD: 9.3 K/UL — SIGNIFICANT CHANGE UP (ref 3.8–10.5)
WBC # FLD AUTO: 9.3 K/UL — SIGNIFICANT CHANGE UP (ref 3.8–10.5)

## 2018-06-10 RX ORDER — POTASSIUM CHLORIDE 20 MEQ
20 PACKET (EA) ORAL ONCE
Qty: 0 | Refills: 0 | Status: COMPLETED | OUTPATIENT
Start: 2018-06-10 | End: 2018-06-10

## 2018-06-10 RX ADMIN — Medication 500000 UNIT(S): at 14:20

## 2018-06-10 RX ADMIN — HEPARIN SODIUM 5000 UNIT(S): 5000 INJECTION INTRAVENOUS; SUBCUTANEOUS at 17:16

## 2018-06-10 RX ADMIN — FAMOTIDINE 20 MILLIGRAM(S): 10 INJECTION INTRAVENOUS at 14:20

## 2018-06-10 RX ADMIN — Medication 500000 UNIT(S): at 07:07

## 2018-06-10 RX ADMIN — Medication 0: at 22:47

## 2018-06-10 RX ADMIN — MORPHINE SULFATE 15 MILLIGRAM(S): 50 CAPSULE, EXTENDED RELEASE ORAL at 22:00

## 2018-06-10 RX ADMIN — MIRTAZAPINE 15 MILLIGRAM(S): 45 TABLET, ORALLY DISINTEGRATING ORAL at 21:30

## 2018-06-10 RX ADMIN — MORPHINE SULFATE 15 MILLIGRAM(S): 50 CAPSULE, EXTENDED RELEASE ORAL at 15:00

## 2018-06-10 RX ADMIN — LISINOPRIL 5 MILLIGRAM(S): 2.5 TABLET ORAL at 07:06

## 2018-06-10 RX ADMIN — HEPARIN SODIUM 5000 UNIT(S): 5000 INJECTION INTRAVENOUS; SUBCUTANEOUS at 07:00

## 2018-06-10 RX ADMIN — PIPERACILLIN AND TAZOBACTAM 25 GRAM(S): 4; .5 INJECTION, POWDER, LYOPHILIZED, FOR SOLUTION INTRAVENOUS at 07:00

## 2018-06-10 RX ADMIN — Medication 500000 UNIT(S): at 17:16

## 2018-06-10 RX ADMIN — PIPERACILLIN AND TAZOBACTAM 25 GRAM(S): 4; .5 INJECTION, POWDER, LYOPHILIZED, FOR SOLUTION INTRAVENOUS at 22:47

## 2018-06-10 RX ADMIN — ATORVASTATIN CALCIUM 40 MILLIGRAM(S): 80 TABLET, FILM COATED ORAL at 21:30

## 2018-06-10 RX ADMIN — CARVEDILOL PHOSPHATE 12.5 MILLIGRAM(S): 80 CAPSULE, EXTENDED RELEASE ORAL at 07:06

## 2018-06-10 RX ADMIN — Medication 1: at 13:02

## 2018-06-10 RX ADMIN — MORPHINE SULFATE 15 MILLIGRAM(S): 50 CAPSULE, EXTENDED RELEASE ORAL at 14:19

## 2018-06-10 RX ADMIN — MORPHINE SULFATE 15 MILLIGRAM(S): 50 CAPSULE, EXTENDED RELEASE ORAL at 07:00

## 2018-06-10 RX ADMIN — CARVEDILOL PHOSPHATE 12.5 MILLIGRAM(S): 80 CAPSULE, EXTENDED RELEASE ORAL at 17:16

## 2018-06-10 RX ADMIN — Medication 20 MILLIEQUIVALENT(S): at 14:19

## 2018-06-10 RX ADMIN — MORPHINE SULFATE 15 MILLIGRAM(S): 50 CAPSULE, EXTENDED RELEASE ORAL at 21:30

## 2018-06-10 RX ADMIN — PIPERACILLIN AND TAZOBACTAM 25 GRAM(S): 4; .5 INJECTION, POWDER, LYOPHILIZED, FOR SOLUTION INTRAVENOUS at 14:21

## 2018-06-10 RX ADMIN — Medication 250 MILLIGRAM(S): at 21:23

## 2018-06-10 RX ADMIN — Medication 2: at 18:15

## 2018-06-10 NOTE — PROGRESS NOTE ADULT - SUBJECTIVE AND OBJECTIVE BOX
- Patient seen and examined.  - In summary, patient is a 78 year old woman who presented with fever (2018 15:36)  - Today, patient is without complaints.         *****MEDICATIONS:    MEDICATIONS  (STANDING):  atorvastatin 40 milliGRAM(s) Oral at bedtime  carvedilol 12.5 milliGRAM(s) Oral every 12 hours  dextrose 5%. 1000 milliLiter(s) (50 mL/Hr) IV Continuous <Continuous>  dextrose 50% Injectable 12.5 Gram(s) IV Push once  dextrose 50% Injectable 25 Gram(s) IV Push once  dextrose 50% Injectable 25 Gram(s) IV Push once  famotidine    Tablet 20 milliGRAM(s) Oral daily  heparin  Injectable 5000 Unit(s) SubCutaneous every 12 hours  influenza   Vaccine 0.5 milliLiter(s) IntraMuscular once  insulin lispro (HumaLOG) corrective regimen sliding scale   SubCutaneous three times a day before meals  insulin lispro (HumaLOG) corrective regimen sliding scale   SubCutaneous at bedtime  lisinopril 5 milliGRAM(s) Oral daily  metFORMIN 1000 milliGRAM(s) Oral daily  mirtazapine 15 milliGRAM(s) Oral at bedtime  morphine ER Tablet 15 milliGRAM(s) Oral every 8 hours  nystatin    Suspension 776204 Unit(s) Oral four times a day  piperacillin/tazobactam IVPB. 3.375 Gram(s) IV Intermittent every 8 hours  potassium chloride    Tablet ER 20 milliEquivalent(s) Oral once  vancomycin  IVPB 1000 milliGRAM(s) IV Intermittent every 24 hours    MEDICATIONS  (PRN):  acetaminophen   Tablet 650 milliGRAM(s) Oral every 6 hours PRN Mild Pain (1 - 3)  dextrose 40% Gel 15 Gram(s) Oral once PRN Blood Glucose LESS THAN 70 milliGRAM(s)/deciliter  glucagon  Injectable 1 milliGRAM(s) IntraMuscular once PRN Glucose LESS THAN 70 milligrams/deciliter  morphine  IR 15 milliGRAM(s) Oral every 4 hours PRN moderate to severe pain             ***** REVIEW OF SYSTEM:  GEN: no fever, no chills, no pain  RESP: no SOB, no cough, no sputum  CVS: no chest pain, no palpitations, no edema  GI: no abdominal pain, no nausea, no vomiting, no constipation, no diarrhea  : no dysuria, no frequency  NEURO: no headache, no dizziness  PSYCH: no depression, not anxious  Derm : no itching, no rash         ***** VITAL SIGNS:    T(F): 98.6 (18 @ 20:06), Max: 98.6 (18 @ 20:06)  HR: 75 (18 @ 20:06) (72 - 76)  BP: 106/50 (18 @ 20:06) (101/60 - 106/50)  RR: 18 (18 @ 20:06) (18 - 18)  SpO2: 98% (18 @ 20:06) (94% - 98%)  Wt(kg): --  ,   I&O's Summary    2018 07:01  -  10 Jed 2018 07:00  --------------------------------------------------------  IN: 1250 mL / OUT: 302 mL / NET: 948 mL                 *****PHYSICAL EXAM:  GEN: A&O X 3 , NAD , comfortable  HEENT: NCAT, EOMI, MMM, no icterus  NECK: Supple, No JVD  CVS: S1S2 , regular , No M/R/G appreciated  PULM: CTA B/L,  no W/R/R appreciated  ABD.: soft. non tender, non distended,  bowel sounds present  Extrem: intact pulses , no edema noted  Derm: No rash or ecchymosis noted  PSYCH: normal mood, no depression, not anxious         *****LAB AND IMAGIN.8    11.6  )-----------( 280      ( 2018 19:34 )             23.6               06-10    131<L>  |  97  |  7   ----------------------------<  80  3.3<L>   |  26  |  0.48<L>    Ca    7.7<L>      10 Jed 2018 07:35    TPro  6.7  /  Alb  2.5<L>  /  TBili  0.4  /  DBili  x   /  AST  67<H>  /  ALT  19  /  AlkPhos  64  06-08           [All pertinent recent Imaging/Reports reviewed]         *****A S S E S S M E N T   A N D   P L A N :  78F with rectal cancer adm with fever  abx per ID  CT noted  cont current meds  IVF  oncology f/u  to get prbc  monitor I/O    __________________________  A. JOJO Metcalf.

## 2018-06-10 NOTE — PROGRESS NOTE ADULT - SUBJECTIVE AND OBJECTIVE BOX
no cp/sob/abd pain  REVIEW OF SYSTEMS:  CONSTITUTIONAL: No weakness,  no   fevers      RESPIRATORY:  No    shortness of breath  , no  wheezing  CARDIOVASCULAR: No chest pain,   no  palpitations, no  cough  GASTROINTESTINAL: No abdominal  pain, no  vomiting, no  diarrhea  NEUROLOGICAL: No  focal  weakness    MEDICATIONS  (STANDING):  atorvastatin 40 milliGRAM(s) Oral at bedtime  carvedilol 12.5 milliGRAM(s) Oral every 12 hours  dextrose 5%. 1000 milliLiter(s) (50 mL/Hr) IV Continuous <Continuous>  dextrose 50% Injectable 12.5 Gram(s) IV Push once  dextrose 50% Injectable 25 Gram(s) IV Push once  dextrose 50% Injectable 25 Gram(s) IV Push once  famotidine    Tablet 20 milliGRAM(s) Oral daily  heparin  Injectable 5000 Unit(s) SubCutaneous every 12 hours  influenza   Vaccine 0.5 milliLiter(s) IntraMuscular once  insulin lispro (HumaLOG) corrective regimen sliding scale   SubCutaneous three times a day before meals  insulin lispro (HumaLOG) corrective regimen sliding scale   SubCutaneous at bedtime  lisinopril 5 milliGRAM(s) Oral daily  metFORMIN 1000 milliGRAM(s) Oral daily  mirtazapine 15 milliGRAM(s) Oral at bedtime  morphine ER Tablet 15 milliGRAM(s) Oral every 8 hours  nystatin    Suspension 330818 Unit(s) Oral four times a day  piperacillin/tazobactam IVPB. 3.375 Gram(s) IV Intermittent every 8 hours  vancomycin  IVPB 1000 milliGRAM(s) IV Intermittent every 24 hours    MEDICATIONS  (PRN):  acetaminophen   Tablet 650 milliGRAM(s) Oral every 6 hours PRN Mild Pain (1 - 3)  dextrose 40% Gel 15 Gram(s) Oral once PRN Blood Glucose LESS THAN 70 milliGRAM(s)/deciliter  glucagon  Injectable 1 milliGRAM(s) IntraMuscular once PRN Glucose LESS THAN 70 milligrams/deciliter  morphine  IR 15 milliGRAM(s) Oral every 4 hours PRN moderate to severe pain      Vital Signs Last 24 Hrs  T(C): 37 (09 Jun 2018 20:06), Max: 37 (09 Jun 2018 20:06)  T(F): 98.6 (09 Jun 2018 20:06), Max: 98.6 (09 Jun 2018 20:06)  HR: 75 (09 Jun 2018 20:06) (72 - 76)  BP: 106/50 (09 Jun 2018 20:06) (101/60 - 106/50)  BP(mean): --  RR: 18 (09 Jun 2018 20:06) (18 - 18)  SpO2: 98% (09 Jun 2018 20:06) (94% - 98%)  CAPILLARY BLOOD GLUCOSE      POCT Blood Glucose.: 162 mg/dL (09 Jun 2018 22:19)  POCT Blood Glucose.: 103 mg/dL (09 Jun 2018 17:45)  POCT Blood Glucose.: 131 mg/dL (09 Jun 2018 12:48)    I&O's Summary    09 Jun 2018 07:01  -  10 Jed 2018 07:00  --------------------------------------------------------  IN: 1250 mL / OUT: 302 mL / NET: 948 mL        PHYSICAL EXAM:  HEAD:  Atraumatic, Normocephalic  NECK: Supple, No   JVD  CHEST/LUNG:   no     rales,     no,    rhonchi  HEART: Regular rate and rhythm;         murmur  ABDOMEN: Soft, Nontender, ;   EXTREMITIES:        edema  NEUROLOGY:  alert    LABS:                        7.8    11.6  )-----------( 280      ( 09 Jun 2018 19:34 )             23.6     06-10    131<L>  |  97  |  7   ----------------------------<  80  3.3<L>   |  26  |  0.48<L>    Ca    7.7<L>      10 Jed 2018 07:35    TPro  6.7  /  Alb  2.5<L>  /  TBili  0.4  /  DBili  x   /  AST  67<H>  /  ALT  19  /  AlkPhos  64  06-08                06-08 @ 15:02  3.7  75    Hemoglobin A1C, Whole Blood: 6.6 % (05-09 @ 06:52)    Thyroid Stimulating Hormone, Serum: 1.00 uIU/mL (05-18 @ 06:35)          Consultant(s) Notes Reviewed:      Care Discussed with Consultants/Other Providers:

## 2018-06-10 NOTE — PROGRESS NOTE ADULT - ASSESSMENT
78F with rectal cancer, dm,  htn,  adm with fever,  afebrile  now  cont IV zosyn/  vanco   CT abd pelvis,, as  noted  cont prior meds  prbc  today, hypokalemia  repleted  duration of  ab, defer to  ID      < from: CT Abdomen and Pelvis w/ IV Cont (06.08.18 @ 16:45) >  pondylolysis at this level.    IMPRESSION: Known rectal neoplasm. Slight interval improvement in fluid   collections to the left of the rectum.  Stable spiculated nodule in the right upper lobe, which is suspicious for   neoplasm. Surrounding groundglass opacities may be infectious.                < end of copied text >

## 2018-06-11 DIAGNOSIS — R91.1 SOLITARY PULMONARY NODULE: ICD-10-CM

## 2018-06-11 DIAGNOSIS — R50.9 FEVER, UNSPECIFIED: ICD-10-CM

## 2018-06-11 DIAGNOSIS — C20 MALIGNANT NEOPLASM OF RECTUM: ICD-10-CM

## 2018-06-11 LAB
ANION GAP SERPL CALC-SCNC: 12 MMOL/L — SIGNIFICANT CHANGE UP (ref 5–17)
BUN SERPL-MCNC: 5 MG/DL — LOW (ref 7–23)
CALCIUM SERPL-MCNC: 8.1 MG/DL — LOW (ref 8.4–10.5)
CHLORIDE SERPL-SCNC: 99 MMOL/L — SIGNIFICANT CHANGE UP (ref 96–108)
CO2 SERPL-SCNC: 24 MMOL/L — SIGNIFICANT CHANGE UP (ref 22–31)
CREAT SERPL-MCNC: 0.54 MG/DL — SIGNIFICANT CHANGE UP (ref 0.5–1.3)
GLUCOSE BLDC GLUCOMTR-MCNC: 124 MG/DL — HIGH (ref 70–99)
GLUCOSE BLDC GLUCOMTR-MCNC: 222 MG/DL — HIGH (ref 70–99)
GLUCOSE BLDC GLUCOMTR-MCNC: 237 MG/DL — HIGH (ref 70–99)
GLUCOSE BLDC GLUCOMTR-MCNC: 351 MG/DL — HIGH (ref 70–99)
GLUCOSE BLDC GLUCOMTR-MCNC: 87 MG/DL — SIGNIFICANT CHANGE UP (ref 70–99)
GLUCOSE SERPL-MCNC: 81 MG/DL — SIGNIFICANT CHANGE UP (ref 70–99)
HCT VFR BLD CALC: 26.8 % — LOW (ref 34.5–45)
HGB BLD-MCNC: 8.6 G/DL — LOW (ref 11.5–15.5)
MCHC RBC-ENTMCNC: 27.1 PG — SIGNIFICANT CHANGE UP (ref 27–34)
MCHC RBC-ENTMCNC: 32.1 GM/DL — SIGNIFICANT CHANGE UP (ref 32–36)
MCV RBC AUTO: 84.5 FL — SIGNIFICANT CHANGE UP (ref 80–100)
PLATELET # BLD AUTO: 223 K/UL — SIGNIFICANT CHANGE UP (ref 150–400)
POTASSIUM SERPL-MCNC: 3.4 MMOL/L — LOW (ref 3.5–5.3)
POTASSIUM SERPL-SCNC: 3.4 MMOL/L — LOW (ref 3.5–5.3)
RBC # BLD: 3.17 M/UL — LOW (ref 3.8–5.2)
RBC # FLD: 16.6 % — HIGH (ref 10.3–14.5)
SODIUM SERPL-SCNC: 135 MMOL/L — SIGNIFICANT CHANGE UP (ref 135–145)
VANCOMYCIN TROUGH SERPL-MCNC: 6.4 UG/ML — LOW (ref 10–20)
WBC # BLD: 9.52 K/UL — SIGNIFICANT CHANGE UP (ref 3.8–10.5)
WBC # FLD AUTO: 9.52 K/UL — SIGNIFICANT CHANGE UP (ref 3.8–10.5)

## 2018-06-11 PROCEDURE — 99233 SBSQ HOSP IP/OBS HIGH 50: CPT | Mod: GC

## 2018-06-11 PROCEDURE — 99233 SBSQ HOSP IP/OBS HIGH 50: CPT

## 2018-06-11 RX ORDER — OXYCODONE HYDROCHLORIDE 5 MG/1
10 TABLET ORAL EVERY 12 HOURS
Qty: 0 | Refills: 0 | Status: DISCONTINUED | OUTPATIENT
Start: 2018-06-11 | End: 2018-06-16

## 2018-06-11 RX ORDER — POTASSIUM CHLORIDE 20 MEQ
20 PACKET (EA) ORAL
Qty: 0 | Refills: 0 | Status: COMPLETED | OUTPATIENT
Start: 2018-06-11 | End: 2018-06-11

## 2018-06-11 RX ADMIN — Medication 500000 UNIT(S): at 23:43

## 2018-06-11 RX ADMIN — Medication 500000 UNIT(S): at 12:10

## 2018-06-11 RX ADMIN — PIPERACILLIN AND TAZOBACTAM 25 GRAM(S): 4; .5 INJECTION, POWDER, LYOPHILIZED, FOR SOLUTION INTRAVENOUS at 21:28

## 2018-06-11 RX ADMIN — OXYCODONE HYDROCHLORIDE 10 MILLIGRAM(S): 5 TABLET ORAL at 18:08

## 2018-06-11 RX ADMIN — Medication 0: at 22:08

## 2018-06-11 RX ADMIN — PIPERACILLIN AND TAZOBACTAM 25 GRAM(S): 4; .5 INJECTION, POWDER, LYOPHILIZED, FOR SOLUTION INTRAVENOUS at 05:10

## 2018-06-11 RX ADMIN — Medication 20 MILLIEQUIVALENT(S): at 15:45

## 2018-06-11 RX ADMIN — HEPARIN SODIUM 5000 UNIT(S): 5000 INJECTION INTRAVENOUS; SUBCUTANEOUS at 05:10

## 2018-06-11 RX ADMIN — ATORVASTATIN CALCIUM 40 MILLIGRAM(S): 80 TABLET, FILM COATED ORAL at 21:29

## 2018-06-11 RX ADMIN — OXYCODONE HYDROCHLORIDE 10 MILLIGRAM(S): 5 TABLET ORAL at 17:27

## 2018-06-11 RX ADMIN — PIPERACILLIN AND TAZOBACTAM 25 GRAM(S): 4; .5 INJECTION, POWDER, LYOPHILIZED, FOR SOLUTION INTRAVENOUS at 13:14

## 2018-06-11 RX ADMIN — Medication 20 MILLIEQUIVALENT(S): at 17:23

## 2018-06-11 RX ADMIN — MORPHINE SULFATE 15 MILLIGRAM(S): 50 CAPSULE, EXTENDED RELEASE ORAL at 05:10

## 2018-06-11 RX ADMIN — HEPARIN SODIUM 5000 UNIT(S): 5000 INJECTION INTRAVENOUS; SUBCUTANEOUS at 17:22

## 2018-06-11 RX ADMIN — Medication 500000 UNIT(S): at 01:16

## 2018-06-11 RX ADMIN — Medication 500000 UNIT(S): at 17:21

## 2018-06-11 RX ADMIN — CARVEDILOL PHOSPHATE 12.5 MILLIGRAM(S): 80 CAPSULE, EXTENDED RELEASE ORAL at 05:10

## 2018-06-11 RX ADMIN — MORPHINE SULFATE 15 MILLIGRAM(S): 50 CAPSULE, EXTENDED RELEASE ORAL at 05:40

## 2018-06-11 RX ADMIN — Medication 500000 UNIT(S): at 06:37

## 2018-06-11 RX ADMIN — Medication 250 MILLIGRAM(S): at 17:18

## 2018-06-11 RX ADMIN — LISINOPRIL 5 MILLIGRAM(S): 2.5 TABLET ORAL at 05:10

## 2018-06-11 RX ADMIN — FAMOTIDINE 20 MILLIGRAM(S): 10 INJECTION INTRAVENOUS at 12:10

## 2018-06-11 RX ADMIN — Medication 2: at 18:04

## 2018-06-11 RX ADMIN — MIRTAZAPINE 15 MILLIGRAM(S): 45 TABLET, ORALLY DISINTEGRATING ORAL at 21:29

## 2018-06-11 RX ADMIN — CARVEDILOL PHOSPHATE 12.5 MILLIGRAM(S): 80 CAPSULE, EXTENDED RELEASE ORAL at 17:27

## 2018-06-11 NOTE — CONSULT NOTE ADULT - PROBLEM SELECTOR RECOMMENDATION 3
- needs repeat imaging for surveillance in 6-12 months as an outpatient  - unlikely to be a candidate for resection  - no further workup at this time

## 2018-06-11 NOTE — PROGRESS NOTE ADULT - SUBJECTIVE AND OBJECTIVE BOX
- Patient seen and examined.  - In summary, patient is a 78 year old woman who presented with fever (2018 15:36)  - Today, patient is without complaints.         *****MEDICATIONS:    MEDICATIONS  (STANDING):  atorvastatin 40 milliGRAM(s) Oral at bedtime  carvedilol 12.5 milliGRAM(s) Oral every 12 hours  dextrose 5%. 1000 milliLiter(s) (50 mL/Hr) IV Continuous <Continuous>  dextrose 50% Injectable 12.5 Gram(s) IV Push once  dextrose 50% Injectable 25 Gram(s) IV Push once  dextrose 50% Injectable 25 Gram(s) IV Push once  famotidine    Tablet 20 milliGRAM(s) Oral daily  heparin  Injectable 5000 Unit(s) SubCutaneous every 12 hours  insulin lispro (HumaLOG) corrective regimen sliding scale   SubCutaneous three times a day before meals  insulin lispro (HumaLOG) corrective regimen sliding scale   SubCutaneous at bedtime  lisinopril 5 milliGRAM(s) Oral daily  mirtazapine 15 milliGRAM(s) Oral at bedtime  morphine ER Tablet 15 milliGRAM(s) Oral every 8 hours  nystatin    Suspension 678435 Unit(s) Oral four times a day  piperacillin/tazobactam IVPB. 3.375 Gram(s) IV Intermittent every 8 hours  vancomycin  IVPB 1000 milliGRAM(s) IV Intermittent every 24 hours    MEDICATIONS  (PRN):  acetaminophen   Tablet 650 milliGRAM(s) Oral every 6 hours PRN Mild Pain (1 - 3)  dextrose 40% Gel 15 Gram(s) Oral once PRN Blood Glucose LESS THAN 70 milliGRAM(s)/deciliter  glucagon  Injectable 1 milliGRAM(s) IntraMuscular once PRN Glucose LESS THAN 70 milligrams/deciliter  morphine  IR 15 milliGRAM(s) Oral every 4 hours PRN moderate to severe pain             ***** REVIEW OF SYSTEM:  GEN: no fever, no chills, no pain  RESP: no SOB, no cough, no sputum  CVS: no chest pain, no palpitations, no edema  GI: no abdominal pain, no nausea, no vomiting, no constipation, no diarrhea  : no dysuria, no frequency  NEURO: no headache, no dizziness  PSYCH: no depression, not anxious  Derm : no itching, no rash         ***** VITAL SIGNS:    T(F): 98 (18 @ 04:33), Max: 99.4 (06-10-18 @ 21:04)  HR: 65 (18 @ 04:33) (65 - 77)  BP: 120/73 (18 @ 04:33) (99/58 - 120/73)  RR: 18 (18 @ 04:33) (18 - 18)  SpO2: 98% (18 @ 04:33) (95% - 98%)  Wt(kg): --  ,   I&O's Summary    10 Jed 2018 07:01  -  2018 07:00  --------------------------------------------------------  IN: 2170 mL / OUT: 500 mL / NET: 1670 mL                 *****PHYSICAL EXAM:  GEN: A&O X 3 , NAD , comfortable  HEENT: NCAT, EOMI, MMM, no icterus  NECK: Supple, No JVD  CVS: S1S2 , regular , No M/R/G appreciated  PULM: CTA B/L,  no W/R/R appreciated  ABD.: soft. non tender, non distended,  bowel sounds present  Extrem: intact pulses , no edema noted  Derm: No rash or ecchymosis noted  PSYCH: normal mood, no depression, not anxious         *****LAB AND IMAGIN.6    9.52  )-----------( 223      ( 2018 08:12 )             26.8               -    135  |  99  |  5<L>  ----------------------------<  81  3.4<L>   |  24  |  0.54    Ca    8.1<L>      2018 06:11    [All pertinent recent Imaging/Reports reviewed]         *****A S S E S S M E N T   A N D   P L A N :  78F with rectal cancer adm with fever  abx per ID, 3 more days  CT noted  cont current meds  IVF  oncology to f/u  hopefully DC when done with abx if no in-pt plans per onc      __________________________  YON Metcalf D.O.

## 2018-06-11 NOTE — PROGRESS NOTE ADULT - SUBJECTIVE AND OBJECTIVE BOX
no  pain,  much improved    REVIEW OF SYSTEMS:  CONSTITUTIONAL: No weakness,  no   fevers      RESPIRATORY:  No    shortness of breath  , no  wheezing  CARDIOVASCULAR: No chest pain,   no  palpitations, no  cough  GASTROINTESTINAL: No abdominal  pain, no  vomiting, no  diarrhea  NEUROLOGICAL: No  focal  weakness    MEDICATIONS  (STANDING):  atorvastatin 40 milliGRAM(s) Oral at bedtime  carvedilol 12.5 milliGRAM(s) Oral every 12 hours  dextrose 5%. 1000 milliLiter(s) (50 mL/Hr) IV Continuous <Continuous>  dextrose 50% Injectable 12.5 Gram(s) IV Push once  dextrose 50% Injectable 25 Gram(s) IV Push once  dextrose 50% Injectable 25 Gram(s) IV Push once  famotidine    Tablet 20 milliGRAM(s) Oral daily  heparin  Injectable 5000 Unit(s) SubCutaneous every 12 hours  insulin lispro (HumaLOG) corrective regimen sliding scale   SubCutaneous three times a day before meals  insulin lispro (HumaLOG) corrective regimen sliding scale   SubCutaneous at bedtime  lisinopril 5 milliGRAM(s) Oral daily  mirtazapine 15 milliGRAM(s) Oral at bedtime  morphine ER Tablet 15 milliGRAM(s) Oral every 8 hours  nystatin    Suspension 386493 Unit(s) Oral four times a day  piperacillin/tazobactam IVPB. 3.375 Gram(s) IV Intermittent every 8 hours  vancomycin  IVPB 1000 milliGRAM(s) IV Intermittent every 24 hours    MEDICATIONS  (PRN):  acetaminophen   Tablet 650 milliGRAM(s) Oral every 6 hours PRN Mild Pain (1 - 3)  dextrose 40% Gel 15 Gram(s) Oral once PRN Blood Glucose LESS THAN 70 milliGRAM(s)/deciliter  glucagon  Injectable 1 milliGRAM(s) IntraMuscular once PRN Glucose LESS THAN 70 milligrams/deciliter  morphine  IR 15 milliGRAM(s) Oral every 4 hours PRN moderate to severe pain      Vital Signs Last 24 Hrs  T(C): 36.7 (11 Jun 2018 04:33), Max: 37.4 (10 Jed 2018 21:04)  T(F): 98 (11 Jun 2018 04:33), Max: 99.4 (10 Jed 2018 21:04)  HR: 65 (11 Jun 2018 04:33) (65 - 77)  BP: 120/73 (11 Jun 2018 04:33) (99/58 - 120/73)  BP(mean): --  RR: 18 (11 Jun 2018 04:33) (18 - 18)  SpO2: 98% (11 Jun 2018 04:33) (95% - 98%)  CAPILLARY BLOOD GLUCOSE      POCT Blood Glucose.: 87 mg/dL (11 Jun 2018 08:52)  POCT Blood Glucose.: 119 mg/dL (10 Jed 2018 22:14)  POCT Blood Glucose.: 245 mg/dL (10 Jed 2018 17:57)  POCT Blood Glucose.: 165 mg/dL (10 Jed 2018 12:16)    I&O's Summary    10 Jed 2018 07:01  -  11 Jun 2018 07:00  --------------------------------------------------------  IN: 2170 mL / OUT: 500 mL / NET: 1670 mL        PHYSICAL EXAM:  HEAD:  Atraumatic, Normocephalic  NECK: Supple, No   JVD  CHEST/LUNG:   no     rales,     no,    rhonchi  HEART: Regular rate and rhythm;         murmur  ABDOMEN: Soft, Nontender, ;   EXTREMITIES:        edema  NEUROLOGY:  alert    LABS:                        8.6    9.52  )-----------( 223      ( 11 Jun 2018 08:12 )             26.8     06-11    135  |  99  |  5<L>  ----------------------------<  81  3.4<L>   |  24  |  0.54    Ca    8.1<L>      11 Jun 2018 06:11                    Hemoglobin A1C, Whole Blood: 6.6 % (05-09 @ 06:52)    Thyroid Stimulating Hormone, Serum: 1.00 uIU/mL (05-18 @ 06:35)          Consultant(s) Notes Reviewed:      Care Discussed with Consultants/Other Providers:

## 2018-06-11 NOTE — CONSULT NOTE ADULT - PROBLEM SELECTOR RECOMMENDATION 2
- s/p incomplete RT therapy  - the goals of care are palliative  - not a candidate for systemic chemotherapy due to very poor PS  - consult colorectal surgery regarding any plan for resection  - outpatient f/u with Dr. Charmaine Okeefe

## 2018-06-11 NOTE — PROGRESS NOTE ADULT - ASSESSMENT
78 year old Bulgarian-speaking woman with CAD, PAD s/p RLE amputation presents this admission with abdominal pain, bleeding per rectum for the past few months.  rectal mass on CT imaging of abdomen/pelvis, underwent colonscopy, lower EUS with GI, which confirms large (10x5cm), circumferential rectal mass 4cm from anal verge, stage IIIB (T3N1MX) by EUS examination.   s/p RT  Recent clint-rectal abscess V neoplasm  s/p IV abx-fevers again  Abscess decreased on CT  Also ? pna V lung neoplasm  Fevers-resolved  Clinically stable  Continue HCAP coverage as above  Oncology input on nodule  Chcek Vanco levels  Will tailor plan for ID issues  per course,results.Will defer to primary team on management of other issues.  case d/w Med NP,Dr Metcalf  Will Follow.  Beeper [id number]-kpuo/afterhours/No response-[id number]

## 2018-06-11 NOTE — CONSULT NOTE ADULT - SUBJECTIVE AND OBJECTIVE BOX
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 Of note patient recently diagnosed with rectal abscesses was on IV antibiotics via PICC Line, completed 2 week course, was transitioned to augmentin with plan for continued surveillance. Patient found to have fever of 101 and was brought back to hospital. Patient is a poor historian and unable to answer questions via  phone. She says she is feeling weak, and cannot walk, but otherwise denies any fevers, chills, nausea, vomiting, diarrhea, SOB, cough, rash, abdominal pain. She is currently on vancomycin and zosyn. Patient had repeat CT imaging of her abdomen and pelvis, which showed persistent rectal neoplasm, and slight improvement in fluid collections to the left of the rectum. There was also as stable spiculated nodule in the right upper lobe suspicious for neoplasm, previously described as 6mm in size. There were additionally surrounding groundglass opacities as well as which "may be infectious".     Regarding patient's oncologist history she was admitted with rectal bleeding on April 10th, CT A/P at that time showed rectal thickening and a 6mm RUL nodule, suspicious for malignancy. EUS/colonoscopy performed suggested T3N1 mid-rectal tumor. Biopsy was nondiagnostic. Patient readmitted with rectal pain and vomiting. Repeat endoscopic biopsy + for adenocarcinoma. She was started on RT but then was determined to have pelvic abscesses. She was evaluated by colorectal surgery who recommended re-evaluation for resection in future.       Allergies    No Known Allergies    Intolerances        MEDICATIONS  (STANDING):  atorvastatin 40 milliGRAM(s) Oral at bedtime  carvedilol 12.5 milliGRAM(s) Oral every 12 hours  dextrose 5%. 1000 milliLiter(s) (50 mL/Hr) IV Continuous <Continuous>  dextrose 50% Injectable 12.5 Gram(s) IV Push once  dextrose 50% Injectable 25 Gram(s) IV Push once  dextrose 50% Injectable 25 Gram(s) IV Push once  famotidine    Tablet 20 milliGRAM(s) Oral daily  heparin  Injectable 5000 Unit(s) SubCutaneous every 12 hours  insulin lispro (HumaLOG) corrective regimen sliding scale   SubCutaneous three times a day before meals  insulin lispro (HumaLOG) corrective regimen sliding scale   SubCutaneous at bedtime  lisinopril 5 milliGRAM(s) Oral daily  mirtazapine 15 milliGRAM(s) Oral at bedtime  nystatin    Suspension 555988 Unit(s) Oral four times a day  oxyCODONE  ER Tablet 10 milliGRAM(s) Oral every 12 hours  piperacillin/tazobactam IVPB. 3.375 Gram(s) IV Intermittent every 8 hours  vancomycin  IVPB 1000 milliGRAM(s) IV Intermittent every 24 hours    MEDICATIONS  (PRN):  acetaminophen   Tablet 650 milliGRAM(s) Oral every 6 hours PRN Mild Pain (1 - 3)  dextrose 40% Gel 15 Gram(s) Oral once PRN Blood Glucose LESS THAN 70 milliGRAM(s)/deciliter  glucagon  Injectable 1 milliGRAM(s) IntraMuscular once PRN Glucose LESS THAN 70 milligrams/deciliter      PAST MEDICAL & SURGICAL HISTORY:  Rectal mass  Gangrene of foot  Coronary artery disease involving native coronary artery of native heart without angina pectoris  Status post above knee amputation of right lower extremity      FAMILY HISTORY:  No pertinent family history in first degree relatives      SOCIAL HISTORY: No EtOH, no tobacco    REVIEW OF SYSTEMS: unreliable as patient poor historian    CONSTITUTIONAL: No  fevers or chills  EYES/ENT: No visual changes;  No vertigo or throat pain   NECK: No pain or stiffness  RESPIRATORY: No cough, wheezing, hemoptysis; No shortness of breath  CARDIOVASCULAR: No chest pain or palpitations  GASTROINTESTINAL: No abdominal or epigastric pain. No nausea, vomiting, or hematemesis; No diarrhea or constipation. No melena or hematochezia.  GENITOURINARY: No dysuria, frequency or hematuria  NEUROLOGICAL: No numbness or weakness  SKIN: No itching, burning, rashes, or lesions   All other review of systems is negative unless indicated above.    Height (cm): 157.48 (06-10 @ 21:32)    T(F): 98.2 (06-11-18 @ 11:17), Max: 99.4 (06-10-18 @ 21:04)  HR: 64 (06-11-18 @ 11:17)  BP: 104/64 (06-11-18 @ 11:17)  RR: 18 (06-11-18 @ 11:17)  SpO2: 96% (06-11-18 @ 11:17)  Wt(kg): --    GENERAL: chronically ill in appearance  HEAD:  Atraumatic, Normocephalic  EYES: EOMI, PERRLA, conjunctiva and sclera clear  NECK: Supple, No JVD  CHEST/LUNG: Clear to auscultation bilaterally; No wheeze  HEART: Regular rate and rhythm; No murmurs, rubs, or gallops  ABDOMEN: Soft, Nontender, Nondistended; Bowel sounds present  EXTREMITIES:  right AKA  NEUROLOGY: non-focal  SKIN: No rashes or lesions                          8.6    9.52  )-----------( 223      ( 11 Jun 2018 08:12 )             26.8       06-11    135  |  99  |  5<L>  ----------------------------<  81  3.4<L>   |  24  |  0.54    Ca    8.1<L>      11 Jun 2018 06:11

## 2018-06-11 NOTE — PROGRESS NOTE ADULT - SUBJECTIVE AND OBJECTIVE BOX
Patient is a 78y old  Female who presents with a chief complaint of fever (08 Jun 2018 15:36)    Being followed by ID for per-rectal abscess ? PNA    Interval history:Feels OK-denies pain,no cough   No other acute events      ROS:    No other complaints      Antimicrobials:    nystatin    Suspension 502498 Unit(s) Oral four times a day  piperacillin/tazobactam IVPB. 3.375 Gram(s) IV Intermittent every 8 hours  vancomycin  IVPB 1000 milliGRAM(s) IV Intermittent every 24 hours      other medications reviewed    Vital Signs Last 24 Hrs  T(C): 36.7 (06-11-18 @ 04:33), Max: 37.4 (06-10-18 @ 21:04)  T(F): 98 (06-11-18 @ 04:33), Max: 99.4 (06-10-18 @ 21:04)  HR: 65 (06-11-18 @ 04:33) (65 - 77)  BP: 120/73 (06-11-18 @ 04:33) (99/58 - 120/73)  BP(mean): --  RR: 18 (06-11-18 @ 04:33) (18 - 18)  SpO2: 98% (06-11-18 @ 04:33) (95% - 98%)    Physical Exam:    Constitutional well preserved,comfortable,pleasant    HEENT PERRLA EOMI,No pallor or icterus    No oral exudate or erythema    Neck supple no JVD or LN    Chest Good AE,CTA    CVS RRR S1 S2 WNl No murmur or rub or gallop    Abd soft BS normal No tenderness no masses    Ext BKA     IV site no erythema tenderness or discharge    Joints no swelling or LOM    CNS AAO X 3 no focal    Lab Data:                          8.6    9.52  )-----------( 223      ( 11 Jun 2018 08:12 )             26.8       06-11    135  |  99  |  5<L>  ----------------------------<  81  3.4<L>   |  24  |  0.54    Ca    8.1<L>      11 Jun 2018 06:11          Culture - Blood (collected 08 Jun 2018 20:16)  Source: .Blood Blood-Peripheral  Preliminary Report (09 Jun 2018 21:01):    No growth to date.    Culture - Blood (collected 08 Jun 2018 16:27)  Source: .Blood Blood  Preliminary Report (09 Jun 2018 17:01):    No growth to date.          < from: CT Abdomen and Pelvis w/ IV Cont (06.08.18 @ 16:45) >    IMPRESSION: Known rectal neoplasm. Slight interval improvement in fluid   collections to the left of the rectum.  Stable spiculated nodule in the right upper lobe, which is suspicious for   neoplasm. Surrounding groundglass opacities may be infectious.        < end of copied text >

## 2018-06-12 ENCOUNTER — APPOINTMENT (OUTPATIENT)
Dept: COLORECTAL SURGERY | Facility: CLINIC | Age: 78
End: 2018-06-12

## 2018-06-12 LAB
ANION GAP SERPL CALC-SCNC: 9 MMOL/L — SIGNIFICANT CHANGE UP (ref 5–17)
ANION GAP SERPL CALC-SCNC: 9 MMOL/L — SIGNIFICANT CHANGE UP (ref 5–17)
BUN SERPL-MCNC: 4 MG/DL — LOW (ref 7–23)
BUN SERPL-MCNC: 4 MG/DL — LOW (ref 7–23)
CALCIUM SERPL-MCNC: 8.2 MG/DL — LOW (ref 8.4–10.5)
CALCIUM SERPL-MCNC: 8.2 MG/DL — LOW (ref 8.4–10.5)
CHLORIDE SERPL-SCNC: 96 MMOL/L — SIGNIFICANT CHANGE UP (ref 96–108)
CHLORIDE SERPL-SCNC: 97 MMOL/L — SIGNIFICANT CHANGE UP (ref 96–108)
CO2 SERPL-SCNC: 24 MMOL/L — SIGNIFICANT CHANGE UP (ref 22–31)
CO2 SERPL-SCNC: 25 MMOL/L — SIGNIFICANT CHANGE UP (ref 22–31)
CREAT SERPL-MCNC: 0.49 MG/DL — LOW (ref 0.5–1.3)
CREAT SERPL-MCNC: 0.54 MG/DL — SIGNIFICANT CHANGE UP (ref 0.5–1.3)
GLUCOSE BLDC GLUCOMTR-MCNC: 127 MG/DL — HIGH (ref 70–99)
GLUCOSE BLDC GLUCOMTR-MCNC: 137 MG/DL — HIGH (ref 70–99)
GLUCOSE BLDC GLUCOMTR-MCNC: 209 MG/DL — HIGH (ref 70–99)
GLUCOSE BLDC GLUCOMTR-MCNC: 228 MG/DL — HIGH (ref 70–99)
GLUCOSE BLDC GLUCOMTR-MCNC: 262 MG/DL — HIGH (ref 70–99)
GLUCOSE SERPL-MCNC: 112 MG/DL — HIGH (ref 70–99)
GLUCOSE SERPL-MCNC: 96 MG/DL — SIGNIFICANT CHANGE UP (ref 70–99)
HCT VFR BLD CALC: 28.4 % — LOW (ref 34.5–45)
HCT VFR BLD CALC: 30.2 % — LOW (ref 34.5–45)
HGB BLD-MCNC: 9.6 G/DL — LOW (ref 11.5–15.5)
HGB BLD-MCNC: 9.8 G/DL — LOW (ref 11.5–15.5)
MCHC RBC-ENTMCNC: 27.8 PG — SIGNIFICANT CHANGE UP (ref 27–34)
MCHC RBC-ENTMCNC: 30 PG — SIGNIFICANT CHANGE UP (ref 27–34)
MCHC RBC-ENTMCNC: 32.5 GM/DL — SIGNIFICANT CHANGE UP (ref 32–36)
MCHC RBC-ENTMCNC: 33.9 GM/DL — SIGNIFICANT CHANGE UP (ref 32–36)
MCV RBC AUTO: 85.6 FL — SIGNIFICANT CHANGE UP (ref 80–100)
MCV RBC AUTO: 88.4 FL — SIGNIFICANT CHANGE UP (ref 80–100)
PLATELET # BLD AUTO: 195 K/UL — SIGNIFICANT CHANGE UP (ref 150–400)
PLATELET # BLD AUTO: 266 K/UL — SIGNIFICANT CHANGE UP (ref 150–400)
POTASSIUM SERPL-MCNC: 4 MMOL/L — SIGNIFICANT CHANGE UP (ref 3.5–5.3)
POTASSIUM SERPL-MCNC: 4.2 MMOL/L — SIGNIFICANT CHANGE UP (ref 3.5–5.3)
POTASSIUM SERPL-SCNC: 4 MMOL/L — SIGNIFICANT CHANGE UP (ref 3.5–5.3)
POTASSIUM SERPL-SCNC: 4.2 MMOL/L — SIGNIFICANT CHANGE UP (ref 3.5–5.3)
RBC # BLD: 3.21 M/UL — LOW (ref 3.8–5.2)
RBC # BLD: 3.53 M/UL — LOW (ref 3.8–5.2)
RBC # FLD: 15.4 % — HIGH (ref 10.3–14.5)
RBC # FLD: 16.7 % — HIGH (ref 10.3–14.5)
SODIUM SERPL-SCNC: 129 MMOL/L — LOW (ref 135–145)
SODIUM SERPL-SCNC: 131 MMOL/L — LOW (ref 135–145)
WBC # BLD: 11.2 K/UL — HIGH (ref 3.8–10.5)
WBC # BLD: 13.87 K/UL — HIGH (ref 3.8–10.5)
WBC # FLD AUTO: 11.2 K/UL — HIGH (ref 3.8–10.5)
WBC # FLD AUTO: 13.87 K/UL — HIGH (ref 3.8–10.5)

## 2018-06-12 PROCEDURE — 99232 SBSQ HOSP IP/OBS MODERATE 35: CPT

## 2018-06-12 RX ORDER — CARVEDILOL PHOSPHATE 80 MG/1
3.12 CAPSULE, EXTENDED RELEASE ORAL EVERY 12 HOURS
Qty: 0 | Refills: 0 | Status: DISCONTINUED | OUTPATIENT
Start: 2018-06-13 | End: 2018-06-20

## 2018-06-12 RX ADMIN — Medication 500000 UNIT(S): at 05:58

## 2018-06-12 RX ADMIN — Medication 250 MILLIGRAM(S): at 19:18

## 2018-06-12 RX ADMIN — PIPERACILLIN AND TAZOBACTAM 25 GRAM(S): 4; .5 INJECTION, POWDER, LYOPHILIZED, FOR SOLUTION INTRAVENOUS at 05:55

## 2018-06-12 RX ADMIN — OXYCODONE HYDROCHLORIDE 10 MILLIGRAM(S): 5 TABLET ORAL at 05:58

## 2018-06-12 RX ADMIN — Medication 0: at 23:57

## 2018-06-12 RX ADMIN — OXYCODONE HYDROCHLORIDE 10 MILLIGRAM(S): 5 TABLET ORAL at 18:25

## 2018-06-12 RX ADMIN — HEPARIN SODIUM 5000 UNIT(S): 5000 INJECTION INTRAVENOUS; SUBCUTANEOUS at 17:32

## 2018-06-12 RX ADMIN — Medication 500000 UNIT(S): at 23:58

## 2018-06-12 RX ADMIN — ATORVASTATIN CALCIUM 40 MILLIGRAM(S): 80 TABLET, FILM COATED ORAL at 21:08

## 2018-06-12 RX ADMIN — FAMOTIDINE 20 MILLIGRAM(S): 10 INJECTION INTRAVENOUS at 12:55

## 2018-06-12 RX ADMIN — PIPERACILLIN AND TAZOBACTAM 25 GRAM(S): 4; .5 INJECTION, POWDER, LYOPHILIZED, FOR SOLUTION INTRAVENOUS at 23:58

## 2018-06-12 RX ADMIN — PIPERACILLIN AND TAZOBACTAM 25 GRAM(S): 4; .5 INJECTION, POWDER, LYOPHILIZED, FOR SOLUTION INTRAVENOUS at 15:25

## 2018-06-12 RX ADMIN — MIRTAZAPINE 15 MILLIGRAM(S): 45 TABLET, ORALLY DISINTEGRATING ORAL at 21:08

## 2018-06-12 RX ADMIN — HEPARIN SODIUM 5000 UNIT(S): 5000 INJECTION INTRAVENOUS; SUBCUTANEOUS at 05:55

## 2018-06-12 RX ADMIN — Medication 650 MILLIGRAM(S): at 21:08

## 2018-06-12 RX ADMIN — Medication 500000 UNIT(S): at 17:35

## 2018-06-12 RX ADMIN — Medication 500000 UNIT(S): at 12:55

## 2018-06-12 RX ADMIN — CARVEDILOL PHOSPHATE 12.5 MILLIGRAM(S): 80 CAPSULE, EXTENDED RELEASE ORAL at 05:55

## 2018-06-12 RX ADMIN — OXYCODONE HYDROCHLORIDE 10 MILLIGRAM(S): 5 TABLET ORAL at 06:35

## 2018-06-12 RX ADMIN — LISINOPRIL 5 MILLIGRAM(S): 2.5 TABLET ORAL at 05:55

## 2018-06-12 RX ADMIN — OXYCODONE HYDROCHLORIDE 10 MILLIGRAM(S): 5 TABLET ORAL at 17:32

## 2018-06-12 RX ADMIN — Medication 2: at 17:32

## 2018-06-12 NOTE — PHYSICAL THERAPY INITIAL EVALUATION ADULT - PLANNED THERAPY INTERVENTIONS, PT EVAL
bed mobility training/transfer training/balance training/strengthening balance training/bed mobility training/gait training/strengthening/transfer training

## 2018-06-12 NOTE — CONSULT NOTE ADULT - ATTENDING COMMENTS
Locally advanced rectal cancer  -Pt could not tolerate short course radiation and now admitted w/ fevers.  Pelvic abscess some what improved  -Will discuss further w/ oncology and family regarding goals of care/palliation  -Pt may benefit from proximal diversion to alleviate possible obstruction  -will continue to monitor

## 2018-06-12 NOTE — PHYSICAL THERAPY INITIAL EVALUATION ADULT - ADDITIONAL COMMENTS
pt came from [facility] there as short term to long term placement ; pt dependnet all functional activty; Has Ness Lew [phone] pt came from [facility] there as short term to long term placement ; pt dependent all functional activity; Has Dtr Louann [phone] which pt ask if PT call to let dtr know re session and plan ; dtr id as caregiver; per dtr via phone pt was living in house with son prior to getting ill, amb with rolling walker ; then dx with cancer, AKA and pt went into NH requiring assist all functional mobility ,

## 2018-06-12 NOTE — PHYSICAL THERAPY INITIAL EVALUATION ADULT - ACTIVE RANGE OF MOTION EXAMINATION, REHAB EVAL
bilateral  lower extremity Active ROM was WFL (within functional limits)/R AKA/bilateral upper extremity Active ROM was WFL (within functional limits)/deficits as listed below

## 2018-06-12 NOTE — PROGRESS NOTE ADULT - ASSESSMENT
78 year old Icelandic-speaking woman with CAD, PAD s/p RLE amputation presents this admission with abdominal pain, bleeding per rectum for the past few months.  rectal mass on CT imaging of abdomen/pelvis, underwent colonscopy, lower EUS with GI, which confirms large (10x5cm), circumferential rectal mass 4cm from anal verge, stage IIIB (T3N1MX) by EUS examination.   s/p RT  Recent clint-rectal abscess V neoplasm  s/p IV abx-fevers again  Abscess decreased on CT  Also ? pna V lung neoplasm  Fevers-resolved  Clinically stable  Continue HCAP coverage as above  If stable 3 more days planned   Chcek Vanco levels  Will tailor plan for ID issues  per course,results.Will defer to primary team on management of other issues.  case d/w Dr ramírez  Will Follow.  Beeper [id number]/afterhours/No response-[phone]

## 2018-06-12 NOTE — PHYSICAL THERAPY INITIAL EVALUATION ADULT - IMPAIRMENTS CONTRIBUTING IMPAIRED BED MOBILITY, REHAB EVAL
decreased strength/impaired balance/cognition/decreased endurance , sat x 5 min with L foot on floor and using ue's to support min of 1/impaired postural control

## 2018-06-12 NOTE — PHYSICAL THERAPY INITIAL EVALUATION ADULT - DISCHARGE DISPOSITION, PT EVAL
rehabilitation facility/subacute rehab with transition to LTC ,pt came from [facility] to return per CM and dtr Louann ;PT to increase functional mobility , strength, balance, endurance

## 2018-06-12 NOTE — PROGRESS NOTE ADULT - SUBJECTIVE AND OBJECTIVE BOX
- Patient seen and examined.  - In summary, patient is a 78 year old woman who presented with fever (2018 15:36)  - Today, patient is without complaints.         *****MEDICATIONS:    MEDICATIONS  (STANDING):  atorvastatin 40 milliGRAM(s) Oral at bedtime  carvedilol 12.5 milliGRAM(s) Oral every 12 hours  dextrose 5%. 1000 milliLiter(s) (50 mL/Hr) IV Continuous <Continuous>  dextrose 50% Injectable 12.5 Gram(s) IV Push once  dextrose 50% Injectable 25 Gram(s) IV Push once  dextrose 50% Injectable 25 Gram(s) IV Push once  famotidine    Tablet 20 milliGRAM(s) Oral daily  heparin  Injectable 5000 Unit(s) SubCutaneous every 12 hours  insulin lispro (HumaLOG) corrective regimen sliding scale   SubCutaneous three times a day before meals  insulin lispro (HumaLOG) corrective regimen sliding scale   SubCutaneous at bedtime  lisinopril 5 milliGRAM(s) Oral daily  mirtazapine 15 milliGRAM(s) Oral at bedtime  nystatin    Suspension 102572 Unit(s) Oral four times a day  oxyCODONE  ER Tablet 10 milliGRAM(s) Oral every 12 hours  piperacillin/tazobactam IVPB. 3.375 Gram(s) IV Intermittent every 8 hours  vancomycin  IVPB 1000 milliGRAM(s) IV Intermittent every 24 hours    MEDICATIONS  (PRN):  acetaminophen   Tablet 650 milliGRAM(s) Oral every 6 hours PRN Mild Pain (1 - 3)  dextrose 40% Gel 15 Gram(s) Oral once PRN Blood Glucose LESS THAN 70 milliGRAM(s)/deciliter  glucagon  Injectable 1 milliGRAM(s) IntraMuscular once PRN Glucose LESS THAN 70 milligrams/deciliter               ***** REVIEW OF SYSTEM:  GEN: no fever, no chills, no pain  RESP: no SOB, no cough, no sputum  CVS: no chest pain, no palpitations, no edema  GI: no abdominal pain, no nausea, no vomiting, no constipation, no diarrhea  : no dysuria, no frequency  NEURO: no headache, no dizziness  PSYCH: no depression, not anxious  Derm : no itching, no rash         ***** VITAL SIGNS:    T(F): 98.1 (18 @ 04:01), Max: 99.2 (06-11-18 @ 20:14)  HR: 68 (18 @ 04:01) (64 - 73)  BP: 151/84 (18 @ 04:01) (104/64 - 151/84)  RR: 18 (18 @ 04:01) (18 - 18)  SpO2: 98% (18 @ 04:01) (96% - 100%)  Wt(kg): --  ,   I&O's Summary    2018 07:01  -  2018 07:00  --------------------------------------------------------  IN: 1030 mL / OUT: 0 mL / NET: 1030 mL                   *****PHYSICAL EXAM:  GEN: A&O X 3 , NAD , comfortable  HEENT: NCAT, EOMI, MMM, no icterus  NECK: Supple, No JVD  CVS: S1S2 , regular , No M/R/G appreciated  PULM: CTA B/L,  no W/R/R appreciated  ABD.: soft. non tender, non distended,  bowel sounds present  Extrem: intact pulses , no edema noted  Derm: No rash or ecchymosis noted  PSYCH: normal mood, no depression, not anxious         *****LAB AND IMAGIN.6    9.52  )-----------( 223      ( 2018 08:12 )             26.8               06-12    129<L>  |  96  |  4<L>  ----------------------------<  96  4.2   |  24  |  0.49<L>    Ca    8.2<L>      2018 06:21      [All pertinent recent Imaging/Reports reviewed]         *****A S S E S S M E N T   A N D   P L A N :  78F with rectal cancer adm with fever  abx per ID  CT noted  cont current meds  IVF  oncology to f/u  hopefully DC when done with abx if no in-pt plans per onc      __________________________  ROBERTO. CHAPARRO Metcalf

## 2018-06-12 NOTE — PROGRESS NOTE ADULT - SUBJECTIVE AND OBJECTIVE BOX
Patient is a 78y old  Female who presents with a chief complaint of fever (08 Jun 2018 15:36)    Being followed by ID for pna, h/o CRS ca witha bscess    Interval history:some cough  No other events  No other acute events      ROS:No other complaints      Antimicrobials:    nystatin    Suspension 765143 Unit(s) Oral four times a day  piperacillin/tazobactam IVPB. 3.375 Gram(s) IV Intermittent every 8 hours D 4  vancomycin  IVPB 1000 milliGRAM(s) IV Intermittent every 24 hours D 4      other medications reviewed    Vital Signs Last 24 Hrs  T(C): 36.7 (06-12-18 @ 04:01), Max: 37.3 (06-11-18 @ 20:14)  T(F): 98.1 (06-12-18 @ 04:01), Max: 99.2 (06-11-18 @ 20:14)  HR: 68 (06-12-18 @ 04:01) (64 - 73)  BP: 151/84 (06-12-18 @ 04:01) (104/64 - 151/84)  BP(mean): --  RR: 18 (06-12-18 @ 04:01) (18 - 18)  SpO2: 98% (06-12-18 @ 04:01) (96% - 100%)    Physical Exam:    Constitutional well preserved,comfortable,pleasant    HEENT PERRLA EOMI,No pallor or icterus    No oral exudate or erythema    Neck supple no JVD or LN    Chest Good AE,occ crackles     CVS RRR S1 S2 WNl No murmur or rub or gallop    Abd soft BS normal No tenderness no masses    Ext No cyanosis clubbing or edema    IV site no erythema tenderness or discharge    Joints no swelling or LOM    CNS AAO X 3 no focal    Lab Data:                          8.6    9.52  )-----------( 223      ( 11 Jun 2018 08:12 )             26.8       06-12    129<L>  |  96  |  4<L>  ----------------------------<  96  4.2   |  24  |  0.49<L>    Ca    8.2<L>      12 Jun 2018 06:21          Culture - Blood (collected 08 Jun 2018 20:16)  Source: .Blood Blood-Peripheral  Preliminary Report (09 Jun 2018 21:01):    No growth to date.    Culture - Blood (collected 08 Jun 2018 16:27)  Source: .Blood Blood  Preliminary Report (09 Jun 2018 17:01):    No growth to date.              Vancomycin Level, Trough: 6.4 ug/mL (06-11-18 @ 16:28)

## 2018-06-12 NOTE — PHYSICAL THERAPY INITIAL EVALUATION ADULT - PRECAUTIONS/LIMITATIONS, REHAB EVAL
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 Of note patient recently diagnosed with rectal abscesses was on IV antibiotics via PICC Line, completed 2 week course, was transitioned to augmentin with plan for continued surveillance. Patient found to have fever of 101 and was brought back to hospital. Patient is a poor historian and unable to answer questions via  phone. She says she is feeling weak, and cannot walk, but otherwise denies any fevers, chills, nausea, vomiting, diarrhea, SOB, cough, rash, abdominal pain. She is currently on vancomycin and zosyn. Patient had repeat CT imaging of her abdomen and pelvis, which showed persistent rectal neoplasm, and slight improvement in fluid collections to the left of the rectum. There was also as stable spiculated nodule in the right upper lobe suspicious for neoplasm, previously described as 6mm in size. There were additionally surrounding groundglass opacities as well as which "may be infectious".

## 2018-06-12 NOTE — PHYSICAL THERAPY INITIAL EVALUATION ADULT - TRANSFER SAFETY CONCERNS NOTED: SIT/STAND, REHAB EVAL
decreased balance during turns/decreased weight-shifting ability/decreased step length/losing balance

## 2018-06-12 NOTE — PHYSICAL THERAPY INITIAL EVALUATION ADULT - GENERAL OBSERVATIONS, REHAB EVAL
pt received in bed nad pt speak some English , make needs known ;offered free translation phone service pt refuse ;pt no complaints

## 2018-06-12 NOTE — CONSULT NOTE ADULT - SUBJECTIVE AND OBJECTIVE BOX
Colorectal Surgery Consult Note  Red Surgery [id number]    Consultant: Dr. NORMA Sosa    CC: 78y old Female admitted with a chief complaint of fever (08 Jun 2018 15:36).    HPI:  78F PMHx Stage III rectal cancer s/p incomplete RT course, PVD s/p R AKA, who presented to [facility] ED the afternoon of 6/8/18 from nursing home complaining of fever and chills. Patient recently admitted to [facility] (5/8-5/23) during which her rectal CA was diagnosed (EUS/colonoscopy performed suggested T3N1 mid-rectal tumor, adenocarcinoma. Started RT, until she was developed pelvic abscesses. Discharged on 2weeks IV antibiotics via PICC line, then transitioned to Augmentin with plan for continued surveillance. However she developed fevers & returned to hospital.     Per family at bedside, patient was feeling weak, unable to walk, but otherwise denies any fevers, chills, nausea, vomiting, diarrhea, SOB, cough, rash, abdominal pain. Repeat CT imaging of abdomen and pelvis, showed persistent rectal neoplasm, w/ improvement in clint-rectal collections. There was also as stable spiculated nodule in the right upper lobe suspicious for neoplasm, previously seen.     Initially, during [facility] hospitalization in May, plan for short course of RT, followed by interval surgery, however RT stopped 2/2 abscesses. Planned for discharge on IV abx w/ repeat imaging to assess for resolution of collections.  Colorectal Surgery now consulted.     PMHx: Rectal mass  Gangrene of foot  Coronary artery disease involving native coronary artery of native heart without angina pectoris    PSHx: Status post above knee amputation of right lower extremity  No significant past surgical history    Medications (inpatient): atorvastatin 40 milliGRAM(s) Oral at bedtime  dextrose 5%. 1000 milliLiter(s) IV Continuous <Continuous>  dextrose 50% Injectable 12.5 Gram(s) IV Push once  dextrose 50% Injectable 25 Gram(s) IV Push once  dextrose 50% Injectable 25 Gram(s) IV Push once  famotidine    Tablet 20 milliGRAM(s) Oral daily  heparin  Injectable 5000 Unit(s) SubCutaneous every 12 hours  insulin lispro (HumaLOG) corrective regimen sliding scale   SubCutaneous three times a day before meals  insulin lispro (HumaLOG) corrective regimen sliding scale   SubCutaneous at bedtime  mirtazapine 15 milliGRAM(s) Oral at bedtime  nystatin    Suspension 031548 Unit(s) Oral four times a day  oxyCODONE  ER Tablet 10 milliGRAM(s) Oral every 12 hours  piperacillin/tazobactam IVPB. 3.375 Gram(s) IV Intermittent every 8 hours  vancomycin  IVPB 1000 milliGRAM(s) IV Intermittent every 24 hours    Medications (PRN):acetaminophen   Tablet 650 milliGRAM(s) Oral every 6 hours PRN  dextrose 40% Gel 15 Gram(s) Oral once PRN  glucagon  Injectable 1 milliGRAM(s) IntraMuscular once PRN    Home Medications:  acetaminophen 325 mg oral tablet: 2 tab(s) orally every 6 hours as needed for temp greater than 100.1F (08 Jun 2018 19:55)  acetaminophen 325 mg oral tablet: 2 tab(s) orally every 6 hours, As needed, Mild Pain (1 - 3) (08 Jun 2018 19:55)  atorvastatin 40 mg oral tablet: 1 tab(s) orally once a day (at bedtime) (08 Jun 2018 19:55)  carvedilol 12.5 mg oral tablet: 1 tab(s) orally every 12 hours (08 Jun 2018 19:55)  Fleet Enema 7 g-19 g rectal enema: 118 milliliter(s) rectal , As Needed (08 Jun 2018 19:55)  folic acid 1 mg oral tablet: 1 tab(s) orally once a day (08 Jun 2018 19:55)  INVanz 1 g injection: 1 gram(s) intravenously once a day  for 7 days (08 Jun 2018 19:55)  ipratropium-albuterol 0.5 mg-2.5 mg/3 mLinhalation solution: 3 milliliter(s) by nebulizer every 4 hours, As Needed - for shortness of breath and/or wheezing (08 Jun 2018 19:55)  lisinopril 5 mg oral tablet: 1 tab(s) orally once a day (08 Jun 2018 19:55)  metFORMIN 1000 mg oral tablet: 1 tab(s) orally once a day (08 Jun 2018 19:55)  MiraLax oral powder for reconstitution: 1 packet(s) orally once a day (08 Jun 2018 19:55)  morphine 15 mg oral tablet: 1 tab(s) orally every 4 hours, As needed, moderate to severe pain (08 Jun 2018 19:55)  morphine 15 mg/12 hr oral tablet, extended release: 1 tab(s) orally every 12 hours (08 Jun 2018 19:55)  Periactin 4 mg oral tablet: 1 tab(s) orally 3 times a day for 14 days (08 Jun 2018 19:55)  raNITIdine 300 mg oral tablet: 1 tab(s) orally 2 times a day (08 Jun 2018 19:55)  Senna 8.6 mg oral tablet: 2 tab(s) orally once a day (at bedtime) (08 Jun 2018 19:55)  Sodium Chloride 0.9% Intravenous  Solution: 75cc/hr for 48 hours (08 Jun 2018 19:55)  Zofran 4 mg oral tablet: 1 tab(s) orally 2 times a day prior to MS Contin (08 Jun 2018 19:55)      Allergies: No Known Allergies  (Intolerances: x )    Social Hx: no EtOH/tobacco use.     Physical Exam  T(C): 36.6 (06-12-18 @ 16:37)  HR: 70 (06-12-18 @ 16:37) (62 - 70)  BP: 144/62 (06-12-18 @ 16:37) (89/54 - 151/84)  RR: 18 (06-12-18 @ 16:37) (16 - 18)  SpO2: 98% (06-12-18 @ 16:37) (95% - 98%)  Wt(kg): --  Tmax: T(C): , Max: 36.7 (06-12-18 @ 04:01)  Wt(kg): --    06-11-18  -  06-12-18  --------------------------------------------------------  IN:    IV PiggyBack: 550 mL    Oral Fluid: 480 mL  Total IN: 1030 mL    OUT:  Total OUT: 0 mL    Total NET: 1030 mL      06-12-18 - 06-12-18  --------------------------------------------------------  IN:    IV PiggyBack: 100 mL    Oral Fluid: 720 mL  Total IN: 820 mL    OUT:  Total OUT: 0 mL    Total NET: 820 mL        General: NAD, thin appearing; daughter feeding patient  Neuro: alert and oriented, no focal deficits, moves all extremities spontaneously  HEENT: NCAT, anicteric, mucosa moist  Respiratory: airway patent, respirations unlabored on RA  CVS: regular  Abdomen: soft, nontender, nondistended  Extremities: no LE edema, sensation and movement grossly intact; s/p R AKA  Skin: warm, dry, appropriate color  TOMÁS: deferred 2/2 pain      Labs:                        9.6    11.2  )-----------( 195      ( 12 Jun 2018 12:29 )             28.4       06-12    131<L>  |  97  |  4<L>  ----------------------------<  112<H>  4.0   |  25  |  0.54    Ca    8.2<L>      12 Jun 2018 12:29      Surgical Pathology Report (05.10.18 @ 15:30)    Surgical Pathology Report:   ACCESSION No:  [id number]    Final Diagnosis  Rectum, mass, biopsy  - Superficial fragments of adenocarcinoma, moderately  differentiated.          Surgical Pathology Report (04.12.18 @ 16:20)    Surgical Pathology Report:   ACCESSION No:  [id number]      Final Diagnosis  Rectal mass, biopsy  - Superficial fragments of adenoma with focal high grade  dysplasia, see note    Note: The biopsy mayrepresent a superficial mucosal component of  a deeper, invasive underlying lesion. Clinical and endoscopic  correlation is recommended.      Imaging and other studies:  < from: CT Abdomen and Pelvis w/ IV Cont (06.08.18 @ 16:45) >  CHEST:     LUNGS AND LARGE AIRWAYS: Patent central airways. Unchanged spiculated   nodule in the right upper lobe. Increased surrounding groundglass   opacity. Likely a small focus of subpleural atelectasis in the right   lower lobe adjacent to the diaphragm. Bibasilar subsegmental atelectasis.   Mild centrilobular emphysema.   PLEURA: No pleural effusion.       VESSELS: Mild vascular calcifications.   MEDIASTINUM: Heart size is normal. No pericardial effusion. No   mediastinal, hilar, axillary or supraclavicular lymphadenopathy. Left   PICC with tip in the distal SVC. Calcified lymph nodes in the mediastinum   and the brooklynn.  CHEST WALL AND LOWER NECK: Within normal limits.        ABDOMEN AND PELVIS:    LIVER: No focal lesion.      BILE DUCTS: Normal caliber.  GALLBLADDER: Unremarkable.  SPLEEN: Unremarkable.  PANCREAS: Unremarkable.  ADRENALS: Stable 10 mm nodule in the left adrenal gland.  KIDNEYS/URETERS: No hydronephrosis.  No focal lesion.    BLADDER: Thickening of the wall of the base of urinary bladder  REPRODUCTIVE ORGANS: Hysterectomy. No adnexal mass.    BOWEL: Marked rectal wall thickening and irregularity consistent with   known neoplasm. Adjacent fatty stranding and trace amount of free fluid.   Slight interval improvement in rim enhancing fluid collections to the   left of the rectum. PERITONEUM/RETROPERITONEUM: No pneumoperitoneum,   ascites, or lymphadenopathy.  VESSELS:  No evidence of aortic aneurysm. Marked vascular calcifications.   Partially imaged are 2 grafts in the right groin which are occluded,   unchanged.  BONES/SOFT TISSUES: Mild spondylolisthesis of L4 on L5 secondary to   spondylolysis at this level.    IMPRESSION: Known rectal neoplasm. Slight interval improvement in fluid   collections to the left of the rectum.  Stable spiculated nodule in the right upper lobe, which is suspicious for   neoplasm. Surrounding groundglass opacities may be infectious.    < end of copied text >    < from: CT Abdomen and Pelvis w/ Oral Cont and w/ IV Cont (04.10.18 @ 13:37) >  LOWER CHEST: Small calcifiedmediastinal and hilar nodes. A 4 mm left   lower lobe pulmonary nodule (2:13).    LIVER: Within normal limits.  BILE DUCTS: Normal caliber.  GALLBLADDER: Within normal limits.  SPLEEN: Within normal limits.  PANCREAS: Within normal limits.  ADRENALS:Mild thickening of the left adrenal gland.  KIDNEYS/URETERS: Within normal limits.    BLADDER: Within normal limits.  REPRODUCTIVE ORGANS: Hysterectomy.    BOWEL: No bowel obstruction. Markedly irregular rectal wall thickening   with nodularity in the mesorectal fascia, highly concerning for rectal   neoplasm. Appendix is normal.   PERITONEUM: No ascites.  VESSELS:  Atherosclerotic changes. Right lower extremity femoral artery   graft which appears occluded.  RETROPERITONEUM: A few subcentimeter short axis retroperitoneal nodes are   nonspecific.    ABDOMINAL WALL: Within normal limits.  BONES: Degenerative changes.    IMPRESSION:     Rectal neoplasm.    A 4 mm left lower lobe pulmonary nodule.    < end of copied text >    < from: MR Pelvis w/ IV Cont (05.18.18 @ 16:34) >  REPRODUCTIVE ORGANS: Status post hysterectomy. Both ovaries are atrophic   and normal in appearance.    BLADDER: Linear enhancement of the wall of the base of the urinary   bladder. No focal lesion within the bladder.    LYMPH NODES: No obvious lymphadenopathy.    VISUALIZED PORTIONS:    ABDOMINAL ORGANS: Gallstones.  BOWEL: Markedly irregular and abnormal appearance of the rectum with   irregular mural thickening and enhancement. New since the prior exam are   multiple rim-enhancing fluid containing lesions posterior to the rectum   in the presacral space and to the left of the rectum in the mesorectal   fascia. All of these lesions are likely communicating with each other and   collectively measure approximately 6.2 x 2.9 cm. These lesions approach   the base of the urinary bladder.  PERITONEUM: No ascites.  VESSELS: A femoral stent and agraft in the right groin are partially   imaged.  ABDOMINAL WALL: Within normal limits.  BONES: Degenerative changes of the visualized axial spine.    IMPRESSION: Known rectal malignancy. Multiple new cystic lesions to the   left and posterior to therectum may represent abscesses, in the right   clinical setting. Neoplastic extension is also possible. Mild enhancement   at the base of the urinary bladder may be reactive, however, minimal   involvement with disease is not excluded. No focal bladder lesion.    < end of copied text >

## 2018-06-12 NOTE — CONSULT NOTE ADULT - ASSESSMENT
78F p/w stage III rectal adenocarcinoma w/ pelvic collections discharged on abx, now readmitted for fevers.     -will continue to follow.         JOSE Porras MD (PGY2)    (Please page [facility] Red team Surgery, [phone] for questions/concerns.)

## 2018-06-12 NOTE — PHYSICAL THERAPY INITIAL EVALUATION ADULT - IMPAIRED TRANSFERS: SIT/STAND, REHAB EVAL
decreased strength/impaired balance/impaired postural control/cognition/decreased endurance , mod of1 and min of 1 EVERETTE Horton mod unsteady

## 2018-06-12 NOTE — PROGRESS NOTE ADULT - ASSESSMENT
78F with rectal cancer, dm,  htn,  adm with fever,  afebrile  now  cont IV zosyn/  vanco   CT abd pelvis,, as  noted    rectal fluid  collections/ pna  3  more days  of ab, per ID  PTeval  to  start d/c  planning  rpt labs now, to f/p on wbc  and  sodium      < from: CT Abdomen and Pelvis w/ IV Cont (06.08.18 @ 16:45) >  pondylolysis at this level.    IMPRESSION: Known rectal neoplasm. Slight interval improvement in fluid   collections to the left of the rectum.  Stable spiculated nodule in the right upper lobe, which is suspicious for   neoplasm. Surrounding groundglass opacities may be infectious.                < end of copied text >

## 2018-06-12 NOTE — PHYSICAL THERAPY INITIAL EVALUATION ADULT - IMPAIRMENTS CONTRIBUTING TO GAIT DEVIATIONS, PT EVAL
impaired postural control/cognition/impaired balance/decreased strength/decreased endurance ; pt return  to bed to be washed up per rn treasure and diaper wet + urine

## 2018-06-12 NOTE — PROGRESS NOTE ADULT - SUBJECTIVE AND OBJECTIVE BOX
in bed, no compalints  REVIEW OF SYSTEMS:  CONSTITUTIONAL: No weakness,  no   fevers      RESPIRATORY:  No    shortness of breath  , no  wheezing  CARDIOVASCULAR: No chest pain,   no  palpitations, no  cough  GASTROINTESTINAL: No abdominal  pain, no  vomiting, no  diarrhea  NEUROLOGICAL: No  focal  weakness    MEDICATIONS  (STANDING):  atorvastatin 40 milliGRAM(s) Oral at bedtime  carvedilol 12.5 milliGRAM(s) Oral every 12 hours  dextrose 5%. 1000 milliLiter(s) (50 mL/Hr) IV Continuous <Continuous>  dextrose 50% Injectable 12.5 Gram(s) IV Push once  dextrose 50% Injectable 25 Gram(s) IV Push once  dextrose 50% Injectable 25 Gram(s) IV Push once  famotidine    Tablet 20 milliGRAM(s) Oral daily  heparin  Injectable 5000 Unit(s) SubCutaneous every 12 hours  insulin lispro (HumaLOG) corrective regimen sliding scale   SubCutaneous three times a day before meals  insulin lispro (HumaLOG) corrective regimen sliding scale   SubCutaneous at bedtime  lisinopril 5 milliGRAM(s) Oral daily  mirtazapine 15 milliGRAM(s) Oral at bedtime  nystatin    Suspension 578938 Unit(s) Oral four times a day  oxyCODONE  ER Tablet 10 milliGRAM(s) Oral every 12 hours  piperacillin/tazobactam IVPB. 3.375 Gram(s) IV Intermittent every 8 hours  vancomycin  IVPB 1000 milliGRAM(s) IV Intermittent every 24 hours    MEDICATIONS  (PRN):  acetaminophen   Tablet 650 milliGRAM(s) Oral every 6 hours PRN Mild Pain (1 - 3)  dextrose 40% Gel 15 Gram(s) Oral once PRN Blood Glucose LESS THAN 70 milliGRAM(s)/deciliter  glucagon  Injectable 1 milliGRAM(s) IntraMuscular once PRN Glucose LESS THAN 70 milligrams/deciliter      Vital Signs Last 24 Hrs  T(C): 36.7 (12 Jun 2018 04:01), Max: 37.3 (11 Jun 2018 20:14)  T(F): 98.1 (12 Jun 2018 04:01), Max: 99.2 (11 Jun 2018 20:14)  HR: 68 (12 Jun 2018 10:20) (65 - 73)  BP: 89/54 (12 Jun 2018 10:20) (89/54 - 151/84)  BP(mean): --  RR: 18 (12 Jun 2018 10:20) (18 - 18)  SpO2: 98% (12 Jun 2018 10:20) (98% - 100%)  CAPILLARY BLOOD GLUCOSE      POCT Blood Glucose.: 127 mg/dL (12 Jun 2018 08:50)  POCT Blood Glucose.: 237 mg/dL (11 Jun 2018 22:02)  POCT Blood Glucose.: 222 mg/dL (11 Jun 2018 17:32)  POCT Blood Glucose.: 351 mg/dL (11 Jun 2018 17:31)  POCT Blood Glucose.: 124 mg/dL (11 Jun 2018 12:28)    I&O's Summary    11 Jun 2018 07:01  -  12 Jun 2018 07:00  --------------------------------------------------------  IN: 1030 mL / OUT: 0 mL / NET: 1030 mL    12 Jun 2018 07:01  -  12 Jun 2018 11:33  --------------------------------------------------------  IN: 120 mL / OUT: 0 mL / NET: 120 mL        PHYSICAL EXAM:  HEAD:  Atraumatic, Normocephalic  NECK: Supple, No   JVD  CHEST/LUNG:   no     rales,     no,    rhonchi  HEART: Regular rate and rhythm;         murmur  ABDOMEN: Soft, Nontender, ;   EXTREMITIES:        edema  NEUROLOGY:  alert    LABS:                        9.8    13.87 )-----------( 266      ( 12 Jun 2018 07:45 )             30.2     06-12    129<L>  |  96  |  4<L>  ----------------------------<  96  4.2   |  24  |  0.49<L>    Ca    8.2<L>      12 Jun 2018 06:21                    Hemoglobin A1C, Whole Blood: 6.6 % (05-09 @ 06:52)    Thyroid Stimulating Hormone, Serum: 1.00 uIU/mL (05-18 @ 06:35)          Consultant(s) Notes Reviewed:      Care Discussed with Consultants/Other Providers:

## 2018-06-12 NOTE — PHYSICAL THERAPY INITIAL EVALUATION ADULT - PHYSICAL ASSIST/NONPHYSICAL ASSIST: STAND/SIT, REHAB EVAL
Felt woozy freshened up in bed heron pad changed and mini bedbath   2 person assist/verbal cues/nonverbal cues (demo/gestures)

## 2018-06-13 ENCOUNTER — APPOINTMENT (OUTPATIENT)
Age: 78
End: 2018-06-13

## 2018-06-13 LAB
ANION GAP SERPL CALC-SCNC: 11 MMOL/L — SIGNIFICANT CHANGE UP (ref 5–17)
BUN SERPL-MCNC: 5 MG/DL — LOW (ref 7–23)
CALCIUM SERPL-MCNC: 8.3 MG/DL — LOW (ref 8.4–10.5)
CHLORIDE SERPL-SCNC: 102 MMOL/L — SIGNIFICANT CHANGE UP (ref 96–108)
CO2 SERPL-SCNC: 24 MMOL/L — SIGNIFICANT CHANGE UP (ref 22–31)
CREAT SERPL-MCNC: 0.52 MG/DL — SIGNIFICANT CHANGE UP (ref 0.5–1.3)
CULTURE RESULTS: SIGNIFICANT CHANGE UP
CULTURE RESULTS: SIGNIFICANT CHANGE UP
GLUCOSE BLDC GLUCOMTR-MCNC: 206 MG/DL — HIGH (ref 70–99)
GLUCOSE BLDC GLUCOMTR-MCNC: 218 MG/DL — HIGH (ref 70–99)
GLUCOSE BLDC GLUCOMTR-MCNC: 91 MG/DL — SIGNIFICANT CHANGE UP (ref 70–99)
GLUCOSE BLDC GLUCOMTR-MCNC: 95 MG/DL — SIGNIFICANT CHANGE UP (ref 70–99)
GLUCOSE SERPL-MCNC: 97 MG/DL — SIGNIFICANT CHANGE UP (ref 70–99)
HCT VFR BLD CALC: 28.6 % — LOW (ref 34.5–45)
HGB BLD-MCNC: 9.7 G/DL — LOW (ref 11.5–15.5)
MCHC RBC-ENTMCNC: 30.3 PG — SIGNIFICANT CHANGE UP (ref 27–34)
MCHC RBC-ENTMCNC: 34 GM/DL — SIGNIFICANT CHANGE UP (ref 32–36)
MCV RBC AUTO: 89.1 FL — SIGNIFICANT CHANGE UP (ref 80–100)
PLATELET # BLD AUTO: 246 K/UL — SIGNIFICANT CHANGE UP (ref 150–400)
POTASSIUM SERPL-MCNC: 3.3 MMOL/L — LOW (ref 3.5–5.3)
POTASSIUM SERPL-SCNC: 3.3 MMOL/L — LOW (ref 3.5–5.3)
RBC # BLD: 3.21 M/UL — LOW (ref 3.8–5.2)
RBC # FLD: 15.4 % — HIGH (ref 10.3–14.5)
SODIUM SERPL-SCNC: 137 MMOL/L — SIGNIFICANT CHANGE UP (ref 135–145)
SPECIMEN SOURCE: SIGNIFICANT CHANGE UP
SPECIMEN SOURCE: SIGNIFICANT CHANGE UP
VANCOMYCIN TROUGH SERPL-MCNC: 7.9 UG/ML — LOW (ref 10–20)
WBC # BLD: 8.8 K/UL — SIGNIFICANT CHANGE UP (ref 3.8–10.5)
WBC # FLD AUTO: 8.8 K/UL — SIGNIFICANT CHANGE UP (ref 3.8–10.5)

## 2018-06-13 PROCEDURE — 99232 SBSQ HOSP IP/OBS MODERATE 35: CPT

## 2018-06-13 RX ORDER — POTASSIUM CHLORIDE 20 MEQ
40 PACKET (EA) ORAL EVERY 4 HOURS
Qty: 0 | Refills: 0 | Status: COMPLETED | OUTPATIENT
Start: 2018-06-13 | End: 2018-06-13

## 2018-06-13 RX ADMIN — OXYCODONE HYDROCHLORIDE 10 MILLIGRAM(S): 5 TABLET ORAL at 06:51

## 2018-06-13 RX ADMIN — Medication 40 MILLIEQUIVALENT(S): at 15:23

## 2018-06-13 RX ADMIN — Medication 2: at 13:44

## 2018-06-13 RX ADMIN — HEPARIN SODIUM 5000 UNIT(S): 5000 INJECTION INTRAVENOUS; SUBCUTANEOUS at 05:53

## 2018-06-13 RX ADMIN — CARVEDILOL PHOSPHATE 3.12 MILLIGRAM(S): 80 CAPSULE, EXTENDED RELEASE ORAL at 18:44

## 2018-06-13 RX ADMIN — CARVEDILOL PHOSPHATE 3.12 MILLIGRAM(S): 80 CAPSULE, EXTENDED RELEASE ORAL at 05:57

## 2018-06-13 RX ADMIN — Medication 650 MILLIGRAM(S): at 22:16

## 2018-06-13 RX ADMIN — Medication 30 MILLILITER(S): at 20:23

## 2018-06-13 RX ADMIN — Medication 40 MILLIEQUIVALENT(S): at 18:44

## 2018-06-13 RX ADMIN — MIRTAZAPINE 15 MILLIGRAM(S): 45 TABLET, ORALLY DISINTEGRATING ORAL at 22:16

## 2018-06-13 RX ADMIN — FAMOTIDINE 20 MILLIGRAM(S): 10 INJECTION INTRAVENOUS at 12:24

## 2018-06-13 RX ADMIN — Medication 500000 UNIT(S): at 05:54

## 2018-06-13 RX ADMIN — Medication 250 MILLIGRAM(S): at 18:49

## 2018-06-13 RX ADMIN — Medication 500000 UNIT(S): at 12:24

## 2018-06-13 RX ADMIN — PIPERACILLIN AND TAZOBACTAM 25 GRAM(S): 4; .5 INJECTION, POWDER, LYOPHILIZED, FOR SOLUTION INTRAVENOUS at 13:00

## 2018-06-13 RX ADMIN — HEPARIN SODIUM 5000 UNIT(S): 5000 INJECTION INTRAVENOUS; SUBCUTANEOUS at 18:43

## 2018-06-13 RX ADMIN — PIPERACILLIN AND TAZOBACTAM 25 GRAM(S): 4; .5 INJECTION, POWDER, LYOPHILIZED, FOR SOLUTION INTRAVENOUS at 22:16

## 2018-06-13 RX ADMIN — Medication 500000 UNIT(S): at 18:43

## 2018-06-13 RX ADMIN — PIPERACILLIN AND TAZOBACTAM 25 GRAM(S): 4; .5 INJECTION, POWDER, LYOPHILIZED, FOR SOLUTION INTRAVENOUS at 07:03

## 2018-06-13 RX ADMIN — OXYCODONE HYDROCHLORIDE 10 MILLIGRAM(S): 5 TABLET ORAL at 05:57

## 2018-06-13 RX ADMIN — OXYCODONE HYDROCHLORIDE 10 MILLIGRAM(S): 5 TABLET ORAL at 18:43

## 2018-06-13 RX ADMIN — ATORVASTATIN CALCIUM 40 MILLIGRAM(S): 80 TABLET, FILM COATED ORAL at 22:16

## 2018-06-13 NOTE — PROGRESS NOTE ADULT - ASSESSMENT
78 year old Serbian-speaking woman with CAD, PAD s/p RLE amputation presents this admission with abdominal pain, bleeding per rectum for the past few months.  rectal mass on CT imaging of abdomen/pelvis, underwent colonscopy, lower EUS with GI, which confirms large (10x5cm), circumferential rectal mass 4cm from anal verge, stage IIIB (T3N1MX) by EUS examination.   s/p RT  Recent clint-rectal abscess V neoplasm  s/p IV abx-fevers again  Abscess decreased on CT  Also ? pna V lung neoplasm  Fevers-resolved  Clinically stable  Continue HCAP coverage as above  If stable can Dc antimicrobials Friday   Will tailor plan for ID issues  per course,results.Will defer to primary team on management of other issues.  case d/w Dr ramírez,Med NP  Will Follow.  Beeper [phone]-[id number]/afterhours/No response-[phone]

## 2018-06-13 NOTE — CHART NOTE - NSCHARTNOTEFT_GEN_A_CORE
Upon Nutritional Assessment by the Registered Dietitian your patient was determined to meet criteria / has evidence of the following diagnosis/diagnoses:          [ ]  Mild Protein Calorie Malnutrition        [ ]  Moderate Protein Calorie Malnutrition        [X ] Severe Protein Calorie Malnutrition        [ ] Unspecified Protein Calorie Malnutrition        [ ] Underweight / BMI <19        [ ] Morbid Obesity / BMI > 40      Findings as based on:  [X ] Comprehensive nutrition assessment : inadequate oral intake, weight loss 19% in 2 months  [X ] Nutrition Focused Physical Exam  severe muscle wasting, clavicle, temporal, occipital areas, uper and lower limbs  [ ] Other: BMI=16.4    Nutrition Plan/Recommendations:  add glucerna 1.2 2x/day; assit with feeding, hydration, encourage family to bring in patient favorite foods from home        PROVIDER Section:     By signing this assessment you are acknowledging and agree with the diagnosis/diagnoses assigned by the Registered Dietitian    Comments:

## 2018-06-13 NOTE — DIETITIAN INITIAL EVALUATION ADULT. - OTHER INFO
patient seen for low BMI< 18. Patient known from previous recent visit when  her weight was 124 lbs in April 10., 2018, and more recent stay at [facility] with weight record indicating 124 lbs also. on 5/8/2018.  Patient very restless asking for pain meds during visit. Per RN Patients; daughter visits and brings food from home. Patient agreed to drink glucerna 2x/day as she is not eating her meals   <20%.  Patien c/o abdominal pain, known to have rectal mass with bleed.and

## 2018-06-13 NOTE — PROGRESS NOTE ADULT - ASSESSMENT
78F with rectal cancer, dm,  htn,  adm with fever,  afebrile  now  cont IV zosyn/  vanco   CT abd pelvis,, as  noted    rectal fluid  collections/ pna  2    more days  of ab, per ID  PTeval  normal wbc  now      < from: CT Abdomen and Pelvis w/ IV Cont (06.08.18 @ 16:45) >  pondylolysis at this level.    IMPRESSION: Known rectal neoplasm. Slight interval improvement in fluid   collections to the left of the rectum.  Stable spiculated nodule in the right upper lobe, which is suspicious for   neoplasm. Surrounding groundglass opacities may be infectious.                < end of copied text >

## 2018-06-13 NOTE — PROGRESS NOTE ADULT - SUBJECTIVE AND OBJECTIVE BOX
Patient is a 78y old  Female who presents with a chief complaint of fever (08 Jun 2018 15:36)    Being followed by ID for /pna,perirectal abscess v tumor    Interval history:Feels well  No acute events      ROS:  Minimal cough,No SOB,CP  No N/V/D./abd pain  No other complaints      Antimicrobials:    nystatin    Suspension 574931 Unit(s) Oral four times a day  piperacillin/tazobactam IVPB. 3.375 Gram(s) IV Intermittent every 8 hours  vancomycin  IVPB 1000 milliGRAM(s) IV Intermittent every 24 hours    Other medications reviewed    Vital Signs Last 24 Hrs  T(C): 36.3 (06-13-18 @ 04:27), Max: 36.9 (06-12-18 @ 20:44)  T(F): 97.3 (06-13-18 @ 04:27), Max: 98.4 (06-12-18 @ 20:44)  HR: 60 (06-13-18 @ 04:27) (60 - 70)  BP: 114/69 (06-13-18 @ 04:27) (89/54 - 144/62)  BP(mean): --  RR: 18 (06-13-18 @ 04:27) (16 - 18)  SpO2: 98% (06-13-18 @ 04:27) (95% - 98%)    Physical Exam:        HEENT PERRLA EOMI    No oral exudate or erythema    Chest Good AE,CTA    CVS RRR S1 S2 WNl No murmur or rub or gallop    Abd soft BS normal No tenderness no masses    IV site no erythema tenderness or discharge    CNS AAO X 3 no focal    Lab Data:                          9.7    8.8   )-----------( 246      ( 13 Jun 2018 06:43 )             28.6       06-13    137  |  102  |  5<L>  ----------------------------<  97  3.3<L>   |  24  |  0.52    Ca    8.3<L>      13 Jun 2018 06:43          Culture - Blood (collected 08 Jun 2018 20:16)  Source: .Blood Blood-Peripheral  Preliminary Report (09 Jun 2018 21:01):    No growth to date.    Culture - Blood (collected 08 Jun 2018 16:27)  Source: .Blood Blood  Preliminary Report (09 Jun 2018 17:01):    No growth to date.            Vancomycin Level, Trough: 6.4 ug/mL (06-11-18 @ 16:28)

## 2018-06-13 NOTE — PROGRESS NOTE ADULT - SUBJECTIVE AND OBJECTIVE BOX
- Patient seen and examined.  - In summary, patient is a 78 year old woman who presented with fever (2018 15:36)  - Today, patient is without complaints.         *****MEDICATIONS:    MEDICATIONS  (STANDING):  atorvastatin 40 milliGRAM(s) Oral at bedtime  carvedilol 3.125 milliGRAM(s) Oral every 12 hours  dextrose 5%. 1000 milliLiter(s) (50 mL/Hr) IV Continuous <Continuous>  dextrose 50% Injectable 12.5 Gram(s) IV Push once  dextrose 50% Injectable 25 Gram(s) IV Push once  dextrose 50% Injectable 25 Gram(s) IV Push once  famotidine    Tablet 20 milliGRAM(s) Oral daily  heparin  Injectable 5000 Unit(s) SubCutaneous every 12 hours  insulin lispro (HumaLOG) corrective regimen sliding scale   SubCutaneous three times a day before meals  insulin lispro (HumaLOG) corrective regimen sliding scale   SubCutaneous at bedtime  mirtazapine 15 milliGRAM(s) Oral at bedtime  nystatin    Suspension 854723 Unit(s) Oral four times a day  oxyCODONE  ER Tablet 10 milliGRAM(s) Oral every 12 hours  piperacillin/tazobactam IVPB. 3.375 Gram(s) IV Intermittent every 8 hours  vancomycin  IVPB 1000 milliGRAM(s) IV Intermittent every 24 hours    MEDICATIONS  (PRN):  acetaminophen   Tablet 650 milliGRAM(s) Oral every 6 hours PRN Mild Pain (1 - 3)  dextrose 40% Gel 15 Gram(s) Oral once PRN Blood Glucose LESS THAN 70 milliGRAM(s)/deciliter  glucagon  Injectable 1 milliGRAM(s) IntraMuscular once PRN Glucose LESS THAN 70 milligrams/deciliter                 ***** REVIEW OF SYSTEM:  GEN: no fever, no chills, no pain  RESP: no SOB, no cough, no sputum  CVS: no chest pain, no palpitations, no edema  GI: no abdominal pain, no nausea, no vomiting, no constipation, no diarrhea  : no dysuria, no frequency  NEURO: no headache, no dizziness  PSYCH: no depression, not anxious  Derm : no itching, no rash         ***** VITAL SIGNS:    T(F): 97.3 (18 @ 04:27), Max: 98.4 (18 @ 20:44)  HR: 60 (06-13-18 @ 04:27) (60 - 70)  BP: 114/69 (18 @ 04:27) (89/54 - 144/62)  RR: 18 (18 @ 04:27) (16 - 18)  SpO2: 98% (18 @ 04:27) (95% - 98%)  Wt(kg): --  ,   I&O's Summary    2018 07:01  -  2018 07:00  --------------------------------------------------------  IN: 1270 mL / OUT: 0 mL / NET: 1270 mL                     *****PHYSICAL EXAM:  GEN: A&O X 3 , NAD , comfortable  HEENT: NCAT, EOMI, MMM, no icterus  NECK: Supple, No JVD  CVS: S1S2 , regular , No M/R/G appreciated  PULM: CTA B/L,  no W/R/R appreciated  ABD.: soft. non tender, non distended,  bowel sounds present  Extrem: intact pulses , no edema noted  Derm: No rash or ecchymosis noted  PSYCH: normal mood, no depression, not anxious         *****LAB AND IMAGIN.7    8.8   )-----------( 246      ( 2018 06:43 )             28.6               06-    137  |  102  |  5<L>  ----------------------------<  97  3.3<L>   |  24  |  0.52    Ca    8.3<L>      2018 06:43      [All pertinent recent Imaging/Reports reviewed]         *****A S S E S S M E N T   A N D   P L A N :  78F with rectal cancer adm with fever  abx per ID  CT noted  cont current meds  IVF  oncology to f/u  CRS eval noted  hopefully DC when done with abx if no in-pt plans per onc      __________________________  YON Metcalf D.O.

## 2018-06-13 NOTE — DIETITIAN INITIAL EVALUATION ADULT. - ENERGY NEEDS
77 yo Female with PMH of  colon cancer s/p RT with rectal abscess being treated outpatient with picc line and zosyn x 2 weeks in Nursing home then changed to oral Abx p/w chills, fever mild abdominal pain lower quadrant and rectal pain.   Dx Fever, possible abd abscess,  Lung nodule, solitary   Rectal adenocarcinoma  (Problem/DX) Fever  (PSH) Status post above knee amputation of right lower extremity  possible PICC infection., possible pna- still working up in ED

## 2018-06-13 NOTE — PROGRESS NOTE ADULT - SUBJECTIVE AND OBJECTIVE BOX
doing well,  eating well  REVIEW OF SYSTEMS:  CONSTITUTIONAL: No weakness,  no   fevers      RESPIRATORY:  No    shortness of breath  , no  wheezing  CARDIOVASCULAR: No chest pain,   no  palpitations, no  cough  GASTROINTESTINAL: No abdominal  pain, no  vomiting, no  diarrhea  NEUROLOGICAL: No  focal  weakness    MEDICATIONS  (STANDING):  atorvastatin 40 milliGRAM(s) Oral at bedtime  carvedilol 3.125 milliGRAM(s) Oral every 12 hours  dextrose 5%. 1000 milliLiter(s) (50 mL/Hr) IV Continuous <Continuous>  dextrose 50% Injectable 12.5 Gram(s) IV Push once  dextrose 50% Injectable 25 Gram(s) IV Push once  dextrose 50% Injectable 25 Gram(s) IV Push once  famotidine    Tablet 20 milliGRAM(s) Oral daily  heparin  Injectable 5000 Unit(s) SubCutaneous every 12 hours  insulin lispro (HumaLOG) corrective regimen sliding scale   SubCutaneous three times a day before meals  insulin lispro (HumaLOG) corrective regimen sliding scale   SubCutaneous at bedtime  mirtazapine 15 milliGRAM(s) Oral at bedtime  nystatin    Suspension 978994 Unit(s) Oral four times a day  oxyCODONE  ER Tablet 10 milliGRAM(s) Oral every 12 hours  piperacillin/tazobactam IVPB. 3.375 Gram(s) IV Intermittent every 8 hours  vancomycin  IVPB 1000 milliGRAM(s) IV Intermittent every 24 hours    MEDICATIONS  (PRN):  acetaminophen   Tablet 650 milliGRAM(s) Oral every 6 hours PRN Mild Pain (1 - 3)  dextrose 40% Gel 15 Gram(s) Oral once PRN Blood Glucose LESS THAN 70 milliGRAM(s)/deciliter  glucagon  Injectable 1 milliGRAM(s) IntraMuscular once PRN Glucose LESS THAN 70 milligrams/deciliter      Vital Signs Last 24 Hrs  T(C): 36.3 (13 Jun 2018 04:27), Max: 36.9 (12 Jun 2018 20:44)  T(F): 97.3 (13 Jun 2018 04:27), Max: 98.4 (12 Jun 2018 20:44)  HR: 60 (13 Jun 2018 04:27) (60 - 70)  BP: 114/69 (13 Jun 2018 04:27) (89/54 - 144/62)  BP(mean): --  RR: 18 (13 Jun 2018 04:27) (16 - 18)  SpO2: 98% (13 Jun 2018 04:27) (95% - 98%)  CAPILLARY BLOOD GLUCOSE      POCT Blood Glucose.: 91 mg/dL (13 Jun 2018 08:57)  POCT Blood Glucose.: 209 mg/dL (12 Jun 2018 23:26)  POCT Blood Glucose.: 262 mg/dL (12 Jun 2018 20:50)  POCT Blood Glucose.: 228 mg/dL (12 Jun 2018 17:17)  POCT Blood Glucose.: 137 mg/dL (12 Jun 2018 12:40)    I&O's Summary    12 Jun 2018 07:01  -  13 Jun 2018 07:00  --------------------------------------------------------  IN: 1270 mL / OUT: 0 mL / NET: 1270 mL        PHYSICAL EXAM:  HEAD:  Atraumatic, Normocephalic  NECK: Supple, No   JVD  CHEST/LUNG:   no     rales,     no,    rhonchi  HEART: Regular rate and rhythm;         murmur  ABDOMEN: Soft, Nontender, ;   EXTREMITIES:        edema  NEUROLOGY:  alert    LABS:                        9.7    8.8   )-----------( 246      ( 13 Jun 2018 06:43 )             28.6     06-13    137  |  102  |  5<L>  ----------------------------<  97  3.3<L>   |  24  |  0.52    Ca    8.3<L>      13 Jun 2018 06:43                    Hemoglobin A1C, Whole Blood: 6.6 % (05-09 @ 06:52)    Thyroid Stimulating Hormone, Serum: 1.00 uIU/mL (05-18 @ 06:35)          Consultant(s) Notes Reviewed:      Care Discussed with Consultants/Other Providers:

## 2018-06-14 LAB
ANION GAP SERPL CALC-SCNC: 9 MMOL/L — SIGNIFICANT CHANGE UP (ref 5–17)
BUN SERPL-MCNC: 4 MG/DL — LOW (ref 7–23)
CALCIUM SERPL-MCNC: 8.8 MG/DL — SIGNIFICANT CHANGE UP (ref 8.4–10.5)
CHLORIDE SERPL-SCNC: 103 MMOL/L — SIGNIFICANT CHANGE UP (ref 96–108)
CO2 SERPL-SCNC: 27 MMOL/L — SIGNIFICANT CHANGE UP (ref 22–31)
CREAT SERPL-MCNC: 0.61 MG/DL — SIGNIFICANT CHANGE UP (ref 0.5–1.3)
GLUCOSE BLDC GLUCOMTR-MCNC: 124 MG/DL — HIGH (ref 70–99)
GLUCOSE BLDC GLUCOMTR-MCNC: 155 MG/DL — HIGH (ref 70–99)
GLUCOSE BLDC GLUCOMTR-MCNC: 190 MG/DL — HIGH (ref 70–99)
GLUCOSE BLDC GLUCOMTR-MCNC: 94 MG/DL — SIGNIFICANT CHANGE UP (ref 70–99)
GLUCOSE SERPL-MCNC: 88 MG/DL — SIGNIFICANT CHANGE UP (ref 70–99)
MAGNESIUM SERPL-MCNC: 2.2 MG/DL — SIGNIFICANT CHANGE UP (ref 1.6–2.6)
POTASSIUM SERPL-MCNC: 4.5 MMOL/L — SIGNIFICANT CHANGE UP (ref 3.5–5.3)
POTASSIUM SERPL-SCNC: 4.5 MMOL/L — SIGNIFICANT CHANGE UP (ref 3.5–5.3)
SODIUM SERPL-SCNC: 139 MMOL/L — SIGNIFICANT CHANGE UP (ref 135–145)

## 2018-06-14 PROCEDURE — 99232 SBSQ HOSP IP/OBS MODERATE 35: CPT

## 2018-06-14 RX ORDER — DOCUSATE SODIUM 100 MG
100 CAPSULE ORAL
Qty: 0 | Refills: 0 | Status: DISCONTINUED | OUTPATIENT
Start: 2018-06-14 | End: 2018-06-20

## 2018-06-14 RX ORDER — SENNA PLUS 8.6 MG/1
2 TABLET ORAL AT BEDTIME
Qty: 0 | Refills: 0 | Status: DISCONTINUED | OUTPATIENT
Start: 2018-06-14 | End: 2018-06-20

## 2018-06-14 RX ADMIN — Medication 1: at 12:59

## 2018-06-14 RX ADMIN — PIPERACILLIN AND TAZOBACTAM 25 GRAM(S): 4; .5 INJECTION, POWDER, LYOPHILIZED, FOR SOLUTION INTRAVENOUS at 06:31

## 2018-06-14 RX ADMIN — MIRTAZAPINE 15 MILLIGRAM(S): 45 TABLET, ORALLY DISINTEGRATING ORAL at 21:47

## 2018-06-14 RX ADMIN — Medication 500000 UNIT(S): at 12:56

## 2018-06-14 RX ADMIN — PIPERACILLIN AND TAZOBACTAM 25 GRAM(S): 4; .5 INJECTION, POWDER, LYOPHILIZED, FOR SOLUTION INTRAVENOUS at 21:49

## 2018-06-14 RX ADMIN — CARVEDILOL PHOSPHATE 3.12 MILLIGRAM(S): 80 CAPSULE, EXTENDED RELEASE ORAL at 06:34

## 2018-06-14 RX ADMIN — PIPERACILLIN AND TAZOBACTAM 25 GRAM(S): 4; .5 INJECTION, POWDER, LYOPHILIZED, FOR SOLUTION INTRAVENOUS at 13:05

## 2018-06-14 RX ADMIN — SENNA PLUS 2 TABLET(S): 8.6 TABLET ORAL at 21:47

## 2018-06-14 RX ADMIN — FAMOTIDINE 20 MILLIGRAM(S): 10 INJECTION INTRAVENOUS at 12:56

## 2018-06-14 RX ADMIN — HEPARIN SODIUM 5000 UNIT(S): 5000 INJECTION INTRAVENOUS; SUBCUTANEOUS at 17:46

## 2018-06-14 RX ADMIN — OXYCODONE HYDROCHLORIDE 10 MILLIGRAM(S): 5 TABLET ORAL at 06:35

## 2018-06-14 RX ADMIN — ATORVASTATIN CALCIUM 40 MILLIGRAM(S): 80 TABLET, FILM COATED ORAL at 21:47

## 2018-06-14 RX ADMIN — Medication 1: at 17:46

## 2018-06-14 RX ADMIN — Medication 250 MILLIGRAM(S): at 17:46

## 2018-06-14 RX ADMIN — OXYCODONE HYDROCHLORIDE 10 MILLIGRAM(S): 5 TABLET ORAL at 17:45

## 2018-06-14 RX ADMIN — Medication 650 MILLIGRAM(S): at 12:56

## 2018-06-14 RX ADMIN — Medication 650 MILLIGRAM(S): at 20:37

## 2018-06-14 RX ADMIN — CARVEDILOL PHOSPHATE 3.12 MILLIGRAM(S): 80 CAPSULE, EXTENDED RELEASE ORAL at 17:46

## 2018-06-14 RX ADMIN — OXYCODONE HYDROCHLORIDE 10 MILLIGRAM(S): 5 TABLET ORAL at 07:05

## 2018-06-14 RX ADMIN — Medication 500000 UNIT(S): at 17:46

## 2018-06-14 RX ADMIN — Medication 100 MILLIGRAM(S): at 21:47

## 2018-06-14 RX ADMIN — HEPARIN SODIUM 5000 UNIT(S): 5000 INJECTION INTRAVENOUS; SUBCUTANEOUS at 06:34

## 2018-06-14 RX ADMIN — Medication 500000 UNIT(S): at 06:34

## 2018-06-14 NOTE — PROGRESS NOTE ADULT - ASSESSMENT
78 year old Occitan-speaking woman with CAD, PAD s/p RLE amputation presents this admission with abdominal pain, bleeding per rectum for the past few months.  rectal mass on CT imaging of abdomen/pelvis, underwent colonscopy, lower EUS with GI, which confirms large (10x5cm), circumferential rectal mass 4cm from anal verge, stage IIIB (T3N1MX) by EUS examination.   s/p RT  Recent clint-rectal abscess V neoplasm  s/p IV abx-fevers again  Abscess decreased on CT  Also ? pna V lung neoplasm  Fevers-resolved  Clinically stable  Continue HCAP coverage as above  If stable can Dc antimicrobials tomorrow  Will defer to primary team on management of other issues.  case d/w med NP  ID will follow as needed,please call [phone] if any questions or issues.

## 2018-06-14 NOTE — PROGRESS NOTE ADULT - SUBJECTIVE AND OBJECTIVE BOX
Chief Complaint: rectal ca     PHONE USED  INTERVAL HPI/OVERNIGHT EVENTS: Pain better controlled. Eating a little. Afebrile. Daughter at bedside. Dr. Sosa, colorectal surgery also present.     MEDICATIONS  (STANDING):  atorvastatin 40 milliGRAM(s) Oral at bedtime  carvedilol 3.125 milliGRAM(s) Oral every 12 hours  dextrose 5%. 1000 milliLiter(s) (50 mL/Hr) IV Continuous <Continuous>  dextrose 50% Injectable 12.5 Gram(s) IV Push once  dextrose 50% Injectable 25 Gram(s) IV Push once  dextrose 50% Injectable 25 Gram(s) IV Push once  famotidine    Tablet 20 milliGRAM(s) Oral daily  heparin  Injectable 5000 Unit(s) SubCutaneous every 12 hours  insulin lispro (HumaLOG) corrective regimen sliding scale   SubCutaneous three times a day before meals  insulin lispro (HumaLOG) corrective regimen sliding scale   SubCutaneous at bedtime  mirtazapine 15 milliGRAM(s) Oral at bedtime  nystatin    Suspension 297095 Unit(s) Oral four times a day  oxyCODONE  ER Tablet 10 milliGRAM(s) Oral every 12 hours  piperacillin/tazobactam IVPB. 3.375 Gram(s) IV Intermittent every 8 hours  vancomycin  IVPB 1000 milliGRAM(s) IV Intermittent every 24 hours    MEDICATIONS  (PRN):  acetaminophen   Tablet 650 milliGRAM(s) Oral every 6 hours PRN Mild Pain (1 - 3)  dextrose 40% Gel 15 Gram(s) Oral once PRN Blood Glucose LESS THAN 70 milliGRAM(s)/deciliter  glucagon  Injectable 1 milliGRAM(s) IntraMuscular once PRN Glucose LESS THAN 70 milligrams/deciliter      Allergies    No Known Allergies    Intolerances        ROS: as above     Vital Signs Last 24 Hrs  T(C): 36.8 (14 Jun 2018 14:05), Max: 36.8 (13 Jun 2018 19:57)  T(F): 98.2 (14 Jun 2018 14:05), Max: 98.2 (13 Jun 2018 19:57)  HR: 78 (14 Jun 2018 14:05) (60 - 78)  BP: 132/63 (14 Jun 2018 14:05) (132/63 - 152/62)  BP(mean): --  RR: 18 (14 Jun 2018 14:05) (18 - 18)  SpO2: 95% (14 Jun 2018 14:05) (95% - 97%)    Physical Exam:   AAO x 3, NAD   RRR S1S2  CTA b/l   soft NTNDBS+  R AKA    LABS:                        9.7    8.8   )-----------( 246      ( 13 Jun 2018 06:43 )             28.6     06-14    139  |  103  |  4<L>  ----------------------------<  88  4.5   |  27  |  0.61    Ca    8.8      14 Jun 2018 07:08  Mg     2.2     06-14

## 2018-06-14 NOTE — PROGRESS NOTE ADULT - SUBJECTIVE AND OBJECTIVE BOX
no complaints  seen by surg    REVIEW OF SYSTEMS:  CONSTITUTIONAL: No weakness,  no   fevers      RESPIRATORY:  No    shortness of breath  , no  wheezing  CARDIOVASCULAR: No chest pain,   no  palpitations, no  cough  GASTROINTESTINAL: No abdominal  pain, no  vomiting, no  diarrhea  NEUROLOGICAL: No  focal  weakness    MEDICATIONS  (STANDING):  atorvastatin 40 milliGRAM(s) Oral at bedtime  carvedilol 3.125 milliGRAM(s) Oral every 12 hours  dextrose 5%. 1000 milliLiter(s) (50 mL/Hr) IV Continuous <Continuous>  dextrose 50% Injectable 12.5 Gram(s) IV Push once  dextrose 50% Injectable 25 Gram(s) IV Push once  dextrose 50% Injectable 25 Gram(s) IV Push once  famotidine    Tablet 20 milliGRAM(s) Oral daily  heparin  Injectable 5000 Unit(s) SubCutaneous every 12 hours  insulin lispro (HumaLOG) corrective regimen sliding scale   SubCutaneous three times a day before meals  insulin lispro (HumaLOG) corrective regimen sliding scale   SubCutaneous at bedtime  mirtazapine 15 milliGRAM(s) Oral at bedtime  nystatin    Suspension 314055 Unit(s) Oral four times a day  oxyCODONE  ER Tablet 10 milliGRAM(s) Oral every 12 hours  piperacillin/tazobactam IVPB. 3.375 Gram(s) IV Intermittent every 8 hours  vancomycin  IVPB 1000 milliGRAM(s) IV Intermittent every 24 hours    MEDICATIONS  (PRN):  acetaminophen   Tablet 650 milliGRAM(s) Oral every 6 hours PRN Mild Pain (1 - 3)  dextrose 40% Gel 15 Gram(s) Oral once PRN Blood Glucose LESS THAN 70 milliGRAM(s)/deciliter  glucagon  Injectable 1 milliGRAM(s) IntraMuscular once PRN Glucose LESS THAN 70 milligrams/deciliter      Vital Signs Last 24 Hrs  T(C): 36.6 (14 Jun 2018 04:36), Max: 36.8 (13 Jun 2018 19:57)  T(F): 97.9 (14 Jun 2018 04:36), Max: 98.2 (13 Jun 2018 19:57)  HR: 62 (14 Jun 2018 04:36) (60 - 66)  BP: 143/69 (14 Jun 2018 04:36) (143/69 - 152/62)  BP(mean): --  RR: 18 (14 Jun 2018 04:36) (18 - 18)  SpO2: 97% (14 Jun 2018 04:36) (96% - 97%)  CAPILLARY BLOOD GLUCOSE      POCT Blood Glucose.: 218 mg/dL (13 Jun 2018 22:04)  POCT Blood Glucose.: 95 mg/dL (13 Jun 2018 17:16)  POCT Blood Glucose.: 206 mg/dL (13 Jun 2018 12:55)  POCT Blood Glucose.: 91 mg/dL (13 Jun 2018 08:57)    I&O's Summary    13 Jun 2018 07:01  -  14 Jun 2018 07:00  --------------------------------------------------------  IN: 920 mL / OUT: 0 mL / NET: 920 mL        PHYSICAL EXAM:  HEAD:  Atraumatic, Normocephalic  NECK: Supple, No   JVD  CHEST/LUNG:   no     rales,     no,    rhonchi  HEART: Regular rate and rhythm;         murmur  ABDOMEN: Soft, Nontender, ;   EXTREMITIES:        edema  NEUROLOGY:  alert    LABS:                        9.7    8.8   )-----------( 246      ( 13 Jun 2018 06:43 )             28.6     06-14    139  |  103  |  4<L>  ----------------------------<  88  4.5   |  27  |  0.61    Ca    8.8      14 Jun 2018 07:08  Mg     2.2     06-14                    Hemoglobin A1C, Whole Blood: 6.6 % (05-09 @ 06:52)    Thyroid Stimulating Hormone, Serum: 1.00 uIU/mL (05-18 @ 06:35)          Consultant(s) Notes Reviewed:      Care Discussed with Consultants/Other Providers:

## 2018-06-14 NOTE — PROGRESS NOTE ADULT - ASSESSMENT
78 F w/ locally advanced rectal cancer s/p 3 sessions of palliative RT aborted due to discovery of pelvic abscess s/p IV Zosyn, now a/w PNA. Pt with FTT, ongoing weight loss, malignancy related pain.

## 2018-06-14 NOTE — PROGRESS NOTE ADULT - SUBJECTIVE AND OBJECTIVE BOX
- Patient seen and examined.  - In summary, patient is a 78 year old woman who presented with fever (2018 15:36)  - Today, patient is without complaints.         *****MEDICATIONS:    MEDICATIONS  (STANDING):  atorvastatin 40 milliGRAM(s) Oral at bedtime  carvedilol 3.125 milliGRAM(s) Oral every 12 hours  dextrose 5%. 1000 milliLiter(s) (50 mL/Hr) IV Continuous <Continuous>  dextrose 50% Injectable 12.5 Gram(s) IV Push once  dextrose 50% Injectable 25 Gram(s) IV Push once  dextrose 50% Injectable 25 Gram(s) IV Push once  famotidine    Tablet 20 milliGRAM(s) Oral daily  heparin  Injectable 5000 Unit(s) SubCutaneous every 12 hours  insulin lispro (HumaLOG) corrective regimen sliding scale   SubCutaneous three times a day before meals  insulin lispro (HumaLOG) corrective regimen sliding scale   SubCutaneous at bedtime  mirtazapine 15 milliGRAM(s) Oral at bedtime  nystatin    Suspension 963007 Unit(s) Oral four times a day  oxyCODONE  ER Tablet 10 milliGRAM(s) Oral every 12 hours  piperacillin/tazobactam IVPB. 3.375 Gram(s) IV Intermittent every 8 hours  vancomycin  IVPB 1000 milliGRAM(s) IV Intermittent every 24 hours    MEDICATIONS  (PRN):  acetaminophen   Tablet 650 milliGRAM(s) Oral every 6 hours PRN Mild Pain (1 - 3)  dextrose 40% Gel 15 Gram(s) Oral once PRN Blood Glucose LESS THAN 70 milliGRAM(s)/deciliter  glucagon  Injectable 1 milliGRAM(s) IntraMuscular once PRN Glucose LESS THAN 70 milligrams/deciliter               ***** REVIEW OF SYSTEM:  GEN: no fever, no chills, no pain  RESP: no SOB, no cough, no sputum  CVS: no chest pain, no palpitations, no edema  GI: no abdominal pain, no nausea, no vomiting, no constipation, no diarrhea  : no dysuria, no frequency  NEURO: no headache, no dizziness  PSYCH: no depression, not anxious  Derm : no itching, no rash         ***** VITAL SIGNS:    T(F): 97.9 (18 @ 04:36), Max: 98.2 (18 @ 19:57)  HR: 62 (06-14-18 @ 04:36) (60 - 66)  BP: 143/69 (18 @ 04:36) (143/69 - 152/62)  RR: 18 (18 @ 04:36) (18 - 18)  SpO2: 97% (18 @ 04:36) (96% - 97%)  Wt(kg): --  ,   I&O's Summary    2018 07:01  -  2018 07:00  --------------------------------------------------------  IN: 920 mL / OUT: 0 mL / NET: 920 mL         *****PHYSICAL EXAM:  GEN: A&O X 3 , NAD , comfortable  HEENT: NCAT, EOMI, MMM, no icterus  NECK: Supple, No JVD  CVS: S1S2 , regular , No M/R/G appreciated  PULM: CTA B/L,  no W/R/R appreciated  ABD.: soft. non tender, non distended,  bowel sounds present  Extrem: intact pulses , no edema noted  Derm: No rash or ecchymosis noted  PSYCH: normal mood, no depression, not anxious         *****LAB AND IMAGIN.7    8.8   )-----------( 246      ( 2018 06:43 )             28.6               06-14    139  |  103  |  4<L>  ----------------------------<  88  4.5   |  27  |  0.61    Ca    8.8      2018 07:08  Mg     2.2     -    [All pertinent recent Imaging/Reports reviewed]         *****A S S E S S M E N T   A N D   P L A N :  78F with rectal cancer adm with fever  abx per ID  CT noted  cont current meds  IVF  oncology to f/u  CRS f/u noted  await surgical plan.    __________________________  YON Metcalf D.O.

## 2018-06-14 NOTE — PROGRESS NOTE ADULT - ASSESSMENT
78F with rectal cancer, dm,  htn,  adm with fever,  afebrile  now  cont IV zosyn/  vanco   CT abd pelvis,, as  noted    rectal fluid  collections/ pna  PTeval  normal wbc  now  surg note seen,  ? proximal diversion      < from: CT Abdomen and Pelvis w/ IV Cont (06.08.18 @ 16:45) >  pondylolysis at this level.    IMPRESSION: Known rectal neoplasm. Slight interval improvement in fluid   collections to the left of the rectum.  Stable spiculated nodule in the right upper lobe, which is suspicious for   neoplasm. Surrounding groundglass opacities may be infectious.                < end of copied text >

## 2018-06-14 NOTE — CHART NOTE - NSCHARTNOTEFT_GEN_A_CORE
Pt seen by Heme Onc MD, Dr Okeefe and by Colorectal Surgery. Plan for Surgical Team to create an Ostomy Diversion to bypass tumor - per Onc.  Rad Onc called to eval pt if pt can have additional Radiation. Pt will require Medical Clearance. Dr Metcalf notified.

## 2018-06-14 NOTE — PROGRESS NOTE ADULT - SUBJECTIVE AND OBJECTIVE BOX
Patient is a 78y old  Female who presents with a chief complaint of fever (08 Jun 2018 15:36)    Being followed by ID for pna    Interval history:feels better   No acute events      ROS:  No cough,SOB,CP  No N/V/D./abd pain  No other complaints      Antimicrobials:    nystatin    Suspension 581255 Unit(s) Oral four times a day  piperacillin/tazobactam IVPB. 3.375 Gram(s) IV Intermittent every 8 hours  vancomycin  IVPB 1000 milliGRAM(s) IV Intermittent every 24 hours    Other medications reviewed    Vital Signs Last 24 Hrs  T(C): 36.6 (06-14-18 @ 04:36), Max: 36.8 (06-13-18 @ 19:57)  T(F): 97.9 (06-14-18 @ 04:36), Max: 98.2 (06-13-18 @ 19:57)  HR: 62 (06-14-18 @ 04:36) (60 - 66)  BP: 143/69 (06-14-18 @ 04:36) (143/69 - 152/62)  BP(mean): --  RR: 18 (06-14-18 @ 04:36) (18 - 18)  SpO2: 97% (06-14-18 @ 04:36) (96% - 97%)    Physical Exam:        HEENT PERRLA EOMI    No oral exudate or erythema    Chest Good AE,CTA    CVS RRR S1 S2 WNl No murmur or rub or gallop    Abd soft BS normal No tenderness no masses    IV site no erythema tenderness or discharge    CNS AAO X 3 no focal    Lab Data:                          9.7    8.8   )-----------( 246      ( 13 Jun 2018 06:43 )             28.6       06-14    139  |  103  |  4<L>  ----------------------------<  88  4.5   |  27  |  0.61    Ca    8.8      14 Jun 2018 07:08  Mg     2.2     06-14                Vancomycin Level, Trough: 7.9 ug/mL (06-13-18 @ 16:14)

## 2018-06-14 NOTE — PROGRESS NOTE ADULT - ASSESSMENT
Locally advanced rectal cancer  -Pt could not tolerate short course radiation and now admitted w/ fevers.  Pelvic abscess some what improved  -Will discuss further w/ oncology and family regarding goals of care/palliation  -Pt may benefit from proximal diversion to alleviate possible obstruction  -will continue to monitor .

## 2018-06-14 NOTE — CHART NOTE - NSCHARTNOTEFT_GEN_A_CORE
78 YOF p/w chills and fever. Pt has hx of colon cancer with rectal abscess and received 3/5 fractions of palliative radiation during her last admission at [facility]. 78 YOF p/w chills and fever. Pt has hx of colon cancer (adenocarcinoma) with a rectal mass that developed into a rectal abscess and received 3/5 fractions of palliative radiation during her last admission at [facility]  in May of 2018.   Her radiation treatment was halted due to the development of her abscess.  T/C with Dr. Charmaine Okeefe today regarding questionable further candidacy for radiation treatment to the previously partially treated site.  No further chemotherapy is being offered at this time.    Dr. Sosa does not plan to perform surgery to resect the tumor but only to provide a permanent ostomy.  Recent Imaging below:     < from: CT Abdomen and Pelvis w/ IV Cont (06.08.18 @ 16:45) >      IMPRESSION: Known rectal neoplasm. Slight interval improvement in fluid   collections to the left of the rectum.  Stable spiculated nodule in the right upper lobe, which is suspicious for   neoplasm. Surrounding groundglass opacities may be infectious.        This case is to be continued after discussion with attending and possibly in "[facility]" , radiation oncology rounds to determine her candidacy for any further possible   radiation treatment.     [phone]. 78 YOF p/w chills and fever. Pt has hx of colon cancer (adenocarcinoma) with a rectal mass that developed into a rectal abscess and received 3/5 fractions of palliative radiation during her last admission at [facility]  in May of 2018.   Her radiation treatment was halted due to the development of her abscess.  T/C with Dr. Charmaine Okeefe today regarding questionable further candidacy for radiation treatment to the previously partially treated site.  No further chemotherapy is being offered at this time.    Dr. Sosa does not plan to perform surgery to resect the tumor but only to provide a permanent ostomy.  Recent Imaging below:     < from: CT Abdomen and Pelvis w/ IV Cont (06.08.18 @ 16:45) >      IMPRESSION: Known rectal neoplasm. Slight interval improvement in fluid   collections to the left of the rectum.  Stable spiculated nodule in the right upper lobe, which is suspicious for   neoplasm. Surrounding groundglass opacities may be infectious.      Recommendations:  I believe that patient may have additional radiation therapy to the rectal area if she has a diverting ostomy.  The patient has pain and she may achieve some improvement with Palliative radiation.  Discussed with Dr. Okeefe.  [phone].

## 2018-06-14 NOTE — PROGRESS NOTE ADULT - SUBJECTIVE AND OBJECTIVE BOX
COLORECTAL SURGERY PROGRESS NOTE    78F PMHx Stage III rectal cancer s/p incomplete RT course, PVD s/p R NUBIA, who presented to [facility] ED the afternoon of 6/8/18 from nursing home complaining of fever and chills. Patient recently admitted to [facility] (5/8-5/23) during which her rectal CA was diagnosed (EUS/colonoscopy performed suggested T3N1 mid-rectal tumor, adenocarcinoma. Started RT, until she was developed pelvic abscesses. Discharged on 2weeks IV antibiotics via PICC line, then transitioned to Augmentin with plan for continued surveillance. However she developed fevers & returned to hospital. Per family at bedside, patient was feeling weak, unable to walk, but otherwise denies any fevers, chills, nausea, vomiting, diarrhea, SOB, cough, rash, abdominal pain. Repeat CT imaging of abdomen and pelvis, showed persistent rectal neoplasm, w/ improvement in clint-rectal collections. There was also as stable spiculated nodule in the right upper lobe suspicious for neoplasm, previously seen. Initially, during [facility] hospitalization in May, plan for short course of RT, followed by interval surgery, however RT stopped 2/2 abscesses. Planned for discharge on IV abx w/ repeat imaging to assess for resolution of collections.  CT abdomen and pelvis shows minor improvement in collections    INTERVAL EVENTS:  Patient continues to complain of some abdominal pain.  Reports passing flatus and having bowel movements.  Afebrile and hemodynamically stable overnight.      ICU Vital Signs Last 24 Hrs  T(C): 36.6 (14 Jun 2018 04:36), Max: 36.8 (13 Jun 2018 19:57)  T(F): 97.9 (14 Jun 2018 04:36), Max: 98.2 (13 Jun 2018 19:57)  HR: 62 (14 Jun 2018 04:36) (60 - 66)  BP: 143/69 (14 Jun 2018 04:36) (143/69 - 152/62)  BP(mean): --  ABP: --  ABP(mean): --  RR: 18 (14 Jun 2018 04:36) (18 - 18)  SpO2: 97% (14 Jun 2018 04:36) (96% - 97%)    I&O's Detail    12 Jun 2018 07:01  -  13 Jun 2018 07:00  --------------------------------------------------------  IN:    IV PiggyBack: 550 mL    Oral Fluid: 720 mL  Total IN: 1270 mL    OUT:  Total OUT: 0 mL    Total NET: 1270 mL      13 Jun 2018 07:01  -  14 Jun 2018 06:52  --------------------------------------------------------  IN:    Oral Fluid: 720 mL  Total IN: 720 mL    OUT:  Total OUT: 0 mL    Total NET: 720 mL      MEDICATIONS  (STANDING):  atorvastatin 40 milliGRAM(s) Oral at bedtime  carvedilol 3.125 milliGRAM(s) Oral every 12 hours  dextrose 5%. 1000 milliLiter(s) (50 mL/Hr) IV Continuous <Continuous>  dextrose 50% Injectable 12.5 Gram(s) IV Push once  dextrose 50% Injectable 25 Gram(s) IV Push once  dextrose 50% Injectable 25 Gram(s) IV Push once  famotidine    Tablet 20 milliGRAM(s) Oral daily  heparin  Injectable 5000 Unit(s) SubCutaneous every 12 hours  insulin lispro (HumaLOG) corrective regimen sliding scale   SubCutaneous three times a day before meals  insulin lispro (HumaLOG) corrective regimen sliding scale   SubCutaneous at bedtime  mirtazapine 15 milliGRAM(s) Oral at bedtime  nystatin    Suspension 996596 Unit(s) Oral four times a day  oxyCODONE  ER Tablet 10 milliGRAM(s) Oral every 12 hours  piperacillin/tazobactam IVPB. 3.375 Gram(s) IV Intermittent every 8 hours  vancomycin  IVPB 1000 milliGRAM(s) IV Intermittent every 24 hours    MEDICATIONS  (PRN):  acetaminophen   Tablet 650 milliGRAM(s) Oral every 6 hours PRN Mild Pain (1 - 3)  dextrose 40% Gel 15 Gram(s) Oral once PRN Blood Glucose LESS THAN 70 milliGRAM(s)/deciliter  glucagon  Injectable 1 milliGRAM(s) IntraMuscular once PRN Glucose LESS THAN 70 milligrams/deciliter    General:  Sitting up in bed, appears comfortable  Chest:  Breath sounds audible bilaterally; no wheezing, rales or ronchi  Abdomen:  Soft, nontender, nondistended  Extremities:  Warm, well perfused                          9.7    8.8   )-----------( 246      ( 13 Jun 2018 06:43 )             28.6   06-13    137  |  102  |  5<L>  ----------------------------<  97  3.3<L>   |  24  |  0.52    Ca    8.3<L>      13 Jun 2018 06:43    < from: CT Abdomen and Pelvis w/ IV Cont (06.08.18 @ 16:45) >  FINDINGS:    CHEST:     LUNGS AND LARGE AIRWAYS: Patent central airways. Unchanged spiculated   nodule in the right upper lobe. Increased surrounding groundglass   opacity. Likely a small focus of subpleural atelectasis in the right   lower lobe adjacent to the diaphragm. Bibasilar subsegmental atelectasis.   Mild centrilobular emphysema.   PLEURA: No pleural effusion.       VESSELS: Mild vascular calcifications.   MEDIASTINUM: Heart size is normal. No pericardial effusion. No   mediastinal, hilar, axillary or supraclavicular lymphadenopathy. Left   PICC with tip in the distal SVC. Calcified lymph nodes in the mediastinum   and the bro[place].  CHEST WALL AND LOWER NECK: Within normal limits.        ABDOMEN AND PELVIS:    LIVER: No focal lesion.      BILE DUCTS: Normal caliber.  GALLBLADDER: Unremarkable.  SPLEEN: Unremarkable.  PANCREAS: Unremarkable.  ADRENALS: Stable 10 mm nodule in the left adrenal gland.  KIDNEYS/URETERS: No hydronephrosis.  No focal lesion.    BLADDER: Thickening of the wall of the base of urinary bladder  REPRODUCTIVE ORGANS: Hysterectomy. No adnexal mass.    BOWEL: Marked rectal wall thickening and irregularity consistent with   known neoplasm. Adjacent fatty stranding and trace amount of free fluid.   Slight interval improvement in rim enhancing fluid collections to the   left of the rectum. PERITONEUM/RETROPERITONEUM: No pneumoperitoneum,   ascites, or lymphadenopathy.  VESSELS:  No evidence of aortic aneurysm. Marked vascular calcifications.   Partially imaged are 2 grafts in the right groin which are occluded,   unchanged.  BONES/SOFT TISSUES: Mild spondylolisthesis of L4 on L5 secondary to   spondylolysis at this level.    IMPRESSION: Known rectal neoplasm. Slight interval improvement in fluid   collections to the left of the rectum.  Stable spiculated nodule in the right upper lobe, which is suspicious for   neoplasm. Surrounding groundglass opacities may be infectious.    < end of copied text >

## 2018-06-14 NOTE — PROGRESS NOTE ADULT - PROBLEM SELECTOR PLAN 2
At this point we are monitoring this and not pursuing further work up as pt is asymptomatic from pulmonary perspective.

## 2018-06-15 LAB
GLUCOSE BLDC GLUCOMTR-MCNC: 114 MG/DL — HIGH (ref 70–99)
GLUCOSE BLDC GLUCOMTR-MCNC: 124 MG/DL — HIGH (ref 70–99)
GLUCOSE BLDC GLUCOMTR-MCNC: 136 MG/DL — HIGH (ref 70–99)
GLUCOSE BLDC GLUCOMTR-MCNC: 91 MG/DL — SIGNIFICANT CHANGE UP (ref 70–99)
HCT VFR BLD CALC: 31 % — LOW (ref 34.5–45)
HGB BLD-MCNC: 10.1 G/DL — LOW (ref 11.5–15.5)
MCHC RBC-ENTMCNC: 29.3 PG — SIGNIFICANT CHANGE UP (ref 27–34)
MCHC RBC-ENTMCNC: 32.7 GM/DL — SIGNIFICANT CHANGE UP (ref 32–36)
MCV RBC AUTO: 89.6 FL — SIGNIFICANT CHANGE UP (ref 80–100)
PLATELET # BLD AUTO: 278 K/UL — SIGNIFICANT CHANGE UP (ref 150–400)
RBC # BLD: 3.46 M/UL — LOW (ref 3.8–5.2)
RBC # FLD: 15.8 % — HIGH (ref 10.3–14.5)
WBC # BLD: 8.6 K/UL — SIGNIFICANT CHANGE UP (ref 3.8–10.5)
WBC # FLD AUTO: 8.6 K/UL — SIGNIFICANT CHANGE UP (ref 3.8–10.5)

## 2018-06-15 RX ORDER — LISINOPRIL 2.5 MG/1
2.5 TABLET ORAL DAILY
Qty: 0 | Refills: 0 | Status: DISCONTINUED | OUTPATIENT
Start: 2018-06-15 | End: 2018-06-20

## 2018-06-15 RX ORDER — ACETAMINOPHEN 500 MG
325 TABLET ORAL ONCE
Qty: 0 | Refills: 0 | Status: COMPLETED | OUTPATIENT
Start: 2018-06-15 | End: 2018-06-15

## 2018-06-15 RX ORDER — POLYETHYLENE GLYCOL 3350 17 G/17G
17 POWDER, FOR SOLUTION ORAL DAILY
Qty: 0 | Refills: 0 | Status: DISCONTINUED | OUTPATIENT
Start: 2018-06-15 | End: 2018-06-20

## 2018-06-15 RX ADMIN — PIPERACILLIN AND TAZOBACTAM 25 GRAM(S): 4; .5 INJECTION, POWDER, LYOPHILIZED, FOR SOLUTION INTRAVENOUS at 12:50

## 2018-06-15 RX ADMIN — OXYCODONE HYDROCHLORIDE 10 MILLIGRAM(S): 5 TABLET ORAL at 17:20

## 2018-06-15 RX ADMIN — PIPERACILLIN AND TAZOBACTAM 25 GRAM(S): 4; .5 INJECTION, POWDER, LYOPHILIZED, FOR SOLUTION INTRAVENOUS at 21:38

## 2018-06-15 RX ADMIN — Medication 500000 UNIT(S): at 06:06

## 2018-06-15 RX ADMIN — Medication 100 MILLIGRAM(S): at 17:21

## 2018-06-15 RX ADMIN — Medication 650 MILLIGRAM(S): at 12:17

## 2018-06-15 RX ADMIN — Medication 325 MILLIGRAM(S): at 12:50

## 2018-06-15 RX ADMIN — Medication 250 MILLIGRAM(S): at 17:20

## 2018-06-15 RX ADMIN — FAMOTIDINE 20 MILLIGRAM(S): 10 INJECTION INTRAVENOUS at 12:17

## 2018-06-15 RX ADMIN — ATORVASTATIN CALCIUM 40 MILLIGRAM(S): 80 TABLET, FILM COATED ORAL at 21:37

## 2018-06-15 RX ADMIN — OXYCODONE HYDROCHLORIDE 10 MILLIGRAM(S): 5 TABLET ORAL at 06:06

## 2018-06-15 RX ADMIN — PIPERACILLIN AND TAZOBACTAM 25 GRAM(S): 4; .5 INJECTION, POWDER, LYOPHILIZED, FOR SOLUTION INTRAVENOUS at 06:07

## 2018-06-15 RX ADMIN — OXYCODONE HYDROCHLORIDE 10 MILLIGRAM(S): 5 TABLET ORAL at 06:36

## 2018-06-15 RX ADMIN — Medication 100 MILLIGRAM(S): at 06:06

## 2018-06-15 RX ADMIN — Medication 650 MILLIGRAM(S): at 19:29

## 2018-06-15 RX ADMIN — CARVEDILOL PHOSPHATE 3.12 MILLIGRAM(S): 80 CAPSULE, EXTENDED RELEASE ORAL at 17:20

## 2018-06-15 RX ADMIN — Medication 500000 UNIT(S): at 12:17

## 2018-06-15 RX ADMIN — MIRTAZAPINE 15 MILLIGRAM(S): 45 TABLET, ORALLY DISINTEGRATING ORAL at 21:37

## 2018-06-15 RX ADMIN — LISINOPRIL 2.5 MILLIGRAM(S): 2.5 TABLET ORAL at 12:17

## 2018-06-15 RX ADMIN — SENNA PLUS 2 TABLET(S): 8.6 TABLET ORAL at 21:37

## 2018-06-15 RX ADMIN — HEPARIN SODIUM 5000 UNIT(S): 5000 INJECTION INTRAVENOUS; SUBCUTANEOUS at 06:05

## 2018-06-15 RX ADMIN — Medication 325 MILLIGRAM(S): at 13:20

## 2018-06-15 RX ADMIN — CARVEDILOL PHOSPHATE 3.12 MILLIGRAM(S): 80 CAPSULE, EXTENDED RELEASE ORAL at 06:05

## 2018-06-15 RX ADMIN — Medication 500000 UNIT(S): at 23:10

## 2018-06-15 RX ADMIN — HEPARIN SODIUM 5000 UNIT(S): 5000 INJECTION INTRAVENOUS; SUBCUTANEOUS at 17:21

## 2018-06-15 RX ADMIN — Medication 0: at 22:28

## 2018-06-15 RX ADMIN — Medication 500000 UNIT(S): at 17:20

## 2018-06-15 NOTE — PROGRESS NOTE ADULT - ASSESSMENT
78F with rectal cancer, dm,  htn,  adm with fever,  afebrile  now  cont IV zosyn/  vanco   CT abd pelvis,, as  noted    rectal fluid  collections/ pna  PTeval  normal wbc  now  surg note seen,  ? proximal diversion/ostomy, defer to  dr ferreira  per oncology, RT saw  pt      < from: CT Abdomen and Pelvis w/ IV Cont (06.08.18 @ 16:45) >  pondylolysis at this level.    IMPRESSION: Known rectal neoplasm. Slight interval improvement in fluid   collections to the left of the rectum.  Stable spiculated nodule in the right upper lobe, which is suspicious for   neoplasm. Surrounding groundglass opacities may be infectious.                < end of copied text >

## 2018-06-15 NOTE — PROGRESS NOTE ADULT - SUBJECTIVE AND OBJECTIVE BOX
- Patient seen and examined.  - In summary, patient is a 78 year old woman who presented with fever (08 Jun 2018 15:36)  - Today, patient is without complaints.         *****MEDICATIONS:    MEDICATIONS  (STANDING):  atorvastatin 40 milliGRAM(s) Oral at bedtime  carvedilol 3.125 milliGRAM(s) Oral every 12 hours  dextrose 5%. 1000 milliLiter(s) (50 mL/Hr) IV Continuous <Continuous>  dextrose 50% Injectable 12.5 Gram(s) IV Push once  dextrose 50% Injectable 25 Gram(s) IV Push once  dextrose 50% Injectable 25 Gram(s) IV Push once  docusate sodium 100 milliGRAM(s) Oral two times a day  famotidine    Tablet 20 milliGRAM(s) Oral daily  heparin  Injectable 5000 Unit(s) SubCutaneous every 12 hours  insulin lispro (HumaLOG) corrective regimen sliding scale   SubCutaneous three times a day before meals  insulin lispro (HumaLOG) corrective regimen sliding scale   SubCutaneous at bedtime  mirtazapine 15 milliGRAM(s) Oral at bedtime  nystatin    Suspension 681155 Unit(s) Oral four times a day  oxyCODONE  ER Tablet 10 milliGRAM(s) Oral every 12 hours  piperacillin/tazobactam IVPB. 3.375 Gram(s) IV Intermittent every 8 hours  senna 2 Tablet(s) Oral at bedtime  vancomycin  IVPB 1000 milliGRAM(s) IV Intermittent every 24 hours    MEDICATIONS  (PRN):  acetaminophen   Tablet 650 milliGRAM(s) Oral every 6 hours PRN Mild Pain (1 - 3)  dextrose 40% Gel 15 Gram(s) Oral once PRN Blood Glucose LESS THAN 70 milliGRAM(s)/deciliter  glucagon  Injectable 1 milliGRAM(s) IntraMuscular once PRN Glucose LESS THAN 70 milligrams/deciliter               ***** REVIEW OF SYSTEM:  GEN: no fever, no chills, no pain  RESP: no SOB, no cough, no sputum  CVS: no chest pain, no palpitations, no edema  GI: no abdominal pain, no nausea, no vomiting, no constipation, no diarrhea  : no dysuria, no frequency  NEURO: no headache, no dizziness  PSYCH: no depression, not anxious  Derm : no itching, no rash         ***** VITAL SIGNS:                        10.1   8.6   )-----------( 278      ( 15 Jed 2018 06:15 )             31.0               06-14    139  |  103  |  4<L>  ----------------------------<  88  4.5   |  27  |  0.61    Ca    8.8      14 Jun 2018 07:08  Mg     2.2     06-14         *****PHYSICAL EXAM:  GEN: A&O X 3 , NAD , comfortable  HEENT: NCAT, EOMI, MMM, no icterus  NECK: Supple, No JVD  CVS: S1S2 , regular , No M/R/G appreciated  PULM: CTA B/L,  no W/R/R appreciated  ABD.: soft. non tender, non distended,  bowel sounds present  Extrem: intact pulses , no edema noted  Derm: No rash or ecchymosis noted  PSYCH: normal mood, no depression, not anxious         *****LAB AND IMAGING:                                          10.1   8.6   )-----------( 278      ( 15 Jed 2018 06:15 )             31.0               06-14    139  |  103  |  4<L>  ----------------------------<  88  4.5   |  27  |  0.61    Ca    8.8      14 Jun 2018 07:08  Mg     2.2     06-14      [All pertinent recent Imaging/Reports reviewed]  < from: Transthoracic Echocardiogram (04.16.18 @ 07:08) >  Conclusions:  1. Normal left ventricular internal dimensions and wall  thicknesses.  2. Normal left ventricular systolic function. No segmental  wall motion abnormalities.  3. Normal diastolic function  4. Normal right ventricular sizeand systolic function.  5. Estimated pulmonary artery systolic pressure equals 36  mm Hg, assuming right atrial pressure equals 8 mm Hg,  consistent with borderline pulmonary pressures.  *** Compared with echocardiogram of 2/28/2017, no  significant changes noted.    < end of copied text >         *****A S S E S S M E N T   A N D   P L A N :  78F with rectal cancer adm with fever  abx per ID  CT noted  cont current meds  IVF  oncology to f/u  CRS f/u noted  surgical plan. for ostomy  recent echo noted  acceptable cardiac risk for surgery    __________________________  YON Metcalf D.O.

## 2018-06-15 NOTE — CHART NOTE - NSCHARTNOTEFT_GEN_A_CORE
Patient seen for initial malnutrition follow-up.     Pertinent information: This is a  78 year old F with locally advanced rectal cancer s/p 3 sessions of palliative RT aborted due to discovery of pelvic abscess s/p IV Zosyn, now presenting with PNA. Pt with FTT, ongoing weight loss, malignancy related pain. Per heme/onco, plan for surgery to perform a bypass with ostomy placement.       Source: Patient [ ]    Family [x ]     other [ x]: medical record     Diet : DASH + carbohydrate consistent diet + Glucerna 2x per day     Writer spoke with daughter bedside as patient sleeping deeply. Daughter reports pt continues with minimal PO intake in-house, only taking a few bites at a time. Reports pt has not been Glucerna supplements, Daughter reports she will try to encourage pt to drink supplements while PO intake remains suboptimal. Daughter also bringing in food from home but patient also only taking minimal amounts. Reports pt with occasional nausea especially when smelling meals. Writer discussed alternative cold menu items on back of menu to try if pt does not want hot entree. Daughter concerned pt is not consuming enough protein, daughter amendable to patient trying No carb Pro-source 1x per day. Writer also obtained additional food preferences, will honor as able. Per daughter report, pt with no BM x 3 days.      PO intake:  < 50% [x ]     Source for PO intake [ ] Patient [ x] family [ ] chart [ ] staff [ ] other; daughter    no new weights     Pertinent Medications: MEDICATIONS  (STANDING):  atorvastatin 40 milliGRAM(s) Oral at bedtime  carvedilol 3.125 milliGRAM(s) Oral every 12 hours  dextrose 5%. 1000 milliLiter(s) (50 mL/Hr) IV Continuous <Continuous>  dextrose 50% Injectable 12.5 Gram(s) IV Push once  dextrose 50% Injectable 25 Gram(s) IV Push once  dextrose 50% Injectable 25 Gram(s) IV Push once  docusate sodium 100 milliGRAM(s) Oral two times a day  famotidine    Tablet 20 milliGRAM(s) Oral daily  heparin  Injectable 5000 Unit(s) SubCutaneous every 12 hours  insulin lispro (HumaLOG) corrective regimen sliding scale   SubCutaneous three times a day before meals  insulin lispro (HumaLOG) corrective regimen sliding scale   SubCutaneous at bedtime  lisinopril 2.5 milliGRAM(s) Oral daily  mirtazapine 15 milliGRAM(s) Oral at bedtime  nystatin    Suspension 200240 Unit(s) Oral four times a day  oxyCODONE  ER Tablet 10 milliGRAM(s) Oral every 12 hours  piperacillin/tazobactam IVPB. 3.375 Gram(s) IV Intermittent every 8 hours  polyethylene glycol 3350 17 Gram(s) Oral daily  senna 2 Tablet(s) Oral at bedtime  vancomycin  IVPB 1000 milliGRAM(s) IV Intermittent every 24 hours    MEDICATIONS  (PRN):  acetaminophen   Tablet 650 milliGRAM(s) Oral every 6 hours PRN Mild Pain (1 - 3)  bisacodyl Suppository 10 milliGRAM(s) Rectal daily PRN Constipation  dextrose 40% Gel 15 Gram(s) Oral once PRN Blood Glucose LESS THAN 70 milliGRAM(s)/deciliter  glucagon  Injectable 1 milliGRAM(s) IntraMuscular once PRN Glucose LESS THAN 70 milligrams/deciliter    Pertinent Labs:  06-14 Na139 mmol/L Glu 88 mg/dL K+ 4.5 mmol/L Cr  0.61 mg/dL BUN 4 mg/dL<L> 06-08 Alb 2.5 g/dL<L>  POCT glucose: 6/15: 91-136mg/dL, 6/14: 94-190mg/dL, 6/13: 91-218mg/dL     Skin: free of pressure injuries   No edema noted     Estimated Needs:   [x ] no change since previous assessment  [ ] recalculated:       Previous Nutrition Diagnosis:      [x ] Malnutrition (severe)       Nutrition Diagnosis is [ x] ongoing  [ ] resolved [ ] not applicable: being addressed with PO diet and nutritional oral supplements      New Nutrition Diagnosis: [x ] not applicable    Interventions:     Recommend  1. Recommend liberalized diet to carbohydrate consistent diet to optimize meal choices.   2. Continue Glucerna 2x per day   3. Recommend add Pro-source 1x per day for pt to try   4. Continue bowel regimen. Consider adding anti-emetic.   5. Obtain/honor foods preferences as able.       Monitoring and Evaluation:     [x ] PO intake [x ] Tolerance to diet prescription [x ] weights [ x] follow up per protocol    [x ] other: RD to remain available and follow-up as medically appropriate.

## 2018-06-15 NOTE — PROGRESS NOTE ADULT - SUBJECTIVE AND OBJECTIVE BOX
no  compalints  REVIEW OF SYSTEMS:  GEN: no fever,    no chills  RESP: no SOB,   no cough  CVS: no chest pain,   no palpitations  GI: no abdominal pain,   no nausea,   no vomiting,   no constipation,   no diarrhea  : no dysuria,   no frequency  NEURO: no headache,   no dizziness  PSYCH: no depression,   not anxious  Derm : no rash    MEDICATIONS  (STANDING):  atorvastatin 40 milliGRAM(s) Oral at bedtime  carvedilol 3.125 milliGRAM(s) Oral every 12 hours  dextrose 5%. 1000 milliLiter(s) (50 mL/Hr) IV Continuous <Continuous>  dextrose 50% Injectable 12.5 Gram(s) IV Push once  dextrose 50% Injectable 25 Gram(s) IV Push once  dextrose 50% Injectable 25 Gram(s) IV Push once  docusate sodium 100 milliGRAM(s) Oral two times a day  famotidine    Tablet 20 milliGRAM(s) Oral daily  heparin  Injectable 5000 Unit(s) SubCutaneous every 12 hours  insulin lispro (HumaLOG) corrective regimen sliding scale   SubCutaneous three times a day before meals  insulin lispro (HumaLOG) corrective regimen sliding scale   SubCutaneous at bedtime  mirtazapine 15 milliGRAM(s) Oral at bedtime  nystatin    Suspension 769853 Unit(s) Oral four times a day  oxyCODONE  ER Tablet 10 milliGRAM(s) Oral every 12 hours  piperacillin/tazobactam IVPB. 3.375 Gram(s) IV Intermittent every 8 hours  senna 2 Tablet(s) Oral at bedtime  vancomycin  IVPB 1000 milliGRAM(s) IV Intermittent every 24 hours    MEDICATIONS  (PRN):  acetaminophen   Tablet 650 milliGRAM(s) Oral every 6 hours PRN Mild Pain (1 - 3)  dextrose 40% Gel 15 Gram(s) Oral once PRN Blood Glucose LESS THAN 70 milliGRAM(s)/deciliter  glucagon  Injectable 1 milliGRAM(s) IntraMuscular once PRN Glucose LESS THAN 70 milligrams/deciliter      Vital Signs Last 24 Hrs  T(C): 36.3 (15 Jed 2018 04:39), Max: 36.8 (14 Jun 2018 14:05)  T(F): 97.3 (15 Jed 2018 04:39), Max: 98.2 (14 Jun 2018 14:05)  HR: 70 (15 Jed 2018 04:39) (66 - 78)  BP: 171/76 (15 Jed 2018 04:39) (132/63 - 171/76)  BP(mean): --  RR: 18 (15 Jed 2018 04:39) (18 - 18)  SpO2: 98% (15 Jed 2018 04:39) (95% - 99%)  CAPILLARY BLOOD GLUCOSE      POCT Blood Glucose.: 91 mg/dL (15 Jed 2018 08:30)  POCT Blood Glucose.: 124 mg/dL (14 Jun 2018 22:18)  POCT Blood Glucose.: 155 mg/dL (14 Jun 2018 17:32)  POCT Blood Glucose.: 190 mg/dL (14 Jun 2018 12:45)    I&O's Summary    14 Jun 2018 07:01  -  15 Jed 2018 07:00  --------------------------------------------------------  IN: 530 mL / OUT: 0 mL / NET: 530 mL        PHYSICAL EXAM:  HEAD:  Atraumatic, Normocephalic  NECK: Supple, No   JVD  CHEST/LUNG:   no     rales,     no,    rhonchi  HEART: Regular rate and rhythm;         murmur  ABDOMEN: Soft, Nontender, ;   EXTREMITIES:        edema  NEUROLOGY:  alert    LABS:                        10.1   8.6   )-----------( 278      ( 15 Jed 2018 06:15 )             31.0     06-14    139  |  103  |  4<L>  ----------------------------<  88  4.5   |  27  |  0.61    Ca    8.8      14 Jun 2018 07:08  Mg     2.2     06-14                    Hemoglobin A1C, Whole Blood: 6.6 % (05-09 @ 06:52)    Thyroid Stimulating Hormone, Serum: 1.00 uIU/mL (05-18 @ 06:35)          Consultant(s) Notes Reviewed:      Care Discussed with Consultants/Other Providers:

## 2018-06-16 LAB
GLUCOSE BLDC GLUCOMTR-MCNC: 117 MG/DL — HIGH (ref 70–99)
GLUCOSE BLDC GLUCOMTR-MCNC: 139 MG/DL — HIGH (ref 70–99)
GLUCOSE BLDC GLUCOMTR-MCNC: 155 MG/DL — HIGH (ref 70–99)
GLUCOSE BLDC GLUCOMTR-MCNC: 94 MG/DL — SIGNIFICANT CHANGE UP (ref 70–99)

## 2018-06-16 RX ORDER — LOSARTAN POTASSIUM 100 MG/1
25 TABLET, FILM COATED ORAL DAILY
Qty: 0 | Refills: 0 | Status: DISCONTINUED | OUTPATIENT
Start: 2018-06-16 | End: 2018-06-18

## 2018-06-16 RX ORDER — MORPHINE SULFATE 50 MG/1
15 CAPSULE, EXTENDED RELEASE ORAL EVERY 8 HOURS
Qty: 0 | Refills: 0 | Status: DISCONTINUED | OUTPATIENT
Start: 2018-06-16 | End: 2018-06-20

## 2018-06-16 RX ORDER — MORPHINE SULFATE 50 MG/1
15 CAPSULE, EXTENDED RELEASE ORAL EVERY 12 HOURS
Qty: 0 | Refills: 0 | Status: DISCONTINUED | OUTPATIENT
Start: 2018-06-16 | End: 2018-06-20

## 2018-06-16 RX ORDER — ONDANSETRON 8 MG/1
4 TABLET, FILM COATED ORAL EVERY 6 HOURS
Qty: 0 | Refills: 0 | Status: DISCONTINUED | OUTPATIENT
Start: 2018-06-16 | End: 2018-06-20

## 2018-06-16 RX ADMIN — MORPHINE SULFATE 15 MILLIGRAM(S): 50 CAPSULE, EXTENDED RELEASE ORAL at 22:41

## 2018-06-16 RX ADMIN — MORPHINE SULFATE 15 MILLIGRAM(S): 50 CAPSULE, EXTENDED RELEASE ORAL at 17:34

## 2018-06-16 RX ADMIN — MIRTAZAPINE 15 MILLIGRAM(S): 45 TABLET, ORALLY DISINTEGRATING ORAL at 22:06

## 2018-06-16 RX ADMIN — Medication 100 MILLIGRAM(S): at 17:34

## 2018-06-16 RX ADMIN — Medication 500000 UNIT(S): at 05:44

## 2018-06-16 RX ADMIN — MORPHINE SULFATE 15 MILLIGRAM(S): 50 CAPSULE, EXTENDED RELEASE ORAL at 22:11

## 2018-06-16 RX ADMIN — LOSARTAN POTASSIUM 25 MILLIGRAM(S): 100 TABLET, FILM COATED ORAL at 12:40

## 2018-06-16 RX ADMIN — Medication 100 MILLIGRAM(S): at 05:44

## 2018-06-16 RX ADMIN — PIPERACILLIN AND TAZOBACTAM 25 GRAM(S): 4; .5 INJECTION, POWDER, LYOPHILIZED, FOR SOLUTION INTRAVENOUS at 05:44

## 2018-06-16 RX ADMIN — LISINOPRIL 2.5 MILLIGRAM(S): 2.5 TABLET ORAL at 05:43

## 2018-06-16 RX ADMIN — CARVEDILOL PHOSPHATE 3.12 MILLIGRAM(S): 80 CAPSULE, EXTENDED RELEASE ORAL at 17:34

## 2018-06-16 RX ADMIN — HEPARIN SODIUM 5000 UNIT(S): 5000 INJECTION INTRAVENOUS; SUBCUTANEOUS at 05:43

## 2018-06-16 RX ADMIN — MORPHINE SULFATE 15 MILLIGRAM(S): 50 CAPSULE, EXTENDED RELEASE ORAL at 13:32

## 2018-06-16 RX ADMIN — POLYETHYLENE GLYCOL 3350 17 GRAM(S): 17 POWDER, FOR SOLUTION ORAL at 11:36

## 2018-06-16 RX ADMIN — ONDANSETRON 4 MILLIGRAM(S): 8 TABLET, FILM COATED ORAL at 17:34

## 2018-06-16 RX ADMIN — MORPHINE SULFATE 15 MILLIGRAM(S): 50 CAPSULE, EXTENDED RELEASE ORAL at 18:04

## 2018-06-16 RX ADMIN — OXYCODONE HYDROCHLORIDE 10 MILLIGRAM(S): 5 TABLET ORAL at 06:08

## 2018-06-16 RX ADMIN — Medication 1: at 12:36

## 2018-06-16 RX ADMIN — SENNA PLUS 2 TABLET(S): 8.6 TABLET ORAL at 22:06

## 2018-06-16 RX ADMIN — Medication 650 MILLIGRAM(S): at 08:11

## 2018-06-16 RX ADMIN — FAMOTIDINE 20 MILLIGRAM(S): 10 INJECTION INTRAVENOUS at 11:36

## 2018-06-16 RX ADMIN — MORPHINE SULFATE 15 MILLIGRAM(S): 50 CAPSULE, EXTENDED RELEASE ORAL at 13:02

## 2018-06-16 RX ADMIN — HEPARIN SODIUM 5000 UNIT(S): 5000 INJECTION INTRAVENOUS; SUBCUTANEOUS at 17:34

## 2018-06-16 RX ADMIN — ATORVASTATIN CALCIUM 40 MILLIGRAM(S): 80 TABLET, FILM COATED ORAL at 22:06

## 2018-06-16 RX ADMIN — CARVEDILOL PHOSPHATE 3.12 MILLIGRAM(S): 80 CAPSULE, EXTENDED RELEASE ORAL at 05:43

## 2018-06-16 RX ADMIN — OXYCODONE HYDROCHLORIDE 10 MILLIGRAM(S): 5 TABLET ORAL at 05:44

## 2018-06-16 NOTE — PROGRESS NOTE ADULT - SUBJECTIVE AND OBJECTIVE BOX
no  compalints    REVIEW OF SYSTEMS:  GEN: no fever,    no chills  RESP: no SOB,   no cough  CVS: no chest pain,   no palpitations  GI: no abdominal pain,   no nausea,   no vomiting,   no constipation,   no diarrhea  : no dysuria,   no frequency  NEURO: no headache,   no dizziness  PSYCH: no depression,   not anxious  Derm : no rash    MEDICATIONS  (STANDING):  atorvastatin 40 milliGRAM(s) Oral at bedtime  carvedilol 3.125 milliGRAM(s) Oral every 12 hours  dextrose 5%. 1000 milliLiter(s) (50 mL/Hr) IV Continuous <Continuous>  dextrose 50% Injectable 12.5 Gram(s) IV Push once  dextrose 50% Injectable 25 Gram(s) IV Push once  dextrose 50% Injectable 25 Gram(s) IV Push once  docusate sodium 100 milliGRAM(s) Oral two times a day  famotidine    Tablet 20 milliGRAM(s) Oral daily  heparin  Injectable 5000 Unit(s) SubCutaneous every 12 hours  insulin lispro (HumaLOG) corrective regimen sliding scale   SubCutaneous three times a day before meals  insulin lispro (HumaLOG) corrective regimen sliding scale   SubCutaneous at bedtime  lisinopril 2.5 milliGRAM(s) Oral daily  losartan 25 milliGRAM(s) Oral daily  mirtazapine 15 milliGRAM(s) Oral at bedtime  nystatin    Suspension 132672 Unit(s) Oral four times a day  oxyCODONE  ER Tablet 10 milliGRAM(s) Oral every 12 hours  piperacillin/tazobactam IVPB. 3.375 Gram(s) IV Intermittent every 8 hours  polyethylene glycol 3350 17 Gram(s) Oral daily  senna 2 Tablet(s) Oral at bedtime  vancomycin  IVPB 1000 milliGRAM(s) IV Intermittent every 24 hours    MEDICATIONS  (PRN):  acetaminophen   Tablet 650 milliGRAM(s) Oral every 6 hours PRN Mild Pain (1 - 3)  bisacodyl Suppository 10 milliGRAM(s) Rectal daily PRN Constipation  dextrose 40% Gel 15 Gram(s) Oral once PRN Blood Glucose LESS THAN 70 milliGRAM(s)/deciliter  glucagon  Injectable 1 milliGRAM(s) IntraMuscular once PRN Glucose LESS THAN 70 milligrams/deciliter      Vital Signs Last 24 Hrs  T(C): 36.9 (16 Jun 2018 04:15), Max: 36.9 (16 Jun 2018 04:15)  T(F): 98.4 (16 Jun 2018 04:15), Max: 98.4 (16 Jun 2018 04:15)  HR: 73 (16 Jun 2018 04:15) (70 - 73)  BP: 148/78 (16 Jun 2018 04:15) (134/67 - 148/78)  BP(mean): --  RR: 18 (16 Jun 2018 04:15) (18 - 18)  SpO2: 97% (16 Jun 2018 04:15) (96% - 98%)  CAPILLARY BLOOD GLUCOSE      POCT Blood Glucose.: 117 mg/dL (16 Jun 2018 08:37)  POCT Blood Glucose.: 114 mg/dL (15 Jed 2018 21:54)  POCT Blood Glucose.: 124 mg/dL (15 Jed 2018 17:37)  POCT Blood Glucose.: 136 mg/dL (15 Jed 2018 12:51)    I&O's Summary    15 Jed 2018 07:01  -  16 Jun 2018 07:00  --------------------------------------------------------  IN: 1010 mL / OUT: 0 mL / NET: 1010 mL        PHYSICAL EXAM:  HEAD:  Atraumatic, Normocephalic  NECK: Supple, No   JVD  CHEST/LUNG:   no     rales,     no,    rhonchi  HEART: Regular rate and rhythm;         murmur  ABDOMEN: Soft, Nontender, ;   EXTREMITIES:        edema  NEUROLOGY:  alert    LABS:                        10.1   8.6   )-----------( 278      ( 15 Jed 2018 06:15 )             31.0                         Hemoglobin A1C, Whole Blood: 6.6 % (05-09 @ 06:52)    Thyroid Stimulating Hormone, Serum: 1.00 uIU/mL (05-18 @ 06:35)          Consultant(s) Notes Reviewed:      Care Discussed with Consultants/Other Providers:

## 2018-06-16 NOTE — PROGRESS NOTE ADULT - SUBJECTIVE AND OBJECTIVE BOX
CARDIOLOGY     PROGRESS  NOTE   ________________________________________________    CHIEF COMPLAINT:Patient is a 78y old  Female who presents with a chief complaint of fever (08 Jun 2018 15:36)  no complain  	  REVIEW OF SYSTEMS:  CONSTITUTIONAL: No fever, weight loss, or fatigue  EYES: No eye pain, visual disturbances, or discharge  ENT:  No difficulty hearing, tinnitus, vertigo; No sinus or throat pain  NECK: No pain or stiffness  RESPIRATORY: No cough, wheezing, chills or hemoptysis; No Shortness of Breath  CARDIOVASCULAR: No chest pain, palpitations, passing out, dizziness, or leg swelling  GASTROINTESTINAL: No abdominal or epigastric pain. No nausea, vomiting, or hematemesis; No diarrhea or constipation. No melena or hematochezia.  GENITOURINARY: No dysuria, frequency, hematuria, or incontinence  NEUROLOGICAL: No headaches, memory loss, loss of strength, numbness, or tremors  SKIN: No itching, burning, rashes, or lesions   LYMPH Nodes: No enlarged glands  ENDOCRINE: No heat or cold intolerance; No hair loss  MUSCULOSKELETAL: No joint pain or swelling; No muscle, back, or extremity pain  PSYCHIATRIC: No depression, anxiety, mood swings, or difficulty sleeping  HEME/LYMPH: No easy bruising, or bleeding gums  ALLERGY AND IMMUNOLOGIC: No hives or eczema	    [ ] All others negative	  [ ] Unable to obtain    PHYSICAL EXAM:  T(C): 36.9 (06-16-18 @ 04:15), Max: 36.9 (06-16-18 @ 04:15)  HR: 73 (06-16-18 @ 04:15) (70 - 73)  BP: 148/78 (06-16-18 @ 04:15) (134/67 - 148/78)  RR: 18 (06-16-18 @ 04:15) (18 - 18)  SpO2: 97% (06-16-18 @ 04:15) (96% - 98%)  Wt(kg): --  I&O's Summary    15 Jed 2018 07:01  -  16 Jun 2018 07:00  --------------------------------------------------------  IN: 1010 mL / OUT: 0 mL / NET: 1010 mL        Appearance: Normal	  HEENT:   Normal oral mucosa, PERRL, EOMI	  Lymphatic: No lymphadenopathy  Cardiovascular: Normal S1 S2, No JVD, + murmurs, No edema  Respiratory: Lungs clear to auscultation	  Psychiatry: A & O x 3, Mood & affect appropriate  Gastrointestinal:  Soft, Non-tender, + BS	  Skin: No rashes, No ecchymoses, No cyanosis	  Neurologic: Non-focal  Extremities: Normal range of motion, No clubbing, cyanosis or edema  Vascular: Peripheral pulses palpable 2+ bilaterally    MEDICATIONS  (STANDING):  atorvastatin 40 milliGRAM(s) Oral at bedtime  carvedilol 3.125 milliGRAM(s) Oral every 12 hours  dextrose 5%. 1000 milliLiter(s) (50 mL/Hr) IV Continuous <Continuous>  dextrose 50% Injectable 12.5 Gram(s) IV Push once  dextrose 50% Injectable 25 Gram(s) IV Push once  dextrose 50% Injectable 25 Gram(s) IV Push once  docusate sodium 100 milliGRAM(s) Oral two times a day  famotidine    Tablet 20 milliGRAM(s) Oral daily  heparin  Injectable 5000 Unit(s) SubCutaneous every 12 hours  insulin lispro (HumaLOG) corrective regimen sliding scale   SubCutaneous three times a day before meals  insulin lispro (HumaLOG) corrective regimen sliding scale   SubCutaneous at bedtime  lisinopril 2.5 milliGRAM(s) Oral daily  mirtazapine 15 milliGRAM(s) Oral at bedtime  nystatin    Suspension 030867 Unit(s) Oral four times a day  oxyCODONE  ER Tablet 10 milliGRAM(s) Oral every 12 hours  piperacillin/tazobactam IVPB. 3.375 Gram(s) IV Intermittent every 8 hours  polyethylene glycol 3350 17 Gram(s) Oral daily  senna 2 Tablet(s) Oral at bedtime  vancomycin  IVPB 1000 milliGRAM(s) IV Intermittent every 24 hours      TELEMETRY: 	    ECG:  	  RADIOLOGY:  OTHER: 	  	  LABS:	 	    CARDIAC MARKERS:                                10.1   8.6   )-----------( 278      ( 15 Jed 2018 06:15 )             31.0           proBNP:   Lipid Profile:   HgA1c:   TSH: Thyroid Stimulating Hormone, Serum: 1.00 uIU/mL (05-18 @ 06:35)          Assessment and plan  ---------------------------  78F with rectal cancer adm with fever  abx per ID  CT noted  cont current meds  IVF  oncology to f/u  CRS f/u noted  surgical plan. for ostomy  recent echo noted  acceptable cardiac risk for surgery  will adjust bp meds

## 2018-06-16 NOTE — PROGRESS NOTE ADULT - ASSESSMENT
78F with rectal cancer, dm,  htn,  adm with fever,  afebrile  now  cont IV zosyn/  vanco   CT abd pelvis,, as  noted    rectal fluid  collections/ pna  PTeval  normal wbc  now  surg note seen,  ? proximal diversion/ostomy, defer to  dr ferreira  per oncology, RT saw  pt  per  ID,  stop  ab      < from: CT Abdomen and Pelvis w/ IV Cont (06.08.18 @ 16:45) >  pondylolysis at this level.    IMPRESSION: Known rectal neoplasm. Slight interval improvement in fluid   collections to the left of the rectum.  Stable spiculated nodule in the right upper lobe, which is suspicious for   neoplasm. Surrounding groundglass opacities may be infectious.                < end of copied text >

## 2018-06-17 LAB
ANION GAP SERPL CALC-SCNC: 10 MMOL/L — SIGNIFICANT CHANGE UP (ref 5–17)
BUN SERPL-MCNC: 7 MG/DL — SIGNIFICANT CHANGE UP (ref 7–23)
CALCIUM SERPL-MCNC: 8.7 MG/DL — SIGNIFICANT CHANGE UP (ref 8.4–10.5)
CHLORIDE SERPL-SCNC: 100 MMOL/L — SIGNIFICANT CHANGE UP (ref 96–108)
CO2 SERPL-SCNC: 26 MMOL/L — SIGNIFICANT CHANGE UP (ref 22–31)
CREAT SERPL-MCNC: 0.62 MG/DL — SIGNIFICANT CHANGE UP (ref 0.5–1.3)
GLUCOSE BLDC GLUCOMTR-MCNC: 106 MG/DL — HIGH (ref 70–99)
GLUCOSE BLDC GLUCOMTR-MCNC: 131 MG/DL — HIGH (ref 70–99)
GLUCOSE BLDC GLUCOMTR-MCNC: 142 MG/DL — HIGH (ref 70–99)
GLUCOSE BLDC GLUCOMTR-MCNC: 93 MG/DL — SIGNIFICANT CHANGE UP (ref 70–99)
GLUCOSE SERPL-MCNC: 76 MG/DL — SIGNIFICANT CHANGE UP (ref 70–99)
HCT VFR BLD CALC: 29.8 % — LOW (ref 34.5–45)
HGB BLD-MCNC: 9.6 G/DL — LOW (ref 11.5–15.5)
MCHC RBC-ENTMCNC: 27.5 PG — SIGNIFICANT CHANGE UP (ref 27–34)
MCHC RBC-ENTMCNC: 32.2 GM/DL — SIGNIFICANT CHANGE UP (ref 32–36)
MCV RBC AUTO: 85.4 FL — SIGNIFICANT CHANGE UP (ref 80–100)
PLATELET # BLD AUTO: 264 K/UL — SIGNIFICANT CHANGE UP (ref 150–400)
POTASSIUM SERPL-MCNC: 3.6 MMOL/L — SIGNIFICANT CHANGE UP (ref 3.5–5.3)
POTASSIUM SERPL-SCNC: 3.6 MMOL/L — SIGNIFICANT CHANGE UP (ref 3.5–5.3)
RBC # BLD: 3.49 M/UL — LOW (ref 3.8–5.2)
RBC # FLD: 17.4 % — HIGH (ref 10.3–14.5)
SODIUM SERPL-SCNC: 136 MMOL/L — SIGNIFICANT CHANGE UP (ref 135–145)
WBC # BLD: 7.88 K/UL — SIGNIFICANT CHANGE UP (ref 3.8–10.5)
WBC # FLD AUTO: 7.88 K/UL — SIGNIFICANT CHANGE UP (ref 3.8–10.5)

## 2018-06-17 RX ORDER — ACETAMINOPHEN 500 MG
650 TABLET ORAL ONCE
Qty: 0 | Refills: 0 | Status: COMPLETED | OUTPATIENT
Start: 2018-06-17 | End: 2018-06-17

## 2018-06-17 RX ADMIN — Medication 100 MILLIGRAM(S): at 17:15

## 2018-06-17 RX ADMIN — CARVEDILOL PHOSPHATE 3.12 MILLIGRAM(S): 80 CAPSULE, EXTENDED RELEASE ORAL at 05:51

## 2018-06-17 RX ADMIN — Medication 260 MILLIGRAM(S): at 23:16

## 2018-06-17 RX ADMIN — MORPHINE SULFATE 15 MILLIGRAM(S): 50 CAPSULE, EXTENDED RELEASE ORAL at 05:50

## 2018-06-17 RX ADMIN — Medication 100 MILLIGRAM(S): at 05:51

## 2018-06-17 RX ADMIN — MORPHINE SULFATE 15 MILLIGRAM(S): 50 CAPSULE, EXTENDED RELEASE ORAL at 17:14

## 2018-06-17 RX ADMIN — Medication 650 MILLIGRAM(S): at 20:32

## 2018-06-17 RX ADMIN — FAMOTIDINE 20 MILLIGRAM(S): 10 INJECTION INTRAVENOUS at 13:22

## 2018-06-17 RX ADMIN — HEPARIN SODIUM 5000 UNIT(S): 5000 INJECTION INTRAVENOUS; SUBCUTANEOUS at 05:51

## 2018-06-17 RX ADMIN — MORPHINE SULFATE 15 MILLIGRAM(S): 50 CAPSULE, EXTENDED RELEASE ORAL at 16:10

## 2018-06-17 RX ADMIN — HEPARIN SODIUM 5000 UNIT(S): 5000 INJECTION INTRAVENOUS; SUBCUTANEOUS at 17:17

## 2018-06-17 RX ADMIN — Medication 650 MILLIGRAM(S): at 23:31

## 2018-06-17 RX ADMIN — LOSARTAN POTASSIUM 25 MILLIGRAM(S): 100 TABLET, FILM COATED ORAL at 05:51

## 2018-06-17 RX ADMIN — CARVEDILOL PHOSPHATE 3.12 MILLIGRAM(S): 80 CAPSULE, EXTENDED RELEASE ORAL at 17:15

## 2018-06-17 RX ADMIN — MORPHINE SULFATE 15 MILLIGRAM(S): 50 CAPSULE, EXTENDED RELEASE ORAL at 15:46

## 2018-06-17 RX ADMIN — ATORVASTATIN CALCIUM 40 MILLIGRAM(S): 80 TABLET, FILM COATED ORAL at 21:13

## 2018-06-17 RX ADMIN — MORPHINE SULFATE 15 MILLIGRAM(S): 50 CAPSULE, EXTENDED RELEASE ORAL at 06:20

## 2018-06-17 RX ADMIN — MIRTAZAPINE 15 MILLIGRAM(S): 45 TABLET, ORALLY DISINTEGRATING ORAL at 21:13

## 2018-06-17 RX ADMIN — SENNA PLUS 2 TABLET(S): 8.6 TABLET ORAL at 21:13

## 2018-06-17 RX ADMIN — POLYETHYLENE GLYCOL 3350 17 GRAM(S): 17 POWDER, FOR SOLUTION ORAL at 13:22

## 2018-06-17 RX ADMIN — LISINOPRIL 2.5 MILLIGRAM(S): 2.5 TABLET ORAL at 05:51

## 2018-06-17 RX ADMIN — MORPHINE SULFATE 15 MILLIGRAM(S): 50 CAPSULE, EXTENDED RELEASE ORAL at 18:00

## 2018-06-17 NOTE — PROGRESS NOTE ADULT - ASSESSMENT
78F with rectal cancer, dm,  htn,  adm with fever,  afebrile  now  cont IV zosyn/  vanco   CT abd pelvis,, as  noted    rectal fluid  collections/ pna  PTeval  normal wbc  now  surg note seen,  ? proximal diversion/ostomy, defer to  dr ferreira  per oncology, RT saw  pt  per  ID,  stop  ab,    oncology to f/p        < from: CT Abdomen and Pelvis w/ IV Cont (06.08.18 @ 16:45) >  pondylolysis at this level.    IMPRESSION: Known rectal neoplasm. Slight interval improvement in fluid   collections to the left of the rectum.  Stable spiculated nodule in the right upper lobe, which is suspicious for   neoplasm. Surrounding groundglass opacities may be infectious.                < end of copied text >

## 2018-06-17 NOTE — PROGRESS NOTE ADULT - SUBJECTIVE AND OBJECTIVE BOX
no compalints    REVIEW OF SYSTEMS:  GEN: no fever,    no chills  RESP: no SOB,   no cough  CVS: no chest pain,   no palpitations  GI: no abdominal pain,   no nausea,   no vomiting,   no constipation,   no diarrhea  : no dysuria,   no frequency  NEURO: no headache,   no dizziness  PSYCH: no depression,   not anxious  Derm : no rash    MEDICATIONS  (STANDING):  atorvastatin 40 milliGRAM(s) Oral at bedtime  carvedilol 3.125 milliGRAM(s) Oral every 12 hours  dextrose 5%. 1000 milliLiter(s) (50 mL/Hr) IV Continuous <Continuous>  dextrose 50% Injectable 12.5 Gram(s) IV Push once  dextrose 50% Injectable 25 Gram(s) IV Push once  dextrose 50% Injectable 25 Gram(s) IV Push once  docusate sodium 100 milliGRAM(s) Oral two times a day  famotidine    Tablet 20 milliGRAM(s) Oral daily  heparin  Injectable 5000 Unit(s) SubCutaneous every 12 hours  insulin lispro (HumaLOG) corrective regimen sliding scale   SubCutaneous three times a day before meals  insulin lispro (HumaLOG) corrective regimen sliding scale   SubCutaneous at bedtime  lisinopril 2.5 milliGRAM(s) Oral daily  losartan 25 milliGRAM(s) Oral daily  mirtazapine 15 milliGRAM(s) Oral at bedtime  morphine ER Tablet 15 milliGRAM(s) Oral every 12 hours  polyethylene glycol 3350 17 Gram(s) Oral daily  senna 2 Tablet(s) Oral at bedtime    MEDICATIONS  (PRN):  acetaminophen   Tablet 650 milliGRAM(s) Oral every 6 hours PRN Mild Pain (1 - 3)  bisacodyl Suppository 10 milliGRAM(s) Rectal daily PRN Constipation  dextrose 40% Gel 15 Gram(s) Oral once PRN Blood Glucose LESS THAN 70 milliGRAM(s)/deciliter  glucagon  Injectable 1 milliGRAM(s) IntraMuscular once PRN Glucose LESS THAN 70 milligrams/deciliter  morphine  IR 15 milliGRAM(s) Oral every 8 hours PRN Severe Pain (7 - 10)  ondansetron Injectable 4 milliGRAM(s) IV Push every 6 hours PRN Nausea and/or Vomiting      Vital Signs Last 24 Hrs  T(C): 36.8 (17 Jun 2018 09:04), Max: 36.8 (16 Jun 2018 20:12)  T(F): 98.3 (17 Jun 2018 09:04), Max: 98.3 (16 Jun 2018 20:12)  HR: 72 (17 Jun 2018 09:04) (72 - 77)  BP: 112/56 (17 Jun 2018 09:04) (111/60 - 123/65)  BP(mean): --  RR: 19 (17 Jun 2018 09:04) (18 - 19)  SpO2: 96% (17 Jun 2018 09:04) (95% - 96%)  CAPILLARY BLOOD GLUCOSE      POCT Blood Glucose.: 93 mg/dL (17 Jun 2018 08:27)  POCT Blood Glucose.: 139 mg/dL (16 Jun 2018 22:12)  POCT Blood Glucose.: 94 mg/dL (16 Jun 2018 17:20)  POCT Blood Glucose.: 155 mg/dL (16 Jun 2018 12:28)    I&O's Summary    16 Jun 2018 07:01  -  17 Jun 2018 07:00  --------------------------------------------------------  IN: 220 mL / OUT: 0 mL / NET: 220 mL        PHYSICAL EXAM:  HEAD:  Atraumatic, Normocephalic  NECK: Supple, No   JVD  CHEST/LUNG:   no     rales,     no,    rhonchi  HEART: Regular rate and rhythm;         murmur  ABDOMEN: Soft, Nontender, ;   EXTREMITIES:        edema  NEUROLOGY:  alert    LABS:                        9.6    7.88  )-----------( 264      ( 17 Jun 2018 08:29 )             29.8     06-17    136  |  100  |  7   ----------------------------<  76  3.6   |  26  |  0.62    Ca    8.7      17 Jun 2018 06:42                    Hemoglobin A1C, Whole Blood: 6.6 % (05-09 @ 06:52)    Thyroid Stimulating Hormone, Serum: 1.00 uIU/mL (05-18 @ 06:35)          Consultant(s) Notes Reviewed:      Care Discussed with Consultants/Other Providers:

## 2018-06-17 NOTE — PROGRESS NOTE ADULT - SUBJECTIVE AND OBJECTIVE BOX
CARDIOLOGY     PROGRESS  NOTE   ________________________________________________    CHIEF COMPLAINT:Patient is a 78y old  Female who presents with a chief complaint of fever (08 Jun 2018 15:36)  no complain.  	  REVIEW OF SYSTEMS:  CONSTITUTIONAL: No fever, weight loss, or fatigue  EYES: No eye pain, visual disturbances, or discharge  ENT:  No difficulty hearing, tinnitus, vertigo; No sinus or throat pain  NECK: No pain or stiffness  RESPIRATORY: No cough, wheezing, chills or hemoptysis; No Shortness of Breath  CARDIOVASCULAR: No chest pain, palpitations, passing out, dizziness, or leg swelling  GASTROINTESTINAL: No abdominal or epigastric pain. No nausea, vomiting, or hematemesis; No diarrhea or constipation. No melena or hematochezia.  GENITOURINARY: No dysuria, frequency, hematuria, or incontinence  NEUROLOGICAL: No headaches, memory loss, loss of strength, numbness, or tremors  SKIN: No itching, burning, rashes, or lesions   LYMPH Nodes: No enlarged glands  ENDOCRINE: No heat or cold intolerance; No hair loss  MUSCULOSKELETAL: No joint pain or swelling; No muscle, back, or extremity pain  PSYCHIATRIC: No depression, anxiety, mood swings, or difficulty sleeping  HEME/LYMPH: No easy bruising, or bleeding gums  ALLERGY AND IMMUNOLOGIC: No hives or eczema	    [ ] All others negative	  [ ] Unable to obtain    PHYSICAL EXAM:  T(C): 36.3 (06-17-18 @ 05:38), Max: 36.8 (06-16-18 @ 20:12)  HR: 74 (06-17-18 @ 05:38) (74 - 77)  BP: 113/71 (06-17-18 @ 05:38) (111/60 - 123/65)  RR: 18 (06-17-18 @ 05:38) (18 - 18)  SpO2: 96% (06-17-18 @ 05:38) (95% - 96%)  Wt(kg): --  I&O's Summary    16 Jun 2018 07:01  -  17 Jun 2018 07:00  --------------------------------------------------------  IN: 220 mL / OUT: 0 mL / NET: 220 mL        Appearance: Normal	  HEENT:   Normal oral mucosa, PERRL, EOMI	  Lymphatic: No lymphadenopathy  Cardiovascular: Normal S1 S2, No JVD, + murmurs, No edema  Respiratory: Lungs clear to auscultation	  Psychiatry: A & O x 3, Mood & affect appropriate  Gastrointestinal:  Soft, Non-tender, + BS	  Skin: No rashes, No ecchymoses, No cyanosis	  Neurologic: Non-focal  Extremities: Normal range of motion, No clubbing, cyanosis or edema  Vascular: Peripheral pulses palpable 2+ bilaterally    MEDICATIONS  (STANDING):  atorvastatin 40 milliGRAM(s) Oral at bedtime  carvedilol 3.125 milliGRAM(s) Oral every 12 hours  dextrose 5%. 1000 milliLiter(s) (50 mL/Hr) IV Continuous <Continuous>  dextrose 50% Injectable 12.5 Gram(s) IV Push once  dextrose 50% Injectable 25 Gram(s) IV Push once  dextrose 50% Injectable 25 Gram(s) IV Push once  docusate sodium 100 milliGRAM(s) Oral two times a day  famotidine    Tablet 20 milliGRAM(s) Oral daily  heparin  Injectable 5000 Unit(s) SubCutaneous every 12 hours  insulin lispro (HumaLOG) corrective regimen sliding scale   SubCutaneous three times a day before meals  insulin lispro (HumaLOG) corrective regimen sliding scale   SubCutaneous at bedtime  lisinopril 2.5 milliGRAM(s) Oral daily  losartan 25 milliGRAM(s) Oral daily  mirtazapine 15 milliGRAM(s) Oral at bedtime  morphine ER Tablet 15 milliGRAM(s) Oral every 12 hours  polyethylene glycol 3350 17 Gram(s) Oral daily  senna 2 Tablet(s) Oral at bedtime      TELEMETRY: 	    ECG:  	  RADIOLOGY:  OTHER: 	  	  LABS:	 	    CARDIAC MARKERS:                                9.6    7.88  )-----------( 264      ( 17 Jun 2018 08:29 )             29.8     06-17    136  |  100  |  7   ----------------------------<  76  3.6   |  26  |  0.62    Ca    8.7      17 Jun 2018 06:42      proBNP:   Lipid Profile:   HgA1c:   TSH:         Assessment and plan  ---------------------------  78F with rectal cancer adm with fever  abx per ID  CT noted  cont current meds  IVF  oncology to f/u  CRS f/u noted  surgical plan. for ostomy  recent echo noted  acceptable cardiac risk for surgery  will adjust bp meds

## 2018-06-18 LAB
GLUCOSE BLDC GLUCOMTR-MCNC: 115 MG/DL — HIGH (ref 70–99)
GLUCOSE BLDC GLUCOMTR-MCNC: 144 MG/DL — HIGH (ref 70–99)
GLUCOSE BLDC GLUCOMTR-MCNC: 93 MG/DL — SIGNIFICANT CHANGE UP (ref 70–99)
GLUCOSE BLDC GLUCOMTR-MCNC: 97 MG/DL — SIGNIFICANT CHANGE UP (ref 70–99)

## 2018-06-18 RX ORDER — MORPHINE SULFATE 50 MG/1
1 CAPSULE, EXTENDED RELEASE ORAL ONCE
Qty: 0 | Refills: 0 | Status: DISCONTINUED | OUTPATIENT
Start: 2018-06-18 | End: 2018-06-18

## 2018-06-18 RX ORDER — ACETAMINOPHEN 500 MG
650 TABLET ORAL ONCE
Qty: 0 | Refills: 0 | Status: COMPLETED | OUTPATIENT
Start: 2018-06-18 | End: 2018-06-18

## 2018-06-18 RX ADMIN — MORPHINE SULFATE 1 MILLIGRAM(S): 50 CAPSULE, EXTENDED RELEASE ORAL at 12:38

## 2018-06-18 RX ADMIN — LISINOPRIL 2.5 MILLIGRAM(S): 2.5 TABLET ORAL at 05:16

## 2018-06-18 RX ADMIN — ATORVASTATIN CALCIUM 40 MILLIGRAM(S): 80 TABLET, FILM COATED ORAL at 22:14

## 2018-06-18 RX ADMIN — Medication 650 MILLIGRAM(S): at 22:44

## 2018-06-18 RX ADMIN — Medication 650 MILLIGRAM(S): at 17:17

## 2018-06-18 RX ADMIN — MORPHINE SULFATE 15 MILLIGRAM(S): 50 CAPSULE, EXTENDED RELEASE ORAL at 19:20

## 2018-06-18 RX ADMIN — Medication 260 MILLIGRAM(S): at 22:14

## 2018-06-18 RX ADMIN — MORPHINE SULFATE 15 MILLIGRAM(S): 50 CAPSULE, EXTENDED RELEASE ORAL at 18:33

## 2018-06-18 RX ADMIN — MIRTAZAPINE 15 MILLIGRAM(S): 45 TABLET, ORALLY DISINTEGRATING ORAL at 22:14

## 2018-06-18 RX ADMIN — CARVEDILOL PHOSPHATE 3.12 MILLIGRAM(S): 80 CAPSULE, EXTENDED RELEASE ORAL at 17:16

## 2018-06-18 RX ADMIN — Medication 100 MILLIGRAM(S): at 17:15

## 2018-06-18 RX ADMIN — MORPHINE SULFATE 15 MILLIGRAM(S): 50 CAPSULE, EXTENDED RELEASE ORAL at 05:46

## 2018-06-18 RX ADMIN — HEPARIN SODIUM 5000 UNIT(S): 5000 INJECTION INTRAVENOUS; SUBCUTANEOUS at 17:15

## 2018-06-18 RX ADMIN — MORPHINE SULFATE 15 MILLIGRAM(S): 50 CAPSULE, EXTENDED RELEASE ORAL at 17:15

## 2018-06-18 RX ADMIN — SENNA PLUS 2 TABLET(S): 8.6 TABLET ORAL at 22:14

## 2018-06-18 RX ADMIN — FAMOTIDINE 20 MILLIGRAM(S): 10 INJECTION INTRAVENOUS at 12:43

## 2018-06-18 RX ADMIN — MORPHINE SULFATE 1 MILLIGRAM(S): 50 CAPSULE, EXTENDED RELEASE ORAL at 13:38

## 2018-06-18 RX ADMIN — HEPARIN SODIUM 5000 UNIT(S): 5000 INJECTION INTRAVENOUS; SUBCUTANEOUS at 05:15

## 2018-06-18 RX ADMIN — POLYETHYLENE GLYCOL 3350 17 GRAM(S): 17 POWDER, FOR SOLUTION ORAL at 12:43

## 2018-06-18 RX ADMIN — Medication 100 MILLIGRAM(S): at 05:15

## 2018-06-18 RX ADMIN — MORPHINE SULFATE 15 MILLIGRAM(S): 50 CAPSULE, EXTENDED RELEASE ORAL at 10:00

## 2018-06-18 RX ADMIN — MORPHINE SULFATE 15 MILLIGRAM(S): 50 CAPSULE, EXTENDED RELEASE ORAL at 11:42

## 2018-06-18 RX ADMIN — MORPHINE SULFATE 15 MILLIGRAM(S): 50 CAPSULE, EXTENDED RELEASE ORAL at 05:16

## 2018-06-18 RX ADMIN — CARVEDILOL PHOSPHATE 3.12 MILLIGRAM(S): 80 CAPSULE, EXTENDED RELEASE ORAL at 05:15

## 2018-06-18 NOTE — PROGRESS NOTE ADULT - SUBJECTIVE AND OBJECTIVE BOX
doing well  no  comaplints    REVIEW OF SYSTEMS:  GEN: no fever,    no chills  RESP: no SOB,   no cough  CVS: no chest pain,   no palpitations  GI: no abdominal pain,   no nausea,   no vomiting,   no constipation,   no diarrhea  : no dysuria,   no frequency  NEURO: no headache,   no dizziness  PSYCH: no depression,   not anxious  Derm : no rash    MEDICATIONS  (STANDING):  atorvastatin 40 milliGRAM(s) Oral at bedtime  carvedilol 3.125 milliGRAM(s) Oral every 12 hours  dextrose 5%. 1000 milliLiter(s) (50 mL/Hr) IV Continuous <Continuous>  dextrose 50% Injectable 12.5 Gram(s) IV Push once  dextrose 50% Injectable 25 Gram(s) IV Push once  dextrose 50% Injectable 25 Gram(s) IV Push once  docusate sodium 100 milliGRAM(s) Oral two times a day  famotidine    Tablet 20 milliGRAM(s) Oral daily  heparin  Injectable 5000 Unit(s) SubCutaneous every 12 hours  insulin lispro (HumaLOG) corrective regimen sliding scale   SubCutaneous three times a day before meals  insulin lispro (HumaLOG) corrective regimen sliding scale   SubCutaneous at bedtime  lisinopril 2.5 milliGRAM(s) Oral daily  losartan 25 milliGRAM(s) Oral daily  mirtazapine 15 milliGRAM(s) Oral at bedtime  morphine ER Tablet 15 milliGRAM(s) Oral every 12 hours  polyethylene glycol 3350 17 Gram(s) Oral daily  senna 2 Tablet(s) Oral at bedtime    MEDICATIONS  (PRN):  acetaminophen   Tablet 650 milliGRAM(s) Oral every 6 hours PRN Mild Pain (1 - 3)  bisacodyl Suppository 10 milliGRAM(s) Rectal daily PRN Constipation  dextrose 40% Gel 15 Gram(s) Oral once PRN Blood Glucose LESS THAN 70 milliGRAM(s)/deciliter  glucagon  Injectable 1 milliGRAM(s) IntraMuscular once PRN Glucose LESS THAN 70 milligrams/deciliter  morphine  IR 15 milliGRAM(s) Oral every 8 hours PRN Severe Pain (7 - 10)  ondansetron Injectable 4 milliGRAM(s) IV Push every 6 hours PRN Nausea and/or Vomiting      Vital Signs Last 24 Hrs  T(C): 36.4 (18 Jun 2018 04:49), Max: 36.8 (17 Jun 2018 09:04)  T(F): 97.6 (18 Jun 2018 04:49), Max: 98.3 (17 Jun 2018 09:04)  HR: 71 (18 Jun 2018 04:49) (57 - 86)  BP: 130/62 (18 Jun 2018 04:49) (91/55 - 130/62)  BP(mean): --  RR: 18 (18 Jun 2018 04:49) (18 - 19)  SpO2: 96% (18 Jun 2018 04:49) (93% - 98%)  CAPILLARY BLOOD GLUCOSE      POCT Blood Glucose.: 131 mg/dL (17 Jun 2018 22:10)  POCT Blood Glucose.: 106 mg/dL (17 Jun 2018 17:27)  POCT Blood Glucose.: 142 mg/dL (17 Jun 2018 12:24)    I&O's Summary    17 Jun 2018 07:01  -  18 Jun 2018 07:00  --------------------------------------------------------  IN: 665 mL / OUT: 0 mL / NET: 665 mL        PHYSICAL EXAM:  HEAD:  Atraumatic, Normocephalic  NECK: Supple, No   JVD  CHEST/LUNG:   no     rales,     no,    rhonchi  HEART: Regular rate and rhythm;         murmur  ABDOMEN: Soft, Nontender, ;   EXTREMITIES:        edema  NEUROLOGY:  alert    LABS:                        9.6    7.88  )-----------( 264      ( 17 Jun 2018 08:29 )             29.8     06-17    136  |  100  |  7   ----------------------------<  76  3.6   |  26  |  0.62    Ca    8.7      17 Jun 2018 06:42                    Hemoglobin A1C, Whole Blood: 6.6 % (05-09 @ 06:52)    Thyroid Stimulating Hormone, Serum: 1.00 uIU/mL (05-18 @ 06:35)          Consultant(s) Notes Reviewed:      Care Discussed with Consultants/Other Providers:

## 2018-06-18 NOTE — PROGRESS NOTE ADULT - SUBJECTIVE AND OBJECTIVE BOX
- Patient seen and examined.  - In summary, patient is a 78 year old woman who presented with fever (2018 15:36)  - Today, patient is without complaints.         *****MEDICATIONS:    MEDICATIONS  (STANDING):  atorvastatin 40 milliGRAM(s) Oral at bedtime  carvedilol 3.125 milliGRAM(s) Oral every 12 hours  dextrose 5%. 1000 milliLiter(s) (50 mL/Hr) IV Continuous <Continuous>  dextrose 50% Injectable 12.5 Gram(s) IV Push once  dextrose 50% Injectable 25 Gram(s) IV Push once  dextrose 50% Injectable 25 Gram(s) IV Push once  docusate sodium 100 milliGRAM(s) Oral two times a day  famotidine    Tablet 20 milliGRAM(s) Oral daily  heparin  Injectable 5000 Unit(s) SubCutaneous every 12 hours  insulin lispro (HumaLOG) corrective regimen sliding scale   SubCutaneous three times a day before meals  insulin lispro (HumaLOG) corrective regimen sliding scale   SubCutaneous at bedtime  lisinopril 2.5 milliGRAM(s) Oral daily  losartan 25 milliGRAM(s) Oral daily  mirtazapine 15 milliGRAM(s) Oral at bedtime  morphine ER Tablet 15 milliGRAM(s) Oral every 12 hours  polyethylene glycol 3350 17 Gram(s) Oral daily  senna 2 Tablet(s) Oral at bedtime    MEDICATIONS  (PRN):  acetaminophen   Tablet 650 milliGRAM(s) Oral every 6 hours PRN Mild Pain (1 - 3)  bisacodyl Suppository 10 milliGRAM(s) Rectal daily PRN Constipation  dextrose 40% Gel 15 Gram(s) Oral once PRN Blood Glucose LESS THAN 70 milliGRAM(s)/deciliter  glucagon  Injectable 1 milliGRAM(s) IntraMuscular once PRN Glucose LESS THAN 70 milligrams/deciliter  morphine  IR 15 milliGRAM(s) Oral every 8 hours PRN Severe Pain (7 - 10)  ondansetron Injectable 4 milliGRAM(s) IV Push every 6 hours PRN Nausea and/or Vomiting                 ***** REVIEW OF SYSTEM:  GEN: no fever, no chills, no pain  RESP: no SOB, no cough, no sputum  CVS: no chest pain, no palpitations, no edema  GI: no abdominal pain, no nausea, no vomiting, no constipation, no diarrhea  : no dysuria, no frequency  NEURO: no headache, no dizziness  PSYCH: no depression, not anxious  Derm : no itching, no rash         ***** VITAL SIGNS:             T(F): 97.6 (18 @ 04:49), Max: 98.3 (18 @ 09:04)  HR: 71 (18 @ 04:49) (57 - 86)  BP: 130/62 (18 @ 04:49) (91/55 - 130/62)  RR: 18 (18 @ 04:49) (18 - 19)  SpO2: 96% (18 @ 04:49) (93% - 98%)  Wt(kg): --  ,   I&O's Summary    2018 07:01  -  2018 07:00  --------------------------------------------------------  IN: 665 mL / OUT: 0 mL / NET: 665 mL               *****PHYSICAL EXAM:  GEN: A&O X 3 , NAD , comfortable  HEENT: NCAT, EOMI, MMM, no icterus  NECK: Supple, No JVD  CVS: S1S2 , regular , No M/R/G appreciated  PULM: CTA B/L,  no W/R/R appreciated  ABD.: soft. non tender, non distended,  bowel sounds present  Extrem: intact pulses , no edema noted  Derm: No rash or ecchymosis noted  PSYCH: normal mood, no depression, not anxious         *****LAB AND IMAGIN.6    7.88  )-----------( 264      ( 2018 08:29 )             29.8               -    136  |  100  |  7   ----------------------------<  76  3.6   |  26  |  0.62    Ca    8.7      2018 06:42    [All pertinent recent Imaging/Reports reviewed]  < from: Transthoracic Echocardiogram (18 @ 07:08) >  Conclusions:  1. Normal left ventricular internal dimensions and wall  thicknesses.  2. Normal left ventricular systolic function. No segmental  wall motion abnormalities.  3. Normal diastolic function  4. Normal right ventricular sizeand systolic function.  5. Estimated pulmonary artery systolic pressure equals 36  mm Hg, assuming right atrial pressure equals 8 mm Hg,  consistent with borderline pulmonary pressures.  *** Compared with echocardiogram of 2017, no  significant changes noted.    < end of copied text >         *****A S S E S S M E N T   A N D   P L A N :  78F with rectal cancer adm with fever  abx per ID  CT noted  cont current meds  IVF  oncology to f/u  surgical plan for ostomy  recent echo noted  acceptable cardiac risk for surgery  need surgery f/u!!    __________________________  ROBERTO. JOJO Metcalf.

## 2018-06-18 NOTE — PROGRESS NOTE ADULT - ASSESSMENT
78F with rectal cancer, dm,  htn,  adm with fever,  afebrile  now  cont IV zosyn/  vanco   CT abd pelvis,, as  noted    rectal fluid  collections/ pna  PTeval  normal wbc  now  surg note seen,  ? proximal diversion/ostomy, defer to  dr ferreira  per oncology, RT saw  pt  per  ID,  stop  ab,    oncology to f/p  awaiting  placement        < from: CT Abdomen and Pelvis w/ IV Cont (06.08.18 @ 16:45) >  pondylolysis at this level.    IMPRESSION: Known rectal neoplasm. Slight interval improvement in fluid   collections to the left of the rectum.  Stable spiculated nodule in the right upper lobe, which is suspicious for   neoplasm. Surrounding groundglass opacities may be infectious.                < end of copied text >

## 2018-06-19 ENCOUNTER — TRANSCRIPTION ENCOUNTER (OUTPATIENT)
Age: 78
End: 2018-06-19

## 2018-06-19 LAB
ALBUMIN SERPL ELPH-MCNC: 2.6 G/DL — LOW (ref 3.3–5)
ALP SERPL-CCNC: 89 U/L — SIGNIFICANT CHANGE UP (ref 40–120)
ALT FLD-CCNC: 16 U/L — SIGNIFICANT CHANGE UP (ref 10–45)
ANION GAP SERPL CALC-SCNC: 12 MMOL/L — SIGNIFICANT CHANGE UP (ref 5–17)
APTT BLD: 34.3 SEC — SIGNIFICANT CHANGE UP (ref 27.5–37.4)
AST SERPL-CCNC: 24 U/L — SIGNIFICANT CHANGE UP (ref 10–40)
BILIRUB SERPL-MCNC: 0.2 MG/DL — SIGNIFICANT CHANGE UP (ref 0.2–1.2)
BLD GP AB SCN SERPL QL: NEGATIVE — SIGNIFICANT CHANGE UP
BUN SERPL-MCNC: 9 MG/DL — SIGNIFICANT CHANGE UP (ref 7–23)
CALCIUM SERPL-MCNC: 8.6 MG/DL — SIGNIFICANT CHANGE UP (ref 8.4–10.5)
CHLORIDE SERPL-SCNC: 97 MMOL/L — SIGNIFICANT CHANGE UP (ref 96–108)
CO2 SERPL-SCNC: 24 MMOL/L — SIGNIFICANT CHANGE UP (ref 22–31)
CREAT SERPL-MCNC: 0.53 MG/DL — SIGNIFICANT CHANGE UP (ref 0.5–1.3)
GLUCOSE BLDC GLUCOMTR-MCNC: 114 MG/DL — HIGH (ref 70–99)
GLUCOSE BLDC GLUCOMTR-MCNC: 116 MG/DL — HIGH (ref 70–99)
GLUCOSE BLDC GLUCOMTR-MCNC: 140 MG/DL — HIGH (ref 70–99)
GLUCOSE BLDC GLUCOMTR-MCNC: 166 MG/DL — HIGH (ref 70–99)
GLUCOSE SERPL-MCNC: 115 MG/DL — HIGH (ref 70–99)
HCT VFR BLD CALC: 33.5 % — LOW (ref 34.5–45)
HGB BLD-MCNC: 10.9 G/DL — LOW (ref 11.5–15.5)
INR BLD: 1.11 RATIO — SIGNIFICANT CHANGE UP (ref 0.88–1.16)
MCHC RBC-ENTMCNC: 29.1 PG — SIGNIFICANT CHANGE UP (ref 27–34)
MCHC RBC-ENTMCNC: 32.7 GM/DL — SIGNIFICANT CHANGE UP (ref 32–36)
MCV RBC AUTO: 89.1 FL — SIGNIFICANT CHANGE UP (ref 80–100)
PLATELET # BLD AUTO: 317 K/UL — SIGNIFICANT CHANGE UP (ref 150–400)
POTASSIUM SERPL-MCNC: 4.1 MMOL/L — SIGNIFICANT CHANGE UP (ref 3.5–5.3)
POTASSIUM SERPL-SCNC: 4.1 MMOL/L — SIGNIFICANT CHANGE UP (ref 3.5–5.3)
PROT SERPL-MCNC: 6.8 G/DL — SIGNIFICANT CHANGE UP (ref 6–8.3)
PROTHROM AB SERPL-ACNC: 12 SEC — SIGNIFICANT CHANGE UP (ref 9.8–12.7)
RBC # BLD: 3.76 M/UL — LOW (ref 3.8–5.2)
RBC # FLD: 17.1 % — HIGH (ref 10.3–14.5)
RH IG SCN BLD-IMP: NEGATIVE — SIGNIFICANT CHANGE UP
SODIUM SERPL-SCNC: 133 MMOL/L — LOW (ref 135–145)
WBC # BLD: 14 K/UL — HIGH (ref 3.8–10.5)
WBC # FLD AUTO: 14 K/UL — HIGH (ref 3.8–10.5)

## 2018-06-19 RX ORDER — POLYETHYLENE GLYCOL 3350 17 G/17G
238 POWDER, FOR SOLUTION ORAL ONCE
Qty: 0 | Refills: 0 | Status: COMPLETED | OUTPATIENT
Start: 2018-06-19 | End: 2018-06-19

## 2018-06-19 RX ORDER — NEOMYCIN SULFATE 500 MG/1
1000 TABLET ORAL
Qty: 0 | Refills: 0 | Status: DISCONTINUED | OUTPATIENT
Start: 2018-06-19 | End: 2018-06-20

## 2018-06-19 RX ORDER — METRONIDAZOLE 500 MG
500 TABLET ORAL
Qty: 0 | Refills: 0 | Status: DISCONTINUED | OUTPATIENT
Start: 2018-06-19 | End: 2018-06-20

## 2018-06-19 RX ADMIN — FAMOTIDINE 20 MILLIGRAM(S): 10 INJECTION INTRAVENOUS at 16:04

## 2018-06-19 RX ADMIN — HEPARIN SODIUM 5000 UNIT(S): 5000 INJECTION INTRAVENOUS; SUBCUTANEOUS at 09:42

## 2018-06-19 RX ADMIN — ATORVASTATIN CALCIUM 40 MILLIGRAM(S): 80 TABLET, FILM COATED ORAL at 21:20

## 2018-06-19 RX ADMIN — LISINOPRIL 2.5 MILLIGRAM(S): 2.5 TABLET ORAL at 09:39

## 2018-06-19 RX ADMIN — Medication 500 MILLIGRAM(S): at 21:20

## 2018-06-19 RX ADMIN — MORPHINE SULFATE 15 MILLIGRAM(S): 50 CAPSULE, EXTENDED RELEASE ORAL at 05:31

## 2018-06-19 RX ADMIN — NEOMYCIN SULFATE 1000 MILLIGRAM(S): 500 TABLET ORAL at 21:19

## 2018-06-19 RX ADMIN — NEOMYCIN SULFATE 1000 MILLIGRAM(S): 500 TABLET ORAL at 16:03

## 2018-06-19 RX ADMIN — MORPHINE SULFATE 15 MILLIGRAM(S): 50 CAPSULE, EXTENDED RELEASE ORAL at 12:10

## 2018-06-19 RX ADMIN — HEPARIN SODIUM 5000 UNIT(S): 5000 INJECTION INTRAVENOUS; SUBCUTANEOUS at 17:48

## 2018-06-19 RX ADMIN — Medication 500 MILLIGRAM(S): at 16:03

## 2018-06-19 RX ADMIN — SENNA PLUS 2 TABLET(S): 8.6 TABLET ORAL at 21:19

## 2018-06-19 RX ADMIN — MIRTAZAPINE 15 MILLIGRAM(S): 45 TABLET, ORALLY DISINTEGRATING ORAL at 21:20

## 2018-06-19 RX ADMIN — MORPHINE SULFATE 15 MILLIGRAM(S): 50 CAPSULE, EXTENDED RELEASE ORAL at 09:39

## 2018-06-19 RX ADMIN — Medication 0: at 23:13

## 2018-06-19 NOTE — PROGRESS NOTE ADULT - ASSESSMENT
Perforated rectal cancer  -OR tomorrow for laparoscopic possible open colostomy creation  -Patient has been marked by ostomy nurses  -Pre-op labs completes  -Bowel prep ordered  -2 units of PRBC on hold for the OR tomorrow  -Clear liquid diet today, NPO after midnight  -Consent obtained from patients daughter

## 2018-06-19 NOTE — PROVIDER CONTACT NOTE (MEDICATION) - ASSESSMENT
Pt combative, refusing am meds and labs and taking her temperature. Was combative when providing incontinence care following BM.

## 2018-06-19 NOTE — CHART NOTE - NSCHARTNOTEFT_GEN_A_CORE
Patient seen for malnutrition follow-up on [facility]    Pertinent information: This is a  78 year old F with locally advanced rectal cancer s/p 3 sessions of palliative RT aborted due to discovery of pelvic abscess s/p IV Zosyn, now presenting with PNA. Pt with FTT, ongoing weight loss, malignancy related pain. Per heme/onco, plan for surgery to perform a bypass with ostomy placement tomorrow. Per chart review, pt refusing bowel prep earlier, daughter now at bedside to assist.     Source: Patient [ ]    Family [ x]     other [x ]medical record, daughter bedside, RN    Diet : Clear Liquids (NPO after midnight)     PO intake:  < 50% [x ]; Daughter reports pt continues with decreased PO intake in-house, continues to bring in foods to encourage PO intake but pt still consuming minimal amounts. Daughter endorses pt with nausea, did not try pro-source yet. Last BM today (6/19).      Source for PO intake [ ] Patient [x] family [x ] chart [x ] staff [ ] other    No new weights to assess     Pertinent Medications: MEDICATIONS  (STANDING):  atorvastatin 40 milliGRAM(s) Oral at bedtime  carvedilol 3.125 milliGRAM(s) Oral every 12 hours  dextrose 5%. 1000 milliLiter(s) (50 mL/Hr) IV Continuous <Continuous>  dextrose 50% Injectable 12.5 Gram(s) IV Push once  dextrose 50% Injectable 25 Gram(s) IV Push once  dextrose 50% Injectable 25 Gram(s) IV Push once  docusate sodium 100 milliGRAM(s) Oral two times a day  famotidine    Tablet 20 milliGRAM(s) Oral daily  heparin  Injectable 5000 Unit(s) SubCutaneous every 12 hours  insulin lispro (HumaLOG) corrective regimen sliding scale   SubCutaneous three times a day before meals  insulin lispro (HumaLOG) corrective regimen sliding scale   SubCutaneous at bedtime  lisinopril 2.5 milliGRAM(s) Oral daily  metroNIDAZOLE    Tablet 500 milliGRAM(s) Oral <User Schedule>  mirtazapine 15 milliGRAM(s) Oral at bedtime  morphine ER Tablet 15 milliGRAM(s) Oral every 12 hours  neomycin 1000 milliGRAM(s) Oral <User Schedule>  polyethylene glycol 3350 238 Gram(s) Oral once  polyethylene glycol 3350 17 Gram(s) Oral daily  senna 2 Tablet(s) Oral at bedtime    MEDICATIONS  (PRN):  acetaminophen   Tablet 650 milliGRAM(s) Oral every 6 hours PRN Mild Pain (1 - 3)  bisacodyl Suppository 10 milliGRAM(s) Rectal daily PRN Constipation  dextrose 40% Gel 15 Gram(s) Oral once PRN Blood Glucose LESS THAN 70 milliGRAM(s)/deciliter  glucagon  Injectable 1 milliGRAM(s) IntraMuscular once PRN Glucose LESS THAN 70 milligrams/deciliter  morphine  IR 15 milliGRAM(s) Oral every 8 hours PRN Severe Pain (7 - 10)  ondansetron Injectable 4 milliGRAM(s) IV Push every 6 hours PRN Nausea and/or Vomiting    Pertinent Labs:  06-19 Na133 mmol/L<L> Glu 115 mg/dL<H> K+ 4.1 mmol/L Cr  0.53 mg/dL BUN 9 mg/dL 06-19 Alb 2.6 g/dL<L>    Skin: free of pressure injuries   No edema noted     Estimated Needs:   [x] no change since previous assessment  [ ] recalculated:       Previous Nutrition Diagnosis:   [x ] Malnutrition (severe)      Nutrition Diagnosis is [x ] ongoing  [ ] resolved [ ] not applicable     New Nutrition Diagnosis: [ x] not applicable    Interventions:   Recommend  1. Continue clear liquids per team.   2. Recommend resuming nutritional oral supplement as medically feasible   3. Continue to obtain/honor food preferences as able       Monitoring and Evaluation:     [ x] PO intake [ x] Tolerance to diet prescription [x ] weights [ x] follow up per protocol    [x ] other: RD to remain available and follow-up as medically appropriate. Patient seen for malnutrition follow-up on 3CO[facility]    Pertinent information: This is a  78 year old F with locally advanced rectal cancer s/p 3 sessions of palliative RT aborted due to discovery of pelvic abscess s/p IV Zosyn, now presenting with PNA. Pt with FTT, ongoing weight loss, malignancy related pain. Per heme/onco, plan for surgery to perform a bypass with ostomy placement tomorrow. Per chart review, pt refusing bowel prep earlier, daughter now at bedside to assist.     Source: Patient [ ]    Family [ x]     other [x ]medical record, daughter bedside, RN    Diet : Clear Liquids (NPO after midnight)     PO intake:  < 50% [x ]; Daughter reports pt continues with decreased PO intake in-house, continues to bring in foods to encourage PO intake but pt still consuming minimal amounts. Daughter endorses pt with nausea, did not try pro-source yet. Last BM today (6/19).      Source for PO intake [ ] Patient [x] family [x ] chart [x ] staff [ ] other    No new weights to assess     Pertinent Medications: MEDICATIONS  (STANDING):  atorvastatin 40 milliGRAM(s) Oral at bedtime  carvedilol 3.125 milliGRAM(s) Oral every 12 hours  dextrose 5%. 1000 milliLiter(s) (50 mL/Hr) IV Continuous <Continuous>  dextrose 50% Injectable 12.5 Gram(s) IV Push once  dextrose 50% Injectable 25 Gram(s) IV Push once  dextrose 50% Injectable 25 Gram(s) IV Push once  docusate sodium 100 milliGRAM(s) Oral two times a day  famotidine    Tablet 20 milliGRAM(s) Oral daily  heparin  Injectable 5000 Unit(s) SubCutaneous every 12 hours  insulin lispro (HumaLOG) corrective regimen sliding scale   SubCutaneous three times a day before meals  insulin lispro (HumaLOG) corrective regimen sliding scale   SubCutaneous at bedtime  lisinopril 2.5 milliGRAM(s) Oral daily  metroNIDAZOLE    Tablet 500 milliGRAM(s) Oral <User Schedule>  mirtazapine 15 milliGRAM(s) Oral at bedtime  morphine ER Tablet 15 milliGRAM(s) Oral every 12 hours  neomycin 1000 milliGRAM(s) Oral <User Schedule>  polyethylene glycol 3350 238 Gram(s) Oral once  polyethylene glycol 3350 17 Gram(s) Oral daily  senna 2 Tablet(s) Oral at bedtime    MEDICATIONS  (PRN):  acetaminophen   Tablet 650 milliGRAM(s) Oral every 6 hours PRN Mild Pain (1 - 3)  bisacodyl Suppository 10 milliGRAM(s) Rectal daily PRN Constipation  dextrose 40% Gel 15 Gram(s) Oral once PRN Blood Glucose LESS THAN 70 milliGRAM(s)/deciliter  glucagon  Injectable 1 milliGRAM(s) IntraMuscular once PRN Glucose LESS THAN 70 milligrams/deciliter  morphine  IR 15 milliGRAM(s) Oral every 8 hours PRN Severe Pain (7 - 10)  ondansetron Injectable 4 milliGRAM(s) IV Push every 6 hours PRN Nausea and/or Vomiting    Pertinent Labs:  06-19 Na133 mmol/L<L> Glu 115 mg/dL<H> K+ 4.1 mmol/L Cr  0.53 mg/dL BUN 9 mg/dL 06-19 Alb 2.6 g/dL<L>    Skin: free of pressure injuries   No edema noted     Estimated Needs:   [x] no change since previous assessment  [ ] recalculated:       Previous Nutrition Diagnosis:   [x ] Malnutrition (severe)      Nutrition Diagnosis is [x ] ongoing  [ ] resolved [ ] not applicable     New Nutrition Diagnosis: [ x] not applicable    Interventions:   Recommend  1. Continue Clear liquid diet for now per team.    2. Recommend resuming nutritional oral supplement as medically feasible.   3. Continue to obtain/honor food preferences as feasible.        Monitoring and Evaluation:     [ x] PO intake [ x] Tolerance to diet prescription [x ] weights [ x] follow up per protocol    [x ] other: RD to remain available and follow-up as medically appropriate.

## 2018-06-19 NOTE — PROGRESS NOTE ADULT - ASSESSMENT
78F with rectal cancer, dm,  htn,  adm with fever,  afebrile  now  cont IV zosyn/  vanco   CT abd pelvis,, as  noted    rectal fluid  collections/ pna  PTeval  normal wbc  now  surg note seen,  ? proximal diversion/ostomy, defer to  dr ferreira  per oncology, RT saw  pt  per  ID,  stop  ab,    oncology to f/p  awaiting  placement/ pt is  afebrile  ans stable        < from: CT Abdomen and Pelvis w/ IV Cont (06.08.18 @ 16:45) >  pondylolysis at this level.    IMPRESSION: Known rectal neoplasm. Slight interval improvement in fluid   collections to the left of the rectum.  Stable spiculated nodule in the right upper lobe, which is suspicious for   neoplasm. Surrounding groundglass opacities may be infectious.                < end of copied text >

## 2018-06-19 NOTE — CONSULT NOTE ADULT - SUBJECTIVE AND OBJECTIVE BOX
Pt presents with severe abdominal and rectal pain . SHe has rectal ca. Stage II and is r/p RT.  She is more comfortable on current regimen.She awaits surgery in 1 day. She has obstructive symptoms.  PAST MEDICAL & SURGICAL HISTORY:  Rectal mass  Gangrene of foot  Coronary artery disease involving native coronary artery of native heart without angina pectoris  Status post above knee amputation of right lower extremity  MEDICATIONS  (STANDING):  atorvastatin 40 milliGRAM(s) Oral at bedtime  carvedilol 3.125 milliGRAM(s) Oral every 12 hours  dextrose 5%. 1000 milliLiter(s) (50 mL/Hr) IV Continuous <Continuous>  dextrose 50% Injectable 12.5 Gram(s) IV Push once  dextrose 50% Injectable 25 Gram(s) IV Push once  dextrose 50% Injectable 25 Gram(s) IV Push once  docusate sodium 100 milliGRAM(s) Oral two times a day  famotidine    Tablet 20 milliGRAM(s) Oral daily  heparin  Injectable 5000 Unit(s) SubCutaneous every 12 hours  insulin lispro (HumaLOG) corrective regimen sliding scale   SubCutaneous three times a day before meals  insulin lispro (HumaLOG) corrective regimen sliding scale   SubCutaneous at bedtime  lisinopril 2.5 milliGRAM(s) Oral daily  metroNIDAZOLE    Tablet 500 milliGRAM(s) Oral <User Schedule>  mirtazapine 15 milliGRAM(s) Oral at bedtime  morphine ER Tablet 15 milliGRAM(s) Oral every 12 hours  neomycin 1000 milliGRAM(s) Oral <User Schedule>  polyethylene glycol 3350 17 Gram(s) Oral daily  senna 2 Tablet(s) Oral at bedtime    MEDICATIONS  (PRN):  acetaminophen   Tablet 650 milliGRAM(s) Oral every 6 hours PRN Mild Pain (1 - 3)  bisacodyl Suppository 10 milliGRAM(s) Rectal daily PRN Constipation  dextrose 40% Gel 15 Gram(s) Oral once PRN Blood Glucose LESS THAN 70 milliGRAM(s)/deciliter  glucagon  Injectable 1 milliGRAM(s) IntraMuscular once PRN Glucose LESS THAN 70 milligrams/deciliter  morphine  IR 15 milliGRAM(s) Oral every 8 hours PRN Severe Pain (7 - 10)  ondansetron Injectable 4 milliGRAM(s) IV Push every 6 hours PRN Nausea and/or Vomiting    Allergies    No Known Allergies    Intolerances  ROS: as above-current on clear liquids    Vital Signs Last 24 Hrs  T(C): 36.7 (19 Jun 2018 16:34), Max: 36.9 (18 Jun 2018 20:09)  T(F): 98.1 (19 Jun 2018 16:34), Max: 98.4 (18 Jun 2018 20:09)  HR: 92 (19 Jun 2018 16:34) (79 - 99)  BP: 122/82 (19 Jun 2018 16:34) (110/58 - 147/67)  BP(mean): --  RR: 18 (19 Jun 2018 16:34) (18 - 18)  SpO2: 96% (19 Jun 2018 16:34) (94% - 96%)  HEENT: NCAT  slightly sedated but arousable. Family at bedside  chest- nml resp effort  abd- slightly tender, slight distention  ext: no C/C/E

## 2018-06-19 NOTE — PROVIDER CONTACT NOTE (EICU) - ACTION/TREATMENT ORDERED:
Np on unit and aware.  Pt daughter called and aware.  daughter will be up to unit to assist in plan of care.

## 2018-06-19 NOTE — PROVIDER CONTACT NOTE (MEDICATION) - ACTION/TREATMENT ORDERED:
NP Otfo aware. NP will order IV potassium
Provider assessed pt at bedside, pt still refuses medication. Communicated with day RN at change of shift for follow up with provider.

## 2018-06-19 NOTE — PROGRESS NOTE ADULT - SUBJECTIVE AND OBJECTIVE BOX
COLORECTAL SURGERY PROGRESS NOTE    78F PMHx Stage III rectal cancer s/p incomplete RT course, PVD s/p R NUBIA, who presented to [facility] ED the afternoon of 6/8/18 from nursing home complaining of fever and chills. Patient recently admitted to [facility] (5/8-5/23) during which her rectal CA was diagnosed (EUS/colonoscopy performed suggested T3N1 mid-rectal tumor, adenocarcinoma. Started RT, until she was developed pelvic abscesses. Discharged on 2weeks IV antibiotics via PICC line, then transitioned to Augmentin with plan for continued surveillance. However she developed fevers & returned to hospital. Per family at bedside, patient was feeling weak, unable to walk, but otherwise denies any fevers, chills, nausea, vomiting, diarrhea, SOB, cough, rash, abdominal pain. Repeat CT imaging of abdomen and pelvis, showed persistent rectal neoplasm, w/ improvement in clint-rectal collections. There was also as stable spiculated nodule in the right upper lobe suspicious for neoplasm, previously seen. Initially, during [facility] hospitalization in May, plan for short course of RT, followed by interval surgery, however RT stopped 2/2 abscesses. Planned for discharge on IV abx w/ repeat imaging to assess for resolution of collections.  CT abdomen and pelvis shows minor improvement in collections    INTERVAL EVENTS:  Patient is planned for laparoscopic possible open colostomy creation tomorrow    ICU Vital Signs Last 24 Hrs  T(C): 36.7 (19 Jun 2018 16:34), Max: 36.9 (18 Jun 2018 20:09)  T(F): 98.1 (19 Jun 2018 16:34), Max: 98.4 (18 Jun 2018 20:09)  HR: 92 (19 Jun 2018 16:34) (79 - 99)  BP: 122/82 (19 Jun 2018 16:34) (110/58 - 147/67)  BP(mean): --  ABP: --  ABP(mean): --  RR: 18 (19 Jun 2018 16:34) (18 - 18)  SpO2: 96% (19 Jun 2018 16:34) (94% - 96%)    I&O's Detail    18 Jun 2018 07:01  -  19 Jun 2018 07:00  --------------------------------------------------------  IN:    Oral Fluid: 600 mL  Total IN: 600 mL    OUT:  Total OUT: 0 mL    Total NET: 600 mL      19 Jun 2018 07:01  -  19 Jun 2018 19:18  --------------------------------------------------------  IN:    Oral Fluid: 240 mL  Total IN: 240 mL    OUT:  Total OUT: 0 mL    Total NET: 240 mL      MEDICATIONS  (STANDING):  atorvastatin 40 milliGRAM(s) Oral at bedtime  carvedilol 3.125 milliGRAM(s) Oral every 12 hours  dextrose 5%. 1000 milliLiter(s) (50 mL/Hr) IV Continuous <Continuous>  dextrose 50% Injectable 12.5 Gram(s) IV Push once  dextrose 50% Injectable 25 Gram(s) IV Push once  dextrose 50% Injectable 25 Gram(s) IV Push once  docusate sodium 100 milliGRAM(s) Oral two times a day  famotidine    Tablet 20 milliGRAM(s) Oral daily  heparin  Injectable 5000 Unit(s) SubCutaneous every 12 hours  insulin lispro (HumaLOG) corrective regimen sliding scale   SubCutaneous three times a day before meals  insulin lispro (HumaLOG) corrective regimen sliding scale   SubCutaneous at bedtime  lisinopril 2.5 milliGRAM(s) Oral daily  metroNIDAZOLE    Tablet 500 milliGRAM(s) Oral <User Schedule>  mirtazapine 15 milliGRAM(s) Oral at bedtime  morphine ER Tablet 15 milliGRAM(s) Oral every 12 hours  neomycin 1000 milliGRAM(s) Oral <User Schedule>  polyethylene glycol 3350 17 Gram(s) Oral daily  senna 2 Tablet(s) Oral at bedtime    MEDICATIONS  (PRN):  acetaminophen   Tablet 650 milliGRAM(s) Oral every 6 hours PRN Mild Pain (1 - 3)  bisacodyl Suppository 10 milliGRAM(s) Rectal daily PRN Constipation  dextrose 40% Gel 15 Gram(s) Oral once PRN Blood Glucose LESS THAN 70 milliGRAM(s)/deciliter  glucagon  Injectable 1 milliGRAM(s) IntraMuscular once PRN Glucose LESS THAN 70 milligrams/deciliter  morphine  IR 15 milliGRAM(s) Oral every 8 hours PRN Severe Pain (7 - 10)  ondansetron Injectable 4 milliGRAM(s) IV Push every 6 hours PRN Nausea and/or Vomiting    General:  Sitting up in bed, appears comfortable  Chest:  Breath sounds audible bilaterally; no wheezing, rales or ronchi  Abdomen:  Soft, nontender, nondistended  Extremities:  Warm, well perfused                          10.9   14.0  )-----------( 317      ( 19 Jun 2018 14:22 )             33.5   06-19    133<L>  |  97  |  9   ----------------------------<  115<H>  4.1   |  24  |  0.53    Ca    8.6      19 Jun 2018 14:22    TPro  6.8  /  Alb  2.6<L>  /  TBili  0.2  /  DBili  x   /  AST  24  /  ALT  16  /  AlkPhos  89  06-19

## 2018-06-19 NOTE — PROGRESS NOTE ADULT - SUBJECTIVE AND OBJECTIVE BOX
- Patient seen and examined.  - In summary, patient is a 78 year old woman who presented with fever (08 Jun 2018 15:36)  - Today, patient is without complaints.         *****MEDICATIONS:    MEDICATIONS  (STANDING):  atorvastatin 40 milliGRAM(s) Oral at bedtime  carvedilol 3.125 milliGRAM(s) Oral every 12 hours  dextrose 5%. 1000 milliLiter(s) (50 mL/Hr) IV Continuous <Continuous>  dextrose 50% Injectable 12.5 Gram(s) IV Push once  dextrose 50% Injectable 25 Gram(s) IV Push once  dextrose 50% Injectable 25 Gram(s) IV Push once  docusate sodium 100 milliGRAM(s) Oral two times a day  famotidine    Tablet 20 milliGRAM(s) Oral daily  heparin  Injectable 5000 Unit(s) SubCutaneous every 12 hours  insulin lispro (HumaLOG) corrective regimen sliding scale   SubCutaneous three times a day before meals  insulin lispro (HumaLOG) corrective regimen sliding scale   SubCutaneous at bedtime  lisinopril 2.5 milliGRAM(s) Oral daily  metroNIDAZOLE    Tablet 500 milliGRAM(s) Oral <User Schedule>  mirtazapine 15 milliGRAM(s) Oral at bedtime  morphine ER Tablet 15 milliGRAM(s) Oral every 12 hours  neomycin 1000 milliGRAM(s) Oral <User Schedule>  polyethylene glycol 3350 238 Gram(s) Oral once  polyethylene glycol 3350 17 Gram(s) Oral daily  senna 2 Tablet(s) Oral at bedtime    MEDICATIONS  (PRN):  acetaminophen   Tablet 650 milliGRAM(s) Oral every 6 hours PRN Mild Pain (1 - 3)  bisacodyl Suppository 10 milliGRAM(s) Rectal daily PRN Constipation  dextrose 40% Gel 15 Gram(s) Oral once PRN Blood Glucose LESS THAN 70 milliGRAM(s)/deciliter  glucagon  Injectable 1 milliGRAM(s) IntraMuscular once PRN Glucose LESS THAN 70 milligrams/deciliter  morphine  IR 15 milliGRAM(s) Oral every 8 hours PRN Severe Pain (7 - 10)  ondansetron Injectable 4 milliGRAM(s) IV Push every 6 hours PRN Nausea and/or Vomiting                   ***** REVIEW OF SYSTEM:  GEN: no fever, no chills, no pain  RESP: no SOB, no cough, no sputum  CVS: no chest pain, no palpitations, no edema  GI: no abdominal pain, no nausea, no vomiting, no constipation, no diarrhea  : no dysuria, no frequency  NEURO: no headache, no dizziness  PSYCH: no depression, not anxious  Derm : no itching, no rash         ***** VITAL SIGNS:             T(F): 98.4 (06-18-18 @ 20:09), Max: 98.4 (06-18-18 @ 20:09)  HR: 99 (06-19-18 @ 05:28) (77 - 99)  BP: 147/67 (06-19-18 @ 05:28) (104/66 - 158/74)  RR: 18 (06-19-18 @ 05:28) (18 - 18)  SpO2: 94% (06-19-18 @ 05:28) (94% - 98%)  Wt(kg): --  ,   I&O's Summary    18 Jun 2018 07:01  -  19 Jun 2018 07:00  --------------------------------------------------------  IN: 600 mL / OUT: 0 mL / NET: 600 mL                   *****PHYSICAL EXAM:  GEN: A&O X 3 , NAD , comfortable  HEENT: NCAT, EOMI, MMM, no icterus  NECK: Supple, No JVD  CVS: S1S2 , regular , No M/R/G appreciated  PULM: CTA B/L,  no W/R/R appreciated  ABD.: soft. non tender, non distended,  bowel sounds present  Extrem: intact pulses , no edema noted  Derm: No rash or ecchymosis noted  PSYCH: normal mood, no depression, not anxious         *****LAB AND IMAGING:                         [All pertinent recent Imaging/Reports reviewed]  < from: Transthoracic Echocardiogram (04.16.18 @ 07:08) >  Conclusions:  1. Normal left ventricular internal dimensions and wall  thicknesses.  2. Normal left ventricular systolic function. No segmental  wall motion abnormalities.  3. Normal diastolic function  4. Normal right ventricular sizeand systolic function.  5. Estimated pulmonary artery systolic pressure equals 36  mm Hg, assuming right atrial pressure equals 8 mm Hg,  consistent with borderline pulmonary pressures.  *** Compared with echocardiogram of 2/28/2017, no  significant changes noted.    < end of copied text >         *****A S S E S S M E N T   A N D   P L A N :  78F with rectal cancer adm with fever  abx per ID  cont current meds  oncology f/u  f/u surgery  await ostomy  recent echo noted  acceptable cardiac risk for surgery      __________________________  ROBERTO. CHAPARRO Metcalf

## 2018-06-19 NOTE — PROGRESS NOTE ADULT - SUBJECTIVE AND OBJECTIVE BOX
pt  with  no  abd pain, ha s improved  REVIEW OF SYSTEMS:  GEN: no fever,    no chills  RESP: no SOB,   no cough  CVS: no chest pain,   no palpitations  GI: no abdominal pain,   no nausea,   no vomiting,   no constipation,   no diarrhea  : no dysuria,   no frequency  NEURO: no headache,   no dizziness  PSYCH: no depression,   not anxious  Derm : no rash    MEDICATIONS  (STANDING):  atorvastatin 40 milliGRAM(s) Oral at bedtime  carvedilol 3.125 milliGRAM(s) Oral every 12 hours  dextrose 5%. 1000 milliLiter(s) (50 mL/Hr) IV Continuous <Continuous>  dextrose 50% Injectable 12.5 Gram(s) IV Push once  dextrose 50% Injectable 25 Gram(s) IV Push once  dextrose 50% Injectable 25 Gram(s) IV Push once  docusate sodium 100 milliGRAM(s) Oral two times a day  famotidine    Tablet 20 milliGRAM(s) Oral daily  heparin  Injectable 5000 Unit(s) SubCutaneous every 12 hours  insulin lispro (HumaLOG) corrective regimen sliding scale   SubCutaneous three times a day before meals  insulin lispro (HumaLOG) corrective regimen sliding scale   SubCutaneous at bedtime  lisinopril 2.5 milliGRAM(s) Oral daily  metroNIDAZOLE    Tablet 500 milliGRAM(s) Oral <User Schedule>  mirtazapine 15 milliGRAM(s) Oral at bedtime  morphine ER Tablet 15 milliGRAM(s) Oral every 12 hours  neomycin 1000 milliGRAM(s) Oral <User Schedule>  polyethylene glycol 3350 238 Gram(s) Oral once  polyethylene glycol 3350 17 Gram(s) Oral daily  senna 2 Tablet(s) Oral at bedtime    MEDICATIONS  (PRN):  acetaminophen   Tablet 650 milliGRAM(s) Oral every 6 hours PRN Mild Pain (1 - 3)  bisacodyl Suppository 10 milliGRAM(s) Rectal daily PRN Constipation  dextrose 40% Gel 15 Gram(s) Oral once PRN Blood Glucose LESS THAN 70 milliGRAM(s)/deciliter  glucagon  Injectable 1 milliGRAM(s) IntraMuscular once PRN Glucose LESS THAN 70 milligrams/deciliter  morphine  IR 15 milliGRAM(s) Oral every 8 hours PRN Severe Pain (7 - 10)  ondansetron Injectable 4 milliGRAM(s) IV Push every 6 hours PRN Nausea and/or Vomiting      Vital Signs Last 24 Hrs  T(C): 36.9 (18 Jun 2018 20:09), Max: 36.9 (18 Jun 2018 20:09)  T(F): 98.4 (18 Jun 2018 20:09), Max: 98.4 (18 Jun 2018 20:09)  HR: 99 (19 Jun 2018 05:28) (77 - 99)  BP: 147/67 (19 Jun 2018 05:28) (104/66 - 158/74)  BP(mean): --  RR: 18 (19 Jun 2018 05:28) (18 - 18)  SpO2: 94% (19 Jun 2018 05:28) (94% - 98%)  CAPILLARY BLOOD GLUCOSE      POCT Blood Glucose.: 140 mg/dL (19 Jun 2018 08:37)  POCT Blood Glucose.: 97 mg/dL (18 Jun 2018 21:22)  POCT Blood Glucose.: 93 mg/dL (18 Jun 2018 17:17)  POCT Blood Glucose.: 115 mg/dL (18 Jun 2018 12:33)    I&O's Summary    18 Jun 2018 07:01  -  19 Jun 2018 07:00  --------------------------------------------------------  IN: 600 mL / OUT: 0 mL / NET: 600 mL        PHYSICAL EXAM:  HEAD:  Atraumatic, Normocephalic  NECK: Supple, No   JVD  CHEST/LUNG:   no     rales,     no,    rhonchi  HEART: Regular rate and rhythm;         murmur  ABDOMEN: Soft, Nontender, ;   EXTREMITIES:        edema  NEUROLOGY:  alert    LABS:                        Hemoglobin A1C, Whole Blood: 6.6 % (05-09 @ 06:52)    Thyroid Stimulating Hormone, Serum: 1.00 uIU/mL (05-18 @ 06:35)          Consultant(s) Notes Reviewed:      Care Discussed with Consultants/Other Providers:

## 2018-06-20 ENCOUNTER — APPOINTMENT (OUTPATIENT)
Dept: COLORECTAL SURGERY | Facility: HOSPITAL | Age: 78
End: 2018-06-20

## 2018-06-20 ENCOUNTER — RESULT REVIEW (OUTPATIENT)
Age: 78
End: 2018-06-20

## 2018-06-20 LAB
ANION GAP SERPL CALC-SCNC: 12 MMOL/L — SIGNIFICANT CHANGE UP (ref 5–17)
BUN SERPL-MCNC: 11 MG/DL — SIGNIFICANT CHANGE UP (ref 7–23)
CALCIUM SERPL-MCNC: 8.4 MG/DL — SIGNIFICANT CHANGE UP (ref 8.4–10.5)
CHLORIDE SERPL-SCNC: 94 MMOL/L — LOW (ref 96–108)
CO2 SERPL-SCNC: 25 MMOL/L — SIGNIFICANT CHANGE UP (ref 22–31)
CREAT SERPL-MCNC: 0.51 MG/DL — SIGNIFICANT CHANGE UP (ref 0.5–1.3)
GLUCOSE BLDC GLUCOMTR-MCNC: 101 MG/DL — HIGH (ref 70–99)
GLUCOSE BLDC GLUCOMTR-MCNC: 111 MG/DL — HIGH (ref 70–99)
GLUCOSE BLDC GLUCOMTR-MCNC: 116 MG/DL — HIGH (ref 70–99)
GLUCOSE BLDC GLUCOMTR-MCNC: 122 MG/DL — HIGH (ref 70–99)
GLUCOSE SERPL-MCNC: 114 MG/DL — HIGH (ref 70–99)
HCT VFR BLD CALC: 33.2 % — LOW (ref 34.5–45)
HGB BLD-MCNC: 10.6 G/DL — LOW (ref 11.5–15.5)
MCHC RBC-ENTMCNC: 27.7 PG — SIGNIFICANT CHANGE UP (ref 27–34)
MCHC RBC-ENTMCNC: 31.9 GM/DL — LOW (ref 32–36)
MCV RBC AUTO: 86.9 FL — SIGNIFICANT CHANGE UP (ref 80–100)
PLATELET # BLD AUTO: 339 K/UL — SIGNIFICANT CHANGE UP (ref 150–400)
POTASSIUM SERPL-MCNC: 3.9 MMOL/L — SIGNIFICANT CHANGE UP (ref 3.5–5.3)
POTASSIUM SERPL-SCNC: 3.9 MMOL/L — SIGNIFICANT CHANGE UP (ref 3.5–5.3)
RBC # BLD: 3.82 M/UL — SIGNIFICANT CHANGE UP (ref 3.8–5.2)
RBC # FLD: 17.9 % — HIGH (ref 10.3–14.5)
SODIUM SERPL-SCNC: 131 MMOL/L — LOW (ref 135–145)
WBC # BLD: 13.63 K/UL — HIGH (ref 3.8–10.5)
WBC # FLD AUTO: 13.63 K/UL — HIGH (ref 3.8–10.5)

## 2018-06-20 PROCEDURE — 44144 PARTIAL REMOVAL OF COLON: CPT

## 2018-06-20 RX ORDER — DOCUSATE SODIUM 100 MG
100 CAPSULE ORAL
Qty: 0 | Refills: 0 | Status: DISCONTINUED | OUTPATIENT
Start: 2018-06-20 | End: 2018-07-03

## 2018-06-20 RX ORDER — HEPARIN SODIUM 5000 [USP'U]/ML
5000 INJECTION INTRAVENOUS; SUBCUTANEOUS EVERY 12 HOURS
Qty: 0 | Refills: 0 | Status: DISCONTINUED | OUTPATIENT
Start: 2018-06-20 | End: 2018-07-03

## 2018-06-20 RX ORDER — ATORVASTATIN CALCIUM 80 MG/1
40 TABLET, FILM COATED ORAL AT BEDTIME
Qty: 0 | Refills: 0 | Status: DISCONTINUED | OUTPATIENT
Start: 2018-06-20 | End: 2018-07-03

## 2018-06-20 RX ORDER — LISINOPRIL 2.5 MG/1
2.5 TABLET ORAL DAILY
Qty: 0 | Refills: 0 | Status: DISCONTINUED | OUTPATIENT
Start: 2018-06-20 | End: 2018-07-03

## 2018-06-20 RX ORDER — ACETAMINOPHEN 500 MG
650 TABLET ORAL ONCE
Qty: 0 | Refills: 0 | Status: COMPLETED | OUTPATIENT
Start: 2018-06-20 | End: 2018-06-20

## 2018-06-20 RX ORDER — CARVEDILOL PHOSPHATE 80 MG/1
3.12 CAPSULE, EXTENDED RELEASE ORAL EVERY 12 HOURS
Qty: 0 | Refills: 0 | Status: DISCONTINUED | OUTPATIENT
Start: 2018-06-20 | End: 2018-07-03

## 2018-06-20 RX ORDER — ONDANSETRON 8 MG/1
4 TABLET, FILM COATED ORAL EVERY 6 HOURS
Qty: 0 | Refills: 0 | Status: DISCONTINUED | OUTPATIENT
Start: 2018-06-20 | End: 2018-07-02

## 2018-06-20 RX ORDER — FAMOTIDINE 10 MG/ML
20 INJECTION INTRAVENOUS DAILY
Qty: 0 | Refills: 0 | Status: DISCONTINUED | OUTPATIENT
Start: 2018-06-20 | End: 2018-07-03

## 2018-06-20 RX ORDER — MIRTAZAPINE 45 MG/1
15 TABLET, ORALLY DISINTEGRATING ORAL AT BEDTIME
Qty: 0 | Refills: 0 | Status: DISCONTINUED | OUTPATIENT
Start: 2018-06-20 | End: 2018-07-03

## 2018-06-20 RX ORDER — MORPHINE SULFATE 50 MG/1
1 CAPSULE, EXTENDED RELEASE ORAL
Qty: 0 | Refills: 0 | Status: DISCONTINUED | OUTPATIENT
Start: 2018-06-20 | End: 2018-06-20

## 2018-06-20 RX ORDER — MORPHINE SULFATE 50 MG/1
15 CAPSULE, EXTENDED RELEASE ORAL EVERY 12 HOURS
Qty: 0 | Refills: 0 | Status: DISCONTINUED | OUTPATIENT
Start: 2018-06-20 | End: 2018-06-27

## 2018-06-20 RX ORDER — ACETAMINOPHEN 500 MG
650 TABLET ORAL EVERY 6 HOURS
Qty: 0 | Refills: 0 | Status: DISCONTINUED | OUTPATIENT
Start: 2018-06-20 | End: 2018-07-03

## 2018-06-20 RX ORDER — POLYETHYLENE GLYCOL 3350 17 G/17G
17 POWDER, FOR SOLUTION ORAL DAILY
Qty: 0 | Refills: 0 | Status: DISCONTINUED | OUTPATIENT
Start: 2018-06-20 | End: 2018-07-03

## 2018-06-20 RX ORDER — SODIUM CHLORIDE 9 MG/ML
1000 INJECTION, SOLUTION INTRAVENOUS
Qty: 0 | Refills: 0 | Status: DISCONTINUED | OUTPATIENT
Start: 2018-06-20 | End: 2018-06-21

## 2018-06-20 RX ORDER — SENNA PLUS 8.6 MG/1
2 TABLET ORAL AT BEDTIME
Qty: 0 | Refills: 0 | Status: DISCONTINUED | OUTPATIENT
Start: 2018-06-20 | End: 2018-07-03

## 2018-06-20 RX ORDER — MORPHINE SULFATE 50 MG/1
15 CAPSULE, EXTENDED RELEASE ORAL EVERY 8 HOURS
Qty: 0 | Refills: 0 | Status: DISCONTINUED | OUTPATIENT
Start: 2018-06-20 | End: 2018-06-25

## 2018-06-20 RX ADMIN — SODIUM CHLORIDE 75 MILLILITER(S): 9 INJECTION, SOLUTION INTRAVENOUS at 21:02

## 2018-06-20 RX ADMIN — FAMOTIDINE 20 MILLIGRAM(S): 10 INJECTION INTRAVENOUS at 12:58

## 2018-06-20 RX ADMIN — LISINOPRIL 2.5 MILLIGRAM(S): 2.5 TABLET ORAL at 05:54

## 2018-06-20 RX ADMIN — Medication 260 MILLIGRAM(S): at 19:17

## 2018-06-20 RX ADMIN — Medication 100 MILLIGRAM(S): at 05:54

## 2018-06-20 RX ADMIN — POLYETHYLENE GLYCOL 3350 17 GRAM(S): 17 POWDER, FOR SOLUTION ORAL at 12:57

## 2018-06-20 RX ADMIN — SENNA PLUS 2 TABLET(S): 8.6 TABLET ORAL at 22:00

## 2018-06-20 RX ADMIN — MORPHINE SULFATE 15 MILLIGRAM(S): 50 CAPSULE, EXTENDED RELEASE ORAL at 09:37

## 2018-06-20 RX ADMIN — ATORVASTATIN CALCIUM 40 MILLIGRAM(S): 80 TABLET, FILM COATED ORAL at 22:00

## 2018-06-20 RX ADMIN — MORPHINE SULFATE 15 MILLIGRAM(S): 50 CAPSULE, EXTENDED RELEASE ORAL at 12:56

## 2018-06-20 RX ADMIN — CARVEDILOL PHOSPHATE 3.12 MILLIGRAM(S): 80 CAPSULE, EXTENDED RELEASE ORAL at 05:54

## 2018-06-20 NOTE — BRIEF OPERATIVE NOTE - PROCEDURE
<<-----Click on this checkbox to enter Procedure Colon resection  06/20/2018    Active  SSHTERENTA  Colostomy  06/20/2018    Active  SSHTERENTA

## 2018-06-20 NOTE — PROGRESS NOTE ADULT - ASSESSMENT
78F s/p Sigmoid end loop colostomy.     - Held coreg and morphine for hypotension (SBP 90's)  - Pain control with tylenol  - IVF  - CLD  - SQH for DVT ppx

## 2018-06-20 NOTE — PROGRESS NOTE ADULT - SUBJECTIVE AND OBJECTIVE BOX
- Patient seen and examined.  - In summary, patient is a 78 year old woman who presented with fever (08 Jun 2018 15:36)  - Today, patient is without complaints.         *****MEDICATIONS:    MEDICATIONS  (STANDING):  atorvastatin 40 milliGRAM(s) Oral at bedtime  carvedilol 3.125 milliGRAM(s) Oral every 12 hours  dextrose 5%. 1000 milliLiter(s) (50 mL/Hr) IV Continuous <Continuous>  dextrose 50% Injectable 12.5 Gram(s) IV Push once  dextrose 50% Injectable 25 Gram(s) IV Push once  dextrose 50% Injectable 25 Gram(s) IV Push once  docusate sodium 100 milliGRAM(s) Oral two times a day  famotidine    Tablet 20 milliGRAM(s) Oral daily  heparin  Injectable 5000 Unit(s) SubCutaneous every 12 hours  insulin lispro (HumaLOG) corrective regimen sliding scale   SubCutaneous three times a day before meals  insulin lispro (HumaLOG) corrective regimen sliding scale   SubCutaneous at bedtime  lisinopril 2.5 milliGRAM(s) Oral daily  metroNIDAZOLE    Tablet 500 milliGRAM(s) Oral <User Schedule>  mirtazapine 15 milliGRAM(s) Oral at bedtime  morphine ER Tablet 15 milliGRAM(s) Oral every 12 hours  neomycin 1000 milliGRAM(s) Oral <User Schedule>  polyethylene glycol 3350 17 Gram(s) Oral daily  senna 2 Tablet(s) Oral at bedtime    MEDICATIONS  (PRN):  acetaminophen   Tablet 650 milliGRAM(s) Oral every 6 hours PRN Mild Pain (1 - 3)  bisacodyl Suppository 10 milliGRAM(s) Rectal daily PRN Constipation  dextrose 40% Gel 15 Gram(s) Oral once PRN Blood Glucose LESS THAN 70 milliGRAM(s)/deciliter  glucagon  Injectable 1 milliGRAM(s) IntraMuscular once PRN Glucose LESS THAN 70 milligrams/deciliter  morphine  IR 15 milliGRAM(s) Oral every 8 hours PRN Severe Pain (7 - 10)  ondansetron Injectable 4 milliGRAM(s) IV Push every 6 hours PRN Nausea and/or Vomiting           ***** REVIEW OF SYSTEM:  GEN: no fever, no chills, no pain  RESP: no SOB, no cough, no sputum  CVS: no chest pain, no palpitations, no edema  GI: no abdominal pain, no nausea, no vomiting, no constipation, no diarrhea  : no dysuria, no frequency  NEURO: no headache, no dizziness  PSYCH: no depression, not anxious  Derm : no itching, no rash         ***** VITAL SIGNS:             T(F): 98.7 (06-20-18 @ 05:52), Max: 98.7 (06-20-18 @ 05:52)  HR: 92 (06-20-18 @ 05:52) (88 - 101)  BP: 129/68 (06-20-18 @ 05:52) (110/58 - 129/68)  RR: 18 (06-20-18 @ 05:52) (18 - 18)  SpO2: 95% (06-20-18 @ 05:52) (95% - 96%)  Wt(kg): --  ,   I&O's Summary    19 Jun 2018 07:01  -  20 Jun 2018 07:00  --------------------------------------------------------  IN: 480 mL / OUT: 0 mL / NET: 480 mL                 *****PHYSICAL EXAM:  GEN: A&O X 3 , NAD , comfortable  HEENT: NCAT, EOMI, MMM, no icterus  NECK: Supple, No JVD  CVS: S1S2 , regular , No M/R/G appreciated  PULM: CTA B/L,  no W/R/R appreciated  ABD.: soft. non tender, non distended,  bowel sounds present  Extrem: intact pulses , no edema noted  Derm: No rash or ecchymosis noted  PSYCH: normal mood, no depression, not anxious         *****LAB AND IMAGING:                                           10.6   13.63 )-----------( 339      ( 20 Jun 2018 07:59 )             33.2               06-20    131<L>  |  94<L>  |  11  ----------------------------<  114<H>  3.9   |  25  |  0.51    Ca    8.4      20 Jun 2018 06:40    TPro  6.8  /  Alb  2.6<L>  /  TBili  0.2  /  DBili  x   /  AST  24  /  ALT  16  /  AlkPhos  89  06-19    PT/INR - ( 19 Jun 2018 14:22 )   PT: 12.0 sec;   INR: 1.11 ratio         PTT - ( 19 Jun 2018 14:22 )  PTT:34.3 sec                         [All pertinent recent Imaging/Reports reviewed]  < from: Transthoracic Echocardiogram (04.16.18 @ 07:08) >  Conclusions:  1. Normal left ventricular internal dimensions and wall  thicknesses.  2. Normal left ventricular systolic function. No segmental  wall motion abnormalities.  3. Normal diastolic function  4. Normal right ventricular sizeand systolic function.  5. Estimated pulmonary artery systolic pressure equals 36  mm Hg, assuming right atrial pressure equals 8 mm Hg,  consistent with borderline pulmonary pressures.  *** Compared with echocardiogram of 2/28/2017, no  significant changes noted.    < end of copied text >         *****A S S E S S M E N T   A N D   P L A N :  78F with rectal cancer adm with fever  abx per ID  cont current meds  oncology f/u  surgery f/u noted  ostomy today  NPO  recent echo noted  acceptable cardiac risk for surgery      __________________________  A. JOJO Metcalf.

## 2018-06-20 NOTE — BRIEF OPERATIVE NOTE - PRE-OP DX
Perirectal abscess  06/20/2018    Active  Shterental, Thiago  Rectal cancer  06/20/2018    Thiago Corey

## 2018-06-20 NOTE — PROGRESS NOTE ADULT - SUBJECTIVE AND OBJECTIVE BOX
weak,  no compalints    REVIEW OF SYSTEMS:  GEN: no fever,    no chills  RESP: no SOB,   no cough  CVS: no chest pain,   no palpitations  GI: no abdominal pain,   no nausea,   no vomiting,   no constipation,   no diarrhea  : no dysuria,   no frequency  NEURO: no headache,   no dizziness  PSYCH: no depression,   not anxious  Derm : no rash    MEDICATIONS  (STANDING):  atorvastatin 40 milliGRAM(s) Oral at bedtime  carvedilol 3.125 milliGRAM(s) Oral every 12 hours  dextrose 5%. 1000 milliLiter(s) (50 mL/Hr) IV Continuous <Continuous>  dextrose 50% Injectable 12.5 Gram(s) IV Push once  dextrose 50% Injectable 25 Gram(s) IV Push once  dextrose 50% Injectable 25 Gram(s) IV Push once  docusate sodium 100 milliGRAM(s) Oral two times a day  famotidine    Tablet 20 milliGRAM(s) Oral daily  heparin  Injectable 5000 Unit(s) SubCutaneous every 12 hours  insulin lispro (HumaLOG) corrective regimen sliding scale   SubCutaneous three times a day before meals  insulin lispro (HumaLOG) corrective regimen sliding scale   SubCutaneous at bedtime  lisinopril 2.5 milliGRAM(s) Oral daily  metroNIDAZOLE    Tablet 500 milliGRAM(s) Oral <User Schedule>  mirtazapine 15 milliGRAM(s) Oral at bedtime  morphine ER Tablet 15 milliGRAM(s) Oral every 12 hours  neomycin 1000 milliGRAM(s) Oral <User Schedule>  polyethylene glycol 3350 17 Gram(s) Oral daily  senna 2 Tablet(s) Oral at bedtime    MEDICATIONS  (PRN):  acetaminophen   Tablet 650 milliGRAM(s) Oral every 6 hours PRN Mild Pain (1 - 3)  bisacodyl Suppository 10 milliGRAM(s) Rectal daily PRN Constipation  dextrose 40% Gel 15 Gram(s) Oral once PRN Blood Glucose LESS THAN 70 milliGRAM(s)/deciliter  glucagon  Injectable 1 milliGRAM(s) IntraMuscular once PRN Glucose LESS THAN 70 milligrams/deciliter  morphine  IR 15 milliGRAM(s) Oral every 8 hours PRN Severe Pain (7 - 10)  ondansetron Injectable 4 milliGRAM(s) IV Push every 6 hours PRN Nausea and/or Vomiting      Vital Signs Last 24 Hrs  T(C): 36.3 (20 Jun 2018 09:41), Max: 37.1 (20 Jun 2018 05:52)  T(F): 97.4 (20 Jun 2018 09:41), Max: 98.7 (20 Jun 2018 05:52)  HR: 89 (20 Jun 2018 09:41) (88 - 101)  BP: 128/79 (20 Jun 2018 09:41) (110/58 - 129/68)  BP(mean): --  RR: 18 (20 Jun 2018 09:41) (18 - 18)  SpO2: 97% (20 Jun 2018 09:41) (95% - 97%)  CAPILLARY BLOOD GLUCOSE      POCT Blood Glucose.: 122 mg/dL (20 Jun 2018 09:23)  POCT Blood Glucose.: 166 mg/dL (19 Jun 2018 22:14)  POCT Blood Glucose.: 116 mg/dL (19 Jun 2018 17:26)  POCT Blood Glucose.: 114 mg/dL (19 Jun 2018 13:16)    I&O's Summary    19 Jun 2018 07:01  -  20 Jun 2018 07:00  --------------------------------------------------------  IN: 480 mL / OUT: 0 mL / NET: 480 mL        PHYSICAL EXAM:  HEAD:  Atraumatic, Normocephalic  NECK: Supple, No   JVD  CHEST/LUNG:   no     rales,     no,    rhonchi  HEART: Regular rate and rhythm;         murmur  ABDOMEN: Soft, Nontender, ;   EXTREMITIES:        edema  NEUROLOGY:  alert    LABS:                        10.6   13.63 )-----------( 339      ( 20 Jun 2018 07:59 )             33.2     06-20    131<L>  |  94<L>  |  11  ----------------------------<  114<H>  3.9   |  25  |  0.51    Ca    8.4      20 Jun 2018 06:40    TPro  6.8  /  Alb  2.6<L>  /  TBili  0.2  /  DBili  x   /  AST  24  /  ALT  16  /  AlkPhos  89  06-19    PT/INR - ( 19 Jun 2018 14:22 )   PT: 12.0 sec;   INR: 1.11 ratio         PTT - ( 19 Jun 2018 14:22 )  PTT:34.3 sec              Hemoglobin A1C, Whole Blood: 6.6 % (05-09 @ 06:52)    Thyroid Stimulating Hormone, Serum: 1.00 uIU/mL (05-18 @ 06:35)          Consultant(s) Notes Reviewed:      Care Discussed with Consultants/Other Providers:

## 2018-06-20 NOTE — PROGRESS NOTE ADULT - ASSESSMENT
78F with rectal cancer, dm,  htn,  adm with fever,  afebrile  now  cont IV zosyn/  vanco   CT abd pelvis,, as  noted    rectal fluid  collections/ pna  surg note seen,   proximal diversion/ostomy,   dr ferreira   oncology, RT saw  pt  per  ID,  stop  ab,    per  surg, for  OR today/ostomy        < from: CT Abdomen and Pelvis w/ IV Cont (06.08.18 @ 16:45) >  pondylolysis at this level.    IMPRESSION: Known rectal neoplasm. Slight interval improvement in fluid   collections to the left of the rectum.  Stable spiculated nodule in the right upper lobe, which is suspicious for   neoplasm. Surrounding groundglass opacities may be infectious.                < end of copied text >

## 2018-06-21 LAB
ANION GAP SERPL CALC-SCNC: 17 MMOL/L — SIGNIFICANT CHANGE UP (ref 5–17)
BUN SERPL-MCNC: 9 MG/DL — SIGNIFICANT CHANGE UP (ref 7–23)
CALCIUM SERPL-MCNC: 8.2 MG/DL — LOW (ref 8.4–10.5)
CHLORIDE SERPL-SCNC: 93 MMOL/L — LOW (ref 96–108)
CO2 SERPL-SCNC: 22 MMOL/L — SIGNIFICANT CHANGE UP (ref 22–31)
CREAT SERPL-MCNC: 0.4 MG/DL — LOW (ref 0.5–1.3)
GLUCOSE BLDC GLUCOMTR-MCNC: 112 MG/DL — HIGH (ref 70–99)
GLUCOSE BLDC GLUCOMTR-MCNC: 116 MG/DL — HIGH (ref 70–99)
GLUCOSE BLDC GLUCOMTR-MCNC: 117 MG/DL — HIGH (ref 70–99)
GLUCOSE BLDC GLUCOMTR-MCNC: 97 MG/DL — SIGNIFICANT CHANGE UP (ref 70–99)
GLUCOSE SERPL-MCNC: 102 MG/DL — HIGH (ref 70–99)
HCT VFR BLD CALC: 33.2 % — LOW (ref 34.5–45)
HGB BLD-MCNC: 10.4 G/DL — LOW (ref 11.5–15.5)
MCHC RBC-ENTMCNC: 27.9 PG — SIGNIFICANT CHANGE UP (ref 27–34)
MCHC RBC-ENTMCNC: 31.3 GM/DL — LOW (ref 32–36)
MCV RBC AUTO: 89.1 FL — SIGNIFICANT CHANGE UP (ref 80–100)
PLATELET # BLD AUTO: 358 K/UL — SIGNIFICANT CHANGE UP (ref 150–400)
POTASSIUM SERPL-MCNC: 3.4 MMOL/L — LOW (ref 3.5–5.3)
POTASSIUM SERPL-SCNC: 3.4 MMOL/L — LOW (ref 3.5–5.3)
RBC # BLD: 3.73 M/UL — LOW (ref 3.8–5.2)
RBC # FLD: 17.3 % — HIGH (ref 10.3–14.5)
SODIUM SERPL-SCNC: 132 MMOL/L — LOW (ref 135–145)
WBC # BLD: 12.7 K/UL — HIGH (ref 3.8–10.5)
WBC # FLD AUTO: 12.7 K/UL — HIGH (ref 3.8–10.5)

## 2018-06-21 RX ORDER — SODIUM CHLORIDE 9 MG/ML
1000 INJECTION, SOLUTION INTRAVENOUS
Qty: 0 | Refills: 0 | Status: DISCONTINUED | OUTPATIENT
Start: 2018-06-21 | End: 2018-06-25

## 2018-06-21 RX ORDER — MORPHINE SULFATE 50 MG/1
1 CAPSULE, EXTENDED RELEASE ORAL ONCE
Qty: 0 | Refills: 0 | Status: DISCONTINUED | OUTPATIENT
Start: 2018-06-21 | End: 2018-06-21

## 2018-06-21 RX ORDER — MORPHINE SULFATE 50 MG/1
1 CAPSULE, EXTENDED RELEASE ORAL EVERY 6 HOURS
Qty: 0 | Refills: 0 | Status: DISCONTINUED | OUTPATIENT
Start: 2018-06-21 | End: 2018-06-21

## 2018-06-21 RX ADMIN — MORPHINE SULFATE 1 MILLIGRAM(S): 50 CAPSULE, EXTENDED RELEASE ORAL at 22:11

## 2018-06-21 RX ADMIN — MORPHINE SULFATE 15 MILLIGRAM(S): 50 CAPSULE, EXTENDED RELEASE ORAL at 17:46

## 2018-06-21 RX ADMIN — MORPHINE SULFATE 15 MILLIGRAM(S): 50 CAPSULE, EXTENDED RELEASE ORAL at 12:36

## 2018-06-21 RX ADMIN — ONDANSETRON 4 MILLIGRAM(S): 8 TABLET, FILM COATED ORAL at 14:20

## 2018-06-21 RX ADMIN — MIRTAZAPINE 15 MILLIGRAM(S): 45 TABLET, ORALLY DISINTEGRATING ORAL at 21:35

## 2018-06-21 RX ADMIN — HEPARIN SODIUM 5000 UNIT(S): 5000 INJECTION INTRAVENOUS; SUBCUTANEOUS at 06:10

## 2018-06-21 RX ADMIN — MORPHINE SULFATE 1 MILLIGRAM(S): 50 CAPSULE, EXTENDED RELEASE ORAL at 14:20

## 2018-06-21 RX ADMIN — Medication 100 MILLIGRAM(S): at 06:08

## 2018-06-21 RX ADMIN — MORPHINE SULFATE 15 MILLIGRAM(S): 50 CAPSULE, EXTENDED RELEASE ORAL at 17:16

## 2018-06-21 RX ADMIN — MORPHINE SULFATE 15 MILLIGRAM(S): 50 CAPSULE, EXTENDED RELEASE ORAL at 06:08

## 2018-06-21 RX ADMIN — LISINOPRIL 2.5 MILLIGRAM(S): 2.5 TABLET ORAL at 06:08

## 2018-06-21 RX ADMIN — CARVEDILOL PHOSPHATE 3.12 MILLIGRAM(S): 80 CAPSULE, EXTENDED RELEASE ORAL at 06:08

## 2018-06-21 RX ADMIN — Medication 100 MILLIGRAM(S): at 17:16

## 2018-06-21 RX ADMIN — CARVEDILOL PHOSPHATE 3.12 MILLIGRAM(S): 80 CAPSULE, EXTENDED RELEASE ORAL at 17:14

## 2018-06-21 RX ADMIN — ATORVASTATIN CALCIUM 40 MILLIGRAM(S): 80 TABLET, FILM COATED ORAL at 21:35

## 2018-06-21 RX ADMIN — POLYETHYLENE GLYCOL 3350 17 GRAM(S): 17 POWDER, FOR SOLUTION ORAL at 12:07

## 2018-06-21 RX ADMIN — ONDANSETRON 4 MILLIGRAM(S): 8 TABLET, FILM COATED ORAL at 21:11

## 2018-06-21 RX ADMIN — SENNA PLUS 2 TABLET(S): 8.6 TABLET ORAL at 21:35

## 2018-06-21 RX ADMIN — MORPHINE SULFATE 15 MILLIGRAM(S): 50 CAPSULE, EXTENDED RELEASE ORAL at 12:06

## 2018-06-21 RX ADMIN — MORPHINE SULFATE 1 MILLIGRAM(S): 50 CAPSULE, EXTENDED RELEASE ORAL at 22:26

## 2018-06-21 RX ADMIN — MORPHINE SULFATE 1 MILLIGRAM(S): 50 CAPSULE, EXTENDED RELEASE ORAL at 14:50

## 2018-06-21 RX ADMIN — HEPARIN SODIUM 5000 UNIT(S): 5000 INJECTION INTRAVENOUS; SUBCUTANEOUS at 17:17

## 2018-06-21 NOTE — CONSULT NOTE ADULT - SUBJECTIVE AND OBJECTIVE BOX
Patient is a 78y Female     Patient is a 78y old  Female who presents with a chief complaint of fever (08 Jun 2018 15:36)      HPI:  78 YOF p/w chills and fever. Pt has hx of colon cancer with rectal abscess being treated outpatient with picc line and zosyn. Pt endorses mild abdominal pain lower quadrant and rectal pain. Pt denies back pain, denies cough. (08 Jun 2018 15:36)      PAST MEDICAL & SURGICAL HISTORY:  Rectal mass  Gangrene of foot  Coronary artery disease involving native coronary artery of native heart without angina pectoris  Status post above knee amputation of right lower extremity      MEDICATIONS  (STANDING):  atorvastatin 40 milliGRAM(s) Oral at bedtime  carvedilol 3.125 milliGRAM(s) Oral every 12 hours  dextrose 5%. 1000 milliLiter(s) (50 mL/Hr) IV Continuous <Continuous>  dextrose 50% Injectable 12.5 Gram(s) IV Push once  dextrose 50% Injectable 25 Gram(s) IV Push once  dextrose 50% Injectable 25 Gram(s) IV Push once  docusate sodium 100 milliGRAM(s) Oral two times a day  famotidine    Tablet 20 milliGRAM(s) Oral daily  heparin  Injectable 5000 Unit(s) SubCutaneous every 12 hours  insulin lispro (HumaLOG) corrective regimen sliding scale   SubCutaneous three times a day before meals  insulin lispro (HumaLOG) corrective regimen sliding scale   SubCutaneous at bedtime  lactated ringers. 1000 milliLiter(s) (75 mL/Hr) IV Continuous <Continuous>  lisinopril 2.5 milliGRAM(s) Oral daily  mirtazapine 15 milliGRAM(s) Oral at bedtime  morphine ER Tablet 15 milliGRAM(s) Oral every 12 hours  polyethylene glycol 3350 17 Gram(s) Oral daily  senna 2 Tablet(s) Oral at bedtime      Allergies    No Known Allergies    Intolerances        SOCIAL HISTORY:  Denies ETOh,Smoking,     FAMILY HISTORY:  No pertinent family history in first degree relatives      REVIEW OF SYSTEMS:    CONSTITUTIONAL: No weakness, fevers or chills  EYES/ENT: No visual changes;  No vertigo or throat pain   NECK: No pain or stiffness  RESPIRATORY: No cough, wheezing, hemoptysis; No shortness of breath  CARDIOVASCULAR: No chest pain or palpitations  GASTROINTESTINAL: No abdominal or epigastric pain. No nausea, vomiting, or hematemesis; No diarrhea or constipation. No melena or hematochezia.  GENITOURINARY: No dysuria, frequency or hematuria  NEUROLOGICAL: No numbness or weakness  SKIN: No itching, burning, rashes, or lesions   All other review of systems is negative unless indicated above.    VITAL:  T(C): , Max: 37.3 (06-21-18 @ 06:00)  T(F): , Max: 99.2 (06-21-18 @ 06:00)  HR: 81 (06-21-18 @ 10:44)  BP: 173/80 (06-21-18 @ 10:44)  BP(mean): 81 (06-20-18 @ 19:15)  RR: 18 (06-21-18 @ 10:44)  SpO2: 96% (06-21-18 @ 10:44)  Wt(kg): --    I and O's:    06-19 @ 07:01  -  06-20 @ 07:00  --------------------------------------------------------  IN: 480 mL / OUT: 0 mL / NET: 480 mL    06-20 @ 07:01  -  06-21 @ 07:00  --------------------------------------------------------  IN: 0 mL / OUT: 375 mL / NET: -375 mL    06-21 @ 07:01  -  06-21 @ 17:03  --------------------------------------------------------  IN: 150 mL / OUT: 100 mL / NET: 50 mL          PHYSICAL EXAM:    Constitutional: NAD  HEENT: PERRLA,   Neck: No JVD  Respiratory: CTA B/L  Cardiovascular: S1 and S2  Gastrointestinal: BS+, soft, NT/ND  Extremities: No peripheral edema  Neurological: A/O x 3, no focal deficits  Psychiatric: Normal mood, normal affect  : No Saba  Skin: No rashes  Access: Not applicable  Back: No CVA tenderness    LABS:                        10.6   13.63 )-----------( 339      ( 20 Jun 2018 07:59 )             33.2     06-20    131<L>  |  94<L>  |  11  ----------------------------<  114<H>  3.9   |  25  |  0.51    Ca    8.4      20 Jun 2018 06:40            RADIOLOGY & ADDITIONAL STUDIES:

## 2018-06-21 NOTE — PROGRESS NOTE ADULT - SUBJECTIVE AND OBJECTIVE BOX
post op, doing well  REVIEW OF SYSTEMS:  GEN: no fever,    no chills  RESP: no SOB,   no cough  CVS: no chest pain,   no palpitations  GI: no abdominal pain,   no nausea,   no vomiting,   no constipation,   no diarrhea  : no dysuria,   no frequency  NEURO: no headache,   no dizziness  PSYCH: no depression,   not anxious  Derm : no rash    MEDICATIONS  (STANDING):  atorvastatin 40 milliGRAM(s) Oral at bedtime  carvedilol 3.125 milliGRAM(s) Oral every 12 hours  dextrose 5%. 1000 milliLiter(s) (50 mL/Hr) IV Continuous <Continuous>  dextrose 50% Injectable 12.5 Gram(s) IV Push once  dextrose 50% Injectable 25 Gram(s) IV Push once  dextrose 50% Injectable 25 Gram(s) IV Push once  docusate sodium 100 milliGRAM(s) Oral two times a day  famotidine    Tablet 20 milliGRAM(s) Oral daily  heparin  Injectable 5000 Unit(s) SubCutaneous every 12 hours  insulin lispro (HumaLOG) corrective regimen sliding scale   SubCutaneous three times a day before meals  insulin lispro (HumaLOG) corrective regimen sliding scale   SubCutaneous at bedtime  lactated ringers. 1000 milliLiter(s) (75 mL/Hr) IV Continuous <Continuous>  lisinopril 2.5 milliGRAM(s) Oral daily  mirtazapine 15 milliGRAM(s) Oral at bedtime  morphine ER Tablet 15 milliGRAM(s) Oral every 12 hours  polyethylene glycol 3350 17 Gram(s) Oral daily  senna 2 Tablet(s) Oral at bedtime    MEDICATIONS  (PRN):  acetaminophen   Tablet 650 milliGRAM(s) Oral every 6 hours PRN Mild Pain (1 - 3)  bisacodyl Suppository 10 milliGRAM(s) Rectal daily PRN Constipation  dextrose 40% Gel 15 Gram(s) Oral once PRN Blood Glucose LESS THAN 70 milliGRAM(s)/deciliter  glucagon  Injectable 1 milliGRAM(s) IntraMuscular once PRN Glucose LESS THAN 70 milligrams/deciliter  morphine  IR 15 milliGRAM(s) Oral every 8 hours PRN Severe Pain (7 - 10)  ondansetron Injectable 4 milliGRAM(s) IV Push every 6 hours PRN Nausea and/or Vomiting      Vital Signs Last 24 Hrs  T(C): 37.3 (21 Jun 2018 06:00), Max: 37.3 (20 Jun 2018 12:52)  T(F): 99.2 (21 Jun 2018 06:00), Max: 99.2 (20 Jun 2018 12:52)  HR: 83 (21 Jun 2018 06:00) (53 - 87)  BP: 175/64 (21 Jun 2018 06:00) (85/43 - 175/64)  BP(mean): 81 (20 Jun 2018 19:15) (61 - 84)  RR: 18 (21 Jun 2018 06:00) (16 - 18)  SpO2: 95% (21 Jun 2018 06:00) (93% - 100%)  CAPILLARY BLOOD GLUCOSE      POCT Blood Glucose.: 116 mg/dL (21 Jun 2018 08:49)  POCT Blood Glucose.: 111 mg/dL (20 Jun 2018 21:23)  POCT Blood Glucose.: 116 mg/dL (20 Jun 2018 17:43)  POCT Blood Glucose.: 101 mg/dL (20 Jun 2018 12:51)    I&O's Summary    20 Jun 2018 07:01  -  21 Jun 2018 07:00  --------------------------------------------------------  IN: 0 mL / OUT: 375 mL / NET: -375 mL        PHYSICAL EXAM:  HEAD:  Atraumatic, Normocephalic  NECK: Supple, No   JVD  CHEST/LUNG:   no     rales,     no,    rhonchi  HEART: Regular rate and rhythm;         murmur  ABDOMEN: Soft,  ostomy ;   EXTREMITIES:  no      edema  NEUROLOGY:  alert    LABS:                        10.6   13.63 )-----------( 339      ( 20 Jun 2018 07:59 )             33.2     06-20    131<L>  |  94<L>  |  11  ----------------------------<  114<H>  3.9   |  25  |  0.51    Ca    8.4      20 Jun 2018 06:40    TPro  6.8  /  Alb  2.6<L>  /  TBili  0.2  /  DBili  x   /  AST  24  /  ALT  16  /  AlkPhos  89  06-19    PT/INR - ( 19 Jun 2018 14:22 )   PT: 12.0 sec;   INR: 1.11 ratio         PTT - ( 19 Jun 2018 14:22 )  PTT:34.3 sec              Hemoglobin A1C, Whole Blood: 6.6 % (05-09 @ 06:52)    Thyroid Stimulating Hormone, Serum: 1.00 uIU/mL (05-18 @ 06:35)          Consultant(s) Notes Reviewed:      Care Discussed with Consultants/Other Providers:

## 2018-06-21 NOTE — PROGRESS NOTE ADULT - ASSESSMENT
78F with rectal cancer, dm,  htn,  adm with fever,  afebrile  now  cont IV zosyn/  vanco   CT abd pelvis,, as  noted    rectal fluid  collections/ pna  surg note seen,   proximal diversion/ostomy,   dr ferreira   oncology, RT saw  pt  per  ID,  stop  ab,    s/p  colectomy/ sigmoid   colostomy        < from: CT Abdomen and Pelvis w/ IV Cont (06.08.18 @ 16:45) >  pondylolysis at this level.    IMPRESSION: Known rectal neoplasm. Slight interval improvement in fluid   collections to the left of the rectum.  Stable spiculated nodule in the right upper lobe, which is suspicious for   neoplasm. Surrounding groundglass opacities may be infectious.                < end of copied text >

## 2018-06-21 NOTE — PROGRESS NOTE ADULT - SUBJECTIVE AND OBJECTIVE BOX
COLORECTAL SURGERY PROGRESS NOTE    78F PMHx Stage III rectal cancer s/p incomplete RT course, PVD s/p R NUBIA, who presented to [facility] ED the afternoon of 6/8/18 from nursing home complaining of fever and chills. Patient recently admitted to [facility] (5/8-5/23) during which her rectal CA was diagnosed (EUS/colonoscopy performed suggested T3N1 mid-rectal tumor, adenocarcinoma. Started RT, until she was developed pelvic abscesses. Discharged on 2weeks IV antibiotics via PICC line, then transitioned to Augmentin with plan for continued surveillance. However she developed fevers & returned to hospital. Per family at bedside, patient was feeling weak, unable to walk, but otherwise denies any fevers, chills, nausea, vomiting, diarrhea, SOB, cough, rash, abdominal pain. Repeat CT imaging of abdomen and pelvis, showed persistent rectal neoplasm, w/ improvement in clint-rectal collections. There was also as stable spiculated nodule in the right upper lobe suspicious for neoplasm, previously seen. Initially, during [facility] hospitalization in May, plan for short course of RT, followed by interval surgery, however RT stopped 2/2 abscesses. Planned for discharge on IV abx w/ repeat imaging to assess for resolution of collections.  CT abdomen and pelvis shows minor improvement in collections    INTERVAL EVENTS:  Patient is POD 1 s/p creation of end sigmoid loop colostomy.  Patient did well overnight.  Complaining of some abdominal pain around the incisions.      ICU Vital Signs Last 24 Hrs  T(C): 37.3 (21 Jun 2018 06:00), Max: 37.3 (20 Jun 2018 12:52)  T(F): 99.2 (21 Jun 2018 06:00), Max: 99.2 (20 Jun 2018 12:52)  HR: 83 (21 Jun 2018 06:00) (53 - 89)  BP: 175/64 (21 Jun 2018 06:00) (85/43 - 175/64)  BP(mean): 81 (20 Jun 2018 19:15) (61 - 84)  ABP: --  ABP(mean): --  RR: 18 (21 Jun 2018 06:00) (16 - 18)  SpO2: 95% (21 Jun 2018 06:00) (93% - 100%)    I&O's Detail    19 Jun 2018 07:01  -  20 Jun 2018 07:00  --------------------------------------------------------  IN:    Oral Fluid: 480 mL  Total IN: 480 mL    OUT:  Total OUT: 0 mL    Total NET: 480 mL      20 Jun 2018 07:01  -  21 Jun 2018 06:24  --------------------------------------------------------  IN:  Total IN: 0 mL    OUT:    Indwelling Catheter - Urethral: 45 mL    Indwelling Catheter - Urethral: 330 mL  Total OUT: 375 mL    Total NET: -375 mL      MEDICATIONS  (STANDING):  atorvastatin 40 milliGRAM(s) Oral at bedtime  carvedilol 3.125 milliGRAM(s) Oral every 12 hours  dextrose 5%. 1000 milliLiter(s) (50 mL/Hr) IV Continuous <Continuous>  dextrose 50% Injectable 12.5 Gram(s) IV Push once  dextrose 50% Injectable 25 Gram(s) IV Push once  dextrose 50% Injectable 25 Gram(s) IV Push once  docusate sodium 100 milliGRAM(s) Oral two times a day  famotidine    Tablet 20 milliGRAM(s) Oral daily  heparin  Injectable 5000 Unit(s) SubCutaneous every 12 hours  insulin lispro (HumaLOG) corrective regimen sliding scale   SubCutaneous three times a day before meals  insulin lispro (HumaLOG) corrective regimen sliding scale   SubCutaneous at bedtime  lactated ringers. 1000 milliLiter(s) (75 mL/Hr) IV Continuous <Continuous>  lisinopril 2.5 milliGRAM(s) Oral daily  mirtazapine 15 milliGRAM(s) Oral at bedtime  morphine ER Tablet 15 milliGRAM(s) Oral every 12 hours  polyethylene glycol 3350 17 Gram(s) Oral daily  senna 2 Tablet(s) Oral at bedtime    MEDICATIONS  (PRN):  acetaminophen   Tablet 650 milliGRAM(s) Oral every 6 hours PRN Mild Pain (1 - 3)  bisacodyl Suppository 10 milliGRAM(s) Rectal daily PRN Constipation  dextrose 40% Gel 15 Gram(s) Oral once PRN Blood Glucose LESS THAN 70 milliGRAM(s)/deciliter  glucagon  Injectable 1 milliGRAM(s) IntraMuscular once PRN Glucose LESS THAN 70 milligrams/deciliter  morphine  IR 15 milliGRAM(s) Oral every 8 hours PRN Severe Pain (7 - 10)  ondansetron Injectable 4 milliGRAM(s) IV Push every 6 hours PRN Nausea and/or Vomiting    General:  Sitting up in bed, appears comfortable  Chest:  Breath sounds audible bilaterally; no wheezing, rales or ronchi  Abdomen:  Soft, appropriately tender, non distended; colostomy healthy and pink  Extremities:  Warm, well perfused                          10.6   13.63 )-----------( 339      ( 20 Jun 2018 07:59 )             33.2   06-20    131<L>  |  94<L>  |  11  ----------------------------<  114<H>  3.9   |  25  |  0.51    Ca    8.4      20 Jun 2018 06:40    TPro  6.8  /  Alb  2.6<L>  /  TBili  0.2  /  DBili  x   /  AST  24  /  ALT  16  /  AlkPhos  89  06-19

## 2018-06-21 NOTE — CONSULT NOTE ADULT - ASSESSMENT
pt well known to me, seen here and then on readmission at [facility].  pt with locally advanced rectal cancer.  now s/p diversion.  continue conservative care.  await bowel function

## 2018-06-21 NOTE — PROGRESS NOTE ADULT - SUBJECTIVE AND OBJECTIVE BOX
- Patient seen and examined.  - In summary, patient is a 78 year old woman who presented with fever (08 Jun 2018 15:36)  - Today, patient is without complaints.         *****MEDICATIONS:    MEDICATIONS  (STANDING):  atorvastatin 40 milliGRAM(s) Oral at bedtime  carvedilol 3.125 milliGRAM(s) Oral every 12 hours  dextrose 5%. 1000 milliLiter(s) (50 mL/Hr) IV Continuous <Continuous>  dextrose 50% Injectable 12.5 Gram(s) IV Push once  dextrose 50% Injectable 25 Gram(s) IV Push once  dextrose 50% Injectable 25 Gram(s) IV Push once  docusate sodium 100 milliGRAM(s) Oral two times a day  famotidine    Tablet 20 milliGRAM(s) Oral daily  heparin  Injectable 5000 Unit(s) SubCutaneous every 12 hours  insulin lispro (HumaLOG) corrective regimen sliding scale   SubCutaneous three times a day before meals  insulin lispro (HumaLOG) corrective regimen sliding scale   SubCutaneous at bedtime  lactated ringers. 1000 milliLiter(s) (75 mL/Hr) IV Continuous <Continuous>  lisinopril 2.5 milliGRAM(s) Oral daily  mirtazapine 15 milliGRAM(s) Oral at bedtime  morphine ER Tablet 15 milliGRAM(s) Oral every 12 hours  polyethylene glycol 3350 17 Gram(s) Oral daily  senna 2 Tablet(s) Oral at bedtime    MEDICATIONS  (PRN):  acetaminophen   Tablet 650 milliGRAM(s) Oral every 6 hours PRN Mild Pain (1 - 3)  bisacodyl Suppository 10 milliGRAM(s) Rectal daily PRN Constipation  dextrose 40% Gel 15 Gram(s) Oral once PRN Blood Glucose LESS THAN 70 milliGRAM(s)/deciliter  glucagon  Injectable 1 milliGRAM(s) IntraMuscular once PRN Glucose LESS THAN 70 milligrams/deciliter  morphine  IR 15 milliGRAM(s) Oral every 8 hours PRN Severe Pain (7 - 10)  ondansetron Injectable 4 milliGRAM(s) IV Push every 6 hours PRN Nausea and/or Vomiting             ***** REVIEW OF SYSTEM:  GEN: no fever, no chills, no pain  RESP: no SOB, no cough, no sputum  CVS: no chest pain, no palpitations, no edema  GI: no abdominal pain, no nausea, no vomiting, no constipation, no diarrhea  : no dysuria, no frequency  NEURO: no headache, no dizziness  PSYCH: no depression, not anxious  Derm : no itching, no rash         ***** VITAL SIGNS:             T(F): 99.2 (06-21-18 @ 06:00), Max: 99.2 (06-20-18 @ 12:52)  HR: 83 (06-21-18 @ 06:00) (53 - 89)  BP: 175/64 (06-21-18 @ 06:00) (85/43 - 175/64)  RR: 18 (06-21-18 @ 06:00) (16 - 18)  SpO2: 95% (06-21-18 @ 06:00) (93% - 100%)  Wt(kg): --  ,   I&O's Summary    20 Jun 2018 07:01  -  21 Jun 2018 07:00  --------------------------------------------------------  IN: 0 mL / OUT: 375 mL / NET: -375 mL                   *****PHYSICAL EXAM:  GEN: A&O X 3 , NAD , comfortable  HEENT: NCAT, EOMI, MMM, no icterus  NECK: Supple, No JVD  CVS: S1S2 , regular , No M/R/G appreciated  PULM: CTA B/L,  no W/R/R appreciated  ABD.: soft. non tender, non distended,  bowel sounds present  Extrem: intact pulses , no edema noted  Derm: No rash or ecchymosis noted  PSYCH: normal mood, no depression, not anxious         *****LAB AND IMAGING:                                  10.6   13.63 )-----------( 339      ( 20 Jun 2018 07:59 )             33.2               06-20    131<L>  |  94<L>  |  11  ----------------------------<  114<H>  3.9   |  25  |  0.51    Ca    8.4      20 Jun 2018 06:40    TPro  6.8  /  Alb  2.6<L>  /  TBili  0.2  /  DBili  x   /  AST  24  /  ALT  16  /  AlkPhos  89  06-19    PT/INR - ( 19 Jun 2018 14:22 )   PT: 12.0 sec;   INR: 1.11 ratio         PTT - ( 19 Jun 2018 14:22 )  PTT:34.3 sec                   [All pertinent recent Imaging/Reports reviewed]  < from: Transthoracic Echocardiogram (04.16.18 @ 07:08) >  Conclusions:  1. Normal left ventricular internal dimensions and wall  thicknesses.  2. Normal left ventricular systolic function. No segmental  wall motion abnormalities.  3. Normal diastolic function  4. Normal right ventricular sizeand systolic function.  5. Estimated pulmonary artery systolic pressure equals 36  mm Hg, assuming right atrial pressure equals 8 mm Hg,  consistent with borderline pulmonary pressures.  *** Compared with echocardiogram of 2/28/2017, no  significant changes noted.    < end of copied text >         *****A S S E S S M E N T   A N D   P L A N :  78F with rectal cancer adm with fever  abx per ID  cont current meds  oncology f/u  s/p ostomy  surg f/u noted  PO as able  echo noted  DCP once tolerating diet if no in-pt plan per onc    __________________________  YON Metcalf D.O.

## 2018-06-22 LAB
ANION GAP SERPL CALC-SCNC: 13 MMOL/L — SIGNIFICANT CHANGE UP (ref 5–17)
BUN SERPL-MCNC: 6 MG/DL — LOW (ref 7–23)
CALCIUM SERPL-MCNC: 8.2 MG/DL — LOW (ref 8.4–10.5)
CHLORIDE SERPL-SCNC: 93 MMOL/L — LOW (ref 96–108)
CO2 SERPL-SCNC: 24 MMOL/L — SIGNIFICANT CHANGE UP (ref 22–31)
CREAT SERPL-MCNC: 0.4 MG/DL — LOW (ref 0.5–1.3)
GLUCOSE BLDC GLUCOMTR-MCNC: 114 MG/DL — HIGH (ref 70–99)
GLUCOSE BLDC GLUCOMTR-MCNC: 128 MG/DL — HIGH (ref 70–99)
GLUCOSE BLDC GLUCOMTR-MCNC: 143 MG/DL — HIGH (ref 70–99)
GLUCOSE BLDC GLUCOMTR-MCNC: 147 MG/DL — HIGH (ref 70–99)
GLUCOSE BLDC GLUCOMTR-MCNC: 78 MG/DL — SIGNIFICANT CHANGE UP (ref 70–99)
GLUCOSE SERPL-MCNC: 75 MG/DL — SIGNIFICANT CHANGE UP (ref 70–99)
HCT VFR BLD CALC: 28.8 % — LOW (ref 34.5–45)
HGB BLD-MCNC: 9.4 G/DL — LOW (ref 11.5–15.5)
MCHC RBC-ENTMCNC: 27.7 PG — SIGNIFICANT CHANGE UP (ref 27–34)
MCHC RBC-ENTMCNC: 32.6 GM/DL — SIGNIFICANT CHANGE UP (ref 32–36)
MCV RBC AUTO: 85 FL — SIGNIFICANT CHANGE UP (ref 80–100)
PLATELET # BLD AUTO: 298 K/UL — SIGNIFICANT CHANGE UP (ref 150–400)
POTASSIUM SERPL-MCNC: 3.4 MMOL/L — LOW (ref 3.5–5.3)
POTASSIUM SERPL-SCNC: 3.4 MMOL/L — LOW (ref 3.5–5.3)
RBC # BLD: 3.39 M/UL — LOW (ref 3.8–5.2)
RBC # FLD: 18.1 % — HIGH (ref 10.3–14.5)
SODIUM SERPL-SCNC: 130 MMOL/L — LOW (ref 135–145)
WBC # BLD: 10.74 K/UL — HIGH (ref 3.8–10.5)
WBC # FLD AUTO: 10.74 K/UL — HIGH (ref 3.8–10.5)

## 2018-06-22 RX ORDER — POTASSIUM CHLORIDE 20 MEQ
40 PACKET (EA) ORAL ONCE
Qty: 0 | Refills: 0 | Status: COMPLETED | OUTPATIENT
Start: 2018-06-22 | End: 2018-06-22

## 2018-06-22 RX ADMIN — Medication 0: at 22:53

## 2018-06-22 RX ADMIN — Medication 100 MILLIGRAM(S): at 05:16

## 2018-06-22 RX ADMIN — Medication 100 MILLIGRAM(S): at 18:04

## 2018-06-22 RX ADMIN — MORPHINE SULFATE 15 MILLIGRAM(S): 50 CAPSULE, EXTENDED RELEASE ORAL at 09:49

## 2018-06-22 RX ADMIN — SODIUM CHLORIDE 75 MILLILITER(S): 9 INJECTION, SOLUTION INTRAVENOUS at 09:50

## 2018-06-22 RX ADMIN — MORPHINE SULFATE 15 MILLIGRAM(S): 50 CAPSULE, EXTENDED RELEASE ORAL at 05:46

## 2018-06-22 RX ADMIN — MORPHINE SULFATE 15 MILLIGRAM(S): 50 CAPSULE, EXTENDED RELEASE ORAL at 05:16

## 2018-06-22 RX ADMIN — MORPHINE SULFATE 15 MILLIGRAM(S): 50 CAPSULE, EXTENDED RELEASE ORAL at 18:49

## 2018-06-22 RX ADMIN — CARVEDILOL PHOSPHATE 3.12 MILLIGRAM(S): 80 CAPSULE, EXTENDED RELEASE ORAL at 18:04

## 2018-06-22 RX ADMIN — MORPHINE SULFATE 15 MILLIGRAM(S): 50 CAPSULE, EXTENDED RELEASE ORAL at 10:19

## 2018-06-22 RX ADMIN — MIRTAZAPINE 15 MILLIGRAM(S): 45 TABLET, ORALLY DISINTEGRATING ORAL at 21:29

## 2018-06-22 RX ADMIN — HEPARIN SODIUM 5000 UNIT(S): 5000 INJECTION INTRAVENOUS; SUBCUTANEOUS at 05:16

## 2018-06-22 RX ADMIN — Medication 40 MILLIEQUIVALENT(S): at 15:33

## 2018-06-22 RX ADMIN — HEPARIN SODIUM 5000 UNIT(S): 5000 INJECTION INTRAVENOUS; SUBCUTANEOUS at 18:04

## 2018-06-22 RX ADMIN — MORPHINE SULFATE 15 MILLIGRAM(S): 50 CAPSULE, EXTENDED RELEASE ORAL at 19:19

## 2018-06-22 RX ADMIN — SODIUM CHLORIDE 75 MILLILITER(S): 9 INJECTION, SOLUTION INTRAVENOUS at 18:04

## 2018-06-22 RX ADMIN — FAMOTIDINE 20 MILLIGRAM(S): 10 INJECTION INTRAVENOUS at 09:50

## 2018-06-22 RX ADMIN — CARVEDILOL PHOSPHATE 3.12 MILLIGRAM(S): 80 CAPSULE, EXTENDED RELEASE ORAL at 05:16

## 2018-06-22 RX ADMIN — SENNA PLUS 2 TABLET(S): 8.6 TABLET ORAL at 21:29

## 2018-06-22 RX ADMIN — ATORVASTATIN CALCIUM 40 MILLIGRAM(S): 80 TABLET, FILM COATED ORAL at 21:29

## 2018-06-22 RX ADMIN — LISINOPRIL 2.5 MILLIGRAM(S): 2.5 TABLET ORAL at 05:16

## 2018-06-22 NOTE — PROGRESS NOTE ADULT - ASSESSMENT
Assessment and Plan:   · Assessment	  78 year old female with perforated rectal cancer POD 2 s/p creation of end sigmoid loop colostomy  -Patient is tolerating clear liquid diet  -Ostomy functioning, would advance diet as tolerated  -No labs yesterday, please send full set of labs today  -Pain control  -OOB and ambulate  -Ostomy care  -Continue care per primary team, colorectal surgery to follow

## 2018-06-22 NOTE — PROGRESS NOTE ADULT - SUBJECTIVE AND OBJECTIVE BOX
INTERVAL HPI/OVERNIGHT EVENTS: s/p colostomy, stable overnight    MEDICATIONS  (STANDING):  atorvastatin 40 milliGRAM(s) Oral at bedtime  carvedilol 3.125 milliGRAM(s) Oral every 12 hours  dextrose 5%. 1000 milliLiter(s) (50 mL/Hr) IV Continuous <Continuous>  dextrose 50% Injectable 12.5 Gram(s) IV Push once  dextrose 50% Injectable 25 Gram(s) IV Push once  dextrose 50% Injectable 25 Gram(s) IV Push once  docusate sodium 100 milliGRAM(s) Oral two times a day  famotidine    Tablet 20 milliGRAM(s) Oral daily  heparin  Injectable 5000 Unit(s) SubCutaneous every 12 hours  insulin lispro (HumaLOG) corrective regimen sliding scale   SubCutaneous three times a day before meals  insulin lispro (HumaLOG) corrective regimen sliding scale   SubCutaneous at bedtime  lactated ringers. 1000 milliLiter(s) (75 mL/Hr) IV Continuous <Continuous>  lisinopril 2.5 milliGRAM(s) Oral daily  mirtazapine 15 milliGRAM(s) Oral at bedtime  morphine ER Tablet 15 milliGRAM(s) Oral every 12 hours  polyethylene glycol 3350 17 Gram(s) Oral daily  senna 2 Tablet(s) Oral at bedtime    MEDICATIONS  (PRN):  acetaminophen   Tablet 650 milliGRAM(s) Oral every 6 hours PRN Mild Pain (1 - 3)  bisacodyl Suppository 10 milliGRAM(s) Rectal daily PRN Constipation  dextrose 40% Gel 15 Gram(s) Oral once PRN Blood Glucose LESS THAN 70 milliGRAM(s)/deciliter  glucagon  Injectable 1 milliGRAM(s) IntraMuscular once PRN Glucose LESS THAN 70 milligrams/deciliter  morphine  - Injectable 1 milliGRAM(s) IV Push every 6 hours PRN Severe Pain (7 - 10)  morphine  IR 15 milliGRAM(s) Oral every 8 hours PRN Severe Pain (7 - 10)  ondansetron Injectable 4 milliGRAM(s) IV Push every 6 hours PRN Nausea and/or Vomiting      Allergies    No Known Allergies    Intolerances            PHYSICAL EXAM:   Vital Signs:  Vital Signs Last 24 Hrs  T(C): 37.1 (22 Jun 2018 05:12), Max: 37.1 (22 Jun 2018 05:12)  T(F): 98.7 (22 Jun 2018 05:12), Max: 98.7 (22 Jun 2018 05:12)  HR: 86 (22 Jun 2018 05:12) (81 - 86)  BP: 167/74 (22 Jun 2018 05:12) (152/68 - 173/80)  BP(mean): --  RR: 18 (22 Jun 2018 05:12) (18 - 18)  SpO2: 96% (22 Jun 2018 05:12) (94% - 97%)  Daily     Daily     GENERAL:  no distress  HEENT:  NC/AT,  anicteric  CHEST:   no increased effort, breath sounds clear  HEART:  Regular rhythm  ABDOMEN:  Soft, expected post op tenderness, non-distended, normoactive bowel sounds,  LLQ ostomy functioning  EXTEREMITIES:  no cyanosis      LABS:                        10.4   12.7  )-----------( 358      ( 21 Jun 2018 18:46 )             33.2     06-22    130<L>  |  93<L>  |  6<L>  ----------------------------<  75  3.4<L>   |  24  |  0.40<L>    Ca    8.2<L>      22 Jun 2018 06:17            RADIOLOGY & ADDITIONAL TESTS:

## 2018-06-22 NOTE — PROGRESS NOTE ADULT - SUBJECTIVE AND OBJECTIVE BOX
COLORECTAL SURGERY PROGRESS NOTE    78F PMHx Stage III rectal cancer s/p incomplete RT course, PVD s/p R NUBIA, who presented to [facility] ED the afternoon of 6/8/18 from nursing home complaining of fever and chills. Patient recently admitted to [facility] (5/8-5/23) during which her rectal CA was diagnosed (EUS/colonoscopy performed suggested T3N1 mid-rectal tumor, adenocarcinoma. Started RT, until she was developed pelvic abscesses. Discharged on 2weeks IV antibiotics via PICC line, then transitioned to Augmentin with plan for continued surveillance. However she developed fevers & returned to hospital. Per family at bedside, patient was feeling weak, unable to walk, but otherwise denies any fevers, chills, nausea, vomiting, diarrhea, SOB, cough, rash, abdominal pain. Repeat CT imaging of abdomen and pelvis, showed persistent rectal neoplasm, w/ improvement in clint-rectal collections. There was also as stable spiculated nodule in the right upper lobe suspicious for neoplasm, previously seen. Initially, during [facility] hospitalization in May, plan for short course of RT, followed by interval surgery, however RT stopped 2/2 abscesses. Planned for discharge on IV abx w/ repeat imaging to assess for resolution of collections.  CT abdomen and pelvis shows minor improvement in collections    INTERVAL EVENTS:  Patient is POD 2 s/p creation of end sigmoid loop colostomy.  Patient did well overnight.  Complaining of some abdominal pain around the incisions. Tolerating clear liquid diet    ICU Vital Signs Last 24 Hrs  T(C): 37.1 (22 Jun 2018 05:12), Max: 37.1 (22 Jun 2018 05:12)  T(F): 98.7 (22 Jun 2018 05:12), Max: 98.7 (22 Jun 2018 05:12)  HR: 86 (22 Jun 2018 05:12) (81 - 86)  BP: 167/74 (22 Jun 2018 05:12) (152/68 - 173/80)  BP(mean): --  ABP: --  ABP(mean): --  RR: 18 (22 Jun 2018 05:12) (18 - 18)  SpO2: 96% (22 Jun 2018 05:12) (94% - 97%)    I&O's Detail    21 Jun 2018 07:01  -  22 Jun 2018 07:00  --------------------------------------------------------  IN:    lactated ringers.: 900 mL    Oral Fluid: 390 mL  Total IN: 1290 mL    OUT:    Indwelling Catheter - Urethral: 850 mL  Total OUT: 850 mL    Total NET: 440 mL      MEDICATIONS  (STANDING):  atorvastatin 40 milliGRAM(s) Oral at bedtime  carvedilol 3.125 milliGRAM(s) Oral every 12 hours  dextrose 5%. 1000 milliLiter(s) (50 mL/Hr) IV Continuous <Continuous>  dextrose 50% Injectable 12.5 Gram(s) IV Push once  dextrose 50% Injectable 25 Gram(s) IV Push once  dextrose 50% Injectable 25 Gram(s) IV Push once  docusate sodium 100 milliGRAM(s) Oral two times a day  famotidine    Tablet 20 milliGRAM(s) Oral daily  heparin  Injectable 5000 Unit(s) SubCutaneous every 12 hours  insulin lispro (HumaLOG) corrective regimen sliding scale   SubCutaneous three times a day before meals  insulin lispro (HumaLOG) corrective regimen sliding scale   SubCutaneous at bedtime  lactated ringers. 1000 milliLiter(s) (75 mL/Hr) IV Continuous <Continuous>  lisinopril 2.5 milliGRAM(s) Oral daily  mirtazapine 15 milliGRAM(s) Oral at bedtime  morphine ER Tablet 15 milliGRAM(s) Oral every 12 hours  polyethylene glycol 3350 17 Gram(s) Oral daily  senna 2 Tablet(s) Oral at bedtime    MEDICATIONS  (PRN):  acetaminophen   Tablet 650 milliGRAM(s) Oral every 6 hours PRN Mild Pain (1 - 3)  bisacodyl Suppository 10 milliGRAM(s) Rectal daily PRN Constipation  dextrose 40% Gel 15 Gram(s) Oral once PRN Blood Glucose LESS THAN 70 milliGRAM(s)/deciliter  glucagon  Injectable 1 milliGRAM(s) IntraMuscular once PRN Glucose LESS THAN 70 milligrams/deciliter  morphine  - Injectable 1 milliGRAM(s) IV Push every 6 hours PRN Severe Pain (7 - 10)  morphine  IR 15 milliGRAM(s) Oral every 8 hours PRN Severe Pain (7 - 10)  ondansetron Injectable 4 milliGRAM(s) IV Push every 6 hours PRN Nausea and/or Vomiting    General:  Sitting up in bed, appears comfortable  Chest:  Breath sounds audible bilaterally; no wheezing, rales or ronchi  Abdomen:  Soft, appropriately tender, non distended; colostomy healthy and pink  Extremities:  Warm, well perfused                          10.6   13.63 )-----------( 339      ( 20 Jun 2018 07:59 )             33.2   06-20    131<L>  |  94<L>  |  11  ----------------------------<  114<H>  3.9   |  25  |  0.51    Ca    8.4      20 Jun 2018 06:40    TPro  6.8  /  Alb  2.6<L>  /  TBili  0.2  /  DBili  x   /  AST  24  /  ALT  16  /  AlkPhos  89  06-19

## 2018-06-22 NOTE — PROGRESS NOTE ADULT - SUBJECTIVE AND OBJECTIVE BOX
resting  buck  REVIEW OF SYSTEMS:  GEN: no fever,    no chills  RESP: no SOB,   no cough  CVS: no chest pain,   no palpitations  GI: no abdominal pain,   no nausea,   no vomiting,   no constipation,   no diarrhea  : no dysuria,   no frequency  NEURO: no headache,   no dizziness  PSYCH: no depression,   not anxious  Derm : no rash    MEDICATIONS  (STANDING):  atorvastatin 40 milliGRAM(s) Oral at bedtime  carvedilol 3.125 milliGRAM(s) Oral every 12 hours  dextrose 5%. 1000 milliLiter(s) (50 mL/Hr) IV Continuous <Continuous>  dextrose 50% Injectable 12.5 Gram(s) IV Push once  dextrose 50% Injectable 25 Gram(s) IV Push once  dextrose 50% Injectable 25 Gram(s) IV Push once  docusate sodium 100 milliGRAM(s) Oral two times a day  famotidine    Tablet 20 milliGRAM(s) Oral daily  heparin  Injectable 5000 Unit(s) SubCutaneous every 12 hours  insulin lispro (HumaLOG) corrective regimen sliding scale   SubCutaneous three times a day before meals  insulin lispro (HumaLOG) corrective regimen sliding scale   SubCutaneous at bedtime  lactated ringers. 1000 milliLiter(s) (75 mL/Hr) IV Continuous <Continuous>  lisinopril 2.5 milliGRAM(s) Oral daily  mirtazapine 15 milliGRAM(s) Oral at bedtime  morphine ER Tablet 15 milliGRAM(s) Oral every 12 hours  polyethylene glycol 3350 17 Gram(s) Oral daily  potassium chloride   Solution 40 milliEquivalent(s) Oral once  senna 2 Tablet(s) Oral at bedtime    MEDICATIONS  (PRN):  acetaminophen   Tablet 650 milliGRAM(s) Oral every 6 hours PRN Mild Pain (1 - 3)  bisacodyl Suppository 10 milliGRAM(s) Rectal daily PRN Constipation  dextrose 40% Gel 15 Gram(s) Oral once PRN Blood Glucose LESS THAN 70 milliGRAM(s)/deciliter  glucagon  Injectable 1 milliGRAM(s) IntraMuscular once PRN Glucose LESS THAN 70 milligrams/deciliter  morphine  - Injectable 1 milliGRAM(s) IV Push every 6 hours PRN Severe Pain (7 - 10)  morphine  IR 15 milliGRAM(s) Oral every 8 hours PRN Severe Pain (7 - 10)  ondansetron Injectable 4 milliGRAM(s) IV Push every 6 hours PRN Nausea and/or Vomiting      Vital Signs Last 24 Hrs  T(C): 37.1 (22 Jun 2018 05:12), Max: 37.1 (22 Jun 2018 05:12)  T(F): 98.7 (22 Jun 2018 05:12), Max: 98.7 (22 Jun 2018 05:12)  HR: 86 (22 Jun 2018 05:12) (83 - 86)  BP: 167/74 (22 Jun 2018 05:12) (152/68 - 167/74)  BP(mean): --  RR: 18 (22 Jun 2018 05:12) (18 - 18)  SpO2: 96% (22 Jun 2018 05:12) (94% - 97%)  CAPILLARY BLOOD GLUCOSE      POCT Blood Glucose.: 78 mg/dL (22 Jun 2018 09:02)  POCT Blood Glucose.: 97 mg/dL (21 Jun 2018 21:55)  POCT Blood Glucose.: 117 mg/dL (21 Jun 2018 17:23)  POCT Blood Glucose.: 112 mg/dL (21 Jun 2018 12:53)    I&O's Summary    21 Jun 2018 07:01  -  22 Jun 2018 07:00  --------------------------------------------------------  IN: 1290 mL / OUT: 850 mL / NET: 440 mL        PHYSICAL EXAM:  HEAD:  Atraumatic, Normocephalic  NECK: Supple, No   JVD  CHEST/LUNG:   no     rales,     no,    rhonchi  HEART: Regular rate and rhythm;         murmur  ABDOMEN: Soft, Nontender, ;   EXTREMITIES:        edema  NEUROLOGY:  alert    LABS:                        9.4    10.74 )-----------( 298      ( 22 Jun 2018 08:07 )             28.8     06-22    130<L>  |  93<L>  |  6<L>  ----------------------------<  75  3.4<L>   |  24  |  0.40<L>    Ca    8.2<L>      22 Jun 2018 06:17                    Hemoglobin A1C, Whole Blood: 6.6 % (05-09 @ 06:52)    Thyroid Stimulating Hormone, Serum: 1.00 uIU/mL (05-18 @ 06:35)          Consultant(s) Notes Reviewed:      Care Discussed with Consultants/Other Providers:

## 2018-06-22 NOTE — PROGRESS NOTE ADULT - SUBJECTIVE AND OBJECTIVE BOX
- Patient seen and examined.  - In summary, patient is a 78 year old woman who presented with fever (2018 15:36)  - Today, patient is without complaints.         *****MEDICATIONS:    MEDICATIONS  (STANDING):  atorvastatin 40 milliGRAM(s) Oral at bedtime  carvedilol 3.125 milliGRAM(s) Oral every 12 hours  dextrose 5%. 1000 milliLiter(s) (50 mL/Hr) IV Continuous <Continuous>  dextrose 50% Injectable 12.5 Gram(s) IV Push once  dextrose 50% Injectable 25 Gram(s) IV Push once  dextrose 50% Injectable 25 Gram(s) IV Push once  docusate sodium 100 milliGRAM(s) Oral two times a day  famotidine    Tablet 20 milliGRAM(s) Oral daily  heparin  Injectable 5000 Unit(s) SubCutaneous every 12 hours  insulin lispro (HumaLOG) corrective regimen sliding scale   SubCutaneous three times a day before meals  insulin lispro (HumaLOG) corrective regimen sliding scale   SubCutaneous at bedtime  lactated ringers. 1000 milliLiter(s) (75 mL/Hr) IV Continuous <Continuous>  lisinopril 2.5 milliGRAM(s) Oral daily  mirtazapine 15 milliGRAM(s) Oral at bedtime  morphine ER Tablet 15 milliGRAM(s) Oral every 12 hours  polyethylene glycol 3350 17 Gram(s) Oral daily  senna 2 Tablet(s) Oral at bedtime    MEDICATIONS  (PRN):  acetaminophen   Tablet 650 milliGRAM(s) Oral every 6 hours PRN Mild Pain (1 - 3)  bisacodyl Suppository 10 milliGRAM(s) Rectal daily PRN Constipation  dextrose 40% Gel 15 Gram(s) Oral once PRN Blood Glucose LESS THAN 70 milliGRAM(s)/deciliter  glucagon  Injectable 1 milliGRAM(s) IntraMuscular once PRN Glucose LESS THAN 70 milligrams/deciliter  morphine  - Injectable 1 milliGRAM(s) IV Push every 6 hours PRN Severe Pain (7 - 10)  morphine  IR 15 milliGRAM(s) Oral every 8 hours PRN Severe Pain (7 - 10)  ondansetron Injectable 4 milliGRAM(s) IV Push every 6 hours PRN Nausea and/or Vomiting             ***** REVIEW OF SYSTEM:  GEN: no fever, no chills, no pain  RESP: no SOB, no cough, no sputum  CVS: no chest pain, no palpitations, no edema  GI: no abdominal pain, no nausea, no vomiting, no constipation, no diarrhea  : no dysuria, no frequency  NEURO: no headache, no dizziness  PSYCH: no depression, not anxious  Derm : no itching, no rash         ***** VITAL SIGNS:             T(F): 98.7 (18 @ 05:12), Max: 98.7 (18 @ 05:12)  HR: 86 (18 @ 05:12) (81 - 86)  BP: 167/74 (18 @ 05:12) (152/68 - 173/80)  RR: 18 (18 @ 05:12) (18 - 18)  SpO2: 96% (18 @ 05:12) (94% - 97%)  Wt(kg): --  ,   I&O's Summary    2018 07:01  -  2018 07:00  --------------------------------------------------------  IN: 1290 mL / OUT: 850 mL / NET: 440 mL               *****PHYSICAL EXAM:  GEN: A&O X 3 , NAD , comfortable  HEENT: NCAT, EOMI, MMM, no icterus  NECK: Supple, No JVD  CVS: S1S2 , regular , No M/R/G appreciated  PULM: CTA B/L,  no W/R/R appreciated  ABD.: soft. non tender, non distended,  bowel sounds present  Extrem: intact pulses , no edema noted  Derm: No rash or ecchymosis noted  PSYCH: normal mood, no depression, not anxious         *****LAB AND IMAGIN.4    10.74 )-----------( 298      ( 2018 08:07 )             28.8                   130<L>  |  93<L>  |  6<L>  ----------------------------<  75  3.4<L>   |  24  |  0.40<L>    Ca    8.2<L>      2018 06:17                           [All pertinent recent Imaging/Reports reviewed]  < from: Transthoracic Echocardiogram (18 @ 07:08) >  Conclusions:  1. Normal left ventricular internal dimensions and wall  thicknesses.  2. Normal left ventricular systolic function. No segmental  wall motion abnormalities.  3. Normal diastolic function  4. Normal right ventricular sizeand systolic function.  5. Estimated pulmonary artery systolic pressure equals 36  mm Hg, assuming right atrial pressure equals 8 mm Hg,  consistent with borderline pulmonary pressures.  *** Compared with echocardiogram of 2017, no  significant changes noted.    < end of copied text >         *****A S S E S S M E N T   A N D   P L A N :  78F with rectal cancer adm with fever  echo noted  abx per ID  cont current meds  oncology f/u  s/p ostomy  surg f/u noted  advance diet  DCP when clear by CRS      __________________________  YON Metcalf D.O.

## 2018-06-22 NOTE — PROGRESS NOTE ADULT - ASSESSMENT
78F with rectal cancer, dm,  htn,  adm with fever,  afebrile  now, s/p ab    rectal fluid  collections/ pna  proximal diversion/ostomy,   dr ferreira   oncology, RT saw  pt  per  ID,  stop  ab,    s/p  colectomy/ sigmoid   colostomy  buck  pt  to  see pt

## 2018-06-22 NOTE — CHART NOTE - NSCHARTNOTEFT_GEN_A_CORE
Malnutrition followup. Patient currently tolerating clear liquid diet, taking in very little. Discussed with NP, per MD OK to advance diet.    Chart review, events noted.  Patient is POD 2 s/p creation of end sigmoid loop colostomy. Patient Complaining of some abdominal pain around the incisions.     Admission history  78F PMHx Stage III rectal cancer s/p incomplete RT course, PVD s/p R AKROBERTO, who presented to [facility] ED the afternoon of 6/8/18 from nursing home complaining of fever and chills. Patient recently admitted to [facility] (5/8-5/23) during which her rectal CA was diagnosed (EUS/colonoscopy performed suggested T3N1 mid-rectal tumor, adenocarcinoma. Started RT, until she was developed pelvic abscesses. Discharged on 2weeks IV antibiotics via PICC line, then transitioned to Augmentin with plan for continued surveillance. However she developed fevers & returned to hospital.   Diet : clear liquid           PO intake:  [X ]  other : 25%   Source for PO intake [ ] Patient [ ] family [ ] chart [ X] staff [ ] other     Enteral /Parenteral Nutrition:   NA      Current Weight: 41kg  Dosing Weight Change 41.2kg    Pertinent Medications: MEDICATIONS  (STANDING):  atorvastatin 40 milliGRAM(s) Oral at bedtime  carvedilol 3.125 milliGRAM(s) Oral every 12 hours  dextrose 5%. 1000 milliLiter(s) (50 mL/Hr) IV Continuous <Continuous>  dextrose 50% Injectable 12.5 Gram(s) IV Push once  dextrose 50% Injectable 25 Gram(s) IV Push once  dextrose 50% Injectable 25 Gram(s) IV Push once  docusate sodium 100 milliGRAM(s) Oral two times a day  famotidine    Tablet 20 milliGRAM(s) Oral daily  heparin  Injectable 5000 Unit(s) SubCutaneous every 12 hours  insulin lispro (HumaLOG) corrective regimen sliding scale   SubCutaneous three times a day before meals  insulin lispro (HumaLOG) corrective regimen sliding scale   SubCutaneous at bedtime  lactated ringers. 1000 milliLiter(s) (75 mL/Hr) IV Continuous <Continuous>  lisinopril 2.5 milliGRAM(s) Oral daily  mirtazapine 15 milliGRAM(s) Oral at bedtime  morphine ER Tablet 15 milliGRAM(s) Oral every 12 hours  polyethylene glycol 3350 17 Gram(s) Oral daily  potassium chloride   Solution 40 milliEquivalent(s) Oral once  senna 2 Tablet(s) Oral at bedtime    MEDICATIONS  (PRN):  acetaminophen   Tablet 650 milliGRAM(s) Oral every 6 hours PRN Mild Pain (1 - 3)  bisacodyl Suppository 10 milliGRAM(s) Rectal daily PRN Constipation  dextrose 40% Gel 15 Gram(s) Oral once PRN Blood Glucose LESS THAN 70 milliGRAM(s)/deciliter  glucagon  Injectable 1 milliGRAM(s) IntraMuscular once PRN Glucose LESS THAN 70 milligrams/deciliter  morphine  - Injectable 1 milliGRAM(s) IV Push every 6 hours PRN Severe Pain (7 - 10)  morphine  IR 15 milliGRAM(s) Oral every 8 hours PRN Severe Pain (7 - 10)  ondansetron Injectable 4 milliGRAM(s) IV Push every 6 hours PRN Nausea and/or Vomiting    Pertinent Labs:  06-22 Na130 mmol/L<L> Glu 75 mg/dL K+ 3.4 mmol/L<L> Cr  0.40 mg/dL<L> BUN 6 mg/dL<L> 06-19 Alb 2.6 g/dL<L>      Skin: wound at colostomy site, no pressure breakdown noted    Estimated Needs:   [X ] no change since previous assessment  [ ] recalculated:       Previous Nutrition Diagnosis:    [X ] Malnutrition          Nutrition Diagnosis is [X ] ongoing  [ ] resolved [ ] not applicable              Recommend    [X ] Change Diet To: Soft    [X ] Nutrition Supplement :     add prosource x1 daily,  add ensure enlive 1x/day        [X ] Other: discussed with NP       Monitoring and Evaluation:      [X ] Tolerance to diet prescription [X ] weights [X ] follow up per protocol    [X ] other: assist with tray prep. menus

## 2018-06-23 LAB
ANION GAP SERPL CALC-SCNC: 10 MMOL/L — SIGNIFICANT CHANGE UP (ref 5–17)
BUN SERPL-MCNC: <4 MG/DL — LOW (ref 7–23)
CALCIUM SERPL-MCNC: 8.3 MG/DL — LOW (ref 8.4–10.5)
CHLORIDE SERPL-SCNC: 95 MMOL/L — LOW (ref 96–108)
CO2 SERPL-SCNC: 27 MMOL/L — SIGNIFICANT CHANGE UP (ref 22–31)
CREAT SERPL-MCNC: 0.41 MG/DL — LOW (ref 0.5–1.3)
GLUCOSE BLDC GLUCOMTR-MCNC: 113 MG/DL — HIGH (ref 70–99)
GLUCOSE BLDC GLUCOMTR-MCNC: 167 MG/DL — HIGH (ref 70–99)
GLUCOSE BLDC GLUCOMTR-MCNC: 197 MG/DL — HIGH (ref 70–99)
GLUCOSE BLDC GLUCOMTR-MCNC: 203 MG/DL — HIGH (ref 70–99)
GLUCOSE SERPL-MCNC: 120 MG/DL — HIGH (ref 70–99)
HCT VFR BLD CALC: 30.5 % — LOW (ref 34.5–45)
HGB BLD-MCNC: 9.8 G/DL — LOW (ref 11.5–15.5)
MAGNESIUM SERPL-MCNC: 1.8 MG/DL — SIGNIFICANT CHANGE UP (ref 1.6–2.6)
MCHC RBC-ENTMCNC: 28 PG — SIGNIFICANT CHANGE UP (ref 27–34)
MCHC RBC-ENTMCNC: 32.1 GM/DL — SIGNIFICANT CHANGE UP (ref 32–36)
MCV RBC AUTO: 87.1 FL — SIGNIFICANT CHANGE UP (ref 80–100)
PHOSPHATE SERPL-MCNC: 1.3 MG/DL — LOW (ref 2.5–4.5)
PLATELET # BLD AUTO: 345 K/UL — SIGNIFICANT CHANGE UP (ref 150–400)
POTASSIUM SERPL-MCNC: 3.8 MMOL/L — SIGNIFICANT CHANGE UP (ref 3.5–5.3)
POTASSIUM SERPL-SCNC: 3.8 MMOL/L — SIGNIFICANT CHANGE UP (ref 3.5–5.3)
RBC # BLD: 3.5 M/UL — LOW (ref 3.8–5.2)
RBC # FLD: 18.1 % — HIGH (ref 10.3–14.5)
SODIUM SERPL-SCNC: 132 MMOL/L — LOW (ref 135–145)
WBC # BLD: 9.08 K/UL — SIGNIFICANT CHANGE UP (ref 3.8–10.5)
WBC # FLD AUTO: 9.08 K/UL — SIGNIFICANT CHANGE UP (ref 3.8–10.5)

## 2018-06-23 RX ADMIN — FAMOTIDINE 20 MILLIGRAM(S): 10 INJECTION INTRAVENOUS at 12:36

## 2018-06-23 RX ADMIN — Medication 100 MILLIGRAM(S): at 05:01

## 2018-06-23 RX ADMIN — CARVEDILOL PHOSPHATE 3.12 MILLIGRAM(S): 80 CAPSULE, EXTENDED RELEASE ORAL at 05:01

## 2018-06-23 RX ADMIN — MORPHINE SULFATE 15 MILLIGRAM(S): 50 CAPSULE, EXTENDED RELEASE ORAL at 04:51

## 2018-06-23 RX ADMIN — POLYETHYLENE GLYCOL 3350 17 GRAM(S): 17 POWDER, FOR SOLUTION ORAL at 12:36

## 2018-06-23 RX ADMIN — MORPHINE SULFATE 15 MILLIGRAM(S): 50 CAPSULE, EXTENDED RELEASE ORAL at 05:21

## 2018-06-23 RX ADMIN — HEPARIN SODIUM 5000 UNIT(S): 5000 INJECTION INTRAVENOUS; SUBCUTANEOUS at 17:25

## 2018-06-23 RX ADMIN — MORPHINE SULFATE 15 MILLIGRAM(S): 50 CAPSULE, EXTENDED RELEASE ORAL at 18:54

## 2018-06-23 RX ADMIN — MORPHINE SULFATE 15 MILLIGRAM(S): 50 CAPSULE, EXTENDED RELEASE ORAL at 19:26

## 2018-06-23 RX ADMIN — LISINOPRIL 2.5 MILLIGRAM(S): 2.5 TABLET ORAL at 05:01

## 2018-06-23 RX ADMIN — HEPARIN SODIUM 5000 UNIT(S): 5000 INJECTION INTRAVENOUS; SUBCUTANEOUS at 05:01

## 2018-06-23 RX ADMIN — SODIUM CHLORIDE 75 MILLILITER(S): 9 INJECTION, SOLUTION INTRAVENOUS at 22:00

## 2018-06-23 RX ADMIN — SENNA PLUS 2 TABLET(S): 8.6 TABLET ORAL at 21:49

## 2018-06-23 RX ADMIN — MORPHINE SULFATE 15 MILLIGRAM(S): 50 CAPSULE, EXTENDED RELEASE ORAL at 07:23

## 2018-06-23 RX ADMIN — ATORVASTATIN CALCIUM 40 MILLIGRAM(S): 80 TABLET, FILM COATED ORAL at 21:49

## 2018-06-23 RX ADMIN — Medication 0: at 22:00

## 2018-06-23 RX ADMIN — MIRTAZAPINE 15 MILLIGRAM(S): 45 TABLET, ORALLY DISINTEGRATING ORAL at 21:49

## 2018-06-23 RX ADMIN — CARVEDILOL PHOSPHATE 3.12 MILLIGRAM(S): 80 CAPSULE, EXTENDED RELEASE ORAL at 17:25

## 2018-06-23 NOTE — PROGRESS NOTE ADULT - SUBJECTIVE AND OBJECTIVE BOX
- Patient seen and examined.  - In summary, patient is a 78 year old woman who presented with fever (2018 15:36)  - Today, patient is without complaints.         *****MEDICATIONS:    MEDICATIONS  (STANDING):  atorvastatin 40 milliGRAM(s) Oral at bedtime  carvedilol 3.125 milliGRAM(s) Oral every 12 hours  dextrose 5%. 1000 milliLiter(s) (50 mL/Hr) IV Continuous <Continuous>  dextrose 50% Injectable 12.5 Gram(s) IV Push once  dextrose 50% Injectable 25 Gram(s) IV Push once  dextrose 50% Injectable 25 Gram(s) IV Push once  docusate sodium 100 milliGRAM(s) Oral two times a day  famotidine    Tablet 20 milliGRAM(s) Oral daily  heparin  Injectable 5000 Unit(s) SubCutaneous every 12 hours  insulin lispro (HumaLOG) corrective regimen sliding scale   SubCutaneous three times a day before meals  insulin lispro (HumaLOG) corrective regimen sliding scale   SubCutaneous at bedtime  lactated ringers. 1000 milliLiter(s) (75 mL/Hr) IV Continuous <Continuous>  lisinopril 2.5 milliGRAM(s) Oral daily  mirtazapine 15 milliGRAM(s) Oral at bedtime  morphine ER Tablet 15 milliGRAM(s) Oral every 12 hours  polyethylene glycol 3350 17 Gram(s) Oral daily  senna 2 Tablet(s) Oral at bedtime    MEDICATIONS  (PRN):  acetaminophen   Tablet 650 milliGRAM(s) Oral every 6 hours PRN Mild Pain (1 - 3)  bisacodyl Suppository 10 milliGRAM(s) Rectal daily PRN Constipation  dextrose 40% Gel 15 Gram(s) Oral once PRN Blood Glucose LESS THAN 70 milliGRAM(s)/deciliter  glucagon  Injectable 1 milliGRAM(s) IntraMuscular once PRN Glucose LESS THAN 70 milligrams/deciliter  morphine  - Injectable 1 milliGRAM(s) IV Push every 6 hours PRN Severe Pain (7 - 10)  morphine  IR 15 milliGRAM(s) Oral every 8 hours PRN Severe Pain (7 - 10)  ondansetron Injectable 4 milliGRAM(s) IV Push every 6 hours PRN Nausea and/or Vomiting               ***** REVIEW OF SYSTEM:  GEN: no fever, no chills, no pain  RESP: no SOB, no cough, no sputum  CVS: no chest pain, no palpitations, no edema  GI: no abdominal pain, no nausea, no vomiting, no constipation, no diarrhea  : no dysuria, no frequency  NEURO: no headache, no dizziness  PSYCH: no depression, not anxious  Derm : no itching, no rash         ***** VITAL SIGNS:             T(F): 97.9 (18 @ 14:16), Max: 98.2 (18 @ 23:57)  HR: 97 (18 @ 14:16) (76 - 97)  BP: 149/59 (18 @ 14:16) (122/66 - 151/70)  RR: 18 (18 @ 14:16) (18 - 18)  SpO2: 96% (18 @ 14:16) (94% - 97%)  Wt(kg): --  ,   I&O's Summary    2018 07:  -  2018 07:00  --------------------------------------------------------  IN: 2000 mL / OUT: 350 mL / NET: 1650 mL    2018 07:  -  2018 14:24  --------------------------------------------------------  IN: 100 mL / OUT: 0 mL / NET: 100 mL                   *****PHYSICAL EXAM:  GEN: A&O X 3 , NAD , comfortable  HEENT: NCAT, EOMI, MMM, no icterus  NECK: Supple, No JVD  CVS: S1S2 , regular , No M/R/G appreciated  PULM: CTA B/L,  no W/R/R appreciated  ABD.: soft. non tender, non distended,  bowel sounds present  Extrem: intact pulses , no edema noted  Derm: No rash or ecchymosis noted  PSYCH: normal mood, no depression, not anxious         *****LAB AND IMAGIN.8    9.08  )-----------( 345      ( 2018 08:13 )             30.5               -    132<L>  |  95<L>  |  <4<L>  ----------------------------<  120<H>  3.8   |  27  |  0.41<L>    Ca    8.3<L>      2018 06:35  Phos  1.3       Mg     1.8                   [All pertinent recent Imaging/Reports reviewed]  < from: Transthoracic Echocardiogram (18 @ 07:08) >  Conclusions:  1. Normal left ventricular internal dimensions and wall  thicknesses.  2. Normal left ventricular systolic function. No segmental  wall motion abnormalities.  3. Normal diastolic function  4. Normal right ventricular sizeand systolic function.  5. Estimated pulmonary artery systolic pressure equals 36  mm Hg, assuming right atrial pressure equals 8 mm Hg,  consistent with borderline pulmonary pressures.  *** Compared with echocardiogram of 2017, no  significant changes noted.    < end of copied text >         *****A S S E S S M E N T   A N D   P L A N :  78F with rectal cancer adm with fever  echo noted  abx per ID  cont current meds  oncology f/u  s/p ostomy  surg f/u appreciated  advance diet as tolerated  DCP when clear by CRS  PT    __________________________  YON Metcalf D.O.

## 2018-06-23 NOTE — PROGRESS NOTE ADULT - SUBJECTIVE AND OBJECTIVE BOX
INTERVAL HPI/OVERNIGHT EVENTS: daughter at bedside, c/o peristomal abd pain otherwise well    MEDICATIONS  (STANDING):  atorvastatin 40 milliGRAM(s) Oral at bedtime  carvedilol 3.125 milliGRAM(s) Oral every 12 hours  dextrose 5%. 1000 milliLiter(s) (50 mL/Hr) IV Continuous <Continuous>  dextrose 50% Injectable 12.5 Gram(s) IV Push once  dextrose 50% Injectable 25 Gram(s) IV Push once  dextrose 50% Injectable 25 Gram(s) IV Push once  docusate sodium 100 milliGRAM(s) Oral two times a day  famotidine    Tablet 20 milliGRAM(s) Oral daily  heparin  Injectable 5000 Unit(s) SubCutaneous every 12 hours  insulin lispro (HumaLOG) corrective regimen sliding scale   SubCutaneous three times a day before meals  insulin lispro (HumaLOG) corrective regimen sliding scale   SubCutaneous at bedtime  lactated ringers. 1000 milliLiter(s) (75 mL/Hr) IV Continuous <Continuous>  lisinopril 2.5 milliGRAM(s) Oral daily  mirtazapine 15 milliGRAM(s) Oral at bedtime  morphine ER Tablet 15 milliGRAM(s) Oral every 12 hours  polyethylene glycol 3350 17 Gram(s) Oral daily  senna 2 Tablet(s) Oral at bedtime    MEDICATIONS  (PRN):  acetaminophen   Tablet 650 milliGRAM(s) Oral every 6 hours PRN Mild Pain (1 - 3)  bisacodyl Suppository 10 milliGRAM(s) Rectal daily PRN Constipation  dextrose 40% Gel 15 Gram(s) Oral once PRN Blood Glucose LESS THAN 70 milliGRAM(s)/deciliter  glucagon  Injectable 1 milliGRAM(s) IntraMuscular once PRN Glucose LESS THAN 70 milligrams/deciliter  morphine  - Injectable 1 milliGRAM(s) IV Push every 6 hours PRN Severe Pain (7 - 10)  morphine  IR 15 milliGRAM(s) Oral every 8 hours PRN Severe Pain (7 - 10)  ondansetron Injectable 4 milliGRAM(s) IV Push every 6 hours PRN Nausea and/or Vomiting      Allergies    No Known Allergies    Intolerances            PHYSICAL EXAM:   Vital Signs:  Vital Signs Last 24 Hrs  T(C): 36.7 (23 Jun 2018 10:48), Max: 36.8 (22 Jun 2018 23:57)  T(F): 98.1 (23 Jun 2018 10:48), Max: 98.2 (22 Jun 2018 23:57)  HR: 76 (23 Jun 2018 10:48) (76 - 84)  BP: 150/74 (23 Jun 2018 10:48) (122/66 - 151/70)  BP(mean): --  RR: 18 (23 Jun 2018 10:48) (18 - 18)  SpO2: 94% (23 Jun 2018 10:48) (94% - 97%)  Daily     Daily     GENERAL:  no distress  HEENT:  NC/AT,  anicteric  CHEST:   no increased effort, breath sounds clear  HEART:  Regular rhythm  ABDOMEN:  Soft, tenderness around colostomy, non-distended, normoactive bowel sounds,  ostomy functioning  EXTEREMITIES:  no cyanosis      LABS:                        9.8    9.08  )-----------( 345      ( 23 Jun 2018 08:13 )             30.5     06-23    132<L>  |  95<L>  |  <4<L>  ----------------------------<  120<H>  3.8   |  27  |  0.41<L>    Ca    8.3<L>      23 Jun 2018 06:35  Phos  1.3     06-23  Mg     1.8     06-23            RADIOLOGY & ADDITIONAL TESTS:

## 2018-06-23 NOTE — PROGRESS NOTE ADULT - ASSESSMENT
78F with rectal cancer, dm,  htn,  adm with fever,  afebrile  now, s/p ab    rectal fluid  collections/ pna  proximal diversion/ostomy,   dr ferreira   oncology, RT saw  pt  per  ID,  stop  ab,    s/p  colectomy/ sigmoid   colostomy  ostomy,  , good  function  awaiting disposition

## 2018-06-23 NOTE — PROGRESS NOTE ADULT - ASSESSMENT
78 year old female with perforated rectal cancer POD 3 s/p creation of end sigmoid loop colostomy  -Patient is tolerating regular liquid diet  -Ostomy functioning  -Pain control  -OOB and ambulate  -Ostomy care  -Continue care per primary team, colorectal surgery to follow

## 2018-06-23 NOTE — PROGRESS NOTE ADULT - SUBJECTIVE AND OBJECTIVE BOX
COLORECTAL SURGERY PROGRESS NOTE    78F PMHx Stage III rectal cancer s/p incomplete RT course, PVD s/p R NUBIA, who presented to [facility] ED the afternoon of 6/8/18 from nursing home complaining of fever and chills. Patient recently admitted to [facility] (5/8-5/23) during which her rectal CA was diagnosed (EUS/colonoscopy performed suggested T3N1 mid-rectal tumor, adenocarcinoma. Started RT, until she was developed pelvic abscesses. Discharged on 2weeks IV antibiotics via PICC line, then transitioned to Augmentin with plan for continued surveillance. However she developed fevers & returned to hospital. Per family at bedside, patient was feeling weak, unable to walk, but otherwise denies any fevers, chills, nausea, vomiting, diarrhea, SOB, cough, rash, abdominal pain. Repeat CT imaging of abdomen and pelvis, showed persistent rectal neoplasm, w/ improvement in clint-rectal collections. There was also as stable spiculated nodule in the right upper lobe suspicious for neoplasm, previously seen. Initially, during [facility] hospitalization in May, plan for short course of RT, followed by interval surgery, however RT stopped 2/2 abscesses. Planned for discharge on IV abx w/ repeat imaging to assess for resolution of collections.  CT abdomen and pelvis shows minor improvement in collections    INTERVAL EVENTS:  Patient is POD 3 s/p creation of end sigmoid loop colostomy.  Complaining of some abdominal pain around the incisions. Tolerating regular liquid diet.  Ostomy functional.      ICU Vital Signs Last 24 Hrs  T(C): 36.4 (23 Jun 2018 04:36), Max: 36.8 (22 Jun 2018 23:57)  T(F): 97.6 (23 Jun 2018 04:36), Max: 98.2 (22 Jun 2018 23:57)  HR: 83 (23 Jun 2018 04:36) (76 - 84)  BP: 141/59 (23 Jun 2018 04:36) (122/66 - 151/70)  BP(mean): --  ABP: --  ABP(mean): --  RR: 18 (23 Jun 2018 04:36) (18 - 18)  SpO2: 95% (23 Jun 2018 04:36) (95% - 97%)    I&O's Detail    22 Jun 2018 07:01  -  23 Jun 2018 07:00  --------------------------------------------------------  IN:    lactated ringers.: 900 mL    Oral Fluid: 600 mL    PRBCs (Packed Red Blood Cells): 500 mL  Total IN: 2000 mL    OUT:    Colostomy: 150 mL    Indwelling Catheter - Urethral: 200 mL  Total OUT: 350 mL    Total NET: 1650 mL      MEDICATIONS  (STANDING):  atorvastatin 40 milliGRAM(s) Oral at bedtime  carvedilol 3.125 milliGRAM(s) Oral every 12 hours  dextrose 5%. 1000 milliLiter(s) (50 mL/Hr) IV Continuous <Continuous>  dextrose 50% Injectable 12.5 Gram(s) IV Push once  dextrose 50% Injectable 25 Gram(s) IV Push once  dextrose 50% Injectable 25 Gram(s) IV Push once  docusate sodium 100 milliGRAM(s) Oral two times a day  famotidine    Tablet 20 milliGRAM(s) Oral daily  heparin  Injectable 5000 Unit(s) SubCutaneous every 12 hours  insulin lispro (HumaLOG) corrective regimen sliding scale   SubCutaneous three times a day before meals  insulin lispro (HumaLOG) corrective regimen sliding scale   SubCutaneous at bedtime  lactated ringers. 1000 milliLiter(s) (75 mL/Hr) IV Continuous <Continuous>  lisinopril 2.5 milliGRAM(s) Oral daily  mirtazapine 15 milliGRAM(s) Oral at bedtime  morphine ER Tablet 15 milliGRAM(s) Oral every 12 hours  polyethylene glycol 3350 17 Gram(s) Oral daily  senna 2 Tablet(s) Oral at bedtime    MEDICATIONS  (PRN):  acetaminophen   Tablet 650 milliGRAM(s) Oral every 6 hours PRN Mild Pain (1 - 3)  bisacodyl Suppository 10 milliGRAM(s) Rectal daily PRN Constipation  dextrose 40% Gel 15 Gram(s) Oral once PRN Blood Glucose LESS THAN 70 milliGRAM(s)/deciliter  glucagon  Injectable 1 milliGRAM(s) IntraMuscular once PRN Glucose LESS THAN 70 milligrams/deciliter  morphine  - Injectable 1 milliGRAM(s) IV Push every 6 hours PRN Severe Pain (7 - 10)  morphine  IR 15 milliGRAM(s) Oral every 8 hours PRN Severe Pain (7 - 10)  ondansetron Injectable 4 milliGRAM(s) IV Push every 6 hours PRN Nausea and/or Vomiting    General:  Sitting up in bed, appears comfortable  Chest:  Breath sounds audible bilaterally; no wheezing, rales or ronchi  Abdomen:  Soft, appropriately tender, non distended; colostomy healthy and pink  Extremities:  Warm, well perfused                          9.4    10.74 )-----------( 298      ( 22 Jun 2018 08:07 )             28.8   06-23    132<L>  |  95<L>  |  <4<L>  ----------------------------<  120<H>  3.8   |  27  |  0.41<L>    Ca    8.3<L>      23 Jun 2018 06:35  Phos  1.3     06-23  Mg     1.8     06-23

## 2018-06-23 NOTE — PROGRESS NOTE ADULT - SUBJECTIVE AND OBJECTIVE BOX
no  complaints  REVIEW OF SYSTEMS:  GEN: no fever,    no chills  RESP: no SOB,   no cough  CVS: no chest pain,   no palpitations  GI: no abdominal pain,   no nausea,   no vomiting,   no constipation,   no diarrhea  : no dysuria,   no frequency  NEURO: no headache,   no dizziness  PSYCH: no depression,   not anxious  Derm : no rash    MEDICATIONS  (STANDING):  atorvastatin 40 milliGRAM(s) Oral at bedtime  carvedilol 3.125 milliGRAM(s) Oral every 12 hours  dextrose 5%. 1000 milliLiter(s) (50 mL/Hr) IV Continuous <Continuous>  dextrose 50% Injectable 12.5 Gram(s) IV Push once  dextrose 50% Injectable 25 Gram(s) IV Push once  dextrose 50% Injectable 25 Gram(s) IV Push once  docusate sodium 100 milliGRAM(s) Oral two times a day  famotidine    Tablet 20 milliGRAM(s) Oral daily  heparin  Injectable 5000 Unit(s) SubCutaneous every 12 hours  insulin lispro (HumaLOG) corrective regimen sliding scale   SubCutaneous three times a day before meals  insulin lispro (HumaLOG) corrective regimen sliding scale   SubCutaneous at bedtime  lactated ringers. 1000 milliLiter(s) (75 mL/Hr) IV Continuous <Continuous>  lisinopril 2.5 milliGRAM(s) Oral daily  mirtazapine 15 milliGRAM(s) Oral at bedtime  morphine ER Tablet 15 milliGRAM(s) Oral every 12 hours  polyethylene glycol 3350 17 Gram(s) Oral daily  senna 2 Tablet(s) Oral at bedtime    MEDICATIONS  (PRN):  acetaminophen   Tablet 650 milliGRAM(s) Oral every 6 hours PRN Mild Pain (1 - 3)  bisacodyl Suppository 10 milliGRAM(s) Rectal daily PRN Constipation  dextrose 40% Gel 15 Gram(s) Oral once PRN Blood Glucose LESS THAN 70 milliGRAM(s)/deciliter  glucagon  Injectable 1 milliGRAM(s) IntraMuscular once PRN Glucose LESS THAN 70 milligrams/deciliter  morphine  - Injectable 1 milliGRAM(s) IV Push every 6 hours PRN Severe Pain (7 - 10)  morphine  IR 15 milliGRAM(s) Oral every 8 hours PRN Severe Pain (7 - 10)  ondansetron Injectable 4 milliGRAM(s) IV Push every 6 hours PRN Nausea and/or Vomiting      Vital Signs Last 24 Hrs  T(C): 36.4 (23 Jun 2018 04:36), Max: 36.8 (22 Jun 2018 23:57)  T(F): 97.6 (23 Jun 2018 04:36), Max: 98.2 (22 Jun 2018 23:57)  HR: 83 (23 Jun 2018 04:36) (76 - 84)  BP: 141/59 (23 Jun 2018 04:36) (122/66 - 151/70)  BP(mean): --  RR: 18 (23 Jun 2018 04:36) (18 - 18)  SpO2: 95% (23 Jun 2018 04:36) (95% - 97%)  CAPILLARY BLOOD GLUCOSE      POCT Blood Glucose.: 113 mg/dL (23 Jun 2018 08:44)  POCT Blood Glucose.: 143 mg/dL (22 Jun 2018 22:51)  POCT Blood Glucose.: 147 mg/dL (22 Jun 2018 21:16)  POCT Blood Glucose.: 128 mg/dL (22 Jun 2018 17:19)  POCT Blood Glucose.: 114 mg/dL (22 Jun 2018 12:29)    I&O's Summary    22 Jun 2018 07:01  -  23 Jun 2018 07:00  --------------------------------------------------------  IN: 2000 mL / OUT: 350 mL / NET: 1650 mL        PHYSICAL EXAM:  HEAD:  Atraumatic, Normocephalic  NECK: Supple, No   JVD  CHEST/LUNG:   no     rales,     no,    rhonchi  HEART: Regular rate and rhythm;         murmur  ABDOMEN: Soft, Nontender, ;   EXTREMITIES:        edema  NEUROLOGY:  alert    LABS:                        9.8    9.08  )-----------( 345      ( 23 Jun 2018 08:13 )             30.5     06-23    132<L>  |  95<L>  |  <4<L>  ----------------------------<  120<H>  3.8   |  27  |  0.41<L>    Ca    8.3<L>      23 Jun 2018 06:35  Phos  1.3     06-23  Mg     1.8     06-23                    Hemoglobin A1C, Whole Blood: 6.6 % (05-09 @ 06:52)    Thyroid Stimulating Hormone, Serum: 1.00 uIU/mL (05-18 @ 06:35)          Consultant(s) Notes Reviewed:      Care Discussed with Consultants/Other Providers:

## 2018-06-24 LAB
ANION GAP SERPL CALC-SCNC: 11 MMOL/L — SIGNIFICANT CHANGE UP (ref 5–17)
BUN SERPL-MCNC: <4 MG/DL — LOW (ref 7–23)
CALCIUM SERPL-MCNC: 8.2 MG/DL — LOW (ref 8.4–10.5)
CHLORIDE SERPL-SCNC: 93 MMOL/L — LOW (ref 96–108)
CO2 SERPL-SCNC: 28 MMOL/L — SIGNIFICANT CHANGE UP (ref 22–31)
CREAT SERPL-MCNC: 0.37 MG/DL — LOW (ref 0.5–1.3)
GLUCOSE BLDC GLUCOMTR-MCNC: 115 MG/DL — HIGH (ref 70–99)
GLUCOSE BLDC GLUCOMTR-MCNC: 116 MG/DL — HIGH (ref 70–99)
GLUCOSE BLDC GLUCOMTR-MCNC: 212 MG/DL — HIGH (ref 70–99)
GLUCOSE BLDC GLUCOMTR-MCNC: 99 MG/DL — SIGNIFICANT CHANGE UP (ref 70–99)
GLUCOSE SERPL-MCNC: 102 MG/DL — HIGH (ref 70–99)
MAGNESIUM SERPL-MCNC: 1.7 MG/DL — SIGNIFICANT CHANGE UP (ref 1.6–2.6)
PHOSPHATE SERPL-MCNC: 1.5 MG/DL — LOW (ref 2.5–4.5)
POTASSIUM SERPL-MCNC: 3.7 MMOL/L — SIGNIFICANT CHANGE UP (ref 3.5–5.3)
POTASSIUM SERPL-SCNC: 3.7 MMOL/L — SIGNIFICANT CHANGE UP (ref 3.5–5.3)
SODIUM SERPL-SCNC: 132 MMOL/L — LOW (ref 135–145)

## 2018-06-24 PROCEDURE — 71045 X-RAY EXAM CHEST 1 VIEW: CPT | Mod: 26

## 2018-06-24 RX ORDER — ACETAMINOPHEN 500 MG
650 TABLET ORAL ONCE
Qty: 0 | Refills: 0 | Status: COMPLETED | OUTPATIENT
Start: 2018-06-24 | End: 2018-06-24

## 2018-06-24 RX ORDER — POTASSIUM PHOSPHATE, MONOBASIC POTASSIUM PHOSPHATE, DIBASIC 236; 224 MG/ML; MG/ML
15 INJECTION, SOLUTION INTRAVENOUS ONCE
Qty: 0 | Refills: 0 | Status: COMPLETED | OUTPATIENT
Start: 2018-06-24 | End: 2018-06-24

## 2018-06-24 RX ADMIN — Medication 100 MILLIGRAM(S): at 05:00

## 2018-06-24 RX ADMIN — SENNA PLUS 2 TABLET(S): 8.6 TABLET ORAL at 21:33

## 2018-06-24 RX ADMIN — POTASSIUM PHOSPHATE, MONOBASIC POTASSIUM PHOSPHATE, DIBASIC 62.5 MILLIMOLE(S): 236; 224 INJECTION, SOLUTION INTRAVENOUS at 12:03

## 2018-06-24 RX ADMIN — MORPHINE SULFATE 15 MILLIGRAM(S): 50 CAPSULE, EXTENDED RELEASE ORAL at 05:00

## 2018-06-24 RX ADMIN — HEPARIN SODIUM 5000 UNIT(S): 5000 INJECTION INTRAVENOUS; SUBCUTANEOUS at 05:19

## 2018-06-24 RX ADMIN — POLYETHYLENE GLYCOL 3350 17 GRAM(S): 17 POWDER, FOR SOLUTION ORAL at 11:46

## 2018-06-24 RX ADMIN — MORPHINE SULFATE 15 MILLIGRAM(S): 50 CAPSULE, EXTENDED RELEASE ORAL at 18:45

## 2018-06-24 RX ADMIN — Medication 100 MILLIGRAM(S): at 18:13

## 2018-06-24 RX ADMIN — MORPHINE SULFATE 15 MILLIGRAM(S): 50 CAPSULE, EXTENDED RELEASE ORAL at 05:36

## 2018-06-24 RX ADMIN — CARVEDILOL PHOSPHATE 3.12 MILLIGRAM(S): 80 CAPSULE, EXTENDED RELEASE ORAL at 18:13

## 2018-06-24 RX ADMIN — Medication 0: at 22:36

## 2018-06-24 RX ADMIN — CARVEDILOL PHOSPHATE 3.12 MILLIGRAM(S): 80 CAPSULE, EXTENDED RELEASE ORAL at 05:00

## 2018-06-24 RX ADMIN — HEPARIN SODIUM 5000 UNIT(S): 5000 INJECTION INTRAVENOUS; SUBCUTANEOUS at 18:14

## 2018-06-24 RX ADMIN — Medication 650 MILLIGRAM(S): at 21:53

## 2018-06-24 RX ADMIN — MORPHINE SULFATE 15 MILLIGRAM(S): 50 CAPSULE, EXTENDED RELEASE ORAL at 18:12

## 2018-06-24 RX ADMIN — FAMOTIDINE 20 MILLIGRAM(S): 10 INJECTION INTRAVENOUS at 11:45

## 2018-06-24 RX ADMIN — Medication 2: at 13:25

## 2018-06-24 RX ADMIN — MIRTAZAPINE 15 MILLIGRAM(S): 45 TABLET, ORALLY DISINTEGRATING ORAL at 21:34

## 2018-06-24 RX ADMIN — ATORVASTATIN CALCIUM 40 MILLIGRAM(S): 80 TABLET, FILM COATED ORAL at 21:34

## 2018-06-24 RX ADMIN — Medication 650 MILLIGRAM(S): at 11:45

## 2018-06-24 RX ADMIN — LISINOPRIL 2.5 MILLIGRAM(S): 2.5 TABLET ORAL at 05:00

## 2018-06-24 NOTE — PROGRESS NOTE ADULT - SUBJECTIVE AND OBJECTIVE BOX
COLORECTAL SURGERY PROGRESS NOTE    78F PMHx Stage III rectal cancer s/p incomplete RT course, PVD s/p R NUBIA, who presented to [facility] ED the afternoon of 6/8/18 from nursing home complaining of fever and chills. Patient recently admitted to [facility] (5/8-5/23) during which her rectal CA was diagnosed (EUS/colonoscopy performed suggested T3N1 mid-rectal tumor, adenocarcinoma. Started RT, until she was developed pelvic abscesses. Discharged on 2weeks IV antibiotics via PICC line, then transitioned to Augmentin with plan for continued surveillance. However she developed fevers & returned to hospital. Per family at bedside, patient was feeling weak, unable to walk, but otherwise denies any fevers, chills, nausea, vomiting, diarrhea, SOB, cough, rash, abdominal pain. Repeat CT imaging of abdomen and pelvis, showed persistent rectal neoplasm, w/ improvement in clint-rectal collections. There was also as stable spiculated nodule in the right upper lobe suspicious for neoplasm, previously seen. Initially, during [facility] hospitalization in May, plan for short course of RT, followed by interval surgery, however RT stopped 2/2 abscesses. Planned for discharge on IV abx w/ repeat imaging to assess for resolution of collections.  CT abdomen and pelvis shows minor improvement in collections    INTERVAL EVENTS:  Patient is POD 4 s/p creation of end sigmoid loop colostomy.  Complaining of some abdominal pain around the incisions. Tolerating soft diet.  Ostomy functional.     ICU Vital Signs Last 24 Hrs  T(C): 36.7 (24 Jun 2018 04:45), Max: 37.5 (23 Jun 2018 18:07)  T(F): 98.1 (24 Jun 2018 04:45), Max: 99.5 (23 Jun 2018 18:07)  HR: 88 (24 Jun 2018 04:45) (76 - 97)  BP: 151/73 (24 Jun 2018 04:45) (118/68 - 151/73)  BP(mean): --  ABP: --  ABP(mean): --  RR: 18 (24 Jun 2018 04:45) (18 - 18)  SpO2: 97% (24 Jun 2018 04:45) (94% - 97%)    I&O's Detail    23 Jun 2018 07:01  -  24 Jun 2018 07:00  --------------------------------------------------------  IN:    lactated ringers.: 1800 mL    Oral Fluid: 340 mL  Total IN: 2140 mL    OUT:    Colostomy: 25 mL  Total OUT: 25 mL    Total NET: 2115 mL       MEDICATIONS  (STANDING):  atorvastatin 40 milliGRAM(s) Oral at bedtime  carvedilol 3.125 milliGRAM(s) Oral every 12 hours  dextrose 5%. 1000 milliLiter(s) (50 mL/Hr) IV Continuous <Continuous>  dextrose 50% Injectable 12.5 Gram(s) IV Push once  dextrose 50% Injectable 25 Gram(s) IV Push once  dextrose 50% Injectable 25 Gram(s) IV Push once  docusate sodium 100 milliGRAM(s) Oral two times a day  famotidine    Tablet 20 milliGRAM(s) Oral daily  heparin  Injectable 5000 Unit(s) SubCutaneous every 12 hours  insulin lispro (HumaLOG) corrective regimen sliding scale   SubCutaneous three times a day before meals  insulin lispro (HumaLOG) corrective regimen sliding scale   SubCutaneous at bedtime  lactated ringers. 1000 milliLiter(s) (75 mL/Hr) IV Continuous <Continuous>  lisinopril 2.5 milliGRAM(s) Oral daily  mirtazapine 15 milliGRAM(s) Oral at bedtime  morphine ER Tablet 15 milliGRAM(s) Oral every 12 hours  polyethylene glycol 3350 17 Gram(s) Oral daily  senna 2 Tablet(s) Oral at bedtime    MEDICATIONS  (PRN):  acetaminophen   Tablet 650 milliGRAM(s) Oral every 6 hours PRN Mild Pain (1 - 3)  bisacodyl Suppository 10 milliGRAM(s) Rectal daily PRN Constipation  dextrose 40% Gel 15 Gram(s) Oral once PRN Blood Glucose LESS THAN 70 milliGRAM(s)/deciliter  glucagon  Injectable 1 milliGRAM(s) IntraMuscular once PRN Glucose LESS THAN 70 milligrams/deciliter  morphine  - Injectable 1 milliGRAM(s) IV Push every 6 hours PRN Severe Pain (7 - 10)  morphine  IR 15 milliGRAM(s) Oral every 8 hours PRN Severe Pain (7 - 10)  ondansetron Injectable 4 milliGRAM(s) IV Push every 6 hours PRN Nausea and/or Vomiting    General:  Sitting up in bed, appears comfortable  Chest:  Breath sounds audible bilaterally; no wheezing, rales or ronchi  Abdomen:  Soft, appropriately tender, non distended; colostomy healthy and pink  Extremities:  Warm, well perfused                          9.8    9.08  )-----------( 345      ( 23 Jun 2018 08:13 )             30.5   06-24    132<L>  |  93<L>  |  <4<L>  ----------------------------<  102<H>  3.7   |  28  |  0.37<L>    Ca    8.2<L>      24 Jun 2018 06:35  Phos  1.5     06-24  Mg     1.7     06-24

## 2018-06-24 NOTE — PROGRESS NOTE ADULT - SUBJECTIVE AND OBJECTIVE BOX
noc ompalints  REVIEW OF SYSTEMS:  GEN: no fever,    no chills  RESP: no SOB,   no cough  CVS: no chest pain,   no palpitations  GI: no abdominal pain,   no nausea,   no vomiting,   no constipation,   no diarrhea  : no dysuria,   no frequency  NEURO: no headache,   no dizziness  PSYCH: no depression,   not anxious  Derm : no rash    MEDICATIONS  (STANDING):  atorvastatin 40 milliGRAM(s) Oral at bedtime  carvedilol 3.125 milliGRAM(s) Oral every 12 hours  dextrose 5%. 1000 milliLiter(s) (50 mL/Hr) IV Continuous <Continuous>  dextrose 50% Injectable 12.5 Gram(s) IV Push once  dextrose 50% Injectable 25 Gram(s) IV Push once  dextrose 50% Injectable 25 Gram(s) IV Push once  docusate sodium 100 milliGRAM(s) Oral two times a day  famotidine    Tablet 20 milliGRAM(s) Oral daily  heparin  Injectable 5000 Unit(s) SubCutaneous every 12 hours  insulin lispro (HumaLOG) corrective regimen sliding scale   SubCutaneous three times a day before meals  insulin lispro (HumaLOG) corrective regimen sliding scale   SubCutaneous at bedtime  lactated ringers. 1000 milliLiter(s) (75 mL/Hr) IV Continuous <Continuous>  lisinopril 2.5 milliGRAM(s) Oral daily  mirtazapine 15 milliGRAM(s) Oral at bedtime  morphine ER Tablet 15 milliGRAM(s) Oral every 12 hours  polyethylene glycol 3350 17 Gram(s) Oral daily  potassium phosphate IVPB 15 milliMole(s) IV Intermittent once  senna 2 Tablet(s) Oral at bedtime    MEDICATIONS  (PRN):  acetaminophen   Tablet 650 milliGRAM(s) Oral every 6 hours PRN Mild Pain (1 - 3)  bisacodyl Suppository 10 milliGRAM(s) Rectal daily PRN Constipation  dextrose 40% Gel 15 Gram(s) Oral once PRN Blood Glucose LESS THAN 70 milliGRAM(s)/deciliter  glucagon  Injectable 1 milliGRAM(s) IntraMuscular once PRN Glucose LESS THAN 70 milligrams/deciliter  morphine  - Injectable 1 milliGRAM(s) IV Push every 6 hours PRN Severe Pain (7 - 10)  morphine  IR 15 milliGRAM(s) Oral every 8 hours PRN Severe Pain (7 - 10)  ondansetron Injectable 4 milliGRAM(s) IV Push every 6 hours PRN Nausea and/or Vomiting      Vital Signs Last 24 Hrs  T(C): 36.7 (24 Jun 2018 04:45), Max: 37.5 (23 Jun 2018 18:07)  T(F): 98.1 (24 Jun 2018 04:45), Max: 99.5 (23 Jun 2018 18:07)  HR: 88 (24 Jun 2018 04:45) (83 - 97)  BP: 151/73 (24 Jun 2018 04:45) (118/68 - 151/73)  BP(mean): --  RR: 18 (24 Jun 2018 04:45) (18 - 18)  SpO2: 97% (24 Jun 2018 04:45) (95% - 97%)  CAPILLARY BLOOD GLUCOSE      POCT Blood Glucose.: 115 mg/dL (24 Jun 2018 08:49)  POCT Blood Glucose.: 203 mg/dL (23 Jun 2018 21:58)  POCT Blood Glucose.: 197 mg/dL (23 Jun 2018 17:46)  POCT Blood Glucose.: 167 mg/dL (23 Jun 2018 13:49)    I&O's Summary    23 Jun 2018 07:01  -  24 Jun 2018 07:00  --------------------------------------------------------  IN: 2140 mL / OUT: 25 mL / NET: 2115 mL        PHYSICAL EXAM:  HEAD:  Atraumatic, Normocephalic  NECK: Supple, No   JVD  CHEST/LUNG:   no     rales,     no,    rhonchi  HEART: Regular rate and rhythm;         murmur  ABDOMEN: Soft, Nontender, ;   EXTREMITIES:        edema  NEUROLOGY:  alert    LABS:                        9.8    9.08  )-----------( 345      ( 23 Jun 2018 08:13 )             30.5     06-24    132<L>  |  93<L>  |  <4<L>  ----------------------------<  102<H>  3.7   |  28  |  0.37<L>    Ca    8.2<L>      24 Jun 2018 06:35  Phos  1.5     06-24  Mg     1.7     06-24                    Hemoglobin A1C, Whole Blood: 6.6 % (05-09 @ 06:52)    Thyroid Stimulating Hormone, Serum: 1.00 uIU/mL (05-18 @ 06:35)          Consultant(s) Notes Reviewed:      Care Discussed with Consultants/Other Providers:

## 2018-06-24 NOTE — PROGRESS NOTE ADULT - SUBJECTIVE AND OBJECTIVE BOX
INTERVAL HPI/OVERNIGHT EVENTS: daughter states pt is confused and c/o headache, tolerating PO, ostomy functioning    MEDICATIONS  (STANDING):  atorvastatin 40 milliGRAM(s) Oral at bedtime  carvedilol 3.125 milliGRAM(s) Oral every 12 hours  dextrose 5%. 1000 milliLiter(s) (50 mL/Hr) IV Continuous <Continuous>  dextrose 50% Injectable 12.5 Gram(s) IV Push once  dextrose 50% Injectable 25 Gram(s) IV Push once  dextrose 50% Injectable 25 Gram(s) IV Push once  docusate sodium 100 milliGRAM(s) Oral two times a day  famotidine    Tablet 20 milliGRAM(s) Oral daily  heparin  Injectable 5000 Unit(s) SubCutaneous every 12 hours  insulin lispro (HumaLOG) corrective regimen sliding scale   SubCutaneous three times a day before meals  insulin lispro (HumaLOG) corrective regimen sliding scale   SubCutaneous at bedtime  lactated ringers. 1000 milliLiter(s) (75 mL/Hr) IV Continuous <Continuous>  lisinopril 2.5 milliGRAM(s) Oral daily  mirtazapine 15 milliGRAM(s) Oral at bedtime  morphine ER Tablet 15 milliGRAM(s) Oral every 12 hours  polyethylene glycol 3350 17 Gram(s) Oral daily  senna 2 Tablet(s) Oral at bedtime    MEDICATIONS  (PRN):  acetaminophen   Tablet 650 milliGRAM(s) Oral every 6 hours PRN Mild Pain (1 - 3)  bisacodyl Suppository 10 milliGRAM(s) Rectal daily PRN Constipation  dextrose 40% Gel 15 Gram(s) Oral once PRN Blood Glucose LESS THAN 70 milliGRAM(s)/deciliter  glucagon  Injectable 1 milliGRAM(s) IntraMuscular once PRN Glucose LESS THAN 70 milligrams/deciliter  morphine  - Injectable 1 milliGRAM(s) IV Push every 6 hours PRN Severe Pain (7 - 10)  morphine  IR 15 milliGRAM(s) Oral every 8 hours PRN Severe Pain (7 - 10)  ondansetron Injectable 4 milliGRAM(s) IV Push every 6 hours PRN Nausea and/or Vomiting      Allergies    No Known Allergies    Intolerances            PHYSICAL EXAM:   Vital Signs:  Vital Signs Last 24 Hrs  T(C): 36.8 (24 Jun 2018 12:00), Max: 37.5 (23 Jun 2018 18:07)  T(F): 98.3 (24 Jun 2018 12:00), Max: 99.5 (23 Jun 2018 18:07)  HR: 86 (24 Jun 2018 12:00) (83 - 97)  BP: 117/51 (24 Jun 2018 12:00) (117/51 - 151/73)  BP(mean): --  RR: 18 (24 Jun 2018 12:00) (18 - 18)  SpO2: 95% (24 Jun 2018 12:00) (95% - 97%)  Daily     Daily     GENERAL:  no distress  HEENT:  NC/AT,  anicteric  CHEST:   no increased effort, breath sounds clear  HEART:  Regular rhythm  ABDOMEN:  Soft, tenderness around ostomy non-distended, normoactive bowel sounds,  ostomy with solid green stool in place  EXTEREMITIES:  no cyanosis      LABS:                        9.8    9.08  )-----------( 345      ( 23 Jun 2018 08:13 )             30.5     06-24    132<L>  |  93<L>  |  <4<L>  ----------------------------<  102<H>  3.7   |  28  |  0.37<L>    Ca    8.2<L>      24 Jun 2018 06:35  Phos  1.5     06-24  Mg     1.7     06-24            RADIOLOGY & ADDITIONAL TESTS:

## 2018-06-24 NOTE — PROGRESS NOTE ADULT - SUBJECTIVE AND OBJECTIVE BOX
- Patient seen and examined.  - In summary, patient is a 78 year old woman who presented with fever (2018 15:36)  - Today, patient is without complaints.         *****MEDICATIONS:    MEDICATIONS  (STANDING):  atorvastatin 40 milliGRAM(s) Oral at bedtime  carvedilol 3.125 milliGRAM(s) Oral every 12 hours  dextrose 5%. 1000 milliLiter(s) (50 mL/Hr) IV Continuous <Continuous>  dextrose 50% Injectable 12.5 Gram(s) IV Push once  dextrose 50% Injectable 25 Gram(s) IV Push once  dextrose 50% Injectable 25 Gram(s) IV Push once  docusate sodium 100 milliGRAM(s) Oral two times a day  famotidine    Tablet 20 milliGRAM(s) Oral daily  heparin  Injectable 5000 Unit(s) SubCutaneous every 12 hours  insulin lispro (HumaLOG) corrective regimen sliding scale   SubCutaneous three times a day before meals  insulin lispro (HumaLOG) corrective regimen sliding scale   SubCutaneous at bedtime  lactated ringers. 1000 milliLiter(s) (75 mL/Hr) IV Continuous <Continuous>  lisinopril 2.5 milliGRAM(s) Oral daily  mirtazapine 15 milliGRAM(s) Oral at bedtime  morphine ER Tablet 15 milliGRAM(s) Oral every 12 hours  polyethylene glycol 3350 17 Gram(s) Oral daily  potassium phosphate IVPB 15 milliMole(s) IV Intermittent once  senna 2 Tablet(s) Oral at bedtime    MEDICATIONS  (PRN):  acetaminophen   Tablet 650 milliGRAM(s) Oral every 6 hours PRN Mild Pain (1 - 3)  bisacodyl Suppository 10 milliGRAM(s) Rectal daily PRN Constipation  dextrose 40% Gel 15 Gram(s) Oral once PRN Blood Glucose LESS THAN 70 milliGRAM(s)/deciliter  glucagon  Injectable 1 milliGRAM(s) IntraMuscular once PRN Glucose LESS THAN 70 milligrams/deciliter  morphine  - Injectable 1 milliGRAM(s) IV Push every 6 hours PRN Severe Pain (7 - 10)  morphine  IR 15 milliGRAM(s) Oral every 8 hours PRN Severe Pain (7 - 10)  ondansetron Injectable 4 milliGRAM(s) IV Push every 6 hours PRN Nausea and/or Vomiting               ***** REVIEW OF SYSTEM:  GEN: no fever, no chills, no pain  RESP: no SOB, no cough, no sputum  CVS: no chest pain, no palpitations, no edema  GI: no abdominal pain, no nausea, no vomiting, no constipation, no diarrhea  : no dysuria, no frequency  NEURO: no headache, no dizziness  PSYCH: no depression, not anxious  Derm : no itching, no rash         ***** VITAL SIGNS:             T(F): 98.1 (18 @ 04:45), Max: 99.5 (18 @ 18:07)  HR: 88 (18 @ 04:45) (76 - 97)  BP: 151/73 (18 @ 04:45) (118/68 - 151/73)  RR: 18 (18 @ 04:45) (18 - 18)  SpO2: 97% (18 @ 04:45) (94% - 97%)  Wt(kg): --  ,   I&O's Summary    2018 07:01  -  2018 07:00  --------------------------------------------------------  IN: 2140 mL / OUT: 25 mL / NET: 2115 mL                   *****PHYSICAL EXAM:  GEN: A&O X 3 , NAD , comfortable  HEENT: NCAT, EOMI, MMM, no icterus  NECK: Supple, No JVD  CVS: S1S2 , regular , No M/R/G appreciated  PULM: CTA B/L,  no W/R/R appreciated  ABD.: soft. non tender, non distended,  bowel sounds present  Extrem: intact pulses , no edema noted  Derm: No rash or ecchymosis noted  PSYCH: normal mood, no depression, not anxious         *****LAB AND IMAGIN.8    9.08  )-----------( 345      ( 2018 08:13 )             30.5                   132<L>  |  93<L>  |  <4<L>  ----------------------------<  102<H>  3.7   |  28  |  0.37<L>    Ca    8.2<L>      2018 06:35  Phos  1.5     -  Mg     1.7     -              [All pertinent recent Imaging/Reports reviewed]  < from: Transthoracic Echocardiogram (18 @ 07:08) >  Conclusions:  1. Normal left ventricular internal dimensions and wall  thicknesses.  2. Normal left ventricular systolic function. No segmental  wall motion abnormalities.  3. Normal diastolic function  4. Normal right ventricular sizeand systolic function.  5. Estimated pulmonary artery systolic pressure equals 36  mm Hg, assuming right atrial pressure equals 8 mm Hg,  consistent with borderline pulmonary pressures.  *** Compared with echocardiogram of 2017, no  significant changes noted.    < end of copied text >         *****A S S E S S M E N T   A N D   P L A N :  78F with rectal cancer adm with fever  abx per ID  cont current meds  oncology f/u  s/p ostomy  surg f/u appreciated  advance diet as tolerated  PT  DCP when clear by CRS    __________________________  A. JOJO Metcalf.

## 2018-06-24 NOTE — PROGRESS NOTE ADULT - ASSESSMENT
78 year old female with perforated rectal cancer POD 4 s/p creation of end sigmoid loop colostomy  -Patient is tolerating soft diet  -Ostomy functioning  -Pain control  -OOB and ambulate  -Ostomy care  -Continue care per primary team, colorectal surgery to follow

## 2018-06-24 NOTE — PROGRESS NOTE ADULT - ASSESSMENT
78F with rectal cancer, dm,  htn,  adm with fever,  afebrile  now, s/p ab    rectal fluid  collections/ pna  proximal diversion/ostomy,   dr ferreira   oncology, RT saw  pt  per  ID,  stop  ab,    s/p  colectomy/ sigmoid   colostomy  ostomy,  , good  function  awaiting disposition  needs  palcement

## 2018-06-25 LAB
-  COAGULASE NEGATIVE STAPHYLOCOCCUS: SIGNIFICANT CHANGE UP
ANION GAP SERPL CALC-SCNC: 13 MMOL/L — SIGNIFICANT CHANGE UP (ref 5–17)
APPEARANCE UR: CLEAR — SIGNIFICANT CHANGE UP
BILIRUB UR-MCNC: NEGATIVE — SIGNIFICANT CHANGE UP
BUN SERPL-MCNC: <4 MG/DL — LOW (ref 7–23)
CALCIUM SERPL-MCNC: 8.5 MG/DL — SIGNIFICANT CHANGE UP (ref 8.4–10.5)
CHLORIDE SERPL-SCNC: 95 MMOL/L — LOW (ref 96–108)
CO2 SERPL-SCNC: 26 MMOL/L — SIGNIFICANT CHANGE UP (ref 22–31)
COLOR SPEC: YELLOW — SIGNIFICANT CHANGE UP
CREAT SERPL-MCNC: 0.41 MG/DL — LOW (ref 0.5–1.3)
DIFF PNL FLD: ABNORMAL
GLUCOSE BLDC GLUCOMTR-MCNC: 111 MG/DL — HIGH (ref 70–99)
GLUCOSE BLDC GLUCOMTR-MCNC: 216 MG/DL — HIGH (ref 70–99)
GLUCOSE BLDC GLUCOMTR-MCNC: 96 MG/DL — SIGNIFICANT CHANGE UP (ref 70–99)
GLUCOSE SERPL-MCNC: 87 MG/DL — SIGNIFICANT CHANGE UP (ref 70–99)
GLUCOSE UR QL: NEGATIVE MG/DL — SIGNIFICANT CHANGE UP
GRAM STN FLD: SIGNIFICANT CHANGE UP
GRAM STN FLD: SIGNIFICANT CHANGE UP
HCT VFR BLD CALC: 29.2 % — LOW (ref 34.5–45)
HGB BLD-MCNC: 9 G/DL — LOW (ref 11.5–15.5)
KETONES UR-MCNC: NEGATIVE — SIGNIFICANT CHANGE UP
LEUKOCYTE ESTERASE UR-ACNC: ABNORMAL
MAGNESIUM SERPL-MCNC: 1.7 MG/DL — SIGNIFICANT CHANGE UP (ref 1.6–2.6)
MCHC RBC-ENTMCNC: 26.5 PG — LOW (ref 27–34)
MCHC RBC-ENTMCNC: 30.8 GM/DL — LOW (ref 32–36)
MCV RBC AUTO: 86.1 FL — SIGNIFICANT CHANGE UP (ref 80–100)
METHOD TYPE: SIGNIFICANT CHANGE UP
NITRITE UR-MCNC: NEGATIVE — SIGNIFICANT CHANGE UP
PH UR: 8 — SIGNIFICANT CHANGE UP (ref 5–8)
PHOSPHATE SERPL-MCNC: 2.7 MG/DL — SIGNIFICANT CHANGE UP (ref 2.5–4.5)
PLATELET # BLD AUTO: 371 K/UL — SIGNIFICANT CHANGE UP (ref 150–400)
POTASSIUM SERPL-MCNC: 4.2 MMOL/L — SIGNIFICANT CHANGE UP (ref 3.5–5.3)
POTASSIUM SERPL-SCNC: 4.2 MMOL/L — SIGNIFICANT CHANGE UP (ref 3.5–5.3)
PROT UR-MCNC: ABNORMAL MG/DL
RBC # BLD: 3.39 M/UL — LOW (ref 3.8–5.2)
RBC # FLD: 18.1 % — HIGH (ref 10.3–14.5)
SODIUM SERPL-SCNC: 134 MMOL/L — LOW (ref 135–145)
SP GR SPEC: 1.01 — SIGNIFICANT CHANGE UP (ref 1.01–1.02)
SPECIMEN SOURCE: SIGNIFICANT CHANGE UP
SURGICAL PATHOLOGY STUDY: SIGNIFICANT CHANGE UP
UROBILINOGEN FLD QL: NEGATIVE MG/DL — SIGNIFICANT CHANGE UP
WBC # BLD: 12.99 K/UL — HIGH (ref 3.8–10.5)
WBC # FLD AUTO: 12.99 K/UL — HIGH (ref 3.8–10.5)

## 2018-06-25 PROCEDURE — 99233 SBSQ HOSP IP/OBS HIGH 50: CPT

## 2018-06-25 RX ORDER — VANCOMYCIN HCL 1 G
1000 VIAL (EA) INTRAVENOUS ONCE
Qty: 0 | Refills: 0 | Status: COMPLETED | OUTPATIENT
Start: 2018-06-25 | End: 2018-06-25

## 2018-06-25 RX ORDER — SODIUM CHLORIDE 9 MG/ML
1000 INJECTION INTRAMUSCULAR; INTRAVENOUS; SUBCUTANEOUS
Qty: 0 | Refills: 0 | Status: DISCONTINUED | OUTPATIENT
Start: 2018-06-25 | End: 2018-07-03

## 2018-06-25 RX ORDER — ACETAMINOPHEN 500 MG
650 TABLET ORAL EVERY 6 HOURS
Qty: 0 | Refills: 0 | Status: DISCONTINUED | OUTPATIENT
Start: 2018-06-25 | End: 2018-07-03

## 2018-06-25 RX ORDER — PIPERACILLIN AND TAZOBACTAM 4; .5 G/20ML; G/20ML
3.38 INJECTION, POWDER, LYOPHILIZED, FOR SOLUTION INTRAVENOUS EVERY 8 HOURS
Qty: 0 | Refills: 0 | Status: DISCONTINUED | OUTPATIENT
Start: 2018-06-25 | End: 2018-06-29

## 2018-06-25 RX ORDER — SODIUM CHLORIDE 9 MG/ML
1000 INJECTION, SOLUTION INTRAVENOUS
Qty: 0 | Refills: 0 | Status: DISCONTINUED | OUTPATIENT
Start: 2018-06-25 | End: 2018-06-25

## 2018-06-25 RX ADMIN — CARVEDILOL PHOSPHATE 3.12 MILLIGRAM(S): 80 CAPSULE, EXTENDED RELEASE ORAL at 05:00

## 2018-06-25 RX ADMIN — HEPARIN SODIUM 5000 UNIT(S): 5000 INJECTION INTRAVENOUS; SUBCUTANEOUS at 05:00

## 2018-06-25 RX ADMIN — MORPHINE SULFATE 15 MILLIGRAM(S): 50 CAPSULE, EXTENDED RELEASE ORAL at 11:20

## 2018-06-25 RX ADMIN — Medication 100 MILLIGRAM(S): at 05:00

## 2018-06-25 RX ADMIN — Medication 250 MILLIGRAM(S): at 18:30

## 2018-06-25 RX ADMIN — Medication 650 MILLIGRAM(S): at 10:42

## 2018-06-25 RX ADMIN — HEPARIN SODIUM 5000 UNIT(S): 5000 INJECTION INTRAVENOUS; SUBCUTANEOUS at 18:31

## 2018-06-25 RX ADMIN — MORPHINE SULFATE 15 MILLIGRAM(S): 50 CAPSULE, EXTENDED RELEASE ORAL at 10:50

## 2018-06-25 RX ADMIN — MORPHINE SULFATE 15 MILLIGRAM(S): 50 CAPSULE, EXTENDED RELEASE ORAL at 18:38

## 2018-06-25 RX ADMIN — SODIUM CHLORIDE 75 MILLILITER(S): 9 INJECTION INTRAMUSCULAR; INTRAVENOUS; SUBCUTANEOUS at 18:38

## 2018-06-25 RX ADMIN — SENNA PLUS 2 TABLET(S): 8.6 TABLET ORAL at 21:49

## 2018-06-25 RX ADMIN — MIRTAZAPINE 15 MILLIGRAM(S): 45 TABLET, ORALLY DISINTEGRATING ORAL at 21:44

## 2018-06-25 RX ADMIN — MORPHINE SULFATE 15 MILLIGRAM(S): 50 CAPSULE, EXTENDED RELEASE ORAL at 06:03

## 2018-06-25 RX ADMIN — PIPERACILLIN AND TAZOBACTAM 25 GRAM(S): 4; .5 INJECTION, POWDER, LYOPHILIZED, FOR SOLUTION INTRAVENOUS at 15:44

## 2018-06-25 RX ADMIN — PIPERACILLIN AND TAZOBACTAM 25 GRAM(S): 4; .5 INJECTION, POWDER, LYOPHILIZED, FOR SOLUTION INTRAVENOUS at 23:06

## 2018-06-25 RX ADMIN — CARVEDILOL PHOSPHATE 3.12 MILLIGRAM(S): 80 CAPSULE, EXTENDED RELEASE ORAL at 18:30

## 2018-06-25 RX ADMIN — Medication 650 MILLIGRAM(S): at 21:44

## 2018-06-25 RX ADMIN — MORPHINE SULFATE 15 MILLIGRAM(S): 50 CAPSULE, EXTENDED RELEASE ORAL at 19:08

## 2018-06-25 RX ADMIN — ATORVASTATIN CALCIUM 40 MILLIGRAM(S): 80 TABLET, FILM COATED ORAL at 21:44

## 2018-06-25 RX ADMIN — FAMOTIDINE 20 MILLIGRAM(S): 10 INJECTION INTRAVENOUS at 10:49

## 2018-06-25 RX ADMIN — MORPHINE SULFATE 15 MILLIGRAM(S): 50 CAPSULE, EXTENDED RELEASE ORAL at 05:00

## 2018-06-25 RX ADMIN — SODIUM CHLORIDE 75 MILLILITER(S): 9 INJECTION, SOLUTION INTRAVENOUS at 10:48

## 2018-06-25 RX ADMIN — Medication 2: at 18:30

## 2018-06-25 RX ADMIN — LISINOPRIL 2.5 MILLIGRAM(S): 2.5 TABLET ORAL at 05:00

## 2018-06-25 RX ADMIN — SODIUM CHLORIDE 75 MILLILITER(S): 9 INJECTION, SOLUTION INTRAVENOUS at 15:44

## 2018-06-25 NOTE — PROGRESS NOTE ADULT - SUBJECTIVE AND OBJECTIVE BOX
- Patient seen and examined.  - In summary, patient is a 78 year old woman who presented with fever (2018 15:36)  - Today, patient is without complaints.         *****MEDICATIONS:    MEDICATIONS  (STANDING):  atorvastatin 40 milliGRAM(s) Oral at bedtime  carvedilol 3.125 milliGRAM(s) Oral every 12 hours  dextrose 5%. 1000 milliLiter(s) (50 mL/Hr) IV Continuous <Continuous>  dextrose 50% Injectable 12.5 Gram(s) IV Push once  dextrose 50% Injectable 25 Gram(s) IV Push once  dextrose 50% Injectable 25 Gram(s) IV Push once  docusate sodium 100 milliGRAM(s) Oral two times a day  famotidine    Tablet 20 milliGRAM(s) Oral daily  heparin  Injectable 5000 Unit(s) SubCutaneous every 12 hours  insulin lispro (HumaLOG) corrective regimen sliding scale   SubCutaneous three times a day before meals  insulin lispro (HumaLOG) corrective regimen sliding scale   SubCutaneous at bedtime  lactated ringers. 1000 milliLiter(s) (75 mL/Hr) IV Continuous <Continuous>  lisinopril 2.5 milliGRAM(s) Oral daily  mirtazapine 15 milliGRAM(s) Oral at bedtime  morphine ER Tablet 15 milliGRAM(s) Oral every 12 hours  polyethylene glycol 3350 17 Gram(s) Oral daily  senna 2 Tablet(s) Oral at bedtime    MEDICATIONS  (PRN):  acetaminophen   Tablet 650 milliGRAM(s) Oral every 6 hours PRN Mild Pain (1 - 3)  bisacodyl Suppository 10 milliGRAM(s) Rectal daily PRN Constipation  dextrose 40% Gel 15 Gram(s) Oral once PRN Blood Glucose LESS THAN 70 milliGRAM(s)/deciliter  glucagon  Injectable 1 milliGRAM(s) IntraMuscular once PRN Glucose LESS THAN 70 milligrams/deciliter  morphine  - Injectable 1 milliGRAM(s) IV Push every 6 hours PRN Severe Pain (7 - 10)  morphine  IR 15 milliGRAM(s) Oral every 8 hours PRN Severe Pain (7 - 10)  ondansetron Injectable 4 milliGRAM(s) IV Push every 6 hours PRN Nausea and/or Vomiting                 ***** REVIEW OF SYSTEM:  GEN: no fever, no chills, no pain  RESP: no SOB, no cough, no sputum  CVS: no chest pain, no palpitations, no edema  GI: no abdominal pain, no nausea, no vomiting, no constipation, no diarrhea  : no dysuria, no frequency  NEURO: no headache, no dizziness  PSYCH: no depression, not anxious  Derm : no itching, no rash         ***** VITAL SIGNS:             T(F): 98 (18 @ 04:39), Max: 101 (18 @ 21:25)  HR: 90 (18 @ 04:39) (66 - 91)  BP: 160/52 (18 @ 04:39) (102/64 - 160/52)  RR: 18 (18 @ 04:39) (18 - 18)  SpO2: 96% (18 @ 04:39) (95% - 97%)  Wt(kg): --  ,   I&O's Summary    2018 07:01  -  2018 07:00  --------------------------------------------------------  IN: 1900 mL / OUT: 100 mL / NET: 1800 mL                   *****PHYSICAL EXAM:  GEN: A&O X 3 , NAD , comfortable  HEENT: NCAT, EOMI, MMM, no icterus  NECK: Supple, No JVD  CVS: S1S2 , regular , No M/R/G appreciated  PULM: CTA B/L,  no W/R/R appreciated  ABD.: soft. non tender, non distended,  bowel sounds present  Extrem: intact pulses , no edema noted  Derm: No rash or ecchymosis noted  PSYCH: normal mood, no depression, not anxious         *****LAB AND IMAGIN.0    12.99 )-----------( 371      ( 2018 07:58 )             29.2                   134<L>  |  95<L>  |  <4<L>  ----------------------------<  87  4.2   |  26  |  0.41<L>    Ca    8.5      2018 06:18  Phos  2.7     06-25  Mg     1.7     06-25            [All pertinent recent Imaging/Reports reviewed]  < from: Transthoracic Echocardiogram (18 @ 07:08) >  Conclusions:  1. Normal left ventricular internal dimensions and wall  thicknesses.  2. Normal left ventricular systolic function. No segmental  wall motion abnormalities.  3. Normal diastolic function  4. Normal right ventricular sizeand systolic function.  5. Estimated pulmonary artery systolic pressure equals 36  mm Hg, assuming right atrial pressure equals 8 mm Hg,  consistent with borderline pulmonary pressures.  *** Compared with echocardiogram of 2017, no  significant changes noted.    < end of copied text >         *****A S S E S S M E N T   A N D   P L A N :  78F with rectal cancer adm with fever  abx per ID  cont current meds  oncology f/u  s/p ostomy  surg f/u appreciated  advance diet as tolerated  PT  ID recalled    __________________________  YON Metcalf D.O.

## 2018-06-25 NOTE — PROGRESS NOTE ADULT - SUBJECTIVE AND OBJECTIVE BOX
temp of  100.  c/c weak appearing  daughter at bedisde    REVIEW OF SYSTEMS:  GEN: no fever,    no chills  RESP: no SOB,   no cough  CVS: no chest pain,   no palpitations  GI: no abdominal pain,   no nausea,   no vomiting,   no constipation,   no diarrhea  : no dysuria,   no frequency  NEURO: no headache,   no dizziness  PSYCH: no depression,   not anxious  Derm : no rash    MEDICATIONS  (STANDING):  atorvastatin 40 milliGRAM(s) Oral at bedtime  carvedilol 3.125 milliGRAM(s) Oral every 12 hours  dextrose 5%. 1000 milliLiter(s) (50 mL/Hr) IV Continuous <Continuous>  dextrose 50% Injectable 12.5 Gram(s) IV Push once  dextrose 50% Injectable 25 Gram(s) IV Push once  dextrose 50% Injectable 25 Gram(s) IV Push once  docusate sodium 100 milliGRAM(s) Oral two times a day  famotidine    Tablet 20 milliGRAM(s) Oral daily  heparin  Injectable 5000 Unit(s) SubCutaneous every 12 hours  insulin lispro (HumaLOG) corrective regimen sliding scale   SubCutaneous three times a day before meals  insulin lispro (HumaLOG) corrective regimen sliding scale   SubCutaneous at bedtime  lactated ringers. 1000 milliLiter(s) (75 mL/Hr) IV Continuous <Continuous>  lisinopril 2.5 milliGRAM(s) Oral daily  mirtazapine 15 milliGRAM(s) Oral at bedtime  morphine ER Tablet 15 milliGRAM(s) Oral every 12 hours  polyethylene glycol 3350 17 Gram(s) Oral daily  senna 2 Tablet(s) Oral at bedtime    MEDICATIONS  (PRN):  acetaminophen   Tablet 650 milliGRAM(s) Oral every 6 hours PRN Mild Pain (1 - 3)  bisacodyl Suppository 10 milliGRAM(s) Rectal daily PRN Constipation  dextrose 40% Gel 15 Gram(s) Oral once PRN Blood Glucose LESS THAN 70 milliGRAM(s)/deciliter  glucagon  Injectable 1 milliGRAM(s) IntraMuscular once PRN Glucose LESS THAN 70 milligrams/deciliter  morphine  - Injectable 1 milliGRAM(s) IV Push every 6 hours PRN Severe Pain (7 - 10)  morphine  IR 15 milliGRAM(s) Oral every 8 hours PRN Severe Pain (7 - 10)  ondansetron Injectable 4 milliGRAM(s) IV Push every 6 hours PRN Nausea and/or Vomiting      Vital Signs Last 24 Hrs  T(C): 36.7 (25 Jun 2018 04:39), Max: 38.3 (24 Jun 2018 21:25)  T(F): 98 (25 Jun 2018 04:39), Max: 101 (24 Jun 2018 21:25)  HR: 98 (25 Jun 2018 09:56) (66 - 98)  BP: 133/81 (25 Jun 2018 09:56) (102/64 - 160/52)  BP(mean): --  RR: 18 (25 Jun 2018 09:56) (18 - 18)  SpO2: 95% (25 Jun 2018 09:56) (95% - 97%)  CAPILLARY BLOOD GLUCOSE      POCT Blood Glucose.: 111 mg/dL (25 Jun 2018 08:37)  POCT Blood Glucose.: 116 mg/dL (24 Jun 2018 22:20)  POCT Blood Glucose.: 99 mg/dL (24 Jun 2018 17:33)  POCT Blood Glucose.: 212 mg/dL (24 Jun 2018 12:46)    I&O's Summary    24 Jun 2018 07:01  -  25 Jun 2018 07:00  --------------------------------------------------------  IN: 1900 mL / OUT: 100 mL / NET: 1800 mL        PHYSICAL EXAM:  HEAD:  Atraumatic, Normocephalic  NECK: Supple, No   JVD  CHEST/LUNG:   no     rales,     no,    rhonchi  HEART: Regular rate and rhythm;         murmur  ABDOMEN: Soft, Nontender, ;   EXTREMITIES:        edema  NEUROLOGY:  alert    LABS:                        9.0    12.99 )-----------( 371      ( 25 Jun 2018 07:58 )             29.2     06-25    134<L>  |  95<L>  |  <4<L>  ----------------------------<  87  4.2   |  26  |  0.41<L>    Ca    8.5      25 Jun 2018 06:18  Phos  2.7     06-25  Mg     1.7     06-25                    Hemoglobin A1C, Whole Blood: 6.6 % (05-09 @ 06:52)    Thyroid Stimulating Hormone, Serum: 1.00 uIU/mL (05-18 @ 06:35)          Consultant(s) Notes Reviewed:      Care Discussed with Consultants/Other Providers:

## 2018-06-25 NOTE — PROGRESS NOTE ADULT - SUBJECTIVE AND OBJECTIVE BOX
[facility] [place]:[id number],   78yFemale followed for:  No Known Allergies    PAST MEDICAL & SURGICAL HISTORY:  Rectal mass  Gangrene of foot  Coronary artery disease involving native coronary artery of native heart without angina pectoris  Status post above knee amputation of right lower extremity    FAMILY HISTORY:  No pertinent family history in first degree relatives    MEDICATIONS  (STANDING):  atorvastatin 40 milliGRAM(s) Oral at bedtime  carvedilol 3.125 milliGRAM(s) Oral every 12 hours  dextrose 5%. 1000 milliLiter(s) (50 mL/Hr) IV Continuous <Continuous>  dextrose 50% Injectable 12.5 Gram(s) IV Push once  dextrose 50% Injectable 25 Gram(s) IV Push once  dextrose 50% Injectable 25 Gram(s) IV Push once  docusate sodium 100 milliGRAM(s) Oral two times a day  famotidine    Tablet 20 milliGRAM(s) Oral daily  heparin  Injectable 5000 Unit(s) SubCutaneous every 12 hours  insulin lispro (HumaLOG) corrective regimen sliding scale   SubCutaneous three times a day before meals  insulin lispro (HumaLOG) corrective regimen sliding scale   SubCutaneous at bedtime  lactated ringers. 1000 milliLiter(s) (75 mL/Hr) IV Continuous <Continuous>  lisinopril 2.5 milliGRAM(s) Oral daily  mirtazapine 15 milliGRAM(s) Oral at bedtime  morphine ER Tablet 15 milliGRAM(s) Oral every 12 hours  polyethylene glycol 3350 17 Gram(s) Oral daily  senna 2 Tablet(s) Oral at bedtime    MEDICATIONS  (PRN):  acetaminophen   Tablet 650 milliGRAM(s) Oral every 6 hours PRN Mild Pain (1 - 3)  bisacodyl Suppository 10 milliGRAM(s) Rectal daily PRN Constipation  dextrose 40% Gel 15 Gram(s) Oral once PRN Blood Glucose LESS THAN 70 milliGRAM(s)/deciliter  glucagon  Injectable 1 milliGRAM(s) IntraMuscular once PRN Glucose LESS THAN 70 milligrams/deciliter  morphine  - Injectable 1 milliGRAM(s) IV Push every 6 hours PRN Severe Pain (7 - 10)  morphine  IR 15 milliGRAM(s) Oral every 8 hours PRN Severe Pain (7 - 10)  ondansetron Injectable 4 milliGRAM(s) IV Push every 6 hours PRN Nausea and/or Vomiting      Vital Signs Last 24 Hrs  T(C): 36.7 (25 Jun 2018 04:39), Max: 38.3 (24 Jun 2018 21:25)  T(F): 98 (25 Jun 2018 04:39), Max: 101 (24 Jun 2018 21:25)  HR: 90 (25 Jun 2018 04:39) (66 - 91)  BP: 160/52 (25 Jun 2018 04:39) (102/64 - 160/52)  BP(mean): --  RR: 18 (25 Jun 2018 04:39) (18 - 18)  SpO2: 96% (25 Jun 2018 04:39) (95% - 97%)  nc/at  s1s2  cta  soft, nt, nd no guarding or rebound  no c/c/e    CBC Full  -  ( 25 Jun 2018 07:58 )  WBC Count : 12.99 K/uL  Hemoglobin : 9.0 g/dL  Hematocrit : 29.2 %  Platelet Count - Automated : 371 K/uL  Mean Cell Volume : 86.1 fl  Mean Cell Hemoglobin : 26.5 pg  Mean Cell Hemoglobin Concentration : 30.8 gm/dL  Auto Neutrophil # : x  Auto Lymphocyte # : x  Auto Monocyte # : x  Auto Eosinophil # : x  Auto Basophil # : x  Auto Neutrophil % : x  Auto Lymphocyte % : x  Auto Monocyte % : x  Auto Eosinophil % : x  Auto Basophil % : x    06-25    134<L>  |  95<L>  |  <4<L>  ----------------------------<  87  4.2   |  26  |  0.41<L>    Ca    8.5      25 Jun 2018 06:18  Phos  2.7     06-25  Mg     1.7     06-25

## 2018-06-25 NOTE — PROGRESS NOTE ADULT - ASSESSMENT
78 year old female with perforated rectal cancer POD 5 s/p creation of end sigmoid loop colostomy  -Patient is tolerating soft diet  -Ostomy functioning  -Febrile to 101 overnight off antibiotics, recommend ID follow up regarding possibly resuming antibiotics given known abscesses  -Pain control  -OOB and ambulate  -Ostomy care  -Continue care per primary team, colorectal surgery to follow

## 2018-06-25 NOTE — CHART NOTE - NSCHARTNOTEFT_GEN_A_CORE
Informed by RN. Blood cultures Aerobic bottle shows Gram positive cocci in clusters. Pt received 1 dose Vancomycin and currently on Zosyn Q8 hours. Awaiting ID call back. D/w Dr. Metcalf. Pt afebrile at this time.

## 2018-06-25 NOTE — PROGRESS NOTE ADULT - SUBJECTIVE AND OBJECTIVE BOX
Patient is a 78y old  Female who presents with a chief complaint of fever (08 Jun 2018 15:36)    Being followed by ID for fever  Asked to see patient again as she had a fever  s/p sigmoid end colostomy on 6/20-intraop no abscess visualized  Had fever yesterday    Interval history:  No other acute events      ROS:  No observed cough or sputum  Some abd pain  No N/V  No Iv site pain   No other complaints    Antimicrobials:    piperacillin/tazobactam IVPB. 3.375 Gram(s) IV Intermittent every 8 hours  vancomycin  IVPB 1000 milliGRAM(s) IV Intermittent once      other medications reviewed    Vital Signs Last 24 Hrs  T(C): 37.1 (06-25-18 @ 13:23), Max: 38.3 (06-24-18 @ 21:25)  T(F): 98.7 (06-25-18 @ 13:23), Max: 101 (06-24-18 @ 21:25)  HR: 103 (06-25-18 @ 13:23) (66 - 103)  BP: 148/79 (06-25-18 @ 13:23) (102/64 - 160/52)  BP(mean): --  RR: 18 (06-25-18 @ 13:23) (18 - 18)  SpO2: 96% (06-25-18 @ 13:23) (95% - 97%)    Physical Exam:    Constitutional well preserved,comfortable,pleasant    HEENT PERRLA EOMI,No pallor or icterus    No oral exudate or erythema    Neck supple no JVD or LN    Chest Good AE,CTA    CVS RRR S1 S2 WNl No murmur or rub or gallop    Abd soft BS normal  colostomy  Minimal tenderness    Ext No cyanosis clubbing or edema    IV site no erythema tenderness or discharge    Joints no swelling or LOM    CNS alert awake-answers some queries   Moves all ext     Lab Data:                          9.0    12.99 )-----------( 371      ( 25 Jun 2018 07:58 )             29.2     WBC Count: 12.99 (06-25-18 @ 07:58)  WBC Count: 9.08 (06-23-18 @ 08:13)  WBC Count: 10.74 (06-22-18 @ 08:07)  WBC Count: 12.7 (06-21-18 @ 18:46)  WBC Count: 13.63 (06-20-18 @ 07:59)  WBC Count: 14.0 (06-19-18 @ 14:22)    06-25    134<L>  |  95<L>  |  <4<L>  ----------------------------<  87  4.2   |  26  |  0.41<L>    Ca    8.5      25 Jun 2018 06:18  Phos  2.7     06-25  Mg     1.7     06-25          Urine Microscopic-Add On (NC) (06.25.18 @ 05:26)    Bacteria: Negative    Epithelial Cells: 2 /HPF    Red Blood Cell - Urine: 4 /HPF    White Blood Cell - Urine: 73 /HPF    Hyaline Casts: 0 /LPF    < from: Xray Chest 1 View- PORTABLE-Urgent (06.24.18 @ 21:59) >    IMPRESSION:   Clear lungs.        < end of copied text >

## 2018-06-25 NOTE — CHART NOTE - NSCHARTNOTEFT_GEN_A_CORE
Malnutrition followup.   Patient with very poor PO intake.  Discussed with patient's daughter and RN. Daughter brings food from home, patient accepts 2 spoons only per daughter. No nausea, vomiting, diarrhea.   Medical course:  S/P Colorectal Surgery on 6/14 for Perforated rectal cancer; s/p laparoscopic colostomy  to divert  fecal stream, for  improved  pelvic pain/discomfort, and treat any impending obstruction and allow for some healing of pelvis  as noted.    Diet : PO diet advance Low Sodium Soft     PO intake:  < 50% [ X] 50-75% [ ]   % [ ]  other :     Source for PO intake [ ] Patient [ X] famiily , daughter feels patient is still "dazed" by surgical procedure as stated, not speaking, not eating      Enteral /Parenteral Nutrition: NA      Current Weight: 41  Dosing Weight   41.2    Pertinent Medications: MEDICATIONS  (STANDING):  atorvastatin 40 milliGRAM(s) Oral at bedtime  carvedilol 3.125 milliGRAM(s) Oral every 12 hours  dextrose 5% + sodium chloride 0.9%. 1000 milliLiter(s) (75 mL/Hr) IV Continuous <Continuous>  dextrose 5%. 1000 milliLiter(s) (50 mL/Hr) IV Continuous <Continuous>  dextrose 50% Injectable 12.5 Gram(s) IV Push once  dextrose 50% Injectable 25 Gram(s) IV Push once  dextrose 50% Injectable 25 Gram(s) IV Push once  docusate sodium 100 milliGRAM(s) Oral two times a day  famotidine    Tablet 20 milliGRAM(s) Oral daily  heparin  Injectable 5000 Unit(s) SubCutaneous every 12 hours  insulin lispro (HumaLOG) corrective regimen sliding scale   SubCutaneous three times a day before meals  insulin lispro (HumaLOG) corrective regimen sliding scale   SubCutaneous at bedtime  lisinopril 2.5 milliGRAM(s) Oral daily  mirtazapine 15 milliGRAM(s) Oral at bedtime  morphine ER Tablet 15 milliGRAM(s) Oral every 12 hours  piperacillin/tazobactam IVPB. 3.375 Gram(s) IV Intermittent every 8 hours  polyethylene glycol 3350 17 Gram(s) Oral daily  senna 2 Tablet(s) Oral at bedtime  vancomycin  IVPB 1000 milliGRAM(s) IV Intermittent once    MEDICATIONS  (PRN):  acetaminophen   Tablet 650 milliGRAM(s) Oral every 6 hours PRN Mild Pain (1 - 3)  bisacodyl Suppository 10 milliGRAM(s) Rectal daily PRN Constipation  dextrose 40% Gel 15 Gram(s) Oral once PRN Blood Glucose LESS THAN 70 milliGRAM(s)/deciliter  glucagon  Injectable 1 milliGRAM(s) IntraMuscular once PRN Glucose LESS THAN 70 milligrams/deciliter  morphine  - Injectable 1 milliGRAM(s) IV Push every 6 hours PRN Severe Pain (7 - 10)  morphine  IR 15 milliGRAM(s) Oral every 8 hours PRN Severe Pain (7 - 10)  ondansetron Injectable 4 milliGRAM(s) IV Push every 6 hours PRN Nausea and/or Vomiting    Pertinent Labs:  06-25 Na134 mmol/L<L> Glu 87 mg/dL K+ 4.2 mmol/L Cr  0.41 mg/dL<L> BUN <4 mg/dL<L> 06-25 Phos 2.7 mg/dL 06-19 Alb 2.6 g/dL<L>      Skin: no pressure breakdown      Estimated Needs:   [X ] no change since previous assessment  [ ] recalculated:       Previous Nutrition Diagnosis:    [X ] Malnutrition          Nutrition Diagnosis is [X ] ongoing         New Nutrition Diagnosis: [X] not applicable       Recommend      [X ] Nutrition  supplements:  continue ensure enlive 1x/day, prosource protein supplement x 2 daily    [X ] Other: Total feeding assistance,    Monitor/encourage PO intake as tolerated. ,       Monitoring and Evaluation:     [X ] PO intake [ X] Tolerance to diet prescription [X ] weights [ X] follow up per protocol    [X ] other:  total feeding assistance

## 2018-06-25 NOTE — PROGRESS NOTE ADULT - ASSESSMENT
78F PMHx Stage III rectal cancer s/p incomplete RT course, PVD s/p R AKA, who presented to [facility] ED the afternoon of 6/8/18 from nursing home complaining of fever and chills. Patient recently admitted to [facility] (5/8-5/23) during which her rectal CA was diagnosed (EUS/colonoscopy performed suggested T3N1 mid-rectal tumor, adenocarcinoma. Started RT, until she was developed pelvic abscesses. Discharged on 2weeks IV antibiotics via PICC line, then transitioned to Augmentin with plan for continued surveillance. However she developed fevers & returned to hospital.   s/p Rx for PNA  Now POD#5 sigmoid end loop colostomy  Now with fevers  Mild leucocytosis  Some abd pain  No other clinical localization  Pyuria on UA  CXR clear    ?Nosocomial infection  ?UTI  ?Occult abd foci  Given POD3% estherley post op fever    Rec:  1) follow blood and urine Cx  2) Consider abd imaging  3) empiric zosyn + Vanco x 1  4) Will tailor plan for ID issues  per course,results.Will defer to primary team on management of other issues.  case d/w Med NP    Will Follow.  Beeper [id number]/afterhours/No response-[id number]

## 2018-06-25 NOTE — PROGRESS NOTE ADULT - SUBJECTIVE AND OBJECTIVE BOX
COLORECTAL SURGERY PROGRESS NOTE    78F PMHx Stage III rectal cancer s/p incomplete RT course, PVD s/p R NUBIA, who presented to [facility] ED the afternoon of 6/8/18 from nursing home complaining of fever and chills. Patient recently admitted to [facility] (5/8-5/23) during which her rectal CA was diagnosed (EUS/colonoscopy performed suggested T3N1 mid-rectal tumor, adenocarcinoma. Started RT, until she was developed pelvic abscesses. Discharged on 2weeks IV antibiotics via PICC line, then transitioned to Augmentin with plan for continued surveillance. However she developed fevers & returned to hospital. Per family at bedside, patient was feeling weak, unable to walk, but otherwise denies any fevers, chills, nausea, vomiting, diarrhea, SOB, cough, rash, abdominal pain. Repeat CT imaging of abdomen and pelvis, showed persistent rectal neoplasm, w/ improvement in clint-rectal collections. There was also as stable spiculated nodule in the right upper lobe suspicious for neoplasm, previously seen. Initially, during [facility] hospitalization in May, plan for short course of RT, followed by interval surgery, however RT stopped 2/2 abscesses. Planned for discharge on IV abx w/ repeat imaging to assess for resolution of collections.  CT abdomen and pelvis shows minor improvement in collections    INTERVAL EVENTS:  Patient is POD 5 s/p creation of end sigmoid loop colostomy.  Complaining of some abdominal pain around the incisions. Tolerating soft diet.  Ostomy functional.  Febrile overnight to 101.  Fever work up performed.      ICU Vital Signs Last 24 Hrs  T(C): 36.7 (25 Jun 2018 04:39), Max: 38.3 (24 Jun 2018 21:25)  T(F): 98 (25 Jun 2018 04:39), Max: 101 (24 Jun 2018 21:25)  HR: 90 (25 Jun 2018 04:39) (66 - 91)  BP: 160/52 (25 Jun 2018 04:39) (102/64 - 160/52)  BP(mean): --  ABP: --  ABP(mean): --  RR: 18 (25 Jun 2018 04:39) (18 - 18)  SpO2: 96% (25 Jun 2018 04:39) (95% - 97%)    I&O's Detail    23 Jun 2018 07:01  -  24 Jun 2018 07:00  --------------------------------------------------------  IN:    lactated ringers.: 1800 mL    Oral Fluid: 340 mL  Total IN: 2140 mL    OUT:    Colostomy: 25 mL  Total OUT: 25 mL    Total NET: 2115 mL      24 Jun 2018 07:01  -  25 Jun 2018 06:38  --------------------------------------------------------  IN:    IV PiggyBack: 250 mL    lactated ringers.: 1350 mL    Oral Fluid: 300 mL  Total IN: 1900 mL    OUT:    Colostomy: 100 mL  Total OUT: 100 mL    Total NET: 1800 mL      MEDICATIONS  (STANDING):  atorvastatin 40 milliGRAM(s) Oral at bedtime  carvedilol 3.125 milliGRAM(s) Oral every 12 hours  dextrose 5%. 1000 milliLiter(s) (50 mL/Hr) IV Continuous <Continuous>  dextrose 50% Injectable 12.5 Gram(s) IV Push once  dextrose 50% Injectable 25 Gram(s) IV Push once  dextrose 50% Injectable 25 Gram(s) IV Push once  docusate sodium 100 milliGRAM(s) Oral two times a day  famotidine    Tablet 20 milliGRAM(s) Oral daily  heparin  Injectable 5000 Unit(s) SubCutaneous every 12 hours  insulin lispro (HumaLOG) corrective regimen sliding scale   SubCutaneous three times a day before meals  insulin lispro (HumaLOG) corrective regimen sliding scale   SubCutaneous at bedtime  lactated ringers. 1000 milliLiter(s) (75 mL/Hr) IV Continuous <Continuous>  lisinopril 2.5 milliGRAM(s) Oral daily  mirtazapine 15 milliGRAM(s) Oral at bedtime  morphine ER Tablet 15 milliGRAM(s) Oral every 12 hours  polyethylene glycol 3350 17 Gram(s) Oral daily  senna 2 Tablet(s) Oral at bedtime    MEDICATIONS  (PRN):  acetaminophen   Tablet 650 milliGRAM(s) Oral every 6 hours PRN Mild Pain (1 - 3)  bisacodyl Suppository 10 milliGRAM(s) Rectal daily PRN Constipation  dextrose 40% Gel 15 Gram(s) Oral once PRN Blood Glucose LESS THAN 70 milliGRAM(s)/deciliter  glucagon  Injectable 1 milliGRAM(s) IntraMuscular once PRN Glucose LESS THAN 70 milligrams/deciliter  morphine  - Injectable 1 milliGRAM(s) IV Push every 6 hours PRN Severe Pain (7 - 10)  morphine  IR 15 milliGRAM(s) Oral every 8 hours PRN Severe Pain (7 - 10)  ondansetron Injectable 4 milliGRAM(s) IV Push every 6 hours PRN Nausea and/or Vomiting    General:  Sitting up in bed, appears comfortable  Chest:  Breath sounds audible bilaterally; no wheezing, rales or ronchi  Abdomen:  Soft, appropriately tender, non distended; colostomy healthy and pink  Extremities:  Warm, well perfused                          9.8    9.08  )-----------( 345      ( 23 Jun 2018 08:13 )             30.5   06-24    132<L>  |  93<L>  |  <4<L>  ----------------------------<  102<H>  3.7   |  28  |  0.37<L>    Ca    8.2<L>      24 Jun 2018 06:35  Phos  1.5     06-24  Mg     1.7     06-24

## 2018-06-25 NOTE — PROGRESS NOTE ADULT - ASSESSMENT
78F with rectal cancer, dm,  htn,  adm with fever,  afebrile  now, s/p ab    rectal fluid  collections/ pna  proximal diversion/ostomy,   dr ferreira   oncology, RT saw  pt  per  ID,   ab stopped   s/p  colectomy/ sigmoid   colostomy  ostomy,  , good  function  low  grade fever, not persistent, ID to f/p  spoke  with  daughter

## 2018-06-26 LAB
CULTURE RESULTS: SIGNIFICANT CHANGE UP
GLUCOSE BLDC GLUCOMTR-MCNC: 114 MG/DL — HIGH (ref 70–99)
GLUCOSE BLDC GLUCOMTR-MCNC: 150 MG/DL — HIGH (ref 70–99)
GLUCOSE BLDC GLUCOMTR-MCNC: 81 MG/DL — SIGNIFICANT CHANGE UP (ref 70–99)
GLUCOSE BLDC GLUCOMTR-MCNC: 87 MG/DL — SIGNIFICANT CHANGE UP (ref 70–99)
HCT VFR BLD CALC: 26.7 % — LOW (ref 34.5–45)
HGB BLD-MCNC: 8.7 G/DL — LOW (ref 11.5–15.5)
MCHC RBC-ENTMCNC: 29.1 PG — SIGNIFICANT CHANGE UP (ref 27–34)
MCHC RBC-ENTMCNC: 32.5 GM/DL — SIGNIFICANT CHANGE UP (ref 32–36)
MCV RBC AUTO: 89.4 FL — SIGNIFICANT CHANGE UP (ref 80–100)
ORGANISM # SPEC MICROSCOPIC CNT: SIGNIFICANT CHANGE UP
ORGANISM # SPEC MICROSCOPIC CNT: SIGNIFICANT CHANGE UP
PLATELET # BLD AUTO: 277 K/UL — SIGNIFICANT CHANGE UP (ref 150–400)
RBC # BLD: 2.99 M/UL — LOW (ref 3.8–5.2)
RBC # FLD: 17.3 % — HIGH (ref 10.3–14.5)
SPECIMEN SOURCE: SIGNIFICANT CHANGE UP
WBC # BLD: 11.5 K/UL — HIGH (ref 3.8–10.5)
WBC # FLD AUTO: 11.5 K/UL — HIGH (ref 3.8–10.5)

## 2018-06-26 PROCEDURE — 74177 CT ABD & PELVIS W/CONTRAST: CPT | Mod: 26

## 2018-06-26 PROCEDURE — 99233 SBSQ HOSP IP/OBS HIGH 50: CPT

## 2018-06-26 RX ADMIN — CARVEDILOL PHOSPHATE 3.12 MILLIGRAM(S): 80 CAPSULE, EXTENDED RELEASE ORAL at 05:23

## 2018-06-26 RX ADMIN — POLYETHYLENE GLYCOL 3350 17 GRAM(S): 17 POWDER, FOR SOLUTION ORAL at 13:35

## 2018-06-26 RX ADMIN — SODIUM CHLORIDE 75 MILLILITER(S): 9 INJECTION INTRAMUSCULAR; INTRAVENOUS; SUBCUTANEOUS at 13:39

## 2018-06-26 RX ADMIN — HEPARIN SODIUM 5000 UNIT(S): 5000 INJECTION INTRAVENOUS; SUBCUTANEOUS at 17:31

## 2018-06-26 RX ADMIN — Medication 100 MILLIGRAM(S): at 17:31

## 2018-06-26 RX ADMIN — FAMOTIDINE 20 MILLIGRAM(S): 10 INJECTION INTRAVENOUS at 13:35

## 2018-06-26 RX ADMIN — MORPHINE SULFATE 15 MILLIGRAM(S): 50 CAPSULE, EXTENDED RELEASE ORAL at 06:10

## 2018-06-26 RX ADMIN — Medication 100 MILLIGRAM(S): at 05:23

## 2018-06-26 RX ADMIN — MORPHINE SULFATE 15 MILLIGRAM(S): 50 CAPSULE, EXTENDED RELEASE ORAL at 05:23

## 2018-06-26 RX ADMIN — ATORVASTATIN CALCIUM 40 MILLIGRAM(S): 80 TABLET, FILM COATED ORAL at 21:39

## 2018-06-26 RX ADMIN — Medication 650 MILLIGRAM(S): at 13:34

## 2018-06-26 RX ADMIN — HEPARIN SODIUM 5000 UNIT(S): 5000 INJECTION INTRAVENOUS; SUBCUTANEOUS at 05:23

## 2018-06-26 RX ADMIN — LISINOPRIL 2.5 MILLIGRAM(S): 2.5 TABLET ORAL at 05:24

## 2018-06-26 RX ADMIN — MIRTAZAPINE 15 MILLIGRAM(S): 45 TABLET, ORALLY DISINTEGRATING ORAL at 21:39

## 2018-06-26 RX ADMIN — PIPERACILLIN AND TAZOBACTAM 25 GRAM(S): 4; .5 INJECTION, POWDER, LYOPHILIZED, FOR SOLUTION INTRAVENOUS at 05:22

## 2018-06-26 RX ADMIN — PIPERACILLIN AND TAZOBACTAM 25 GRAM(S): 4; .5 INJECTION, POWDER, LYOPHILIZED, FOR SOLUTION INTRAVENOUS at 13:36

## 2018-06-26 RX ADMIN — CARVEDILOL PHOSPHATE 3.12 MILLIGRAM(S): 80 CAPSULE, EXTENDED RELEASE ORAL at 17:31

## 2018-06-26 RX ADMIN — SENNA PLUS 2 TABLET(S): 8.6 TABLET ORAL at 21:39

## 2018-06-26 RX ADMIN — ONDANSETRON 4 MILLIGRAM(S): 8 TABLET, FILM COATED ORAL at 18:22

## 2018-06-26 RX ADMIN — PIPERACILLIN AND TAZOBACTAM 25 GRAM(S): 4; .5 INJECTION, POWDER, LYOPHILIZED, FOR SOLUTION INTRAVENOUS at 21:39

## 2018-06-26 RX ADMIN — MORPHINE SULFATE 15 MILLIGRAM(S): 50 CAPSULE, EXTENDED RELEASE ORAL at 18:00

## 2018-06-26 RX ADMIN — MORPHINE SULFATE 15 MILLIGRAM(S): 50 CAPSULE, EXTENDED RELEASE ORAL at 17:35

## 2018-06-26 NOTE — PROVIDER CONTACT NOTE (CRITICAL VALUE NOTIFICATION) - ASSESSMENT
Patient admitted with fever has blood culture drawn on 6/24 result came back critical. Postive blood culture perilimanry growth anerobic and arobic bottle growth positive cocxy and cluster.

## 2018-06-26 NOTE — PROGRESS NOTE ADULT - SUBJECTIVE AND OBJECTIVE BOX
resting    REVIEW OF SYSTEMS:  GEN: no fever,    no chills  RESP: no SOB,   no cough  CVS: no chest pain,   no palpitations  GI: no abdominal pain,   no nausea,   no vomiting,   no constipation,   no diarrhea  : no dysuria,   no frequency  NEURO: no headache,   no dizziness  PSYCH: no depression,   not anxious  Derm : no rash    MEDICATIONS  (STANDING):  atorvastatin 40 milliGRAM(s) Oral at bedtime  carvedilol 3.125 milliGRAM(s) Oral every 12 hours  dextrose 5%. 1000 milliLiter(s) (50 mL/Hr) IV Continuous <Continuous>  dextrose 50% Injectable 12.5 Gram(s) IV Push once  dextrose 50% Injectable 25 Gram(s) IV Push once  dextrose 50% Injectable 25 Gram(s) IV Push once  docusate sodium 100 milliGRAM(s) Oral two times a day  famotidine    Tablet 20 milliGRAM(s) Oral daily  heparin  Injectable 5000 Unit(s) SubCutaneous every 12 hours  insulin lispro (HumaLOG) corrective regimen sliding scale   SubCutaneous three times a day before meals  insulin lispro (HumaLOG) corrective regimen sliding scale   SubCutaneous at bedtime  lisinopril 2.5 milliGRAM(s) Oral daily  mirtazapine 15 milliGRAM(s) Oral at bedtime  morphine ER Tablet 15 milliGRAM(s) Oral every 12 hours  piperacillin/tazobactam IVPB. 3.375 Gram(s) IV Intermittent every 8 hours  polyethylene glycol 3350 17 Gram(s) Oral daily  senna 2 Tablet(s) Oral at bedtime  sodium chloride 0.9%. 1000 milliLiter(s) (75 mL/Hr) IV Continuous <Continuous>    MEDICATIONS  (PRN):  acetaminophen   Tablet 650 milliGRAM(s) Oral every 6 hours PRN Mild Pain (1 - 3)  acetaminophen   Tablet 650 milliGRAM(s) Oral every 6 hours PRN For Temp greater than 38 C (100.4 F)  bisacodyl Suppository 10 milliGRAM(s) Rectal daily PRN Constipation  dextrose 40% Gel 15 Gram(s) Oral once PRN Blood Glucose LESS THAN 70 milliGRAM(s)/deciliter  glucagon  Injectable 1 milliGRAM(s) IntraMuscular once PRN Glucose LESS THAN 70 milligrams/deciliter  morphine  - Injectable 1 milliGRAM(s) IV Push every 6 hours PRN Severe Pain (7 - 10)  morphine  IR 15 milliGRAM(s) Oral every 8 hours PRN Severe Pain (7 - 10)  ondansetron Injectable 4 milliGRAM(s) IV Push every 6 hours PRN Nausea and/or Vomiting      Vital Signs Last 24 Hrs  T(C): 36.9 (2018 04:38), Max: 38.3 (2018 20:10)  T(F): 98.4 (2018 04:38), Max: 100.9 (2018 20:10)  HR: 94 (2018 04:38) (90 - 103)  BP: 140/76 (2018 04:38) (136/78 - 148/79)  BP(mean): --  RR: 18 (2018 04:38) (17 - 20)  SpO2: 96% (2018 04:38) (95% - 97%)  CAPILLARY BLOOD GLUCOSE      POCT Blood Glucose.: 87 mg/dL (2018 08:45)  POCT Blood Glucose.: 96 mg/dL (2018 22:17)  POCT Blood Glucose.: 216 mg/dL (2018 17:38)    I&O's Summary    2018 07:01  -  2018 07:00  --------------------------------------------------------  IN: 2590 mL / OUT: 925 mL / NET: 1665 mL        PHYSICAL EXAM:  HEAD:  Atraumatic, Normocephalic  NECK: Supple, No   JVD  CHEST/LUNG:   no     rales,     no,    rhonchi  HEART: Regular rate and rhythm;         murmur  ABDOMEN: Soft, Nontender, ;   EXTREMITIES:        edema  NEUROLOGY:  alert    LABS:                        9.0    12.99 )-----------( 371      ( 2018 07:58 )             29.2     06-    134<L>  |  95<L>  |  <4<L>  ----------------------------<  87  4.2   |  26  |  0.41<L>    Ca    8.5      2018 06:18  Phos  2.7     06-25  Mg     1.7     06-25            Urinalysis Basic - ( 2018 05:26 )    Color: Yellow / Appearance: Clear / S.010 / pH: x  Gluc: x / Ketone: Negative  / Bili: Negative / Urobili: Negative mg/dL   Blood: x / Protein: Trace mg/dL / Nitrite: Negative   Leuk Esterase: Moderate / RBC: 4 /HPF / WBC 73 /HPF   Sq Epi: x / Non Sq Epi: 2 /HPF / Bacteria: Negative            Hemoglobin A1C, Whole Blood: 6.6 % ( @ 06:52)    Thyroid Stimulating Hormone, Serum: 1.00 uIU/mL ( @ 06:35)          Consultant(s) Notes Reviewed:      Care Discussed with Consultants/Other Providers:

## 2018-06-26 NOTE — PROGRESS NOTE ADULT - SUBJECTIVE AND OBJECTIVE BOX
Patient is a 78y old  Female who presents with a chief complaint of fever (08 Jun 2018 15:36)    Being followed by ID for fever, CR ca    Interval history:low grade fevers  No other acute events      ROS:  denies  cough,SOB,CP  No N/V/D  + abd pain  No urinary complaints  No HA  No joint or limb pain  No other complaints      Antimicrobials:    piperacillin/tazobactam IVPB. 3.375 Gram(s) IV Intermittent every 8 hours  vancop x 1 yesterday      other medications reviewed    Vital Signs Last 24 Hrs  T(C): 36.9 (06-26-18 @ 11:14), Max: 38.3 (06-25-18 @ 20:10)  T(F): 98.5 (06-26-18 @ 11:14), Max: 100.9 (06-25-18 @ 20:10)  HR: 89 (06-26-18 @ 11:14) (89 - 103)  BP: 119/69 (06-26-18 @ 11:14) (119/69 - 148/79)  BP(mean): --  RR: 16 (06-26-18 @ 11:14) (16 - 20)  SpO2: 96% (06-26-18 @ 11:14) (95% - 97%)    Physical Exam:    Constitutional well preserved,comfortable,pleasant    HEENT PERRLA EOMI,No pallor or icterus    No oral exudate or erythema    Neck supple no JVD or LN    Chest Good AE,CTA    CVS RRR S1 S2 WNl No murmur or rub or gallop    Abd soft BS normal No tenderness  colostomy  Mild tenderness no rebound or guarding    Ext No cyanosis clubbing or edema    IV site no erythema tenderness or discharge    Joints no swelling or LOM    CNS AAO X 3 no focal    Lab Data:                          9.0    12.99 )-----------( 371      ( 25 Jun 2018 07:58 )             29.2       06-25    134<L>  |  95<L>  |  <4<L>  ----------------------------<  87  4.2   |  26  |  0.41<L>    Ca    8.5      25 Jun 2018 06:18  Phos  2.7     06-25  Mg     1.7     06-25          Culture - Blood (collected 25 Jun 2018 00:01)  Source: .Blood Blood-Catheter  Gram Stain (25 Jun 2018 19:11):    Growth in aerobic and anaerobic bottles: Gram Positive Cocci in Clusters  Preliminary Report (25 Jun 2018 19:12):    Growth in aerobic bottle: Gram Positive Cocci in Clusters    "    -  Coagulase negative Staphylococcus: Detec      Method Type: PCR    Culture - Blood (collected 25 Jun 2018 00:01)  Source: .Blood Blood-Peripheral  Preliminary Report (26 Jun 2018 01:02):    No growth to date.        Ct pending     < from: Xray Chest 1 View- PORTABLE-Urgent (06.24.18 @ 21:59) >    IMPRESSION:   Clear lungs.        < end of copied text >

## 2018-06-26 NOTE — PROGRESS NOTE ADULT - SUBJECTIVE AND OBJECTIVE BOX
- Patient seen and examined.  - In summary, patient is a 78 year old woman who presented with fever (2018 15:36)  - Today, patient is without complaints.         *****MEDICATIONS:    MEDICATIONS  (STANDING):  atorvastatin 40 milliGRAM(s) Oral at bedtime  carvedilol 3.125 milliGRAM(s) Oral every 12 hours  dextrose 5%. 1000 milliLiter(s) (50 mL/Hr) IV Continuous <Continuous>  dextrose 50% Injectable 12.5 Gram(s) IV Push once  dextrose 50% Injectable 25 Gram(s) IV Push once  dextrose 50% Injectable 25 Gram(s) IV Push once  docusate sodium 100 milliGRAM(s) Oral two times a day  famotidine    Tablet 20 milliGRAM(s) Oral daily  heparin  Injectable 5000 Unit(s) SubCutaneous every 12 hours  insulin lispro (HumaLOG) corrective regimen sliding scale   SubCutaneous three times a day before meals  insulin lispro (HumaLOG) corrective regimen sliding scale   SubCutaneous at bedtime  lisinopril 2.5 milliGRAM(s) Oral daily  mirtazapine 15 milliGRAM(s) Oral at bedtime  morphine ER Tablet 15 milliGRAM(s) Oral every 12 hours  piperacillin/tazobactam IVPB. 3.375 Gram(s) IV Intermittent every 8 hours  polyethylene glycol 3350 17 Gram(s) Oral daily  senna 2 Tablet(s) Oral at bedtime  sodium chloride 0.9%. 1000 milliLiter(s) (75 mL/Hr) IV Continuous <Continuous>    MEDICATIONS  (PRN):  acetaminophen   Tablet 650 milliGRAM(s) Oral every 6 hours PRN Mild Pain (1 - 3)  acetaminophen   Tablet 650 milliGRAM(s) Oral every 6 hours PRN For Temp greater than 38 C (100.4 F)  bisacodyl Suppository 10 milliGRAM(s) Rectal daily PRN Constipation  dextrose 40% Gel 15 Gram(s) Oral once PRN Blood Glucose LESS THAN 70 milliGRAM(s)/deciliter  glucagon  Injectable 1 milliGRAM(s) IntraMuscular once PRN Glucose LESS THAN 70 milligrams/deciliter  morphine  - Injectable 1 milliGRAM(s) IV Push every 6 hours PRN Severe Pain (7 - 10)  morphine  IR 15 milliGRAM(s) Oral every 8 hours PRN Severe Pain (7 - 10)  ondansetron Injectable 4 milliGRAM(s) IV Push every 6 hours PRN Nausea and/or Vomiting           ***** REVIEW OF SYSTEM:  GEN: no fever, no chills, no pain  RESP: no SOB, no cough, no sputum  CVS: no chest pain, no palpitations, no edema  GI: no abdominal pain, no nausea, no vomiting, no constipation, no diarrhea  : no dysuria, no frequency  NEURO: no headache, no dizziness  PSYCH: no depression, not anxious  Derm : no itching, no rash         ***** VITAL SIGNS:             T(F): 98.4 (18 @ 04:38), Max: 100.9 (18 @ 20:10)  HR: 94 (18 @ 04:38) (90 - 103)  BP: 140/76 (18 @ 04:38) (133/81 - 148/79)  RR: 18 (18 @ 04:38) (17 - 20)  SpO2: 96% (18 @ 04:38) (95% - 97%)  Wt(kg): --  ,   I&O's Summary    2018 07:01  -  2018 07:00  --------------------------------------------------------  IN: 2590 mL / OUT: 925 mL / NET: 1665 mL                     *****PHYSICAL EXAM:  GEN: A&O X 3 , NAD , comfortable  HEENT: NCAT, EOMI, MMM, no icterus  NECK: Supple, No JVD  CVS: S1S2 , regular , No M/R/G appreciated  PULM: CTA B/L,  no W/R/R appreciated  ABD.: soft. non tender, non distended,  bowel sounds present  Extrem: intact pulses , no edema noted  Derm: No rash or ecchymosis noted  PSYCH: normal mood, no depression, not anxious         *****LAB AND IMAGIN.0    12.99 )-----------( 371      ( 2018 07:58 )             29.2               -    134<L>  |  95<L>  |  <4<L>  ----------------------------<  87  4.2   |  26  |  0.41<L>    Ca    8.5      2018 06:18  Phos  2.7     06-25  Mg     1.7     06-25                         Urinalysis Basic - ( 2018 05:26 )    Color: Yellow / Appearance: Clear / S.010 / pH: x  Gluc: x / Ketone: Negative  / Bili: Negative / Urobili: Negative mg/dL   Blood: x / Protein: Trace mg/dL / Nitrite: Negative   Leuk Esterase: Moderate / RBC: 4 /HPF / WBC 73 /HPF   Sq Epi: x / Non Sq Epi: 2 /HPF / Bacteria: Negative                  [All pertinent recent Imaging/Reports reviewed]  < from: Transthoracic Echocardiogram (18 @ 07:08) >  Conclusions:  1. Normal left ventricular internal dimensions and wall  thicknesses.  2. Normal left ventricular systolic function. No segmental  wall motion abnormalities.  3. Normal diastolic function  4. Normal right ventricular sizeand systolic function.  5. Estimated pulmonary artery systolic pressure equals 36  mm Hg, assuming right atrial pressure equals 8 mm Hg,  consistent with borderline pulmonary pressures.  *** Compared with echocardiogram of 2017, no  significant changes noted.    < end of copied text >         *****A S S E S S M E N T   A N D   P L A N :  78F with rectal cancer adm with fever  abx per ID  cont current meds  oncology f/u  s/p ostomy  surg f/u appreciated  diet as tolerated  PT    __________________________  YON Metcalf D.O.

## 2018-06-26 NOTE — PROGRESS NOTE ADULT - SUBJECTIVE AND OBJECTIVE BOX
INTERVAL HPI/OVERNIGHT EVENTS: low grade temps, BC 1/2 pos gram pos cocci clusters, started on abx    MEDICATIONS  (STANDING):  atorvastatin 40 milliGRAM(s) Oral at bedtime  carvedilol 3.125 milliGRAM(s) Oral every 12 hours  dextrose 5%. 1000 milliLiter(s) (50 mL/Hr) IV Continuous <Continuous>  dextrose 50% Injectable 12.5 Gram(s) IV Push once  dextrose 50% Injectable 25 Gram(s) IV Push once  dextrose 50% Injectable 25 Gram(s) IV Push once  docusate sodium 100 milliGRAM(s) Oral two times a day  famotidine    Tablet 20 milliGRAM(s) Oral daily  heparin  Injectable 5000 Unit(s) SubCutaneous every 12 hours  insulin lispro (HumaLOG) corrective regimen sliding scale   SubCutaneous three times a day before meals  insulin lispro (HumaLOG) corrective regimen sliding scale   SubCutaneous at bedtime  lisinopril 2.5 milliGRAM(s) Oral daily  mirtazapine 15 milliGRAM(s) Oral at bedtime  morphine ER Tablet 15 milliGRAM(s) Oral every 12 hours  piperacillin/tazobactam IVPB. 3.375 Gram(s) IV Intermittent every 8 hours  polyethylene glycol 3350 17 Gram(s) Oral daily  senna 2 Tablet(s) Oral at bedtime  sodium chloride 0.9%. 1000 milliLiter(s) (75 mL/Hr) IV Continuous <Continuous>    MEDICATIONS  (PRN):  acetaminophen   Tablet 650 milliGRAM(s) Oral every 6 hours PRN Mild Pain (1 - 3)  acetaminophen   Tablet 650 milliGRAM(s) Oral every 6 hours PRN For Temp greater than 38 C (100.4 F)  bisacodyl Suppository 10 milliGRAM(s) Rectal daily PRN Constipation  dextrose 40% Gel 15 Gram(s) Oral once PRN Blood Glucose LESS THAN 70 milliGRAM(s)/deciliter  glucagon  Injectable 1 milliGRAM(s) IntraMuscular once PRN Glucose LESS THAN 70 milligrams/deciliter  morphine  - Injectable 1 milliGRAM(s) IV Push every 6 hours PRN Severe Pain (7 - 10)  morphine  IR 15 milliGRAM(s) Oral every 8 hours PRN Severe Pain (7 - 10)  ondansetron Injectable 4 milliGRAM(s) IV Push every 6 hours PRN Nausea and/or Vomiting      Allergies    No Known Allergies    Intolerances            PHYSICAL EXAM:   Vital Signs:  Vital Signs Last 24 Hrs  T(C): 36.9 (2018 04:38), Max: 38.3 (2018 20:10)  T(F): 98.4 (2018 04:38), Max: 100.9 (2018 20:10)  HR: 94 (2018 04:38) (90 - 103)  BP: 140/76 (2018 04:38) (133/81 - 148/79)  BP(mean): --  RR: 18 (2018 04:38) (17 - 20)  SpO2: 96% (2018 04:38) (95% - 97%)  Daily     Daily     GENERAL:  no distress  HEENT:  NC/AT,  anicteric  CHEST:   no increased effort, breath sounds clear  HEART:  Regular rhythm  ABDOMEN:  Soft, non-tender, non-distended, normoactive bowel sounds,  less tenderness, ostomy functioning  EXTEREMITIES:  no cyanosis      LABS:                        9.0    12.99 )-----------( 371      ( 2018 07:58 )             29.2     06-25    134<L>  |  95<L>  |  <4<L>  ----------------------------<  87  4.2   |  26  |  0.41<L>    Ca    8.5      2018 06:18  Phos  2.7     06-25  Mg     1.7     06-25        Urinalysis Basic - ( 2018 05:26 )    Color: Yellow / Appearance: Clear / S.010 / pH: x  Gluc: x / Ketone: Negative  / Bili: Negative / Urobili: Negative mg/dL   Blood: x / Protein: Trace mg/dL / Nitrite: Negative   Leuk Esterase: Moderate / RBC: 4 /HPF / WBC 73 /HPF   Sq Epi: x / Non Sq Epi: 2 /HPF / Bacteria: Negative        RADIOLOGY & ADDITIONAL TESTS:

## 2018-06-26 NOTE — PROGRESS NOTE ADULT - ASSESSMENT
78F with rectal cancer, dm,  htn,  adm with fever,  afebrile  now, s/p ab    rectal fluid  collections/ pna  proximal diversion/ostomy,   dr ferreira   oncology, RT saw  pt  s/p  colectomy/ sigmoid   colostomy  ostomy,  , good  function  low  grade fever,  on zosyn now,  per  ID  spoke  with  daughter

## 2018-06-26 NOTE — PROGRESS NOTE ADULT - ASSESSMENT
78 year old female with perforated rectal cancer POD 6 s/p creation of end sigmoid loop colostomy  -Patient is tolerating soft diet  -Ostomy functioning  -Febrile overnight, blood cultures positive for gram positive cocci in clusters, follow up speciation and sensitivities   -ID followup given blood cultures   -Pain control  -OOB and ambulate  -Ostomy care  -Continue care per primary team, colorectal surgery to follow

## 2018-06-26 NOTE — PROGRESS NOTE ADULT - SUBJECTIVE AND OBJECTIVE BOX
COLORECTAL SURGERY PROGRESS NOTE    78F PMHx Stage III rectal cancer s/p incomplete RT course, PVD s/p R NUBIA, who presented to [facility] ED the afternoon of 6/8/18 from nursing home complaining of fever and chills. Patient recently admitted to [facility] (5/8-5/23) during which her rectal CA was diagnosed (EUS/colonoscopy performed suggested T3N1 mid-rectal tumor, adenocarcinoma. Started RT, until she was developed pelvic abscesses. Discharged on 2weeks IV antibiotics via PICC line, then transitioned to Augmentin with plan for continued surveillance. However she developed fevers & returned to hospital. Per family at bedside, patient was feeling weak, unable to walk, but otherwise denies any fevers, chills, nausea, vomiting, diarrhea, SOB, cough, rash, abdominal pain. Repeat CT imaging of abdomen and pelvis, showed persistent rectal neoplasm, w/ improvement in clint-rectal collections. There was also as stable spiculated nodule in the right upper lobe suspicious for neoplasm, previously seen. Initially, during [facility] hospitalization in May, plan for short course of RT, followed by interval surgery, however RT stopped 2/2 abscesses. Planned for discharge on IV abx w/ repeat imaging to assess for resolution of collections.  CT abdomen and pelvis shows minor improvement in collections    INTERVAL EVENTS:  Patient is POD 6 s/p creation of end sigmoid loop colostomy.  Tolerating soft diet.  Ostomy functional.  Continues to have low grade fevers overnight.  Blood cultures positive for gram positive cocci in clusters.  Given a dose of Vanco and started on Zosyn    ICU Vital Signs Last 24 Hrs  T(C): 36.9 (26 Jun 2018 04:38), Max: 38.3 (25 Jun 2018 20:10)  T(F): 98.4 (26 Jun 2018 04:38), Max: 100.9 (25 Jun 2018 20:10)  HR: 94 (26 Jun 2018 04:38) (90 - 103)  BP: 140/76 (26 Jun 2018 04:38) (133/81 - 148/79)  BP(mean): --  ABP: --  ABP(mean): --  RR: 18 (26 Jun 2018 04:38) (17 - 20)  SpO2: 96% (26 Jun 2018 04:38) (95% - 97%)    I&O's Detail    24 Jun 2018 07:01 - 25 Jun 2018 07:00  --------------------------------------------------------  IN:    IV PiggyBack: 250 mL    lactated ringers.: 1350 mL    Oral Fluid: 300 mL  Total IN: 1900 mL    OUT:    Colostomy: 100 mL  Total OUT: 100 mL    Total NET: 1800 mL      25 Jun 2018 07:01  -  26 Jun 2018 06:57  --------------------------------------------------------  IN:    dextrose 5% + sodium chloride 0.9%: 75 mL    IV PiggyBack: 800 mL    lactated ringers.: 500 mL    Oral Fluid: 240 mL    sodium chloride 0.9%.: 975 mL  Total IN: 2590 mL    OUT:    Colostomy: 925 mL  Total OUT: 925 mL    Total NET: 1665 mL      MEDICATIONS  (STANDING):  atorvastatin 40 milliGRAM(s) Oral at bedtime  carvedilol 3.125 milliGRAM(s) Oral every 12 hours  dextrose 5%. 1000 milliLiter(s) (50 mL/Hr) IV Continuous <Continuous>  dextrose 50% Injectable 12.5 Gram(s) IV Push once  dextrose 50% Injectable 25 Gram(s) IV Push once  dextrose 50% Injectable 25 Gram(s) IV Push once  docusate sodium 100 milliGRAM(s) Oral two times a day  famotidine    Tablet 20 milliGRAM(s) Oral daily  heparin  Injectable 5000 Unit(s) SubCutaneous every 12 hours  insulin lispro (HumaLOG) corrective regimen sliding scale   SubCutaneous three times a day before meals  insulin lispro (HumaLOG) corrective regimen sliding scale   SubCutaneous at bedtime  lisinopril 2.5 milliGRAM(s) Oral daily  mirtazapine 15 milliGRAM(s) Oral at bedtime  morphine ER Tablet 15 milliGRAM(s) Oral every 12 hours  piperacillin/tazobactam IVPB. 3.375 Gram(s) IV Intermittent every 8 hours  polyethylene glycol 3350 17 Gram(s) Oral daily  senna 2 Tablet(s) Oral at bedtime  sodium chloride 0.9%. 1000 milliLiter(s) (75 mL/Hr) IV Continuous <Continuous>    MEDICATIONS  (PRN):  acetaminophen   Tablet 650 milliGRAM(s) Oral every 6 hours PRN Mild Pain (1 - 3)  acetaminophen   Tablet 650 milliGRAM(s) Oral every 6 hours PRN For Temp greater than 38 C (100.4 F)  bisacodyl Suppository 10 milliGRAM(s) Rectal daily PRN Constipation  dextrose 40% Gel 15 Gram(s) Oral once PRN Blood Glucose LESS THAN 70 milliGRAM(s)/deciliter  glucagon  Injectable 1 milliGRAM(s) IntraMuscular once PRN Glucose LESS THAN 70 milligrams/deciliter  morphine  - Injectable 1 milliGRAM(s) IV Push every 6 hours PRN Severe Pain (7 - 10)  morphine  IR 15 milliGRAM(s) Oral every 8 hours PRN Severe Pain (7 - 10)  ondansetron Injectable 4 milliGRAM(s) IV Push every 6 hours PRN Nausea and/or Vomiting    General:  Sitting up in bed, appears comfortable  Chest:  Breath sounds audible bilaterally; no wheezing, rales or ronchi  Abdomen:  Soft, appropriately tender, non distended; colostomy healthy and pink  Extremities:  Warm, well perfused                          9.0    12.99 )-----------( 371      ( 25 Jun 2018 07:58 )             29.2   06-25    134<L>  |  95<L>  |  <4<L>  ----------------------------<  87  4.2   |  26  |  0.41<L>    Ca    8.5      25 Jun 2018 06:18  Phos  2.7     06-25  Mg     1.7     06-25

## 2018-06-26 NOTE — CHART NOTE - NSCHARTNOTEFT_GEN_A_CORE
Malnutrition followup/consult. Patient continues to eat very little, few spoons of fruit at a meal. patient is in a prolonged state of malnutrition, feeding tube placement (NGT) must be considered. Patient is resting , appears weak, disinterested in   eating or drinking.    Discussed with  RN. Daughter brings food from home, patient accepts  only 2 spoons only per daughter. No nausea, vomiting, diarrhea.   Medical course:  S/P Colorectal Surgery on 6/14 for Perforated rectal cancer; s/p laparoscopic colostomy  to divert  fecal stream, for  improved  pelvic pain/discomfort, and treat any impending obstruction and allow for some healing of pelvis  as noted.      Source: Patient [ ]    Family [ ]     other [X ] RN    Diet : Low sodium soft       Enteral /Parenteral Nutrition: NA      Current Weight: 41kg  % Weight Change    Pertinent Medications: MEDICATIONS  (STANDING):  atorvastatin 40 milliGRAM(s) Oral at bedtime  carvedilol 3.125 milliGRAM(s) Oral every 12 hours  dextrose 5%. 1000 milliLiter(s) (50 mL/Hr) IV Continuous <Continuous>  dextrose 50% Injectable 12.5 Gram(s) IV Push once  dextrose 50% Injectable 25 Gram(s) IV Push once  dextrose 50% Injectable 25 Gram(s) IV Push once  docusate sodium 100 milliGRAM(s) Oral two times a day  famotidine    Tablet 20 milliGRAM(s) Oral daily  heparin  Injectable 5000 Unit(s) SubCutaneous every 12 hours  insulin lispro (HumaLOG) corrective regimen sliding scale   SubCutaneous three times a day before meals  insulin lispro (HumaLOG) corrective regimen sliding scale   SubCutaneous at bedtime  lisinopril 2.5 milliGRAM(s) Oral daily  mirtazapine 15 milliGRAM(s) Oral at bedtime  morphine ER Tablet 15 milliGRAM(s) Oral every 12 hours  piperacillin/tazobactam IVPB. 3.375 Gram(s) IV Intermittent every 8 hours  polyethylene glycol 3350 17 Gram(s) Oral daily  senna 2 Tablet(s) Oral at bedtime  sodium chloride 0.9%. 1000 milliLiter(s) (75 mL/Hr) IV Continuous <Continuous>    MEDICATIONS  (PRN):  acetaminophen   Tablet 650 milliGRAM(s) Oral every 6 hours PRN Mild Pain (1 - 3)  acetaminophen   Tablet 650 milliGRAM(s) Oral every 6 hours PRN For Temp greater than 38 C (100.4 F)  bisacodyl Suppository 10 milliGRAM(s) Rectal daily PRN Constipation  dextrose 40% Gel 15 Gram(s) Oral once PRN Blood Glucose LESS THAN 70 milliGRAM(s)/deciliter  glucagon  Injectable 1 milliGRAM(s) IntraMuscular once PRN Glucose LESS THAN 70 milligrams/deciliter  morphine  - Injectable 1 milliGRAM(s) IV Push every 6 hours PRN Severe Pain (7 - 10)  morphine  IR 15 milliGRAM(s) Oral every 8 hours PRN Severe Pain (7 - 10)  ondansetron Injectable 4 milliGRAM(s) IV Push every 6 hours PRN Nausea and/or Vomiting    Pertinent Labs:  06-25 Na134 mmol/L<L> Glu 87 mg/dL K+ 4.2 mmol/L Cr  0.41 mg/dL<L> BUN <4 mg/dL<L> 06-25 Phos 2.7 mg/dL      Skin: s/p colostomy  Estimated Needs:   [X ] no change since previous assessment  [ ] recalculated:       Previous Nutrition Diagnosis:  [X ] Malnutrition    [x] ongoing           Recommendations : see recommendations below for tube feeding    If PO diet continues  [X ] Nutrition  supplements:  cif PO diet maintained continue ensure enlive 1x/day, prosource protein supplement x 2 daily    [X ] Other: Total feeding assistance,    Monitor/encourage PO intake as tolerated. ,       Monitoring and Evaluation:       Must consider alternate means of nutrition via feeding tube placement, recommend NGT for now:  Vital AF @ 40ml/hr x24 hrs, 960ml, 28 calories/kg, protein  72gm, 1.75gm/kg based on 41 kg       [ X] Tolerance to diet prescription [X ] weights [ X] follow up per protocol    [X ] other:  total feeding assistance.

## 2018-06-26 NOTE — PROGRESS NOTE ADULT - ASSESSMENT
78F PMHx Stage III rectal cancer s/p incomplete RT course, PVD s/p R AKA, who presented to [facility] ED the afternoon of 6/8/18 from nursing home complaining of fever and chills. Patient recently admitted to [facility] (5/8-5/23) during which her rectal CA was diagnosed (EUS/colonoscopy performed suggested T3N1 mid-rectal tumor, adenocarcinoma. Started RT, until she was developed pelvic abscesses. Discharged on 2weeks IV antibiotics via PICC line, then transitioned to Augmentin with plan for continued surveillance. However she developed fevers & returned to hospital.   s/p Rx for PNA  Now POD#5 sigmoid end loop colostomy  Now with fevers  Mild leucocytosis  Some abd pain  No other clinical localization  Pyuria on UA  CXR clear    ?Nosocomial infection  ?UTI  ?Occult abd foci  Given POD6 estehrley post op fever    Rec:  1) follow blood and urine Cx-CNS in 1 set likely is a contaminant   2) follow Ct results   3) continue empiric zosyn   4) Will tailor plan for ID issues  per course,results.Will defer to primary team on management of other issues.  case d/w Med NP    Will Follow.  Beeper [phone]-ycrw/afterhours/No response-[phone]

## 2018-06-27 LAB
ANION GAP SERPL CALC-SCNC: 10 MMOL/L — SIGNIFICANT CHANGE UP (ref 5–17)
ANION GAP SERPL CALC-SCNC: 9 MMOL/L — SIGNIFICANT CHANGE UP (ref 5–17)
BUN SERPL-MCNC: 8 MG/DL — SIGNIFICANT CHANGE UP (ref 7–23)
BUN SERPL-MCNC: 9 MG/DL — SIGNIFICANT CHANGE UP (ref 7–23)
CALCIUM SERPL-MCNC: 7.5 MG/DL — LOW (ref 8.4–10.5)
CALCIUM SERPL-MCNC: 7.7 MG/DL — LOW (ref 8.4–10.5)
CHLORIDE SERPL-SCNC: 100 MMOL/L — SIGNIFICANT CHANGE UP (ref 96–108)
CHLORIDE SERPL-SCNC: 100 MMOL/L — SIGNIFICANT CHANGE UP (ref 96–108)
CO2 SERPL-SCNC: 21 MMOL/L — LOW (ref 22–31)
CO2 SERPL-SCNC: 23 MMOL/L — SIGNIFICANT CHANGE UP (ref 22–31)
CREAT SERPL-MCNC: 0.48 MG/DL — LOW (ref 0.5–1.3)
CREAT SERPL-MCNC: 0.54 MG/DL — SIGNIFICANT CHANGE UP (ref 0.5–1.3)
GLUCOSE BLDC GLUCOMTR-MCNC: 187 MG/DL — HIGH (ref 70–99)
GLUCOSE BLDC GLUCOMTR-MCNC: 79 MG/DL — SIGNIFICANT CHANGE UP (ref 70–99)
GLUCOSE BLDC GLUCOMTR-MCNC: 80 MG/DL — SIGNIFICANT CHANGE UP (ref 70–99)
GLUCOSE BLDC GLUCOMTR-MCNC: 89 MG/DL — SIGNIFICANT CHANGE UP (ref 70–99)
GLUCOSE SERPL-MCNC: 176 MG/DL — HIGH (ref 70–99)
GLUCOSE SERPL-MCNC: 74 MG/DL — SIGNIFICANT CHANGE UP (ref 70–99)
HCT VFR BLD CALC: 26.8 % — LOW (ref 34.5–45)
HGB BLD-MCNC: 8.5 G/DL — LOW (ref 11.5–15.5)
MCHC RBC-ENTMCNC: 27.8 PG — SIGNIFICANT CHANGE UP (ref 27–34)
MCHC RBC-ENTMCNC: 31.7 GM/DL — LOW (ref 32–36)
MCV RBC AUTO: 87.6 FL — SIGNIFICANT CHANGE UP (ref 80–100)
PLATELET # BLD AUTO: 274 K/UL — SIGNIFICANT CHANGE UP (ref 150–400)
POTASSIUM SERPL-MCNC: 3 MMOL/L — LOW (ref 3.5–5.3)
POTASSIUM SERPL-MCNC: 3.2 MMOL/L — LOW (ref 3.5–5.3)
POTASSIUM SERPL-SCNC: 3 MMOL/L — LOW (ref 3.5–5.3)
POTASSIUM SERPL-SCNC: 3.2 MMOL/L — LOW (ref 3.5–5.3)
RBC # BLD: 3.06 M/UL — LOW (ref 3.8–5.2)
RBC # FLD: 18.2 % — HIGH (ref 10.3–14.5)
SODIUM SERPL-SCNC: 130 MMOL/L — LOW (ref 135–145)
SODIUM SERPL-SCNC: 133 MMOL/L — LOW (ref 135–145)
WBC # BLD: 11.03 K/UL — HIGH (ref 3.8–10.5)
WBC # FLD AUTO: 11.03 K/UL — HIGH (ref 3.8–10.5)

## 2018-06-27 PROCEDURE — 99233 SBSQ HOSP IP/OBS HIGH 50: CPT

## 2018-06-27 RX ORDER — POTASSIUM CHLORIDE 20 MEQ
10 PACKET (EA) ORAL
Qty: 0 | Refills: 0 | Status: COMPLETED | OUTPATIENT
Start: 2018-06-27 | End: 2018-06-27

## 2018-06-27 RX ORDER — VANCOMYCIN HCL 1 G
1000 VIAL (EA) INTRAVENOUS ONCE
Qty: 0 | Refills: 0 | Status: COMPLETED | OUTPATIENT
Start: 2018-06-27 | End: 2018-06-27

## 2018-06-27 RX ORDER — POTASSIUM CHLORIDE 20 MEQ
40 PACKET (EA) ORAL ONCE
Qty: 0 | Refills: 0 | Status: COMPLETED | OUTPATIENT
Start: 2018-06-27 | End: 2018-06-27

## 2018-06-27 RX ADMIN — LISINOPRIL 2.5 MILLIGRAM(S): 2.5 TABLET ORAL at 08:34

## 2018-06-27 RX ADMIN — MORPHINE SULFATE 15 MILLIGRAM(S): 50 CAPSULE, EXTENDED RELEASE ORAL at 06:30

## 2018-06-27 RX ADMIN — SENNA PLUS 2 TABLET(S): 8.6 TABLET ORAL at 21:45

## 2018-06-27 RX ADMIN — FAMOTIDINE 20 MILLIGRAM(S): 10 INJECTION INTRAVENOUS at 11:33

## 2018-06-27 RX ADMIN — MORPHINE SULFATE 15 MILLIGRAM(S): 50 CAPSULE, EXTENDED RELEASE ORAL at 22:31

## 2018-06-27 RX ADMIN — HEPARIN SODIUM 5000 UNIT(S): 5000 INJECTION INTRAVENOUS; SUBCUTANEOUS at 18:28

## 2018-06-27 RX ADMIN — Medication 250 MILLIGRAM(S): at 18:28

## 2018-06-27 RX ADMIN — ONDANSETRON 4 MILLIGRAM(S): 8 TABLET, FILM COATED ORAL at 09:33

## 2018-06-27 RX ADMIN — Medication 100 MILLIEQUIVALENT(S): at 10:27

## 2018-06-27 RX ADMIN — Medication 100 MILLIEQUIVALENT(S): at 11:23

## 2018-06-27 RX ADMIN — SODIUM CHLORIDE 75 MILLILITER(S): 9 INJECTION INTRAMUSCULAR; INTRAVENOUS; SUBCUTANEOUS at 11:23

## 2018-06-27 RX ADMIN — Medication 100 MILLIGRAM(S): at 06:01

## 2018-06-27 RX ADMIN — MORPHINE SULFATE 15 MILLIGRAM(S): 50 CAPSULE, EXTENDED RELEASE ORAL at 21:45

## 2018-06-27 RX ADMIN — HEPARIN SODIUM 5000 UNIT(S): 5000 INJECTION INTRAVENOUS; SUBCUTANEOUS at 06:01

## 2018-06-27 RX ADMIN — Medication 0: at 22:55

## 2018-06-27 RX ADMIN — PIPERACILLIN AND TAZOBACTAM 25 GRAM(S): 4; .5 INJECTION, POWDER, LYOPHILIZED, FOR SOLUTION INTRAVENOUS at 14:05

## 2018-06-27 RX ADMIN — MORPHINE SULFATE 15 MILLIGRAM(S): 50 CAPSULE, EXTENDED RELEASE ORAL at 06:01

## 2018-06-27 RX ADMIN — ATORVASTATIN CALCIUM 40 MILLIGRAM(S): 80 TABLET, FILM COATED ORAL at 21:45

## 2018-06-27 RX ADMIN — Medication 100 MILLIEQUIVALENT(S): at 12:39

## 2018-06-27 RX ADMIN — MIRTAZAPINE 15 MILLIGRAM(S): 45 TABLET, ORALLY DISINTEGRATING ORAL at 21:45

## 2018-06-27 RX ADMIN — CARVEDILOL PHOSPHATE 3.12 MILLIGRAM(S): 80 CAPSULE, EXTENDED RELEASE ORAL at 06:01

## 2018-06-27 RX ADMIN — PIPERACILLIN AND TAZOBACTAM 25 GRAM(S): 4; .5 INJECTION, POWDER, LYOPHILIZED, FOR SOLUTION INTRAVENOUS at 21:45

## 2018-06-27 RX ADMIN — Medication 650 MILLIGRAM(S): at 10:45

## 2018-06-27 RX ADMIN — PIPERACILLIN AND TAZOBACTAM 25 GRAM(S): 4; .5 INJECTION, POWDER, LYOPHILIZED, FOR SOLUTION INTRAVENOUS at 06:02

## 2018-06-27 NOTE — PROGRESS NOTE ADULT - ASSESSMENT
78F PMHx Stage III rectal cancer s/p incomplete RT course, PVD s/p R AKA, who presented to [facility] ED the afternoon of 6/8/18 from nursing home complaining of fever and chills. Patient recently admitted to [facility] (5/8-5/23) during which her rectal CA was diagnosed (EUS/colonoscopy performed suggested T3N1 mid-rectal tumor, adenocarcinoma. Started RT, until she was developed pelvic abscesses. Discharged on 2weeks IV antibiotics via PICC line, then transitioned to Augmentin with plan for continued surveillance. However she developed fevers & returned to hospital.   s/p Rx for PNA  Now POD#5 sigmoid end loop colostomy   fevers-resolved   Mild leucocytosis  Some abd pain  No other clinical localization  GPC cluster in signgle set  No s/s PICC infection clinically  CT with no abscess-?colonic thickening due to tumor  Patient stable  No other focus on workup  rec:  1) repeat Blood Cx  2) For now continue zosyn  3) If fevers recur-restart vanco and remove PICC(would also remove PICC unless needed by Onc)  4) If stable and repeat blood Cx negative will stop abx in 1-2 days  case d/w med Np  I am away tomorrow,Infectious Diseases Service will cover .  Please call [phone] if issues

## 2018-06-27 NOTE — PROGRESS NOTE ADULT - ASSESSMENT
78 year old female with perforated rectal cancer POD 7 s/p creation of end sigmoid loop colostomy  -Patient is tolerating soft diet  -Ostomy functioning  -ID followup given blood cultures   -Pain control  -OOB and ambulate  -Ostomy care  -Continue care per primary team, colorectal surgery to follow

## 2018-06-27 NOTE — PROGRESS NOTE ADULT - SUBJECTIVE AND OBJECTIVE BOX
- Patient seen and examined.  - In summary, patient is a 78 year old woman who presented with fever (2018 15:36)  - Today, patient is without complaints.         *****MEDICATIONS:    MEDICATIONS  (STANDING):  atorvastatin 40 milliGRAM(s) Oral at bedtime  carvedilol 3.125 milliGRAM(s) Oral every 12 hours  dextrose 5%. 1000 milliLiter(s) (50 mL/Hr) IV Continuous <Continuous>  dextrose 50% Injectable 12.5 Gram(s) IV Push once  dextrose 50% Injectable 25 Gram(s) IV Push once  dextrose 50% Injectable 25 Gram(s) IV Push once  docusate sodium 100 milliGRAM(s) Oral two times a day  famotidine    Tablet 20 milliGRAM(s) Oral daily  heparin  Injectable 5000 Unit(s) SubCutaneous every 12 hours  insulin lispro (HumaLOG) corrective regimen sliding scale   SubCutaneous three times a day before meals  insulin lispro (HumaLOG) corrective regimen sliding scale   SubCutaneous at bedtime  lisinopril 2.5 milliGRAM(s) Oral daily  mirtazapine 15 milliGRAM(s) Oral at bedtime  morphine ER Tablet 15 milliGRAM(s) Oral every 12 hours  piperacillin/tazobactam IVPB. 3.375 Gram(s) IV Intermittent every 8 hours  polyethylene glycol 3350 17 Gram(s) Oral daily  potassium chloride   Solution 40 milliEquivalent(s) Oral once  senna 2 Tablet(s) Oral at bedtime  sodium chloride 0.9%. 1000 milliLiter(s) (75 mL/Hr) IV Continuous <Continuous>    MEDICATIONS  (PRN):  acetaminophen   Tablet 650 milliGRAM(s) Oral every 6 hours PRN Mild Pain (1 - 3)  acetaminophen   Tablet 650 milliGRAM(s) Oral every 6 hours PRN For Temp greater than 38 C (100.4 F)  bisacodyl Suppository 10 milliGRAM(s) Rectal daily PRN Constipation  dextrose 40% Gel 15 Gram(s) Oral once PRN Blood Glucose LESS THAN 70 milliGRAM(s)/deciliter  glucagon  Injectable 1 milliGRAM(s) IntraMuscular once PRN Glucose LESS THAN 70 milligrams/deciliter  morphine  - Injectable 1 milliGRAM(s) IV Push every 6 hours PRN Severe Pain (7 - 10)  morphine  IR 15 milliGRAM(s) Oral every 8 hours PRN Severe Pain (7 - 10)  ondansetron Injectable 4 milliGRAM(s) IV Push every 6 hours PRN Nausea and/or Vomiting             ***** REVIEW OF SYSTEM:  GEN: no fever, no chills, no pain  RESP: no SOB, no cough, no sputum  CVS: no chest pain, no palpitations, no edema  GI: no abdominal pain, no nausea, no vomiting, no constipation, no diarrhea  : no dysuria, no frequency  NEURO: no headache, no dizziness  PSYCH: no depression, not anxious  Derm : no itching, no rash         ***** VITAL SIGNS:             T(F): 97.7 (18 @ 05:55), Max: 99.1 (18 @ 13:39)  HR: 81 (18 @ 08:28) (67 - 89)  BP: 106/54 (18 @ 08:28) (93/53 - 119/69)  RR: 18 (18 @ 05:55) (16 - 18)  SpO2: 96% (18 @ 05:55) (96% - 98%)  Wt(kg): --  ,   I&O's Summary    2018 07:01  -  2018 07:00  --------------------------------------------------------  IN: 1900 mL / OUT: 300 mL / NET: 1600 mL                     *****PHYSICAL EXAM:  GEN: A&O X 3 , NAD , comfortable  HEENT: NCAT, EOMI, MMM, no icterus  NECK: Supple, No JVD  CVS: S1S2 , regular , No M/R/G appreciated  PULM: CTA B/L,  no W/R/R appreciated  ABD.: soft. non tender, non distended,  bowel sounds present  Extrem: intact pulses , no edema noted  Derm: No rash or ecchymosis noted  PSYCH: normal mood, no depression, not anxious         *****LAB AND IMAGIN.5    11.03 )-----------( 274      ( 2018 08:01 )             26.8               06-    133<L>  |  100  |  9   ----------------------------<  74  3.0<L>   |  23  |  0.54    Ca    7.7<L>      2018 06:06              [All pertinent recent Imaging/Reports reviewed]  < from: Transthoracic Echocardiogram (18 @ 07:08) >  Conclusions:  1. Normal left ventricular internal dimensions and wall  thicknesses.  2. Normal left ventricular systolic function. No segmental  wall motion abnormalities.  3. Normal diastolic function  4. Normal right ventricular sizeand systolic function.  5. Estimated pulmonary artery systolic pressure equals 36  mm Hg, assuming right atrial pressure equals 8 mm Hg,  consistent with borderline pulmonary pressures.  *** Compared with echocardiogram of 2017, no  significant changes noted.    < end of copied text >         *****A S S E S S M E N T   A N D   P L A N :  78F with rectal cancer adm with fever  abx per ID  cont current meds  oncology f/u  s/p ostomy  surg f/u appreciated  diet as tolerated  PT  minimal PO intake  trial NGT/TF  may need peg    __________________________  YON Metcalf D.O.

## 2018-06-27 NOTE — PROGRESS NOTE ADULT - SUBJECTIVE AND OBJECTIVE BOX
COLORECTAL SURGERY PROGRESS NOTE    78F PMHx Stage III rectal cancer s/p incomplete RT course, PVD s/p R NUBIA, who presented to [facility] ED the afternoon of 6/8/18 from nursing home complaining of fever and chills. Patient recently admitted to [facility] (5/8-5/23) during which her rectal CA was diagnosed (EUS/colonoscopy performed suggested T3N1 mid-rectal tumor, adenocarcinoma. Started RT, until she was developed pelvic abscesses. Discharged on 2weeks IV antibiotics via PICC line, then transitioned to Augmentin with plan for continued surveillance. However she developed fevers & returned to hospital. Per family at bedside, patient was feeling weak, unable to walk, but otherwise denies any fevers, chills, nausea, vomiting, diarrhea, SOB, cough, rash, abdominal pain. Repeat CT imaging of abdomen and pelvis, showed persistent rectal neoplasm, w/ improvement in clint-rectal collections. There was also as stable spiculated nodule in the right upper lobe suspicious for neoplasm, previously seen. Initially, during [facility] hospitalization in May, plan for short course of RT, followed by interval surgery, however RT stopped 2/2 abscesses. Planned for discharge on IV abx w/ repeat imaging to assess for resolution of collections.  CT abdomen and pelvis shows minor improvement in collections    INTERVAL EVENTS:  Patient is POD 7 s/p creation of end sigmoid loop colostomy.  Tolerating soft diet.  Ostomy functional.  Afebrile overnight.  Started on Zosyn as per ID.  Repeat CT scan performed again showing rectal thickening and perirectal fluid collections    ICU Vital Signs Last 24 Hrs  T(C): 36.5 (27 Jun 2018 05:55), Max: 37.3 (26 Jun 2018 13:39)  T(F): 97.7 (27 Jun 2018 05:55), Max: 99.1 (26 Jun 2018 13:39)  HR: 87 (27 Jun 2018 05:55) (67 - 89)  BP: 108/56 (27 Jun 2018 05:55) (93/53 - 119/69)  BP(mean): --  ABP: --  ABP(mean): --  RR: 18 (27 Jun 2018 05:55) (16 - 18)  SpO2: 96% (27 Jun 2018 05:55) (96% - 98%)    I&O's Detail    25 Jun 2018 07:01 - 26 Jun 2018 07:00  --------------------------------------------------------  IN:    dextrose 5% + sodium chloride 0.9%: 75 mL    IV PiggyBack: 800 mL    lactated ringers.: 500 mL    Oral Fluid: 240 mL    sodium chloride 0.9%.: 975 mL  Total IN: 2590 mL    OUT:    Colostomy: 925 mL  Total OUT: 925 mL    Total NET: 1665 mL      26 Jun 2018 07:01  -  27 Jun 2018 06:14  --------------------------------------------------------  IN:    IV PiggyBack: 100 mL    Oral Fluid: 770 mL  Total IN: 870 mL    OUT:  Total OUT: 0 mL    Total NET: 870 mL      MEDICATIONS  (STANDING):  atorvastatin 40 milliGRAM(s) Oral at bedtime  carvedilol 3.125 milliGRAM(s) Oral every 12 hours  dextrose 5%. 1000 milliLiter(s) (50 mL/Hr) IV Continuous <Continuous>  dextrose 50% Injectable 12.5 Gram(s) IV Push once  dextrose 50% Injectable 25 Gram(s) IV Push once  dextrose 50% Injectable 25 Gram(s) IV Push once  docusate sodium 100 milliGRAM(s) Oral two times a day  famotidine    Tablet 20 milliGRAM(s) Oral daily  heparin  Injectable 5000 Unit(s) SubCutaneous every 12 hours  insulin lispro (HumaLOG) corrective regimen sliding scale   SubCutaneous three times a day before meals  insulin lispro (HumaLOG) corrective regimen sliding scale   SubCutaneous at bedtime  lisinopril 2.5 milliGRAM(s) Oral daily  mirtazapine 15 milliGRAM(s) Oral at bedtime  morphine ER Tablet 15 milliGRAM(s) Oral every 12 hours  piperacillin/tazobactam IVPB. 3.375 Gram(s) IV Intermittent every 8 hours  polyethylene glycol 3350 17 Gram(s) Oral daily  senna 2 Tablet(s) Oral at bedtime  sodium chloride 0.9%. 1000 milliLiter(s) (75 mL/Hr) IV Continuous <Continuous>    MEDICATIONS  (PRN):  acetaminophen   Tablet 650 milliGRAM(s) Oral every 6 hours PRN Mild Pain (1 - 3)  acetaminophen   Tablet 650 milliGRAM(s) Oral every 6 hours PRN For Temp greater than 38 C (100.4 F)  bisacodyl Suppository 10 milliGRAM(s) Rectal daily PRN Constipation  dextrose 40% Gel 15 Gram(s) Oral once PRN Blood Glucose LESS THAN 70 milliGRAM(s)/deciliter  glucagon  Injectable 1 milliGRAM(s) IntraMuscular once PRN Glucose LESS THAN 70 milligrams/deciliter  morphine  - Injectable 1 milliGRAM(s) IV Push every 6 hours PRN Severe Pain (7 - 10)  morphine  IR 15 milliGRAM(s) Oral every 8 hours PRN Severe Pain (7 - 10)  ondansetron Injectable 4 milliGRAM(s) IV Push every 6 hours PRN Nausea and/or Vomiting    General:  Sitting up in bed, appears comfortable  Chest:  Breath sounds audible bilaterally; no wheezing, rales or ronchi  Abdomen:  Soft, appropriately tender, non distended; colostomy healthy and pink  Extremities:  Warm, well perfused                          8.7    11.5  )-----------( 277      ( 26 Jun 2018 18:31 )             26.7   06-25    134<L>  |  95<L>  |  <4<L>  ----------------------------<  87  4.2   |  26  |  0.41<L>    Ca    8.5      25 Jun 2018 06:18  Phos  2.7     06-25  Mg     1.7     06-25    < from: CT Abdomen and Pelvis w/ IV Cont (06.26.18 @ 08:33) >  FINDINGS:    LOWER CHEST: Partially imaged central line with tip in SVC. Coronary   artery calcification. Bilateral lower lobe subsegmental atelectasis.    LIVER: Focal hypodensity along the falciform ligament most compatible   with focal fat.  BILE DUCTS: Normal caliber.  GALLBLADDER: Mildly distended.  SPLEEN: Within normal limits.  PANCREAS: Within normal limits.  ADRENALS: Stable 10 mm nodule in the left adrenal gland.   KIDNEYS/URETERS: Within normal limits.    BLADDER: Mild circumferential wall thickening.  REPRODUCTIVE ORGANS: No adnexal mass. Hysterectomy.    BOWEL: Interval left lower quadrant loop colostomy. Irregular rectal wall   thickening again noted. Left perirectal fluid collections are again noted.  PERITONEUM: Small volume ascites.  VESSELS:  Atherosclerotic changes. Partially imaged right femoral stent   and bypass graft which are occluded, unchanged.  RETROPERITONEUM: No lymphadenopathy.    ABDOMINAL WALL: Within normal limits.  BONES: Multilevel degenerative spinal changes. Grade 1 anterolisthesis of   L4 on L5.    IMPRESSION:     Irregular rectal wall thickening and enhancement consistent with known   neoplasm. Persistent perirectal fluid collections. Interval left lower   quadrant loop colostomy.     < end of copied text >

## 2018-06-27 NOTE — PROGRESS NOTE ADULT - SUBJECTIVE AND OBJECTIVE BOX
weak, no compalints    REVIEW OF SYSTEMS:  GEN: no fever,    no chills  RESP: no SOB,   no cough  CVS: no chest pain,   no palpitations  GI: no abdominal pain,   no nausea,   no vomiting,   no constipation,   no diarrhea  : no dysuria,   no frequency  NEURO: no headache,   no dizziness  PSYCH: no depression,   not anxious  Derm : no rash    MEDICATIONS  (STANDING):  atorvastatin 40 milliGRAM(s) Oral at bedtime  carvedilol 3.125 milliGRAM(s) Oral every 12 hours  dextrose 5%. 1000 milliLiter(s) (50 mL/Hr) IV Continuous <Continuous>  dextrose 50% Injectable 12.5 Gram(s) IV Push once  dextrose 50% Injectable 25 Gram(s) IV Push once  dextrose 50% Injectable 25 Gram(s) IV Push once  docusate sodium 100 milliGRAM(s) Oral two times a day  famotidine    Tablet 20 milliGRAM(s) Oral daily  heparin  Injectable 5000 Unit(s) SubCutaneous every 12 hours  insulin lispro (HumaLOG) corrective regimen sliding scale   SubCutaneous three times a day before meals  insulin lispro (HumaLOG) corrective regimen sliding scale   SubCutaneous at bedtime  lisinopril 2.5 milliGRAM(s) Oral daily  mirtazapine 15 milliGRAM(s) Oral at bedtime  morphine ER Tablet 15 milliGRAM(s) Oral every 12 hours  piperacillin/tazobactam IVPB. 3.375 Gram(s) IV Intermittent every 8 hours  polyethylene glycol 3350 17 Gram(s) Oral daily  potassium chloride  10 mEq/100 mL IVPB 10 milliEquivalent(s) IV Intermittent every 1 hour  senna 2 Tablet(s) Oral at bedtime  sodium chloride 0.9%. 1000 milliLiter(s) (75 mL/Hr) IV Continuous <Continuous>    MEDICATIONS  (PRN):  acetaminophen   Tablet 650 milliGRAM(s) Oral every 6 hours PRN Mild Pain (1 - 3)  acetaminophen   Tablet 650 milliGRAM(s) Oral every 6 hours PRN For Temp greater than 38 C (100.4 F)  bisacodyl Suppository 10 milliGRAM(s) Rectal daily PRN Constipation  dextrose 40% Gel 15 Gram(s) Oral once PRN Blood Glucose LESS THAN 70 milliGRAM(s)/deciliter  glucagon  Injectable 1 milliGRAM(s) IntraMuscular once PRN Glucose LESS THAN 70 milligrams/deciliter  morphine  - Injectable 1 milliGRAM(s) IV Push every 6 hours PRN Severe Pain (7 - 10)  morphine  IR 15 milliGRAM(s) Oral every 8 hours PRN Severe Pain (7 - 10)  ondansetron Injectable 4 milliGRAM(s) IV Push every 6 hours PRN Nausea and/or Vomiting      Vital Signs Last 24 Hrs  T(C): 36.5 (27 Jun 2018 05:55), Max: 37.3 (26 Jun 2018 13:39)  T(F): 97.7 (27 Jun 2018 05:55), Max: 99.1 (26 Jun 2018 13:39)  HR: 81 (27 Jun 2018 08:28) (67 - 89)  BP: 106/54 (27 Jun 2018 08:28) (93/53 - 119/69)  BP(mean): --  RR: 18 (27 Jun 2018 05:55) (16 - 18)  SpO2: 96% (27 Jun 2018 05:55) (96% - 98%)  CAPILLARY BLOOD GLUCOSE      POCT Blood Glucose.: 79 mg/dL (27 Jun 2018 09:08)  POCT Blood Glucose.: 150 mg/dL (26 Jun 2018 22:11)  POCT Blood Glucose.: 114 mg/dL (26 Jun 2018 17:50)  POCT Blood Glucose.: 81 mg/dL (26 Jun 2018 12:14)    I&O's Summary    26 Jun 2018 07:01  -  27 Jun 2018 07:00  --------------------------------------------------------  IN: 1900 mL / OUT: 300 mL / NET: 1600 mL    27 Jun 2018 07:01  -  27 Jun 2018 10:57  --------------------------------------------------------  IN: 430 mL / OUT: 0 mL / NET: 430 mL        PHYSICAL EXAM:  HEAD:  Atraumatic, Normocephalic  NECK: Supple, No   JVD  CHEST/LUNG:   no     rales,     no,    rhonchi  HEART: Regular rate and rhythm;         murmur  ABDOMEN: Soft, Nontender, ;   EXTREMITIES:        edema  NEUROLOGY:  alert    LABS:                        8.5    11.03 )-----------( 274      ( 27 Jun 2018 08:01 )             26.8     06-27    133<L>  |  100  |  9   ----------------------------<  74  3.0<L>   |  23  |  0.54    Ca    7.7<L>      27 Jun 2018 06:06                    Hemoglobin A1C, Whole Blood: 6.6 % (05-09 @ 06:52)    Thyroid Stimulating Hormone, Serum: 1.00 uIU/mL (05-18 @ 06:35)          Consultant(s) Notes Reviewed:      Care Discussed with Consultants/Other Providers:

## 2018-06-27 NOTE — PROGRESS NOTE ADULT - SUBJECTIVE AND OBJECTIVE BOX
[place] [facility] [id number]   78yFemale followed for:  No Known Allergies    PAST MEDICAL & SURGICAL HISTORY:  Rectal mass  Gangrene of foot  Coronary artery disease involving native coronary artery of native heart without angina pectoris  Status post above knee amputation of right lower extremity    FAMILY HISTORY:  No pertinent family history in first degree relatives    MEDICATIONS  (STANDING):  atorvastatin 40 milliGRAM(s) Oral at bedtime  carvedilol 3.125 milliGRAM(s) Oral every 12 hours  dextrose 5%. 1000 milliLiter(s) (50 mL/Hr) IV Continuous <Continuous>  dextrose 50% Injectable 12.5 Gram(s) IV Push once  dextrose 50% Injectable 25 Gram(s) IV Push once  dextrose 50% Injectable 25 Gram(s) IV Push once  docusate sodium 100 milliGRAM(s) Oral two times a day  famotidine    Tablet 20 milliGRAM(s) Oral daily  heparin  Injectable 5000 Unit(s) SubCutaneous every 12 hours  insulin lispro (HumaLOG) corrective regimen sliding scale   SubCutaneous three times a day before meals  insulin lispro (HumaLOG) corrective regimen sliding scale   SubCutaneous at bedtime  lisinopril 2.5 milliGRAM(s) Oral daily  mirtazapine 15 milliGRAM(s) Oral at bedtime  morphine ER Tablet 15 milliGRAM(s) Oral every 12 hours  piperacillin/tazobactam IVPB. 3.375 Gram(s) IV Intermittent every 8 hours  polyethylene glycol 3350 17 Gram(s) Oral daily  senna 2 Tablet(s) Oral at bedtime  sodium chloride 0.9%. 1000 milliLiter(s) (75 mL/Hr) IV Continuous <Continuous>    MEDICATIONS  (PRN):  acetaminophen   Tablet 650 milliGRAM(s) Oral every 6 hours PRN Mild Pain (1 - 3)  acetaminophen   Tablet 650 milliGRAM(s) Oral every 6 hours PRN For Temp greater than 38 C (100.4 F)  bisacodyl Suppository 10 milliGRAM(s) Rectal daily PRN Constipation  dextrose 40% Gel 15 Gram(s) Oral once PRN Blood Glucose LESS THAN 70 milliGRAM(s)/deciliter  glucagon  Injectable 1 milliGRAM(s) IntraMuscular once PRN Glucose LESS THAN 70 milligrams/deciliter  morphine  - Injectable 1 milliGRAM(s) IV Push every 6 hours PRN Severe Pain (7 - 10)  morphine  IR 15 milliGRAM(s) Oral every 8 hours PRN Severe Pain (7 - 10)  ondansetron Injectable 4 milliGRAM(s) IV Push every 6 hours PRN Nausea and/or Vomiting      Vital Signs Last 24 Hrs  T(C): 36.5 (27 Jun 2018 05:55), Max: 37.3 (26 Jun 2018 13:39)  T(F): 97.7 (27 Jun 2018 05:55), Max: 99.1 (26 Jun 2018 13:39)  HR: 81 (27 Jun 2018 08:28) (67 - 89)  BP: 106/54 (27 Jun 2018 08:28) (93/53 - 119/69)  BP(mean): --  RR: 18 (27 Jun 2018 05:55) (16 - 18)  SpO2: 96% (27 Jun 2018 05:55) (96% - 98%)  nc/at  s1s2  cta  soft, nt, nd no guarding or rebound  no c/c/e    CBC Full  -  ( 26 Jun 2018 18:31 )  WBC Count : 11.5 K/uL  Hemoglobin : 8.7 g/dL  Hematocrit : 26.7 %  Platelet Count - Automated : 277 K/uL  Mean Cell Volume : 89.4 fl  Mean Cell Hemoglobin : 29.1 pg  Mean Cell Hemoglobin Concentration : 32.5 gm/dL  Auto Neutrophil # : x  Auto Lymphocyte # : x  Auto Monocyte # : x  Auto Eosinophil # : x  Auto Basophil # : x  Auto Neutrophil % : x  Auto Lymphocyte % : x  Auto Monocyte % : x  Auto Eosinophil % : x  Auto Basophil % : x    06-27    133<L>  |  100  |  9   ----------------------------<  74  3.0<L>   |  23  |  0.54    Ca    7.7<L>      27 Jun 2018 06:06

## 2018-06-27 NOTE — PROGRESS NOTE ADULT - SUBJECTIVE AND OBJECTIVE BOX
Patient is a 78y old  Female who presents with a chief complaint of fever (08 Jun 2018 15:36)    Being followed by ID for fever    Interval history:afebrile  no complaints  No acute events      ROS:  No cough,SOB,CP  No N/V/D./abd pain  No other complaints      Antimicrobials:    piperacillin/tazobactam IVPB. 3.375 Gram(s) IV Intermittent every 8 hours    Other medications reviewed    Vital Signs Last 24 Hrs  T(C): 36.3 (06-27-18 @ 13:44), Max: 36.8 (06-27-18 @ 01:17)  T(F): 97.3 (06-27-18 @ 13:44), Max: 98.2 (06-27-18 @ 01:17)  HR: 68 (06-27-18 @ 13:44) (67 - 87)  BP: 97/61 (06-27-18 @ 13:44) (93/53 - 108/56)  BP(mean): --  RR: 18 (06-27-18 @ 13:44) (16 - 18)  SpO2: 97% (06-27-18 @ 13:44) (96% - 98%)    Physical Exam:        HEENT PERRLA EOMI    No oral exudate or erythema    Chest Good AE,CTA    CVS RRR S1 S2 WNl No murmur or rub or gallop    Abd soft BS normal No tenderness colostomy    PICC  site no erythema tenderness or discharge  Left AKA     CNS AAO X 3 no focal    Lab Data:                          8.5    11.03 )-----------( 274      ( 27 Jun 2018 08:01 )             26.8     WBC Count: 11.03 (06-27-18 @ 08:01)  WBC Count: 11.5 (06-26-18 @ 18:31)  WBC Count: 12.99 (06-25-18 @ 07:58)  WBC Count: 9.08 (06-23-18 @ 08:13)  WBC Count: 10.74 (06-22-18 @ 08:07)  WBC Count: 12.7 (06-21-18 @ 18:46)    06-27    133<L>  |  100  |  9   ----------------------------<  74  3.0<L>   |  23  |  0.54    Ca    7.7<L>      27 Jun 2018 06:06          Culture - Blood (collected 25 Jun 2018 00:01)  Source: .Blood Blood-Catheter  Gram Stain (25 Jun 2018 19:11):    Growth in aerobic and anaerobic bottles: Gram Positive Cocci in Clusters  Final Report (26 Jun 2018 17:14):    Growth in aerobic and anaerobic bottles: Coag Negative Staphylococcus    Single set isolate, possible contaminant. Contact    Microbiology if susceptibility testing clinically    indicated.    "  Culture - Blood (collected 25 Jun 2018 00:01)  Source: .Blood Blood-Peripheral  Preliminary Report (26 Jun 2018 01:02):    No growth to date. Patient is a 78y old  Female who presents with a chief complaint of fever (08 Jun 2018 15:36)    Being followed by ID for fever    Interval history:afebrile  no complaints  No acute events      ROS:  No cough,SOB,CP  No N/V/D./abd pain  No other complaints      Antimicrobials:    piperacillin/tazobactam IVPB. 3.375 Gram(s) IV Intermittent every 8 hours  vanco x 1 6/25    Other medications reviewed    Vital Signs Last 24 Hrs  T(C): 36.3 (06-27-18 @ 13:44), Max: 36.8 (06-27-18 @ 01:17)  T(F): 97.3 (06-27-18 @ 13:44), Max: 98.2 (06-27-18 @ 01:17)  HR: 68 (06-27-18 @ 13:44) (67 - 87)  BP: 97/61 (06-27-18 @ 13:44) (93/53 - 108/56)  BP(mean): --  RR: 18 (06-27-18 @ 13:44) (16 - 18)  SpO2: 97% (06-27-18 @ 13:44) (96% - 98%)    Physical Exam:        HEENT PERRLA EOMI    No oral exudate or erythema    Chest Good AE,CTA    CVS RRR S1 S2 WNl No murmur or rub or gallop    Abd soft BS normal No tenderness colostomy    PICC  site no erythema tenderness or discharge  Left AKA     CNS AAO X 3 no focal    Lab Data:                          8.5    11.03 )-----------( 274      ( 27 Jun 2018 08:01 )             26.8     WBC Count: 11.03 (06-27-18 @ 08:01)  WBC Count: 11.5 (06-26-18 @ 18:31)  WBC Count: 12.99 (06-25-18 @ 07:58)  WBC Count: 9.08 (06-23-18 @ 08:13)  WBC Count: 10.74 (06-22-18 @ 08:07)  WBC Count: 12.7 (06-21-18 @ 18:46)    06-27    133<L>  |  100  |  9   ----------------------------<  74  3.0<L>   |  23  |  0.54    Ca    7.7<L>      27 Jun 2018 06:06          Culture - Blood (collected 25 Jun 2018 00:01)  Source: .Blood Blood-Catheter  Gram Stain (25 Jun 2018 19:11):    Growth in aerobic and anaerobic bottles: Gram Positive Cocci in Clusters  Final Report (26 Jun 2018 17:14):    Growth in aerobic and anaerobic bottles: Coag Negative Staphylococcus    Single set isolate, possible contaminant. Contact    Microbiology if susceptibility testing clinically    indicated.    "  Culture - Blood (collected 25 Jun 2018 00:01)  Source: .Blood Blood-Peripheral  Preliminary Report (26 Jun 2018 01:02):    No growth to date.

## 2018-06-27 NOTE — PROGRESS NOTE ADULT - ASSESSMENT
78F with rectal cancer, dm,  htn,  adm with fever,  afebrile  now, s/p ab    rectal fluid  collections/ pna  proximal diversion/ostomy,   dr ferreira   oncology, RT saw  pt  s/p  colectomy/ sigmoid   colostomy  ostomy,  , good  function  low  grade fever, resolved,   on zosyn now,  per  ID  spoke  with  daughter  hyponatremia/ hypokalemia

## 2018-06-28 LAB
ANION GAP SERPL CALC-SCNC: 11 MMOL/L — SIGNIFICANT CHANGE UP (ref 5–17)
BUN SERPL-MCNC: 6 MG/DL — LOW (ref 7–23)
CALCIUM SERPL-MCNC: 7.7 MG/DL — LOW (ref 8.4–10.5)
CHLORIDE SERPL-SCNC: 98 MMOL/L — SIGNIFICANT CHANGE UP (ref 96–108)
CO2 SERPL-SCNC: 22 MMOL/L — SIGNIFICANT CHANGE UP (ref 22–31)
CREAT SERPL-MCNC: 0.48 MG/DL — LOW (ref 0.5–1.3)
GLUCOSE BLDC GLUCOMTR-MCNC: 109 MG/DL — HIGH (ref 70–99)
GLUCOSE BLDC GLUCOMTR-MCNC: 192 MG/DL — HIGH (ref 70–99)
GLUCOSE BLDC GLUCOMTR-MCNC: 87 MG/DL — SIGNIFICANT CHANGE UP (ref 70–99)
GLUCOSE BLDC GLUCOMTR-MCNC: 94 MG/DL — SIGNIFICANT CHANGE UP (ref 70–99)
GLUCOSE SERPL-MCNC: 69 MG/DL — LOW (ref 70–99)
HCT VFR BLD CALC: 27.3 % — LOW (ref 34.5–45)
HGB BLD-MCNC: 8.5 G/DL — LOW (ref 11.5–15.5)
MCHC RBC-ENTMCNC: 27.9 PG — SIGNIFICANT CHANGE UP (ref 27–34)
MCHC RBC-ENTMCNC: 31.1 GM/DL — LOW (ref 32–36)
MCV RBC AUTO: 89.5 FL — SIGNIFICANT CHANGE UP (ref 80–100)
PLATELET # BLD AUTO: 293 K/UL — SIGNIFICANT CHANGE UP (ref 150–400)
POTASSIUM SERPL-MCNC: 3.3 MMOL/L — LOW (ref 3.5–5.3)
POTASSIUM SERPL-SCNC: 3.3 MMOL/L — LOW (ref 3.5–5.3)
RBC # BLD: 3.05 M/UL — LOW (ref 3.8–5.2)
RBC # FLD: 18.2 % — HIGH (ref 10.3–14.5)
SODIUM SERPL-SCNC: 131 MMOL/L — LOW (ref 135–145)
WBC # BLD: 10.85 K/UL — HIGH (ref 3.8–10.5)
WBC # FLD AUTO: 10.85 K/UL — HIGH (ref 3.8–10.5)

## 2018-06-28 RX ORDER — POTASSIUM CHLORIDE 20 MEQ
20 PACKET (EA) ORAL DAILY
Qty: 0 | Refills: 0 | Status: DISCONTINUED | OUTPATIENT
Start: 2018-06-28 | End: 2018-07-03

## 2018-06-28 RX ORDER — POTASSIUM CHLORIDE 20 MEQ
20 PACKET (EA) ORAL ONCE
Qty: 0 | Refills: 0 | Status: COMPLETED | OUTPATIENT
Start: 2018-06-28 | End: 2018-06-28

## 2018-06-28 RX ORDER — MORPHINE SULFATE 50 MG/1
15 CAPSULE, EXTENDED RELEASE ORAL EVERY 12 HOURS
Qty: 0 | Refills: 0 | Status: DISCONTINUED | OUTPATIENT
Start: 2018-06-28 | End: 2018-07-03

## 2018-06-28 RX ADMIN — ATORVASTATIN CALCIUM 40 MILLIGRAM(S): 80 TABLET, FILM COATED ORAL at 21:26

## 2018-06-28 RX ADMIN — POLYETHYLENE GLYCOL 3350 17 GRAM(S): 17 POWDER, FOR SOLUTION ORAL at 11:28

## 2018-06-28 RX ADMIN — SENNA PLUS 2 TABLET(S): 8.6 TABLET ORAL at 21:26

## 2018-06-28 RX ADMIN — MIRTAZAPINE 15 MILLIGRAM(S): 45 TABLET, ORALLY DISINTEGRATING ORAL at 21:26

## 2018-06-28 RX ADMIN — CARVEDILOL PHOSPHATE 3.12 MILLIGRAM(S): 80 CAPSULE, EXTENDED RELEASE ORAL at 05:50

## 2018-06-28 RX ADMIN — Medication 20 MILLIEQUIVALENT(S): at 21:26

## 2018-06-28 RX ADMIN — SODIUM CHLORIDE 75 MILLILITER(S): 9 INJECTION INTRAMUSCULAR; INTRAVENOUS; SUBCUTANEOUS at 18:26

## 2018-06-28 RX ADMIN — MORPHINE SULFATE 15 MILLIGRAM(S): 50 CAPSULE, EXTENDED RELEASE ORAL at 11:47

## 2018-06-28 RX ADMIN — LISINOPRIL 2.5 MILLIGRAM(S): 2.5 TABLET ORAL at 05:50

## 2018-06-28 RX ADMIN — HEPARIN SODIUM 5000 UNIT(S): 5000 INJECTION INTRAVENOUS; SUBCUTANEOUS at 18:27

## 2018-06-28 RX ADMIN — MORPHINE SULFATE 15 MILLIGRAM(S): 50 CAPSULE, EXTENDED RELEASE ORAL at 11:27

## 2018-06-28 RX ADMIN — MORPHINE SULFATE 15 MILLIGRAM(S): 50 CAPSULE, EXTENDED RELEASE ORAL at 21:26

## 2018-06-28 RX ADMIN — PIPERACILLIN AND TAZOBACTAM 25 GRAM(S): 4; .5 INJECTION, POWDER, LYOPHILIZED, FOR SOLUTION INTRAVENOUS at 15:23

## 2018-06-28 RX ADMIN — Medication 650 MILLIGRAM(S): at 18:29

## 2018-06-28 RX ADMIN — MORPHINE SULFATE 15 MILLIGRAM(S): 50 CAPSULE, EXTENDED RELEASE ORAL at 21:56

## 2018-06-28 RX ADMIN — FAMOTIDINE 20 MILLIGRAM(S): 10 INJECTION INTRAVENOUS at 11:28

## 2018-06-28 RX ADMIN — HEPARIN SODIUM 5000 UNIT(S): 5000 INJECTION INTRAVENOUS; SUBCUTANEOUS at 05:50

## 2018-06-28 RX ADMIN — Medication 100 MILLIGRAM(S): at 05:50

## 2018-06-28 RX ADMIN — PIPERACILLIN AND TAZOBACTAM 25 GRAM(S): 4; .5 INJECTION, POWDER, LYOPHILIZED, FOR SOLUTION INTRAVENOUS at 21:27

## 2018-06-28 RX ADMIN — CARVEDILOL PHOSPHATE 3.12 MILLIGRAM(S): 80 CAPSULE, EXTENDED RELEASE ORAL at 18:27

## 2018-06-28 RX ADMIN — PIPERACILLIN AND TAZOBACTAM 25 GRAM(S): 4; .5 INJECTION, POWDER, LYOPHILIZED, FOR SOLUTION INTRAVENOUS at 05:52

## 2018-06-28 RX ADMIN — Medication 100 MILLIGRAM(S): at 18:27

## 2018-06-28 RX ADMIN — Medication 0: at 22:22

## 2018-06-28 RX ADMIN — SODIUM CHLORIDE 75 MILLILITER(S): 9 INJECTION INTRAMUSCULAR; INTRAVENOUS; SUBCUTANEOUS at 05:59

## 2018-06-28 NOTE — PROGRESS NOTE ADULT - ASSESSMENT
78F with rectal cancer, dm,  htn,  adm with fever,  afebrile  now, s/p ab    rectal fluid  collections/ pna  proximal diversion/ostomy,   dr ferreira   oncology, RT saw  pt  s/p  colectomy/ sigmoid   colostomy  ostomy,  , good  function  low  grade fever, resolved,   on zosyn now,  per  ID  spoke  with  daughter  hyponatremia, stable

## 2018-06-28 NOTE — PROGRESS NOTE ADULT - ASSESSMENT
78 year old female with perforated rectal cancer POD 7 s/p creation of end sigmoid loop colostomy  -Patient is tolerating soft diet  -Ostomy functioning  -ID followup for blood cultures  -Pain control  -OOB and ambulate  -Ostomy care  -Continue care per primary team, colorectal surgery to follow

## 2018-06-28 NOTE — PROGRESS NOTE ADULT - SUBJECTIVE AND OBJECTIVE BOX
COLORECTAL SURGERY PROGRESS NOTE    78F PMHx Stage III rectal cancer s/p incomplete RT course, PVD s/p R NUBIA, who presented to [facility] ED the afternoon of 6/8/18 from nursing home complaining of fever and chills. Patient recently admitted to [facility] (5/8-5/23) during which her rectal CA was diagnosed (EUS/colonoscopy performed suggested T3N1 mid-rectal tumor, adenocarcinoma. Started RT, until she was developed pelvic abscesses. Discharged on 2weeks IV antibiotics via PICC line, then transitioned to Augmentin with plan for continued surveillance. However she developed fevers & returned to hospital. Per family at bedside, patient was feeling weak, unable to walk, but otherwise denies any fevers, chills, nausea, vomiting, diarrhea, SOB, cough, rash, abdominal pain. Repeat CT imaging of abdomen and pelvis, showed persistent rectal neoplasm, w/ improvement in clint-rectal collections. There was also as stable spiculated nodule in the right upper lobe suspicious for neoplasm, previously seen. Initially, during [facility] hospitalization in May, plan for short course of RT, followed by interval surgery, however RT stopped 2/2 abscesses. Planned for discharge on IV abx w/ repeat imaging to assess for resolution of collections.  CT abdomen and pelvis shows minor improvement in collections    INTERVAL EVENTS:  Patient is POD 8 s/p creation of end sigmoid loop colostomy.  Tolerating soft diet.  Ostomy functional.  Afebrile overnight.  Started on Zosyn as per ID.  Repeat CT scan performed 6/26 again showing rectal thickening and perirectal fluid collections    ICU Vital Signs Last 24 Hrs  T(C): 37.3 (28 Jun 2018 05:47), Max: 37.3 (28 Jun 2018 05:47)  T(F): 99.2 (28 Jun 2018 05:47), Max: 99.2 (28 Jun 2018 05:47)  HR: 71 (28 Jun 2018 05:47) (66 - 81)  BP: 127/87 (28 Jun 2018 05:47) (95/48 - 141/85)  BP(mean): --  ABP: --  ABP(mean): --  RR: 18 (28 Jun 2018 05:47) (18 - 18)  SpO2: 96% (28 Jun 2018 05:47) (95% - 97%)    I&O's Detail    26 Jun 2018 07:01  -  27 Jun 2018 07:00  --------------------------------------------------------  IN:    IV PiggyBack: 200 mL    Oral Fluid: 800 mL    sodium chloride 0.9%.: 900 mL  Total IN: 1900 mL    OUT:    Colostomy: 300 mL  Total OUT: 300 mL    Total NET: 1600 mL      27 Jun 2018 07:01  -  28 Jun 2018 06:23  --------------------------------------------------------  IN:    IV PiggyBack: 850 mL    Oral Fluid: 320 mL    sodium chloride 0.9%.: 900 mL  Total IN: 2070 mL    OUT:    Colostomy: 150 mL  Total OUT: 150 mL    Total NET: 1920 mL      MEDICATIONS  (STANDING):  atorvastatin 40 milliGRAM(s) Oral at bedtime  carvedilol 3.125 milliGRAM(s) Oral every 12 hours  dextrose 5%. 1000 milliLiter(s) (50 mL/Hr) IV Continuous <Continuous>  dextrose 50% Injectable 12.5 Gram(s) IV Push once  dextrose 50% Injectable 25 Gram(s) IV Push once  dextrose 50% Injectable 25 Gram(s) IV Push once  docusate sodium 100 milliGRAM(s) Oral two times a day  famotidine    Tablet 20 milliGRAM(s) Oral daily  heparin  Injectable 5000 Unit(s) SubCutaneous every 12 hours  insulin lispro (HumaLOG) corrective regimen sliding scale   SubCutaneous three times a day before meals  insulin lispro (HumaLOG) corrective regimen sliding scale   SubCutaneous at bedtime  lisinopril 2.5 milliGRAM(s) Oral daily  mirtazapine 15 milliGRAM(s) Oral at bedtime  morphine ER Tablet 15 milliGRAM(s) Oral every 12 hours  piperacillin/tazobactam IVPB. 3.375 Gram(s) IV Intermittent every 8 hours  polyethylene glycol 3350 17 Gram(s) Oral daily  senna 2 Tablet(s) Oral at bedtime  sodium chloride 0.9%. 1000 milliLiter(s) (75 mL/Hr) IV Continuous <Continuous>    MEDICATIONS  (PRN):  acetaminophen   Tablet 650 milliGRAM(s) Oral every 6 hours PRN Mild Pain (1 - 3)  acetaminophen   Tablet 650 milliGRAM(s) Oral every 6 hours PRN For Temp greater than 38 C (100.4 F)  bisacodyl Suppository 10 milliGRAM(s) Rectal daily PRN Constipation  dextrose 40% Gel 15 Gram(s) Oral once PRN Blood Glucose LESS THAN 70 milliGRAM(s)/deciliter  glucagon  Injectable 1 milliGRAM(s) IntraMuscular once PRN Glucose LESS THAN 70 milligrams/deciliter  morphine  - Injectable 1 milliGRAM(s) IV Push every 6 hours PRN Severe Pain (7 - 10)  ondansetron Injectable 4 milliGRAM(s) IV Push every 6 hours PRN Nausea and/or Vomiting    General:  Sitting up in bed, appears comfortable  Chest:  Breath sounds audible bilaterally; no wheezing, rales or ronchi  Abdomen:  Soft, appropriately tender, non distended; colostomy healthy and pink  Extremities:  Warm, well perfused                          8.5    11.03 )-----------( 274      ( 27 Jun 2018 08:01 )             26.8   06-27    130<L>  |  100  |  8   ----------------------------<  176<H>  3.2<L>   |  21<L>  |  0.48<L>    Ca    7.5<L>      27 Jun 2018 23:06

## 2018-06-28 NOTE — PROGRESS NOTE ADULT - SUBJECTIVE AND OBJECTIVE BOX
very  weak, no  compalints    REVIEW OF SYSTEMS:  GEN: no fever,    no chills  RESP: no SOB,   no cough  CVS: no chest pain,   no palpitations  GI: no abdominal pain,   no nausea,   no vomiting,   no constipation,   no diarrhea  : no dysuria,   no frequency  NEURO: no headache,   no dizziness  PSYCH: no depression,   not anxious  Derm : no rash    MEDICATIONS  (STANDING):  atorvastatin 40 milliGRAM(s) Oral at bedtime  carvedilol 3.125 milliGRAM(s) Oral every 12 hours  dextrose 5%. 1000 milliLiter(s) (50 mL/Hr) IV Continuous <Continuous>  dextrose 50% Injectable 12.5 Gram(s) IV Push once  dextrose 50% Injectable 25 Gram(s) IV Push once  dextrose 50% Injectable 25 Gram(s) IV Push once  docusate sodium 100 milliGRAM(s) Oral two times a day  famotidine    Tablet 20 milliGRAM(s) Oral daily  heparin  Injectable 5000 Unit(s) SubCutaneous every 12 hours  insulin lispro (HumaLOG) corrective regimen sliding scale   SubCutaneous three times a day before meals  insulin lispro (HumaLOG) corrective regimen sliding scale   SubCutaneous at bedtime  lisinopril 2.5 milliGRAM(s) Oral daily  mirtazapine 15 milliGRAM(s) Oral at bedtime  morphine ER Tablet 15 milliGRAM(s) Oral every 12 hours  piperacillin/tazobactam IVPB. 3.375 Gram(s) IV Intermittent every 8 hours  polyethylene glycol 3350 17 Gram(s) Oral daily  senna 2 Tablet(s) Oral at bedtime  sodium chloride 0.9%. 1000 milliLiter(s) (75 mL/Hr) IV Continuous <Continuous>    MEDICATIONS  (PRN):  acetaminophen   Tablet 650 milliGRAM(s) Oral every 6 hours PRN Mild Pain (1 - 3)  acetaminophen   Tablet 650 milliGRAM(s) Oral every 6 hours PRN For Temp greater than 38 C (100.4 F)  bisacodyl Suppository 10 milliGRAM(s) Rectal daily PRN Constipation  dextrose 40% Gel 15 Gram(s) Oral once PRN Blood Glucose LESS THAN 70 milliGRAM(s)/deciliter  glucagon  Injectable 1 milliGRAM(s) IntraMuscular once PRN Glucose LESS THAN 70 milligrams/deciliter  morphine  - Injectable 1 milliGRAM(s) IV Push every 6 hours PRN Severe Pain (7 - 10)  ondansetron Injectable 4 milliGRAM(s) IV Push every 6 hours PRN Nausea and/or Vomiting      Vital Signs Last 24 Hrs  T(C): 37.3 (28 Jun 2018 05:47), Max: 37.3 (28 Jun 2018 05:47)  T(F): 99.2 (28 Jun 2018 05:47), Max: 99.2 (28 Jun 2018 05:47)  HR: 71 (28 Jun 2018 05:47) (66 - 78)  BP: 127/87 (28 Jun 2018 05:47) (95/48 - 141/85)  BP(mean): --  RR: 18 (28 Jun 2018 05:47) (18 - 18)  SpO2: 96% (28 Jun 2018 05:47) (95% - 97%)  CAPILLARY BLOOD GLUCOSE      POCT Blood Glucose.: 94 mg/dL (28 Jun 2018 08:34)  POCT Blood Glucose.: 187 mg/dL (27 Jun 2018 22:48)  POCT Blood Glucose.: 80 mg/dL (27 Jun 2018 17:37)  POCT Blood Glucose.: 89 mg/dL (27 Jun 2018 12:51)    I&O's Summary    27 Jun 2018 07:01  -  28 Jun 2018 07:00  --------------------------------------------------------  IN: 2070 mL / OUT: 150 mL / NET: 1920 mL        PHYSICAL EXAM:  HEAD:  Atraumatic, Normocephalic  NECK: Supple, No   JVD  CHEST/LUNG:   no     rales,     no,    rhonchi  HEART: Regular rate and rhythm;         murmur  ABDOMEN: Soft, Nontender, ;   EXTREMITIES:        edema  NEUROLOGY:  alert    LABS:                        8.5    11.03 )-----------( 274      ( 27 Jun 2018 08:01 )             26.8     06-27    130<L>  |  100  |  8   ----------------------------<  176<H>  3.2<L>   |  21<L>  |  0.48<L>    Ca    7.5<L>      27 Jun 2018 23:06                    Hemoglobin A1C, Whole Blood: 6.6 % (05-09 @ 06:52)    Thyroid Stimulating Hormone, Serum: 1.00 uIU/mL (05-18 @ 06:35)          Consultant(s) Notes Reviewed:      Care Discussed with Consultants/Other Providers:

## 2018-06-28 NOTE — PROGRESS NOTE ADULT - SUBJECTIVE AND OBJECTIVE BOX
[facility]:[id number],   78yFemale followed for:  No Known Allergies    PAST MEDICAL & SURGICAL HISTORY:  Rectal mass  Gangrene of foot  Coronary artery disease involving native coronary artery of native heart without angina pectoris  Status post above knee amputation of right lower extremity    FAMILY HISTORY:  No pertinent family history in first degree relatives    MEDICATIONS  (STANDING):  atorvastatin 40 milliGRAM(s) Oral at bedtime  carvedilol 3.125 milliGRAM(s) Oral every 12 hours  dextrose 5%. 1000 milliLiter(s) (50 mL/Hr) IV Continuous <Continuous>  dextrose 50% Injectable 12.5 Gram(s) IV Push once  dextrose 50% Injectable 25 Gram(s) IV Push once  dextrose 50% Injectable 25 Gram(s) IV Push once  docusate sodium 100 milliGRAM(s) Oral two times a day  famotidine    Tablet 20 milliGRAM(s) Oral daily  heparin  Injectable 5000 Unit(s) SubCutaneous every 12 hours  insulin lispro (HumaLOG) corrective regimen sliding scale   SubCutaneous three times a day before meals  insulin lispro (HumaLOG) corrective regimen sliding scale   SubCutaneous at bedtime  lisinopril 2.5 milliGRAM(s) Oral daily  mirtazapine 15 milliGRAM(s) Oral at bedtime  morphine ER Tablet 15 milliGRAM(s) Oral every 12 hours  piperacillin/tazobactam IVPB. 3.375 Gram(s) IV Intermittent every 8 hours  polyethylene glycol 3350 17 Gram(s) Oral daily  senna 2 Tablet(s) Oral at bedtime  sodium chloride 0.9%. 1000 milliLiter(s) (75 mL/Hr) IV Continuous <Continuous>    MEDICATIONS  (PRN):  acetaminophen   Tablet 650 milliGRAM(s) Oral every 6 hours PRN Mild Pain (1 - 3)  acetaminophen   Tablet 650 milliGRAM(s) Oral every 6 hours PRN For Temp greater than 38 C (100.4 F)  bisacodyl Suppository 10 milliGRAM(s) Rectal daily PRN Constipation  dextrose 40% Gel 15 Gram(s) Oral once PRN Blood Glucose LESS THAN 70 milliGRAM(s)/deciliter  glucagon  Injectable 1 milliGRAM(s) IntraMuscular once PRN Glucose LESS THAN 70 milligrams/deciliter  morphine  - Injectable 1 milliGRAM(s) IV Push every 6 hours PRN Severe Pain (7 - 10)  ondansetron Injectable 4 milliGRAM(s) IV Push every 6 hours PRN Nausea and/or Vomiting      Vital Signs Last 24 Hrs  T(C): 37.2 (28 Jun 2018 11:24), Max: 37.3 (28 Jun 2018 05:47)  T(F): 99 (28 Jun 2018 11:24), Max: 99.2 (28 Jun 2018 05:47)  HR: 78 (28 Jun 2018 11:24) (66 - 78)  BP: 132/67 (28 Jun 2018 11:24) (95/48 - 141/85)  BP(mean): --  RR: 18 (28 Jun 2018 11:24) (18 - 18)  SpO2: 98% (28 Jun 2018 11:24) (95% - 98%)  nc/at  s1s2  cta  soft, nt, nd no guarding or rebound  no c/c/e    CBC Full  -  ( 27 Jun 2018 08:01 )  WBC Count : 11.03 K/uL  Hemoglobin : 8.5 g/dL  Hematocrit : 26.8 %  Platelet Count - Automated : 274 K/uL  Mean Cell Volume : 87.6 fl  Mean Cell Hemoglobin : 27.8 pg  Mean Cell Hemoglobin Concentration : 31.7 gm/dL  Auto Neutrophil # : x  Auto Lymphocyte # : x  Auto Monocyte # : x  Auto Eosinophil # : x  Auto Basophil # : x  Auto Neutrophil % : x  Auto Lymphocyte % : x  Auto Monocyte % : x  Auto Eosinophil % : x  Auto Basophil % : x    06-27    130<L>  |  100  |  8   ----------------------------<  176<H>  3.2<L>   |  21<L>  |  0.48<L>    Ca    7.5<L>      27 Jun 2018 23:06

## 2018-06-28 NOTE — PROGRESS NOTE ADULT - SUBJECTIVE AND OBJECTIVE BOX
- Patient seen and examined.  - In summary, patient is a 78 year old woman who presented with fever (2018 15:36)  - Today, patient is without complaints.         *****MEDICATIONS:    MEDICATIONS  (STANDING):  atorvastatin 40 milliGRAM(s) Oral at bedtime  carvedilol 3.125 milliGRAM(s) Oral every 12 hours  dextrose 5%. 1000 milliLiter(s) (50 mL/Hr) IV Continuous <Continuous>  dextrose 50% Injectable 12.5 Gram(s) IV Push once  dextrose 50% Injectable 25 Gram(s) IV Push once  dextrose 50% Injectable 25 Gram(s) IV Push once  docusate sodium 100 milliGRAM(s) Oral two times a day  famotidine    Tablet 20 milliGRAM(s) Oral daily  heparin  Injectable 5000 Unit(s) SubCutaneous every 12 hours  insulin lispro (HumaLOG) corrective regimen sliding scale   SubCutaneous three times a day before meals  insulin lispro (HumaLOG) corrective regimen sliding scale   SubCutaneous at bedtime  lisinopril 2.5 milliGRAM(s) Oral daily  mirtazapine 15 milliGRAM(s) Oral at bedtime  morphine ER Tablet 15 milliGRAM(s) Oral every 12 hours  piperacillin/tazobactam IVPB. 3.375 Gram(s) IV Intermittent every 8 hours  polyethylene glycol 3350 17 Gram(s) Oral daily  senna 2 Tablet(s) Oral at bedtime  sodium chloride 0.9%. 1000 milliLiter(s) (75 mL/Hr) IV Continuous <Continuous>    MEDICATIONS  (PRN):  acetaminophen   Tablet 650 milliGRAM(s) Oral every 6 hours PRN Mild Pain (1 - 3)  acetaminophen   Tablet 650 milliGRAM(s) Oral every 6 hours PRN For Temp greater than 38 C (100.4 F)  bisacodyl Suppository 10 milliGRAM(s) Rectal daily PRN Constipation  dextrose 40% Gel 15 Gram(s) Oral once PRN Blood Glucose LESS THAN 70 milliGRAM(s)/deciliter  glucagon  Injectable 1 milliGRAM(s) IntraMuscular once PRN Glucose LESS THAN 70 milligrams/deciliter  morphine  - Injectable 1 milliGRAM(s) IV Push every 6 hours PRN Severe Pain (7 - 10)  ondansetron Injectable 4 milliGRAM(s) IV Push every 6 hours PRN Nausea and/or Vomiting               ***** REVIEW OF SYSTEM:  GEN: no fever, no chills, no pain  RESP: no SOB, no cough, no sputum  CVS: no chest pain, no palpitations, no edema  GI: no abdominal pain, no nausea, no vomiting, no constipation, no diarrhea  : no dysuria, no frequency  NEURO: no headache, no dizziness  PSYCH: no depression, not anxious  Derm : no itching, no rash         ***** VITAL SIGNS:             T(F): 99.2 (18 @ 05:47), Max: 99.2 (18 @ 05:47)  HR: 71 (18 @ 05:47) (66 - 78)  BP: 127/87 (18 @ 05:47) (95/48 - 141/85)  RR: 18 (18 @ 05:47) (18 - 18)  SpO2: 96% (18 @ 05:47) (95% - 97%)  Wt(kg): --  ,   I&O's Summary    2018 07:01  -  2018 07:00  --------------------------------------------------------  IN: 2070 mL / OUT: 150 mL / NET: 1920 mL                 *****PHYSICAL EXAM:  GEN: A&O X 3 , NAD , comfortable  HEENT: NCAT, EOMI, MMM, no icterus  NECK: Supple, No JVD  CVS: S1S2 , regular , No M/R/G appreciated  PULM: CTA B/L,  no W/R/R appreciated  ABD.: soft. non tender, non distended,  bowel sounds present  Extrem: intact pulses , no edema noted  Derm: No rash or ecchymosis noted  PSYCH: normal mood, no depression, not anxious         *****LAB AND IMAGIN.5    11.03 )-----------( 274      ( 2018 08:01 )             26.8               06-27    130<L>  |  100  |  8   ----------------------------<  176<H>  3.2<L>   |  21<L>  |  0.48<L>    Ca    7.5<L>      2018 23:06              [All pertinent recent Imaging/Reports reviewed]  < from: Transthoracic Echocardiogram (18 @ 07:08) >  Conclusions:  1. Normal left ventricular internal dimensions and wall  thicknesses.  2. Normal left ventricular systolic function. No segmental  wall motion abnormalities.  3. Normal diastolic function  4. Normal right ventricular sizeand systolic function.  5. Estimated pulmonary artery systolic pressure equals 36  mm Hg, assuming right atrial pressure equals 8 mm Hg,  consistent with borderline pulmonary pressures.  *** Compared with echocardiogram of 2017, no  significant changes noted.    < end of copied text >         *****A S S E S S M E N T   A N D   P L A N :  78F with rectal cancer adm with fever  abx per ID  f/u cxs  cont current meds  oncology f/u  s/p ostomy  surg f/u appreciated  PO as tolerated  PT  calorie count  g/u GI    __________________________  A. CHAPARRO Metcalf

## 2018-06-29 LAB
ANION GAP SERPL CALC-SCNC: 13 MMOL/L — SIGNIFICANT CHANGE UP (ref 5–17)
BASOPHILS # BLD AUTO: 0.02 K/UL — SIGNIFICANT CHANGE UP (ref 0–0.2)
BASOPHILS NFR BLD AUTO: 0.2 % — SIGNIFICANT CHANGE UP (ref 0–2)
BUN SERPL-MCNC: <4 MG/DL — LOW (ref 7–23)
CALCIUM SERPL-MCNC: 7.8 MG/DL — LOW (ref 8.4–10.5)
CHLORIDE SERPL-SCNC: 100 MMOL/L — SIGNIFICANT CHANGE UP (ref 96–108)
CO2 SERPL-SCNC: 21 MMOL/L — LOW (ref 22–31)
CREAT SERPL-MCNC: 0.45 MG/DL — LOW (ref 0.5–1.3)
EOSINOPHIL # BLD AUTO: 0.2 K/UL — SIGNIFICANT CHANGE UP (ref 0–0.5)
EOSINOPHIL NFR BLD AUTO: 1.6 % — SIGNIFICANT CHANGE UP (ref 0–6)
GLUCOSE BLDC GLUCOMTR-MCNC: 103 MG/DL — HIGH (ref 70–99)
GLUCOSE BLDC GLUCOMTR-MCNC: 105 MG/DL — HIGH (ref 70–99)
GLUCOSE BLDC GLUCOMTR-MCNC: 155 MG/DL — HIGH (ref 70–99)
GLUCOSE BLDC GLUCOMTR-MCNC: 193 MG/DL — HIGH (ref 70–99)
GLUCOSE SERPL-MCNC: 93 MG/DL — SIGNIFICANT CHANGE UP (ref 70–99)
HCT VFR BLD CALC: 28.5 % — LOW (ref 34.5–45)
HGB BLD-MCNC: 9.1 G/DL — LOW (ref 11.5–15.5)
IMM GRANULOCYTES NFR BLD AUTO: 0.3 % — SIGNIFICANT CHANGE UP (ref 0–1.5)
LYMPHOCYTES # BLD AUTO: 1.87 K/UL — SIGNIFICANT CHANGE UP (ref 1–3.3)
LYMPHOCYTES # BLD AUTO: 14.8 % — SIGNIFICANT CHANGE UP (ref 13–44)
MCHC RBC-ENTMCNC: 27.7 PG — SIGNIFICANT CHANGE UP (ref 27–34)
MCHC RBC-ENTMCNC: 31.9 GM/DL — LOW (ref 32–36)
MCV RBC AUTO: 86.6 FL — SIGNIFICANT CHANGE UP (ref 80–100)
MONOCYTES # BLD AUTO: 0.93 K/UL — HIGH (ref 0–0.9)
MONOCYTES NFR BLD AUTO: 7.4 % — SIGNIFICANT CHANGE UP (ref 2–14)
NEUTROPHILS # BLD AUTO: 9.57 K/UL — HIGH (ref 1.8–7.4)
NEUTROPHILS NFR BLD AUTO: 75.7 % — SIGNIFICANT CHANGE UP (ref 43–77)
PLATELET # BLD AUTO: 317 K/UL — SIGNIFICANT CHANGE UP (ref 150–400)
POTASSIUM SERPL-MCNC: 3.5 MMOL/L — SIGNIFICANT CHANGE UP (ref 3.5–5.3)
POTASSIUM SERPL-SCNC: 3.5 MMOL/L — SIGNIFICANT CHANGE UP (ref 3.5–5.3)
RBC # BLD: 3.29 M/UL — LOW (ref 3.8–5.2)
RBC # FLD: 18.3 % — HIGH (ref 10.3–14.5)
SODIUM SERPL-SCNC: 134 MMOL/L — LOW (ref 135–145)
WBC # BLD: 12.63 K/UL — HIGH (ref 3.8–10.5)
WBC # FLD AUTO: 12.63 K/UL — HIGH (ref 3.8–10.5)

## 2018-06-29 PROCEDURE — 99233 SBSQ HOSP IP/OBS HIGH 50: CPT

## 2018-06-29 RX ADMIN — ATORVASTATIN CALCIUM 40 MILLIGRAM(S): 80 TABLET, FILM COATED ORAL at 22:14

## 2018-06-29 RX ADMIN — HEPARIN SODIUM 5000 UNIT(S): 5000 INJECTION INTRAVENOUS; SUBCUTANEOUS at 18:00

## 2018-06-29 RX ADMIN — CARVEDILOL PHOSPHATE 3.12 MILLIGRAM(S): 80 CAPSULE, EXTENDED RELEASE ORAL at 05:02

## 2018-06-29 RX ADMIN — POLYETHYLENE GLYCOL 3350 17 GRAM(S): 17 POWDER, FOR SOLUTION ORAL at 13:17

## 2018-06-29 RX ADMIN — MORPHINE SULFATE 15 MILLIGRAM(S): 50 CAPSULE, EXTENDED RELEASE ORAL at 20:59

## 2018-06-29 RX ADMIN — CARVEDILOL PHOSPHATE 3.12 MILLIGRAM(S): 80 CAPSULE, EXTENDED RELEASE ORAL at 18:01

## 2018-06-29 RX ADMIN — MORPHINE SULFATE 15 MILLIGRAM(S): 50 CAPSULE, EXTENDED RELEASE ORAL at 09:01

## 2018-06-29 RX ADMIN — FAMOTIDINE 20 MILLIGRAM(S): 10 INJECTION INTRAVENOUS at 13:18

## 2018-06-29 RX ADMIN — MORPHINE SULFATE 15 MILLIGRAM(S): 50 CAPSULE, EXTENDED RELEASE ORAL at 08:31

## 2018-06-29 RX ADMIN — SENNA PLUS 2 TABLET(S): 8.6 TABLET ORAL at 22:14

## 2018-06-29 RX ADMIN — HEPARIN SODIUM 5000 UNIT(S): 5000 INJECTION INTRAVENOUS; SUBCUTANEOUS at 05:02

## 2018-06-29 RX ADMIN — LISINOPRIL 2.5 MILLIGRAM(S): 2.5 TABLET ORAL at 05:01

## 2018-06-29 RX ADMIN — PIPERACILLIN AND TAZOBACTAM 25 GRAM(S): 4; .5 INJECTION, POWDER, LYOPHILIZED, FOR SOLUTION INTRAVENOUS at 05:02

## 2018-06-29 RX ADMIN — Medication 20 MILLIEQUIVALENT(S): at 13:18

## 2018-06-29 RX ADMIN — Medication 100 MILLIGRAM(S): at 18:01

## 2018-06-29 RX ADMIN — MIRTAZAPINE 15 MILLIGRAM(S): 45 TABLET, ORALLY DISINTEGRATING ORAL at 22:14

## 2018-06-29 RX ADMIN — Medication 1: at 13:18

## 2018-06-29 RX ADMIN — SODIUM CHLORIDE 75 MILLILITER(S): 9 INJECTION INTRAMUSCULAR; INTRAVENOUS; SUBCUTANEOUS at 05:02

## 2018-06-29 RX ADMIN — MORPHINE SULFATE 15 MILLIGRAM(S): 50 CAPSULE, EXTENDED RELEASE ORAL at 21:15

## 2018-06-29 RX ADMIN — Medication 100 MILLIGRAM(S): at 05:02

## 2018-06-29 RX ADMIN — Medication 650 MILLIGRAM(S): at 15:07

## 2018-06-29 RX ADMIN — Medication 1: at 18:00

## 2018-06-29 NOTE — PROGRESS NOTE ADULT - SUBJECTIVE AND OBJECTIVE BOX
- Patient seen and examined.  - In summary, patient is a 78 year old woman who presented with fever (2018 15:36)  - Today, patient is without complaints.         *****MEDICATIONS:    MEDICATIONS  (STANDING):  atorvastatin 40 milliGRAM(s) Oral at bedtime  carvedilol 3.125 milliGRAM(s) Oral every 12 hours  dextrose 5%. 1000 milliLiter(s) (50 mL/Hr) IV Continuous <Continuous>  dextrose 50% Injectable 12.5 Gram(s) IV Push once  dextrose 50% Injectable 25 Gram(s) IV Push once  dextrose 50% Injectable 25 Gram(s) IV Push once  docusate sodium 100 milliGRAM(s) Oral two times a day  famotidine    Tablet 20 milliGRAM(s) Oral daily  heparin  Injectable 5000 Unit(s) SubCutaneous every 12 hours  insulin lispro (HumaLOG) corrective regimen sliding scale   SubCutaneous three times a day before meals  insulin lispro (HumaLOG) corrective regimen sliding scale   SubCutaneous at bedtime  lisinopril 2.5 milliGRAM(s) Oral daily  mirtazapine 15 milliGRAM(s) Oral at bedtime  morphine ER Tablet 15 milliGRAM(s) Oral every 12 hours  piperacillin/tazobactam IVPB. 3.375 Gram(s) IV Intermittent every 8 hours  polyethylene glycol 3350 17 Gram(s) Oral daily  potassium chloride    Tablet ER 20 milliEquivalent(s) Oral daily  senna 2 Tablet(s) Oral at bedtime  sodium chloride 0.9%. 1000 milliLiter(s) (75 mL/Hr) IV Continuous <Continuous>    MEDICATIONS  (PRN):  acetaminophen   Tablet 650 milliGRAM(s) Oral every 6 hours PRN Mild Pain (1 - 3)  acetaminophen   Tablet 650 milliGRAM(s) Oral every 6 hours PRN For Temp greater than 38 C (100.4 F)  bisacodyl Suppository 10 milliGRAM(s) Rectal daily PRN Constipation  dextrose 40% Gel 15 Gram(s) Oral once PRN Blood Glucose LESS THAN 70 milliGRAM(s)/deciliter  glucagon  Injectable 1 milliGRAM(s) IntraMuscular once PRN Glucose LESS THAN 70 milligrams/deciliter  ondansetron Injectable 4 milliGRAM(s) IV Push every 6 hours PRN Nausea and/or Vomiting                 ***** REVIEW OF SYSTEM:  GEN: no fever, no chills, no pain  RESP: no SOB, no cough, no sputum  CVS: no chest pain, no palpitations, no edema  GI: no abdominal pain, no nausea, no vomiting, no constipation, no diarrhea  : no dysuria, no frequency  NEURO: no headache, no dizziness  PSYCH: no depression, not anxious  Derm : no itching, no rash         ***** VITAL SIGNS:             T(F): 99.6 (18 @ 04:48), Max: 99.6 (18 @ 04:48)  HR: 93 (18 @ 04:48) (76 - 93)  BP: 149/80 (18 @ 04:48) (113/70 - 149/80)  RR: 18 (18 @ 04:48) (18 - 18)  SpO2: 93% (18 @ 04:48) (93% - 98%)  Wt(kg): --  ,   I&O's Summary    2018 07:01  -  2018 07:00  --------------------------------------------------------  IN: 2460 mL / OUT: 100 mL / NET: 2360 mL                 *****PHYSICAL EXAM:  GEN: A&O X 3 , NAD , comfortable  HEENT: NCAT, EOMI, MMM, no icterus  NECK: Supple, No JVD  CVS: S1S2 , regular , No M/R/G appreciated  PULM: CTA B/L,  no W/R/R appreciated  ABD.: soft. non tender, non distended,  bowel sounds present  Extrem: intact pulses , no edema noted  Derm: No rash or ecchymosis noted  PSYCH: normal mood, no depression, not anxious         *****LAB AND IMAGIN.1    12.63 )-----------( 317      ( 2018 08:19 )             28.5               06-    134<L>  |  100  |  <4<L>  ----------------------------<  93  3.5   |  21<L>  |  0.45<L>    Ca    7.8<L>      2018 07:07            [All pertinent recent Imaging/Reports reviewed]  < from: Transthoracic Echocardiogram (18 @ 07:08) >  Conclusions:  1. Normal left ventricular internal dimensions and wall  thicknesses.  2. Normal left ventricular systolic function. No segmental  wall motion abnormalities.  3. Normal diastolic function  4. Normal right ventricular sizeand systolic function.  5. Estimated pulmonary artery systolic pressure equals 36  mm Hg, assuming right atrial pressure equals 8 mm Hg,  consistent with borderline pulmonary pressures.  *** Compared with echocardiogram of 2017, no  significant changes noted.    < end of copied text >         *****A S S E S S M E N T   A N D   P L A N :  78F with rectal cancer adm with fever  abx per ID, ?DC  cont current meds  s/p ostomy  surg f/u noted  PO as tolerated  PT  calorie count  F/U GI    __________________________  YON Metcalf D.O.

## 2018-06-29 NOTE — PROGRESS NOTE ADULT - ASSESSMENT
78F PMHx Stage III rectal cancer s/p incomplete RT course, PVD s/p R AKA, who presented to [facility] ED the afternoon of 6/8/18 from nursing home complaining of fever and chills. Patient recently admitted to [facility] (5/8-5/23) during which her rectal CA was diagnosed (EUS/colonoscopy performed suggested T3N1 mid-rectal tumor, adenocarcinoma. Started RT, until she was developed pelvic abscesses. Discharged on 2weeks IV antibiotics via PICC line, then transitioned to Augmentin with plan for continued surveillance. However she developed fevers & returned to hospital.   s/p Rx for PNA  Now POD#7 sigmoid end loop colostomy   fevers-resolved   Mild leucocytosis  Some abd pain  No other clinical localization  CNS bacteremia is likely contaminant-repeat Cx negative    She does still ahve fluid around the rectal lesions  However intraop-no abscess was seen  CT done 2 days ago no other new collections   she has recd about 6 weeks of antimicrobials in total and the collections are still there  ?Tumor with reactive phlegmon  Unlikely resistant pathogen  less likely organized abscess that needs drainage as nonesee intraop  She is stable  hence would recommend observe off antimicrobials  If fevers recur would recommend-IR/surgical sampling/aspiration of collection/ workup for other process  Will tailor plan for ID issues  per course,results.Will defer to primary team on management of other issues.  case d/w Dr ramírez  Infectious Diseases Service will cover over weekend.  Please call [phone] if issues 78F PMHx Stage III rectal cancer s/p incomplete RT course, PVD s/p R AKA, who presented to [facility] ED the afternoon of 6/8/18 from nursing home complaining of fever and chills. Patient recently admitted to [facility] (5/8-5/23) during which her rectal CA was diagnosed (EUS/colonoscopy performed suggested T3N1 mid-rectal tumor, adenocarcinoma. Started RT, until she was developed pelvic abscesses. Discharged on 2weeks IV antibiotics via PICC line, then transitioned to Augmentin with plan for continued surveillance. However she developed fevers & returned to hospital.   s/p Rx for PNA  Now POD#7 sigmoid end loop colostomy   fevers-resolved   Mild leucocytosis  Some abd pain  No other clinical localization  CNS bacteremia is likely contaminant-repeat Cx negative    She does still ahve fluid around the rectal lesions  However intraop-no abscess was seen  CT done 2 days ago no other new collections   she has recd about 6 weeks of antimicrobials in total and the collections are still there  ?Tumor with reactive phlegmon  Unlikely resistant pathogen  less likely organized abscess that needs drainage as nonesee intraop  She is stable  hence would recommend observe off antimicrobials  If fevers recur would recommend-IR/surgical sampling/aspiration of collection/ workup for other process  Will tailor plan for ID issues  per course,results.Will defer to primary team on management of other issues.  case d/w Dr ramírez  Infectious Diseases Service will cover over weekend.  Please call [phone] if issues        Addendum 6/29/18 1723hrs  Discussed with Surgery Dr Sosa  he believes the collections are secondary to tumor microperfs feeding the collection and patient is poor surgical candidadte  The diverting colostomy may help  Given this would try de-escalation to Po levaquin -if stable may be able to stop in a few days   Will tailor plan for ID issues  per course,results.Will defer to primary team on management of other issues.  case d/w Dr ramírez as well

## 2018-06-29 NOTE — PROGRESS NOTE ADULT - SUBJECTIVE AND OBJECTIVE BOX
weak,  no comaplints  REVIEW OF SYSTEMS:  GEN: no fever,    no chills  RESP: no SOB,   no cough  CVS: no chest pain,   no palpitations  GI: no abdominal pain,   no nausea,   no vomiting,   no constipation,   no diarrhea  : no dysuria,   no frequency  NEURO: no headache,   no dizziness  PSYCH: no depression,   not anxious  Derm : no rash    MEDICATIONS  (STANDING):  atorvastatin 40 milliGRAM(s) Oral at bedtime  carvedilol 3.125 milliGRAM(s) Oral every 12 hours  dextrose 5%. 1000 milliLiter(s) (50 mL/Hr) IV Continuous <Continuous>  dextrose 50% Injectable 12.5 Gram(s) IV Push once  dextrose 50% Injectable 25 Gram(s) IV Push once  dextrose 50% Injectable 25 Gram(s) IV Push once  docusate sodium 100 milliGRAM(s) Oral two times a day  famotidine    Tablet 20 milliGRAM(s) Oral daily  heparin  Injectable 5000 Unit(s) SubCutaneous every 12 hours  insulin lispro (HumaLOG) corrective regimen sliding scale   SubCutaneous three times a day before meals  insulin lispro (HumaLOG) corrective regimen sliding scale   SubCutaneous at bedtime  lisinopril 2.5 milliGRAM(s) Oral daily  mirtazapine 15 milliGRAM(s) Oral at bedtime  morphine ER Tablet 15 milliGRAM(s) Oral every 12 hours  piperacillin/tazobactam IVPB. 3.375 Gram(s) IV Intermittent every 8 hours  polyethylene glycol 3350 17 Gram(s) Oral daily  potassium chloride    Tablet ER 20 milliEquivalent(s) Oral daily  senna 2 Tablet(s) Oral at bedtime  sodium chloride 0.9%. 1000 milliLiter(s) (75 mL/Hr) IV Continuous <Continuous>    MEDICATIONS  (PRN):  acetaminophen   Tablet 650 milliGRAM(s) Oral every 6 hours PRN Mild Pain (1 - 3)  acetaminophen   Tablet 650 milliGRAM(s) Oral every 6 hours PRN For Temp greater than 38 C (100.4 F)  bisacodyl Suppository 10 milliGRAM(s) Rectal daily PRN Constipation  dextrose 40% Gel 15 Gram(s) Oral once PRN Blood Glucose LESS THAN 70 milliGRAM(s)/deciliter  glucagon  Injectable 1 milliGRAM(s) IntraMuscular once PRN Glucose LESS THAN 70 milligrams/deciliter  ondansetron Injectable 4 milliGRAM(s) IV Push every 6 hours PRN Nausea and/or Vomiting      Vital Signs Last 24 Hrs  T(C): 37.6 (29 Jun 2018 04:48), Max: 37.6 (29 Jun 2018 04:48)  T(F): 99.6 (29 Jun 2018 04:48), Max: 99.6 (29 Jun 2018 04:48)  HR: 93 (29 Jun 2018 04:48) (76 - 93)  BP: 149/80 (29 Jun 2018 04:48) (113/70 - 149/80)  BP(mean): --  RR: 18 (29 Jun 2018 04:48) (18 - 18)  SpO2: 93% (29 Jun 2018 04:48) (93% - 98%)  CAPILLARY BLOOD GLUCOSE      POCT Blood Glucose.: 105 mg/dL (29 Jun 2018 09:27)  POCT Blood Glucose.: 192 mg/dL (28 Jun 2018 21:49)  POCT Blood Glucose.: 109 mg/dL (28 Jun 2018 17:35)  POCT Blood Glucose.: 87 mg/dL (28 Jun 2018 12:23)    I&O's Summary    28 Jun 2018 07:01  -  29 Jun 2018 07:00  --------------------------------------------------------  IN: 2460 mL / OUT: 100 mL / NET: 2360 mL        PHYSICAL EXAM:  HEAD:  Atraumatic, Normocephalic  NECK: Supple, No   JVD  CHEST/LUNG:   no     rales,     no,    rhonchi  HEART: Regular rate and rhythm;         murmur  ABDOMEN: Soft, Nontender, ;   EXTREMITIES:        edema  NEUROLOGY:  alert    LABS:                        9.1    12.63 )-----------( 317      ( 29 Jun 2018 08:19 )             28.5     06-29    134<L>  |  100  |  <4<L>  ----------------------------<  93  3.5   |  21<L>  |  0.45<L>    Ca    7.8<L>      29 Jun 2018 07:07                    Hemoglobin A1C, Whole Blood: 6.6 % (05-09 @ 06:52)    Thyroid Stimulating Hormone, Serum: 1.00 uIU/mL (05-18 @ 06:35)          Consultant(s) Notes Reviewed:      Care Discussed with Consultants/Other Providers:

## 2018-06-29 NOTE — PROGRESS NOTE ADULT - SUBJECTIVE AND OBJECTIVE BOX
COLORECTAL SURGERY PROGRESS NOTE    78F PMHx Stage III rectal cancer s/p incomplete RT course, PVD s/p R NUBIA, who presented to [facility] ED the afternoon of 6/8/18 from nursing home complaining of fever and chills. Patient recently admitted to [facility] (5/8-5/23) during which her rectal CA was diagnosed (EUS/colonoscopy performed suggested T3N1 mid-rectal tumor, adenocarcinoma. Started RT, until she was developed pelvic abscesses. Discharged on 2weeks IV antibiotics via PICC line, then transitioned to Augmentin with plan for continued surveillance. However she developed fevers & returned to hospital. Per family at bedside, patient was feeling weak, unable to walk, but otherwise denies any fevers, chills, nausea, vomiting, diarrhea, SOB, cough, rash, abdominal pain. Repeat CT imaging of abdomen and pelvis, showed persistent rectal neoplasm, w/ improvement in clint-rectal collections. There was also as stable spiculated nodule in the right upper lobe suspicious for neoplasm, previously seen. Initially, during [facility] hospitalization in May, plan for short course of RT, followed by interval surgery, however RT stopped 2/2 abscesses. Planned for discharge on IV abx w/ repeat imaging to assess for resolution of collections.  CT abdomen and pelvis shows minor improvement in collections    INTERVAL EVENTS:  Patient is POD 9 s/p creation of end sigmoid loop colostomy.  Tolerating soft diet.  Ostomy functional.  Afebrile overnight.  Started on Zosyn as per ID.      ICU Vital Signs Last 24 Hrs  T(C): 36.6 (28 Jun 2018 20:41), Max: 37.3 (28 Jun 2018 05:47)  T(F): 97.8 (28 Jun 2018 20:41), Max: 99.2 (28 Jun 2018 05:47)  HR: 76 (28 Jun 2018 20:41) (71 - 83)  BP: 113/70 (28 Jun 2018 20:41) (113/70 - 132/67)  BP(mean): --  ABP: --  ABP(mean): --  RR: 18 (28 Jun 2018 20:41) (18 - 18)  SpO2: 95% (28 Jun 2018 20:41) (95% - 98%)    I&O's Detail    27 Jun 2018 07:01  -  28 Jun 2018 07:00  --------------------------------------------------------  IN:    IV PiggyBack: 850 mL    Oral Fluid: 320 mL    sodium chloride 0.9%.: 900 mL  Total IN: 2070 mL    OUT:    Colostomy: 150 mL  Total OUT: 150 mL    Total NET: 1920 mL      28 Jun 2018 07:01  -  29 Jun 2018 03:25  --------------------------------------------------------  IN:    IV PiggyBack: 100 mL    Oral Fluid: 300 mL    sodium chloride 0.9%.: 900 mL  Total IN: 1300 mL    OUT:    Colostomy: 50 mL  Total OUT: 50 mL    Total NET: 1250 mL      MEDICATIONS  (STANDING):  atorvastatin 40 milliGRAM(s) Oral at bedtime  carvedilol 3.125 milliGRAM(s) Oral every 12 hours  dextrose 5%. 1000 milliLiter(s) (50 mL/Hr) IV Continuous <Continuous>  dextrose 50% Injectable 12.5 Gram(s) IV Push once  dextrose 50% Injectable 25 Gram(s) IV Push once  dextrose 50% Injectable 25 Gram(s) IV Push once  docusate sodium 100 milliGRAM(s) Oral two times a day  famotidine    Tablet 20 milliGRAM(s) Oral daily  heparin  Injectable 5000 Unit(s) SubCutaneous every 12 hours  insulin lispro (HumaLOG) corrective regimen sliding scale   SubCutaneous three times a day before meals  insulin lispro (HumaLOG) corrective regimen sliding scale   SubCutaneous at bedtime  lisinopril 2.5 milliGRAM(s) Oral daily  mirtazapine 15 milliGRAM(s) Oral at bedtime  morphine ER Tablet 15 milliGRAM(s) Oral every 12 hours  piperacillin/tazobactam IVPB. 3.375 Gram(s) IV Intermittent every 8 hours  polyethylene glycol 3350 17 Gram(s) Oral daily  potassium chloride    Tablet ER 20 milliEquivalent(s) Oral daily  senna 2 Tablet(s) Oral at bedtime  sodium chloride 0.9%. 1000 milliLiter(s) (75 mL/Hr) IV Continuous <Continuous>    MEDICATIONS  (PRN):  acetaminophen   Tablet 650 milliGRAM(s) Oral every 6 hours PRN Mild Pain (1 - 3)  acetaminophen   Tablet 650 milliGRAM(s) Oral every 6 hours PRN For Temp greater than 38 C (100.4 F)  bisacodyl Suppository 10 milliGRAM(s) Rectal daily PRN Constipation  dextrose 40% Gel 15 Gram(s) Oral once PRN Blood Glucose LESS THAN 70 milliGRAM(s)/deciliter  glucagon  Injectable 1 milliGRAM(s) IntraMuscular once PRN Glucose LESS THAN 70 milligrams/deciliter  ondansetron Injectable 4 milliGRAM(s) IV Push every 6 hours PRN Nausea and/or Vomiting    General:  Sitting up in bed, appears comfortable  Chest:  Breath sounds audible bilaterally; no wheezing, rales or ronchi  Abdomen:  Soft, appropriately tender, non distended; colostomy healthy and pink  Extremities:  Warm, well perfused                          8.5    10.85 )-----------( 293      ( 28 Jun 2018 14:43 )             27.3   06-28    131<L>  |  98  |  6<L>  ----------------------------<  69<L>  3.3<L>   |  22  |  0.48<L>    Ca    7.7<L>      28 Jun 2018 12:41

## 2018-06-29 NOTE — PROGRESS NOTE ADULT - ASSESSMENT
78 year old female with perforated rectal cancer POD 9 s/p creation of end sigmoid loop colostomy  -Patient is tolerating soft diet  -Ostomy functioning  -ID followup for blood cultures  -Pain control  -OOB and ambulate  -Ostomy care  -Continue care per primary team, colorectal surgery to follow

## 2018-06-29 NOTE — CHART NOTE - NSCHARTNOTEFT_GEN_A_CORE
Patient seen for Malnutrition Follow-up on [facility]    Pertinent Information: This is a  78 year old F with locally advanced rectal cancer s/p 3 sessions of palliative RT aborted due to discovery of pelvic abscess s/p IV Zosyn, now presenting with PNA. Pt with FTT, ongoing weight loss, malignancy related pain. Now s/p Colorectal Surgery on 6/14 for Perforated rectal cancer; s/p laparoscopic colostomy  to divert  fecal stream, for improved  pelvic pain/discomfort, and treat any impending obstruction and allow for some healing of pelvis. Patient with minimal PO intake during admission meeting <50% of estimated needs.     Source: Patient [ ]    Family [ ]     other [ x]: Medical record, RN, pt sleeping during visit, no family present at bedside. No acute GI distress noted.    24hrs Colostomy output: (6/28) 200ml    Diet : Low sodium + Vanilla Ensure 1x + Pro-Source 1x per day      PO intake:  < 50% [x ] 50-75% [ ]   % [ ]  other :     Source for PO intake [ ] Patient [ ] family [ ] chart [ x] staff [ x] other: calorie count sheet,    Per Calorie count record, pt has been refusing meals or consuming minimal amounts. Daughter has been bringing in cultural foods from home which pt appears to prefer however PO intake remains minimal. O    3 day calorie count (6/27-6/29) in Progress:  Day 1: Calories 469Kcal, Protein: 28 gm --Pt refused breakfast and lunch   Day 2: Calories 210 Kcal, Protein: 7gm --Pt refused breakfast, pt consumed ~ 15% of food brought in from home (no additional details provided).     Estimated needs based on ideal weight of 110 pounds  Calories: 1,000-1,250 (20-25Kcal/Kg)  Protein: 50-60gm (1-1.2gm/Kg)    Weight: No new weight to assess  6/20: 90.2 Pounds    Pertinent Medications: MEDICATIONS  (STANDING):  atorvastatin 40 milliGRAM(s) Oral at bedtime  carvedilol 3.125 milliGRAM(s) Oral every 12 hours  dextrose 5%. 1000 milliLiter(s) (50 mL/Hr) IV Continuous <Continuous>  dextrose 50% Injectable 12.5 Gram(s) IV Push once  dextrose 50% Injectable 25 Gram(s) IV Push once  dextrose 50% Injectable 25 Gram(s) IV Push once  docusate sodium 100 milliGRAM(s) Oral two times a day  famotidine    Tablet 20 milliGRAM(s) Oral daily  heparin  Injectable 5000 Unit(s) SubCutaneous every 12 hours  insulin lispro (HumaLOG) corrective regimen sliding scale   SubCutaneous three times a day before meals  insulin lispro (HumaLOG) corrective regimen sliding scale   SubCutaneous at bedtime  lisinopril 2.5 milliGRAM(s) Oral daily  mirtazapine 15 milliGRAM(s) Oral at bedtime  morphine ER Tablet 15 milliGRAM(s) Oral every 12 hours  polyethylene glycol 3350 17 Gram(s) Oral daily  potassium chloride    Tablet ER 20 milliEquivalent(s) Oral daily  senna 2 Tablet(s) Oral at bedtime  sodium chloride 0.9%. 1000 milliLiter(s) (75 mL/Hr) IV Continuous <Continuous>    MEDICATIONS  (PRN):  acetaminophen   Tablet 650 milliGRAM(s) Oral every 6 hours PRN Mild Pain (1 - 3)  acetaminophen   Tablet 650 milliGRAM(s) Oral every 6 hours PRN For Temp greater than 38 C (100.4 F)  bisacodyl Suppository 10 milliGRAM(s) Rectal daily PRN Constipation  dextrose 40% Gel 15 Gram(s) Oral once PRN Blood Glucose LESS THAN 70 milliGRAM(s)/deciliter  glucagon  Injectable 1 milliGRAM(s) IntraMuscular once PRN Glucose LESS THAN 70 milligrams/deciliter  ondansetron Injectable 4 milliGRAM(s) IV Push every 6 hours PRN Nausea and/or Vomiting    Pertinent Labs:  06-29 Na134 mmol/L<L> Glu 93 mg/dL K+ 3.5 mmol/L Cr  0.45 mg/dL<L> BUN <4 mg/dL<L> 06-25 Phos 2.7 mg/dL      Skin: free of pressure injuries   No edema noted     Estimated Needs:   [ x] no change since previous assessment  [ ] recalculated:       Previous Nutrition Diagnosis:      [x ] Malnutrition     Nutrition Diagnosis is [x ] ongoing: being addressed by oral supplements, calories count       New Nutrition Diagnosis: [ x] not applicable    Interventions:   Recommend  1. Continue current diet + Ensure Enlive 1x and Prosource 1x.   2. Continue Calorie Count to assess adequacy of PO intake. Results of calorie count for the first two day show pt is meeting <50% of low end estimated calorie and protein needs.   3. If pt unable to meet estimated needs via PO diet would recommend initiating nutrition support. Would recommend  Vital AF @ 40ml/hr x24 hrs, 960ml, 28 calories/kg, protein  72gm, 1.75gm/kg based on 41 kg.     Monitoring and Evaluation:     [x ] PO intake [x] Tolerance to diet prescription  [x] weights [x ] follow up per protocol    [x ] other: RD to remain available and follow-up as medically appropriate. Lorin Manzano RD, CDN, Pager # [phone]

## 2018-06-29 NOTE — PROGRESS NOTE ADULT - SUBJECTIVE AND OBJECTIVE BOX
INTERVAL HPI/OVERNIGHT EVENTS: improving, less pain, ostomy functioning    MEDICATIONS  (STANDING):  atorvastatin 40 milliGRAM(s) Oral at bedtime  carvedilol 3.125 milliGRAM(s) Oral every 12 hours  dextrose 5%. 1000 milliLiter(s) (50 mL/Hr) IV Continuous <Continuous>  dextrose 50% Injectable 12.5 Gram(s) IV Push once  dextrose 50% Injectable 25 Gram(s) IV Push once  dextrose 50% Injectable 25 Gram(s) IV Push once  docusate sodium 100 milliGRAM(s) Oral two times a day  famotidine    Tablet 20 milliGRAM(s) Oral daily  heparin  Injectable 5000 Unit(s) SubCutaneous every 12 hours  insulin lispro (HumaLOG) corrective regimen sliding scale   SubCutaneous three times a day before meals  insulin lispro (HumaLOG) corrective regimen sliding scale   SubCutaneous at bedtime  lisinopril 2.5 milliGRAM(s) Oral daily  mirtazapine 15 milliGRAM(s) Oral at bedtime  morphine ER Tablet 15 milliGRAM(s) Oral every 12 hours  polyethylene glycol 3350 17 Gram(s) Oral daily  potassium chloride    Tablet ER 20 milliEquivalent(s) Oral daily  senna 2 Tablet(s) Oral at bedtime  sodium chloride 0.9%. 1000 milliLiter(s) (75 mL/Hr) IV Continuous <Continuous>    MEDICATIONS  (PRN):  acetaminophen   Tablet 650 milliGRAM(s) Oral every 6 hours PRN Mild Pain (1 - 3)  acetaminophen   Tablet 650 milliGRAM(s) Oral every 6 hours PRN For Temp greater than 38 C (100.4 F)  bisacodyl Suppository 10 milliGRAM(s) Rectal daily PRN Constipation  dextrose 40% Gel 15 Gram(s) Oral once PRN Blood Glucose LESS THAN 70 milliGRAM(s)/deciliter  glucagon  Injectable 1 milliGRAM(s) IntraMuscular once PRN Glucose LESS THAN 70 milligrams/deciliter  ondansetron Injectable 4 milliGRAM(s) IV Push every 6 hours PRN Nausea and/or Vomiting      Allergies    No Known Allergies    Intolerances            PHYSICAL EXAM:   Vital Signs:  Vital Signs Last 24 Hrs  T(C): 37.6 (29 Jun 2018 04:48), Max: 37.6 (29 Jun 2018 04:48)  T(F): 99.6 (29 Jun 2018 04:48), Max: 99.6 (29 Jun 2018 04:48)  HR: 93 (29 Jun 2018 04:48) (76 - 93)  BP: 149/80 (29 Jun 2018 04:48) (113/70 - 149/80)  BP(mean): --  RR: 18 (29 Jun 2018 04:48) (18 - 18)  SpO2: 93% (29 Jun 2018 04:48) (93% - 98%)  Daily     Daily     GENERAL:  no distress  HEENT:  NC/AT,  anicteric  CHEST:   no increased effort, breath sounds clear  HEART:  Regular rhythm  ABDOMEN:  Soft,  non-distended, normoactive bowel sounds,  less tender across lower abd, ostomy functioning  EXTEREMITIES:  no cyanosis      LABS:                        9.1    12.63 )-----------( 317      ( 29 Jun 2018 08:19 )             28.5     06-29    134<L>  |  100  |  <4<L>  ----------------------------<  93  3.5   |  21<L>  |  0.45<L>    Ca    7.8<L>      29 Jun 2018 07:07            RADIOLOGY & ADDITIONAL TESTS:

## 2018-06-29 NOTE — PROGRESS NOTE ADULT - SUBJECTIVE AND OBJECTIVE BOX
Patient is a 78y old  Female who presents with a chief complaint of fever (08 Jun 2018 15:36)    Being followed by ID for fever,CR ca    Interval history:no complaints   Tolerating PO   No other acute events      ROS:  No cough,SOB,CP  No N/V/D  No abd pain  No urinary complaints  No HA  No joint or limb pain  No other complaints      Antimicrobials:    Zosyn Dced today    other medications reviewed    Vital Signs Last 24 Hrs  T(C): 37.6 (06-29-18 @ 04:48), Max: 37.6 (06-29-18 @ 04:48)  T(F): 99.6 (06-29-18 @ 04:48), Max: 99.6 (06-29-18 @ 04:48)  HR: 93 (06-29-18 @ 04:48) (76 - 93)  BP: 149/80 (06-29-18 @ 04:48) (113/70 - 149/80)  BP(mean): --  RR: 18 (06-29-18 @ 04:48) (18 - 18)  SpO2: 93% (06-29-18 @ 04:48) (93% - 98%)    Physical Exam:    Constitutional well preserved,comfortable,pleasant    HEENT PERRLA EOMI,No pallor or icterus    No oral exudate or erythema    Neck supple no JVD or LN    Chest Good AE,CTA    CVS RRR S1 S2 WNl No murmur or rub or gallop    Abd soft BS normal No tenderness colostomy     Ext No cyanosis clubbing or edema    PICC  site no erythema tenderness or discharge  R AKA     Joints no swelling or LOM    CNS AAO X 3 no focal    Lab Data:                          9.1    12.63 )-----------( 317      ( 29 Jun 2018 08:19 )             28.5       06-29    134<L>  |  100  |  <4<L>  ----------------------------<  93  3.5   |  21<L>  |  0.45<L>    Ca    7.8<L>      29 Jun 2018 07:07          Culture - Blood (collected 27 Jun 2018 17:22)  Source: .Blood Blood  Preliminary Report (28 Jun 2018 18:01):    No growth to date.    Culture - Blood (collected 27 Jun 2018 17:22)  Source: .Blood Blood  Preliminary Report (28 Jun 2018 18:01):    No growth to date.    Culture - Blood (collected 25 Jun 2018 00:01)  Source: .Blood Blood-Catheter  Gram Stain (25 Jun 2018 19:11):    Growth in aerobic and anaerobic bottles: Gram Positive Cocci in Clusters  Final Report (26 Jun 2018 17:14):    Growth in aerobic and anaerobic bottles: Coag Negative Staphylococcus    Single set isolate, possible contaminant. Contact    Microbiology if susceptibility testing clinically    indicated.    "    -  Coagulase negative Staphylococcus: Detec      Method Type: PCR    Culture - Blood (collected 25 Jun 2018 00:01)  Source: .Blood Blood-Peripheral  Preliminary Report (26 Jun 2018 01:02):    No growth to date.    < from: CT Abdomen and Pelvis w/ IV Cont (06.26.18 @ 08:33) >  IMPRESSION:     Irregular rectal wall thickening and enhancement consistent with known   neoplasm. Persistent perirectal fluid collections. Interval left lower   quadrant loop colostomy.           < end of copied text > Patient is a 78y old  Female who presents with a chief complaint of fever (08 Jun 2018 15:36)    Being followed by ID for fever,CR ca    Interval history:no complaints   Tolerating PO   No other acute events      ROS:  No cough,SOB,CP  No N/V/D  No abd pain  No urinary complaints  No HA  No joint or limb pain  No other complaints      Antimicrobials:    Zosyn Dced today  s/p zosyn 6/8->6/17 and again 6/25-> date  Recd abx X 3.5 weeks PTA     other medications reviewed    Vital Signs Last 24 Hrs  T(C): 37.6 (06-29-18 @ 04:48), Max: 37.6 (06-29-18 @ 04:48)  T(F): 99.6 (06-29-18 @ 04:48), Max: 99.6 (06-29-18 @ 04:48)  HR: 93 (06-29-18 @ 04:48) (76 - 93)  BP: 149/80 (06-29-18 @ 04:48) (113/70 - 149/80)  BP(mean): --  RR: 18 (06-29-18 @ 04:48) (18 - 18)  SpO2: 93% (06-29-18 @ 04:48) (93% - 98%)    Physical Exam:    Constitutional well preserved,comfortable,pleasant    HEENT PERRLA EOMI,No pallor or icterus    No oral exudate or erythema    Neck supple no JVD or LN    Chest Good AE,CTA    CVS RRR S1 S2 WNl No murmur or rub or gallop    Abd soft BS normal No tenderness colostomy     Ext No cyanosis clubbing or edema    PICC  site no erythema tenderness or discharge  R AKA     Joints no swelling or LOM    CNS AAO X 3 no focal    Lab Data:                          9.1    12.63 )-----------( 317      ( 29 Jun 2018 08:19 )             28.5       06-29    134<L>  |  100  |  <4<L>  ----------------------------<  93  3.5   |  21<L>  |  0.45<L>    Ca    7.8<L>      29 Jun 2018 07:07          Culture - Blood (collected 27 Jun 2018 17:22)  Source: .Blood Blood  Preliminary Report (28 Jun 2018 18:01):    No growth to date.    Culture - Blood (collected 27 Jun 2018 17:22)  Source: .Blood Blood  Preliminary Report (28 Jun 2018 18:01):    No growth to date.    Culture - Blood (collected 25 Jun 2018 00:01)  Source: .Blood Blood-Catheter  Gram Stain (25 Jun 2018 19:11):    Growth in aerobic and anaerobic bottles: Gram Positive Cocci in Clusters  Final Report (26 Jun 2018 17:14):    Growth in aerobic and anaerobic bottles: Coag Negative Staphylococcus    Single set isolate, possible contaminant. Contact    Microbiology if susceptibility testing clinically    indicated.    "    -  Coagulase negative Staphylococcus: Detec      Method Type: PCR    Culture - Blood (collected 25 Jun 2018 00:01)  Source: .Blood Blood-Peripheral  Preliminary Report (26 Jun 2018 01:02):    No growth to date.    < from: CT Abdomen and Pelvis w/ IV Cont (06.26.18 @ 08:33) >  IMPRESSION:     Irregular rectal wall thickening and enhancement consistent with known   neoplasm. Persistent perirectal fluid collections. Interval left lower   quadrant loop colostomy.           < end of copied text >

## 2018-06-29 NOTE — PROGRESS NOTE ADULT - ASSESSMENT
78F with rectal cancer, dm,  htn,  adm with fever,  afebrile  now, s/p ab    rectal fluid  collections/ pna  proximal diversion/ostomy,   dr ferreira   oncology, RT saw  pt  s/p  colectomy/ sigmoid   colostomy  ostomy,  , good  function  low  grade fever, resolved,   on zosyn now,  per  I  hyponatremia, stable  duration of  ab, as per  ID

## 2018-06-30 LAB
CULTURE RESULTS: SIGNIFICANT CHANGE UP
GLUCOSE BLDC GLUCOMTR-MCNC: 104 MG/DL — HIGH (ref 70–99)
GLUCOSE BLDC GLUCOMTR-MCNC: 116 MG/DL — HIGH (ref 70–99)
GLUCOSE BLDC GLUCOMTR-MCNC: 141 MG/DL — HIGH (ref 70–99)
GLUCOSE BLDC GLUCOMTR-MCNC: 215 MG/DL — HIGH (ref 70–99)
HCT VFR BLD CALC: 24.2 % — LOW (ref 34.5–45)
HGB BLD-MCNC: 8 G/DL — LOW (ref 11.5–15.5)
MCHC RBC-ENTMCNC: 29.2 PG — SIGNIFICANT CHANGE UP (ref 27–34)
MCHC RBC-ENTMCNC: 33 GM/DL — SIGNIFICANT CHANGE UP (ref 32–36)
MCV RBC AUTO: 88.7 FL — SIGNIFICANT CHANGE UP (ref 80–100)
PLATELET # BLD AUTO: 281 K/UL — SIGNIFICANT CHANGE UP (ref 150–400)
RBC # BLD: 2.73 M/UL — LOW (ref 3.8–5.2)
RBC # FLD: 17.3 % — HIGH (ref 10.3–14.5)
SPECIMEN SOURCE: SIGNIFICANT CHANGE UP
WBC # BLD: 11.1 K/UL — HIGH (ref 3.8–10.5)
WBC # FLD AUTO: 11.1 K/UL — HIGH (ref 3.8–10.5)

## 2018-06-30 RX ORDER — LIDOCAINE 4 G/100G
10 CREAM TOPICAL ONCE
Qty: 0 | Refills: 0 | Status: COMPLETED | OUTPATIENT
Start: 2018-06-30 | End: 2018-06-30

## 2018-06-30 RX ORDER — LIDOCAINE 4 G/100G
10 CREAM TOPICAL ONCE
Qty: 0 | Refills: 0 | Status: DISCONTINUED | OUTPATIENT
Start: 2018-06-30 | End: 2018-06-30

## 2018-06-30 RX ADMIN — FAMOTIDINE 20 MILLIGRAM(S): 10 INJECTION INTRAVENOUS at 13:25

## 2018-06-30 RX ADMIN — LIDOCAINE 10 MILLILITER(S): 4 CREAM TOPICAL at 22:32

## 2018-06-30 RX ADMIN — HEPARIN SODIUM 5000 UNIT(S): 5000 INJECTION INTRAVENOUS; SUBCUTANEOUS at 17:44

## 2018-06-30 RX ADMIN — MORPHINE SULFATE 15 MILLIGRAM(S): 50 CAPSULE, EXTENDED RELEASE ORAL at 21:31

## 2018-06-30 RX ADMIN — SENNA PLUS 2 TABLET(S): 8.6 TABLET ORAL at 21:01

## 2018-06-30 RX ADMIN — MIRTAZAPINE 15 MILLIGRAM(S): 45 TABLET, ORALLY DISINTEGRATING ORAL at 21:02

## 2018-06-30 RX ADMIN — SODIUM CHLORIDE 75 MILLILITER(S): 9 INJECTION INTRAMUSCULAR; INTRAVENOUS; SUBCUTANEOUS at 21:02

## 2018-06-30 RX ADMIN — CARVEDILOL PHOSPHATE 3.12 MILLIGRAM(S): 80 CAPSULE, EXTENDED RELEASE ORAL at 05:05

## 2018-06-30 RX ADMIN — Medication 20 MILLIEQUIVALENT(S): at 13:25

## 2018-06-30 RX ADMIN — SODIUM CHLORIDE 75 MILLILITER(S): 9 INJECTION INTRAMUSCULAR; INTRAVENOUS; SUBCUTANEOUS at 06:49

## 2018-06-30 RX ADMIN — LISINOPRIL 2.5 MILLIGRAM(S): 2.5 TABLET ORAL at 05:05

## 2018-06-30 RX ADMIN — CARVEDILOL PHOSPHATE 3.12 MILLIGRAM(S): 80 CAPSULE, EXTENDED RELEASE ORAL at 17:44

## 2018-06-30 RX ADMIN — HEPARIN SODIUM 5000 UNIT(S): 5000 INJECTION INTRAVENOUS; SUBCUTANEOUS at 05:05

## 2018-06-30 RX ADMIN — ATORVASTATIN CALCIUM 40 MILLIGRAM(S): 80 TABLET, FILM COATED ORAL at 21:02

## 2018-06-30 RX ADMIN — MORPHINE SULFATE 15 MILLIGRAM(S): 50 CAPSULE, EXTENDED RELEASE ORAL at 21:01

## 2018-06-30 RX ADMIN — Medication 650 MILLIGRAM(S): at 05:07

## 2018-06-30 RX ADMIN — POLYETHYLENE GLYCOL 3350 17 GRAM(S): 17 POWDER, FOR SOLUTION ORAL at 13:25

## 2018-06-30 RX ADMIN — Medication 100 MILLIGRAM(S): at 05:05

## 2018-06-30 RX ADMIN — Medication 650 MILLIGRAM(S): at 13:25

## 2018-06-30 RX ADMIN — MORPHINE SULFATE 15 MILLIGRAM(S): 50 CAPSULE, EXTENDED RELEASE ORAL at 10:03

## 2018-06-30 RX ADMIN — Medication 100 MILLIGRAM(S): at 17:44

## 2018-06-30 RX ADMIN — MORPHINE SULFATE 15 MILLIGRAM(S): 50 CAPSULE, EXTENDED RELEASE ORAL at 09:33

## 2018-06-30 NOTE — PROGRESS NOTE ADULT - SUBJECTIVE AND OBJECTIVE BOX
weak, in bed   no pain    REVIEW OF SYSTEMS:  GEN: no fever,    no chills  RESP: no SOB,   no cough  CVS: no chest pain,   no palpitations  GI: no abdominal pain,   no nausea,   no vomiting,   no constipation,   no diarrhea  : no dysuria,   no frequency  NEURO: no headache,   no dizziness  PSYCH: no depression,   not anxious  Derm : no rash    MEDICATIONS  (STANDING):  atorvastatin 40 milliGRAM(s) Oral at bedtime  carvedilol 3.125 milliGRAM(s) Oral every 12 hours  dextrose 5%. 1000 milliLiter(s) (50 mL/Hr) IV Continuous <Continuous>  dextrose 50% Injectable 12.5 Gram(s) IV Push once  dextrose 50% Injectable 25 Gram(s) IV Push once  dextrose 50% Injectable 25 Gram(s) IV Push once  docusate sodium 100 milliGRAM(s) Oral two times a day  famotidine    Tablet 20 milliGRAM(s) Oral daily  heparin  Injectable 5000 Unit(s) SubCutaneous every 12 hours  insulin lispro (HumaLOG) corrective regimen sliding scale   SubCutaneous three times a day before meals  insulin lispro (HumaLOG) corrective regimen sliding scale   SubCutaneous at bedtime  levoFLOXacin  Tablet 500 milliGRAM(s) Oral every 24 hours  lisinopril 2.5 milliGRAM(s) Oral daily  mirtazapine 15 milliGRAM(s) Oral at bedtime  morphine ER Tablet 15 milliGRAM(s) Oral every 12 hours  polyethylene glycol 3350 17 Gram(s) Oral daily  potassium chloride    Tablet ER 20 milliEquivalent(s) Oral daily  senna 2 Tablet(s) Oral at bedtime  sodium chloride 0.9%. 1000 milliLiter(s) (75 mL/Hr) IV Continuous <Continuous>    MEDICATIONS  (PRN):  acetaminophen   Tablet 650 milliGRAM(s) Oral every 6 hours PRN Mild Pain (1 - 3)  acetaminophen   Tablet 650 milliGRAM(s) Oral every 6 hours PRN For Temp greater than 38 C (100.4 F)  bisacodyl Suppository 10 milliGRAM(s) Rectal daily PRN Constipation  dextrose 40% Gel 15 Gram(s) Oral once PRN Blood Glucose LESS THAN 70 milliGRAM(s)/deciliter  glucagon  Injectable 1 milliGRAM(s) IntraMuscular once PRN Glucose LESS THAN 70 milligrams/deciliter  ondansetron Injectable 4 milliGRAM(s) IV Push every 6 hours PRN Nausea and/or Vomiting      Vital Signs Last 24 Hrs  T(C): 36.6 (30 Jun 2018 04:50), Max: 37.1 (29 Jun 2018 11:44)  T(F): 97.8 (30 Jun 2018 04:50), Max: 98.7 (29 Jun 2018 11:44)  HR: 98 (30 Jun 2018 04:50) (77 - 98)  BP: 125/75 (30 Jun 2018 04:50) (125/75 - 157/84)  BP(mean): --  RR: 18 (30 Jun 2018 04:50) (18 - 18)  SpO2: 95% (30 Jun 2018 04:50) (94% - 95%)  CAPILLARY BLOOD GLUCOSE      POCT Blood Glucose.: 103 mg/dL (29 Jun 2018 21:14)  POCT Blood Glucose.: 155 mg/dL (29 Jun 2018 17:20)  POCT Blood Glucose.: 193 mg/dL (29 Jun 2018 12:42)  POCT Blood Glucose.: 105 mg/dL (29 Jun 2018 09:27)    I&O's Summary    29 Jun 2018 07:01  -  30 Jun 2018 07:00  --------------------------------------------------------  IN: 1340 mL / OUT: 220 mL / NET: 1120 mL        PHYSICAL EXAM:  HEAD:  Atraumatic, Normocephalic  NECK: Supple, No   JVD  CHEST/LUNG:   no     rales,     no,    rhonchi  HEART: Regular rate and rhythm;         murmur  ABDOMEN: Soft, Nontender, ;   EXTREMITIES:        edema  NEUROLOGY:  alert    LABS:                        9.1    12.63 )-----------( 317      ( 29 Jun 2018 08:19 )             28.5     06-29    134<L>  |  100  |  <4<L>  ----------------------------<  93  3.5   |  21<L>  |  0.45<L>    Ca    7.8<L>      29 Jun 2018 07:07                    Hemoglobin A1C, Whole Blood: 6.6 % (05-09 @ 06:52)    Thyroid Stimulating Hormone, Serum: 1.00 uIU/mL (05-18 @ 06:35)          Consultant(s) Notes Reviewed:      Care Discussed with Consultants/Other Providers:

## 2018-06-30 NOTE — PROGRESS NOTE ADULT - ASSESSMENT
78F with rectal cancer, dm,  htn,  adm with fever,  afebrile  now, s/p ab    rectal fluid  collections/ pna  proximal diversion/ostomy,   dr ferreira   oncology, RT saw  pt  s/p  colectomy/ sigmoid   colostomy  ostomy,  , good  function  low  grade fever, resolved,     hyponatremia, stable  duration of  ab, as per  ID, on Levaquin  now

## 2018-06-30 NOTE — PROGRESS NOTE ADULT - SUBJECTIVE AND OBJECTIVE BOX
[facility] [id number],   78yFemale followed for:  No Known Allergies    PAST MEDICAL & SURGICAL HISTORY:  Rectal mass  Gangrene of foot  Coronary artery disease involving native coronary artery of native heart without angina pectoris  Status post above knee amputation of right lower extremity    FAMILY HISTORY:  No pertinent family history in first degree relatives    MEDICATIONS  (STANDING):  atorvastatin 40 milliGRAM(s) Oral at bedtime  carvedilol 3.125 milliGRAM(s) Oral every 12 hours  dextrose 5%. 1000 milliLiter(s) (50 mL/Hr) IV Continuous <Continuous>  dextrose 50% Injectable 12.5 Gram(s) IV Push once  dextrose 50% Injectable 25 Gram(s) IV Push once  dextrose 50% Injectable 25 Gram(s) IV Push once  docusate sodium 100 milliGRAM(s) Oral two times a day  famotidine    Tablet 20 milliGRAM(s) Oral daily  heparin  Injectable 5000 Unit(s) SubCutaneous every 12 hours  insulin lispro (HumaLOG) corrective regimen sliding scale   SubCutaneous three times a day before meals  insulin lispro (HumaLOG) corrective regimen sliding scale   SubCutaneous at bedtime  levoFLOXacin  Tablet 500 milliGRAM(s) Oral every 24 hours  lisinopril 2.5 milliGRAM(s) Oral daily  mirtazapine 15 milliGRAM(s) Oral at bedtime  morphine ER Tablet 15 milliGRAM(s) Oral every 12 hours  polyethylene glycol 3350 17 Gram(s) Oral daily  potassium chloride    Tablet ER 20 milliEquivalent(s) Oral daily  senna 2 Tablet(s) Oral at bedtime  sodium chloride 0.9%. 1000 milliLiter(s) (75 mL/Hr) IV Continuous <Continuous>    MEDICATIONS  (PRN):  acetaminophen   Tablet 650 milliGRAM(s) Oral every 6 hours PRN Mild Pain (1 - 3)  acetaminophen   Tablet 650 milliGRAM(s) Oral every 6 hours PRN For Temp greater than 38 C (100.4 F)  bisacodyl Suppository 10 milliGRAM(s) Rectal daily PRN Constipation  dextrose 40% Gel 15 Gram(s) Oral once PRN Blood Glucose LESS THAN 70 milliGRAM(s)/deciliter  glucagon  Injectable 1 milliGRAM(s) IntraMuscular once PRN Glucose LESS THAN 70 milligrams/deciliter  ondansetron Injectable 4 milliGRAM(s) IV Push every 6 hours PRN Nausea and/or Vomiting      Vital Signs Last 24 Hrs  T(C): 36.6 (30 Jun 2018 04:50), Max: 37.1 (29 Jun 2018 11:44)  T(F): 97.8 (30 Jun 2018 04:50), Max: 98.7 (29 Jun 2018 11:44)  HR: 98 (30 Jun 2018 04:50) (77 - 98)  BP: 125/75 (30 Jun 2018 04:50) (125/75 - 157/84)  BP(mean): --  RR: 18 (30 Jun 2018 04:50) (18 - 18)  SpO2: 95% (30 Jun 2018 04:50) (94% - 95%)  nc/at  s1s2  cta  soft, nt, nd no guarding or rebound  no c/c/e    CBC Full  -  ( 29 Jun 2018 08:19 )  WBC Count : 12.63 K/uL  Hemoglobin : 9.1 g/dL  Hematocrit : 28.5 %  Platelet Count - Automated : 317 K/uL  Mean Cell Volume : 86.6 fl  Mean Cell Hemoglobin : 27.7 pg  Mean Cell Hemoglobin Concentration : 31.9 gm/dL  Auto Neutrophil # : 9.57 K/uL  Auto Lymphocyte # : 1.87 K/uL  Auto Monocyte # : 0.93 K/uL  Auto Eosinophil # : 0.20 K/uL  Auto Basophil # : 0.02 K/uL  Auto Neutrophil % : 75.7 %  Auto Lymphocyte % : 14.8 %  Auto Monocyte % : 7.4 %  Auto Eosinophil % : 1.6 %  Auto Basophil % : 0.2 %    06-29    134<L>  |  100  |  <4<L>  ----------------------------<  93  3.5   |  21<L>  |  0.45<L>    Ca    7.8<L>      29 Jun 2018 07:07

## 2018-06-30 NOTE — PROGRESS NOTE ADULT - SUBJECTIVE AND OBJECTIVE BOX
- Patient seen and examined.  - In summary, patient is a 78 year old woman who presented with fever (2018 15:36)  - Today, patient is without complaints.         *****MEDICATIONS:    MEDICATIONS  (STANDING):  atorvastatin 40 milliGRAM(s) Oral at bedtime  carvedilol 3.125 milliGRAM(s) Oral every 12 hours  dextrose 5%. 1000 milliLiter(s) (50 mL/Hr) IV Continuous <Continuous>  dextrose 50% Injectable 12.5 Gram(s) IV Push once  dextrose 50% Injectable 25 Gram(s) IV Push once  dextrose 50% Injectable 25 Gram(s) IV Push once  docusate sodium 100 milliGRAM(s) Oral two times a day  famotidine    Tablet 20 milliGRAM(s) Oral daily  heparin  Injectable 5000 Unit(s) SubCutaneous every 12 hours  insulin lispro (HumaLOG) corrective regimen sliding scale   SubCutaneous three times a day before meals  insulin lispro (HumaLOG) corrective regimen sliding scale   SubCutaneous at bedtime  levoFLOXacin  Tablet 500 milliGRAM(s) Oral every 24 hours  lisinopril 2.5 milliGRAM(s) Oral daily  mirtazapine 15 milliGRAM(s) Oral at bedtime  morphine ER Tablet 15 milliGRAM(s) Oral every 12 hours  polyethylene glycol 3350 17 Gram(s) Oral daily  potassium chloride    Tablet ER 20 milliEquivalent(s) Oral daily  senna 2 Tablet(s) Oral at bedtime  sodium chloride 0.9%. 1000 milliLiter(s) (75 mL/Hr) IV Continuous <Continuous>    MEDICATIONS  (PRN):  acetaminophen   Tablet 650 milliGRAM(s) Oral every 6 hours PRN Mild Pain (1 - 3)  acetaminophen   Tablet 650 milliGRAM(s) Oral every 6 hours PRN For Temp greater than 38 C (100.4 F)  bisacodyl Suppository 10 milliGRAM(s) Rectal daily PRN Constipation  dextrose 40% Gel 15 Gram(s) Oral once PRN Blood Glucose LESS THAN 70 milliGRAM(s)/deciliter  glucagon  Injectable 1 milliGRAM(s) IntraMuscular once PRN Glucose LESS THAN 70 milligrams/deciliter  ondansetron Injectable 4 milliGRAM(s) IV Push every 6 hours PRN Nausea and/or Vomiting                 ***** REVIEW OF SYSTEM:  GEN: no fever, no chills, no pain  RESP: no SOB, no cough, no sputum  CVS: no chest pain, no palpitations, no edema  GI: no abdominal pain, no nausea, no vomiting, no constipation, no diarrhea  : no dysuria, no frequency  NEURO: no headache, no dizziness  PSYCH: no depression, not anxious  Derm : no itching, no rash         ***** VITAL SIGNS:             T(F): 97.8 (18 @ 04:50), Max: 98.7 (18 @ 11:44)  HR: 98 (18 @ 04:50) (77 - 98)  BP: 125/75 (18 @ 04:50) (125/75 - 157/84)  RR: 18 (18 @ 04:50) (18 - 18)  SpO2: 95% (18 @ 04:50) (94% - 95%)  Wt(kg): --  ,   I&O's Summary    2018 07:01  -  2018 07:00  --------------------------------------------------------  IN: 1340 mL / OUT: 220 mL / NET: 1120 mL                 *****PHYSICAL EXAM:  GEN: A&O X 3 , NAD , comfortable  HEENT: NCAT, EOMI, MMM, no icterus  NECK: Supple, No JVD  CVS: S1S2 , regular , No M/R/G appreciated  PULM: CTA B/L,  no W/R/R appreciated  ABD.: soft. non tender, non distended,  bowel sounds present  Extrem: intact pulses , no edema noted  Derm: No rash or ecchymosis noted  PSYCH: normal mood, no depression, not anxious         *****LAB AND IMAGIN.1    12.63 )-----------( 317      ( 2018 08:19 )             28.5                   134<L>  |  100  |  <4<L>  ----------------------------<  93  3.5   |  21<L>  |  0.45<L>    Ca    7.8<L>      2018 07:07              [All pertinent recent Imaging/Reports reviewed]  < from: Transthoracic Echocardiogram (18 @ 07:08) >  Conclusions:  1. Normal left ventricular internal dimensions and wall  thicknesses.  2. Normal left ventricular systolic function. No segmental  wall motion abnormalities.  3. Normal diastolic function  4. Normal right ventricular sizeand systolic function.  5. Estimated pulmonary artery systolic pressure equals 36  mm Hg, assuming right atrial pressure equals 8 mm Hg,  consistent with borderline pulmonary pressures.  *** Compared with echocardiogram of 2017, no  significant changes noted.    < end of copied text >         *****A S S E S S M E N T   A N D   P L A N :  78F with rectal cancer adm with fever  abx per ID  cont current meds  s/p ostomy  surg f/u noted  PO as tolerated  PT  calorie count noted  d/w GI, no plan for peg  needs PC eval    __________________________  ROBERTO. CHAPARRO Metcalf

## 2018-06-30 NOTE — PROGRESS NOTE ADULT - ASSESSMENT
continue current rx.  pt with ostomy. decreased po, encourgae suppliments.  pt poor peg candidate.  pt needs palliatve care.

## 2018-07-01 LAB
ANION GAP SERPL CALC-SCNC: 10 MMOL/L — SIGNIFICANT CHANGE UP (ref 5–17)
BUN SERPL-MCNC: <4 MG/DL — LOW (ref 7–23)
CALCIUM SERPL-MCNC: 7.5 MG/DL — LOW (ref 8.4–10.5)
CHLORIDE SERPL-SCNC: 101 MMOL/L — SIGNIFICANT CHANGE UP (ref 96–108)
CO2 SERPL-SCNC: 24 MMOL/L — SIGNIFICANT CHANGE UP (ref 22–31)
CREAT SERPL-MCNC: 0.39 MG/DL — LOW (ref 0.5–1.3)
GLUCOSE BLDC GLUCOMTR-MCNC: 100 MG/DL — HIGH (ref 70–99)
GLUCOSE BLDC GLUCOMTR-MCNC: 110 MG/DL — HIGH (ref 70–99)
GLUCOSE BLDC GLUCOMTR-MCNC: 158 MG/DL — HIGH (ref 70–99)
GLUCOSE BLDC GLUCOMTR-MCNC: 190 MG/DL — HIGH (ref 70–99)
GLUCOSE SERPL-MCNC: 81 MG/DL — SIGNIFICANT CHANGE UP (ref 70–99)
HCT VFR BLD CALC: 25.5 % — LOW (ref 34.5–45)
HGB BLD-MCNC: 8 G/DL — LOW (ref 11.5–15.5)
MCHC RBC-ENTMCNC: 26.9 PG — LOW (ref 27–34)
MCHC RBC-ENTMCNC: 31.4 GM/DL — LOW (ref 32–36)
MCV RBC AUTO: 85.9 FL — SIGNIFICANT CHANGE UP (ref 80–100)
PLATELET # BLD AUTO: 318 K/UL — SIGNIFICANT CHANGE UP (ref 150–400)
POTASSIUM SERPL-MCNC: 3.3 MMOL/L — LOW (ref 3.5–5.3)
POTASSIUM SERPL-SCNC: 3.3 MMOL/L — LOW (ref 3.5–5.3)
RBC # BLD: 2.97 M/UL — LOW (ref 3.8–5.2)
RBC # FLD: 18.5 % — HIGH (ref 10.3–14.5)
SODIUM SERPL-SCNC: 135 MMOL/L — SIGNIFICANT CHANGE UP (ref 135–145)
WBC # BLD: 12.77 K/UL — HIGH (ref 3.8–10.5)
WBC # FLD AUTO: 12.77 K/UL — HIGH (ref 3.8–10.5)

## 2018-07-01 PROCEDURE — 99232 SBSQ HOSP IP/OBS MODERATE 35: CPT

## 2018-07-01 RX ORDER — POTASSIUM CHLORIDE 20 MEQ
20 PACKET (EA) ORAL ONCE
Qty: 0 | Refills: 0 | Status: COMPLETED | OUTPATIENT
Start: 2018-07-01 | End: 2018-07-01

## 2018-07-01 RX ORDER — DRONABINOL 2.5 MG
2.5 CAPSULE ORAL
Qty: 0 | Refills: 0 | Status: DISCONTINUED | OUTPATIENT
Start: 2018-07-01 | End: 2018-07-03

## 2018-07-01 RX ADMIN — CARVEDILOL PHOSPHATE 3.12 MILLIGRAM(S): 80 CAPSULE, EXTENDED RELEASE ORAL at 18:01

## 2018-07-01 RX ADMIN — HEPARIN SODIUM 5000 UNIT(S): 5000 INJECTION INTRAVENOUS; SUBCUTANEOUS at 18:01

## 2018-07-01 RX ADMIN — Medication 100 MILLIGRAM(S): at 05:15

## 2018-07-01 RX ADMIN — ATORVASTATIN CALCIUM 40 MILLIGRAM(S): 80 TABLET, FILM COATED ORAL at 21:15

## 2018-07-01 RX ADMIN — CARVEDILOL PHOSPHATE 3.12 MILLIGRAM(S): 80 CAPSULE, EXTENDED RELEASE ORAL at 05:15

## 2018-07-01 RX ADMIN — Medication 650 MILLIGRAM(S): at 18:01

## 2018-07-01 RX ADMIN — Medication 1: at 18:00

## 2018-07-01 RX ADMIN — MIRTAZAPINE 15 MILLIGRAM(S): 45 TABLET, ORALLY DISINTEGRATING ORAL at 21:15

## 2018-07-01 RX ADMIN — HEPARIN SODIUM 5000 UNIT(S): 5000 INJECTION INTRAVENOUS; SUBCUTANEOUS at 05:15

## 2018-07-01 RX ADMIN — Medication 20 MILLIEQUIVALENT(S): at 12:46

## 2018-07-01 RX ADMIN — Medication 2.5 MILLIGRAM(S): at 18:01

## 2018-07-01 RX ADMIN — SODIUM CHLORIDE 75 MILLILITER(S): 9 INJECTION INTRAMUSCULAR; INTRAVENOUS; SUBCUTANEOUS at 21:17

## 2018-07-01 RX ADMIN — FAMOTIDINE 20 MILLIGRAM(S): 10 INJECTION INTRAVENOUS at 12:46

## 2018-07-01 RX ADMIN — POLYETHYLENE GLYCOL 3350 17 GRAM(S): 17 POWDER, FOR SOLUTION ORAL at 12:46

## 2018-07-01 RX ADMIN — SENNA PLUS 2 TABLET(S): 8.6 TABLET ORAL at 21:15

## 2018-07-01 RX ADMIN — Medication 100 MILLIGRAM(S): at 18:01

## 2018-07-01 RX ADMIN — Medication 650 MILLIGRAM(S): at 12:46

## 2018-07-01 RX ADMIN — LISINOPRIL 2.5 MILLIGRAM(S): 2.5 TABLET ORAL at 05:15

## 2018-07-01 RX ADMIN — MORPHINE SULFATE 15 MILLIGRAM(S): 50 CAPSULE, EXTENDED RELEASE ORAL at 09:40

## 2018-07-01 RX ADMIN — MORPHINE SULFATE 15 MILLIGRAM(S): 50 CAPSULE, EXTENDED RELEASE ORAL at 22:15

## 2018-07-01 RX ADMIN — MORPHINE SULFATE 15 MILLIGRAM(S): 50 CAPSULE, EXTENDED RELEASE ORAL at 21:15

## 2018-07-01 RX ADMIN — MORPHINE SULFATE 15 MILLIGRAM(S): 50 CAPSULE, EXTENDED RELEASE ORAL at 10:10

## 2018-07-01 NOTE — PROGRESS NOTE ADULT - SUBJECTIVE AND OBJECTIVE BOX
INFECTIOUS DISEASES FOLLOW UP-- Ricarda Vasquez  [phone]    This is a follow up note for this  78yFemale with  colorectal cancer  daughter at bedside reports mother has a very poor appetite and generalized aches/pains      ROS:  CONSTITUTIONAL:  No fever, poor appetite  CARDIOVASCULAR:  No chest pain or palpitations  RESPIRATORY:  No dyspnea  GASTROINTESTINAL:  No nausea, vomiting, diarrhea, or abdominal pain  GENITOURINARY:  No dysuria  NEUROLOGIC:  No headache,     Allergies    No Known Allergies    Intolerances        ANTIBIOTICS/RELEVANT:  antimicrobials  levoFLOXacin  Tablet 500 milliGRAM(s) Oral every 24 hours    immunologic:    OTHER:  acetaminophen   Tablet 650 milliGRAM(s) Oral every 6 hours PRN  acetaminophen   Tablet 650 milliGRAM(s) Oral every 6 hours PRN  atorvastatin 40 milliGRAM(s) Oral at bedtime  bisacodyl Suppository 10 milliGRAM(s) Rectal daily PRN  carvedilol 3.125 milliGRAM(s) Oral every 12 hours  dextrose 40% Gel 15 Gram(s) Oral once PRN  dextrose 5%. 1000 milliLiter(s) IV Continuous <Continuous>  dextrose 50% Injectable 12.5 Gram(s) IV Push once  dextrose 50% Injectable 25 Gram(s) IV Push once  dextrose 50% Injectable 25 Gram(s) IV Push once  docusate sodium 100 milliGRAM(s) Oral two times a day  famotidine    Tablet 20 milliGRAM(s) Oral daily  glucagon  Injectable 1 milliGRAM(s) IntraMuscular once PRN  heparin  Injectable 5000 Unit(s) SubCutaneous every 12 hours  insulin lispro (HumaLOG) corrective regimen sliding scale   SubCutaneous three times a day before meals  insulin lispro (HumaLOG) corrective regimen sliding scale   SubCutaneous at bedtime  lisinopril 2.5 milliGRAM(s) Oral daily  mirtazapine 15 milliGRAM(s) Oral at bedtime  morphine ER Tablet 15 milliGRAM(s) Oral every 12 hours  ondansetron Injectable 4 milliGRAM(s) IV Push every 6 hours PRN  polyethylene glycol 3350 17 Gram(s) Oral daily  potassium chloride    Tablet ER 20 milliEquivalent(s) Oral daily  senna 2 Tablet(s) Oral at bedtime  sodium chloride 0.9%. 1000 milliLiter(s) IV Continuous <Continuous>      Objective:  Vital Signs Last 24 Hrs  T(C): 36.8 (01 Jul 2018 11:43), Max: 36.8 (30 Jun 2018 20:24)  T(F): 98.2 (01 Jul 2018 11:43), Max: 98.3 (30 Jun 2018 20:24)  HR: 89 (01 Jul 2018 11:43) (76 - 89)  BP: 96/61 (01 Jul 2018 11:43) (96/61 - 131/80)  BP(mean): --  RR: 18 (01 Jul 2018 11:43) (18 - 18)  SpO2: 95% (01 Jul 2018 11:43) (95% - 97%)    PHYSICAL EXAM:  Constitutional:no acute distress  Eyes:KATERIN, EOMI  Ear/Nose/Throat: no oral lesions, 	  Respiratory: clear BL nateriorly  Cardiovascular: S1S2  Gastrointestinal:soft, (+) BS, no tenderness, diaper without soiling  Extremities:no e/e/c  No Lymphadenopathy  IV sites not inflammed.    LABS:                        8.0    12.77 )-----------( 318      ( 01 Jul 2018 08:21 )             25.5     07-01    135  |  101  |  <4<L>  ----------------------------<  81  3.3<L>   |  24  |  0.39<L>    Ca    7.5<L>      01 Jul 2018 07:02            MICROBIOLOGY:            RECENT CULTURES:  06-27 @ 17:22  .Blood Blood  --  --  --    No growth to date.  --  06-25 @ 00:01  .Blood Blood-Peripheral  Blood Culture PCR  Blood Culture PCR  PCR    No growth at 5 days.  --      RADIOLOGY & ADDITIONAL STUDIES:

## 2018-07-01 NOTE — PROGRESS NOTE ADULT - SUBJECTIVE AND OBJECTIVE BOX
[facility] [place][id number],   78yFemale followed for:  No Known Allergies    PAST MEDICAL & SURGICAL HISTORY:  Rectal mass  Gangrene of foot  Coronary artery disease involving native coronary artery of native heart without angina pectoris  Status post above knee amputation of right lower extremity    FAMILY HISTORY:  No pertinent family history in first degree relatives    MEDICATIONS  (STANDING):  atorvastatin 40 milliGRAM(s) Oral at bedtime  carvedilol 3.125 milliGRAM(s) Oral every 12 hours  dextrose 5%. 1000 milliLiter(s) (50 mL/Hr) IV Continuous <Continuous>  dextrose 50% Injectable 12.5 Gram(s) IV Push once  dextrose 50% Injectable 25 Gram(s) IV Push once  dextrose 50% Injectable 25 Gram(s) IV Push once  docusate sodium 100 milliGRAM(s) Oral two times a day  famotidine    Tablet 20 milliGRAM(s) Oral daily  heparin  Injectable 5000 Unit(s) SubCutaneous every 12 hours  insulin lispro (HumaLOG) corrective regimen sliding scale   SubCutaneous three times a day before meals  insulin lispro (HumaLOG) corrective regimen sliding scale   SubCutaneous at bedtime  levoFLOXacin  Tablet 500 milliGRAM(s) Oral every 24 hours  lisinopril 2.5 milliGRAM(s) Oral daily  mirtazapine 15 milliGRAM(s) Oral at bedtime  morphine ER Tablet 15 milliGRAM(s) Oral every 12 hours  polyethylene glycol 3350 17 Gram(s) Oral daily  potassium chloride    Tablet ER 20 milliEquivalent(s) Oral daily  senna 2 Tablet(s) Oral at bedtime  sodium chloride 0.9%. 1000 milliLiter(s) (75 mL/Hr) IV Continuous <Continuous>    MEDICATIONS  (PRN):  acetaminophen   Tablet 650 milliGRAM(s) Oral every 6 hours PRN Mild Pain (1 - 3)  acetaminophen   Tablet 650 milliGRAM(s) Oral every 6 hours PRN For Temp greater than 38 C (100.4 F)  bisacodyl Suppository 10 milliGRAM(s) Rectal daily PRN Constipation  dextrose 40% Gel 15 Gram(s) Oral once PRN Blood Glucose LESS THAN 70 milliGRAM(s)/deciliter  glucagon  Injectable 1 milliGRAM(s) IntraMuscular once PRN Glucose LESS THAN 70 milligrams/deciliter  ondansetron Injectable 4 milliGRAM(s) IV Push every 6 hours PRN Nausea and/or Vomiting      Vital Signs Last 24 Hrs  T(C): 36.5 (01 Jul 2018 04:45), Max: 36.9 (30 Jun 2018 11:27)  T(F): 97.7 (01 Jul 2018 04:45), Max: 98.5 (30 Jun 2018 11:27)  HR: 89 (01 Jul 2018 04:45) (60 - 89)  BP: 131/80 (01 Jul 2018 04:45) (125/72 - 135/78)  BP(mean): --  RR: 18 (01 Jul 2018 04:45) (18 - 18)  SpO2: 95% (01 Jul 2018 04:45) (95% - 97%)  nc/at  s1s2  cta  soft, nt, nd no guarding or rebound  no c/c/e    CBC Full  -  ( 01 Jul 2018 08:21 )  WBC Count : 12.77 K/uL  Hemoglobin : 8.0 g/dL  Hematocrit : 25.5 %  Platelet Count - Automated : 318 K/uL  Mean Cell Volume : 85.9 fl  Mean Cell Hemoglobin : 26.9 pg  Mean Cell Hemoglobin Concentration : 31.4 gm/dL  Auto Neutrophil # : x  Auto Lymphocyte # : x  Auto Monocyte # : x  Auto Eosinophil # : x  Auto Basophil # : x  Auto Neutrophil % : x  Auto Lymphocyte % : x  Auto Monocyte % : x  Auto Eosinophil % : x  Auto Basophil % : x    07-01    135  |  101  |  <4<L>  ----------------------------<  81  3.3<L>   |  24  |  0.39<L>    Ca    7.5<L>      01 Jul 2018 07:02

## 2018-07-01 NOTE — PROGRESS NOTE ADULT - SUBJECTIVE AND OBJECTIVE BOX
weak, no complaints    REVIEW OF SYSTEMS:  GEN: no fever,    no chills  RESP: no SOB,   no cough  CVS: no chest pain,   no palpitations  GI: no abdominal pain,   no nausea,   no vomiting,   no constipation,   no diarrhea  : no dysuria,   no frequency  NEURO: no headache,   no dizziness  PSYCH: no depression,   not anxious  Derm : no rash    MEDICATIONS  (STANDING):  atorvastatin 40 milliGRAM(s) Oral at bedtime  carvedilol 3.125 milliGRAM(s) Oral every 12 hours  dextrose 5%. 1000 milliLiter(s) (50 mL/Hr) IV Continuous <Continuous>  dextrose 50% Injectable 12.5 Gram(s) IV Push once  dextrose 50% Injectable 25 Gram(s) IV Push once  dextrose 50% Injectable 25 Gram(s) IV Push once  docusate sodium 100 milliGRAM(s) Oral two times a day  famotidine    Tablet 20 milliGRAM(s) Oral daily  heparin  Injectable 5000 Unit(s) SubCutaneous every 12 hours  insulin lispro (HumaLOG) corrective regimen sliding scale   SubCutaneous three times a day before meals  insulin lispro (HumaLOG) corrective regimen sliding scale   SubCutaneous at bedtime  levoFLOXacin  Tablet 500 milliGRAM(s) Oral every 24 hours  lisinopril 2.5 milliGRAM(s) Oral daily  mirtazapine 15 milliGRAM(s) Oral at bedtime  morphine ER Tablet 15 milliGRAM(s) Oral every 12 hours  polyethylene glycol 3350 17 Gram(s) Oral daily  potassium chloride    Tablet ER 20 milliEquivalent(s) Oral daily  senna 2 Tablet(s) Oral at bedtime  sodium chloride 0.9%. 1000 milliLiter(s) (75 mL/Hr) IV Continuous <Continuous>    MEDICATIONS  (PRN):  acetaminophen   Tablet 650 milliGRAM(s) Oral every 6 hours PRN Mild Pain (1 - 3)  acetaminophen   Tablet 650 milliGRAM(s) Oral every 6 hours PRN For Temp greater than 38 C (100.4 F)  bisacodyl Suppository 10 milliGRAM(s) Rectal daily PRN Constipation  dextrose 40% Gel 15 Gram(s) Oral once PRN Blood Glucose LESS THAN 70 milliGRAM(s)/deciliter  glucagon  Injectable 1 milliGRAM(s) IntraMuscular once PRN Glucose LESS THAN 70 milligrams/deciliter  ondansetron Injectable 4 milliGRAM(s) IV Push every 6 hours PRN Nausea and/or Vomiting      Vital Signs Last 24 Hrs  T(C): 36.5 (01 Jul 2018 04:45), Max: 36.9 (30 Jun 2018 11:27)  T(F): 97.7 (01 Jul 2018 04:45), Max: 98.5 (30 Jun 2018 11:27)  HR: 89 (01 Jul 2018 04:45) (60 - 89)  BP: 131/80 (01 Jul 2018 04:45) (125/72 - 135/78)  BP(mean): --  RR: 18 (01 Jul 2018 04:45) (18 - 18)  SpO2: 95% (01 Jul 2018 04:45) (95% - 97%)  CAPILLARY BLOOD GLUCOSE      POCT Blood Glucose.: 100 mg/dL (01 Jul 2018 08:49)  POCT Blood Glucose.: 215 mg/dL (30 Jun 2018 22:02)  POCT Blood Glucose.: 141 mg/dL (30 Jun 2018 17:21)  POCT Blood Glucose.: 116 mg/dL (30 Jun 2018 12:57)    I&O's Summary    30 Jun 2018 07:01  -  01 Jul 2018 07:00  --------------------------------------------------------  IN: 1460 mL / OUT: 500 mL / NET: 960 mL        PHYSICAL EXAM:  HEAD:  Atraumatic, Normocephalic  NECK: Supple, No   JVD  CHEST/LUNG:   no     rales,     no,    rhonchi  HEART: Regular rate and rhythm;         murmur  ABDOMEN: Soft, Nontender, ;   EXTREMITIES:        edema  NEUROLOGY:  alert    LABS:                        8.0    12.77 )-----------( 318      ( 01 Jul 2018 08:21 )             25.5     07-01    135  |  101  |  <4<L>  ----------------------------<  81  3.3<L>   |  24  |  0.39<L>    Ca    7.5<L>      01 Jul 2018 07:02                    Hemoglobin A1C, Whole Blood: 6.6 % (05-09 @ 06:52)    Thyroid Stimulating Hormone, Serum: 1.00 uIU/mL (05-18 @ 06:35)          Consultant(s) Notes Reviewed:      Care Discussed with Consultants/Other Providers:

## 2018-07-01 NOTE — PROGRESS NOTE ADULT - SUBJECTIVE AND OBJECTIVE BOX
no events    abd soft inc intact. stoma w/ positive function        Objective:    MEDICATIONS  (STANDING):  atorvastatin 40 milliGRAM(s) Oral at bedtime  carvedilol 3.125 milliGRAM(s) Oral every 12 hours  dextrose 5%. 1000 milliLiter(s) (50 mL/Hr) IV Continuous <Continuous>  dextrose 50% Injectable 12.5 Gram(s) IV Push once  dextrose 50% Injectable 25 Gram(s) IV Push once  dextrose 50% Injectable 25 Gram(s) IV Push once  docusate sodium 100 milliGRAM(s) Oral two times a day  famotidine    Tablet 20 milliGRAM(s) Oral daily  heparin  Injectable 5000 Unit(s) SubCutaneous every 12 hours  insulin lispro (HumaLOG) corrective regimen sliding scale   SubCutaneous three times a day before meals  insulin lispro (HumaLOG) corrective regimen sliding scale   SubCutaneous at bedtime  levoFLOXacin  Tablet 500 milliGRAM(s) Oral every 24 hours  lisinopril 2.5 milliGRAM(s) Oral daily  mirtazapine 15 milliGRAM(s) Oral at bedtime  morphine ER Tablet 15 milliGRAM(s) Oral every 12 hours  polyethylene glycol 3350 17 Gram(s) Oral daily  potassium chloride    Tablet ER 20 milliEquivalent(s) Oral daily  senna 2 Tablet(s) Oral at bedtime  sodium chloride 0.9%. 1000 milliLiter(s) (75 mL/Hr) IV Continuous <Continuous>    MEDICATIONS  (PRN):  acetaminophen   Tablet 650 milliGRAM(s) Oral every 6 hours PRN Mild Pain (1 - 3)  acetaminophen   Tablet 650 milliGRAM(s) Oral every 6 hours PRN For Temp greater than 38 C (100.4 F)  bisacodyl Suppository 10 milliGRAM(s) Rectal daily PRN Constipation  dextrose 40% Gel 15 Gram(s) Oral once PRN Blood Glucose LESS THAN 70 milliGRAM(s)/deciliter  glucagon  Injectable 1 milliGRAM(s) IntraMuscular once PRN Glucose LESS THAN 70 milligrams/deciliter  ondansetron Injectable 4 milliGRAM(s) IV Push every 6 hours PRN Nausea and/or Vomiting      Vital Signs Last 24 Hrs  T(C): 36.5 (01 Jul 2018 04:45), Max: 36.9 (30 Jun 2018 11:27)  T(F): 97.7 (01 Jul 2018 04:45), Max: 98.5 (30 Jun 2018 11:27)  HR: 89 (01 Jul 2018 04:45) (60 - 89)  BP: 131/80 (01 Jul 2018 04:45) (125/72 - 135/78)  BP(mean): --  RR: 18 (01 Jul 2018 04:45) (18 - 18)  SpO2: 95% (01 Jul 2018 04:45) (95% - 97%)    I&O's Detail    30 Jun 2018 07:01  -  01 Jul 2018 07:00  --------------------------------------------------------  IN:    Oral Fluid: 560 mL    sodium chloride 0.9%.: 900 mL  Total IN: 1460 mL    OUT:    Colostomy: 500 mL  Total OUT: 500 mL    Total NET: 960 mL          Daily     Daily     LABS:                        8.0    11.1  )-----------( 281      ( 30 Jun 2018 19:29 )             24.2     07-01    135  |  101  |  <4<L>  ----------------------------<  81  3.3<L>   |  24  |  0.39<L>    Ca    7.5<L>      01 Jul 2018 07:02            RADIOLOGY & ADDITIONAL STUDIES:

## 2018-07-01 NOTE — PROGRESS NOTE ADULT - ASSESSMENT
78F with rectal cancer, dm,  htn,, amputation leg,     adm with fever,  afebrile  now, s/p ab    rectal fluid  collections/ pna  proximal diversion/ostomy,   dr ferreira   oncology, RT saw  pt  s/p  colectomy/ sigmoid   colostomy  ostomy,  , good  function  low  grade fever, resolved,     hyponatremia, stable  c/c  cachexia, not  eating well  duration of  ab, as per  ID, on Levaquin  now

## 2018-07-01 NOTE — PROGRESS NOTE ADULT - SUBJECTIVE AND OBJECTIVE BOX
- Patient seen and examined.  - In summary, patient is a 78 year old woman who presented with fever (2018 15:36)  - Today, patient is without complaints.         *****MEDICATIONS:    MEDICATIONS  (STANDING):  atorvastatin 40 milliGRAM(s) Oral at bedtime  carvedilol 3.125 milliGRAM(s) Oral every 12 hours  dextrose 5%. 1000 milliLiter(s) (50 mL/Hr) IV Continuous <Continuous>  dextrose 50% Injectable 12.5 Gram(s) IV Push once  dextrose 50% Injectable 25 Gram(s) IV Push once  dextrose 50% Injectable 25 Gram(s) IV Push once  docusate sodium 100 milliGRAM(s) Oral two times a day  famotidine    Tablet 20 milliGRAM(s) Oral daily  heparin  Injectable 5000 Unit(s) SubCutaneous every 12 hours  insulin lispro (HumaLOG) corrective regimen sliding scale   SubCutaneous three times a day before meals  insulin lispro (HumaLOG) corrective regimen sliding scale   SubCutaneous at bedtime  levoFLOXacin  Tablet 500 milliGRAM(s) Oral every 24 hours  lisinopril 2.5 milliGRAM(s) Oral daily  mirtazapine 15 milliGRAM(s) Oral at bedtime  morphine ER Tablet 15 milliGRAM(s) Oral every 12 hours  polyethylene glycol 3350 17 Gram(s) Oral daily  potassium chloride    Tablet ER 20 milliEquivalent(s) Oral daily  potassium chloride    Tablet ER 20 milliEquivalent(s) Oral once  senna 2 Tablet(s) Oral at bedtime  sodium chloride 0.9%. 1000 milliLiter(s) (75 mL/Hr) IV Continuous <Continuous>    MEDICATIONS  (PRN):  acetaminophen   Tablet 650 milliGRAM(s) Oral every 6 hours PRN Mild Pain (1 - 3)  acetaminophen   Tablet 650 milliGRAM(s) Oral every 6 hours PRN For Temp greater than 38 C (100.4 F)  bisacodyl Suppository 10 milliGRAM(s) Rectal daily PRN Constipation  dextrose 40% Gel 15 Gram(s) Oral once PRN Blood Glucose LESS THAN 70 milliGRAM(s)/deciliter  glucagon  Injectable 1 milliGRAM(s) IntraMuscular once PRN Glucose LESS THAN 70 milligrams/deciliter  ondansetron Injectable 4 milliGRAM(s) IV Push every 6 hours PRN Nausea and/or Vomiting             ***** REVIEW OF SYSTEM:  GEN: no fever, no chills, no pain  RESP: no SOB, no cough, no sputum  CVS: no chest pain, no palpitations, no edema  GI: no abdominal pain, no nausea, no vomiting, no constipation, no diarrhea  : no dysuria, no frequency  NEURO: no headache, no dizziness  PSYCH: no depression, not anxious  Derm : no itching, no rash         ***** VITAL SIGNS:             T(F): 98.2 (18 @ 11:43), Max: 98.3 (18 @ 20:24)  HR: 89 (18 @ 11:43) (76 - 89)  BP: 96/61 (18 @ 11:43) (96/61 - 131/80)  RR: 18 (18 @ 11:43) (18 - 18)  SpO2: 95% (18 @ 11:43) (95% - 97%)  Wt(kg): --  ,   I&O's Summary    2018 07:  -  2018 07:00  --------------------------------------------------------  IN: 1460 mL / OUT: 500 mL / NET: 960 mL             *****PHYSICAL EXAM:  GEN: A&O X 3 , NAD , comfortable  HEENT: NCAT, EOMI, MMM, no icterus  NECK: Supple, No JVD  CVS: S1S2 , regular , No M/R/G appreciated  PULM: CTA B/L,  no W/R/R appreciated  ABD.: soft. non tender, non distended,  bowel sounds present  Extrem: intact pulses , no edema noted  Derm: No rash or ecchymosis noted  PSYCH: normal mood, no depression, not anxious         *****LAB AND IMAGIN.0    12.77 )-----------( 318      ( 2018 08:21 )             25.5               07-01    135  |  101  |  <4<L>  ----------------------------<  81  3.3<L>   |  24  |  0.39<L>    Ca    7.5<L>      2018 07:02            [All pertinent recent Imaging/Reports reviewed]  < from: Transthoracic Echocardiogram (18 @ 07:08) >  Conclusions:  1. Normal left ventricular internal dimensions and wall  thicknesses.  2. Normal left ventricular systolic function. No segmental  wall motion abnormalities.  3. Normal diastolic function  4. Normal right ventricular sizeand systolic function.  5. Estimated pulmonary artery systolic pressure equals 36  mm Hg, assuming right atrial pressure equals 8 mm Hg,  consistent with borderline pulmonary pressures.  *** Compared with echocardiogram of 2017, no  significant changes noted.    < end of copied text >         *****A S S E S S M E N T   A N D   P L A N :  78F with rectal cancer adm with fever  abx per ID  cont current meds  s/p palliative ostomy  surg f/u noted  PO as tolerated  PT  calorie count noted  d/w GI, no plan for peg  needs PC eval    __________________________  A. CHAPARRO Metcalf

## 2018-07-01 NOTE — PROGRESS NOTE ADULT - ASSESSMENT
78F PMHx Stage III rectal cancer s/p incomplete RT course, PVD s/p R AKA, who presented to [facility] ED the afternoon of 6/8/18 from nursing home complaining of fever and chills. Patient recently admitted to [facility] (5/8-5/23) during which her rectal CA was diagnosed (EUS/colonoscopy performed suggested T3N1 mid-rectal tumor, adenocarcinoma. Started RT, until she was developed pelvic abscesses. Discharged on 2weeks IV antibiotics via PICC line, then transitioned to Augmentin with plan for continued surveillance. However she developed fevers & returned to hospital.   s/p Rx for PNA  Now POD#7 sigmoid end loop colostomy   fevers-resolved   Mild leucocytosis  Some abd pain  No other clinical localization  CNS bacteremia is likely contaminant-repeat Cx negative    She does still ahve fluid around the rectal lesions  However intraop-no abscess was seen  CT done 2 days ago no other new collections   she has recd about 6 weeks of antimicrobials in total and the collections are still there  ?Tumor with reactive phlegmon  Unlikely resistant pathogen  less likely organized abscess that needs drainage as nonesee intraop  She is stable  Addendum 6/29/18 1723hrs  Discussed with Surgery Dr Sosa  he believes the collections are secondary to tumor microperfs feeding the collection and patient is poor surgical candidadte  The diverting colostomy may help  Given this would try de-escalation to Po levaquin -if stable may be able to stop in a few days       At this point she is afebrile, unchanged clinical exam- would favor discontinuing the oral antibiotic and observing    Will Vasquez MD  [phone]  After 5pm/weekends [phone]

## 2018-07-02 DIAGNOSIS — R62.7 ADULT FAILURE TO THRIVE: ICD-10-CM

## 2018-07-02 DIAGNOSIS — R52 PAIN, UNSPECIFIED: ICD-10-CM

## 2018-07-02 DIAGNOSIS — Z51.5 ENCOUNTER FOR PALLIATIVE CARE: ICD-10-CM

## 2018-07-02 DIAGNOSIS — R53.81 OTHER MALAISE: ICD-10-CM

## 2018-07-02 DIAGNOSIS — K62.9 DISEASE OF ANUS AND RECTUM, UNSPECIFIED: ICD-10-CM

## 2018-07-02 LAB
ANION GAP SERPL CALC-SCNC: 11 MMOL/L — SIGNIFICANT CHANGE UP (ref 5–17)
BUN SERPL-MCNC: <4 MG/DL — LOW (ref 7–23)
CALCIUM SERPL-MCNC: 8.1 MG/DL — LOW (ref 8.4–10.5)
CHLORIDE SERPL-SCNC: 103 MMOL/L — SIGNIFICANT CHANGE UP (ref 96–108)
CO2 SERPL-SCNC: 23 MMOL/L — SIGNIFICANT CHANGE UP (ref 22–31)
CREAT SERPL-MCNC: 0.4 MG/DL — LOW (ref 0.5–1.3)
CULTURE RESULTS: SIGNIFICANT CHANGE UP
CULTURE RESULTS: SIGNIFICANT CHANGE UP
GLUCOSE BLDC GLUCOMTR-MCNC: 109 MG/DL — HIGH (ref 70–99)
GLUCOSE BLDC GLUCOMTR-MCNC: 120 MG/DL — HIGH (ref 70–99)
GLUCOSE BLDC GLUCOMTR-MCNC: 140 MG/DL — HIGH (ref 70–99)
GLUCOSE BLDC GLUCOMTR-MCNC: 165 MG/DL — HIGH (ref 70–99)
GLUCOSE SERPL-MCNC: 96 MG/DL — SIGNIFICANT CHANGE UP (ref 70–99)
HCT VFR BLD CALC: 28.6 % — LOW (ref 34.5–45)
HGB BLD-MCNC: 9.1 G/DL — LOW (ref 11.5–15.5)
MCHC RBC-ENTMCNC: 27.5 PG — SIGNIFICANT CHANGE UP (ref 27–34)
MCHC RBC-ENTMCNC: 31.8 GM/DL — LOW (ref 32–36)
MCV RBC AUTO: 86.4 FL — SIGNIFICANT CHANGE UP (ref 80–100)
PLATELET # BLD AUTO: 340 K/UL — SIGNIFICANT CHANGE UP (ref 150–400)
POTASSIUM SERPL-MCNC: 4.4 MMOL/L — SIGNIFICANT CHANGE UP (ref 3.5–5.3)
POTASSIUM SERPL-SCNC: 4.4 MMOL/L — SIGNIFICANT CHANGE UP (ref 3.5–5.3)
RBC # BLD: 3.31 M/UL — LOW (ref 3.8–5.2)
RBC # FLD: 19 % — HIGH (ref 10.3–14.5)
SODIUM SERPL-SCNC: 137 MMOL/L — SIGNIFICANT CHANGE UP (ref 135–145)
SPECIMEN SOURCE: SIGNIFICANT CHANGE UP
SPECIMEN SOURCE: SIGNIFICANT CHANGE UP
WBC # BLD: 19.23 K/UL — HIGH (ref 3.8–10.5)
WBC # FLD AUTO: 19.23 K/UL — HIGH (ref 3.8–10.5)

## 2018-07-02 PROCEDURE — 99223 1ST HOSP IP/OBS HIGH 75: CPT | Mod: GC

## 2018-07-02 PROCEDURE — 99232 SBSQ HOSP IP/OBS MODERATE 35: CPT

## 2018-07-02 PROCEDURE — 99233 SBSQ HOSP IP/OBS HIGH 50: CPT

## 2018-07-02 RX ORDER — METOCLOPRAMIDE HCL 10 MG
10 TABLET ORAL EVERY 6 HOURS
Qty: 0 | Refills: 0 | Status: DISCONTINUED | OUTPATIENT
Start: 2018-07-02 | End: 2018-07-03

## 2018-07-02 RX ORDER — MORPHINE SULFATE 50 MG/1
1 CAPSULE, EXTENDED RELEASE ORAL ONCE
Qty: 0 | Refills: 0 | Status: DISCONTINUED | OUTPATIENT
Start: 2018-07-02 | End: 2018-07-02

## 2018-07-02 RX ORDER — OXYCODONE HYDROCHLORIDE 5 MG/1
5 TABLET ORAL EVERY 4 HOURS
Qty: 0 | Refills: 0 | Status: DISCONTINUED | OUTPATIENT
Start: 2018-07-02 | End: 2018-07-03

## 2018-07-02 RX ORDER — ONDANSETRON 8 MG/1
4 TABLET, FILM COATED ORAL EVERY 6 HOURS
Qty: 0 | Refills: 0 | Status: COMPLETED | OUTPATIENT
Start: 2018-07-02 | End: 2018-07-03

## 2018-07-02 RX ADMIN — HEPARIN SODIUM 5000 UNIT(S): 5000 INJECTION INTRAVENOUS; SUBCUTANEOUS at 06:09

## 2018-07-02 RX ADMIN — Medication 2.5 MILLIGRAM(S): at 18:10

## 2018-07-02 RX ADMIN — HEPARIN SODIUM 5000 UNIT(S): 5000 INJECTION INTRAVENOUS; SUBCUTANEOUS at 18:06

## 2018-07-02 RX ADMIN — CARVEDILOL PHOSPHATE 3.12 MILLIGRAM(S): 80 CAPSULE, EXTENDED RELEASE ORAL at 18:06

## 2018-07-02 RX ADMIN — Medication 2.5 MILLIGRAM(S): at 06:09

## 2018-07-02 RX ADMIN — MORPHINE SULFATE 1 MILLIGRAM(S): 50 CAPSULE, EXTENDED RELEASE ORAL at 16:45

## 2018-07-02 RX ADMIN — Medication 20 MILLIEQUIVALENT(S): at 13:31

## 2018-07-02 RX ADMIN — MORPHINE SULFATE 15 MILLIGRAM(S): 50 CAPSULE, EXTENDED RELEASE ORAL at 21:58

## 2018-07-02 RX ADMIN — MORPHINE SULFATE 1 MILLIGRAM(S): 50 CAPSULE, EXTENDED RELEASE ORAL at 16:24

## 2018-07-02 RX ADMIN — ATORVASTATIN CALCIUM 40 MILLIGRAM(S): 80 TABLET, FILM COATED ORAL at 21:58

## 2018-07-02 RX ADMIN — MIRTAZAPINE 15 MILLIGRAM(S): 45 TABLET, ORALLY DISINTEGRATING ORAL at 21:58

## 2018-07-02 RX ADMIN — MORPHINE SULFATE 15 MILLIGRAM(S): 50 CAPSULE, EXTENDED RELEASE ORAL at 21:28

## 2018-07-02 RX ADMIN — Medication 100 MILLIGRAM(S): at 18:06

## 2018-07-02 RX ADMIN — MORPHINE SULFATE 15 MILLIGRAM(S): 50 CAPSULE, EXTENDED RELEASE ORAL at 09:44

## 2018-07-02 RX ADMIN — ONDANSETRON 4 MILLIGRAM(S): 8 TABLET, FILM COATED ORAL at 18:06

## 2018-07-02 RX ADMIN — CARVEDILOL PHOSPHATE 3.12 MILLIGRAM(S): 80 CAPSULE, EXTENDED RELEASE ORAL at 06:08

## 2018-07-02 RX ADMIN — FAMOTIDINE 20 MILLIGRAM(S): 10 INJECTION INTRAVENOUS at 13:31

## 2018-07-02 RX ADMIN — SODIUM CHLORIDE 75 MILLILITER(S): 9 INJECTION INTRAMUSCULAR; INTRAVENOUS; SUBCUTANEOUS at 12:00

## 2018-07-02 RX ADMIN — MORPHINE SULFATE 15 MILLIGRAM(S): 50 CAPSULE, EXTENDED RELEASE ORAL at 10:20

## 2018-07-02 RX ADMIN — ONDANSETRON 4 MILLIGRAM(S): 8 TABLET, FILM COATED ORAL at 16:24

## 2018-07-02 RX ADMIN — SENNA PLUS 2 TABLET(S): 8.6 TABLET ORAL at 21:58

## 2018-07-02 RX ADMIN — LISINOPRIL 2.5 MILLIGRAM(S): 2.5 TABLET ORAL at 06:08

## 2018-07-02 RX ADMIN — Medication 650 MILLIGRAM(S): at 06:09

## 2018-07-02 RX ADMIN — Medication 100 MILLIGRAM(S): at 06:09

## 2018-07-02 NOTE — CONSULT NOTE ADULT - SUBJECTIVE AND OBJECTIVE BOX
HPI:  78 YOF p/w chills and fever. Pt has hx of colon cancer with rectal abscess being treated outpatient with picc line and zosyn. Pt endorses mild abdominal pain lower quadrant and rectal pain. Pt denies back pain, denies cough. (08 Jun 2018 15:36)    INTERIM:  Pt had surgical intervention to divert the colon and now is finishing abx for abscess.  Pt pain improved.  Eating some without assistance.  Pt remains lethargic and defers to son and daughter.  Pt well known to our service from [facility].  Pt appears comfortable at this time.        PERTINENT PMH REVIEWED:  [x ] YES [ ] NO           SOCIAL HISTORY:   Significant other/partner:  [ ] YES  [x ] NO               Children:  [ x] YES  [ ] NO                   Jain/Spirituality:  Substance hx:  [ ] YES   [ ] NO                   Tobacco hx:  [ ] YES  [ ] NO                       Alcohol hx: [ ] YES  [ ] NO         Home Opioid hx:  [ ] YES  [ ] NO   Living Situation: [ ] Home  [ x] Long term care  [ ] Rehab [ ] Other    FAMILY HISTORY:  FAMILY HISTORY:  No pertinent family history in first degree relatives    [ x] Family history non-contributory     BASELINE (I)ADLs (prior to admission):  [place]: [ ] total  [ ] moderate [x ] dependent    ADVANCE DIRECTIVES:    DNR [ ] YES [ ] NO                            [ ] Completed  MOLST  [ ] YES [ ] NO                      [ ] Completed  Health Care Proxy [ ] YES  [ ] NO   [ ] Completed  Living Will  [ ] YES [ ] NO             [ ] Surrogate  [ ] HCP  [ ] Guardian:          daughter and son                                                         Phone#: [phone]    ALLERGIES:   Allergies    No Known Allergies    Intolerances        MEDICATIONS:   MEDICATIONS  (STANDING):  atorvastatin 40 milliGRAM(s) Oral at bedtime  carvedilol 3.125 milliGRAM(s) Oral every 12 hours  dextrose 5%. 1000 milliLiter(s) (50 mL/Hr) IV Continuous <Continuous>  dextrose 50% Injectable 12.5 Gram(s) IV Push once  dextrose 50% Injectable 25 Gram(s) IV Push once  dextrose 50% Injectable 25 Gram(s) IV Push once  docusate sodium 100 milliGRAM(s) Oral two times a day  dronabinol 2.5 milliGRAM(s) Oral two times a day  famotidine    Tablet 20 milliGRAM(s) Oral daily  heparin  Injectable 5000 Unit(s) SubCutaneous every 12 hours  insulin lispro (HumaLOG) corrective regimen sliding scale   SubCutaneous three times a day before meals  insulin lispro (HumaLOG) corrective regimen sliding scale   SubCutaneous at bedtime  levoFLOXacin  Tablet 500 milliGRAM(s) Oral every 24 hours  lisinopril 2.5 milliGRAM(s) Oral daily  mirtazapine 15 milliGRAM(s) Oral at bedtime  morphine ER Tablet 15 milliGRAM(s) Oral every 12 hours  polyethylene glycol 3350 17 Gram(s) Oral daily  potassium chloride    Tablet ER 20 milliEquivalent(s) Oral daily  senna 2 Tablet(s) Oral at bedtime  sodium chloride 0.9%. 1000 milliLiter(s) (75 mL/Hr) IV Continuous <Continuous>    MEDICATIONS  (PRN):  acetaminophen   Tablet 650 milliGRAM(s) Oral every 6 hours PRN Mild Pain (1 - 3)  acetaminophen   Tablet 650 milliGRAM(s) Oral every 6 hours PRN For Temp greater than 38 C (100.4 F)  bisacodyl Suppository 10 milliGRAM(s) Rectal daily PRN Constipation  dextrose 40% Gel 15 Gram(s) Oral once PRN Blood Glucose LESS THAN 70 milliGRAM(s)/deciliter  glucagon  Injectable 1 milliGRAM(s) IntraMuscular once PRN Glucose LESS THAN 70 milligrams/deciliter  ondansetron Injectable 4 milliGRAM(s) IV Push every 6 hours PRN Nausea and/or Vomiting      PRESENT SYMPTOMS:  Source: [x ] Patient   [ ] Family   [ ] Team     Pain:                        [ x] No [ ] Yes             [ ] Mild [ ] Moderate [ ] Severe    Onset -  Location -  Duration -  Character -  Alleviating/Aggravating -  Radiation -  Timing -      Dyspnea:                [x ] No [ ] Yes             [ ] Mild [ ] Moderate [ ] Severe    Anxiety:                  [ x] No [ ] Yes             [ ] Mild [ ] Moderate [ ] Severe    Fatigue:                  [ ] No x ] Yes             [ ] Mild [ ] Moderate [ ] Severe    Nausea:                  [x ] No [ ] Yes             [ ] Mild [ ] Moderate [ ] Severe    Loss of appetite:   [ ] No [ ] Yes             [ ] Mild [ ] Moderate [ ] Severe    Constipation:        [ ] No [x ] Yes             [ ] Mild [ x] Moderate [ ] Severe    Other Symptoms:  [ ] All other review of systems negative   [x ] Unable to obtain due to poor mentation     Karnofsky Performance Score/Palliative Performance Status Version 2:   30      %    PHYSICAL EXAM:  Vital Signs Last 24 Hrs  T(C): 36.8 (02 Jul 2018 09:02), Max: 36.8 (02 Jul 2018 09:02)  T(F): 98.2 (02 Jul 2018 09:02), Max: 98.2 (02 Jul 2018 09:02)  HR: 88 (02 Jul 2018 12:35) (88 - 102)  BP: 113/73 (02 Jul 2018 12:35) (113/73 - 144/78)  BP(mean): --  RR: 18 (02 Jul 2018 12:35) (18 - 18)  SpO2: 95% (02 Jul 2018 12:35) (95% - 97%) I&O's Summary    01 Jul 2018 07:01  -  02 Jul 2018 07:00  --------------------------------------------------------  IN: 1140 mL / OUT: 100 mL / NET: 1040 mL    02 Jul 2018 07:01  -  02 Jul 2018 16:40  --------------------------------------------------------  IN: 20 mL / OUT: 0 mL / NET: 20 mL        General:  [x ] Alert  [ ] Oriented x      [x ] Lethargic  [ ] Agitated   [ ] Cachexia   [ ] Unarousable  [ ] Verbal  [ ] Non-Verbal    HEENT:  [x ] Normal   [ ] Dry mouth   [ ] ET Tube    [ ] Trach  [ ] Oral lesions    Lungs:   [x ] Clear [ ] Tachypnea  [ ] Audible excessive secretions   [ ] Rhonchi        [ ] Right [ ] Left [ ] Bilateral  [ ] Crackles        [ ] Right [ ] Left [ ] Bilateral  [ ] Wheezing     [ ] Right [ ] Left [ ] Bilateral    Cardiovascular:  [x ] Regular [ ] Irregular [ ] Tachycardia   [ ] Bradycardia  [ ] Murmur [ ] Other    Abdomen: [x ] Soft  [ ] Distended   [ ] +BS  [ ] Non tender [ ] Tender  [ ]PEG   [ ]OGT/ NGT   Last BM:   +ostomy    Genitourinary: [ ] Normal [ x] Incontinent   [ ] Oliguria/Anuria   [ ] Saba    Musculoskeletal:  [ ] Normal   [ ] Weakness  [x ] Bedbound/Wheelchair bound [ ] Edema    Neurological: [ ] No focal deficits  [x ] Cognitive impairment  [ ] Dysphagia [ ] Dysarthria [ ] Paresis [ ] Other     Skin: [ x] Normal   [ ] Pressure ulcer(s)                  [ ] Rash    LABS:  [ ] Critical Care time for unstable patient with organ failure.  Total Time:                 minutes  07-02    137  |  103  |  <4<L>  ----------------------------<  96  4.4   |  23  |  0.40<L>    Ca    8.1<L>      02 Jul 2018 06:55            Shock: [ ] Septic [ ] Cardiogenic [ ] Neurologic [ ] Hypovolemic  Vasopressors x   Inotrophs x     Protein Calorie Malnutrition: [ ] Mild [ ] Moderate [ ] Severe  Oral Intake: [ ] Unable/mouth care only [ ] Minimal [ ] Moderate [ ] Full Capability  Diet: [ ] NPO [ ] Tube feeds [ ] TPN [ ] Other     RADIOLOGY & ADDITIONAL STUDIES:    REFERRALS:   [ ] Chaplaincy  [ ] Hospice  [ ] Child Life  [ ] Social Work  [ ] Case management [ ] Holistic Therapy

## 2018-07-02 NOTE — CONSULT NOTE ADULT - PROBLEM SELECTOR RECOMMENDATION 9
- treatment for HCAP per ID  -  clinically nontoxic and no clear localizing symptoms  - f/u cultures
s/p surgery to divert colon  no further dmt  chemo and RT not necessary at this time  family aware
continue pain medicines at current dosage  After her surgery, consider IV morphine  4mg q4 h prn for 1-2 days postoperatively.

## 2018-07-02 NOTE — PROGRESS NOTE ADULT - ASSESSMENT
78F with perforated rectal cancer s/p sigmoid end loop colostomy on 6/20    - Diet as tolerated  - Monitor ostomy output  - Abx per ID  - Discussed with fellow    KP Lechuga PGY-2  Colorectal Surgery [phone]

## 2018-07-02 NOTE — CONSULT NOTE ADULT - CONSULT REQUESTED DATE/TIME
08-Jun-2018
08-Jun-2018 16:18
12-Jun-2018 20:37
19-Jun-2018 17:50
21-Jun-2018 17:03
11-Jun-2018 17:24
02-Jul-2018 16:40

## 2018-07-02 NOTE — CONSULT NOTE ADULT - CONSULT REQUESTED BY NAME
Dr teresa Ramírez
Dr. Metcalf (Medicine)
colorectal surgey
dr mazin rodriguez/ kuldeep
Dr. Metcalf
primary team

## 2018-07-02 NOTE — PROGRESS NOTE ADULT - SUBJECTIVE AND OBJECTIVE BOX
- Patient seen and examined.  - In summary, patient is a 78 year old woman who presented with fever (2018 15:36)  - Today, patient is without complaints.         *****MEDICATIONS:    MEDICATIONS  (STANDING):  atorvastatin 40 milliGRAM(s) Oral at bedtime  carvedilol 3.125 milliGRAM(s) Oral every 12 hours  dextrose 5%. 1000 milliLiter(s) (50 mL/Hr) IV Continuous <Continuous>  dextrose 50% Injectable 12.5 Gram(s) IV Push once  dextrose 50% Injectable 25 Gram(s) IV Push once  dextrose 50% Injectable 25 Gram(s) IV Push once  docusate sodium 100 milliGRAM(s) Oral two times a day  dronabinol 2.5 milliGRAM(s) Oral two times a day  famotidine    Tablet 20 milliGRAM(s) Oral daily  heparin  Injectable 5000 Unit(s) SubCutaneous every 12 hours  insulin lispro (HumaLOG) corrective regimen sliding scale   SubCutaneous three times a day before meals  insulin lispro (HumaLOG) corrective regimen sliding scale   SubCutaneous at bedtime  levoFLOXacin  Tablet 500 milliGRAM(s) Oral every 24 hours  lisinopril 2.5 milliGRAM(s) Oral daily  mirtazapine 15 milliGRAM(s) Oral at bedtime  morphine ER Tablet 15 milliGRAM(s) Oral every 12 hours  polyethylene glycol 3350 17 Gram(s) Oral daily  potassium chloride    Tablet ER 20 milliEquivalent(s) Oral daily  senna 2 Tablet(s) Oral at bedtime  sodium chloride 0.9%. 1000 milliLiter(s) (75 mL/Hr) IV Continuous <Continuous>    MEDICATIONS  (PRN):  acetaminophen   Tablet 650 milliGRAM(s) Oral every 6 hours PRN Mild Pain (1 - 3)  acetaminophen   Tablet 650 milliGRAM(s) Oral every 6 hours PRN For Temp greater than 38 C (100.4 F)  bisacodyl Suppository 10 milliGRAM(s) Rectal daily PRN Constipation  dextrose 40% Gel 15 Gram(s) Oral once PRN Blood Glucose LESS THAN 70 milliGRAM(s)/deciliter  glucagon  Injectable 1 milliGRAM(s) IntraMuscular once PRN Glucose LESS THAN 70 milligrams/deciliter  ondansetron Injectable 4 milliGRAM(s) IV Push every 6 hours PRN Nausea and/or Vomiting               ***** REVIEW OF SYSTEM:  GEN: no fever, no chills, no pain  RESP: no SOB, no cough, no sputum  CVS: no chest pain, no palpitations, no edema  GI: no abdominal pain, no nausea, no vomiting, no constipation, no diarrhea  : no dysuria, no frequency  NEURO: no headache, no dizziness  PSYCH: no depression, not anxious  Derm : no itching, no rash         ***** VITAL SIGNS:             T(F): 97.3 (18 @ 05:23), Max: 98.2 (18 @ 11:43)  HR: 102 (18 @ 05:23) (89 - 102)  BP: 144/78 (18 @ 05:23) (96/61 - 144/78)  RR: 18 (18 @ 05:23) (18 - 18)  SpO2: 97% (18 @ 05:23) (95% - 97%)  Wt(kg): --  ,   I&O's Summary    2018 07:01  -  2018 07:00  --------------------------------------------------------  IN: 1140 mL / OUT: 100 mL / NET: 1040 mL    2018 07:01  -  2018 09:28  --------------------------------------------------------  IN: 20 mL / OUT: 0 mL / NET: 20 mL                 *****PHYSICAL EXAM:  GEN: A&O X 3 , NAD , comfortable  HEENT: NCAT, EOMI, MMM, no icterus  NECK: Supple, No JVD  CVS: S1S2 , regular , No M/R/G appreciated  PULM: CTA B/L,  no W/R/R appreciated  ABD.: soft. non tender, non distended,  bowel sounds present  Extrem: intact pulses , no edema noted  Derm: No rash or ecchymosis noted  PSYCH: normal mood, no depression, not anxious         *****LAB AND IMAGIN.1    19.23 )-----------( 340      ( 2018 07:58 )             28.6               07-02    137  |  103  |  <4<L>  ----------------------------<  96  4.4   |  23  |  0.40<L>    Ca    8.1<L>      2018 06:55              [All pertinent recent Imaging/Reports reviewed]  < from: Transthoracic Echocardiogram (18 @ 07:08) >  Conclusions:  1. Normal left ventricular internal dimensions and wall  thicknesses.  2. Normal left ventricular systolic function. No segmental  wall motion abnormalities.  3. Normal diastolic function  4. Normal right ventricular sizeand systolic function.  5. Estimated pulmonary artery systolic pressure equals 36  mm Hg, assuming right atrial pressure equals 8 mm Hg,  consistent with borderline pulmonary pressures.  *** Compared with echocardiogram of 2017, no  significant changes noted.    < end of copied text >         *****A S S E S S M E N T   A N D   P L A N :  78F with rectal cancer adm with fever  abx per ID  cont current meds  s/p palliative ostomy  surg f/u noted  PO as tolerated  PT  calorie count noted  d/w GI, no plan for peg  awaiting PC eval    __________________________  YON Metcalf D.O.

## 2018-07-02 NOTE — CHART NOTE - NSCHARTNOTEFT_GEN_A_CORE
Malnutrition followup. calorie count completed. Patient continued on Po diet with Ensure enlive supplement 1x/day. patientre  Source: Patient [ ]    Family [ ]     other [ ]  Chart reviewed events noted. 78F with rectal cancer, dm,  htn,, amputation leg,   admitted with fever,  afebrile  now, s/p abdominal rectal fluid  collections, proximal diversion/ostomy,   followed by oncology, RT saw  pt  s/p  colectomy/ sigmoid, good  function, low  grade fever, resolved, hyponatremia, stable, c/c  cachexia, not  eating well, duration of  abx, as per  ID, on Levaquin    leukocytosis, but  afebrile    Diet : low sodium soft      Patient reports [ ] nausea  [ ] vomiting [ ] diarrhea [ ] constipation  [ ]chewing problems [ ] swallowing issues  [ ] other:      PO intake:  < 50% [X ] 50-75% [ ]   % [ ]  other :     Source for PO intake [ ] Patient [ ] family [X ] chart [ X] staff [ ] other     Enteral /Parenteral Nutrition: NA    Current Weight:   % Weight Change    Pertinent Medications: MEDICATIONS  (STANDING):  atorvastatin 40 milliGRAM(s) Oral at bedtime  carvedilol 3.125 milliGRAM(s) Oral every 12 hours  dextrose 5%. 1000 milliLiter(s) (50 mL/Hr) IV Continuous <Continuous>  dextrose 50% Injectable 12.5 Gram(s) IV Push once  dextrose 50% Injectable 25 Gram(s) IV Push once  dextrose 50% Injectable 25 Gram(s) IV Push once  docusate sodium 100 milliGRAM(s) Oral two times a day  dronabinol 2.5 milliGRAM(s) Oral two times a day  famotidine    Tablet 20 milliGRAM(s) Oral daily  heparin  Injectable 5000 Unit(s) SubCutaneous every 12 hours  insulin lispro (HumaLOG) corrective regimen sliding scale   SubCutaneous three times a day before meals  insulin lispro (HumaLOG) corrective regimen sliding scale   SubCutaneous at bedtime  levoFLOXacin  Tablet 500 milliGRAM(s) Oral every 24 hours  lisinopril 2.5 milliGRAM(s) Oral daily  mirtazapine 15 milliGRAM(s) Oral at bedtime  morphine ER Tablet 15 milliGRAM(s) Oral every 12 hours  polyethylene glycol 3350 17 Gram(s) Oral daily  potassium chloride    Tablet ER 20 milliEquivalent(s) Oral daily  senna 2 Tablet(s) Oral at bedtime  sodium chloride 0.9%. 1000 milliLiter(s) (75 mL/Hr) IV Continuous <Continuous>    MEDICATIONS  (PRN):  acetaminophen   Tablet 650 milliGRAM(s) Oral every 6 hours PRN Mild Pain (1 - 3)  acetaminophen   Tablet 650 milliGRAM(s) Oral every 6 hours PRN For Temp greater than 38 C (100.4 F)  bisacodyl Suppository 10 milliGRAM(s) Rectal daily PRN Constipation  dextrose 40% Gel 15 Gram(s) Oral once PRN Blood Glucose LESS THAN 70 milliGRAM(s)/deciliter  glucagon  Injectable 1 milliGRAM(s) IntraMuscular once PRN Glucose LESS THAN 70 milligrams/deciliter  ondansetron Injectable 4 milliGRAM(s) IV Push every 6 hours PRN Nausea and/or Vomiting    Pertinent Labs:  07-02 Na137 mmol/L Glu 96 mg/dL K+ 4.4 mmol/L Cr  0.40 mg/dL<L> BUN <4 mg/dL<L>      Skin:     Estimated Needs:   [X ] no change since previous assessment  [ ] recalculated:       Previous Nutrition Diagnosis:    [X ] Malnutrition          Nutrition Diagnosis is [X ] ongoing           New Nutrition Diagnosis: [X ] not applicable        Recommend    [ ] Change Diet To:    [ ] Nutrition Supplement    [ ] Nutrition Support    [ ] Other:        Monitoring and Evaluation:     [ X] PO intake [X ] Tolerance to diet prescription [X ] weights [X ] follow up per protocol    [ ] other: Malnutrition followup. calorie count completed. Patient continued on Po diet with Ensure enlive supplement 1x/day. patientre  Source: Patient [ ]    Family [ ]     other [ ]  Chart reviewed events noted. S/P Colorectal Surgery on 6/14 for Perforated rectal cancer; s/p laparoscopic colostomy  to divert  fecal stream, for  improved  pelvic pain/discomfort, and treat any impending obstruction and allow for some healing of pelvis  as noted, h/o R AKA  Followed by oncology, RT saw  pt  s/p  colectomy/ sigmoid, good  function, low  grade fever, resolved, hyponatremia, stable, c/c  cachexia, not  eating well, duration of  abx, as per  ID, on Levaquin    leukocytosis, but  afebrile    Diet : low sodium soft      Patient reports [ ] nausea  [ ] vomiting [ ] diarrhea [ ] constipation  [ ]chewing problems [ ] swallowing issues  [ ] other:      PO intake:  < 50% [X ] 50-75% [ ]   % [ ]  other :     Source for PO intake [ ] Patient [ ] family [X ] chart [ X] staff [ ] other     Enteral /Parenteral Nutrition: NA    Current Weight:   % Weight Change    Pertinent Medications: MEDICATIONS  (STANDING):  atorvastatin 40 milliGRAM(s) Oral at bedtime  carvedilol 3.125 milliGRAM(s) Oral every 12 hours  dextrose 5%. 1000 milliLiter(s) (50 mL/Hr) IV Continuous <Continuous>  dextrose 50% Injectable 12.5 Gram(s) IV Push once  dextrose 50% Injectable 25 Gram(s) IV Push once  dextrose 50% Injectable 25 Gram(s) IV Push once  docusate sodium 100 milliGRAM(s) Oral two times a day  dronabinol 2.5 milliGRAM(s) Oral two times a day  famotidine    Tablet 20 milliGRAM(s) Oral daily  heparin  Injectable 5000 Unit(s) SubCutaneous every 12 hours  insulin lispro (HumaLOG) corrective regimen sliding scale   SubCutaneous three times a day before meals  insulin lispro (HumaLOG) corrective regimen sliding scale   SubCutaneous at bedtime  levoFLOXacin  Tablet 500 milliGRAM(s) Oral every 24 hours  lisinopril 2.5 milliGRAM(s) Oral daily  mirtazapine 15 milliGRAM(s) Oral at bedtime  morphine ER Tablet 15 milliGRAM(s) Oral every 12 hours  polyethylene glycol 3350 17 Gram(s) Oral daily  potassium chloride    Tablet ER 20 milliEquivalent(s) Oral daily  senna 2 Tablet(s) Oral at bedtime  sodium chloride 0.9%. 1000 milliLiter(s) (75 mL/Hr) IV Continuous <Continuous>    MEDICATIONS  (PRN):  acetaminophen   Tablet 650 milliGRAM(s) Oral every 6 hours PRN Mild Pain (1 - 3)  acetaminophen   Tablet 650 milliGRAM(s) Oral every 6 hours PRN For Temp greater than 38 C (100.4 F)  bisacodyl Suppository 10 milliGRAM(s) Rectal daily PRN Constipation  dextrose 40% Gel 15 Gram(s) Oral once PRN Blood Glucose LESS THAN 70 milliGRAM(s)/deciliter  glucagon  Injectable 1 milliGRAM(s) IntraMuscular once PRN Glucose LESS THAN 70 milligrams/deciliter  ondansetron Injectable 4 milliGRAM(s) IV Push every 6 hours PRN Nausea and/or Vomiting    Pertinent Labs:  07-02 Na137 mmol/L Glu 96 mg/dL K+ 4.4 mmol/L Cr  0.40 mg/dL<L> BUN <4 mg/dL<L>      Skin:     Estimated Needs:   [X ] no change since previous assessment  [ ] recalculated:       Previous Nutrition Diagnosis:    [X ] Malnutrition          Nutrition Diagnosis is [X ] ongoing           New Nutrition Diagnosis: [X ] not applicable        Recommend    [ ] Change Diet To:    [ ] Nutrition Supplement    [ ] Nutrition Support    [ ] Other:        Monitoring and Evaluation:     [ X] PO intake [X ] Tolerance to diet prescription [X ] weights [X ] follow up per protocol    [ ] other: Malnutrition followup. calorie count discontinued. Patient continued with very poor PO intake. Few sips of ensure accepted at breakfast; refused lunch today. patient rubbing stomach during visit; per PCA patient gags when eating, discussed with RN, zofran to be given.    Chart reviewed events noted. S/P Colorectal Surgery on 6/14 for Perforated rectal cancer; s/p laparoscopic colostomy, minimal output 100ml, lower abdominal pain per colorectal MD, pain/discomfort.          Diet : low sodium soft           PO intake:  < 50% [X ] 50-75% [ ]   % [ ]  other :     Source for PO intake [ ] Patient [ ] family [X ] chart [ X] staff [ ] other     Enteral /Parenteral Nutrition: NA    Current Weight: 69.6  lbs  previous  Weight 6/27  88 lbs   discrepancy noted, discussed with PCA for reweigh    Pertinent Medications: MEDICATIONS  (STANDING):  atorvastatin 40 milliGRAM(s) Oral at bedtime  carvedilol 3.125 milliGRAM(s) Oral every 12 hours  dextrose 5%. 1000 milliLiter(s) (50 mL/Hr) IV Continuous <Continuous>  dextrose 50% Injectable 12.5 Gram(s) IV Push once  dextrose 50% Injectable 25 Gram(s) IV Push once  dextrose 50% Injectable 25 Gram(s) IV Push once  docusate sodium 100 milliGRAM(s) Oral two times a day  dronabinol 2.5 milliGRAM(s) Oral two times a day  famotidine    Tablet 20 milliGRAM(s) Oral daily  heparin  Injectable 5000 Unit(s) SubCutaneous every 12 hours  insulin lispro (HumaLOG) corrective regimen sliding scale   SubCutaneous three times a day before meals  insulin lispro (HumaLOG) corrective regimen sliding scale   SubCutaneous at bedtime  levoFLOXacin  Tablet 500 milliGRAM(s) Oral every 24 hours  lisinopril 2.5 milliGRAM(s) Oral daily  mirtazapine 15 milliGRAM(s) Oral at bedtime  morphine ER Tablet 15 milliGRAM(s) Oral every 12 hours  polyethylene glycol 3350 17 Gram(s) Oral daily  potassium chloride    Tablet ER 20 milliEquivalent(s) Oral daily  senna 2 Tablet(s) Oral at bedtime  sodium chloride 0.9%. 1000 milliLiter(s) (75 mL/Hr) IV Continuous <Continuous>    MEDICATIONS  (PRN):  acetaminophen   Tablet 650 milliGRAM(s) Oral every 6 hours PRN Mild Pain (1 - 3)  acetaminophen   Tablet 650 milliGRAM(s) Oral every 6 hours PRN For Temp greater than 38 C (100.4 F)  bisacodyl Suppository 10 milliGRAM(s) Rectal daily PRN Constipation  dextrose 40% Gel 15 Gram(s) Oral once PRN Blood Glucose LESS THAN 70 milliGRAM(s)/deciliter  glucagon  Injectable 1 milliGRAM(s) IntraMuscular once PRN Glucose LESS THAN 70 milligrams/deciliter  ondansetron Injectable 4 milliGRAM(s) IV Push every 6 hours PRN Nausea and/or Vomiting    Pertinent Labs:  07-02 Na137 mmol/L Glu 96 mg/dL K+ 4.4 mmol/L Cr  0.40 mg/dL<L> BUN <4 mg/dL<L>      Skin: no pressure breakdown noted    Estimated Needs:   [X ] no change since previous assessment  [ ] recalculated:       Previous Nutrition Diagnosis:    [X ] Malnutrition          Nutrition Diagnosis is [X ] ongoing           New Nutrition Diagnosis: [X ] not applicable        Recommend        [X ] Nutrition Supplement  continue ensure daily, prosource 1x/day        X ] Other: Monitor/encourage PO intake.        Monitoring and Evaluation:     [ X] PO intake [X ] Tolerance to diet prescription [X ] weights [X ] follow up per protocol    [ ] other:

## 2018-07-02 NOTE — PROGRESS NOTE ADULT - SUBJECTIVE AND OBJECTIVE BOX
no  complaints    REVIEW OF SYSTEMS:  GEN: no fever,    no chills  RESP: no SOB,   no cough  CVS: no chest pain,   no palpitations  GI: no abdominal pain,   no nausea,   no vomiting,   no constipation,   no diarrhea  : no dysuria,   no frequency  NEURO: no headache,   no dizziness  PSYCH: no depression,   not anxious  Derm : no rash    MEDICATIONS  (STANDING):  atorvastatin 40 milliGRAM(s) Oral at bedtime  carvedilol 3.125 milliGRAM(s) Oral every 12 hours  dextrose 5%. 1000 milliLiter(s) (50 mL/Hr) IV Continuous <Continuous>  dextrose 50% Injectable 12.5 Gram(s) IV Push once  dextrose 50% Injectable 25 Gram(s) IV Push once  dextrose 50% Injectable 25 Gram(s) IV Push once  docusate sodium 100 milliGRAM(s) Oral two times a day  dronabinol 2.5 milliGRAM(s) Oral two times a day  famotidine    Tablet 20 milliGRAM(s) Oral daily  heparin  Injectable 5000 Unit(s) SubCutaneous every 12 hours  insulin lispro (HumaLOG) corrective regimen sliding scale   SubCutaneous three times a day before meals  insulin lispro (HumaLOG) corrective regimen sliding scale   SubCutaneous at bedtime  levoFLOXacin  Tablet 500 milliGRAM(s) Oral every 24 hours  lisinopril 2.5 milliGRAM(s) Oral daily  mirtazapine 15 milliGRAM(s) Oral at bedtime  morphine ER Tablet 15 milliGRAM(s) Oral every 12 hours  polyethylene glycol 3350 17 Gram(s) Oral daily  potassium chloride    Tablet ER 20 milliEquivalent(s) Oral daily  senna 2 Tablet(s) Oral at bedtime  sodium chloride 0.9%. 1000 milliLiter(s) (75 mL/Hr) IV Continuous <Continuous>    MEDICATIONS  (PRN):  acetaminophen   Tablet 650 milliGRAM(s) Oral every 6 hours PRN Mild Pain (1 - 3)  acetaminophen   Tablet 650 milliGRAM(s) Oral every 6 hours PRN For Temp greater than 38 C (100.4 F)  bisacodyl Suppository 10 milliGRAM(s) Rectal daily PRN Constipation  dextrose 40% Gel 15 Gram(s) Oral once PRN Blood Glucose LESS THAN 70 milliGRAM(s)/deciliter  glucagon  Injectable 1 milliGRAM(s) IntraMuscular once PRN Glucose LESS THAN 70 milligrams/deciliter  ondansetron Injectable 4 milliGRAM(s) IV Push every 6 hours PRN Nausea and/or Vomiting      Vital Signs Last 24 Hrs  T(C): 36.3 (02 Jul 2018 05:23), Max: 36.8 (01 Jul 2018 11:43)  T(F): 97.3 (02 Jul 2018 05:23), Max: 98.2 (01 Jul 2018 11:43)  HR: 102 (02 Jul 2018 05:23) (89 - 102)  BP: 144/78 (02 Jul 2018 05:23) (96/61 - 144/78)  BP(mean): --  RR: 18 (02 Jul 2018 05:23) (18 - 18)  SpO2: 97% (02 Jul 2018 05:23) (95% - 97%)  CAPILLARY BLOOD GLUCOSE      POCT Blood Glucose.: 120 mg/dL (02 Jul 2018 08:48)  POCT Blood Glucose.: 190 mg/dL (01 Jul 2018 21:03)  POCT Blood Glucose.: 158 mg/dL (01 Jul 2018 17:40)  POCT Blood Glucose.: 110 mg/dL (01 Jul 2018 12:29)    I&O's Summary    01 Jul 2018 07:01  -  02 Jul 2018 07:00  --------------------------------------------------------  IN: 1140 mL / OUT: 100 mL / NET: 1040 mL    02 Jul 2018 07:01  -  02 Jul 2018 10:21  --------------------------------------------------------  IN: 20 mL / OUT: 0 mL / NET: 20 mL        PHYSICAL EXAM:  HEAD:  Atraumatic, Normocephalic  NECK: Supple, No   JVD  CHEST/LUNG:   no     rales,     no,    rhonchi  HEART: Regular rate and rhythm;         murmur  ABDOMEN: Soft, Nontender, ;   EXTREMITIES:        edema  NEUROLOGY:  alert    LABS:                        9.1    19.23 )-----------( 340      ( 02 Jul 2018 07:58 )             28.6     07-02    137  |  103  |  <4<L>  ----------------------------<  96  4.4   |  23  |  0.40<L>    Ca    8.1<L>      02 Jul 2018 06:55                    Hemoglobin A1C, Whole Blood: 6.6 % (05-09 @ 06:52)    Thyroid Stimulating Hormone, Serum: 1.00 uIU/mL (05-18 @ 06:35)          Consultant(s) Notes Reviewed:      Care Discussed with Consultants/Other Providers:

## 2018-07-02 NOTE — PROGRESS NOTE ADULT - SUBJECTIVE AND OBJECTIVE BOX
INTERVAL HPI/OVERNIGHT EVENTS:  Patient S&E at bedside. No o/n events, patient c/o abdominal pain, decreased appetite, and nausea    VITAL SIGNS:  T(F): 98.2 (07-02-18 @ 09:02)  HR: 88 (07-02-18 @ 12:35)  BP: 113/73 (07-02-18 @ 12:35)  RR: 18 (07-02-18 @ 12:35)  SpO2: 95% (07-02-18 @ 12:35)  Wt(kg): --    PHYSICAL EXAM:    Constitutional: distress due to pain  Eyes: EOMI, sclera non-icteric  Neck: supple, no masses, no JVD  Respiratory: CTA b/l, good air entry b/l  Cardiovascular: RRR, no M/R/G  Gastrointestinal: distended but soft  Extremities: no c/c/e  Neurological: AAOx3      MEDICATIONS  (STANDING):  atorvastatin 40 milliGRAM(s) Oral at bedtime  carvedilol 3.125 milliGRAM(s) Oral every 12 hours  dextrose 5%. 1000 milliLiter(s) (50 mL/Hr) IV Continuous <Continuous>  dextrose 50% Injectable 12.5 Gram(s) IV Push once  dextrose 50% Injectable 25 Gram(s) IV Push once  dextrose 50% Injectable 25 Gram(s) IV Push once  docusate sodium 100 milliGRAM(s) Oral two times a day  dronabinol 2.5 milliGRAM(s) Oral two times a day  famotidine    Tablet 20 milliGRAM(s) Oral daily  heparin  Injectable 5000 Unit(s) SubCutaneous every 12 hours  insulin lispro (HumaLOG) corrective regimen sliding scale   SubCutaneous three times a day before meals  insulin lispro (HumaLOG) corrective regimen sliding scale   SubCutaneous at bedtime  levoFLOXacin  Tablet 500 milliGRAM(s) Oral every 24 hours  lisinopril 2.5 milliGRAM(s) Oral daily  mirtazapine 15 milliGRAM(s) Oral at bedtime  morphine ER Tablet 15 milliGRAM(s) Oral every 12 hours  polyethylene glycol 3350 17 Gram(s) Oral daily  potassium chloride    Tablet ER 20 milliEquivalent(s) Oral daily  senna 2 Tablet(s) Oral at bedtime  sodium chloride 0.9%. 1000 milliLiter(s) (75 mL/Hr) IV Continuous <Continuous>    MEDICATIONS  (PRN):  acetaminophen   Tablet 650 milliGRAM(s) Oral every 6 hours PRN Mild Pain (1 - 3)  acetaminophen   Tablet 650 milliGRAM(s) Oral every 6 hours PRN For Temp greater than 38 C (100.4 F)  bisacodyl Suppository 10 milliGRAM(s) Rectal daily PRN Constipation  dextrose 40% Gel 15 Gram(s) Oral once PRN Blood Glucose LESS THAN 70 milliGRAM(s)/deciliter  glucagon  Injectable 1 milliGRAM(s) IntraMuscular once PRN Glucose LESS THAN 70 milligrams/deciliter  ondansetron Injectable 4 milliGRAM(s) IV Push every 6 hours PRN Nausea and/or Vomiting      Allergies    No Known Allergies    Intolerances        LABS:                        9.1    19.23 )-----------( 340      ( 02 Jul 2018 07:58 )             28.6     07-02    137  |  103  |  <4<L>  ----------------------------<  96  4.4   |  23  |  0.40<L>    Ca    8.1<L>      02 Jul 2018 06:55            RADIOLOGY & ADDITIONAL TESTS:  Studies reviewed.      < from: CT Abdomen and Pelvis w/ IV Cont (06.26.18 @ 08:33) >    IMPRESSION:     Irregular rectal wall thickening and enhancement consistent with known   neoplasm. Persistent perirectal fluid collections. Interval left lower   quadrant loop colostomy.             < end of copied text >

## 2018-07-02 NOTE — CONSULT NOTE ADULT - PROBLEM SELECTOR RECOMMENDATION 4
pt needs assistance with all adls  family unable to care for her at home  goal will be back to snf upon discharge

## 2018-07-02 NOTE — PROGRESS NOTE ADULT - SUBJECTIVE AND OBJECTIVE BOX
Colorectal Surgery Progress Note      SUBJECTIVE: Patient seen and examined on morning rounds. Having some lower abdominal pain. Minimal output from colostomy. PO intake poor.       OBJECTIVE:     ** Vital signs / I&O's **    Vital Signs Last 24 Hrs  T(C): 36.3 (02 Jul 2018 05:23), Max: 36.8 (01 Jul 2018 11:43)  T(F): 97.3 (02 Jul 2018 05:23), Max: 98.2 (01 Jul 2018 11:43)  HR: 102 (02 Jul 2018 05:23) (89 - 102)  BP: 144/78 (02 Jul 2018 05:23) (96/61 - 144/78)  BP(mean): --  RR: 18 (02 Jul 2018 05:23) (18 - 18)  SpO2: 97% (02 Jul 2018 05:23) (95% - 97%)      01 Jul 2018 07:01  -  02 Jul 2018 07:00  --------------------------------------------------------  IN:    Oral Fluid: 240 mL  Total IN: 240 mL    OUT:    Colostomy: 100 mL  Total OUT: 100 mL    Total NET: 140 mL      ** Physical Exam **  Gen: Alert, NAD  Abd: Soft, mild tenderness in lower abdomen, ostomy pink and healthy with empty pouch    ** Labs **                          8.0    12.77 )-----------( 318      ( 01 Jul 2018 08:21 )             25.5     01 Jul 2018 07:02    135    |  101    |  <4     ----------------------------<  81     3.3     |  24     |  0.39     Ca    7.5        01 Jul 2018 07:02      CAPILLARY BLOOD GLUCOSE      POCT Blood Glucose.: 190 mg/dL (01 Jul 2018 21:03)  POCT Blood Glucose.: 158 mg/dL (01 Jul 2018 17:40)  POCT Blood Glucose.: 110 mg/dL (01 Jul 2018 12:29)  POCT Blood Glucose.: 100 mg/dL (01 Jul 2018 08:49)      MEDICATIONS  (STANDING):  atorvastatin 40 milliGRAM(s) Oral at bedtime  carvedilol 3.125 milliGRAM(s) Oral every 12 hours  dextrose 5%. 1000 milliLiter(s) (50 mL/Hr) IV Continuous <Continuous>  dextrose 50% Injectable 12.5 Gram(s) IV Push once  dextrose 50% Injectable 25 Gram(s) IV Push once  dextrose 50% Injectable 25 Gram(s) IV Push once  docusate sodium 100 milliGRAM(s) Oral two times a day  dronabinol 2.5 milliGRAM(s) Oral two times a day  famotidine    Tablet 20 milliGRAM(s) Oral daily  heparin  Injectable 5000 Unit(s) SubCutaneous every 12 hours  insulin lispro (HumaLOG) corrective regimen sliding scale   SubCutaneous three times a day before meals  insulin lispro (HumaLOG) corrective regimen sliding scale   SubCutaneous at bedtime  levoFLOXacin  Tablet 500 milliGRAM(s) Oral every 24 hours  lisinopril 2.5 milliGRAM(s) Oral daily  mirtazapine 15 milliGRAM(s) Oral at bedtime  morphine ER Tablet 15 milliGRAM(s) Oral every 12 hours  polyethylene glycol 3350 17 Gram(s) Oral daily  potassium chloride    Tablet ER 20 milliEquivalent(s) Oral daily  senna 2 Tablet(s) Oral at bedtime  sodium chloride 0.9%. 1000 milliLiter(s) (75 mL/Hr) IV Continuous <Continuous>    MEDICATIONS  (PRN):  acetaminophen   Tablet 650 milliGRAM(s) Oral every 6 hours PRN Mild Pain (1 - 3)  acetaminophen   Tablet 650 milliGRAM(s) Oral every 6 hours PRN For Temp greater than 38 C (100.4 F)  bisacodyl Suppository 10 milliGRAM(s) Rectal daily PRN Constipation  dextrose 40% Gel 15 Gram(s) Oral once PRN Blood Glucose LESS THAN 70 milliGRAM(s)/deciliter  glucagon  Injectable 1 milliGRAM(s) IntraMuscular once PRN Glucose LESS THAN 70 milligrams/deciliter  ondansetron Injectable 4 milliGRAM(s) IV Push every 6 hours PRN Nausea and/or Vomiting

## 2018-07-02 NOTE — PROGRESS NOTE ADULT - SUBJECTIVE AND OBJECTIVE BOX
[facility] [place]:[id number],   78yFemale followed for:  No Known Allergies    PAST MEDICAL & SURGICAL HISTORY:  Rectal mass  Gangrene of foot  Coronary artery disease involving native coronary artery of native heart without angina pectoris  Status post above knee amputation of right lower extremity    FAMILY HISTORY:  No pertinent family history in first degree relatives    MEDICATIONS  (STANDING):  atorvastatin 40 milliGRAM(s) Oral at bedtime  carvedilol 3.125 milliGRAM(s) Oral every 12 hours  dextrose 5%. 1000 milliLiter(s) (50 mL/Hr) IV Continuous <Continuous>  dextrose 50% Injectable 12.5 Gram(s) IV Push once  dextrose 50% Injectable 25 Gram(s) IV Push once  dextrose 50% Injectable 25 Gram(s) IV Push once  docusate sodium 100 milliGRAM(s) Oral two times a day  dronabinol 2.5 milliGRAM(s) Oral two times a day  famotidine    Tablet 20 milliGRAM(s) Oral daily  heparin  Injectable 5000 Unit(s) SubCutaneous every 12 hours  insulin lispro (HumaLOG) corrective regimen sliding scale   SubCutaneous three times a day before meals  insulin lispro (HumaLOG) corrective regimen sliding scale   SubCutaneous at bedtime  levoFLOXacin  Tablet 500 milliGRAM(s) Oral every 24 hours  lisinopril 2.5 milliGRAM(s) Oral daily  mirtazapine 15 milliGRAM(s) Oral at bedtime  morphine ER Tablet 15 milliGRAM(s) Oral every 12 hours  polyethylene glycol 3350 17 Gram(s) Oral daily  potassium chloride    Tablet ER 20 milliEquivalent(s) Oral daily  senna 2 Tablet(s) Oral at bedtime  sodium chloride 0.9%. 1000 milliLiter(s) (75 mL/Hr) IV Continuous <Continuous>    MEDICATIONS  (PRN):  acetaminophen   Tablet 650 milliGRAM(s) Oral every 6 hours PRN Mild Pain (1 - 3)  acetaminophen   Tablet 650 milliGRAM(s) Oral every 6 hours PRN For Temp greater than 38 C (100.4 F)  bisacodyl Suppository 10 milliGRAM(s) Rectal daily PRN Constipation  dextrose 40% Gel 15 Gram(s) Oral once PRN Blood Glucose LESS THAN 70 milliGRAM(s)/deciliter  glucagon  Injectable 1 milliGRAM(s) IntraMuscular once PRN Glucose LESS THAN 70 milligrams/deciliter  ondansetron Injectable 4 milliGRAM(s) IV Push every 6 hours PRN Nausea and/or Vomiting      Vital Signs Last 24 Hrs  T(C): 36.3 (02 Jul 2018 05:23), Max: 36.8 (01 Jul 2018 11:43)  T(F): 97.3 (02 Jul 2018 05:23), Max: 98.2 (01 Jul 2018 11:43)  HR: 102 (02 Jul 2018 05:23) (89 - 102)  BP: 144/78 (02 Jul 2018 05:23) (96/61 - 144/78)  BP(mean): --  RR: 18 (02 Jul 2018 05:23) (18 - 18)  SpO2: 97% (02 Jul 2018 05:23) (95% - 97%)  nc/at  s1s2  cta  soft, nt, nd no guarding or rebound  no c/c/e    CBC Full  -  ( 02 Jul 2018 07:58 )  WBC Count : 19.23 K/uL  Hemoglobin : 9.1 g/dL  Hematocrit : 28.6 %  Platelet Count - Automated : 340 K/uL  Mean Cell Volume : 86.4 fl  Mean Cell Hemoglobin : 27.5 pg  Mean Cell Hemoglobin Concentration : 31.8 gm/dL  Auto Neutrophil # : x  Auto Lymphocyte # : x  Auto Monocyte # : x  Auto Eosinophil # : x  Auto Basophil # : x  Auto Neutrophil % : x  Auto Lymphocyte % : x  Auto Monocyte % : x  Auto Eosinophil % : x  Auto Basophil % : x    07-02    137  |  103  |  <4<L>  ----------------------------<  96  4.4   |  23  |  0.40<L>    Ca    8.1<L>      02 Jul 2018 06:55

## 2018-07-02 NOTE — PROGRESS NOTE ADULT - SUBJECTIVE AND OBJECTIVE BOX
Patient is a 78y old  Female who presents with a chief complaint of fever (08 Jun 2018 15:36)    Being followed by ID for fever, colorectal ca ,abscess     Interval history:daughter at bedside   No acute events      ROS:  No cough,SOB,CP  No N/V/D./abd pain  No other complaints      Antimicrobials:    levoFLOXacin  Tablet 500 milliGRAM(s) Oral every 24 hours    Other medications reviewed    Vital Signs Last 24 Hrs  T(C): 36.8 (07-02-18 @ 09:02), Max: 36.8 (07-02-18 @ 09:02)  T(F): 98.2 (07-02-18 @ 09:02), Max: 98.2 (07-02-18 @ 09:02)  HR: 88 (07-02-18 @ 12:35) (88 - 102)  BP: 113/73 (07-02-18 @ 12:35) (113/73 - 144/78)  BP(mean): --  RR: 18 (07-02-18 @ 12:35) (18 - 18)  SpO2: 95% (07-02-18 @ 12:35) (95% - 97%)    Physical Exam:        HEENT PERRLA EOMI    No oral exudate or erythema    Chest Good AE,CTA    CVS RRR S1 S2 WNl No murmur or rub or gallop    Abd soft BS normal No tenderness colostomy   s/p R aka     IV site no erythema tenderness or discharge    CNS AAO X 3 no focal    Lab Data:                          9.1    19.23 )-----------( 340      ( 02 Jul 2018 07:58 )             28.6   WBC Count: 19.23 (07-02-18 @ 07:58)  WBC Count: 12.77 (07-01-18 @ 08:21)  WBC Count: 11.1 (06-30-18 @ 19:29)  WBC Count: 12.63 (06-29-18 @ 08:19)  WBC Count: 10.85 (06-28-18 @ 14:43)  WBC Count: 11.03 (06-27-18 @ 08:01)  WBC Count: 11.5 (06-26-18 @ 18:31)      07-02    137  |  103  |  <4<L>  ----------------------------<  96  4.4   |  23  |  0.40<L>    Ca    8.1<L>      02 Jul 2018 06:55          Culture - Blood (collected 27 Jun 2018 17:22)  Source: .Blood Blood  Preliminary Report (28 Jun 2018 18:01):    No growth to date.    Culture - Blood (collected 27 Jun 2018 17:22)  Source: .Blood Blood  Preliminary Report (28 Jun 2018 18:01):    No growth to date.

## 2018-07-02 NOTE — PROGRESS NOTE ADULT - PROBLEM SELECTOR PLAN 1
Locally advanced rectal cancer s/p 3 sessions of palliative RT with ongoing pain. d/w surgery and rad onc. Also d/w pt and daughter at bedside extensively via .   Plan is for surgery to perform a bypass with ostomy placement. This will alleviate risk for bowel obstruction in the area of the tumor. Hopefully it will also enable further healing of the abscesses which are related to microperforation from the tumor. Surgery may also help with some of the pain, although not all.   Radiation post surgery may still be possible with a focus on palliating symptoms.   I do not recommend chemotherapy at this point due to poor nutritional and functional status.   Pt understands that goal is palliative in nature.
Locally advanced rectal cancer s/p 3 sessions of palliative RT with ongoing pain.  Patient s/p diverting ostomy without much relief at this point.   Adding oxycodone and changing zofran to standing ATC at this point, also adding Reglan for breakthrough.   Discussed with palliative care, will come back for discussion with daughter today.

## 2018-07-02 NOTE — PROGRESS NOTE ADULT - ASSESSMENT
78 F w/ locally advanced rectal cancer s/p 3 sessions of palliative RT aborted due to discovery of pelvic abscess s/p IV Zosyn, now a/w PNA. Pt with FTT, ongoing weight loss, malignancy related pain now s/p diverting colostomy with continued pain and nausea.

## 2018-07-02 NOTE — PROGRESS NOTE ADULT - ASSESSMENT
78F with rectal cancer, dm,  htn,, amputation leg,     adm with fever,  afebrile  now, s/p ab    rectal fluid  collections/ pna  proximal diversion/ostomy,   dr ferreira   oncology, RT saw  pt  s/p  colectomy/ sigmoid   colostomy  ostomy,  , good  function  low  grade fever, resolved,     hyponatremia, stable  c/c  cachexia, not  eating well  duration of  ab, as per  ID, on Levaquin    leukocytosis, but  afebrile

## 2018-07-02 NOTE — CONSULT NOTE ADULT - PROVIDER SPECIALTY LIST ADULT
Colorectal Surgery
Gastroenterology
Infectious Disease
Internal Medicine
Pain Medicine
Heme/Onc
Palliative Care

## 2018-07-02 NOTE — PROGRESS NOTE ADULT - ATTENDING COMMENTS
Perforated rectal cancer  -Abx per ID  -Diet as tolerated  -F/u onc/radonc  -will continue to follow
Perforated rectal cancer  -Pt currently would not likely respond well to an APR.  I would recommend she under go laparoscopic colostomy creation to divert the fecal stream.  This should somewhat improve her pelvic pain/discomfort, treat any impending obstruction and allow for some healing of the pelvic abscess.  If pt shows some improvement, she may still be a candidate to for further therapy including possible APR.  -I discussed this at length with oncology, the patient and her daughter  -Will have Rad onc reevaluate after the procedure for possible completion of therapy  -Pt will need medical optimization before diversion.  Procedure hopefully to be performed next week.
diet as yanira  will follow
perforated rectal cancer  -Advance diet as tolerated.  +stool in stoma  -abx per ID.  Pelvic abcess and Urine are possible sources  -DVT ppx  -OOB/rehab
Agree with above.   Pt with significant pain and nausea. Will add Oxycodone 5 mg Q 4 hrs PRN pain and Zofran 4 mg Q 6 hours around the clock x 1 day. REglan PRN as well.   Appreciate palliative consult. Plan to meet with pt's daughter and pt tomorrow to discuss GOC. Pt is not a candidate for any DMT given progressive decline. Recommend hospice services likely in a long term care facility.

## 2018-07-02 NOTE — PROGRESS NOTE ADULT - ASSESSMENT
78F PMHx Stage III rectal cancer s/p incomplete RT course, PVD s/p R AKA, who presented to [facility] ED the afternoon of 6/8/18 from nursing home complaining of fever and chills. Patient recently admitted to [facility] (5/8-5/23) during which her rectal CA was diagnosed (EUS/colonoscopy performed suggested T3N1 mid-rectal tumor, adenocarcinoma. Started RT, until she was developed pelvic abscesses. Discharged on 2weeks IV antibiotics via PICC line, then transitioned to Augmentin with plan for continued surveillance. However she developed fevers & returned to hospital.   s/p Rx for PNA  Now POD#10 sigmoid end loop colostomy   fevers-resolved   Increased WBC today but otherwise stable  Still on levaquin  case d/w daughter at length  Patients tumor which has likley perf feeding the collections is surgically unresectable  She has recd a long(6 week course of of antimicrobials ) and has had a diversion procedure  We may not have any options  Palliative is on case-GOC being decided  In menatime will monitor clinically and WBC  If worsens may need reinitiation of BS coverage with repeat imaging and workup  Will tailor plan for ID issues  per course,results and GOC .Will defer to primary team on management of other issues.  prognosis poor  case d/w Med NP,daughter,Pllaitive team  Will Follow.  Beeper [id number]/afterhours/No response-[id number]

## 2018-07-03 ENCOUNTER — TRANSCRIPTION ENCOUNTER (OUTPATIENT)
Age: 78
End: 2018-07-03

## 2018-07-03 VITALS
SYSTOLIC BLOOD PRESSURE: 95 MMHG | TEMPERATURE: 97 F | RESPIRATION RATE: 18 BRPM | OXYGEN SATURATION: 97 % | HEART RATE: 83 BPM | DIASTOLIC BLOOD PRESSURE: 63 MMHG

## 2018-07-03 LAB
ANION GAP SERPL CALC-SCNC: 10 MMOL/L — SIGNIFICANT CHANGE UP (ref 5–17)
BUN SERPL-MCNC: <4 MG/DL — LOW (ref 7–23)
CALCIUM SERPL-MCNC: 7.5 MG/DL — LOW (ref 8.4–10.5)
CHLORIDE SERPL-SCNC: 97 MMOL/L — SIGNIFICANT CHANGE UP (ref 96–108)
CO2 SERPL-SCNC: 23 MMOL/L — SIGNIFICANT CHANGE UP (ref 22–31)
CREAT SERPL-MCNC: 0.43 MG/DL — LOW (ref 0.5–1.3)
GLUCOSE BLDC GLUCOMTR-MCNC: 109 MG/DL — HIGH (ref 70–99)
GLUCOSE BLDC GLUCOMTR-MCNC: 113 MG/DL — HIGH (ref 70–99)
GLUCOSE BLDC GLUCOMTR-MCNC: 130 MG/DL — HIGH (ref 70–99)
GLUCOSE SERPL-MCNC: 107 MG/DL — HIGH (ref 70–99)
HCT VFR BLD CALC: 26.6 % — LOW (ref 34.5–45)
HGB BLD-MCNC: 8.3 G/DL — LOW (ref 11.5–15.5)
MCHC RBC-ENTMCNC: 26.7 PG — LOW (ref 27–34)
MCHC RBC-ENTMCNC: 31.2 GM/DL — LOW (ref 32–36)
MCV RBC AUTO: 85.5 FL — SIGNIFICANT CHANGE UP (ref 80–100)
PLATELET # BLD AUTO: 365 K/UL — SIGNIFICANT CHANGE UP (ref 150–400)
POTASSIUM SERPL-MCNC: 3.9 MMOL/L — SIGNIFICANT CHANGE UP (ref 3.5–5.3)
POTASSIUM SERPL-SCNC: 3.9 MMOL/L — SIGNIFICANT CHANGE UP (ref 3.5–5.3)
RBC # BLD: 3.11 M/UL — LOW (ref 3.8–5.2)
RBC # FLD: 19.6 % — HIGH (ref 10.3–14.5)
SODIUM SERPL-SCNC: 130 MMOL/L — LOW (ref 135–145)
WBC # BLD: 14.08 K/UL — HIGH (ref 3.8–10.5)
WBC # FLD AUTO: 14.08 K/UL — HIGH (ref 3.8–10.5)

## 2018-07-03 PROCEDURE — 99232 SBSQ HOSP IP/OBS MODERATE 35: CPT

## 2018-07-03 RX ORDER — METOPROLOL TARTRATE 50 MG
25 TABLET ORAL DAILY
Qty: 0 | Refills: 0 | Status: DISCONTINUED | OUTPATIENT
Start: 2018-07-03 | End: 2018-07-03

## 2018-07-03 RX ORDER — ACETAMINOPHEN 500 MG
2 TABLET ORAL
Qty: 0 | Refills: 0 | COMMUNITY

## 2018-07-03 RX ORDER — POLYETHYLENE GLYCOL 3350 17 G/17G
1 POWDER, FOR SOLUTION ORAL
Qty: 0 | Refills: 0 | COMMUNITY

## 2018-07-03 RX ORDER — SENNA PLUS 8.6 MG/1
2 TABLET ORAL
Qty: 0 | Refills: 0 | COMMUNITY

## 2018-07-03 RX ORDER — METFORMIN HYDROCHLORIDE 850 MG/1
1 TABLET ORAL
Qty: 0 | Refills: 0 | COMMUNITY

## 2018-07-03 RX ORDER — ACETAMINOPHEN 500 MG
975 TABLET ORAL ONCE
Qty: 0 | Refills: 0 | Status: COMPLETED | OUTPATIENT
Start: 2018-07-03 | End: 2018-07-03

## 2018-07-03 RX ORDER — MIRTAZAPINE 45 MG/1
1 TABLET, ORALLY DISINTEGRATING ORAL
Qty: 0 | Refills: 0 | COMMUNITY
Start: 2018-07-03

## 2018-07-03 RX ORDER — OXYCODONE HYDROCHLORIDE 5 MG/1
1 TABLET ORAL
Qty: 0 | Refills: 0 | COMMUNITY
Start: 2018-07-03

## 2018-07-03 RX ORDER — ACETAMINOPHEN 500 MG
650 TABLET ORAL ONCE
Qty: 0 | Refills: 0 | Status: DISCONTINUED | OUTPATIENT
Start: 2018-07-03 | End: 2018-07-03

## 2018-07-03 RX ORDER — ATORVASTATIN CALCIUM 80 MG/1
1 TABLET, FILM COATED ORAL
Qty: 0 | Refills: 0 | COMMUNITY
Start: 2018-07-03

## 2018-07-03 RX ORDER — ONDANSETRON 8 MG/1
1 TABLET, FILM COATED ORAL
Qty: 0 | Refills: 0 | COMMUNITY

## 2018-07-03 RX ORDER — RANITIDINE HYDROCHLORIDE 150 MG/1
1 TABLET, FILM COATED ORAL
Qty: 0 | Refills: 0 | COMMUNITY

## 2018-07-03 RX ORDER — CARVEDILOL PHOSPHATE 80 MG/1
6.25 CAPSULE, EXTENDED RELEASE ORAL EVERY 12 HOURS
Qty: 0 | Refills: 0 | Status: DISCONTINUED | OUTPATIENT
Start: 2018-07-03 | End: 2018-07-03

## 2018-07-03 RX ORDER — HEPARIN SODIUM 5000 [USP'U]/ML
5 INJECTION INTRAVENOUS; SUBCUTANEOUS
Qty: 0 | Refills: 0 | COMMUNITY
Start: 2018-07-03

## 2018-07-03 RX ORDER — POTASSIUM CHLORIDE 20 MEQ
1 PACKET (EA) ORAL
Qty: 0 | Refills: 0 | COMMUNITY
Start: 2018-07-03

## 2018-07-03 RX ORDER — METOCLOPRAMIDE HCL 10 MG
1 TABLET ORAL
Qty: 0 | Refills: 0 | COMMUNITY
Start: 2018-07-03

## 2018-07-03 RX ORDER — CARVEDILOL PHOSPHATE 80 MG/1
1 CAPSULE, EXTENDED RELEASE ORAL
Qty: 0 | Refills: 0 | COMMUNITY
Start: 2018-07-03

## 2018-07-03 RX ORDER — DOCUSATE SODIUM 100 MG
1 CAPSULE ORAL
Qty: 0 | Refills: 0 | COMMUNITY
Start: 2018-07-03

## 2018-07-03 RX ORDER — DRONABINOL 2.5 MG
1 CAPSULE ORAL
Qty: 0 | Refills: 0 | COMMUNITY
Start: 2018-07-03

## 2018-07-03 RX ADMIN — ONDANSETRON 4 MILLIGRAM(S): 8 TABLET, FILM COATED ORAL at 06:13

## 2018-07-03 RX ADMIN — FAMOTIDINE 20 MILLIGRAM(S): 10 INJECTION INTRAVENOUS at 12:43

## 2018-07-03 RX ADMIN — OXYCODONE HYDROCHLORIDE 5 MILLIGRAM(S): 5 TABLET ORAL at 03:04

## 2018-07-03 RX ADMIN — Medication 975 MILLIGRAM(S): at 06:58

## 2018-07-03 RX ADMIN — MORPHINE SULFATE 15 MILLIGRAM(S): 50 CAPSULE, EXTENDED RELEASE ORAL at 18:52

## 2018-07-03 RX ADMIN — CARVEDILOL PHOSPHATE 3.12 MILLIGRAM(S): 80 CAPSULE, EXTENDED RELEASE ORAL at 06:13

## 2018-07-03 RX ADMIN — ONDANSETRON 4 MILLIGRAM(S): 8 TABLET, FILM COATED ORAL at 12:43

## 2018-07-03 RX ADMIN — MORPHINE SULFATE 15 MILLIGRAM(S): 50 CAPSULE, EXTENDED RELEASE ORAL at 08:47

## 2018-07-03 RX ADMIN — Medication 100 MILLIGRAM(S): at 06:13

## 2018-07-03 RX ADMIN — Medication 20 MILLIEQUIVALENT(S): at 12:43

## 2018-07-03 RX ADMIN — LISINOPRIL 2.5 MILLIGRAM(S): 2.5 TABLET ORAL at 06:13

## 2018-07-03 RX ADMIN — MORPHINE SULFATE 15 MILLIGRAM(S): 50 CAPSULE, EXTENDED RELEASE ORAL at 09:30

## 2018-07-03 RX ADMIN — SODIUM CHLORIDE 75 MILLILITER(S): 9 INJECTION INTRAMUSCULAR; INTRAVENOUS; SUBCUTANEOUS at 01:31

## 2018-07-03 RX ADMIN — HEPARIN SODIUM 5000 UNIT(S): 5000 INJECTION INTRAVENOUS; SUBCUTANEOUS at 18:21

## 2018-07-03 RX ADMIN — Medication 975 MILLIGRAM(S): at 06:28

## 2018-07-03 RX ADMIN — Medication 2.5 MILLIGRAM(S): at 18:24

## 2018-07-03 RX ADMIN — OXYCODONE HYDROCHLORIDE 5 MILLIGRAM(S): 5 TABLET ORAL at 03:34

## 2018-07-03 RX ADMIN — Medication 2.5 MILLIGRAM(S): at 06:13

## 2018-07-03 RX ADMIN — HEPARIN SODIUM 5000 UNIT(S): 5000 INJECTION INTRAVENOUS; SUBCUTANEOUS at 06:14

## 2018-07-03 RX ADMIN — CARVEDILOL PHOSPHATE 6.25 MILLIGRAM(S): 80 CAPSULE, EXTENDED RELEASE ORAL at 18:24

## 2018-07-03 NOTE — PROGRESS NOTE ADULT - SUBJECTIVE AND OBJECTIVE BOX
INTERVAL HPI/OVERNIGHT EVENTS: no new issues overnight, WBC up to 19K    MEDICATIONS  (STANDING):  atorvastatin 40 milliGRAM(s) Oral at bedtime  carvedilol 3.125 milliGRAM(s) Oral every 12 hours  dextrose 5%. 1000 milliLiter(s) (50 mL/Hr) IV Continuous <Continuous>  dextrose 50% Injectable 12.5 Gram(s) IV Push once  dextrose 50% Injectable 25 Gram(s) IV Push once  dextrose 50% Injectable 25 Gram(s) IV Push once  docusate sodium 100 milliGRAM(s) Oral two times a day  dronabinol 2.5 milliGRAM(s) Oral two times a day  famotidine    Tablet 20 milliGRAM(s) Oral daily  heparin  Injectable 5000 Unit(s) SubCutaneous every 12 hours  insulin lispro (HumaLOG) corrective regimen sliding scale   SubCutaneous three times a day before meals  insulin lispro (HumaLOG) corrective regimen sliding scale   SubCutaneous at bedtime  levoFLOXacin  Tablet 500 milliGRAM(s) Oral every 24 hours  lisinopril 2.5 milliGRAM(s) Oral daily  mirtazapine 15 milliGRAM(s) Oral at bedtime  morphine ER Tablet 15 milliGRAM(s) Oral every 12 hours  ondansetron Injectable 4 milliGRAM(s) IV Push every 6 hours  polyethylene glycol 3350 17 Gram(s) Oral daily  potassium chloride    Tablet ER 20 milliEquivalent(s) Oral daily  senna 2 Tablet(s) Oral at bedtime  sodium chloride 0.9%. 1000 milliLiter(s) (75 mL/Hr) IV Continuous <Continuous>    MEDICATIONS  (PRN):  acetaminophen   Tablet 650 milliGRAM(s) Oral every 6 hours PRN Mild Pain (1 - 3)  acetaminophen   Tablet 650 milliGRAM(s) Oral every 6 hours PRN For Temp greater than 38 C (100.4 F)  bisacodyl Suppository 10 milliGRAM(s) Rectal daily PRN Constipation  dextrose 40% Gel 15 Gram(s) Oral once PRN Blood Glucose LESS THAN 70 milliGRAM(s)/deciliter  glucagon  Injectable 1 milliGRAM(s) IntraMuscular once PRN Glucose LESS THAN 70 milligrams/deciliter  metoclopramide 10 milliGRAM(s) Oral every 6 hours PRN nausea  oxyCODONE    IR 5 milliGRAM(s) Oral every 4 hours PRN Moderate Pain (4 - 6)      Allergies    No Known Allergies    Intolerances            PHYSICAL EXAM:   Vital Signs:  Vital Signs Last 24 Hrs  T(C): 36.9 (03 Jul 2018 06:02), Max: 36.9 (03 Jul 2018 06:02)  T(F): 98.4 (03 Jul 2018 06:02), Max: 98.4 (03 Jul 2018 06:02)  HR: 122 (03 Jul 2018 06:02) (88 - 122)  BP: 131/80 (03 Jul 2018 06:02) (113/73 - 135/72)  BP(mean): --  RR: 18 (03 Jul 2018 06:02) (18 - 18)  SpO2: 93% (03 Jul 2018 06:02) (93% - 96%)  Daily     Daily     GENERAL:  no distress  HEENT:  NC/AT,  anicteric  CHEST:   no increased effort, breath sounds clear  HEART:  Regular rhythm  ABDOMEN:  Soft, tenderness mostly lower abd, active BS, non-peritoneal, ostomy functioning  EXTEREMITIES:  no cyanosis      LABS:                        9.1    19.23 )-----------( 340      ( 02 Jul 2018 07:58 )             28.6     07-03    130<L>  |  97  |  <4<L>  ----------------------------<  107<H>  3.9   |  23  |  0.43<L>    Ca    7.5<L>      03 Jul 2018 06:59            RADIOLOGY & ADDITIONAL TESTS:

## 2018-07-03 NOTE — DISCHARGE NOTE ADULT - HOSPITAL COURSE
78F PMHx Stage III rectal cancer s/p incomplete RT course, PVD s/p R AKA, who presented to [facility] ED the afternoon of 6/8/18 from nursing home complaining of fever and chills. Patient recently admitted to [facility] (5/8-5/23) during which her rectal CA was diagnosed (EUS/colonoscopy performed suggested T3N1 mid-rectal tumor, adenocarcinoma. Started RT, until she was developed pelvic abscesses. Discharged on 2weeks IV antibiotics via PICC line, then transitioned to Augmentin with plan for continued surveillance. However she developed fevers & returned to hospital.   s/p Rx for PNA  Now POD#10 sigmoid end loop colostomy   fevers-resolved   Leucocytosis downtrending   Still on levaquin  No s/s other infectious process  Continue levaquin x 5 more days  Would recommend DC PICC  ?For home hospice-will edfer to primary team  case d/w Med NP,Dr moore  prognosis poor 78F PMHx Stage III rectal cancer s/p incomplete RT course, PVD s/p R AKA, who presented to [facility] ED the afternoon of 6/8/18 from nursing home complaining of fever and chills. Patient recently admitted to [facility] (5/8-5/23) during which her rectal CA was diagnosed (EUS/colonoscopy performed suggested T3N1 mid-rectal tumor, adenocarcinoma. Started RT, until she was developed pelvic abscesses. Discharged on 2weeks IV antibiotics via PICC line, then transitioned to Augmentin with plan for continued surveillance. However she developed fevers & returned to hospital.   s/p Rx for PNA  Now POD#10 sigmoid end loop colostomy  Fevers-resolved  Leucocytosis downtrending   Still on levaquin  No s/s other infectious process  Continue levaquin x 5 more days  PICC line d/cd.  For discharge with Palliative Care.   Prognosis poor

## 2018-07-03 NOTE — PROVIDER CONTACT NOTE (OTHER) - ASSESSMENT
Pt is hypertensive, c/o headache and abdominal pain.  BP is 167/68 electronic, manual is 162/60. Performed neuro check, no deficits noted. Pt denies CP, SOB.
Pt alert and not tolerating po.  surgery team at bedside and aware.
Pt has , states she has abdominal pain 10/10., )2 sat 93 on RA, not due for next dose of oxycodone until 7am.
Pt resting in bed at this time, was oob to chair earlier this shift, last BM noted from 6/12
pt VSS, pt a and ox2, pt no s/s of pain/ discomfort at this times, pt resting comfortably , pt incontinent, pt right old AKA, no distress
pt VSS, pt no distress, pt resting comfortably, denies n/v/d, pt family at bedside
pt VSS, pt resting comfortably, pt awake, pt alert, no s/s of pain/ discomfort
pt confused, no s/s of distress noted, no s.s of chest pain noted, pt incontinent of urine, pt with colostomy, left PICC single lumen
refusing po potassium, refusing po feed

## 2018-07-03 NOTE — PROGRESS NOTE ADULT - ASSESSMENT
perforated advanced rectal cancer s/p diverting colostomy  rising WBC, perirectal fluid collections on last CT scan  consider repeat CT to r/o perirectal abscess

## 2018-07-03 NOTE — PROVIDER CONTACT NOTE (OTHER) - ACTION/TREATMENT ORDERED:
Tylenol 650 mg orally
Administer PRN dose of IV Morphine 1mg, continue to monitor.  BP reassessed at 23:08 and reported to provider; manual - 152/68. Continue to monitor, no new orders at this time.
Family at bedside.  Surgery team at bedside.  pt vomiting.  pt unable to yanira bowel prep.  NP on unit and aware.  continue to monitor pt status.
Cont to monitor Pt, Pt resting comfortably at this time, will re-eval need for pain meds
975 mg Tylenol PO ordered and administered, continue to monitor.
Celia Bradford made aware per PA diet order be placed
ESPERANZA Pradhan notified per PA ok to give morning meds if BP above 100 systolic, reschedule pain medication to later on unless pt is in pain
ESPERANZA Ruiz notified  Tylenol for temp be ordered
Per NP Sadaf Millan give 1 extra 325 of tylenol PO, NP to put in order, cont to monitor
Sara Ricardo per ESPERANZA Fermin. trough due pre 4-th dose
will change med to IV, Zofran given

## 2018-07-03 NOTE — PROVIDER CONTACT NOTE (OTHER) - BACKGROUND
Pt admitted with fever.
PMH rectal ca, rt AKA
Pt admitted w/ fever, abdominal abscess.
Pt dgtr feeding Pt mely, educated this may not be best choice if Pt isnt feeling well
hx colon cancer
pt admitted with Fever
pt admitted with Fever
pt admitted with Fever. PMH of Rectal mass, CAD
pt admitted with fever. PMH of rectal mass, Coronary artery disease, diabetes

## 2018-07-03 NOTE — PROGRESS NOTE ADULT - SUBJECTIVE AND OBJECTIVE BOX
- Patient seen and examined.  - In summary, patient is a 78 year old woman who presented with fever (2018 15:36)  - Today, patient is without complaints.         *****MEDICATIONS:    MEDICATIONS  (STANDING):  atorvastatin 40 milliGRAM(s) Oral at bedtime  carvedilol 6.25 milliGRAM(s) Oral every 12 hours  dextrose 5%. 1000 milliLiter(s) (50 mL/Hr) IV Continuous <Continuous>  dextrose 50% Injectable 12.5 Gram(s) IV Push once  dextrose 50% Injectable 25 Gram(s) IV Push once  dextrose 50% Injectable 25 Gram(s) IV Push once  docusate sodium 100 milliGRAM(s) Oral two times a day  dronabinol 2.5 milliGRAM(s) Oral two times a day  famotidine    Tablet 20 milliGRAM(s) Oral daily  heparin  Injectable 5000 Unit(s) SubCutaneous every 12 hours  insulin lispro (HumaLOG) corrective regimen sliding scale   SubCutaneous three times a day before meals  insulin lispro (HumaLOG) corrective regimen sliding scale   SubCutaneous at bedtime  levoFLOXacin  Tablet 500 milliGRAM(s) Oral every 24 hours  mirtazapine 15 milliGRAM(s) Oral at bedtime  morphine ER Tablet 15 milliGRAM(s) Oral every 12 hours  ondansetron Injectable 4 milliGRAM(s) IV Push every 6 hours  polyethylene glycol 3350 17 Gram(s) Oral daily  potassium chloride    Tablet ER 20 milliEquivalent(s) Oral daily  senna 2 Tablet(s) Oral at bedtime  sodium chloride 0.9%. 1000 milliLiter(s) (75 mL/Hr) IV Continuous <Continuous>    MEDICATIONS  (PRN):  acetaminophen   Tablet 650 milliGRAM(s) Oral every 6 hours PRN Mild Pain (1 - 3)  acetaminophen   Tablet 650 milliGRAM(s) Oral every 6 hours PRN For Temp greater than 38 C (100.4 F)  bisacodyl Suppository 10 milliGRAM(s) Rectal daily PRN Constipation  dextrose 40% Gel 15 Gram(s) Oral once PRN Blood Glucose LESS THAN 70 milliGRAM(s)/deciliter  glucagon  Injectable 1 milliGRAM(s) IntraMuscular once PRN Glucose LESS THAN 70 milligrams/deciliter  metoclopramide 10 milliGRAM(s) Oral every 6 hours PRN nausea  oxyCODONE    IR 5 milliGRAM(s) Oral every 4 hours PRN Moderate Pain (4 - 6)                 ***** REVIEW OF SYSTEM:  GEN: no fever, no chills, no pain  RESP: no SOB, no cough, no sputum  CVS: no chest pain, no palpitations, no edema  GI: no abdominal pain, no nausea, no vomiting, no constipation, no diarrhea  : no dysuria, no frequency  NEURO: no headache, no dizziness  PSYCH: no depression, not anxious  Derm : no itching, no rash         ***** VITAL SIGNS:             T(F): 98.4 (18 @ 06:02), Max: 98.4 (18 @ 06:02)  HR: 122 (18 @ 06:02) (88 - 122)  BP: 131/80 (18 @ 06:02) (113/73 - 131/80)  RR: 18 (18 @ 06:02) (18 - 18)  SpO2: 93% (18 @ 06:02) (93% - 95%)  Wt(kg): --  ,   I&O's Summary    2018 07:01  -  2018 07:00  --------------------------------------------------------  IN: 2240 mL / OUT: 150 mL / NET: 2090 mL                 *****PHYSICAL EXAM:  GEN: A&O X 3 , NAD , comfortable  HEENT: NCAT, EOMI, MMM, no icterus  NECK: Supple, No JVD  CVS: S1S2 , regular , No M/R/G appreciated  PULM: CTA B/L,  no W/R/R appreciated  ABD.: soft. non tender, non distended,  bowel sounds present  Extrem: intact pulses , no edema noted  Derm: No rash or ecchymosis noted  PSYCH: normal mood, no depression, not anxious         *****LAB AND IMAGIN.1    19.23 )-----------( 340      ( 2018 07:58 )             28.6               07-    130<L>  |  97  |  <4<L>  ----------------------------<  107<H>  3.9   |  23  |  0.43<L>    Ca    7.5<L>      2018 06:59                [All pertinent recent Imaging/Reports reviewed]  < from: Transthoracic Echocardiogram (18 @ 07:08) >  Conclusions:  1. Normal left ventricular internal dimensions and wall  thicknesses.  2. Normal left ventricular systolic function. No segmental  wall motion abnormalities.  3. Normal diastolic function  4. Normal right ventricular sizeand systolic function.  5. Estimated pulmonary artery systolic pressure equals 36  mm Hg, assuming right atrial pressure equals 8 mm Hg,  consistent with borderline pulmonary pressures.  *** Compared with echocardiogram of 2017, no  significant changes noted.    < end of copied text >         *****A S S E S S M E N T   A N D   P L A N :  78F with rectal cancer adm with fever  abx per ID  s/p palliative ostomy  PO as tolerated  PT  d/w GI, no plan for peg  PC eval noted  tachycardia, ?from pain  cont pain control  will dc ace and incr BB  plan for home hospice    __________________________  YON Metcalf D.O.

## 2018-07-03 NOTE — PROGRESS NOTE ADULT - SUBJECTIVE AND OBJECTIVE BOX
Surgery Progress Note      SUBJECTIVE: Patient seen and examined on morning rounds. PO intake poor. Ostomy functioning.       OBJECTIVE:     ** Vital signs / I&O's **    Vital Signs Last 24 Hrs  T(C): 36.9 (03 Jul 2018 06:02), Max: 36.9 (03 Jul 2018 06:02)  T(F): 98.4 (03 Jul 2018 06:02), Max: 98.4 (03 Jul 2018 06:02)  HR: 122 (03 Jul 2018 06:02) (88 - 122)  BP: 131/80 (03 Jul 2018 06:02) (113/73 - 131/80)  BP(mean): --  RR: 18 (03 Jul 2018 06:02) (18 - 18)  SpO2: 93% (03 Jul 2018 06:02) (93% - 95%)      02 Jul 2018 07:01  -  03 Jul 2018 07:00  --------------------------------------------------------  IN:    Oral Fluid: 290 mL    sodium chloride 0.9%.: 1950 mL  Total IN: 2240 mL    OUT:    Colostomy: 150 mL  Total OUT: 150 mL    Total NET: 2090 mL      ** Physical Exam **  Gen: Alert, NAD  Abd: Soft, mild tenderness in lower abdomen, ostomy pink and healthy, nondistended    ** Labs **                          9.1    19.23 )-----------( 340      ( 02 Jul 2018 07:58 )             28.6     03 Jul 2018 06:59    130    |  97     |  <4     ----------------------------<  107    3.9     |  23     |  0.43     Ca    7.5        03 Jul 2018 06:59      CAPILLARY BLOOD GLUCOSE      POCT Blood Glucose.: 113 mg/dL (03 Jul 2018 08:34)  POCT Blood Glucose.: 165 mg/dL (02 Jul 2018 22:35)  POCT Blood Glucose.: 109 mg/dL (02 Jul 2018 17:20)  POCT Blood Glucose.: 140 mg/dL (02 Jul 2018 12:36)      MEDICATIONS  (STANDING):  atorvastatin 40 milliGRAM(s) Oral at bedtime  carvedilol 6.25 milliGRAM(s) Oral every 12 hours  dextrose 5%. 1000 milliLiter(s) (50 mL/Hr) IV Continuous <Continuous>  dextrose 50% Injectable 12.5 Gram(s) IV Push once  dextrose 50% Injectable 25 Gram(s) IV Push once  dextrose 50% Injectable 25 Gram(s) IV Push once  docusate sodium 100 milliGRAM(s) Oral two times a day  dronabinol 2.5 milliGRAM(s) Oral two times a day  famotidine    Tablet 20 milliGRAM(s) Oral daily  heparin  Injectable 5000 Unit(s) SubCutaneous every 12 hours  insulin lispro (HumaLOG) corrective regimen sliding scale   SubCutaneous three times a day before meals  insulin lispro (HumaLOG) corrective regimen sliding scale   SubCutaneous at bedtime  levoFLOXacin  Tablet 500 milliGRAM(s) Oral every 24 hours  mirtazapine 15 milliGRAM(s) Oral at bedtime  morphine ER Tablet 15 milliGRAM(s) Oral every 12 hours  ondansetron Injectable 4 milliGRAM(s) IV Push every 6 hours  polyethylene glycol 3350 17 Gram(s) Oral daily  potassium chloride    Tablet ER 20 milliEquivalent(s) Oral daily  senna 2 Tablet(s) Oral at bedtime  sodium chloride 0.9%. 1000 milliLiter(s) (75 mL/Hr) IV Continuous <Continuous>    MEDICATIONS  (PRN):  acetaminophen   Tablet 650 milliGRAM(s) Oral every 6 hours PRN Mild Pain (1 - 3)  acetaminophen   Tablet 650 milliGRAM(s) Oral every 6 hours PRN For Temp greater than 38 C (100.4 F)  bisacodyl Suppository 10 milliGRAM(s) Rectal daily PRN Constipation  dextrose 40% Gel 15 Gram(s) Oral once PRN Blood Glucose LESS THAN 70 milliGRAM(s)/deciliter  glucagon  Injectable 1 milliGRAM(s) IntraMuscular once PRN Glucose LESS THAN 70 milligrams/deciliter  metoclopramide 10 milliGRAM(s) Oral every 6 hours PRN nausea  oxyCODONE    IR 5 milliGRAM(s) Oral every 4 hours PRN Moderate Pain (4 - 6)

## 2018-07-03 NOTE — PROGRESS NOTE ADULT - ASSESSMENT
78F with perforated rectal cancer s/p sigmoid end loop colostomy on 6/20    - Diet as tolerated  - Monitor ostomy output  - Abx per ID  - Please call with any further questions  - Discussed with Dr. Manohar Lechuga PGY-2  Colorectal Surgery [phone]

## 2018-07-03 NOTE — PROVIDER CONTACT NOTE (OTHER) - RECOMMENDATIONS
Please advise of any new orders, continue to monitor.
NP Sadaf Millan aware. Per NP monitor Pt 1-2 hours and see how she is before medicating again
Celia Bradford notified
ESPERANZA Pradhan made aware
ESPERANZA Ruiz notified
Please consider medication as appropriate.
Sara Ricardo notified
ZOIE Millan assessed Pt at bedside
will change med to IV

## 2018-07-03 NOTE — DISCHARGE NOTE ADULT - CARE PROVIDER_API CALL
Charmaine Okeefe (), HematologyOncology; Internal Medicine  [address] [place] [address]  Phone: [phone]  Fax: [phone]    Ryan Sosa), ColonRectal Surgery; Surgery  [facility]  [address] [place] [address]  Phone: [phone]  Fax: [phone]

## 2018-07-03 NOTE — DISCHARGE NOTE ADULT - CARE PLAN
Principal Discharge DX:	Fever  Goal:	Resolved.  Assessment and plan of treatment:	Medical evaluation for any  fever (Temp > 100.4) persisting for more than 24 hours.  Secondary Diagnosis:	Rectal adenocarcinoma  Assessment and plan of treatment:	S/p diverting sigmoid end loop colostomy.  Colostomy care daily and prn.  Follow up with Surgical Team.  Secondary Diagnosis:	Failure to thrive in adult  Assessment and plan of treatment:	Encourage diet - small frequent feeds.  Secondary Diagnosis:	Pain  Assessment and plan of treatment:	Pain medication as prescribed.  Turning and repositioning Q2h and prn.

## 2018-07-03 NOTE — DISCHARGE NOTE ADULT - PLAN OF CARE
Resolved. Medical evaluation for any  fever (Temp > 100.4) persisting for more than 24 hours. S/p diverting sigmoid end loop colostomy.  Colostomy care daily and prn.  Follow up with Surgical Team. Encourage diet - small frequent feeds. Pain medication as prescribed.  Turning and repositioning Q2h and prn.

## 2018-07-03 NOTE — DISCHARGE NOTE ADULT - CONDITIONS AT DISCHARGE
pt out of bed with 1 assist , tolerating diet - poor appetite, incontinent of urine , functional colostomy

## 2018-07-03 NOTE — PROVIDER CONTACT NOTE (OTHER) - DATE AND TIME:
03-Jul-2018 06:18
08-Jun-2018 21:00
10-Jed-2018 20:30
15-Jed-2018 13:00
15-Jed-2018 18:00
19-Jun-2018 18:00
20-Jun-2018 05:50
24-Jun-2018 21:27
25-Jun-2018 10:40
27-Jun-2018 09:00
21-Jun-2018 21:58

## 2018-07-03 NOTE — PROGRESS NOTE ADULT - ASSESSMENT
78F PMHx Stage III rectal cancer s/p incomplete RT course, PVD s/p R AKA, who presented to [facility] ED the afternoon of 6/8/18 from nursing home complaining of fever and chills. Patient recently admitted to [facility] (5/8-5/23) during which her rectal CA was diagnosed (EUS/colonoscopy performed suggested T3N1 mid-rectal tumor, adenocarcinoma. Started RT, until she was developed pelvic abscesses. Discharged on 2weeks IV antibiotics via PICC line, then transitioned to Augmentin with plan for continued surveillance. However she developed fevers & returned to hospital.   s/p Rx for PNA  Now POD#10 sigmoid end loop colostomy   fevers-resolved   Leucocytosis downtrending   Still on levaquin  No s/s other infectious process  Continue levaquin x 5 more days  Would recommend DC PICC  ?For home hospice-will edfer to primary team  case d/w Med NP,Dr moore  prognosis poor  ID will follow as needed,please call [phone] if any questions or issues.

## 2018-07-03 NOTE — PROVIDER CONTACT NOTE (OTHER) - NAME OF MD/NP/PA/DO NOTIFIED:
Celia Bradford
ESPERANZA Pradhan
ESPERANZA Ruiz
Kylie Hernandez NP
Macry Sahni, NP
Monica Gardner NP
Sara Ricardo
ZOIE Millan
ZOIE Millan
ZOIE Peña
ESPERANZA Perdomo

## 2018-07-03 NOTE — DISCHARGE NOTE ADULT - CARE PROVIDERS DIRECT ADDRESSES
[email or web address]

## 2018-07-03 NOTE — DISCHARGE NOTE ADULT - MEDICATION SUMMARY - MEDICATIONS TO STOP TAKING
I will STOP taking the medications listed below when I get home from the hospital:    lisinopril 5 mg oral tablet  -- 1 tab(s) by mouth once a day    piperacillin-tazobactam 2 g-0.25 g intravenous injection  -- 3.375 gram(s) intravenous every 8 hours

## 2018-07-03 NOTE — PROVIDER CONTACT NOTE (OTHER) - SITUATION
Pt is hypertensive, c/o headache.
ESPERANZA Ruiz
Pt c/o pain, tylenol given as ordered, Pt still reporting pain.
Pt family at bedside to assist in bowel prep .  pt unable to tolerate due to vomiting.
Pt has , states she has abdominal pain 10/10
Pt threw up after receiving meds; Pain meds were given; RN unsure how much of med dose Pt actually received
RN asking about morning meds pt NPO since midnight for procedure
pt due for Vancomycin dose #3 nurse asking about vancomycin trough
pt is new admission , no orders, nurse asking for diet order
refusing PO Potassium

## 2018-07-03 NOTE — PROGRESS NOTE ADULT - SUBJECTIVE AND OBJECTIVE BOX
Patient is a 78y old  Female who presents with a chief complaint of fever (08 Jun 2018 15:36)    Being followed by ID for CR ca,abscess    Interval history:some abd pain but controlled  No other acute events      ROS:  No cough,SOB,CP  No N/V/ no change in colostomy output     No urinary complaints  No HA  No joint or limb pain  No other complaints      Antimicrobials:    levoFLOXacin  Tablet 500 milliGRAM(s) Oral every 24 hours      other medications reviewed    Vital Signs Last 24 Hrs  T(C): 36.9 (07-03-18 @ 06:02), Max: 36.9 (07-03-18 @ 06:02)  T(F): 98.4 (07-03-18 @ 06:02), Max: 98.4 (07-03-18 @ 06:02)  HR: 122 (07-03-18 @ 06:02) (88 - 122)  BP: 131/80 (07-03-18 @ 06:02) (113/73 - 131/80)  BP(mean): --  RR: 18 (07-03-18 @ 06:02) (18 - 18)  SpO2: 93% (07-03-18 @ 06:02) (93% - 95%)    Physical Exam:    Constitutional well preserved,comfortable,pleasant    HEENT PERRLA EOMI,No pallor or icterus    No oral exudate or erythema    Neck supple no JVD or LN    Chest Good AE,CTA    CVS RRR S1 S2 WNl No murmur or rub or gallop    Abd soft BS normal No tenderness  colostomy     Ext No cyanosis clubbing or edema    L picc  site no erythema tenderness or discharge    Joints no swelling or LOM    CNS AAO X 3 no focal    Lab Data:                          8.3    14.08 )-----------( 365      ( 03 Jul 2018 09:42 )             26.6       07-03    130<L>  |  97  |  <4<L>  ----------------------------<  107<H>  3.9   |  23  |  0.43<L>    Ca    7.5<L>      03 Jul 2018 06:59

## 2018-07-03 NOTE — PROVIDER CONTACT NOTE (OTHER) - REASON
Pt c/o pain
Pt has 
Pt threw up after receiving meds
Pt unable to tolerate bowel prep
RN asking about morning meds pt NPO since midnight for procedure
Temp 100.4
pt due for Vancomycin dose #3 nurse asking about vancomycin trough
pt is new admission , no orders, nurse asking for diet order
pt temp elevated 100.4 , 101.0
refusing po potassium and food
Pt is hypertensive

## 2018-07-03 NOTE — DISCHARGE NOTE ADULT - PATIENT PORTAL LINK FT
You can access the [facility] Patient Portal, offered by [facility], by registering with the following website: [email or web address]

## 2018-07-03 NOTE — CHART NOTE - NSCHARTNOTEFT_GEN_A_CORE
Spoke with daughter again.  She understands pt's diagnosis and prognosis and goal is back to [facility] with palliative care.  Discussed with CM and primary team.  Will sign off.  Janet Smith DO

## 2018-07-13 ENCOUNTER — OUTPATIENT (OUTPATIENT)
Dept: OUTPATIENT SERVICES | Facility: HOSPITAL | Age: 78
LOS: 1 days | Discharge: HOME | End: 2018-07-13

## 2018-07-13 DIAGNOSIS — Z89.611 ACQUIRED ABSENCE OF RIGHT LEG ABOVE KNEE: Chronic | ICD-10-CM

## 2018-07-13 DIAGNOSIS — C19 MALIGNANT NEOPLASM OF RECTOSIGMOID JUNCTION: ICD-10-CM

## 2018-07-18 ENCOUNTER — APPOINTMENT (OUTPATIENT)
Dept: HEMATOLOGY ONCOLOGY | Facility: CLINIC | Age: 78
End: 2018-07-18

## 2018-07-18 PROBLEM — I96 GANGRENE, NOT ELSEWHERE CLASSIFIED: Chronic | Status: ACTIVE | Noted: 2017-02-22

## 2018-07-18 PROBLEM — K62.9 DISEASE OF ANUS AND RECTUM, UNSPECIFIED: Chronic | Status: ACTIVE | Noted: 2018-05-08

## 2018-07-18 PROBLEM — I25.10 ATHEROSCLEROTIC HEART DISEASE OF NATIVE CORONARY ARTERY WITHOUT ANGINA PECTORIS: Chronic | Status: ACTIVE | Noted: 2017-02-22

## 2018-07-24 ENCOUNTER — APPOINTMENT (OUTPATIENT)
Dept: HEMATOLOGY ONCOLOGY | Facility: CLINIC | Age: 78
End: 2018-07-24
Payer: MEDICARE

## 2018-07-24 ENCOUNTER — RESULT REVIEW (OUTPATIENT)
Age: 78
End: 2018-07-24

## 2018-07-24 VITALS
RESPIRATION RATE: 16 BRPM | TEMPERATURE: 98 F | OXYGEN SATURATION: 99 % | SYSTOLIC BLOOD PRESSURE: 102 MMHG | HEART RATE: 78 BPM | DIASTOLIC BLOOD PRESSURE: 70 MMHG

## 2018-07-24 DIAGNOSIS — C20 MALIGNANT NEOPLASM OF RECTUM: ICD-10-CM

## 2018-07-24 DIAGNOSIS — R11.2 NAUSEA WITH VOMITING, UNSPECIFIED: ICD-10-CM

## 2018-07-24 DIAGNOSIS — G89.3 NEOPLASM RELATED PAIN (ACUTE) (CHRONIC): ICD-10-CM

## 2018-07-24 PROCEDURE — 99215 OFFICE O/P EST HI 40 MIN: CPT

## 2018-07-31 ENCOUNTER — APPOINTMENT (OUTPATIENT)
Dept: COLORECTAL SURGERY | Facility: CLINIC | Age: 78
End: 2018-07-31

## 2018-08-11 PROBLEM — R11.2 NAUSEA & VOMITING: Status: ACTIVE | Noted: 2018-06-08

## 2018-08-11 PROBLEM — G89.3 CANCER-RELATED PAIN: Status: ACTIVE | Noted: 2018-06-08

## 2018-08-11 PROBLEM — C20 RECTAL ADENOCARCINOMA: Status: ACTIVE | Noted: 2018-05-30

## 2018-09-04 ENCOUNTER — APPOINTMENT (OUTPATIENT)
Dept: COLORECTAL SURGERY | Facility: CLINIC | Age: 78
End: 2018-09-04

## 2018-09-27 ENCOUNTER — INPATIENT (INPATIENT)
Facility: HOSPITAL | Age: 78
LOS: 7 days | Discharge: HOME | DRG: 640 | End: 2018-10-05
Attending: INTERNAL MEDICINE | Admitting: INTERNAL MEDICINE
Payer: MEDICAID

## 2018-09-27 VITALS
RESPIRATION RATE: 17 BRPM | OXYGEN SATURATION: 99 % | DIASTOLIC BLOOD PRESSURE: 66 MMHG | SYSTOLIC BLOOD PRESSURE: 104 MMHG | HEART RATE: 86 BPM | TEMPERATURE: 99 F

## 2018-09-27 DIAGNOSIS — Z89.611 ACQUIRED ABSENCE OF RIGHT LEG ABOVE KNEE: Chronic | ICD-10-CM

## 2018-09-27 DIAGNOSIS — C20 MALIGNANT NEOPLASM OF RECTUM: ICD-10-CM

## 2018-09-27 DIAGNOSIS — R62.7 ADULT FAILURE TO THRIVE: ICD-10-CM

## 2018-09-27 LAB
ALBUMIN SERPL ELPH-MCNC: 2.2 G/DL — LOW (ref 3.3–5)
ALP SERPL-CCNC: 128 U/L — HIGH (ref 40–120)
ALT FLD-CCNC: 6 U/L — LOW (ref 10–45)
ANION GAP SERPL CALC-SCNC: 8 MMOL/L — SIGNIFICANT CHANGE UP (ref 5–17)
APPEARANCE UR: ABNORMAL
APTT BLD: 29.1 SEC — SIGNIFICANT CHANGE UP (ref 27.5–37.4)
AST SERPL-CCNC: 9 U/L — LOW (ref 10–40)
BACTERIA # UR AUTO: ABNORMAL
BASOPHILS # BLD AUTO: 0.1 K/UL — SIGNIFICANT CHANGE UP (ref 0–0.2)
BILIRUB SERPL-MCNC: 0.4 MG/DL — SIGNIFICANT CHANGE UP (ref 0.2–1.2)
BILIRUB UR-MCNC: NEGATIVE — SIGNIFICANT CHANGE UP
BLD GP AB SCN SERPL QL: NEGATIVE — SIGNIFICANT CHANGE UP
BUN SERPL-MCNC: 21 MG/DL — SIGNIFICANT CHANGE UP (ref 7–23)
CALCIUM SERPL-MCNC: 8.5 MG/DL — SIGNIFICANT CHANGE UP (ref 8.4–10.5)
CHLORIDE SERPL-SCNC: 94 MMOL/L — LOW (ref 96–108)
CO2 SERPL-SCNC: 26 MMOL/L — SIGNIFICANT CHANGE UP (ref 22–31)
COLOR SPEC: ABNORMAL
CREAT SERPL-MCNC: 0.59 MG/DL — SIGNIFICANT CHANGE UP (ref 0.5–1.3)
DIFF PNL FLD: ABNORMAL
EOSINOPHIL # BLD AUTO: 0 K/UL — SIGNIFICANT CHANGE UP (ref 0–0.5)
EOSINOPHIL NFR BLD AUTO: 1 % — SIGNIFICANT CHANGE UP (ref 0–6)
GLUCOSE SERPL-MCNC: 130 MG/DL — HIGH (ref 70–99)
GLUCOSE UR QL: NEGATIVE — SIGNIFICANT CHANGE UP
HCT VFR BLD CALC: 33.4 % — LOW (ref 34.5–45)
HGB BLD-MCNC: 10.4 G/DL — LOW (ref 11.5–15.5)
INR BLD: 1.16 RATIO — SIGNIFICANT CHANGE UP (ref 0.88–1.16)
KETONES UR-MCNC: SIGNIFICANT CHANGE UP
LEUKOCYTE ESTERASE UR-ACNC: ABNORMAL
LYMPHOCYTES # BLD AUTO: 14 % — SIGNIFICANT CHANGE UP (ref 13–44)
LYMPHOCYTES # BLD AUTO: 3.4 K/UL — HIGH (ref 1–3.3)
MCHC RBC-ENTMCNC: 25.9 PG — LOW (ref 27–34)
MCHC RBC-ENTMCNC: 31.2 GM/DL — LOW (ref 32–36)
MCV RBC AUTO: 83.1 FL — SIGNIFICANT CHANGE UP (ref 80–100)
MONOCYTES # BLD AUTO: 0.6 K/UL — SIGNIFICANT CHANGE UP (ref 0–0.9)
MONOCYTES NFR BLD AUTO: 4 % — SIGNIFICANT CHANGE UP (ref 2–14)
NEUTROPHILS # BLD AUTO: 12.6 K/UL — HIGH (ref 1.8–7.4)
NEUTROPHILS NFR BLD AUTO: 77 % — SIGNIFICANT CHANGE UP (ref 43–77)
NITRITE UR-MCNC: NEGATIVE — SIGNIFICANT CHANGE UP
PH UR: >8 — SIGNIFICANT CHANGE UP (ref 5–8)
PLATELET # BLD AUTO: 217 K/UL — SIGNIFICANT CHANGE UP (ref 150–400)
POTASSIUM SERPL-MCNC: 4.5 MMOL/L — SIGNIFICANT CHANGE UP (ref 3.5–5.3)
POTASSIUM SERPL-SCNC: 4.5 MMOL/L — SIGNIFICANT CHANGE UP (ref 3.5–5.3)
PROT SERPL-MCNC: 7 G/DL — SIGNIFICANT CHANGE UP (ref 6–8.3)
PROT UR-MCNC: SIGNIFICANT CHANGE UP
PROTHROM AB SERPL-ACNC: 12.6 SEC — SIGNIFICANT CHANGE UP (ref 9.8–12.7)
RBC # BLD: 4.02 M/UL — SIGNIFICANT CHANGE UP (ref 3.8–5.2)
RBC # FLD: 18.7 % — HIGH (ref 10.3–14.5)
RBC CASTS # UR COMP ASSIST: >50 /HPF — SIGNIFICANT CHANGE UP (ref 0–4)
RH IG SCN BLD-IMP: NEGATIVE — SIGNIFICANT CHANGE UP
SODIUM SERPL-SCNC: 128 MMOL/L — LOW (ref 135–145)
SP GR SPEC: 1.02 — SIGNIFICANT CHANGE UP (ref 1.01–1.02)
TRI-PHOS CRY UR QL COMP ASSIST: ABNORMAL
UROBILINOGEN FLD QL: NORMAL — SIGNIFICANT CHANGE UP
WBC # BLD: 16.6 K/UL — HIGH (ref 3.8–10.5)
WBC # FLD AUTO: 16.6 K/UL — HIGH (ref 3.8–10.5)
WBC UR QL: >50 /HPF — SIGNIFICANT CHANGE UP (ref 0–5)

## 2018-09-27 PROCEDURE — 99285 EMERGENCY DEPT VISIT HI MDM: CPT | Mod: 25

## 2018-09-27 PROCEDURE — 71045 X-RAY EXAM CHEST 1 VIEW: CPT | Mod: 26

## 2018-09-27 PROCEDURE — 74177 CT ABD & PELVIS W/CONTRAST: CPT | Mod: 26

## 2018-09-27 PROCEDURE — 93010 ELECTROCARDIOGRAM REPORT: CPT

## 2018-09-27 PROCEDURE — 99222 1ST HOSP IP/OBS MODERATE 55: CPT | Mod: GC

## 2018-09-27 PROCEDURE — 99222 1ST HOSP IP/OBS MODERATE 55: CPT

## 2018-09-27 RX ORDER — CARVEDILOL PHOSPHATE 80 MG/1
6.25 CAPSULE, EXTENDED RELEASE ORAL EVERY 12 HOURS
Qty: 0 | Refills: 0 | Status: DISCONTINUED | OUTPATIENT
Start: 2018-09-27 | End: 2018-10-03

## 2018-09-27 RX ORDER — MIRTAZAPINE 45 MG/1
15 TABLET, ORALLY DISINTEGRATING ORAL AT BEDTIME
Qty: 0 | Refills: 0 | Status: DISCONTINUED | OUTPATIENT
Start: 2018-09-27 | End: 2018-10-05

## 2018-09-27 RX ORDER — MORPHINE SULFATE 50 MG/1
15 CAPSULE, EXTENDED RELEASE ORAL EVERY 12 HOURS
Qty: 0 | Refills: 0 | Status: DISCONTINUED | OUTPATIENT
Start: 2018-09-27 | End: 2018-10-02

## 2018-09-27 RX ORDER — MORPHINE SULFATE 50 MG/1
2 CAPSULE, EXTENDED RELEASE ORAL ONCE
Qty: 0 | Refills: 0 | Status: DISCONTINUED | OUTPATIENT
Start: 2018-09-27 | End: 2018-09-27

## 2018-09-27 RX ORDER — ACETAMINOPHEN 500 MG
2 TABLET ORAL
Qty: 0 | Refills: 0 | COMMUNITY

## 2018-09-27 RX ORDER — ATORVASTATIN CALCIUM 80 MG/1
40 TABLET, FILM COATED ORAL AT BEDTIME
Qty: 0 | Refills: 0 | Status: DISCONTINUED | OUTPATIENT
Start: 2018-09-27 | End: 2018-10-05

## 2018-09-27 RX ORDER — ACETAMINOPHEN 500 MG
650 TABLET ORAL ONCE
Qty: 0 | Refills: 0 | Status: COMPLETED | OUTPATIENT
Start: 2018-09-27 | End: 2018-09-27

## 2018-09-27 RX ORDER — PIPERACILLIN AND TAZOBACTAM 4; .5 G/20ML; G/20ML
3.38 INJECTION, POWDER, LYOPHILIZED, FOR SOLUTION INTRAVENOUS EVERY 8 HOURS
Qty: 0 | Refills: 0 | Status: DISCONTINUED | OUTPATIENT
Start: 2018-09-27 | End: 2018-09-27

## 2018-09-27 RX ORDER — HEPARIN SODIUM 5000 [USP'U]/ML
5000 INJECTION INTRAVENOUS; SUBCUTANEOUS
Qty: 0 | Refills: 0 | COMMUNITY

## 2018-09-27 RX ORDER — HEPARIN SODIUM 5000 [USP'U]/ML
5000 INJECTION INTRAVENOUS; SUBCUTANEOUS EVERY 12 HOURS
Qty: 0 | Refills: 0 | Status: DISCONTINUED | OUTPATIENT
Start: 2018-09-27 | End: 2018-10-05

## 2018-09-27 RX ORDER — DRONABINOL 2.5 MG
2.5 CAPSULE ORAL
Qty: 0 | Refills: 0 | Status: DISCONTINUED | OUTPATIENT
Start: 2018-09-27 | End: 2018-10-04

## 2018-09-27 RX ORDER — SODIUM CHLORIDE 9 MG/ML
1000 INJECTION INTRAMUSCULAR; INTRAVENOUS; SUBCUTANEOUS
Qty: 0 | Refills: 0 | Status: DISCONTINUED | OUTPATIENT
Start: 2018-09-27 | End: 2018-09-29

## 2018-09-27 RX ORDER — ERTAPENEM SODIUM 1 G/1
1000 INJECTION, POWDER, LYOPHILIZED, FOR SOLUTION INTRAMUSCULAR; INTRAVENOUS EVERY 24 HOURS
Qty: 0 | Refills: 0 | Status: DISCONTINUED | OUTPATIENT
Start: 2018-09-27 | End: 2018-10-01

## 2018-09-27 RX ORDER — SODIUM CHLORIDE 9 MG/ML
1000 INJECTION INTRAMUSCULAR; INTRAVENOUS; SUBCUTANEOUS ONCE
Qty: 0 | Refills: 0 | Status: COMPLETED | OUTPATIENT
Start: 2018-09-27 | End: 2018-09-27

## 2018-09-27 RX ADMIN — ATORVASTATIN CALCIUM 40 MILLIGRAM(S): 80 TABLET, FILM COATED ORAL at 21:10

## 2018-09-27 RX ADMIN — ERTAPENEM SODIUM 120 MILLIGRAM(S): 1 INJECTION, POWDER, LYOPHILIZED, FOR SOLUTION INTRAMUSCULAR; INTRAVENOUS at 17:05

## 2018-09-27 RX ADMIN — Medication 650 MILLIGRAM(S): at 16:40

## 2018-09-27 RX ADMIN — MIRTAZAPINE 15 MILLIGRAM(S): 45 TABLET, ORALLY DISINTEGRATING ORAL at 21:10

## 2018-09-27 RX ADMIN — MORPHINE SULFATE 15 MILLIGRAM(S): 50 CAPSULE, EXTENDED RELEASE ORAL at 21:10

## 2018-09-27 RX ADMIN — SODIUM CHLORIDE 1000 MILLILITER(S): 9 INJECTION INTRAMUSCULAR; INTRAVENOUS; SUBCUTANEOUS at 13:45

## 2018-09-27 RX ADMIN — Medication 650 MILLIGRAM(S): at 13:45

## 2018-09-27 RX ADMIN — HEPARIN SODIUM 5000 UNIT(S): 5000 INJECTION INTRAVENOUS; SUBCUTANEOUS at 18:42

## 2018-09-27 NOTE — ED PROVIDER NOTE - MEDICAL DECISION MAKING DETAILS
Cachectic and emaciated elderly F presenting with failure to thrive. Admit to medicine for possible peg placement. Admission labs and images. Cachectic and emaciated elderly F presenting with failure to thrive. Admit to medicine for possible peg placement. Admission labs and images. rectal tylenol for pain, ivf

## 2018-09-27 NOTE — CONSULT NOTE ADULT - ASSESSMENT
78F with multiple comorbidities, bedbound, with PMH locally advanced rectal cancer s/p incomplete RT, course complicated by pelvic abscesses on IV Zosyn, s/p diverting ostomy, who presents with failure to thrive with family requesting PEG placement.

## 2018-09-27 NOTE — ED PROVIDER NOTE - PHYSICAL EXAMINATION
GEN: cachectic, emaciated, non-verbal, in no apparent distress.  HEAD: NCAT  HEENT: PERRL, Airway patent, dry mucosal membrane. neck supple, no LAD  LUNG: CTAB, no adventitious sounds, no retractions, no nasal flaring  CV: RRR, no murmurs,   Abd: soft, TTP in epigastric area, ND, colostomy in place, clean, no redness around site, no rebound or guarding, BS+ in all quadrants, no CVAT  MSK: WWP, Pulses 2+ in extremities, No edema, no visible deformities, AKA site clean   Neuro:  Non-ambulatory, AAOx1  Skin: Warm and dry, no evidence of rash  Psych: normal mood and affect

## 2018-09-27 NOTE — CONSULT NOTE ADULT - ASSESSMENT
pt seen at [facility] at Dr. Meeks request.  known to me in past.  pt with poor prognosis, locally advanced rectal cancer.  family wihtout realistic goals.  wants peg.  pt has with likely life expectence gerater than a few months. r/b/a discussed.  will schedule tomorrow.

## 2018-09-27 NOTE — CONSULT NOTE ADULT - SUBJECTIVE AND OBJECTIVE BOX
HPI:    Patient is a 78F PMH locally advanced rectal cancer s/p palliative RT that was aborted 2/2 development of pelvic abscesses treated with prolonged course of IV Zosyn, hx right AKA for LE gangrene, CAD, HTN, DM, who is s/p diverting colostomy for palliation. Patient has been deemed not a candidate for disease modifying therapy and hospice appropriate in the past. Patient now presents from nursing home for PEG placement per family request. Patient is cachectic with failure to thrive, weight loss. Patient had advanced cognitive dysfunction and is an unreliable historian.      Allergies    No Known Allergies    Intolerances        MEDICATIONS  (STANDING):  atorvastatin 40 milliGRAM(s) Oral at bedtime  carvedilol 6.25 milliGRAM(s) Oral every 12 hours  dronabinol 2.5 milliGRAM(s) Oral two times a day  ertapenem  IVPB 1000 milliGRAM(s) IV Intermittent every 24 hours  heparin  Injectable 5000 Unit(s) SubCutaneous every 12 hours  mirtazapine 15 milliGRAM(s) Oral at bedtime  morphine ER Tablet 15 milliGRAM(s) Oral every 12 hours  sodium chloride 0.9%. 1000 milliLiter(s) (70 mL/Hr) IV Continuous <Continuous>    MEDICATIONS  (PRN):      PAST MEDICAL & SURGICAL HISTORY:  Rectal mass  Gangrene of foot  Coronary artery disease involving native coronary artery of native heart without angina pectoris  Status post above knee amputation of right lower extremity      FAMILY HISTORY:  No pertinent family history in first degree relatives      SOCIAL HISTORY: No EtOH, no tobacco    REVIEW OF SYSTEMS:    CONSTITUTIONAL: No weakness, fevers or chills  EYES/ENT: No visual changes;  No vertigo or throat pain   NECK: No pain or stiffness  RESPIRATORY: No cough, wheezing, hemoptysis; No shortness of breath  CARDIOVASCULAR: No chest pain or palpitations  GASTROINTESTINAL: No abdominal or epigastric pain. No nausea, vomiting, or hematemesis; No diarrhea or constipation. No melena or hematochezia.  GENITOURINARY: No dysuria, frequency or hematuria  NEUROLOGICAL: No numbness or weakness  SKIN: No itching, burning, rashes, or lesions   All other review of systems is negative unless indicated above.        T(F): 99.3 (09-27-18 @ 12:15), Max: 99.4 (09-27-18 @ 11:56)  HR: 90 (09-27-18 @ 12:15)  BP: 118/60 (09-27-18 @ 12:15)  RR: 16 (09-27-18 @ 12:15)  SpO2: 100% (09-27-18 @ 12:15)  Wt(kg): --    GENERAL: NAD, well-developed  HEAD:  Atraumatic, Normocephalic  EYES: EOMI, PERRLA, conjunctiva and sclera clear  NECK: Supple, No JVD  CHEST/LUNG: Clear to auscultation bilaterally; No wheeze  HEART: Regular rate and rhythm; No murmurs, rubs, or gallops  ABDOMEN: Soft, Nontender, Nondistended; Bowel sounds present  EXTREMITIES:  2+ Peripheral Pulses, No clubbing, cyanosis, or edema  NEUROLOGY: non-focal  SKIN: No rashes or lesions                          10.4   16.6  )-----------( 217      ( 27 Sep 2018 13:10 )             33.4       09-27    128<L>  |  94<L>  |  21  ----------------------------<  130<H>  4.5   |  26  |  0.59    Ca    8.5      27 Sep 2018 13:10    TPro  7.0  /  Alb  2.2<L>  /  TBili  0.4  /  DBili  x   /  AST  9<L>  /  ALT  6<L>  /  AlkPhos  128<H>  09-27 HPI:    Patient is a 78F PMH locally advanced rectal cancer s/p palliative RT that was aborted 2/2 development of pelvic abscesses treated with prolonged course of IV Zosyn, hx right AKA for LE gangrene, CAD, HTN, DM, who is s/p diverting colostomy for palliation. Patient has been deemed not a candidate for disease modifying therapy and hospice appropriate in the past. Patient now presents from nursing home for PEG placement per family request. Patient is cachectic with failure to thrive, weight loss. Patient had advanced cognitive dysfunction and is an unreliable historian.    At present she appears more confused than her baseline and is noninteractive, not answering any questions appropriately.      Allergies    No Known Allergies    Intolerances        MEDICATIONS  (STANDING):  atorvastatin 40 milliGRAM(s) Oral at bedtime  carvedilol 6.25 milliGRAM(s) Oral every 12 hours  dronabinol 2.5 milliGRAM(s) Oral two times a day  ertapenem  IVPB 1000 milliGRAM(s) IV Intermittent every 24 hours  heparin  Injectable 5000 Unit(s) SubCutaneous every 12 hours  mirtazapine 15 milliGRAM(s) Oral at bedtime  morphine ER Tablet 15 milliGRAM(s) Oral every 12 hours  sodium chloride 0.9%. 1000 milliLiter(s) (70 mL/Hr) IV Continuous <Continuous>    MEDICATIONS  (PRN):      PAST MEDICAL & SURGICAL HISTORY:  Rectal mass  Gangrene of foot  Coronary artery disease involving native coronary artery of native heart without angina pectoris  Status post above knee amputation of right lower extremity      FAMILY HISTORY:  No pertinent family history in first degree relatives      SOCIAL HISTORY: No EtOH, no tobacco    REVIEW OF SYSTEMS: unable to obtain as patient cannot offer history    CONSTITUTIONAL: No weakness, fevers or chills  EYES/ENT: No visual changes;  No vertigo or throat pain   NECK: No pain or stiffness  RESPIRATORY: No cough, wheezing, hemoptysis; No shortness of breath  CARDIOVASCULAR: No chest pain or palpitations  GASTROINTESTINAL: No abdominal or epigastric pain. No nausea, vomiting, or hematemesis; No diarrhea or constipation. No melena or hematochezia.  GENITOURINARY: No dysuria, frequency or hematuria  NEUROLOGICAL: No numbness or weakness  SKIN: No itching, burning, rashes, or lesions   All other review of systems is negative unless indicated above.        T(F): 99.3 (09-27-18 @ 12:15), Max: 99.4 (09-27-18 @ 11:56)  HR: 90 (09-27-18 @ 12:15)  BP: 118/60 (09-27-18 @ 12:15)  RR: 16 (09-27-18 @ 12:15)  SpO2: 100% (09-27-18 @ 12:15)  Wt(kg): --    GENERAL: cachectic, chronically ill appearing, dry mucous membranes  HEAD:  Atraumatic, Normocephalic  EYES: EOMI, PERRLA, conjunctiva and sclera clear  NECK: Supple, No JVD  CHEST/LUNG: Clear to auscultation bilaterally; No wheeze  HEART: Regular rate and rhythm; No murmurs, rubs, or gallops  ABDOMEN: Soft, Nontender, Nondistended; Bowel sounds present  EXTREMITIES:  s/p Right BKA  NEUROLOGY: non-focal  SKIN: No rashes or lesions                          10.4   16.6  )-----------( 217      ( 27 Sep 2018 13:10 )             33.4       09-27    128<L>  |  94<L>  |  21  ----------------------------<  130<H>  4.5   |  26  |  0.59    Ca    8.5      27 Sep 2018 13:10    TPro  7.0  /  Alb  2.2<L>  /  TBili  0.4  /  DBili  x   /  AST  9<L>  /  ALT  6<L>  /  AlkPhos  128<H>  09-27

## 2018-09-27 NOTE — CHART NOTE - NSCHARTNOTEFT_GEN_A_CORE
Urology and surgical consult called per attending wishes and discussed ct. results with consulting teams. Will await further reccs.   Will sign out to Np overnight to follow up.   Isa CARLISLE-BC

## 2018-09-27 NOTE — ED PROVIDER NOTE - OBJECTIVE STATEMENT
78F Nursing home patient hx of rectal mass s/p resection and colostomy placement, CAD, HTN, DM, R AKA for gangrenous leg presents with failure to thrive. Nursing home physician wants pt to be admitted for peg placement. Pt has GERD with esophagitis which also contributes to difficulty feeding patient. Daughter says pt is 77 lbs now and would like to maintain nourishment via peg. Pt is non-verbal but able to point to abdomen when asked if in pain. Take morphine 15mg ext release morning and night with tylenol in between for pain management. Denies pain anywhere else. Uncooperative with rest of review of system.

## 2018-09-27 NOTE — ED ADULT NURSE NOTE - OBJECTIVE STATEMENT
Pt is a 77 yo female sent in from NH for failure to thrive and PEG tube placement. Pt refuses to speak, her daughter at the bedside states she is able to speak at times but is nonverbal must days. Pt has a pmh of rectal mass s/p resection and colostomy placement, CAD, HTN, DM, R AKA for gangrenous leg. Nursing home physician wants pt to be admitted for peg placement. Pt is non-verbal but able to point to abdomen when asked if in pain.

## 2018-09-27 NOTE — CONSULT NOTE ADULT - SUBJECTIVE AND OBJECTIVE BOX
CHIEF COMPLAINT:Patient is a 78y old  Female who presents with a chief complaint of sent for    peg/ admitted  by  marcial mario in past (27 Sep 2018 14:22)      HPI:  78F Nursing home patient hx of rectal mass,   s/p resection and colostomy placement,  CAD, HTN,   DM  , R AKA for gangrenous leg   presents with  h/o f ailure to thrive/  c/c cachexia/  bed  bound . Nursing home physician wants pt to be admitted for peg placement, per  familys  request . Pt has GERD with esophagitis which also contributes to difficulty feeding patient. Daughter says pt is 77 lbs now and would like to maintain nourishment via peg. Pt is non-verbal ,  Take morphine 15mg ext release,  for pain management.  seen in  er/  wak/listless (27 Sep 2018 14:22)  pt with known hx of cad but no recent cardiac work up.    PAST MEDICAL & SURGICAL HISTORY:  Rectal mass  Gangrene of foot  Coronary artery disease involving native coronary artery of native heart without angina pectoris  Status post above knee amputation of right lower extremity      MEDICATIONS  (STANDING):  atorvastatin 40 milliGRAM(s) Oral at bedtime  carvedilol 6.25 milliGRAM(s) Oral every 12 hours  dronabinol 2.5 milliGRAM(s) Oral two times a day  ertapenem  IVPB 1000 milliGRAM(s) IV Intermittent every 24 hours  heparin  Injectable 5000 Unit(s) SubCutaneous every 12 hours  mirtazapine 15 milliGRAM(s) Oral at bedtime  morphine ER Tablet 15 milliGRAM(s) Oral every 12 hours  sodium chloride 0.9%. 1000 milliLiter(s) (70 mL/Hr) IV Continuous <Continuous>    MEDICATIONS  (PRN):      FAMILY HISTORY:  No pertinent family history in first degree relatives      SOCIAL HISTORY:    [ ] Non-smoker  [ ] Smoker  [ ] Alcohol    Allergies    No Known Allergies    Intolerances    	    REVIEW OF SYSTEMS:  CONSTITUTIONAL: No fever, weight loss, or fatigue, cachectic  EYES: No eye pain, visual disturbances, or discharge  ENT:  No difficulty hearing, tinnitus, vertigo; No sinus or throat pain  NECK: No pain or stiffness  RESPIRATORY: No cough, wheezing, chills or hemoptysis; + Shortness of Breath  CARDIOVASCULAR: No chest pain, palpitations, passing out, dizziness, or leg swelling  GASTROINTESTINAL: No abdominal or epigastric pain. No nausea, vomiting, or hematemesis; No diarrhea or constipation. No melena or hematochezia.  GENITOURINARY: No dysuria, frequency, hematuria, or incontinence  NEUROLOGICAL: No headaches, memory loss, loss of strength, numbness, or tremors  SKIN: No itching, burning, rashes, or lesions   LYMPH Nodes: No enlarged glands  ENDOCRINE: No heat or cold intolerance; No hair loss  MUSCULOSKELETAL: No joint pain or swelling; No muscle, back, or extremity pain  PSYCHIATRIC: No depression, anxiety, mood swings, or difficulty sleeping  HEME/LYMPH: No easy bruising, or bleeding gums  ALLERGY AND IMMUNOLOGIC: No hives or eczema	    [ ] All others negative	  [ ] Unable to obtain    PHYSICAL EXAM:  T(C): 37.4 (09-27-18 @ 12:15), Max: 37.4 (09-27-18 @ 11:56)  HR: 90 (09-27-18 @ 12:15) (86 - 90)  BP: 118/60 (09-27-18 @ 12:15) (104/66 - 118/60)  RR: 16 (09-27-18 @ 12:15) (16 - 17)  SpO2: 100% (09-27-18 @ 12:15) (99% - 100%)  Wt(kg): --  I&O's Summary      Appearance: Normal	  HEENT:   Normal oral mucosa, PERRL, EOMI	  Lymphatic: No lymphadenopathy  Cardiovascular: Normal S1 S2, No JVD, + murmurs, No edema  Respiratory: Lungs clear to auscultation	  Psychiatry: A & O x 3, Mood & affect appropriate  Gastrointestinal:  Soft, Non-tender, + BS	  Skin: No rashes, No ecchymoses, No cyanosis	  Neurologic: Non-focal  Extremities: Normal range of motion, No clubbing, cyanosis or edema  Vascular: Peripheral pulses palpable 2+ bilaterally    TELEMETRY: 	    ECG:  	  RADIOLOGY:  OTHER: 	  	  LABS:	 	    CARDIAC MARKERS:                              10.4   16.6  )-----------( 217      ( 27 Sep 2018 13:10 )             33.4     09-27    128<L>  |  94<L>  |  21  ----------------------------<  130<H>  4.5   |  26  |  0.59    Ca    8.5      27 Sep 2018 13:10    TPro  7.0  /  Alb  2.2<L>  /  TBili  0.4  /  DBili  x   /  AST  9<L>  /  ALT  6<L>  /  AlkPhos  128<H>  09-27    proBNP:   Lipid Profile:   HgA1c:   TSH:   PT/INR - ( 27 Sep 2018 13:10 )   PT: 12.6 sec;   INR: 1.16 ratio         PTT - ( 27 Sep 2018 13:10 )  PTT:29.1 sec    PREVIOUS DIAGNOSTIC TESTING:    < from: Transthoracic Echocardiogram (04.16.18 @ 07:08) >  1. Normal left ventricular internal dimensions and wall  thicknesses.  2. Normal left ventricular systolic function. No segmental  wall motion abnormalities.  3. Normal diastolic function  4. Normal right ventricular sizeand systolic function.  5. Estimated pulmonary artery systolic pressure equals 36  mm Hg, assuming right atrial pressure equals 8 mm Hg,  consistent with borderline pulmonary pressures.    < from: Nuclear Stress Test-Pharmacologic (05.15.18 @ 12:21) >  * The left ventricle was normal in size.  * Tracer uptake was homogeneous throughout the left  ventricle.  *Normal study; no evidence for myocardial infarction or  ischemia.  * Gated wall motion analysis was performed, and shows  normal wall motion.    < end of copied text >

## 2018-09-27 NOTE — H&P ADULT - HISTORY OF PRESENT ILLNESS
78F Nursing home patient hx of rectal mass,   s/p resection and colostomy placement,  CAD, HTN,   DM  , R AKA for gangrenous leg   presents with  h/o f ailure to thrive/  c/c cachexia/  bed  bound . Nursing home physician wants pt to be admitted for peg placement, per  familys  request . Pt has GERD with esophagitis which also contributes to difficulty feeding patient. Daughter says pt is 77 lbs now and would like to maintain nourishment via peg. Pt is non-verbal ,  Take morphine 15mg ext release,  for pain management.  seen in  er/  wak/listless

## 2018-09-27 NOTE — H&P ADULT - ASSESSMENT
pt with htn, hld, dm 2, cad, htn,   pad, right femoral A graft. occluded,/ gangrene.  right  leg  amputation    of rectal cancer, s/p RT/ colostomy  also  with lung mass on ct , ?  primary lung cancer,  s/p   ab.  for  rectal abscesses, on prior  admission    now  admitted  for  peg, at  family's request, hence  pt   sent by  n home  dr,  to  er   ct abdomen /  chest, pending    dvt ppx,  pain meds/    pt  with  elevated  wbc/  hyponatremia/ elevated  alk phosphatidase  on iv n  saline   zosyn   ID/ oncology  eval  gi  to  see pt  pt  at  present, not   cleared   for  peg  pt  is  dnr  will have  palliative  care   reconsult  pt/  dr murray is  familiar  with  pt/  pt  is  ideal   candidate  for  hospice  pt  with c/c  cachexia/ from malignancy/ bed  bound/ functional quadriplegia pt with htn, hld, dm 2, cad, htn,   pad, right femoral A graft. occluded,/ gangrene.  right  leg  amputation    of rectal cancer, s/p RT/ colostomy  also  with lung mass on ct , ?  primary lung cancer,  s/p   ab.  for  rectal abscesses, on prior  admission    now  admitted  for  peg, at  family's request, hence  pt   sent by  n home  dr,  to  er   ct abdomen ,  pending    dvt ppx,  pain meds/    pt  with  elevated  wbc/  hyponatremia/ elevated  alk phosphatidase  h/o  rectal  abscesses / collections  on iv n  saline   zosyn   ID/ oncology  eval  gi  to  see pt  pt  at  present, not   cleared   for  peg  pt  is  dnr  will have  palliative  care   reconsult  pt/  dr murray is  familiar  with  pt/  pt  is  ideal   candidate  for  hospice  pt  with c/c  cachexia/ from malignancy/ bed  bound/ functional quadriplegia pt with htn, hld, dm 2, cad, htn,   pad, right femoral A graft. occluded,/ gangrene.  right  leg  amputation    of rectal cancer, s/p RT/ colostomy  also  with lung mass on ct , ?  primary lung cancer,  s/p   ab.  for  rectal abscesses, on prior  admission    now  admitted  for  peg, at  family's request, hence  pt   sent by  n home  dr,  to  er   ct abdomen ,  pending    dvt ppx,  pain meds/    pt  with  elevated  wbc/  hyponatremia/ elevated  alk phosphatidase  h/o  rectal  abscesses / collections  on iv n  saline   zosyn   ID/ oncology  eval  gi  to  see pt  pt  at  present, not   cleared   for  peg  pt  is  dnr  will have  palliative  care   reconsult  pt/  dr murray is  familiar  with  pt/  pt  is  ideal   candidate  for  hospice  pt  with c/c  cachexia/ from malignancy/ bed  bound/ functional quadriplegia    addendum, 6.15 pm, ct prelim, with  enterovesicluar  fistula/ surg and  urology  eval/ discussed  with np/neha  given pts  rectal cancer  and  pts  poor  performance  status/ agree  with  oncology  for  supportive/ comfort care

## 2018-09-27 NOTE — CONSULT NOTE ADULT - ASSESSMENT
78F Nursing home patient hx of rectal mass,   s/p resection and colostomy placement,  CAD, HTN,   DM  , R AKA for gangrenous leg   presents with  h/o f ailure to thrive/  c/c cachexia/  bed  bound . Nursing home physician wants pt to be admitted for peg placement, per  familys  request . Pt has GERD with esophagitis which also contributes to difficulty feeding patient. Daughter says pt is 77 lbs now and would like to maintain nourishment via peg. Pt is non-verbal ,    Previously followed by me for abscess  s/p long antimicrobial course.    Had been stable off abx to my knowledge as very limited records from NH sent currently) .  Now transferred for possible PEG placement  Mild leucocytosis with 4% bands  Clinically stable   Would recommend check blood cx, urine cx, CT abd pelvis.  Continue empiric Invanz for now.  Hold off on PEG till above work up is done  Will tailor plan for ID issues  per course,results.Will defer to primary team on management of other issues.  Case d/w Dr Horta,med NP  Will Follow.  Beeper [id number]-gdft/afterhours/No response-[id number]

## 2018-09-27 NOTE — H&P ADULT - NSHPLABSRESULTS_GEN_ALL_CORE
LABS:                        10.4   16.6  )-----------( 217      ( 27 Sep 2018 13:10 )             33.4     09-27    128<L>  |  94<L>  |  21  ----------------------------<  130<H>  4.5   |  26  |  0.59    Ca    8.5      27 Sep 2018 13:10    TPro  7.0  /  Alb  2.2<L>  /  TBili  0.4  /  DBili  x   /  AST  9<L>  /  ALT  6<L>  /  AlkPhos  128<H>  09-27    PT/INR - ( 27 Sep 2018 13:10 )   PT: 12.6 sec;   INR: 1.16 ratio         PTT - ( 27 Sep 2018 13:10 )  PTT:29.1 sec

## 2018-09-27 NOTE — CONSULT NOTE ADULT - SUBJECTIVE AND OBJECTIVE BOX
Patient is a 78y Female     Patient is a 78y old  Female who presents with a chief complaint of sent for    peg/ admitted  by  marcial mario in past (27 Sep 2018 16:00)      HPI:  78F Nursing home patient hx of rectal mass,   s/p resection and colostomy placement,  CAD, HTN,   DM  , R AKA for gangrenous leg   presents with  h/o f ailure to thrive/  c/c cachexia/  bed  bound . Nursing home physician wants pt to be admitted for peg placement, per  familys  request . Pt has GERD with esophagitis which also contributes to difficulty feeding patient. Daughter says pt is 77 lbs now and would like to maintain nourishment via peg. Pt is non-verbal ,  Take morphine 15mg ext release,  for pain management.  seen in  er/  wak/listless (27 Sep 2018 14:22)      PAST MEDICAL & SURGICAL HISTORY:  Rectal mass  Gangrene of foot  Coronary artery disease involving native coronary artery of native heart without angina pectoris  Status post above knee amputation of right lower extremity      MEDICATIONS  (STANDING):  atorvastatin 40 milliGRAM(s) Oral at bedtime  carvedilol 6.25 milliGRAM(s) Oral every 12 hours  dronabinol 2.5 milliGRAM(s) Oral two times a day  ertapenem  IVPB 1000 milliGRAM(s) IV Intermittent every 24 hours  heparin  Injectable 5000 Unit(s) SubCutaneous every 12 hours  mirtazapine 15 milliGRAM(s) Oral at bedtime  morphine ER Tablet 15 milliGRAM(s) Oral every 12 hours  sodium chloride 0.9%. 1000 milliLiter(s) (70 mL/Hr) IV Continuous <Continuous>      Allergies    No Known Allergies    Intolerances        SOCIAL HISTORY:  Denies ETOh,Smoking,     FAMILY HISTORY:  No pertinent family history in first degree relatives      REVIEW OF SYSTEMS:    CONSTITUTIONAL: No weakness, fevers or chills  EYES/ENT: No visual changes;  No vertigo or throat pain   NECK: No pain or stiffness  RESPIRATORY: No cough, wheezing, hemoptysis; No shortness of breath  CARDIOVASCULAR: No chest pain or palpitations  GASTROINTESTINAL: No abdominal or epigastric pain. No nausea, vomiting, or hematemesis; No diarrhea or constipation. No melena or hematochezia.  GENITOURINARY: No dysuria, frequency or hematuria  NEUROLOGICAL: No numbness or weakness  SKIN: No itching, burning, rashes, or lesions   All other review of systems is negative unless indicated above.    VITAL:  T(C): , Max: 37.7 (09-27-18 @ 16:29)  T(F): , Max: 99.9 (09-27-18 @ 16:29)  HR: 88 (09-27-18 @ 16:29)  BP: 132/59 (09-27-18 @ 16:29)  BP(mean): --  RR: 17 (09-27-18 @ 16:29)  SpO2: 100% (09-27-18 @ 16:29)  Wt(kg): --    I and O's:        PHYSICAL EXAM:    Constitutional: NAD  HEENT: PERRLA,   Neck: No JVD  Respiratory: CTA B/L  Cardiovascular: S1 and S2  Gastrointestinal: BS+, soft, NT/ND  Extremities: No peripheral edema  Neurological: A/O x 3, no focal deficits  Psychiatric: Normal mood, normal affect  : No Saba  Skin: No rashes  Access: Not applicable  Back: No CVA tenderness    LABS:                        10.4   16.6  )-----------( 217      ( 27 Sep 2018 13:10 )             33.4     09-27    128<L>  |  94<L>  |  21  ----------------------------<  130<H>  4.5   |  26  |  0.59    Ca    8.5      27 Sep 2018 13:10    TPro  7.0  /  Alb  2.2<L>  /  TBili  0.4  /  DBili  x   /  AST  9<L>  /  ALT  6<L>  /  AlkPhos  128<H>  09-27          RADIOLOGY & ADDITIONAL STUDIES:

## 2018-09-27 NOTE — CONSULT NOTE ADULT - SUBJECTIVE AND OBJECTIVE BOX
Patient is a 78y old  Female who presents with a chief complaint of sent for    peg/ admitted  by  marcial mario in past (27 Sep 2018 14:22)    From HPI" HPI:  78F Nursing home patient hx of rectal mass,   s/p resection and colostomy placement,  CAD, HTN,   DM  , R AKA for gangrenous leg   presents with  h/o f ailure to thrive/  c/c cachexia/  bed  bound . Nursing home physician wants pt to be admitted for peg placement, per  familys  request . Pt has GERD with esophagitis which also contributes to difficulty feeding patient. Daughter says pt is 77 lbs now and would like to maintain nourishment via peg. Pt is non-verbal ,  Take morphine 15mg ext release,  for pain management.  seen in  er/  wak/listless (27 Sep 2018 14:22)  "    Above verified-agree with above unless noted below.    ID consulted     PAST MEDICAL & SURGICAL HISTORY:  Rectal mass  Gangrene of foot  Coronary artery disease involving native coronary artery of native heart without angina pectoris  Status post above knee amputation of right lower extremity      Social history:   Not obtainable      FAMILY HISTORY:  Not obtainable     REVIEW OF SYSTEMS  Not obtainable-pt non verbal    Allergies    No Known Allergies    Intolerances        Antimicrobials:    ertapenem  IVPB 1000 milliGRAM(s) IV Intermittent every 24 hours    Other medications reviewed      Vital Signs Last 24 Hrs  T(C): 37.4 (27 Sep 2018 12:15), Max: 37.4 (27 Sep 2018 11:56)  T(F): 99.3 (27 Sep 2018 12:15), Max: 99.4 (27 Sep 2018 11:56)  HR: 90 (27 Sep 2018 12:15) (86 - 90)  BP: 118/60 (27 Sep 2018 12:15) (104/66 - 118/60)  BP(mean): --  RR: 16 (27 Sep 2018 12:15) (16 - 17)  SpO2: 100% (27 Sep 2018 12:15) (99% - 100%)    PHYSICAL EXAM:Pleasant patient in no acute distress.      Constitutional:Comfortable.Awake and alert  + cachexia     Eyes:PERRL .NO discharge or conjunctival injection    ENMT:No sinus tenderness.No thrush.No pharyngeal exudate or erythema.Fair dental hygiene    Neck:Supple,No LN,no JVD      Respiratory:Good air entry bilaterally,CTA    Cardiovascular:S1 S2 wnl, No murmurs,rub or gallops    Gastrointestinal:Soft BS(+) no tenderness colostomy no tenderness ,No rebound or guarding    Genitourinary:No CVA tendereness     Stage 1-2 sacral decub    Extremities:R BKA no erythema or tenderness    Vascular:peripheral pulses felt    Neurological:alert awake but no verbal response(even with interpretor)        Lymph Nodes:No palpable LNs    Musculoskeletal:No joint swelling or LOM              Labs:                            10.4   16.6  )-----------( 217      ( 27 Sep 2018 13:10 )             33.4     WBC Count: 16.6 (09-27-18 @ 13:10)      WBC Count: 14.08 K/uL (07.03.18 @ 09:42)        09-27    128<L>  |  94<L>  |  21  ----------------------------<  130<H>  4.5   |  26  |  0.59    Ca    8.5      27 Sep 2018 13:10    TPro  7.0  /  Alb  2.2<L>  /  TBili  0.4  /  DBili  x   /  AST  9<L>  /  ALT  6<L>  /  AlkPhos  128<H>  09-27        < from: Xray Chest 1 View- PORTABLE-Urgent (09.27.18 @ 14:13) >    IMPRESSION: Clear lungs.          < end of copied text >          Urinalysis + Microscopic Examination (09.27.18 @ 15:15)    Urine Appearance: Turbid    Color: Light Orange    Specific Gravity: 1.020: Performed by Specific Gravity Meter

## 2018-09-27 NOTE — ED PROVIDER NOTE - ATTENDING CONTRIBUTION TO CARE
ATTENDING MD:  I, Royal Blake, personally have seen and examined this patient.  I have discussed all aspects of care with the resident physician. Resident note reviewed and agree on plan of care and except where noted.  See HPI, PE, and MDM for details.    cachectic demented emale, confused but baseline per daughter at bedside. heart sounds regualr, lungs clear, abdomen flat minimally tender. extremities atraumatic. thin    MDM: pain contro, pre-procedural testing will admit to medicine. Dr. Horta accepting before labs but we will monitor.

## 2018-09-27 NOTE — CONSULT NOTE ADULT - ASSESSMENT
pt with sig cardiac and medical hx for possible peg placement due to failure to thrive.  pt had a recent stress and echo, result as above.  pt is clear cardiac wise fotr peg placement.  dvt prophylaxis  ?palliative

## 2018-09-27 NOTE — ED ADULT NURSE NOTE - NSIMPLEMENTINTERV_GEN_ALL_ED
Implemented All Fall with Harm Risk Interventions:  [place] to call system. Call bell, personal items and telephone within reach. Instruct patient to call for assistance. Room bathroom lighting operational. Non-slip footwear when patient is off stretcher. Physically safe environment: no spills, clutter or unnecessary equipment. Stretcher in lowest position, wheels locked, appropriate side rails in place. Provide visual cue, wrist band, yellow gown, etc. Monitor gait and stability. Monitor for mental status changes and reorient to person, place, and time. Review medications for side effects contributing to fall risk. Reinforce activity limits and safety measures with patient and family. Provide visual clues: red socks.

## 2018-09-27 NOTE — CONSULT NOTE ADULT - PROBLEM SELECTOR RECOMMENDATION 9
- s/p incomplete RT and palliative diverting colostomy  - poor PS, not a candidate for DMT  - recommend against PEG-tube as it is unlikely to prolong life or improve patient's quality of life  - palliative care evaluation for GOC - s/p incomplete RT and palliative diverting colostomy  - poor PS, not a candidate for DMT  - recommend against PEG-tube as it is unlikely to prolong life or improve patient's quality of life  - palliative care evaluation for GOC, patient is hospice appropriate - s/p incomplete RT and palliative diverting colostomy  - poor PS, not a candidate for DMT  - recommend against PEG-tube as it is unlikely to prolong life or improve patient's quality of life  - palliative care evaluation for GOC, patient is hospice appropriate  - 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

## 2018-09-28 DIAGNOSIS — Z51.5 ENCOUNTER FOR PALLIATIVE CARE: ICD-10-CM

## 2018-09-28 DIAGNOSIS — R62.7 ADULT FAILURE TO THRIVE: ICD-10-CM

## 2018-09-28 LAB
ANION GAP SERPL CALC-SCNC: 13 MMOL/L — SIGNIFICANT CHANGE UP (ref 5–17)
BUN SERPL-MCNC: 17 MG/DL — SIGNIFICANT CHANGE UP (ref 7–23)
CALCIUM SERPL-MCNC: 8.4 MG/DL — SIGNIFICANT CHANGE UP (ref 8.4–10.5)
CHLORIDE SERPL-SCNC: 100 MMOL/L — SIGNIFICANT CHANGE UP (ref 96–108)
CO2 SERPL-SCNC: 19 MMOL/L — LOW (ref 22–31)
CREAT SERPL-MCNC: 0.5 MG/DL — SIGNIFICANT CHANGE UP (ref 0.5–1.3)
GLUCOSE SERPL-MCNC: 55 MG/DL — LOW (ref 70–99)
HCT VFR BLD CALC: 26.4 % — LOW (ref 34.5–45)
HGB BLD-MCNC: 8.1 G/DL — LOW (ref 11.5–15.5)
MCHC RBC-ENTMCNC: 26.1 PG — LOW (ref 27–34)
MCHC RBC-ENTMCNC: 30.7 GM/DL — LOW (ref 32–36)
MCV RBC AUTO: 84.8 FL — SIGNIFICANT CHANGE UP (ref 80–100)
PLATELET # BLD AUTO: 224 K/UL — SIGNIFICANT CHANGE UP (ref 150–400)
POTASSIUM SERPL-MCNC: 4.2 MMOL/L — SIGNIFICANT CHANGE UP (ref 3.5–5.3)
POTASSIUM SERPL-SCNC: 4.2 MMOL/L — SIGNIFICANT CHANGE UP (ref 3.5–5.3)
RBC # BLD: 3.11 M/UL — LOW (ref 3.8–5.2)
RBC # FLD: 19.3 % — HIGH (ref 10.3–14.5)
SODIUM SERPL-SCNC: 132 MMOL/L — LOW (ref 135–145)
WBC # BLD: 12.6 K/UL — HIGH (ref 3.8–10.5)
WBC # FLD AUTO: 12.6 K/UL — HIGH (ref 3.8–10.5)

## 2018-09-28 PROCEDURE — 99222 1ST HOSP IP/OBS MODERATE 55: CPT

## 2018-09-28 PROCEDURE — 51702 INSERT TEMP BLADDER CATH: CPT

## 2018-09-28 PROCEDURE — 99223 1ST HOSP IP/OBS HIGH 75: CPT

## 2018-09-28 PROCEDURE — 99233 SBSQ HOSP IP/OBS HIGH 50: CPT

## 2018-09-28 PROCEDURE — 99223 1ST HOSP IP/OBS HIGH 75: CPT | Mod: AI

## 2018-09-28 PROCEDURE — 99498 ADVNCD CARE PLAN ADDL 30 MIN: CPT | Mod: 25

## 2018-09-28 PROCEDURE — 99497 ADVNCD CARE PLAN 30 MIN: CPT | Mod: 25

## 2018-09-28 RX ORDER — HYDROMORPHONE HYDROCHLORIDE 2 MG/ML
0.5 INJECTION INTRAMUSCULAR; INTRAVENOUS; SUBCUTANEOUS EVERY 4 HOURS
Qty: 0 | Refills: 0 | Status: DISCONTINUED | OUTPATIENT
Start: 2018-09-28 | End: 2018-09-30

## 2018-09-28 RX ADMIN — MORPHINE SULFATE 15 MILLIGRAM(S): 50 CAPSULE, EXTENDED RELEASE ORAL at 17:11

## 2018-09-28 RX ADMIN — HYDROMORPHONE HYDROCHLORIDE 0.5 MILLIGRAM(S): 2 INJECTION INTRAMUSCULAR; INTRAVENOUS; SUBCUTANEOUS at 16:23

## 2018-09-28 RX ADMIN — Medication 2.5 MILLIGRAM(S): at 05:17

## 2018-09-28 RX ADMIN — ATORVASTATIN CALCIUM 40 MILLIGRAM(S): 80 TABLET, FILM COATED ORAL at 21:52

## 2018-09-28 RX ADMIN — HEPARIN SODIUM 5000 UNIT(S): 5000 INJECTION INTRAVENOUS; SUBCUTANEOUS at 05:17

## 2018-09-28 RX ADMIN — MORPHINE SULFATE 15 MILLIGRAM(S): 50 CAPSULE, EXTENDED RELEASE ORAL at 17:24

## 2018-09-28 RX ADMIN — HYDROMORPHONE HYDROCHLORIDE 0.5 MILLIGRAM(S): 2 INJECTION INTRAMUSCULAR; INTRAVENOUS; SUBCUTANEOUS at 16:57

## 2018-09-28 RX ADMIN — ERTAPENEM SODIUM 120 MILLIGRAM(S): 1 INJECTION, POWDER, LYOPHILIZED, FOR SOLUTION INTRAMUSCULAR; INTRAVENOUS at 16:23

## 2018-09-28 RX ADMIN — MIRTAZAPINE 15 MILLIGRAM(S): 45 TABLET, ORALLY DISINTEGRATING ORAL at 21:52

## 2018-09-28 RX ADMIN — MORPHINE SULFATE 15 MILLIGRAM(S): 50 CAPSULE, EXTENDED RELEASE ORAL at 05:17

## 2018-09-28 RX ADMIN — CARVEDILOL PHOSPHATE 6.25 MILLIGRAM(S): 80 CAPSULE, EXTENDED RELEASE ORAL at 05:17

## 2018-09-28 RX ADMIN — CARVEDILOL PHOSPHATE 6.25 MILLIGRAM(S): 80 CAPSULE, EXTENDED RELEASE ORAL at 17:12

## 2018-09-28 RX ADMIN — MORPHINE SULFATE 15 MILLIGRAM(S): 50 CAPSULE, EXTENDED RELEASE ORAL at 05:47

## 2018-09-28 RX ADMIN — HEPARIN SODIUM 5000 UNIT(S): 5000 INJECTION INTRAVENOUS; SUBCUTANEOUS at 17:12

## 2018-09-28 RX ADMIN — Medication 2.5 MILLIGRAM(S): at 17:12

## 2018-09-28 NOTE — CONSULT NOTE ADULT - CONSULT REQUESTED DATE/TIME
27-Sep-2018 15:47
27-Sep-2018 16:21
27-Sep-2018 16:30
28-Sep-2018 00:43
28-Sep-2018 03:47
28-Sep-2018 17:53
27-Sep-2018

## 2018-09-28 NOTE — CONSULT NOTE ADULT - SUBJECTIVE AND OBJECTIVE BOX
[facility] Colorectal Surgery Consultation     HPI:  78F Nursing home patient hx of rectal mass s/p sigmoid end loop colostomy, CAD, HTN, DM, and R AKA for gangrenous leg presents with h/o failure to thrive, cachexia, being bed bound . Nursing home physician wants pt to be admitted for peg placement, per  family's request. GI is following and will try to accommodate according to their latest notes. Pt has GERD with esophagitis which also contributes to difficulty feeding patient. Daughter says pt is 77 lbs now and would like to maintain nourishment via peg. Pt is non-verbal,  Take morphine 15mg ext release,  for pain management.   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    PAST MEDICAL & SURGICAL HISTORY:  Rectal mass  Gangrene of foot  Coronary artery disease involving native coronary artery of native heart without angina pectoris  Status post above knee amputation of right lower extremity      ROS: 10-point review of systems difficult to obtain due to patient being nonverbal and family not being present.    FAMILY HISTORY:  No pertinent family history in first degree relatives      SOCIAL HISTORY:    MEDICATIONS  (STANDING):  atorvastatin 40 milliGRAM(s) Oral at bedtime  carvedilol 6.25 milliGRAM(s) Oral every 12 hours  dronabinol 2.5 milliGRAM(s) Oral two times a day  ertapenem  IVPB 1000 milliGRAM(s) IV Intermittent every 24 hours  heparin  Injectable 5000 Unit(s) SubCutaneous every 12 hours  mirtazapine 15 milliGRAM(s) Oral at bedtime  morphine ER Tablet 15 milliGRAM(s) Oral every 12 hours  sodium chloride 0.9%. 1000 milliLiter(s) (70 mL/Hr) IV Continuous <Continuous>    MEDICATIONS  (PRN):    Allergies    No Known Allergies    Intolerances    Vital Signs Last 24 Hrs  T(C): 36.6 (27 Sep 2018 22:17), Max: 37.7 (27 Sep 2018 16:29)  T(F): 97.9 (27 Sep 2018 22:17), Max: 99.9 (27 Sep 2018 16:29)  HR: 86 (27 Sep 2018 22:17) (86 - 92)  BP: 101/66 (27 Sep 2018 22:17) (98/66 - 132/59)  BP(mean): --  RR: 16 (27 Sep 2018 22:17) (16 - 17)  SpO2: 98% (27 Sep 2018 22:17) (98% - 100%)    Physical Exam:  General: Well developed, thin, No Acute Distress  HEENT: Normocephalic Atraumatic, EOMI bl  Abdominal: Soft, Non-Tender, Non-Distended  Extremities: No Clubbing, cyanosis or edema, FROM  Skin: warm, dry, normal color, no rash or abnormal lesions    LABS:                      10.4   16.6  )-----------( 217      ( 27 Sep 2018 13:10 )             33.4         128<L>  |  94<L>  |  21  ----------------------------<  130<H>  4.5   |  26  |  0.59    Ca    8.5      27 Sep 2018 13:10    TPro  7.0  /  Alb  2.2<L>  /  TBili  0.4  /  DBili  x   /  AST  9<L>  /  ALT  6<L>  /  AlkPhos  128<H>      PT/INR - ( 27 Sep 2018 13:10 )   PT: 12.6 sec;   INR: 1.16 ratio         PTT - ( 27 Sep 2018 13:10 )  PTT:29.1 sec  Urinalysis Basic - ( 27 Sep 2018 15:15 )    Color: Light Orange / Appearance: Turbid / S.020 / pH: x  Gluc: x / Ketone: Trace  / Bili: Negative / Urobili: Normal   Blood: x / Protein: >300 mg/dl / Nitrite: Negative   Leuk Esterase: Large / RBC: >50 /hpf / WBC >50 /hpf   Sq Epi: x / Non Sq Epi: x / Bacteria: Many        Radiographic Findings:     < from: CT Abdomen and Pelvis w/ Oral Cont and w/ IV Cont (18 @ 17:23) >  ******PRELIMINARY REPORT******    ******PRELIMINARY REPORT******              INTERPRETATION:  interval development of intraluminal air in the bladder   with focal wall defect adjacent to the pt's large rectal mass (series 3   image [id number]), concerncing for enterovesicular fistula.   bladder wall thicking likely representing cystitis  New moderate left hydronephrosis with urothelial hyperenhancement,   consistant with ascending pyelitis, likely due to the enterovesicular   fistula or separate ureteral fistula to the mass.              ******PRELIMINARY REPORT******    ******PRELIMINARY REPORT******          < end of copied text > [facility] Colorectal Surgery Consultation     HPI:  78F Nursing home patient hx of rectal mass s/p sigmoid end loop colostomy, CAD, HTN, DM, and R AKA for gangrenous leg presents with h/o failure to thrive, cachexia, being bed bound . Nursing home physician wants pt to be admitted for peg placement, per  family's request. GI is following and will try to accommodate according to their latest notes. Pt has GERD with esophagitis which also contributes to difficulty feeding patient. Daughter says pt is 77 lbs now and would like to maintain nourishment via peg. Pt is non-verbal,  Take morphine 15mg ext release,  for pain management.   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    PAST MEDICAL & SURGICAL HISTORY:  Rectal mass  Gangrene of foot  Coronary artery disease involving native coronary artery of native heart without angina pectoris  Status post above knee amputation of right lower extremity      ROS: 10-point review of systems difficult to obtain due to patient being nonverbal and family not being present.    FAMILY HISTORY:  No pertinent family history in first degree relatives      SOCIAL HISTORY:    MEDICATIONS  (STANDING):  atorvastatin 40 milliGRAM(s) Oral at bedtime  carvedilol 6.25 milliGRAM(s) Oral every 12 hours  dronabinol 2.5 milliGRAM(s) Oral two times a day  ertapenem  IVPB 1000 milliGRAM(s) IV Intermittent every 24 hours  heparin  Injectable 5000 Unit(s) SubCutaneous every 12 hours  mirtazapine 15 milliGRAM(s) Oral at bedtime  morphine ER Tablet 15 milliGRAM(s) Oral every 12 hours  sodium chloride 0.9%. 1000 milliLiter(s) (70 mL/Hr) IV Continuous <Continuous>    MEDICATIONS  (PRN):    Allergies    No Known Allergies    Intolerances    Vital Signs Last 24 Hrs  T(C): 36.6 (27 Sep 2018 22:17), Max: 37.7 (27 Sep 2018 16:29)  T(F): 97.9 (27 Sep 2018 22:17), Max: 99.9 (27 Sep 2018 16:29)  HR: 86 (27 Sep 2018 22:17) (86 - 92)  BP: 101/66 (27 Sep 2018 22:17) (98/66 - 132/59)  BP(mean): --  RR: 16 (27 Sep 2018 22:17) (16 - 17)  SpO2: 98% (27 Sep 2018 22:17) (98% - 100%)    Physical Exam:  General: Well developed, thin, No Acute Distress  HEENT: Normocephalic Atraumatic, EOMI bl  Abdominal: Soft, Non-Tender, Non-Distended, ostomy with gas and stool  Extremities: No Clubbing, cyanosis or edema, FROM  Skin: warm, dry, normal color, no rash or abnormal lesions    LABS:                      10.4   16.6  )-----------( 217      ( 27 Sep 2018 13:10 )             33.4         128<L>  |  94<L>  |  21  ----------------------------<  130<H>  4.5   |  26  |  0.59    Ca    8.5      27 Sep 2018 13:10    TPro  7.0  /  Alb  2.2<L>  /  TBili  0.4  /  DBili  x   /  AST  9<L>  /  ALT  6<L>  /  AlkPhos  128<H>      PT/INR - ( 27 Sep 2018 13:10 )   PT: 12.6 sec;   INR: 1.16 ratio         PTT - ( 27 Sep 2018 13:10 )  PTT:29.1 sec  Urinalysis Basic - ( 27 Sep 2018 15:15 )    Color: Light Orange / Appearance: Turbid / S.020 / pH: x  Gluc: x / Ketone: Trace  / Bili: Negative / Urobili: Normal   Blood: x / Protein: >300 mg/dl / Nitrite: Negative   Leuk Esterase: Large / RBC: >50 /hpf / WBC >50 /hpf   Sq Epi: x / Non Sq Epi: x / Bacteria: Many        Radiographic Findings:     < from: CT Abdomen and Pelvis w/ Oral Cont and w/ IV Cont (18 @ 17:23) >  ******PRELIMINARY REPORT******    ******PRELIMINARY REPORT******              INTERPRETATION:  interval development of intraluminal air in the bladder   with focal wall defect adjacent to the pt's large rectal mass (series 3   image [id number]), concerncing for enterovesicular fistula.   bladder wall thicking likely representing cystitis  New moderate left hydronephrosis with urothelial hyperenhancement,   consistant with ascending pyelitis, likely due to the enterovesicular   fistula or separate ureteral fistula to the mass.              ******PRELIMINARY REPORT******    ******PRELIMINARY REPORT******          < end of copied text >

## 2018-09-28 NOTE — PROCEDURE NOTE - ADDITIONAL PROCEDURE DETAILS
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 15f placed easily and aseptically without event. 500cc of murky yellow urine drained.   Keep buck for maximal drainage   wit wooten

## 2018-09-28 NOTE — CONSULT NOTE ADULT - PROBLEM SELECTOR RECOMMENDATION 3
Daughter (above) indicated to be the HCP and she stated the HCP form is available in a prior admission. I will review prior records to see if the form is available. Daughter (above) indicated to be the HCP and she stated the HCP form is available in a prior admission. I will review prior records to see if the form is available. If a HCP form is not available, all of the patient's children will be her surrogates.   50' were spent in [facility].   Patient is already DNR

## 2018-09-28 NOTE — CONSULT NOTE ADULT - ASSESSMENT
78 year old female with perforated rectal cancer s/p creation of sigmoid end loop colostomy presents with failure to thrive for possible PEG placement.    Plan/Recommendations  -Ostomy functioning  -Pain control  -OOB and ambulate  -Ostomy care  -Continue care per primary team, colorectal surgery to follow   -D/w Colorectal fellow on behalf of Dr. Ian Ball, PGY-2  Colorectal Surgery  p. 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    Plan/Recommendations  -Ostomy functioning  -Continue care per primary team, colorectal surgery to follow   -Will d/w attending, reccs to follow    NORMA Ball, PGY-2  Colorectal Surgery  p. [phone]

## 2018-09-28 NOTE — PROGRESS NOTE ADULT - ASSESSMENT
Procedure cancelled today as pt "not medical clearanced" by Dr. Montgomery (cleared by Emelia Tracy yesterday and sent from [facility] for peg today).  I agree with ID generally, there is no use of repeat imaging,(ct) as pt with tumor in rectum which is known and not curable.  I had three long discussions with different family members regarding their wishes that the patient does not die of starvation.  They understand the prognosis, but are   struggling as a family to be vigilant and "make sure the patient eats."  I believe it is somwhat of a cultural issue They feel a peg will be beneficial  They understand the risk and benefit of the procedure and the patient's prognosis.  I defer to Dr. Montgomery regarding another Palliative Consult (see prior discussion/ prior hospice discussions both at [place] and [facility] over the past three hospitalizations)  I will be available for peg if/when medically cleared by Dr. Montgomery.

## 2018-09-28 NOTE — CONSULT NOTE ADULT - ASSESSMENT
78F Nursing home patient with locally advanced rectal cancer s/p palliative RT that was aborted 2/2 development of pelvic abscesses treated with prolonged course of IV Zosyn, hx right AKA for LE gangrene, CAD, HTN, DM, who is s/p diverting colostomy for palliation that presented with  failure to thrive and also because the "Nursing home physician" wanted the  pt to be admitted for peg placement, per  family's  request . As per the patient's daughter the patient has had poor appetite, difficulty swallowing ( she keeps food in her mouth and does not swallow it ). As per EMR, " Pt has GERD with esophagitis which also contributes to difficulty feeding patient."  She takes morphine 15mg ext release,  for pain management.  As per Onc, "Patient has been deemed not a candidate for disease modifying therapy and hospice appropriate in the past. Patient now presents from nursing home for PEG placement per family request. Patient is cachectic with failure to thrive, weight loss. Patient had advanced cognitive dysfunction and is an unreliable historian." Palliative care was called for [facility] in regards to PEG placement.

## 2018-09-28 NOTE — CONSULT NOTE ADULT - ASSESSMENT
patient 77y/o F w/ multiple medical hx, non- verbal, consult for now CT founding of air in the bladder, and L HN. unable to find any urinary hx, or Saba hx. Patient incontinence. Normal Cr level    Plan  Saba placement   Will talk to family for more hx  will re-evaluation the patient   rest of care per primary team patient 77y/o F w/ multiple medical hx, non- verbal, consult for now CT founding of air in the bladder, and L HN. unable to find any urinary hx, or Saba hx. Patient incontinence. Normal Cr level    Plan  Leave Saba placement  Reassess hydro in 3-5 days if pt still in house  Given current condition would recommend Goals of care discussion with family    rest of care per primary team

## 2018-09-28 NOTE — CONSULT NOTE ADULT - SUBJECTIVE AND OBJECTIVE BOX
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 Nursing home physician wants pt to be admitted for peg placement. Pt has GERD with esophagitis which also contributes to difficulty feeding patient. Daughter says pt is 77 lbs now and would like to maintain nourishment via peg. Pt is non-verbal but able to point to abdomen when asked if in pain. Take morphine 15mg ext release morning and night with tylenol in between for pain management. Denies pain anywhere else. Uncooperative with rest of review of system.  urology call for evaluation the CT founding air in the bladder with focal wall defect adjacent to the pt's large rectal mass, bladder wall thicking likely representing cystitis New moderate left hydronephrosis with urothelial hyperenhancement.   patient is nonverbal, no family at bed side. all the information got from patient care team notes.          PAST MEDICAL & SURGICAL HISTORY:  Rectal mass  Gangrene of foot  Coronary artery disease involving native coronary artery of native heart without angina pectoris  Status post above knee amputation of right lower extremity    FAMILY HISTORY:  No pertinent family history in first degree relatives    SOCIAL HISTORY:   Tobacco hx:  MEDICATIONS  (STANDING):  atorvastatin 40 milliGRAM(s) Oral at bedtime  carvedilol 6.25 milliGRAM(s) Oral every 12 hours  dronabinol 2.5 milliGRAM(s) Oral two times a day  ertapenem  IVPB 1000 milliGRAM(s) IV Intermittent every 24 hours  heparin  Injectable 5000 Unit(s) SubCutaneous every 12 hours  mirtazapine 15 milliGRAM(s) Oral at bedtime  morphine ER Tablet 15 milliGRAM(s) Oral every 12 hours  sodium chloride 0.9%. 1000 milliLiter(s) (70 mL/Hr) IV Continuous <Continuous>    MEDICATIONS  (PRN):    Allergies    No Known Allergies    Intolerances        REVIEW OF SYSTEMS: Pertinent positives and negatives as stated in HPI, otherwise negative    Vital signs  T(C): 36.6 (18 @ 22:17), Max: 37.7 (18 @ 16:29)  HR: 86 (18 @ 22:17)  BP: 101/66 (18 @ 22:17)  SpO2: 98% (18 @ 22:17)  Wt(kg): --    Output      Physical Exam  Gen: NAD  Pulm: No respiratory distress, no subcostal retractions  CV: RRR, no JVD  Abd: Soft, NT, ND, colostomy in place  : diaper on, dry and clean. (Had one time clear urine, and one time light pink urine due to RN at bed side)  MSK: No edema present    LABS:         @ 13:10    WBC 16.6  / Hct 33.4  / SCr 0.59         128<L>  |  94<L>  |  21  ----------------------------<  130<H>  4.5   |  26  |  0.59    Ca    8.5      27 Sep 2018 13:10    TPro  7.0  /  Alb  2.2<L>  /  TBili  0.4  /  DBili  x   /  AST  9<L>  /  ALT  6<L>  /  AlkPhos  128<H>      PT/INR - ( 27 Sep 2018 13:10 )   PT: 12.6 sec;   INR: 1.16 ratio         PTT - ( 27 Sep 2018 13:10 )  PTT:29.1 sec  Urinalysis Basic - ( 27 Sep 2018 15:15 )    Color: Light Orange / Appearance: Turbid / S.020 / pH: x  Gluc: x / Ketone: Trace  / Bili: Negative / Urobili: Normal   Blood: x / Protein: >300 mg/dl / Nitrite: Negative   Leuk Esterase: Large / RBC: >50 /hpf / WBC >50 /hpf   Sq Epi: x / Non Sq Epi: x / Bacteria: Many        Urine Cx:   Blood Cx:    Radiology:  < from: CT Abdomen and Pelvis w/ Oral Cont and w/ IV Cont (18 @ 17:23) >  INTERPRETATION:  interval development of intraluminal air in the bladder   with focal wall defect adjacent to the pt's large rectal mass (series 3   image [id number]), concerncing for enterovesicular fistula.   bladder wall thicking likely representing cystitis  New moderate left hydronephrosis with urothelial hyperenhancement,   consistant with ascending pyelitis, likely due to the enterovesicular   fistula or separate ureteral fistula to the mass.    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 Nursing home physician wants pt to be admitted for peg placement. Pt has GERD with esophagitis which also contributes to difficulty feeding patient. Daughter says pt is 77 lbs now and would like to maintain nourishment via peg. Pt is non-verbal but able to point to abdomen when asked if in pain. Take morphine 15mg ext release morning and night with tylenol in between for pain management. Denies pain anywhere else. Uncooperative with rest of review of system.  urology call for evaluation the CT finding of air in the bladder with focal wall defect adjacent to the pt's large rectal mass, bladder wall thickening likely representing fistula vs infectious process New moderate left hydronephrosis with urothelial hyperenhancement.   patient is nonverbal, no family at bed side. all the information got from patient care team notes.          PAST MEDICAL & SURGICAL HISTORY:  Rectal mass  Gangrene of foot  Coronary artery disease involving native coronary artery of native heart without angina pectoris  Status post above knee amputation of right lower extremity    FAMILY HISTORY:  No pertinent family history in first degree relatives    SOCIAL HISTORY:   Tobacco hx:  MEDICATIONS  (STANDING):  atorvastatin 40 milliGRAM(s) Oral at bedtime  carvedilol 6.25 milliGRAM(s) Oral every 12 hours  dronabinol 2.5 milliGRAM(s) Oral two times a day  ertapenem  IVPB 1000 milliGRAM(s) IV Intermittent every 24 hours  heparin  Injectable 5000 Unit(s) SubCutaneous every 12 hours  mirtazapine 15 milliGRAM(s) Oral at bedtime  morphine ER Tablet 15 milliGRAM(s) Oral every 12 hours  sodium chloride 0.9%. 1000 milliLiter(s) (70 mL/Hr) IV Continuous <Continuous>    MEDICATIONS  (PRN):    Allergies    No Known Allergies    Intolerances        REVIEW OF SYSTEMS: Pertinent positives and negatives as stated in HPI, otherwise negative    Vital signs  T(C): 36.6 (18 @ 22:17), Max: 37.7 (18 @ 16:29)  HR: 86 (18 @ 22:17)  BP: 101/66 (18 @ 22:17)  SpO2: 98% (18 @ 22:17)  Wt(kg): --    Output      Physical Exam  Gen: NAD  Pulm: No respiratory distress, no subcostal retractions  CV: RRR, no JVD  Abd: Soft, NT, ND, colostomy in place  : diaper on, dry and clean. (Had one time clear urine, and one time light pink urine due to RN at bed side)  MSK: No edema present    LABS:         @ 13:10    WBC 16.6  / Hct 33.4  / SCr 0.59         128<L>  |  94<L>  |  21  ----------------------------<  130<H>  4.5   |  26  |  0.59    Ca    8.5      27 Sep 2018 13:10    TPro  7.0  /  Alb  2.2<L>  /  TBili  0.4  /  DBili  x   /  AST  9<L>  /  ALT  6<L>  /  AlkPhos  128<H>      PT/INR - ( 27 Sep 2018 13:10 )   PT: 12.6 sec;   INR: 1.16 ratio         PTT - ( 27 Sep 2018 13:10 )  PTT:29.1 sec  Urinalysis Basic - ( 27 Sep 2018 15:15 )    Color: Light Orange / Appearance: Turbid / S.020 / pH: x  Gluc: x / Ketone: Trace  / Bili: Negative / Urobili: Normal   Blood: x / Protein: >300 mg/dl / Nitrite: Negative   Leuk Esterase: Large / RBC: >50 /hpf / WBC >50 /hpf   Sq Epi: x / Non Sq Epi: x / Bacteria: Many        Urine Cx:   Blood Cx:    Radiology:  < from: CT Abdomen and Pelvis w/ Oral Cont and w/ IV Cont (18 @ 17:23) >  INTERPRETATION:  interval development of intraluminal air in the bladder   with focal wall defect adjacent to the pt's large rectal mass (series 3   image [id number]), concerncing for enterovesicular fistula.   bladder wall thicking likely representing cystitis  New moderate left hydronephrosis with urothelial hyperenhancement,   consistant with ascending pyelitis, likely due to the enterovesicular   fistula or separate ureteral fistula to the mass.    < end of copied text >

## 2018-09-28 NOTE — CONSULT NOTE ADULT - PROBLEM SELECTOR RECOMMENDATION 2
Not a candidate for DMT. Not a candidate for DMT.  Patient is hospice eligible and I will try to further discuss it with her family next week.

## 2018-09-28 NOTE — CONSULT NOTE ADULT - SUBJECTIVE AND OBJECTIVE BOX
[place]  ID # [id number]  HPI:  [id number] Nursing home patient hx of rectal mass,   s/p resection and colostomy placement,  CAD, HTN,   DM  , R AKA for gangrenous leg   presents with  h/o f ailure to thrive/  c/c cachexia/  bed  bound . Nursing home physician wants pt to be admitted for peg placement, per  familys  request . Pt has GERD with esophagitis which also contributes to difficulty feeding patient. Daughter says pt is 77 lbs now and would like to maintain nourishment via peg. Pt is non-verbal ,  Take morphine 15mg ext release,  for pain management.  seen in  er/  wak/listless (27 Sep 2018 14:22)    PERTINENT PM/SXH:   Rectal mass  Gangrene of foot  Coronary artery disease involving native coronary artery of native heart without angina pectoris    Status post above knee amputation of right lower extremity  No significant past surgical history    FAMILY HISTORY:  No pertinent family history in first degree relatives    ITEMS NOT CHECKED ARE NOT PRESENT    SOCIAL HISTORY:   Significant other/partner:  [ ]  Children:  [ ]  Presybeterian/Spirituality:  Substance hx:  [ ]   Tobacco hx:  [ ]   Alcohol hx: [ ]   Home Opioid hx:  [ ] I-Stop Reference No:  Living Situation: [ ]Home  [ ]Long term care  [ ]Rehab [ ]Other    ADVANCE DIRECTIVES:    DNR  Yes  MOLST  [ ]  Living Will  [ ]   DECISION MAKER(s):  [ ] Health Care Proxy(s)  [ ] Surrogate(s)  [ ] Guardian           Name(s): Phone Number(s):    BASELINE (I)ADL(s) (prior to admission):  [place]: [ ]Total  [ ] Moderate [ ]Dependent    Allergies    No Known Allergies    Intolerances    MEDICATIONS  (STANDING):  atorvastatin 40 milliGRAM(s) Oral at bedtime  carvedilol 6.25 milliGRAM(s) Oral every 12 hours  dronabinol 2.5 milliGRAM(s) Oral two times a day  ertapenem  IVPB 1000 milliGRAM(s) IV Intermittent every 24 hours  heparin  Injectable 5000 Unit(s) SubCutaneous every 12 hours  mirtazapine 15 milliGRAM(s) Oral at bedtime  morphine ER Tablet 15 milliGRAM(s) Oral every 12 hours  sodium chloride 0.9%. 1000 milliLiter(s) (70 mL/Hr) IV Continuous <Continuous>    MEDICATIONS  (PRN):  HYDROmorphone  Injectable 0.5 milliGRAM(s) IV Push every 4 hours PRN Severe Pain (7 - 10)    PRESENT SYMPTOMS: [ ]Unable to obtain due to poor mentation   Source if other than patient:  [ ]Family   [ ]Team     Pain (Impact on QOL):    Location -         Minimal acceptable level (0-10 scale):                    Aggravating factors -  Quality -  Radiation -  Severity (0-10 scale) -    Timing -    PAIN AD Score:     http://geriatrictoolkit.[facility]/cog/painad.pdf (press ctrl +  left click to view)    Dyspnea:                           [ ]Mild [ ]Moderate [ ]Severe  Anxiety:                             [ ]Mild [ ]Moderate [ ]Severe  Fatigue:                             [ ]Mild [ ]Moderate [ ]Severe  Nausea:                             [ ]Mild [ ]Moderate [ ]Severe  Loss of appetite:              [ ]Mild [ ]Moderate [ ]Severe  Constipation:                    [ ]Mild [ ]Moderate [ ]Severe    Other Symptoms:  [ ]All other review of systems negative     Karnofsky Performance Score/Palliative Performance Status Version 2:         %    http://palliative.info/resource_material/PPSv2.pdf  PHYSICAL EXAM:  Vital Signs Last 24 Hrs  T(C): 36.3 (28 Sep 2018 11:30), Max: 36.7 (28 Sep 2018 06:09)  T(F): 97.4 (28 Sep 2018 11:30), Max: 98.1 (28 Sep 2018 06:09)  HR: 81 (28 Sep 2018 11:30) (81 - 86)  BP: 111/74 (28 Sep 2018 11:30) (99/64 - 111/74)  BP(mean): --  RR: 19 (28 Sep 2018 11:30) (16 - 19)  SpO2: 98% (28 Sep 2018 11:30) (98% - 98%) I&O's Summary    27 Sep 2018 07:  -  28 Sep 2018 07:00  --------------------------------------------------------  IN: 0 mL / OUT: 1 mL / NET: -1 mL    28 Sep 2018 07:  -  28 Sep 2018 17:54  --------------------------------------------------------  IN: 390 mL / OUT: 400 mL / NET: -10 mL    GENERAL:  [ ]Alert  [ ]Oriented x   [ ]Lethargic  [ ]Cachexia  [ ]Unarousable  [ ]Verbal  [ ]Non-Verbal  Behavioral:   [ ] Anxiety  [ ] Delirium [ ] Agitation [ ] Other  HEENT:  [ ]Normal   [ ]Dry mouth   [ ]ET Tube/Trach  [ ]Oral lesions  PULMONARY:   [ ]Clear [ ]Tachypnea  [ ]Audible excessive secretions   [ ]Rhonchi        [ ]Right [ ]Left [ ]Bilateral  [ ]Crackles        [ ]Right [ ]Left [ ]Bilateral  [ ]Wheezing     [ ]Right [ ]Left [ ]Bilateral  CARDIOVASCULAR:    [ ]Regular [ ]Irregular [ ]Tachy  [ ]Robert [ ]Murmur [ ]Other  GASTROINTESTINAL:  [ ]Soft  [ ]Distended   [ ]+BS  [ ]Non tender [ ]Tender  [ ]PEG [ ]OGT/ NGT  Last BM:   18 @ 07:01  -  18 @ 07:00  --------------------------------------------------------  OUT: 1 mL    GENITOURINARY:  [ ]Normal [ ] Incontinent   [ ]Oliguria/Anuria   [ ]Saba  MUSCULOSKELETAL:   [ ]Normal   [ ]Weakness  [ ]Bed/Wheelchair bound [ ]Edema  NEUROLOGIC:   [ ]No focal deficits  [ ] Cognitive impairment  [ ] Dysphagia [ ]Dysarthria [ ] Paresis [ ]Other   SKIN:   [ ]Normal   [ ]Pressure ulcer(s)  [ ]Rash    CRITICAL CARE:  [ ] Shock Present  [ ]Septic [ ]Cardiogenic [ ]Neurologic [ ]Hypovolemic  [ ]  Vasopressors [ ]  Inotropes   [ ] Respiratory failure present  [ ] Acute  [ ] Chronic [ ] Hypoxic  [ ] Hypercarbic [ ] Other  [ ] Other organ failure     LABS:                        8.1    12.6  )-----------( 224      ( 28 Sep 2018 12:46 )             26.4       132<L>  |  100  |  17  ----------------------------<  55<L>  4.2   |  19<L>  |  0.50    Ca    8.4      28 Sep 2018 07:07    TPro  7.0  /  Alb  2.2<L>  /  TBili  0.4  /  DBili  x   /  AST  9<L>  /  ALT  6<L>  /  AlkPhos  128<H>    PT/INR - ( 27 Sep 2018 13:10 )   PT: 12.6 sec;   INR: 1.16 ratio         PTT - ( 27 Sep 2018 13:10 )  PTT:29.1 sec    Urinalysis Basic - ( 27 Sep 2018 15:15 )    Color: Light Orange / Appearance: Turbid / S.020 / pH: x  Gluc: x / Ketone: Trace  / Bili: Negative / Urobili: Normal   Blood: x / Protein: >300 mg/dl / Nitrite: Negative   Leuk Esterase: Large / RBC: >50 /hpf / WBC >50 /hpf   Sq Epi: x / Non Sq Epi: x / Bacteria: Many      RADIOLOGY & ADDITIONAL STUDIES:    PROTEIN CALORIE MALNUTRITION PRESENT: [ ] Yes [ ] No  [ ] PPSV2 < or = to 30% [ ] significant weight loss  [ ] poor nutritional intake [ ] catabolic state [ ] anasarca     Albumin, Serum: 2.2 g/dL (18 @ 13:10)  Artificial Nutrition [ ]     REFERRALS:   [ ]Chaplaincy  [ ] Hospice  [ ]Child Life  [ ]Social Work  [ ]Case management [ ]Holistic Therapy   Goals of Care Discussion Document: [place]  ID # [id number]  HPI:  [id number] Nursing home patient with locally advanced rectal cancer s/p palliative RT that was aborted 2/2 development of pelvic abscesses treated with prolonged course of IV Zosyn, hx right AKA for LE gangrene, CAD, HTN, DM, who is s/p diverting colostomy for palliation that presented with  failure to thrive and also because the "Nursing home physician" wanted the  pt to be admitted for peg placement, per  family's  request . As per the patient's daughter the patient has had poor appetite, difficulty swallowing ( she keeps food in her mouth and does not swallow it ). As per EMR, " Pt has GERD with esophagitis which also contributes to difficulty feeding patient."  She takes morphine 15mg ext release,  for pain management.  As per Onc, "Patient has been deemed not a candidate for disease modifying therapy and hospice appropriate in the past. Patient now presents from nursing home for PEG placement per family request. Patient is cachectic with failure to thrive, weight loss. Patient had advanced cognitive dysfunction and is an unreliable historian." Palliative care was called for GOC in regards to PEG placement.      PERTINENT PM/SXH:   Rectal mass  Gangrene of foot  Coronary artery disease involving native coronary artery of native heart without angina pectoris    Status post above knee amputation of right lower extremity  No significant past surgical history    FAMILY HISTORY:  No pertinent family history in first degree relatives    ITEMS NOT CHECKED ARE NOT PRESENT    SOCIAL HISTORY:   Significant other/partner:  [ ]  Children:  [ ]  Jew/Spirituality:  Substance hx:  [ ]   Tobacco hx:  [ ]   Alcohol hx: [ ]   Home Opioid hx:  [ ] I-Stop Reference No:  Living Situation: [ ]Home  [ ]Long term care  [ ]Rehab [ ]Other    ADVANCE DIRECTIVES:    DNR  Yes  MOLST  [ ]  Living Will  [ ]   DECISION MAKER(s):  [ ] Health Care Proxy(s)  [ ] Surrogate(s)  [ ] Guardian           Name(s): Phone Number(s):    BASELINE (I)ADL(s) (prior to admission):  [place]: [ ]Total  [ ] Moderate [ ]Dependent    Allergies    No Known Allergies    Intolerances    MEDICATIONS  (STANDING):  atorvastatin 40 milliGRAM(s) Oral at bedtime  carvedilol 6.25 milliGRAM(s) Oral every 12 hours  dronabinol 2.5 milliGRAM(s) Oral two times a day  ertapenem  IVPB 1000 milliGRAM(s) IV Intermittent every 24 hours  heparin  Injectable 5000 Unit(s) SubCutaneous every 12 hours  mirtazapine 15 milliGRAM(s) Oral at bedtime  morphine ER Tablet 15 milliGRAM(s) Oral every 12 hours  sodium chloride 0.9%. 1000 milliLiter(s) (70 mL/Hr) IV Continuous <Continuous>    MEDICATIONS  (PRN):  HYDROmorphone  Injectable 0.5 milliGRAM(s) IV Push every 4 hours PRN Severe Pain (7 - 10)    PRESENT SYMPTOMS: [ ]Unable to obtain due to poor mentation   Source if other than patient:  [ ]Family   [ ]Team     Pain (Impact on QOL):    Location -         Minimal acceptable level (0-10 scale):                    Aggravating factors -  Quality -  Radiation -  Severity (0-10 scale) -    Timing -    PAIN AD Score:     http://geriatrictoolkit.[facility]/cog/painad.pdf (press ctrl +  left click to view)    Dyspnea:                           [ ]Mild [ ]Moderate [ ]Severe  Anxiety:                             [ ]Mild [ ]Moderate [ ]Severe  Fatigue:                             [ ]Mild [ ]Moderate [ ]Severe  Nausea:                             [ ]Mild [ ]Moderate [ ]Severe  Loss of appetite:              [ ]Mild [ ]Moderate [ ]Severe  Constipation:                    [ ]Mild [ ]Moderate [ ]Severe    Other Symptoms:  [ ]All other review of systems negative     Karnofsky Performance Score/Palliative Performance Status Version 2:         %    http://palliative.info/resource_material/PPSv2.pdf  PHYSICAL EXAM:  Vital Signs Last 24 Hrs  T(C): 36.3 (28 Sep 2018 11:30), Max: 36.7 (28 Sep 2018 06:09)  T(F): 97.4 (28 Sep 2018 11:30), Max: 98.1 (28 Sep 2018 06:09)  HR: 81 (28 Sep 2018 11:30) (81 - 86)  BP: 111/74 (28 Sep 2018 11:30) (99/64 - 111/74)  BP(mean): --  RR: 19 (28 Sep 2018 11:30) (16 - 19)  SpO2: 98% (28 Sep 2018 11:30) (98% - 98%) I&O's Summary    27 Sep 2018 07:01  -  28 Sep 2018 07:00  --------------------------------------------------------  IN: 0 mL / OUT: 1 mL / NET: -1 mL    28 Sep 2018 07:  -  28 Sep 2018 17:54  --------------------------------------------------------  IN: 390 mL / OUT: 400 mL / NET: -10 mL    GENERAL:  [ ]Alert  [ ]Oriented x   [ ]Lethargic  [ ]Cachexia  [ ]Unarousable  [ ]Verbal  [ ]Non-Verbal  Behavioral:   [ ] Anxiety  [ ] Delirium [ ] Agitation [ ] Other  HEENT:  [ ]Normal   [ ]Dry mouth   [ ]ET Tube/Trach  [ ]Oral lesions  PULMONARY:   [ ]Clear [ ]Tachypnea  [ ]Audible excessive secretions   [ ]Rhonchi        [ ]Right [ ]Left [ ]Bilateral  [ ]Crackles        [ ]Right [ ]Left [ ]Bilateral  [ ]Wheezing     [ ]Right [ ]Left [ ]Bilateral  CARDIOVASCULAR:    [ ]Regular [ ]Irregular [ ]Tachy  [ ]Robert [ ]Murmur [ ]Other  GASTROINTESTINAL:  [ ]Soft  [ ]Distended   [ ]+BS  [ ]Non tender [ ]Tender  [ ]PEG [ ]OGT/ NGT  Last BM:   18 @ 07:01  -  18 @ 07:00  --------------------------------------------------------  OUT: 1 mL    GENITOURINARY:  [ ]Normal [ ] Incontinent   [ ]Oliguria/Anuria   [ ]Saba  MUSCULOSKELETAL:   [ ]Normal   [ ]Weakness  [ ]Bed/Wheelchair bound [ ]Edema  NEUROLOGIC:   [ ]No focal deficits  [ ] Cognitive impairment  [ ] Dysphagia [ ]Dysarthria [ ] Paresis [ ]Other   SKIN:   [ ]Normal   [ ]Pressure ulcer(s)  [ ]Rash    CRITICAL CARE:  [ ] Shock Present  [ ]Septic [ ]Cardiogenic [ ]Neurologic [ ]Hypovolemic  [ ]  Vasopressors [ ]  Inotropes   [ ] Respiratory failure present  [ ] Acute  [ ] Chronic [ ] Hypoxic  [ ] Hypercarbic [ ] Other  [ ] Other organ failure     LABS:                        8.1    12.6  )-----------( 224      ( 28 Sep 2018 12:46 )             26.4       132<L>  |  100  |  17  ----------------------------<  55<L>  4.2   |  19<L>  |  0.50    Ca    8.4      28 Sep 2018 07:07    TPro  7.0  /  Alb  2.2<L>  /  TBili  0.4  /  DBili  x   /  AST  9<L>  /  ALT  6<L>  /  AlkPhos  128<H>    PT/INR - ( 27 Sep 2018 13:10 )   PT: 12.6 sec;   INR: 1.16 ratio         PTT - ( 27 Sep 2018 13:10 )  PTT:29.1 sec    Urinalysis Basic - ( 27 Sep 2018 15:15 )    Color: Light Orange / Appearance: Turbid / S.020 / pH: x  Gluc: x / Ketone: Trace  / Bili: Negative / Urobili: Normal   Blood: x / Protein: >300 mg/dl / Nitrite: Negative   Leuk Esterase: Large / RBC: >50 /hpf / WBC >50 /hpf   Sq Epi: x / Non Sq Epi: x / Bacteria: Many      RADIOLOGY & ADDITIONAL STUDIES:    PROTEIN CALORIE MALNUTRITION PRESENT: [ ] Yes [ ] No  [ ] PPSV2 < or = to 30% [ ] significant weight loss  [ ] poor nutritional intake [ ] catabolic state [ ] anasarca     Albumin, Serum: 2.2 g/dL (18 @ 13:10)  Artificial Nutrition [ ]     REFERRALS:   [ ]Chaplaincy  [ ] Hospice  [ ]Child Life  [ ]Social Work  [ ]Case management [ ]Holistic Therapy   Goals of Care Discussion Document: [place]  ID # [id number]  HPI:  [id number] Nursing home patient with locally advanced rectal cancer s/p palliative RT that was aborted 2/2 development of pelvic abscesses treated with prolonged course of IV Zosyn, hx right AKA for LE gangrene, CAD, HTN, DM, who is s/p diverting colostomy for palliation that presented with  failure to thrive and also because the "Nursing home physician" wanted the  pt to be admitted for peg placement, per  family's  request . As per the patient's daughter the patient has had poor appetite, difficulty swallowing ( she keeps food in her mouth and does not swallow it ). As per EMR, " Pt has GERD with esophagitis which also contributes to difficulty feeding patient."  She takes morphine 15mg ext release,  for pain management.  As per Onc, "Patient has been deemed not a candidate for disease modifying therapy and hospice appropriate in the past. Patient now presents from nursing home for PEG placement per family request. Patient is cachectic with failure to thrive, weight loss. Patient had advanced cognitive dysfunction and is an unreliable historian." Palliative care was called for GOC in regards to PEG placement.      PERTINENT PM/SXH:   Rectal mass  Gangrene of foot  Coronary artery disease involving native coronary artery of native heart without angina pectoris    Status post above knee amputation of right lower extremity  No significant past surgical history    FAMILY HISTORY:  No pertinent family history in first degree relatives    ITEMS NOT CHECKED ARE NOT PRESENT    SOCIAL HISTORY:   Significant other/partner: Single  [ ]  Children: 3  [ ]  Temple/Spirituality:  Substance hx:  [ ]   Tobacco hx:  [ ]   Alcohol hx: [ ]   Home Opioid hx:  [ ] I-Stop Reference No:  Living Situation: [ ]Home  [ x]Long term care  [ ]Rehab [ ]Other    ADVANCE DIRECTIVES:    DNR  Yes  MOLST  [ ]  Living Will  [ ]   DECISION MAKER(s):  [ ] Health Care Proxy(s)  [ x] Surrogate(s)  [ ] Guardian           Name(s): Phone Number(s): Children. However, her daughter, Louann Edmondson  indicated to be her HCP. [phone]    BASELINE (I)ADL(s) (prior to admission):  [place]: [ ]Total  [ ] Moderate [ x]Dependent    Allergies    No Known Allergies    Intolerances    MEDICATIONS  (STANDING):  atorvastatin 40 milliGRAM(s) Oral at bedtime  carvedilol 6.25 milliGRAM(s) Oral every 12 hours  dronabinol 2.5 milliGRAM(s) Oral two times a day  ertapenem  IVPB 1000 milliGRAM(s) IV Intermittent every 24 hours  heparin  Injectable 5000 Unit(s) SubCutaneous every 12 hours  mirtazapine 15 milliGRAM(s) Oral at bedtime  morphine ER Tablet 15 milliGRAM(s) Oral every 12 hours  sodium chloride 0.9%. 1000 milliLiter(s) (70 mL/Hr) IV Continuous <Continuous>    MEDICATIONS  (PRN):  HYDROmorphone  Injectable 0.5 milliGRAM(s) IV Push every 4 hours PRN Severe Pain (7 - 10)    PRESENT SYMPTOMS: [x ]Unable to obtain due to poor mentation   Source if other than patient:  [ ]Family   [ ]Team     Pain (Impact on QOL):    Location -         Minimal acceptable level (0-10 scale):                    Aggravating factors -  Quality -  Radiation -  Severity (0-10 scale) -    Timing -    PAIN AD Score: 3    http://geriatrictoolkit.[email or web address]/cog/painad.pdf (press ctrl +  left click to view)    Dyspnea:                           [ ]Mild [ ]Moderate [ ]Severe  Anxiety:                             [ ]Mild [ ]Moderate [ ]Severe  Fatigue:                             [ ]Mild [ ]Moderate [ ]Severe  Nausea:                             [ ]Mild [ ]Moderate [ ]Severe  Loss of appetite:              [ ]Mild [ ]Moderate [ ]Severe  Constipation:                    [ ]Mild [ ]Moderate [ ]Severe    Other Symptoms:  [ ]All other review of systems negative     Karnofsky Performance Score/Palliative Performance Status Version 2: 30        %    http://palliative.info/resource_material/PPSv2.pdf  PHYSICAL EXAM:  Vital Signs Last 24 Hrs  T(C): 36.3 (28 Sep 2018 11:30), Max: 36.7 (28 Sep 2018 06:09)  T(F): 97.4 (28 Sep 2018 11:30), Max: 98.1 (28 Sep 2018 06:09)  HR: 81 (28 Sep 2018 11:30) (81 - 86)  BP: 111/74 (28 Sep 2018 11:30) (99/64 - 111/74)  BP(mean): --  RR: 19 (28 Sep 2018 11:30) (16 - 19)  SpO2: 98% (28 Sep 2018 11:30) (98% - 98%) I&O's Summary    27 Sep 2018 07:01  -  28 Sep 2018 07:00  --------------------------------------------------------  IN: 0 mL / OUT: 1 mL / NET: -1 mL    28 Sep 2018 07:01  -  28 Sep 2018 17:54  --------------------------------------------------------  IN: 390 mL / OUT: 400 mL / NET: -10 mL    GENERAL:  [x ]Alert  [x ]Oriented x 1   [ ]Lethargic  [ ]Cachexia  [ ]Unarousable  [ ]Verbal  [ ]Non-Verbal  Behavioral:   [ ] Anxiety  [ ] Delirium [ ] Agitation [ ] Other  HEENT:  [ ]Normal   [x ]Dry mouth   [ ]ET Tube/Trach  [ ]Oral lesions  PULMONARY:   [x ]Clear [ ]Tachypnea  [ ]Audible excessive secretions   [ ]Rhonchi        [ ]Right [ ]Left [ ]Bilateral  [ ]Crackles        [ ]Right [ ]Left [ ]Bilateral  [ ]Wheezing     [ ]Right [ ]Left [ ]Bilateral  CARDIOVASCULAR:    [x ]Regular [ ]Irregular [ ]Tachy  [ ]Robert [ ]Murmur [ ]Other  GASTROINTESTINAL:  [ ]Soft  [ ]Distended   [ ]+BS  [ ]Non tender [ ]Tender  [ ]PEG [ ]OGT/ NGT  Last BM:   18 @ 07:01  -  18 @ 07:00  --------------------------------------------------------  OUT: 1 mL    GENITOURINARY:  [x ]Normal [ ] Incontinent   [ ]Oliguria/Anuria   [ ]Saba  MUSCULOSKELETAL:   [ ]Normal   [x ]Weakness  [ ]Bed/Wheelchair bound [ ]Edema  NEUROLOGIC:   [ ]No focal deficits  [x ] Cognitive impairment  [ ] Dysphagia [ ]Dysarthria [ ] Paresis [x ]Other:The patient was confused and indicating she was not sure if she was in a store or at home. She was saying that nobody was giving her food when there was actually a tray with leftovers close to her bed. She was not clearly answering questions. She also denied any history of cancer.    SKIN:   [ ]Normal   [ ]Pressure ulcer(s)  [ ]Rash    CRITICAL CARE:  [ ] Shock Present  [ ]Septic [ ]Cardiogenic [ ]Neurologic [ ]Hypovolemic  [ ]  Vasopressors [ ]  Inotropes   [ ] Respiratory failure present  [ ] Acute  [ ] Chronic [ ] Hypoxic  [ ] Hypercarbic [ ] Other  [ ] Other organ failure     LABS:                        8.1    12.6  )-----------( 224      ( 28 Sep 2018 12:46 )             26.4       132<L>  |  100  |  17  ----------------------------<  55<L>  4.2   |  19<L>  |  0.50    Ca    8.4      28 Sep 2018 07:07    TPro  7.0  /  Alb  2.2<L>  /  TBili  0.4  /  DBili  x   /  AST  9<L>  /  ALT  6<L>  /  AlkPhos  128<H>    PT/INR - ( 27 Sep 2018 13:10 )   PT: 12.6 sec;   INR: 1.16 ratio         PTT - ( 27 Sep 2018 13:10 )  PTT:29.1 sec    Urinalysis Basic - ( 27 Sep 2018 15:15 )    Color: Light Orange / Appearance: Turbid / S.020 / pH: x  Gluc: x / Ketone: Trace  / Bili: Negative / Urobili: Normal   Blood: x / Protein: >300 mg/dl / Nitrite: Negative   Leuk Esterase: Large / RBC: >50 /hpf / WBC >50 /hpf   Sq Epi: x / Non Sq Epi: x / Bacteria: Many      RADIOLOGY & ADDITIONAL STUDIES:  < from: CT Abdomen and Pelvis w/ Oral Cont and w/ IV Cont (18 @ 17:23) >  IMPRESSION:  Marked progression of known rectal mass with new moderate bilateral   hydronephrosis. Asymmetric urothelial enhancement on the left can be   secondary to an infectious or inflammatory process. Likely involvement of   the base of the urinary bladder with rectal mass. Air within the urinary   bladder can be secondary to recent instrumentation or fistulization.  Indeterminate new peripheral hypodensity in the spleen.                            KIMMIE ANGEL M.D. ATTENDING RADIOLOGIST  This document has been electronically signed. Sep 28 2018 10:17AM    < end of copied text >    PROTEIN CALORIE MALNUTRITION PRESENT: [ ] Yes [ ] No  [ ] PPSV2 < or = to 30% [ ] significant weight loss  [ ] poor nutritional intake [ ] catabolic state [ ] anasarca     Albumin, Serum: 2.2 g/dL (18 @ 13:10)  Artificial Nutrition [ ]     REFERRALS:   [ ]Chaplaincy  [ ] Hospice  [ ]Child Life  [ ]Social Work  [ ]Case management [ ]Holistic Therapy   Goals of Care Discussion Document:

## 2018-09-28 NOTE — CHART NOTE - NSCHARTNOTEFT_GEN_A_CORE
Palliative consult called; spoke with Dr Ruvalcaba who will speak with family to discuss GOC    ZOIE Millan [phone]

## 2018-09-28 NOTE — CONSULT NOTE ADULT - ATTENDING COMMENTS
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
patient seen and examined. Above noted. Saba was placed and urine looks clear yellow. Leaving Saba in place or removing it in view of possible colo-vesical fistula and hydronephrosis, depends on patient/family wishes and goals of care.
Agree with above.   78 F well known to me, rectal cancer diagnosed 6 mos ago. Pt was never a candidate for DMT due to poor performance status. She is s/p a palliative bypass with ostomy. Has been living in LTC for the past 4.5 mos. Pt's family now requesting PEG tube for nutrition. Pt has never had a good appetite since diagnosis and has been eating very little. She is currently altered and cannot communicate. Family not at bedside.   I do not feel a feeding tube will improve QOL or prolong OS  I would recommend a focus on comfort with hospice services (I have recommended this several times in past conversations with daughter and pt).   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 Doubt that any surgical options exist.   Overall poor prognosis.   Left message for Dr. Montgomery to discuss.

## 2018-09-28 NOTE — CONSULT NOTE ADULT - CONSULT REASON
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
GOC in regards to PEG
Leucocytosis  h/o pelvic abscess
colon cancer, not eating
locally advanced rectal cancer s/p RT
new CT founding air in the bladder and b/L HN
cad

## 2018-09-28 NOTE — PROGRESS NOTE ADULT - ASSESSMENT
pt with htn, hld, dm 2, cad, htn,   pad, right femoral A graft. occluded,/ gangrene.  right  leg  amputation ,    rectal cancer, s/p RT/ colostomy  also  with lung mass on ct,  s/p   ab.  for  rectal abscesses, on prior  admission    now  admitted  for  peg, at  family's request, hence  pt   sent by  n home  dr,  to  er   dvt ppx,  pain meds/    pt  with  elevated  wbc/  hyponatremia/ elevated  alk phosphatidase  h/o  rectal  abscesses / collections  on iv n  saline/  ertapenem per  ID    pt  at  present, not   cleared   for  peg  pt  is  dnr  will have  palliative  care   reconsult  pt/  dr murray is  familiar  with  pt/  pt  is  ideal   candidate  for  hospice  pt  with c/c  cachexia/ from malignancy/ bed  bound/ functional quadriplegia   ct prelim, with  entero vesicluar  fistula/ surg and  urology  eval, NOTED  given pts  rectal cancer  and  pts  poor  performance  status/ agree  with  oncology  for  supportive/ comfort care

## 2018-09-28 NOTE — PROGRESS NOTE ADULT - ASSESSMENT
78F Nursing home patient hx of rectal mass,   s/p resection and colostomy placement,  CAD, HTN,   DM  , R AKA for gangrenous leg   presents with  h/o f ailure to thrive/  c/c cachexia/  bed  bound . Nursing home physician wants pt to be admitted for peg placement, per  familys  request . Pt has GERD with esophagitis which also contributes to difficulty feeding patient. Daughter says pt is 77 lbs now and would like to maintain nourishment via peg. Pt is non-verbal ,    Previously followed by me for abscess  s/p long antimicrobial course.    Had been stable off abx to my knowledge as very limited records from NH sent currently) .  Now transferred for possible PEG placement  Mild leucocytosis with 4% bands  Clinically stable   Ct with tumor progression and fistula  Leucocytosis lower  Blood Cx testing  UA with negative nitrite 78F Nursing home patient hx of rectal mass,   s/p resection and colostomy placement,  CAD, HTN,   DM  , R AKA for gangrenous leg   presents with  h/o f ailure to thrive/  c/c cachexia/  bed  bound . Nursing home physician wants pt to be admitted for peg placement, per  familys  request . Pt has GERD with esophagitis which also contributes to difficulty feeding patient. Daughter says pt is 77 lbs now and would like to maintain nourishment via peg. Pt is non-verbal ,    Previously followed by me for abscess  s/p long antimicrobial course.    Had been stable off abx to my knowledge as very limited records from NH sent currently) .  Now transferred for possible PEG placement  Mild leucocytosis with 4% bands  Clinically stable   Ct with tumor progression and fistula  Leucocytosis lower  Blood Cx testing  UA with negative nitrite-urine cx pending  Not sure antimicrobials will ever heal a tumor fistula  For now can continue current abx and follow clinically as well as cultures  Recommend Palliative eval-decide GOC  Will tailor plan for ID issues  per goals of care ,course,results.Will defer to primary team on management of other issues.  Case d/w Med NP  Infectious Diseases Service will cover over weekend.  Please call [phone] if issues

## 2018-09-28 NOTE — CONSULT NOTE ADULT - PROBLEM SELECTOR RECOMMENDATION 9
D/W the patient's daughter (Louann Edmondson) that indicates to be the patient's HCP about the patient's advanced illness and poor prognosis. I explained her that the patient's weight loss and functional decline is likely 2/2 to the patient's CA and that even with a feeding tube it is unlikely the patient's anorexia cachexia will change as well as her prognosis.   She indicated she will further discuss it with her family and she will f/u with me on Monday. I will advise to hold on placing a feeding tube for now.   The daughter also indicated the patient has some degree of dysphagia. I will recommend a S&S eval to see what food texture is better for the patient. GOC: At this time, the patient did not have capacity to discuss GOC in regards to her cancer ot having or not a feeding tube. Her daughter that indicated to be her HCP (documentation that she was ask to present) was available. Although she was able to have a simple conversation in English, when trying to discuss details about the patient's illness and Rx option, a Romansh  was required.  I then D/W the patient's daughter (Louann Edmondson) about the patient's advanced illness and poor prognosis. I explained her that the patient's weight loss and functional decline is likely 2/2 to the patient's CA and that even with a feeding tube it is unlikely the patient's anorexia cachexia will change as well as her prognosis.   She indicated she will further discuss it with her family and she will f/u with me on Monday. The daughter also indicated the patient has some degree of dysphagia. I will recommend a S&S eval to see what food texture is better for the patient. Meanwhile, I will advise to hold on placing a feeding tube  while continuing dysphagia diet with aspiration precautions.

## 2018-09-29 LAB
ANION GAP SERPL CALC-SCNC: 10 MMOL/L — SIGNIFICANT CHANGE UP (ref 5–17)
BUN SERPL-MCNC: 12 MG/DL — SIGNIFICANT CHANGE UP (ref 7–23)
CALCIUM SERPL-MCNC: 8.1 MG/DL — LOW (ref 8.4–10.5)
CHLORIDE SERPL-SCNC: 105 MMOL/L — SIGNIFICANT CHANGE UP (ref 96–108)
CO2 SERPL-SCNC: 18 MMOL/L — LOW (ref 22–31)
CREAT SERPL-MCNC: 0.42 MG/DL — LOW (ref 0.5–1.3)
GLUCOSE SERPL-MCNC: 59 MG/DL — LOW (ref 70–99)
HCT VFR BLD CALC: 26 % — LOW (ref 34.5–45)
HGB BLD-MCNC: 8.1 G/DL — LOW (ref 11.5–15.5)
MCHC RBC-ENTMCNC: 25.9 PG — LOW (ref 27–34)
MCHC RBC-ENTMCNC: 31.2 GM/DL — LOW (ref 32–36)
MCV RBC AUTO: 83.1 FL — SIGNIFICANT CHANGE UP (ref 80–100)
PLATELET # BLD AUTO: 161 K/UL — SIGNIFICANT CHANGE UP (ref 150–400)
POTASSIUM SERPL-MCNC: 3.5 MMOL/L — SIGNIFICANT CHANGE UP (ref 3.5–5.3)
POTASSIUM SERPL-SCNC: 3.5 MMOL/L — SIGNIFICANT CHANGE UP (ref 3.5–5.3)
RBC # BLD: 3.13 M/UL — LOW (ref 3.8–5.2)
RBC # FLD: 21.5 % — HIGH (ref 10.3–14.5)
SODIUM SERPL-SCNC: 133 MMOL/L — LOW (ref 135–145)
WBC # BLD: 11.09 K/UL — HIGH (ref 3.8–10.5)
WBC # FLD AUTO: 11.09 K/UL — HIGH (ref 3.8–10.5)

## 2018-09-29 PROCEDURE — 99231 SBSQ HOSP IP/OBS SF/LOW 25: CPT

## 2018-09-29 RX ORDER — SODIUM CHLORIDE 9 MG/ML
1000 INJECTION, SOLUTION INTRAVENOUS
Qty: 0 | Refills: 0 | Status: DISCONTINUED | OUTPATIENT
Start: 2018-09-29 | End: 2018-10-05

## 2018-09-29 RX ORDER — ACETAMINOPHEN 500 MG
650 TABLET ORAL ONCE
Qty: 0 | Refills: 0 | Status: COMPLETED | OUTPATIENT
Start: 2018-09-29 | End: 2018-09-29

## 2018-09-29 RX ADMIN — MIRTAZAPINE 15 MILLIGRAM(S): 45 TABLET, ORALLY DISINTEGRATING ORAL at 21:44

## 2018-09-29 RX ADMIN — SODIUM CHLORIDE 75 MILLILITER(S): 9 INJECTION, SOLUTION INTRAVENOUS at 14:54

## 2018-09-29 RX ADMIN — Medication 650 MILLIGRAM(S): at 14:52

## 2018-09-29 RX ADMIN — ERTAPENEM SODIUM 120 MILLIGRAM(S): 1 INJECTION, POWDER, LYOPHILIZED, FOR SOLUTION INTRAMUSCULAR; INTRAVENOUS at 17:17

## 2018-09-29 RX ADMIN — HEPARIN SODIUM 5000 UNIT(S): 5000 INJECTION INTRAVENOUS; SUBCUTANEOUS at 17:17

## 2018-09-29 RX ADMIN — MORPHINE SULFATE 15 MILLIGRAM(S): 50 CAPSULE, EXTENDED RELEASE ORAL at 05:28

## 2018-09-29 RX ADMIN — Medication 650 MILLIGRAM(S): at 15:22

## 2018-09-29 RX ADMIN — HEPARIN SODIUM 5000 UNIT(S): 5000 INJECTION INTRAVENOUS; SUBCUTANEOUS at 05:28

## 2018-09-29 RX ADMIN — MORPHINE SULFATE 15 MILLIGRAM(S): 50 CAPSULE, EXTENDED RELEASE ORAL at 18:03

## 2018-09-29 RX ADMIN — ATORVASTATIN CALCIUM 40 MILLIGRAM(S): 80 TABLET, FILM COATED ORAL at 21:44

## 2018-09-29 RX ADMIN — CARVEDILOL PHOSPHATE 6.25 MILLIGRAM(S): 80 CAPSULE, EXTENDED RELEASE ORAL at 18:03

## 2018-09-29 RX ADMIN — SODIUM CHLORIDE 70 MILLILITER(S): 9 INJECTION INTRAMUSCULAR; INTRAVENOUS; SUBCUTANEOUS at 12:16

## 2018-09-29 RX ADMIN — Medication 2.5 MILLIGRAM(S): at 07:51

## 2018-09-29 RX ADMIN — Medication 2.5 MILLIGRAM(S): at 19:41

## 2018-09-29 NOTE — PROGRESS NOTE ADULT - ASSESSMENT
79y/o F w/ multiple medical hx, non- verbal, consult for now CT founding of air in the bladder, and L HN. unable to find any urinary hx, or Saba hx. Patient incontinence. Normal Cr level  Plan  -Okay to D/C Saba   -Given current condition would recommend Goals of care discussion with family 79y/o F w/ multiple medical hx, non- verbal, consult for now CT finding of air in the bladder, and L HN. unable to find any urinary hx, or Saba hx. Patient incontinence. Normal Cr level  Plan  -Okay to D/C Saba   -Given current condition would recommend Goals of care discussion with family

## 2018-09-29 NOTE — CHART NOTE - NSCHARTNOTEFT_GEN_A_CORE
Upon Nutritional Assessment by the Registered Dietitian your patient was determined to meet criteria / has evidence of the following diagnosis/diagnoses:          [ ]  Mild Protein Calorie Malnutrition        [ ]  Moderate Protein Calorie Malnutrition        [X ] Severe Protein Calorie Malnutrition        [ ] Unspecified Protein Calorie Malnutrition        [ ] Underweight / BMI <19        [ ] Morbid Obesity / BMI > 40      Findings as based on:  [X ] Comprehensive nutrition assessment   [ ] Nutrition Focused Physical Exam  [X ] Other:  Pt w/15% wt loss x 3months,severe visual signs of muscle and fat wasting (orbital, temporal, clavicle), prolonged suboptimal PO intake      Nutrition Plan/Recommendations:      1) Continue current diet as tolerated and continue to provide Ensure Enlive- 3 per day (provides additional 1050cal, 60gm protein) -texture/consistency per team  2) Continue to provide total feeding assistance   3) Pending medical course, If plan for PEG, RD to follow up for further nutritional interventions as indicated/requested by medical team/pt.     Meggan Ochoa RD pager #[phone]     PROVIDER Section:     By signing this assessment you are acknowledging and agree with the diagnosis/diagnoses assigned by the Registered Dietitian    Comments:

## 2018-09-29 NOTE — PROGRESS NOTE ADULT - ASSESSMENT
pt with htn, hld, dm 2, cad, htn,   pad, right femoral A graft. occluded,/ gangrene.  right  leg  amputation ,    rectal cancer, s/p RT/ colostomy  also  with lung mass on ct,  s/p   ab.  for  rectal abscesses, on prior  admission    now  admitted  for  peg, at  family's request, hence  pt   sent by  n home  dr,  to  er   dvt ppx,  pain meds/    pt  with  elevated  wbc/ decreasing now on ab/  hyponatremia improving//   h/o  rectal  abscesses / collections  on iv n  saline/  ertapenem per  ID    pt  at  present, not   cleared   for  peg  pt  is  dnr  will have  palliative  care   f/p  with family/  pt  is  ideal   candidate  for  hospice  pt  with c/c  cachexia/ from malignancy/ bed  bound/ functional quadriplegia   ct prelim, with  entero vesicluar  fistula/ surg and  urology  eval, noted  given pts  rectal cancer  and  pts  poor  performance  status/ agree  with  oncology  for  supportive/ comfort care

## 2018-09-29 NOTE — DIETITIAN INITIAL EVALUATION ADULT. - NS AS NUTRI INTERV COLLABORAT
Malnutrition alert placed in chart, discussed with NP. RD to follow up for further nutritional interventions as indicated/requested by medical team/pt.

## 2018-09-29 NOTE — DIETITIAN INITIAL EVALUATION ADULT. - SIGNS/SYMPTOMS
Pt w/15% wt loss x 3months,severe muscle and fat wasting noted above, prolonged suboptimal PO intake

## 2018-09-29 NOTE — DIETITIAN INITIAL EVALUATION ADULT. - OTHER INFO
Nutrition consult received for BMI <18Kg/m2. Per chart, pt with htn, hld, dm 2, cad, htn,   pad, right femoral A graft. occluded,/ gangrene.  right  leg  amputation rectal cancer, s/p RT/ colostomy. Pt presents for PEG, pt not yet cleared for PEG- GOC to be determined.

## 2018-09-29 NOTE — DIETITIAN INITIAL EVALUATION ADULT. - NS FNS REASON FOR WEIGHT CHANG
decreased po intake/Per chart, pt's daughter reports pt's current weight 77 pounds, per previous RD note (6/13/18) 91 pounds - reflective of 14 pound weight loss

## 2018-09-29 NOTE — DIETITIAN INITIAL EVALUATION ADULT. - ENERGY NEEDS
Ht: 62 Wt: 77 pounds BMI: 14 kg/m2 IBW:110 pounds(+/-10%)   No edema. No pressure ulcers documented.

## 2018-09-30 LAB
ANION GAP SERPL CALC-SCNC: 11 MMOL/L — SIGNIFICANT CHANGE UP (ref 5–17)
BUN SERPL-MCNC: 7 MG/DL — SIGNIFICANT CHANGE UP (ref 7–23)
CALCIUM SERPL-MCNC: 7.8 MG/DL — LOW (ref 8.4–10.5)
CHLORIDE SERPL-SCNC: 107 MMOL/L — SIGNIFICANT CHANGE UP (ref 96–108)
CO2 SERPL-SCNC: 21 MMOL/L — LOW (ref 22–31)
CREAT SERPL-MCNC: 0.4 MG/DL — LOW (ref 0.5–1.3)
GLUCOSE SERPL-MCNC: 97 MG/DL — SIGNIFICANT CHANGE UP (ref 70–99)
HCT VFR BLD CALC: 28.9 % — LOW (ref 34.5–45)
HGB BLD-MCNC: 9 G/DL — LOW (ref 11.5–15.5)
MCHC RBC-ENTMCNC: 25.7 PG — LOW (ref 27–34)
MCHC RBC-ENTMCNC: 31.1 GM/DL — LOW (ref 32–36)
MCV RBC AUTO: 82.6 FL — SIGNIFICANT CHANGE UP (ref 80–100)
PLATELET # BLD AUTO: 161 K/UL — SIGNIFICANT CHANGE UP (ref 150–400)
POTASSIUM SERPL-MCNC: 3 MMOL/L — LOW (ref 3.5–5.3)
POTASSIUM SERPL-SCNC: 3 MMOL/L — LOW (ref 3.5–5.3)
RBC # BLD: 3.5 M/UL — LOW (ref 3.8–5.2)
RBC # FLD: 21.4 % — HIGH (ref 10.3–14.5)
SODIUM SERPL-SCNC: 139 MMOL/L — SIGNIFICANT CHANGE UP (ref 135–145)
WBC # BLD: 12.97 K/UL — HIGH (ref 3.8–10.5)
WBC # FLD AUTO: 12.97 K/UL — HIGH (ref 3.8–10.5)

## 2018-09-30 PROCEDURE — 99232 SBSQ HOSP IP/OBS MODERATE 35: CPT

## 2018-09-30 RX ORDER — POTASSIUM CHLORIDE 20 MEQ
10 PACKET (EA) ORAL
Qty: 0 | Refills: 0 | Status: COMPLETED | OUTPATIENT
Start: 2018-09-30 | End: 2018-09-30

## 2018-09-30 RX ADMIN — HEPARIN SODIUM 5000 UNIT(S): 5000 INJECTION INTRAVENOUS; SUBCUTANEOUS at 06:47

## 2018-09-30 RX ADMIN — Medication 2.5 MILLIGRAM(S): at 18:56

## 2018-09-30 RX ADMIN — Medication 2.5 MILLIGRAM(S): at 06:46

## 2018-09-30 RX ADMIN — CARVEDILOL PHOSPHATE 6.25 MILLIGRAM(S): 80 CAPSULE, EXTENDED RELEASE ORAL at 06:47

## 2018-09-30 RX ADMIN — Medication 100 MILLIEQUIVALENT(S): at 18:56

## 2018-09-30 RX ADMIN — SODIUM CHLORIDE 75 MILLILITER(S): 9 INJECTION, SOLUTION INTRAVENOUS at 19:04

## 2018-09-30 RX ADMIN — HYDROMORPHONE HYDROCHLORIDE 0.5 MILLIGRAM(S): 2 INJECTION INTRAMUSCULAR; INTRAVENOUS; SUBCUTANEOUS at 15:55

## 2018-09-30 RX ADMIN — MORPHINE SULFATE 15 MILLIGRAM(S): 50 CAPSULE, EXTENDED RELEASE ORAL at 06:46

## 2018-09-30 RX ADMIN — Medication 100 MILLIEQUIVALENT(S): at 15:30

## 2018-09-30 RX ADMIN — HYDROMORPHONE HYDROCHLORIDE 0.5 MILLIGRAM(S): 2 INJECTION INTRAMUSCULAR; INTRAVENOUS; SUBCUTANEOUS at 15:30

## 2018-09-30 RX ADMIN — SODIUM CHLORIDE 75 MILLILITER(S): 9 INJECTION, SOLUTION INTRAVENOUS at 06:47

## 2018-09-30 RX ADMIN — Medication 100 MILLIEQUIVALENT(S): at 12:57

## 2018-09-30 RX ADMIN — ERTAPENEM SODIUM 120 MILLIGRAM(S): 1 INJECTION, POWDER, LYOPHILIZED, FOR SOLUTION INTRAMUSCULAR; INTRAVENOUS at 21:05

## 2018-09-30 RX ADMIN — HEPARIN SODIUM 5000 UNIT(S): 5000 INJECTION INTRAVENOUS; SUBCUTANEOUS at 18:58

## 2018-09-30 NOTE — PROGRESS NOTE ADULT - ASSESSMENT
78F Nursing home patient hx of rectal mass,   s/p resection and colostomy placement,  CAD, HTN,   DM  , R AKA for gangrenous leg   presents with  h/o f ailure to thrive/  c/c cachexia/  bed  bound .   Plan for feeding tube placement on hold     Continue antibiotic for now - likely short course will suffice

## 2018-09-30 NOTE — PROGRESS NOTE ADULT - ASSESSMENT
pt with htn, hld, dm 2, cad, htn,   pad, right femoral A graft. occluded,/ gangrene.  right  leg  amputation ,    rectal cancer, s/p RT/ colostomy  also  with lung mass on ct,  s/p   ab.  for  rectal abscesses, on prior  admission    now  admitted  for  peg, at  family's request, hence  pt   sent by  n home  dr,  to  er   dvt ppx,  pain meds/    pt  with  elevated  wbc/ decreasing now on ab/  hyponatremia improving//   h/o  rectal  abscesses / collections  on iv n  saline/  ertapenem per  ID    pt  at  present, not   cleared   for  peg  pt  is  dnr    palliative  careto    f/p  with family/  pt  is  ideal   candidate  for  hospice  pt  with c/c  cachexia/ from malignancy/ bed  bound/ functional quadriplegia   ct prelim, with  entero vesicluar  fistula/ surg and  urology  eval, noted  given pts  rectal cancer  and  pts  poor  performance  status/ agree  with  oncology  for  supportive/ comfort care/ encourage on going discusssions  with family

## 2018-10-01 LAB
ANION GAP SERPL CALC-SCNC: 7 MMOL/L — SIGNIFICANT CHANGE UP (ref 5–17)
ANION GAP SERPL CALC-SCNC: 8 MMOL/L — SIGNIFICANT CHANGE UP (ref 5–17)
BASOPHILS # BLD AUTO: 0.02 K/UL — SIGNIFICANT CHANGE UP (ref 0–0.2)
BASOPHILS NFR BLD AUTO: 0.2 % — SIGNIFICANT CHANGE UP (ref 0–2)
BUN SERPL-MCNC: 4 MG/DL — LOW (ref 7–23)
BUN SERPL-MCNC: 4 MG/DL — LOW (ref 7–23)
CALCIUM SERPL-MCNC: 7.3 MG/DL — LOW (ref 8.4–10.5)
CALCIUM SERPL-MCNC: 7.3 MG/DL — LOW (ref 8.4–10.5)
CHLORIDE SERPL-SCNC: 111 MMOL/L — HIGH (ref 96–108)
CHLORIDE SERPL-SCNC: 112 MMOL/L — HIGH (ref 96–108)
CO2 SERPL-SCNC: 18 MMOL/L — LOW (ref 22–31)
CO2 SERPL-SCNC: 18 MMOL/L — LOW (ref 22–31)
CREAT SERPL-MCNC: 0.34 MG/DL — LOW (ref 0.5–1.3)
CREAT SERPL-MCNC: 0.35 MG/DL — LOW (ref 0.5–1.3)
EOSINOPHIL # BLD AUTO: 0 K/UL — SIGNIFICANT CHANGE UP (ref 0–0.5)
EOSINOPHIL NFR BLD AUTO: 0 % — SIGNIFICANT CHANGE UP (ref 0–6)
GLUCOSE SERPL-MCNC: 115 MG/DL — HIGH (ref 70–99)
GLUCOSE SERPL-MCNC: 119 MG/DL — HIGH (ref 70–99)
HCT VFR BLD CALC: 24.1 % — LOW (ref 34.5–45)
HGB BLD-MCNC: 7.5 G/DL — LOW (ref 11.5–15.5)
IMM GRANULOCYTES NFR BLD AUTO: 0.7 % — SIGNIFICANT CHANGE UP (ref 0–1.5)
LYMPHOCYTES # BLD AUTO: 2.25 K/UL — SIGNIFICANT CHANGE UP (ref 1–3.3)
LYMPHOCYTES # BLD AUTO: 21.8 % — SIGNIFICANT CHANGE UP (ref 13–44)
MAGNESIUM SERPL-MCNC: 1.7 MG/DL — SIGNIFICANT CHANGE UP (ref 1.6–2.6)
MCHC RBC-ENTMCNC: 25.5 PG — LOW (ref 27–34)
MCHC RBC-ENTMCNC: 31.1 GM/DL — LOW (ref 32–36)
MCV RBC AUTO: 82 FL — SIGNIFICANT CHANGE UP (ref 80–100)
MONOCYTES # BLD AUTO: 0.5 K/UL — SIGNIFICANT CHANGE UP (ref 0–0.9)
MONOCYTES NFR BLD AUTO: 4.9 % — SIGNIFICANT CHANGE UP (ref 2–14)
NEUTROPHILS # BLD AUTO: 7.46 K/UL — HIGH (ref 1.8–7.4)
NEUTROPHILS NFR BLD AUTO: 72.4 % — SIGNIFICANT CHANGE UP (ref 43–77)
PHOSPHATE SERPL-MCNC: 1.8 MG/DL — LOW (ref 2.5–4.5)
PLATELET # BLD AUTO: 111 K/UL — LOW (ref 150–400)
POTASSIUM SERPL-MCNC: 3.3 MMOL/L — LOW (ref 3.5–5.3)
POTASSIUM SERPL-MCNC: 3.4 MMOL/L — LOW (ref 3.5–5.3)
POTASSIUM SERPL-SCNC: 3.3 MMOL/L — LOW (ref 3.5–5.3)
POTASSIUM SERPL-SCNC: 3.4 MMOL/L — LOW (ref 3.5–5.3)
RBC # BLD: 2.94 M/UL — LOW (ref 3.8–5.2)
RBC # FLD: 21.7 % — HIGH (ref 10.3–14.5)
SODIUM SERPL-SCNC: 137 MMOL/L — SIGNIFICANT CHANGE UP (ref 135–145)
SODIUM SERPL-SCNC: 137 MMOL/L — SIGNIFICANT CHANGE UP (ref 135–145)
WBC # BLD: 10.3 K/UL — SIGNIFICANT CHANGE UP (ref 3.8–10.5)
WBC # FLD AUTO: 10.3 K/UL — SIGNIFICANT CHANGE UP (ref 3.8–10.5)

## 2018-10-01 PROCEDURE — 99232 SBSQ HOSP IP/OBS MODERATE 35: CPT

## 2018-10-01 RX ORDER — POTASSIUM CHLORIDE 20 MEQ
10 PACKET (EA) ORAL ONCE
Qty: 0 | Refills: 0 | Status: COMPLETED | OUTPATIENT
Start: 2018-10-01 | End: 2018-10-01

## 2018-10-01 RX ORDER — POTASSIUM PHOSPHATE, MONOBASIC POTASSIUM PHOSPHATE, DIBASIC 236; 224 MG/ML; MG/ML
30 INJECTION, SOLUTION INTRAVENOUS ONCE
Qty: 0 | Refills: 0 | Status: COMPLETED | OUTPATIENT
Start: 2018-10-01 | End: 2018-10-01

## 2018-10-01 RX ADMIN — MIRTAZAPINE 15 MILLIGRAM(S): 45 TABLET, ORALLY DISINTEGRATING ORAL at 21:34

## 2018-10-01 RX ADMIN — MORPHINE SULFATE 15 MILLIGRAM(S): 50 CAPSULE, EXTENDED RELEASE ORAL at 05:20

## 2018-10-01 RX ADMIN — POTASSIUM PHOSPHATE, MONOBASIC POTASSIUM PHOSPHATE, DIBASIC 83.33 MILLIMOLE(S): 236; 224 INJECTION, SOLUTION INTRAVENOUS at 11:52

## 2018-10-01 RX ADMIN — ATORVASTATIN CALCIUM 40 MILLIGRAM(S): 80 TABLET, FILM COATED ORAL at 21:34

## 2018-10-01 RX ADMIN — Medication 2.5 MILLIGRAM(S): at 17:32

## 2018-10-01 RX ADMIN — HEPARIN SODIUM 5000 UNIT(S): 5000 INJECTION INTRAVENOUS; SUBCUTANEOUS at 05:18

## 2018-10-01 RX ADMIN — CARVEDILOL PHOSPHATE 6.25 MILLIGRAM(S): 80 CAPSULE, EXTENDED RELEASE ORAL at 17:33

## 2018-10-01 RX ADMIN — Medication 100 MILLIEQUIVALENT(S): at 10:32

## 2018-10-01 RX ADMIN — HEPARIN SODIUM 5000 UNIT(S): 5000 INJECTION INTRAVENOUS; SUBCUTANEOUS at 17:33

## 2018-10-01 NOTE — CHART NOTE - NSCHARTNOTEFT_GEN_A_CORE
d/w patient's daughter on the phone. She indicated she has not being able to make a final decision. I re-stated that the main question at this point for her and for her siblings as about feeding tube vs. pleasure feeds. I re-stated that it is not the standard of care to place PEG tubes on patient's approaching the end of their lives or that have advanced cancer without available treatment options; however, that sometimes patient's and or decision makers may decide for a feeding tube based on cultura, Denominational believes or patient's prior expressed wishes. I advised Louann to talk to her siblings and to try to make a decision and she said she wanted me to contact her tomorrow afternoon to f/u on this discussion. I also explained that the patient is eligible for hospice and that depending on the patient's clincial condition and symptoms home or inpatient hospice may be indicated.  16' were spent in ACP.     Allan Ruvalcaba MD.   [phone]

## 2018-10-01 NOTE — PROGRESS NOTE ADULT - ASSESSMENT
pt with htn, hld, dm 2, cad, htn,   pad, right femoral A graft. occluded,/ gangrene.  right  leg  amputation ,    rectal cancer, s/p RT/ colostomy  also  with lung mass on ct,  s/p   ab.  for  rectal abscesses, on prior  admission    now  admitted  for  peg, at  family's request, hence  pt   sent by  n home  dr,  to  er    pt  with  elevated  wbc/ decreasing now on ab/  hyponatremia improved  h/o  rectal  abscesses / collections  on iv n  saline/  ertapenem per  ID  pt  is  dnr    palliative  careto    f/p  with family/  pt  is  ideal   candidate  for  hospice  pt  with c/c  cachexia/ from malignancy/ bed  bound/ functional quadriplegia   ct prelim, with  entero vesicluar  fistula/ surg and  urology  eval, noted  given pts  rectal cancer  and  pts  poor  performance  status/ agree  with  oncology  for  supportive/ comfort care/ encourage on going discusssions  with family  labs stable/ hypokalemia

## 2018-10-01 NOTE — PROGRESS NOTE ADULT - ASSESSMENT
78F Nursing home patient hx of rectal mass,   s/p resection and colostomy placement,  CAD, HTN,   DM  , R AKA for gangrenous leg   presents with  h/o f ailure to thrive/  c/c cachexia/  bed  bound Tumor progression with fistulas  Leucocytosis  Unclear if UTI-no urine Cx sent   Blood Cx negative  WBC trended down  No other s/s infection  Unlikley fistulas will heal in setting of underlying tumor  Can attempt de-escalation to levaquin-if stable 5-7 day short course  Overall prognosis poor  Palliative on case  Will tailor plan for ID issues  per course,results.Will defer to primary team on management of other issues.  Case d/w Dr moore  Will Follow.  Beeper [id number]-uaoc/afterhours/No response-[phone]

## 2018-10-02 LAB
ANION GAP SERPL CALC-SCNC: 6 MMOL/L — SIGNIFICANT CHANGE UP (ref 5–17)
BUN SERPL-MCNC: <4 MG/DL — LOW (ref 7–23)
CALCIUM SERPL-MCNC: 7.1 MG/DL — LOW (ref 8.4–10.5)
CHLORIDE SERPL-SCNC: 113 MMOL/L — HIGH (ref 96–108)
CO2 SERPL-SCNC: 19 MMOL/L — LOW (ref 22–31)
CREAT SERPL-MCNC: 0.32 MG/DL — LOW (ref 0.5–1.3)
CULTURE RESULTS: SIGNIFICANT CHANGE UP
CULTURE RESULTS: SIGNIFICANT CHANGE UP
GLUCOSE SERPL-MCNC: 133 MG/DL — HIGH (ref 70–99)
HCT VFR BLD CALC: 24 % — LOW (ref 34.5–45)
HGB BLD-MCNC: 7.4 G/DL — LOW (ref 11.5–15.5)
MCHC RBC-ENTMCNC: 26.1 PG — LOW (ref 27–34)
MCHC RBC-ENTMCNC: 31 GM/DL — LOW (ref 32–36)
MCV RBC AUTO: 84.1 FL — SIGNIFICANT CHANGE UP (ref 80–100)
PHOSPHATE SERPL-MCNC: 2.1 MG/DL — LOW (ref 2.5–4.5)
PLATELET # BLD AUTO: 183 K/UL — SIGNIFICANT CHANGE UP (ref 150–400)
POTASSIUM SERPL-MCNC: 3 MMOL/L — LOW (ref 3.5–5.3)
POTASSIUM SERPL-SCNC: 3 MMOL/L — LOW (ref 3.5–5.3)
RBC # BLD: 2.85 M/UL — LOW (ref 3.8–5.2)
RBC # FLD: 19.6 % — HIGH (ref 10.3–14.5)
SODIUM SERPL-SCNC: 138 MMOL/L — SIGNIFICANT CHANGE UP (ref 135–145)
SPECIMEN SOURCE: SIGNIFICANT CHANGE UP
SPECIMEN SOURCE: SIGNIFICANT CHANGE UP
WBC # BLD: 8.8 K/UL — SIGNIFICANT CHANGE UP (ref 3.8–10.5)
WBC # FLD AUTO: 8.8 K/UL — SIGNIFICANT CHANGE UP (ref 3.8–10.5)

## 2018-10-02 PROCEDURE — 99232 SBSQ HOSP IP/OBS MODERATE 35: CPT

## 2018-10-02 RX ORDER — POTASSIUM PHOSPHATE, MONOBASIC POTASSIUM PHOSPHATE, DIBASIC 236; 224 MG/ML; MG/ML
15 INJECTION, SOLUTION INTRAVENOUS ONCE
Qty: 0 | Refills: 0 | Status: COMPLETED | OUTPATIENT
Start: 2018-10-02 | End: 2018-10-02

## 2018-10-02 RX ORDER — POTASSIUM CHLORIDE 20 MEQ
10 PACKET (EA) ORAL
Qty: 0 | Refills: 0 | Status: COMPLETED | OUTPATIENT
Start: 2018-10-02 | End: 2018-10-02

## 2018-10-02 RX ORDER — MORPHINE SULFATE 50 MG/1
5 CAPSULE, EXTENDED RELEASE ORAL EVERY 6 HOURS
Qty: 0 | Refills: 0 | Status: DISCONTINUED | OUTPATIENT
Start: 2018-10-02 | End: 2018-10-05

## 2018-10-02 RX ADMIN — POTASSIUM PHOSPHATE, MONOBASIC POTASSIUM PHOSPHATE, DIBASIC 62.5 MILLIMOLE(S): 236; 224 INJECTION, SOLUTION INTRAVENOUS at 17:40

## 2018-10-02 RX ADMIN — MORPHINE SULFATE 5 MILLIGRAM(S): 50 CAPSULE, EXTENDED RELEASE ORAL at 12:30

## 2018-10-02 RX ADMIN — Medication 100 MILLIEQUIVALENT(S): at 17:40

## 2018-10-02 RX ADMIN — Medication 2.5 MILLIGRAM(S): at 17:46

## 2018-10-02 RX ADMIN — HEPARIN SODIUM 5000 UNIT(S): 5000 INJECTION INTRAVENOUS; SUBCUTANEOUS at 05:43

## 2018-10-02 RX ADMIN — MORPHINE SULFATE 5 MILLIGRAM(S): 50 CAPSULE, EXTENDED RELEASE ORAL at 17:46

## 2018-10-02 RX ADMIN — HEPARIN SODIUM 5000 UNIT(S): 5000 INJECTION INTRAVENOUS; SUBCUTANEOUS at 17:47

## 2018-10-02 RX ADMIN — MORPHINE SULFATE 5 MILLIGRAM(S): 50 CAPSULE, EXTENDED RELEASE ORAL at 12:16

## 2018-10-02 RX ADMIN — MORPHINE SULFATE 5 MILLIGRAM(S): 50 CAPSULE, EXTENDED RELEASE ORAL at 23:57

## 2018-10-02 RX ADMIN — CARVEDILOL PHOSPHATE 6.25 MILLIGRAM(S): 80 CAPSULE, EXTENDED RELEASE ORAL at 17:46

## 2018-10-02 RX ADMIN — Medication 2.5 MILLIGRAM(S): at 05:43

## 2018-10-02 RX ADMIN — Medication 100 MILLIEQUIVALENT(S): at 20:10

## 2018-10-02 RX ADMIN — SODIUM CHLORIDE 75 MILLILITER(S): 9 INJECTION, SOLUTION INTRAVENOUS at 00:51

## 2018-10-02 RX ADMIN — Medication 100 MILLIEQUIVALENT(S): at 16:16

## 2018-10-02 RX ADMIN — CARVEDILOL PHOSPHATE 6.25 MILLIGRAM(S): 80 CAPSULE, EXTENDED RELEASE ORAL at 05:43

## 2018-10-02 RX ADMIN — MORPHINE SULFATE 5 MILLIGRAM(S): 50 CAPSULE, EXTENDED RELEASE ORAL at 05:43

## 2018-10-02 RX ADMIN — MIRTAZAPINE 15 MILLIGRAM(S): 45 TABLET, ORALLY DISINTEGRATING ORAL at 22:00

## 2018-10-02 RX ADMIN — ATORVASTATIN CALCIUM 40 MILLIGRAM(S): 80 TABLET, FILM COATED ORAL at 22:00

## 2018-10-02 RX ADMIN — MORPHINE SULFATE 5 MILLIGRAM(S): 50 CAPSULE, EXTENDED RELEASE ORAL at 19:00

## 2018-10-02 NOTE — PROGRESS NOTE ADULT - ASSESSMENT
78F Nursing home patient hx of rectal mass,   s/p resection and colostomy placement,  CAD, HTN,   DM  , R AKA for gangrenous leg   presents with  h/o f ailure to thrive/  c/c cachexia/  bed  bound Tumor progression with fistulas  Leucocytosis  Unclear if UTI-no urine Cx sent   Blood Cx negative  WBC trended down  No other s/s infection  Unlikley fistulas will heal in setting of underlying tumor  continue levaquin -if stable will limit to short 7 day total course  Overall prognosis poor  Palliative on case  Will tailor plan for ID issues  per course,results.Will defer to primary team on management of other issues.  Case d/w med Np  Will Follow.  Beeper [id number]-fsns/afterhours/No response-[phone]

## 2018-10-02 NOTE — PROVIDER CONTACT NOTE (OTHER) - REASON
pt is taking her medication crushed put has a order for morphine tab 15 mg. also pt urine is a pink cloudy color.

## 2018-10-02 NOTE — PROVIDER CONTACT NOTE (OTHER) - SITUATION
pt is taking her medication crushed put has a order for morphine tab 15 mg. also pt urine is a pink cloudy color

## 2018-10-02 NOTE — PROGRESS NOTE ADULT - ASSESSMENT
pt with htn, hld, dm 2, cad, htn,   pad, right femoral A graft. occluded,/ gangrene.  right  leg  amputation ,    rectal cancer, s/p RT/ colostomy  also  with lung mass on ct,  s/p   ab.  for  rectal abscesses, on prior  admission    now  admitted  for  peg, at  family's request, hence  pt   sent by  n home  dr,  to  er    pt  with  elevated  wbc/ decreasing now on ab/  hyponatremia improved  h/o  rectal  abscesses / collections on levaquin per  ID  pt  is  dnr/  palliative  careto    f/p  with family/  pt  is  ideal   candidate  for  hospice  pt  with c/c  cachexia/ from malignancy/ bed  bound/ functional quadriplegia   ct prelim, with  entero vesicluar  fistula/ surg and  urology  eval, noted, no intervention  given pts  rectal cancer  and  pts  poor  performance  status/ agree  with  oncology  for  supportive/ comfort care/ encourage on going discusssions  with family  follow hb today

## 2018-10-02 NOTE — PROVIDER CONTACT NOTE (OTHER) - ACTION/TREATMENT ORDERED:
ESPERANZA Allan aware. states to check if UA was done and remind her to change morphine to liquid form for am dose

## 2018-10-03 LAB
ANION GAP SERPL CALC-SCNC: 9 MMOL/L — SIGNIFICANT CHANGE UP (ref 5–17)
BUN SERPL-MCNC: <4 MG/DL — LOW (ref 7–23)
CALCIUM SERPL-MCNC: 6.8 MG/DL — LOW (ref 8.4–10.5)
CHLORIDE SERPL-SCNC: 111 MMOL/L — HIGH (ref 96–108)
CO2 SERPL-SCNC: 19 MMOL/L — LOW (ref 22–31)
CREAT SERPL-MCNC: 0.32 MG/DL — LOW (ref 0.5–1.3)
GLUCOSE SERPL-MCNC: 201 MG/DL — HIGH (ref 70–99)
HCT VFR BLD CALC: 26.8 % — LOW (ref 34.5–45)
HGB BLD-MCNC: 8.3 G/DL — LOW (ref 11.5–15.5)
MCHC RBC-ENTMCNC: 25.9 PG — LOW (ref 27–34)
MCHC RBC-ENTMCNC: 31 GM/DL — LOW (ref 32–36)
MCV RBC AUTO: 83.5 FL — SIGNIFICANT CHANGE UP (ref 80–100)
PLATELET # BLD AUTO: 116 K/UL — LOW (ref 150–400)
POTASSIUM SERPL-MCNC: 3.9 MMOL/L — SIGNIFICANT CHANGE UP (ref 3.5–5.3)
POTASSIUM SERPL-SCNC: 3.9 MMOL/L — SIGNIFICANT CHANGE UP (ref 3.5–5.3)
RBC # BLD: 3.21 M/UL — LOW (ref 3.8–5.2)
RBC # FLD: 21.9 % — HIGH (ref 10.3–14.5)
SODIUM SERPL-SCNC: 139 MMOL/L — SIGNIFICANT CHANGE UP (ref 135–145)
WBC # BLD: 10.52 K/UL — HIGH (ref 3.8–10.5)
WBC # FLD AUTO: 10.52 K/UL — HIGH (ref 3.8–10.5)

## 2018-10-03 PROCEDURE — 99232 SBSQ HOSP IP/OBS MODERATE 35: CPT

## 2018-10-03 RX ORDER — CARVEDILOL PHOSPHATE 80 MG/1
6.25 CAPSULE, EXTENDED RELEASE ORAL EVERY 12 HOURS
Qty: 0 | Refills: 0 | Status: DISCONTINUED | OUTPATIENT
Start: 2018-10-03 | End: 2018-10-05

## 2018-10-03 RX ADMIN — MORPHINE SULFATE 5 MILLIGRAM(S): 50 CAPSULE, EXTENDED RELEASE ORAL at 01:27

## 2018-10-03 RX ADMIN — HEPARIN SODIUM 5000 UNIT(S): 5000 INJECTION INTRAVENOUS; SUBCUTANEOUS at 05:08

## 2018-10-03 RX ADMIN — Medication 2.5 MILLIGRAM(S): at 05:08

## 2018-10-03 RX ADMIN — MORPHINE SULFATE 5 MILLIGRAM(S): 50 CAPSULE, EXTENDED RELEASE ORAL at 05:36

## 2018-10-03 RX ADMIN — MORPHINE SULFATE 5 MILLIGRAM(S): 50 CAPSULE, EXTENDED RELEASE ORAL at 23:35

## 2018-10-03 RX ADMIN — MORPHINE SULFATE 5 MILLIGRAM(S): 50 CAPSULE, EXTENDED RELEASE ORAL at 18:16

## 2018-10-03 RX ADMIN — MORPHINE SULFATE 5 MILLIGRAM(S): 50 CAPSULE, EXTENDED RELEASE ORAL at 05:08

## 2018-10-03 RX ADMIN — MORPHINE SULFATE 5 MILLIGRAM(S): 50 CAPSULE, EXTENDED RELEASE ORAL at 16:07

## 2018-10-03 RX ADMIN — MIRTAZAPINE 15 MILLIGRAM(S): 45 TABLET, ORALLY DISINTEGRATING ORAL at 23:35

## 2018-10-03 RX ADMIN — Medication 2.5 MILLIGRAM(S): at 18:16

## 2018-10-03 RX ADMIN — ATORVASTATIN CALCIUM 40 MILLIGRAM(S): 80 TABLET, FILM COATED ORAL at 23:05

## 2018-10-03 RX ADMIN — MORPHINE SULFATE 5 MILLIGRAM(S): 50 CAPSULE, EXTENDED RELEASE ORAL at 12:34

## 2018-10-03 RX ADMIN — SODIUM CHLORIDE 75 MILLILITER(S): 9 INJECTION, SOLUTION INTRAVENOUS at 11:34

## 2018-10-03 RX ADMIN — HEPARIN SODIUM 5000 UNIT(S): 5000 INJECTION INTRAVENOUS; SUBCUTANEOUS at 18:16

## 2018-10-03 RX ADMIN — CARVEDILOL PHOSPHATE 6.25 MILLIGRAM(S): 80 CAPSULE, EXTENDED RELEASE ORAL at 05:08

## 2018-10-03 NOTE — PROGRESS NOTE ADULT - ASSESSMENT
pt with htn, hld, dm 2, cad, htn,   pad, right femoral A graft. occluded,/ gangrene.  right  leg  amputation ,    rectal cancer, s/p RT/ colostomy  also  with lung mass on ct,  s/p   ab.  for  rectal abscesses, on prior  admission    now  admitted  for  peg, at  family's request, hence  pt   sent by  n home  dr,  to  er    pt  with  elevated  wbc/ decreasing now on ab/  hyponatremia improved  h/o  rectal  abscesses / collections on levaquin per  ID  pt  is  dnr/  palliative  careto    f/p  with family/  pt  is  ideal   candidate  for  hospice  pt  with c/c  cachexia/ from malignancy/ bed  bound/ functional quadriplegia   ct prelim, with  entero vesicluar  fistula/ surg and  urology  eval, noted, no intervention  given pts  rectal cancer  and  pts  poor  performance  status/ agree  with  oncology  for  supportive/ comfort care/ encourage on going discusssions  with family/ supportive care  follow hb today/  cachexia

## 2018-10-03 NOTE — PROGRESS NOTE ADULT - ASSESSMENT
multiple discussion with family re: peg as outpatient.   pt admitted/ sent to hospital for peg.  pt not medically cleared.  await clearance.  palliatve input

## 2018-10-03 NOTE — PROGRESS NOTE ADULT - ASSESSMENT
78F Nursing home patient hx of rectal mass,   s/p resection and colostomy placement,  CAD, HTN,   DM  , R AKA for gangrenous leg   presents with  h/o f ailure to thrive/  c/c cachexia/  bed  bound Tumor progression with fistulas  Leucocytosis  Unclear if UTI-no urine Cx sent   Blood Cx negative  WBC trended down  No other s/s infection  Unlikley fistulas will heal in setting of underlying tumor  Dc levaquin after finishing 7 day course  Overall prognosis poor  Palliative on case  case d/w Med NP,Dr moore  ID will follow as needed,please call [phone] if any questions or issues.

## 2018-10-04 DIAGNOSIS — R52 PAIN, UNSPECIFIED: ICD-10-CM

## 2018-10-04 LAB
HCT VFR BLD CALC: 23.2 % — LOW (ref 34.5–45)
HGB BLD-MCNC: 7.3 G/DL — LOW (ref 11.5–15.5)
MCHC RBC-ENTMCNC: 26.5 PG — LOW (ref 27–34)
MCHC RBC-ENTMCNC: 31.5 GM/DL — LOW (ref 32–36)
MCV RBC AUTO: 84.1 FL — SIGNIFICANT CHANGE UP (ref 80–100)
PLATELET # BLD AUTO: 146 K/UL — LOW (ref 150–400)
RBC # BLD: 2.76 M/UL — LOW (ref 3.8–5.2)
RBC # FLD: 19.9 % — HIGH (ref 10.3–14.5)
WBC # BLD: 10.8 K/UL — HIGH (ref 3.8–10.5)
WBC # FLD AUTO: 10.8 K/UL — HIGH (ref 3.8–10.5)

## 2018-10-04 PROCEDURE — 99233 SBSQ HOSP IP/OBS HIGH 50: CPT

## 2018-10-04 PROCEDURE — 99497 ADVNCD CARE PLAN 30 MIN: CPT | Mod: 25

## 2018-10-04 RX ADMIN — SODIUM CHLORIDE 75 MILLILITER(S): 9 INJECTION, SOLUTION INTRAVENOUS at 07:10

## 2018-10-04 RX ADMIN — Medication 2.5 MILLIGRAM(S): at 06:58

## 2018-10-04 RX ADMIN — MORPHINE SULFATE 5 MILLIGRAM(S): 50 CAPSULE, EXTENDED RELEASE ORAL at 06:56

## 2018-10-04 RX ADMIN — CARVEDILOL PHOSPHATE 6.25 MILLIGRAM(S): 80 CAPSULE, EXTENDED RELEASE ORAL at 06:58

## 2018-10-04 RX ADMIN — MORPHINE SULFATE 5 MILLIGRAM(S): 50 CAPSULE, EXTENDED RELEASE ORAL at 12:18

## 2018-10-04 RX ADMIN — CARVEDILOL PHOSPHATE 6.25 MILLIGRAM(S): 80 CAPSULE, EXTENDED RELEASE ORAL at 17:19

## 2018-10-04 RX ADMIN — MORPHINE SULFATE 5 MILLIGRAM(S): 50 CAPSULE, EXTENDED RELEASE ORAL at 07:15

## 2018-10-04 RX ADMIN — MIRTAZAPINE 15 MILLIGRAM(S): 45 TABLET, ORALLY DISINTEGRATING ORAL at 21:16

## 2018-10-04 RX ADMIN — ATORVASTATIN CALCIUM 40 MILLIGRAM(S): 80 TABLET, FILM COATED ORAL at 21:16

## 2018-10-04 RX ADMIN — MORPHINE SULFATE 5 MILLIGRAM(S): 50 CAPSULE, EXTENDED RELEASE ORAL at 00:00

## 2018-10-04 RX ADMIN — MORPHINE SULFATE 5 MILLIGRAM(S): 50 CAPSULE, EXTENDED RELEASE ORAL at 17:33

## 2018-10-04 RX ADMIN — Medication 2.5 MILLIGRAM(S): at 17:18

## 2018-10-04 RX ADMIN — HEPARIN SODIUM 5000 UNIT(S): 5000 INJECTION INTRAVENOUS; SUBCUTANEOUS at 06:58

## 2018-10-04 RX ADMIN — HEPARIN SODIUM 5000 UNIT(S): 5000 INJECTION INTRAVENOUS; SUBCUTANEOUS at 17:19

## 2018-10-04 RX ADMIN — MORPHINE SULFATE 5 MILLIGRAM(S): 50 CAPSULE, EXTENDED RELEASE ORAL at 17:18

## 2018-10-04 NOTE — CHART NOTE - NSCHARTNOTEFT_GEN_A_CORE
Malnutrition follow up.    Source:     Family [x ]     other [x ] medical record/team    Patient not eating, limited sips of liquids. Per patient's daughter, patient not eating at all. Per chart :discussions continued regarding family request for PEG placement, MD to determine feasibility of placement.      Chart reviewed, event noted:  Patient admitted for PEG placement, followed by palliative care. Family to decide GOC  PEG vs pleasure feeds/palliative  PMH:HTN, DM 2, CAD, PAD,, right femoral A graft,  occluded/ gangrene h/o  right  leg  amputation ,  rectal cancer, s/p RT/ colostomy,  lung mass       Diet : Mechanical soft      GI colostomy  0 ml output     Enteral /Parenteral Nutrition: NA at this time      Current Weight: Weight (kg): 43 (10-01 @ 11:23)    Pertinent Medications: MEDICATIONS  (STANDING):  atorvastatin 40 milliGRAM(s) Oral at bedtime  carvedilol 6.25 milliGRAM(s) Oral every 12 hours  dextrose 5% + sodium chloride 0.9%. 1000 milliLiter(s) (75 mL/Hr) IV Continuous <Continuous>  dronabinol 2.5 milliGRAM(s) Oral two times a day  heparin  Injectable 5000 Unit(s) SubCutaneous every 12 hours  levoFLOXacin IVPB 250 milliGRAM(s) IV Intermittent every 24 hours  mirtazapine 15 milliGRAM(s) Oral at bedtime  morphine   Solution 5 milliGRAM(s) Oral every 6 hours    MEDICATIONS  (PRN):  HYDROmorphone  Injectable 0.5 milliGRAM(s) IV Push every 4 hours PRN Severe Pain (7 - 10)    Pertinent Labs:  10-03 Na139 mmol/L Glu 201 mg/dL<H> K+ 3.9 mmol/L Cr  0.32 mg/dL<L> BUN <4 mg/dL<L> 10-02 Phos 2.1 mg/dL<L>          Estimated Needs:   [ x] no change since previous assessment  [ ] recalculated:       Previous Nutrition Diagnosis:   t [x ] Malnutrition          Nutrition Diagnosis is [ x] ongoing          New Nutrition Diagnosis: [x ] not applicable    Recommend      [ x] Nutrition Supplement continue ensure clears as tolerated        [x ] Other: encourage intake as tolerated       Monitoring and Evaluation:     [x ] PO intake [x ] Tolerance to diet prescription [ xx] weights [X] IV  hydration if within GOC[ x] follow up per protocol    [ x] other: monitor for GOC, possible peg?

## 2018-10-04 NOTE — PROGRESS NOTE ADULT - PROBLEM SELECTOR PLAN 4
See GOC above   Patient is already DRN. If patient is DC without hospice, I will advise the primary team completing a MOLST form.   Will continue to evaluate for symptoms and PCU needs. At this time the patient is not a PCU candidate.

## 2018-10-04 NOTE — PROGRESS NOTE ADULT - PROBLEM SELECTOR PLAN 3
Will continue Morphine ATC and Dilaudid PRN. If po tolerance further decline, can them switch to Morphine IV 1 mg q 6 ATC.

## 2018-10-04 NOTE — PROGRESS NOTE ADULT - PROBLEM SELECTOR PLAN 1
In the setting of advanced CA, malnutrition, infection, medications,    Abx as per primary  GOC: In regards to dysphagia/poor oral intake I re-stated to the patient's daughter that it is unlikely a feeding tube will be of a benefit at this time and mainly due to the patient's poor prognosis, poor functional status, advanced CA, poor albumin, and impaired mental status.  The patient's daughter (that indicated she has already discussed with her brothers) agree on not trying to pursuit a PEG and instead  continuing pleasure feeds. She understands that oral feeding will be mainly for pleasure.   The patient's daughter was also interested in Hospice Services; however, the patient is living in [facility] where there is not hospice. I asked the  on the floor to further discuss with the daughter about dispo planning.  36' were spent in ACP.

## 2018-10-04 NOTE — PROGRESS NOTE ADULT - ASSESSMENT
78F Nursing home patient with locally advanced rectal cancer s/p palliative RT that was aborted 2/2 development of pelvic abscesses treated with prolonged course of IV Zosyn, hx right AKA for LE gangrene, CAD, HTN, DM, who is s/p diverting colostomy for palliation that presented with  failure to thrive and also because the "Nursing home physician" wanted the  pt to be admitted for peg placement, per  family's  request . As per the patient's daughter the patient has had poor appetite, difficulty swallowing ( she keeps food in her mouth and does not swallow it ). As per EMR, " Pt has GERD with esophagitis which also contributes to difficulty feeding patient."  She takes morphine 15mg ext release,  for pain management.  As per Onc, "Patient has been deemed not a candidate for disease modifying therapy and hospice appropriate in the past. Patient now presents from nursing home for PEG placement per family request. Patient is cachectic with failure to thrive, weight loss. Patient had advanced cognitive dysfunction and is an unreliable historian." Palliative care was called for [facility] in regards to PEG placement.

## 2018-10-04 NOTE — PROGRESS NOTE ADULT - ASSESSMENT
pt with htn, hld, dm 2, cad, htn,   pad, right femoral A graft. occluded,/ gangrene.  right  leg  amputation ,    rectal cancer, s/p RT/ colostomy  also  with lung mass on ct,  s/p   ab.  for  rectal abscesses, on prior  admission    now  admitted  for  peg, at  family's request, hence  pt   sent by  n home  dr,  to  er    pt  with  elevated  wbc/ decreasing now on ab/  hyponatremia improved  h/o  rectal  abscesses / collections on levaquin per  ID  pt  is  dnr/  palliative  careto    f/p  with family/  pt  is  ideal   candidate  for  hospice  pt  with c/c  cachexia/ from malignancy/ bed  bound/ functional quadriplegia   ct prelim, with  entero vesicluar  fistula/ surg and  urology  eval, noted, no intervention  given pts  rectal cancer  and  pts  poor  performance  status/ agree  with  oncology  for  supportive/ comfort care/ encourage on going discusssions  with family/ supportive care  levaquin for  7 days, per  ID

## 2018-10-05 ENCOUNTER — TRANSCRIPTION ENCOUNTER (OUTPATIENT)
Age: 78
End: 2018-10-05

## 2018-10-05 VITALS — TEMPERATURE: 99 F

## 2018-10-05 RX ORDER — MIRTAZAPINE 45 MG/1
1 TABLET, ORALLY DISINTEGRATING ORAL
Qty: 0 | Refills: 0 | COMMUNITY

## 2018-10-05 RX ORDER — SENNA PLUS 8.6 MG/1
2 TABLET ORAL
Qty: 0 | Refills: 0 | COMMUNITY

## 2018-10-05 RX ORDER — INFLUENZA VIRUS VACCINE 15; 15; 15; 15 UG/.5ML; UG/.5ML; UG/.5ML; UG/.5ML
0.5 SUSPENSION INTRAMUSCULAR ONCE
Qty: 0 | Refills: 0 | Status: COMPLETED | OUTPATIENT
Start: 2018-10-05 | End: 2018-10-05

## 2018-10-05 RX ORDER — SOD.CHLORID/POTASSIUM CHLORIDE 287-180-15
1 TABLET ORAL
Qty: 0 | Refills: 0 | COMMUNITY

## 2018-10-05 RX ORDER — IPRATROPIUM/ALBUTEROL SULFATE 18-103MCG
3 AEROSOL WITH ADAPTER (GRAM) INHALATION
Qty: 0 | Refills: 0 | COMMUNITY

## 2018-10-05 RX ORDER — INSULIN ASPART 100 [IU]/ML
0 INJECTION, SOLUTION SUBCUTANEOUS
Qty: 0 | Refills: 0 | COMMUNITY

## 2018-10-05 RX ORDER — FAMOTIDINE 10 MG/ML
1 INJECTION INTRAVENOUS
Qty: 0 | Refills: 0 | COMMUNITY

## 2018-10-05 RX ORDER — MORPHINE SULFATE 50 MG/1
1 CAPSULE, EXTENDED RELEASE ORAL
Qty: 0 | Refills: 0 | COMMUNITY

## 2018-10-05 RX ORDER — CHOLECALCIFEROL (VITAMIN D3) 125 MCG
1 CAPSULE ORAL
Qty: 0 | Refills: 0 | COMMUNITY

## 2018-10-05 RX ORDER — POLYETHYLENE GLYCOL 3350 17 G/17G
17 POWDER, FOR SOLUTION ORAL
Qty: 0 | Refills: 0 | COMMUNITY

## 2018-10-05 RX ORDER — FOLIC ACID 0.8 MG
1 TABLET ORAL
Qty: 0 | Refills: 0 | COMMUNITY

## 2018-10-05 RX ORDER — MIRTAZAPINE 45 MG/1
1 TABLET, ORALLY DISINTEGRATING ORAL
Qty: 0 | Refills: 0 | COMMUNITY
Start: 2018-10-05

## 2018-10-05 RX ORDER — ATORVASTATIN CALCIUM 80 MG/1
1 TABLET, FILM COATED ORAL
Qty: 0 | Refills: 0 | COMMUNITY
Start: 2018-10-05

## 2018-10-05 RX ORDER — CARVEDILOL PHOSPHATE 80 MG/1
1 CAPSULE, EXTENDED RELEASE ORAL
Qty: 0 | Refills: 0 | COMMUNITY
Start: 2018-10-05

## 2018-10-05 RX ORDER — MORPHINE SULFATE 50 MG/1
2.5 CAPSULE, EXTENDED RELEASE ORAL
Qty: 0 | Refills: 0 | COMMUNITY
Start: 2018-10-05

## 2018-10-05 RX ORDER — ONDANSETRON 8 MG/1
1 TABLET, FILM COATED ORAL
Qty: 0 | Refills: 0 | COMMUNITY

## 2018-10-05 RX ORDER — CARVEDILOL PHOSPHATE 80 MG/1
1 CAPSULE, EXTENDED RELEASE ORAL
Qty: 0 | Refills: 0 | COMMUNITY

## 2018-10-05 RX ADMIN — MORPHINE SULFATE 5 MILLIGRAM(S): 50 CAPSULE, EXTENDED RELEASE ORAL at 12:26

## 2018-10-05 RX ADMIN — MORPHINE SULFATE 5 MILLIGRAM(S): 50 CAPSULE, EXTENDED RELEASE ORAL at 01:00

## 2018-10-05 RX ADMIN — MORPHINE SULFATE 5 MILLIGRAM(S): 50 CAPSULE, EXTENDED RELEASE ORAL at 00:29

## 2018-10-05 RX ADMIN — HEPARIN SODIUM 5000 UNIT(S): 5000 INJECTION INTRAVENOUS; SUBCUTANEOUS at 05:43

## 2018-10-05 RX ADMIN — MORPHINE SULFATE 5 MILLIGRAM(S): 50 CAPSULE, EXTENDED RELEASE ORAL at 11:56

## 2018-10-05 RX ADMIN — INFLUENZA VIRUS VACCINE 0.5 MILLILITER(S): 15; 15; 15; 15 SUSPENSION INTRAMUSCULAR at 14:15

## 2018-10-05 RX ADMIN — CARVEDILOL PHOSPHATE 6.25 MILLIGRAM(S): 80 CAPSULE, EXTENDED RELEASE ORAL at 05:43

## 2018-10-05 RX ADMIN — MORPHINE SULFATE 5 MILLIGRAM(S): 50 CAPSULE, EXTENDED RELEASE ORAL at 06:15

## 2018-10-05 RX ADMIN — MORPHINE SULFATE 5 MILLIGRAM(S): 50 CAPSULE, EXTENDED RELEASE ORAL at 05:43

## 2018-10-05 NOTE — DISCHARGE NOTE ADULT - HOSPITAL COURSE
pt with htn, hld, dm 2, cad, htn,   pad, right femoral A graft. occluded,/ gangrene.  right  leg  amputation ,    rectal cancer, s/p RT/ colostomy  also  with lung mass on ct,  s/p   ab.  for  rectal abscesses, on prior  admission    now  admitted  for  peg, at  family's request, hence  pt   sent by  n home  dr,  to  er    pt  with  elevated  wbc/ decreasing now on ab/  hyponatremia improved  h/o  rectal  abscesses / collections on levaquin per  ID  pt  is  dnr/  palliative  careto    f/p  with family/  pt  is  ideal   candidate  for  hospice  pt  with c/c  cachexia/ from malignancy/ bed  bound/ functional quadriplegia   ct prelim, with  entero vesicluar  fistula/ surg and  urology  eval, noted, no intervention  given pts  rectal cancer  and  pts  poor  performance  status/ agree  with  oncology  for  supportive/ comfort care/ encourage   levaquin for  7 days, per  ID  hospice as  per pallaitive care 79 yo female with HTN, HLD, Type 2DM, CAD, PAD , right femoral A graft. occluded,/ gangrene. s/p right  leg  amputation ,  rectal cancer, s/p RT/ colostomy, lung mass on CT,  s/p   antibiotic  for  rectal abscesses, on prior  admission.Pt was  admitted  for failure to thrive, possible PEG, at  family's request.   Pt received IVF and IV Levaquin for leukocytosis and  enterovesicular fistula.  No intervention per Surgery and Urology. Leukocytosis now decreasing, hyponatremia improved.     Pt  is  DNR. Family refusing PEG and Pt  is  ideal   candidate  for  hospice . Pt   with   cachexia, from malignancy, bed  bound, functional quadriplegia, on pleasure feeds. Given pt's  rectal cancer  and   poor  performance  status,  agree  with  oncology  for  supportive/ comfort care/ encourage .  levaquin for  7 days, per  ID,  Discharge with  Hospice care.

## 2018-10-05 NOTE — PROGRESS NOTE ADULT - PROVIDER SPECIALTY LIST ADULT
Cardiology
Gastroenterology
Infectious Disease
Internal Medicine
Palliative Care
Urology
Internal Medicine

## 2018-10-05 NOTE — DISCHARGE NOTE ADULT - PLAN OF CARE
Stable Continue with diet and supplements as tolerated.  Follow-up with your primary care physician/hospice care team . Continue with medication as prescribed by your doctor.  Follow-up with your primary care physician/hospice care team . Continue with pain regimen as prescribed by your doctor.  Follow-up with your primary care physician/hospice care team . Continue with Levaquin thru Oct 9th.  Follow-up with your primary care physician/hospice care team . Follow-up with primary care physician /hospice care team for continued management of indwelling buck catheter for urinary retention/comfort care.

## 2018-10-05 NOTE — DISCHARGE NOTE ADULT - MEDICATION SUMMARY - MEDICATIONS TO CHANGE
I will SWITCH the dose or number of times a day I take the medications listed below when I get home from the hospital:    morphine 15 mg/12 hr oral tablet, extended release  -- 1 tab(s) by mouth every 12 hours    levoFLOXacin 500 mg oral tablet  -- 1 tab(s) by mouth every 24 hours.    -  STOP AFTER 5 DAYS.    carvedilol 3.125 mg oral tablet  -- 1 tab(s) by mouth 2 times a day    mirtazapine 7.5 mg oral tablet  -- 1 tab(s) by mouth once a day (at bedtime)

## 2018-10-05 NOTE — PROGRESS NOTE ADULT - SUBJECTIVE AND OBJECTIVE BOX
Progress Note    Subjective  Patient seen in AM. Saba in place and draining clear yellow. No fevers.   Creatinine stable     Objective  AVSS  Indwelling Catheter - Urethral: 950 mL    Gen NAD   Saba in place and draining clear yellow     Labs  WBC:  11.09  HCT: 26   Cr: 0.42
- Patient seen and examined.  - In summary, patient is a 78 year old woman who presented  for  peg (28 Sep 2018 08:29)  - Today, patient is without complaints.         *****MEDICATIONS:    MEDICATIONS  (STANDING):  atorvastatin 40 milliGRAM(s) Oral at bedtime  carvedilol 6.25 milliGRAM(s) Oral every 12 hours  dextrose 5% + sodium chloride 0.9%. 1000 milliLiter(s) (75 mL/Hr) IV Continuous <Continuous>  dronabinol 2.5 milliGRAM(s) Oral two times a day  ertapenem  IVPB 1000 milliGRAM(s) IV Intermittent every 24 hours  heparin  Injectable 5000 Unit(s) SubCutaneous every 12 hours  mirtazapine 15 milliGRAM(s) Oral at bedtime  morphine ER Tablet 15 milliGRAM(s) Oral every 12 hours    MEDICATIONS  (PRN):  HYDROmorphone  Injectable 0.5 milliGRAM(s) IV Push every 4 hours PRN Severe Pain (7 - 10)             ***** REVIEW OF SYSTEM:  GEN: no fever, no chills, no pain  RESP: no SOB, no cough, no sputum  CVS: no chest pain, no palpitations, no edema  GI: no abdominal pain, no nausea, no vomiting, no constipation, no diarrhea  : no dysuria, no frequency  NEURO: no headache, no dizziness  PSYCH: no depression, not anxious  Derm : no itching, no rash         ***** VITAL SIGNS:    T(F): 97.3 (10-01-18 @ 05:14), Max: 97.9 (18 @ 21:53)  HR: 70 (10-01-18 @ 05:14) (70 - 80)  BP: 104/66 (10-01-18 @ 05:14) (99/60 - 121/90)  RR: 17 (10-01-18 @ 05:14) (16 - 18)  SpO2: 98% (10-01-18 @ 05:14) (96% - 98%)  Wt(kg): --  ,   I&O's Summary    30 Sep 2018 07:01  -  01 Oct 2018 07:00  --------------------------------------------------------  IN: 1275 mL / OUT: 500 mL / NET: 775 mL                   *****PHYSICAL EXAM:  GEN: A&O X 3 , NAD , comfortable  HEENT: NCAT, EOMI, MMM, no icterus  NECK: Supple, No JVD  CVS: S1S2 , regular , No M/R/G appreciated  PULM: CTA B/L,  no W/R/R appreciated  ABD.: soft. non tender, non distended,  bowel sounds present  Extrem: intact pulses , no edema noted  Derm: No rash or ecchymosis noted  PSYCH: normal mood, no depression, not anxious         *****LAB AND IMAGIN.5    10.30 )-----------( 111      ( 01 Oct 2018 07:46 )             24.1               10-    137  |  112<H>  |  4<L>  ----------------------------<  115<H>  3.4<L>   |  18<L>  |  0.35<L>    Ca    7.3<L>      01 Oct 2018 07:06  Phos  1.8     10-  Mg     1.7     10-01      [All pertinent recent Imaging/Reports reviewed]         *****A S S E S S M E N T   A N D   P L A N :    78F for possible peg placement due to failure to thrive.  GI f/u appreciated  ?peg placement   await decision  dvt prophylaxis  need palliative input      __________________________  YON Metcalf D.O.
- Patient seen and examined.  - In summary, patient is a 78 year old woman who presented  for  peg (28 Sep 2018 08:29)  - Today, patient is without complaints.         *****MEDICATIONS:    MEDICATIONS  (STANDING):  atorvastatin 40 milliGRAM(s) Oral at bedtime  carvedilol 6.25 milliGRAM(s) Oral every 12 hours  dextrose 5% + sodium chloride 0.9%. 1000 milliLiter(s) (75 mL/Hr) IV Continuous <Continuous>  dronabinol 2.5 milliGRAM(s) Oral two times a day  heparin  Injectable 5000 Unit(s) SubCutaneous every 12 hours  levoFLOXacin IVPB 250 milliGRAM(s) IV Intermittent every 24 hours  mirtazapine 15 milliGRAM(s) Oral at bedtime  morphine   Solution 5 milliGRAM(s) Oral every 6 hours    MEDICATIONS  (PRN):  HYDROmorphone  Injectable 0.5 milliGRAM(s) IV Push every 4 hours PRN Severe Pain (7 - 10)                 ***** REVIEW OF SYSTEM:  GEN: no fever, no chills, no pain  RESP: no SOB, no cough, no sputum  CVS: no chest pain, no palpitations, no edema  GI: no abdominal pain, no nausea, no vomiting, no constipation, no diarrhea  : no dysuria, no frequency  NEURO: no headache, no dizziness  PSYCH: no depression, not anxious  Derm : no itching, no rash         ***** VITAL SIGNS:    T(F): 97.5 (10-04-18 @ 04:22), Max: 98.2 (10-03-18 @ 20:12)  HR: 75 (10-04-18 @ 04:22) (75 - 84)  BP: 134/53 (10-04-18 @ 04:22) (99/64 - 134/53)  RR: 18 (10-04-18 @ 04:22) (18 - 18)  SpO2: 99% (10-04-18 @ 04:22) (97% - 99%)  Wt(kg): --  ,   I&O's Summary    03 Oct 2018 07:01  -  04 Oct 2018 07:00  --------------------------------------------------------  IN: 1040 mL / OUT: 850 mL / NET: 190 mL    04 Oct 2018 07:01  -  04 Oct 2018 10:32  --------------------------------------------------------  IN: 60 mL / OUT: 0 mL / NET: 60 mL                   *****PHYSICAL EXAM:  GEN: A&O X 3 , NAD , comfortable  HEENT: NCAT, EOMI, MMM, no icterus  NECK: Supple, No JVD  CVS: S1S2 , regular , No M/R/G appreciated  PULM: CTA B/L,  no W/R/R appreciated  ABD.: soft. non tender, non distended,  bowel sounds present  Extrem: intact pulses , no edema noted  Derm: No rash or ecchymosis noted  PSYCH: normal mood, no depression, not anxious         *****LAB AND IMAGIN.3    10.8  )-----------( 146      ( 04 Oct 2018 09:48 )             23.2               10-03    139  |  111<H>  |  <4<L>  ----------------------------<  201<H>  3.9   |  19<L>  |  0.32<L>    Ca    6.8<L>      03 Oct 2018 05:37    [All pertinent recent Imaging/Reports reviewed]         *****A S S E S S M E N T   A N D   P L A N :    78F for possible peg placement due to failure to thrive.  GI f/u appreciated  dvt prophylaxis  f/u palliative, family, GOC   if no plan for PEG start DCP    __________________________  YON Metcalf D.O.
- Patient seen and examined.  - In summary, patient is a 78 year old woman who presented  for  peg (28 Sep 2018 08:29)  - Today, patient is without complaints.         *****MEDICATIONS:    MEDICATIONS  (STANDING):  atorvastatin 40 milliGRAM(s) Oral at bedtime  carvedilol 6.25 milliGRAM(s) Oral every 12 hours  dextrose 5% + sodium chloride 0.9%. 1000 milliLiter(s) (75 mL/Hr) IV Continuous <Continuous>  dronabinol 2.5 milliGRAM(s) Oral two times a day  heparin  Injectable 5000 Unit(s) SubCutaneous every 12 hours  levoFLOXacin IVPB 250 milliGRAM(s) IV Intermittent every 24 hours  mirtazapine 15 milliGRAM(s) Oral at bedtime  morphine   Solution 5 milliGRAM(s) Oral every 6 hours    MEDICATIONS  (PRN):  HYDROmorphone  Injectable 0.5 milliGRAM(s) IV Push every 4 hours PRN Severe Pain (7 - 10)               ***** REVIEW OF SYSTEM:  GEN: no fever, no chills, no pain  RESP: no SOB, no cough, no sputum  CVS: no chest pain, no palpitations, no edema  GI: no abdominal pain, no nausea, no vomiting, no constipation, no diarrhea  : no dysuria, no frequency  NEURO: no headache, no dizziness  PSYCH: no depression, not anxious  Derm : no itching, no rash         ***** VITAL SIGNS:    T(F): 97.3 (10-02-18 @ 04:52), Max: 98 (10-01-18 @ 21:29)  HR: 74 (10-02-18 @ 04:52) (69 - 96)  BP: 116/86 (10-02-18 @ 04:52) (116/86 - 133/75)  RR: 18 (10-02-18 @ 04:52) (18 - 18)  SpO2: 99% (10-02-18 @ 04:52) (96% - 99%)  Wt(kg): --  ,   I&O's Summary    01 Oct 2018 07:01  -  02 Oct 2018 07:00  --------------------------------------------------------  IN: 2520 mL / OUT: 900 mL / NET: 1620 mL               *****PHYSICAL EXAM:  GEN: A&O X 3 , NAD , comfortable  HEENT: NCAT, EOMI, MMM, no icterus  NECK: Supple, No JVD  CVS: S1S2 , regular , No M/R/G appreciated  PULM: CTA B/L,  no W/R/R appreciated  ABD.: soft. non tender, non distended,  bowel sounds present  Extrem: intact pulses , no edema noted  Derm: No rash or ecchymosis noted  PSYCH: normal mood, no depression, not anxious         *****LAB AND IMAGIN.5    10.30 )-----------( 111      ( 01 Oct 2018 07:46 )             24.1               10-    137  |  112<H>  |  4<L>  ----------------------------<  115<H>  3.4<L>   |  18<L>  |  0.35<L>    Ca    7.3<L>      01 Oct 2018 07:06  Phos  1.8     10-  Mg     1.7     10          [All pertinent recent Imaging/Reports reviewed]         *****A S S E S S M E N T   A N D   P L A N :    78F for possible peg placement due to failure to thrive.  GI f/u appreciated  ?peg placement   await decision  dvt prophylaxis  f/u palliative       __________________________  YON Metcalf D.O.
- Patient seen and examined.  - In summary, patient is a 78 year old woman who presented  for  peg (28 Sep 2018 08:29)  - Today, patient is without complaints.         *****MEDICATIONS:    MEDICATIONS  (STANDING):  atorvastatin 40 milliGRAM(s) Oral at bedtime  carvedilol 6.25 milliGRAM(s) Oral every 12 hours  dextrose 5% + sodium chloride 0.9%. 1000 milliLiter(s) (75 mL/Hr) IV Continuous <Continuous>  dronabinol 2.5 milliGRAM(s) Oral two times a day  heparin  Injectable 5000 Unit(s) SubCutaneous every 12 hours  levoFLOXacin IVPB 250 milliGRAM(s) IV Intermittent every 24 hours  mirtazapine 15 milliGRAM(s) Oral at bedtime  morphine   Solution 5 milliGRAM(s) Oral every 6 hours    MEDICATIONS  (PRN):  HYDROmorphone  Injectable 0.5 milliGRAM(s) IV Push every 4 hours PRN Severe Pain (7 - 10)               ***** REVIEW OF SYSTEM:  GEN: no fever, no chills, no pain  RESP: no SOB, no cough, no sputum  CVS: no chest pain, no palpitations, no edema  GI: no abdominal pain, no nausea, no vomiting, no constipation, no diarrhea  : no dysuria, no frequency  NEURO: no headache, no dizziness  PSYCH: no depression, not anxious  Derm : no itching, no rash         ***** VITAL SIGNS:    T(F): 97.9 (10-03-18 @ 04:56), Max: 97.9 (10-02-18 @ 17:19)  HR: 81 (10-03-18 @ 04:56) (73 - 87)  BP: 130/58 (10-03-18 @ 04:56) (119/51 - 141/64)  RR: 18 (10-03-18 @ 04:56) (18 - 18)  SpO2: 97% (10-03-18 @ 04:56) (94% - 100%)  Wt(kg): --  ,   I&O's Summary    02 Oct 2018 07:01  -  03 Oct 2018 07:00  --------------------------------------------------------  IN: 2400 mL / OUT: 1870 mL / NET: 530 mL                   *****PHYSICAL EXAM:  GEN: A&O X 3 , NAD , comfortable  HEENT: NCAT, EOMI, MMM, no icterus  NECK: Supple, No JVD  CVS: S1S2 , regular , No M/R/G appreciated  PULM: CTA B/L,  no W/R/R appreciated  ABD.: soft. non tender, non distended,  bowel sounds present  Extrem: intact pulses , no edema noted  Derm: No rash or ecchymosis noted  PSYCH: normal mood, no depression, not anxious         *****LAB AND IMAGIN.3    10.52 )-----------( 116      ( 03 Oct 2018 07:43 )             26.8               10-03    139  |  111<H>  |  <4<L>  ----------------------------<  201<H>  3.9   |  19<L>  |  0.32<L>    Ca    6.8<L>      03 Oct 2018 05:37  Phos  2.1     10-02    [All pertinent recent Imaging/Reports reviewed]         *****A S S E S S M E N T   A N D   P L A N :    78F for possible peg placement due to failure to thrive.  GI f/u appreciated  ?peg placement   await decision  dvt prophylaxis  f/u palliative, family, GOC       __________________________  YON Metcalf D.O.
- Patient seen and examined.  - In summary, patient is a 78 year old woman who presented  for  peg (28 Sep 2018 08:29)  - Today, patient is without complaints.         *****MEDICATIONS:    MEDICATIONS  (STANDING):  atorvastatin 40 milliGRAM(s) Oral at bedtime  carvedilol 6.25 milliGRAM(s) Oral every 12 hours  dextrose 5% + sodium chloride 0.9%. 1000 milliLiter(s) (75 mL/Hr) IV Continuous <Continuous>  heparin  Injectable 5000 Unit(s) SubCutaneous every 12 hours  levoFLOXacin IVPB 250 milliGRAM(s) IV Intermittent every 24 hours  mirtazapine 15 milliGRAM(s) Oral at bedtime  morphine   Solution 5 milliGRAM(s) Oral every 6 hours    MEDICATIONS  (PRN):  HYDROmorphone  Injectable 0.5 milliGRAM(s) IV Push every 4 hours PRN Severe Pain (7 - 10)                 ***** REVIEW OF SYSTEM:  GEN: no fever, no chills, no pain  RESP: no SOB, no cough, no sputum  CVS: no chest pain, no palpitations, no edema  GI: no abdominal pain, no nausea, no vomiting, no constipation, no diarrhea  : no dysuria, no frequency  NEURO: no headache, no dizziness  PSYCH: no depression, not anxious  Derm : no itching, no rash         ***** VITAL SIGNS:    T(F): 97.8 (10-05-18 @ 04:56), Max: 98 (10-04-18 @ 20:31)  HR: 85 (10-05-18 @ 04:56) (72 - 85)  BP: 113/73 (10-05-18 @ 04:56) (110/75 - 117/61)  RR: 18 (10-05-18 @ 04:56) (18 - 18)  SpO2: 96% (10-05-18 @ 04:56) (95% - 98%)  Wt(kg): --  ,   I&O's Summary    04 Oct 2018 07:01  -  05 Oct 2018 07:00  --------------------------------------------------------  IN: 1875 mL / OUT: 2150 mL / NET: -275 mL                   *****PHYSICAL EXAM:  GEN: A&O X 3 , NAD , comfortable  HEENT: NCAT, EOMI, MMM, no icterus  NECK: Supple, No JVD  CVS: S1S2 , regular , No M/R/G appreciated  PULM: CTA B/L,  no W/R/R appreciated  ABD.: soft. non tender, non distended,  bowel sounds present  Extrem: intact pulses , no edema noted  Derm: No rash or ecchymosis noted  PSYCH: normal mood, no depression, not anxious         *****LAB AND IMAGIN.3    10.8  )-----------( 146      ( 04 Oct 2018 09:48 )             23.2                   [All pertinent recent Imaging/Reports reviewed]         *****A S S E S S M E N T   A N D   P L A N :    78F for possible peg placement due to failure to thrive.  GI f/u appreciated  dvt prophylaxis  f/u palliative, family, GOC   DCP  Hospice    __________________________  A. CHAPARRO Metcalf
- Patient seen and examined.  - In summary, patient is a 78 year old woman who presented  for  peg (28 Sep 2018 08:29)  - Today, patient is without complaints.         *****MEDICATIONS:    MEDICATIONS  (STANDING):  atorvastatin 40 milliGRAM(s) Oral at bedtime  carvedilol 6.25 milliGRAM(s) Oral every 12 hours  dronabinol 2.5 milliGRAM(s) Oral two times a day  ertapenem  IVPB 1000 milliGRAM(s) IV Intermittent every 24 hours  heparin  Injectable 5000 Unit(s) SubCutaneous every 12 hours  mirtazapine 15 milliGRAM(s) Oral at bedtime  morphine ER Tablet 15 milliGRAM(s) Oral every 12 hours  sodium chloride 0.9%. 1000 milliLiter(s) (70 mL/Hr) IV Continuous <Continuous>    MEDICATIONS  (PRN):           ***** REVIEW OF SYSTEM:  GEN: no fever, no chills, no pain  RESP: no SOB, no cough, no sputum  CVS: no chest pain, no palpitations, no edema  GI: no abdominal pain, no nausea, no vomiting, no constipation, no diarrhea  : no dysuria, no frequency  NEURO: no headache, no dizziness  PSYCH: no depression, not anxious  Derm : no itching, no rash         ***** VITAL SIGNS:  T(F): 98.1 (18 @ 06:09), Max: 99.9 (18 @ 16:29)  HR: 81 (18 @ 06:09) (81 - 92)  BP: 99/64 (18 @ 06:09) (98/66 - 132/59)  RR: 16 (18 @ 06:09) (16 - 17)  SpO2: 98% (18 @ 06:09) (98% - 100%)  Wt(kg): --  ,   I&O's Summary    27 Sep 2018 07:01  -  28 Sep 2018 07:00  --------------------------------------------------------  IN: 0 mL / OUT: 1 mL / NET: -1 mL             *****PHYSICAL EXAM:  GEN: A&O X 3 , NAD , comfortable  HEENT: NCAT, EOMI, MMM, no icterus  NECK: Supple, No JVD  CVS: S1S2 , regular , No M/R/G appreciated  PULM: CTA B/L,  no W/R/R appreciated  ABD.: soft. non tender, non distended,  bowel sounds present  Extrem: intact pulses , no edema noted  Derm: No rash or ecchymosis noted  PSYCH: normal mood, no depression, not anxious         *****LAB AND IMAGING:                        10.4   16.6  )-----------( 217      ( 27 Sep 2018 13:10 )             33.4                   132<L>  |  100  |  17  ----------------------------<  55<L>  4.2   |  19<L>  |  0.50    Ca    8.4      28 Sep 2018 07:07    TPro  7.0  /  Alb  2.2<L>  /  TBili  0.4  /  DBili  x   /  AST  9<L>  /  ALT  6<L>  /  AlkPhos  128<H>      PT/INR - ( 27 Sep 2018 13:10 )   PT: 12.6 sec;   INR: 1.16 ratio         PTT - ( 27 Sep 2018 13:10 )  PTT:29.1 sec                   Urinalysis Basic - ( 27 Sep 2018 15:15 )    Color: Light Orange / Appearance: Turbid / S.020 / pH: x  Gluc: x / Ketone: Trace  / Bili: Negative / Urobili: Normal   Blood: x / Protein: >300 mg/dl / Nitrite: Negative   Leuk Esterase: Large / RBC: >50 /hpf / WBC >50 /hpf   Sq Epi: x / Non Sq Epi: x / Bacteria: Many      [All pertinent recent Imaging/Reports reviewed]         *****A S S E S S M E N T   A N D   P L A N :    78F for possible peg placement due to failure to thrive.  recent stress and echo noted  GI eval noted  await peg placement.  dvt prophylaxis  ?palliative      __________________________  YON Metcalf D.O.
CARDIOLOGY     PROGRESS  NOTE   ________________________________________________    CHIEF COMPLAINT:Patient is a 78y old  Female who presents with a chief complaint of sent for    peg/ (30 Sep 2018 07:47)  doing better.  	  REVIEW OF SYSTEMS:  CONSTITUTIONAL: No fever, weight loss, or fatigue  EYES: No eye pain, visual disturbances, or discharge  ENT:  No difficulty hearing, tinnitus, vertigo; No sinus or throat pain  NECK: No pain or stiffness  RESPIRATORY: No cough, wheezing, chills or hemoptysis; No Shortness of Breath  CARDIOVASCULAR: No chest pain, palpitations, passing out, dizziness, or leg swelling  GASTROINTESTINAL: No abdominal or epigastric pain. No nausea, vomiting, or hematemesis; No diarrhea or constipation. No melena or hematochezia.  GENITOURINARY: No dysuria, frequency, hematuria, or incontinence  NEUROLOGICAL: No headaches, memory loss, loss of strength, numbness, or tremors  SKIN: No itching, burning, rashes, or lesions   LYMPH Nodes: No enlarged glands  ENDOCRINE: No heat or cold intolerance; No hair loss  MUSCULOSKELETAL: No joint pain or swelling; No muscle, back, or extremity pain  PSYCHIATRIC: No depression, anxiety, mood swings, or difficulty sleeping  HEME/LYMPH: No easy bruising, or bleeding gums  ALLERGY AND IMMUNOLOGIC: No hives or eczema	    [ ] All others negative	  [ ] Unable to obtain    PHYSICAL EXAM:  T(C): 36.9 (09-30-18 @ 06:45), Max: 36.9 (09-30-18 @ 06:45)  HR: 82 (09-30-18 @ 06:45) (70 - 82)  BP: 102/68 (09-30-18 @ 06:45) (102/68 - 115/53)  RR: 18 (09-30-18 @ 06:45) (18 - 18)  SpO2: 98% (09-30-18 @ 06:45) (98% - 100%)  Wt(kg): --  I&O's Summary    29 Sep 2018 07:01  -  30 Sep 2018 07:00  --------------------------------------------------------  IN: 960 mL / OUT: 450 mL / NET: 510 mL        Appearance: Normal	  HEENT:   Normal oral mucosa, PERRL, EOMI	  Lymphatic: No lymphadenopathy  Cardiovascular: Normal S1 S2, No JVD, + murmurs, No edema  Respiratory: Lungs clear to auscultation	  Psychiatry: A & O x 3, Mood & affect appropriate  Gastrointestinal:  Soft,  + milfd tender, + BS	  Skin: No rashes, No ecchymoses, No cyanosis	  Neurologic: Non-focal  Extremities: Normal range of motion, No clubbing, cyanosis or edema  Vascular: Peripheral pulses palpable 2+ bilaterally    MEDICATIONS  (STANDING):  atorvastatin 40 milliGRAM(s) Oral at bedtime  carvedilol 6.25 milliGRAM(s) Oral every 12 hours  dextrose 5% + sodium chloride 0.9%. 1000 milliLiter(s) (75 mL/Hr) IV Continuous <Continuous>  dronabinol 2.5 milliGRAM(s) Oral two times a day  ertapenem  IVPB 1000 milliGRAM(s) IV Intermittent every 24 hours  heparin  Injectable 5000 Unit(s) SubCutaneous every 12 hours  mirtazapine 15 milliGRAM(s) Oral at bedtime  morphine ER Tablet 15 milliGRAM(s) Oral every 12 hours      TELEMETRY: 	    ECG:  	  RADIOLOGY:  OTHER: 	  	  LABS:	 	    CARDIAC MARKERS:                                9.0    12.97 )-----------( 161      ( 30 Sep 2018 08:11 )             28.9     09-30    139  |  107  |  7   ----------------------------<  97  3.0<L>   |  21<L>  |  0.40<L>    Ca    7.8<L>      30 Sep 2018 07:15      proBNP:   Lipid Profile:   HgA1c:   TSH:         Assessment and plan  ---------------------------    pt with htn, hld, dm 2, cad, htn,   pad, right femoral A graft. occluded,/ gangrene.  right  leg  amputation ,    rectal cancer, s/p RT/ colostomy  also  with lung mass on ct,  s/p   ab.  for  rectal abscesses, on prior  admission    now  admitted  for  peg, at  family's request, hence  pt   sent by  n home  dr,  to  er   dvt ppx,  pain meds/    pt  with  elevated  wbc/ decreasing now on ab/  hyponatremia improving//   h/o  rectal  abscesses / collections  on iv n  saline/  ertapenem per  ID  prognosis is poor consider palliative care.
CARDIOLOGY     PROGRESS  NOTE   ________________________________________________    CHIEF COMPLAINT:Patient is a 78y old  Female who presents with a chief complaint of sent for    peg/ admitted  by  marcial mario in past (29 Sep 2018 08:47)    	  REVIEW OF SYSTEMS:  CONSTITUTIONAL: No fever, weight loss, or fatigue  EYES: No eye pain, visual disturbances, or discharge  ENT:  No difficulty hearing, tinnitus, vertigo; No sinus or throat pain  NECK: No pain or stiffness  RESPIRATORY: No cough, wheezing, chills or hemoptysis; No Shortness of Breath  CARDIOVASCULAR: No chest pain, palpitations, passing out, dizziness, or leg swelling  GASTROINTESTINAL: No abdominal or epigastric pain. No nausea, vomiting, or hematemesis; No diarrhea or constipation. No melena or hematochezia.  GENITOURINARY: No dysuria, frequency, hematuria, or incontinence  NEUROLOGICAL: No headaches, memory loss, loss of strength, numbness, or tremors  SKIN: No itching, burning, rashes, or lesions   LYMPH Nodes: No enlarged glands  ENDOCRINE: No heat or cold intolerance; No hair loss  MUSCULOSKELETAL: No joint pain or swelling; No muscle, back, or extremity pain  PSYCHIATRIC: No depression, anxiety, mood swings, or difficulty sleeping  HEME/LYMPH: No easy bruising, or bleeding gums  ALLERGY AND IMMUNOLOGIC: No hives or eczema	    [ ] All others negative	  [ ] Unable to obtain    PHYSICAL EXAM:  T(C): 36.2 (09-29-18 @ 04:44), Max: 36.3 (09-28-18 @ 11:30)  HR: 73 (09-29-18 @ 04:44) (73 - 81)  BP: 94/60 (09-29-18 @ 04:44) (80/45 - 111/74)  RR: 16 (09-29-18 @ 04:44) (16 - 19)  SpO2: 97% (09-29-18 @ 04:44) (93% - 98%)  Wt(kg): --  I&O's Summary    28 Sep 2018 07:01  -  29 Sep 2018 07:00  --------------------------------------------------------  IN: 1330 mL / OUT: 951 mL / NET: 379 mL        Appearance: Normal	  HEENT:   Normal oral mucosa, PERRL, EOMI	  Lymphatic: No lymphadenopathy  Cardiovascular: Normal S1 S2, No JVD, No murmurs, No edema  Respiratory: Lungs clear to auscultation	  Psychiatry: A & O x 3, Mood & affect appropriate  Gastrointestinal:  Soft, Non-tender, + BS	  Skin: No rashes, No ecchymoses, No cyanosis	  Neurologic: Non-focal  Extremities: Normal range of motion, No clubbing, cyanosis or edema  Vascular: Peripheral pulses palpable 2+ bilaterally    MEDICATIONS  (STANDING):  atorvastatin 40 milliGRAM(s) Oral at bedtime  carvedilol 6.25 milliGRAM(s) Oral every 12 hours  dronabinol 2.5 milliGRAM(s) Oral two times a day  ertapenem  IVPB 1000 milliGRAM(s) IV Intermittent every 24 hours  heparin  Injectable 5000 Unit(s) SubCutaneous every 12 hours  mirtazapine 15 milliGRAM(s) Oral at bedtime  morphine ER Tablet 15 milliGRAM(s) Oral every 12 hours  sodium chloride 0.9%. 1000 milliLiter(s) (70 mL/Hr) IV Continuous <Continuous>      TELEMETRY: 	    ECG:  	  RADIOLOGY:  OTHER: 	  	  LABS:	 	    CARDIAC MARKERS:                                8.1    11.09 )-----------( 161      ( 29 Sep 2018 08:03 )             26.0     09-29    133<L>  |  105  |  12  ----------------------------<  59<L>  3.5   |  18<L>  |  0.42<L>    Ca    8.1<L>      29 Sep 2018 06:28    TPro  7.0  /  Alb  2.2<L>  /  TBili  0.4  /  DBili  x   /  AST  9<L>  /  ALT  6<L>  /  AlkPhos  128<H>  09-27    proBNP:   Lipid Profile:   HgA1c:   TSH:   PT/INR - ( 27 Sep 2018 13:10 )   PT: 12.6 sec;   INR: 1.16 ratio         PTT - ( 27 Sep 2018 13:10 )  PTT:29.1 sec      Assessment and plan  ---------------------------  78F for possible peg placement due to failure to thrive.  recent stress and echo noted  GI eval noted  await peg placement.  dvt prophylaxis  ?palliative
[facility] [place]:[id number],   78yFemale followed for:  No Known Allergies    PAST MEDICAL & SURGICAL HISTORY:  Rectal mass  Gangrene of foot  Coronary artery disease involving native coronary artery of native heart without angina pectoris  Status post above knee amputation of right lower extremity    FAMILY HISTORY:  No pertinent family history in first degree relatives    MEDICATIONS  (STANDING):  atorvastatin 40 milliGRAM(s) Oral at bedtime  carvedilol 6.25 milliGRAM(s) Oral every 12 hours  dextrose 5% + sodium chloride 0.9%. 1000 milliLiter(s) (75 mL/Hr) IV Continuous <Continuous>  dronabinol 2.5 milliGRAM(s) Oral two times a day  heparin  Injectable 5000 Unit(s) SubCutaneous every 12 hours  levoFLOXacin IVPB 250 milliGRAM(s) IV Intermittent every 24 hours  mirtazapine 15 milliGRAM(s) Oral at bedtime  morphine   Solution 5 milliGRAM(s) Oral every 6 hours    MEDICATIONS  (PRN):  HYDROmorphone  Injectable 0.5 milliGRAM(s) IV Push every 4 hours PRN Severe Pain (7 - 10)      Vital Signs Last 24 Hrs  T(C): 36.4 (04 Oct 2018 04:22), Max: 36.8 (03 Oct 2018 20:12)  T(F): 97.5 (04 Oct 2018 04:22), Max: 98.2 (03 Oct 2018 20:12)  HR: 75 (04 Oct 2018 04:22) (75 - 84)  BP: 134/53 (04 Oct 2018 04:22) (99/64 - 134/53)  BP(mean): --  RR: 18 (04 Oct 2018 04:22) (18 - 18)  SpO2: 99% (04 Oct 2018 04:22) (97% - 99%)  nc/at  s1s2  cta  soft, nt, nd no guarding or rebound  no c/c/e    CBC Full  -  ( 04 Oct 2018 09:48 )  WBC Count : 10.8 K/uL  Hemoglobin : 7.3 g/dL  Hematocrit : 23.2 %  Platelet Count - Automated : 146 K/uL  Mean Cell Volume : 84.1 fl  Mean Cell Hemoglobin : 26.5 pg  Mean Cell Hemoglobin Concentration : 31.5 gm/dL  Auto Neutrophil # : x  Auto Lymphocyte # : x  Auto Monocyte # : x  Auto Eosinophil # : x  Auto Basophil # : x  Auto Neutrophil % : x  Auto Lymphocyte % : x  Auto Monocyte % : x  Auto Eosinophil % : x  Auto Basophil % : x    10-03    139  |  111<H>  |  <4<L>  ----------------------------<  201<H>  3.9   |  19<L>  |  0.32<L>    Ca    6.8<L>      03 Oct 2018 05:37
[facility] [place]:[id number],   78yFemale followed for:  No Known Allergies    PAST MEDICAL & SURGICAL HISTORY:  Rectal mass  Gangrene of foot  Coronary artery disease involving native coronary artery of native heart without angina pectoris  Status post above knee amputation of right lower extremity    FAMILY HISTORY:  No pertinent family history in first degree relatives    MEDICATIONS  (STANDING):  atorvastatin 40 milliGRAM(s) Oral at bedtime  carvedilol 6.25 milliGRAM(s) Oral every 12 hours  dextrose 5% + sodium chloride 0.9%. 1000 milliLiter(s) (75 mL/Hr) IV Continuous <Continuous>  dronabinol 2.5 milliGRAM(s) Oral two times a day  heparin  Injectable 5000 Unit(s) SubCutaneous every 12 hours  levoFLOXacin IVPB 250 milliGRAM(s) IV Intermittent every 24 hours  mirtazapine 15 milliGRAM(s) Oral at bedtime  morphine   Solution 5 milliGRAM(s) Oral every 6 hours    MEDICATIONS  (PRN):  HYDROmorphone  Injectable 0.5 milliGRAM(s) IV Push every 4 hours PRN Severe Pain (7 - 10)      Vital Signs Last 24 Hrs  T(C): 36.6 (03 Oct 2018 04:56), Max: 36.6 (02 Oct 2018 17:19)  T(F): 97.9 (03 Oct 2018 04:56), Max: 97.9 (02 Oct 2018 17:19)  HR: 81 (03 Oct 2018 04:56) (73 - 87)  BP: 130/58 (03 Oct 2018 04:56) (119/51 - 141/64)  BP(mean): --  RR: 18 (03 Oct 2018 04:56) (18 - 18)  SpO2: 97% (03 Oct 2018 04:56) (94% - 100%)  nc/at  s1s2  cta  soft, nt, nd no guarding or rebound  no c/c/e    CBC Full  -  ( 03 Oct 2018 07:43 )  WBC Count : 10.52 K/uL  Hemoglobin : 8.3 g/dL  Hematocrit : 26.8 %  Platelet Count - Automated : 116 K/uL  Mean Cell Volume : 83.5 fl  Mean Cell Hemoglobin : 25.9 pg  Mean Cell Hemoglobin Concentration : 31.0 gm/dL  Auto Neutrophil # : x  Auto Lymphocyte # : x  Auto Monocyte # : x  Auto Eosinophil # : x  Auto Basophil # : x  Auto Neutrophil % : x  Auto Lymphocyte % : x  Auto Monocyte % : x  Auto Eosinophil % : x  Auto Basophil % : x    10-03    139  |  111<H>  |  <4<L>  ----------------------------<  201<H>  3.9   |  19<L>  |  0.32<L>    Ca    6.8<L>      03 Oct 2018 05:37  Phos  2.1     10-02
[facility] [place]:[id number],   78yFemale followed for:  No Known Allergies    PAST MEDICAL & SURGICAL HISTORY:  Rectal mass  Gangrene of foot  Coronary artery disease involving native coronary artery of native heart without angina pectoris  Status post above knee amputation of right lower extremity    FAMILY HISTORY:  No pertinent family history in first degree relatives    MEDICATIONS  (STANDING):  atorvastatin 40 milliGRAM(s) Oral at bedtime  carvedilol 6.25 milliGRAM(s) Oral every 12 hours  dextrose 5% + sodium chloride 0.9%. 1000 milliLiter(s) (75 mL/Hr) IV Continuous <Continuous>  dronabinol 2.5 milliGRAM(s) Oral two times a day  ertapenem  IVPB 1000 milliGRAM(s) IV Intermittent every 24 hours  heparin  Injectable 5000 Unit(s) SubCutaneous every 12 hours  mirtazapine 15 milliGRAM(s) Oral at bedtime  morphine ER Tablet 15 milliGRAM(s) Oral every 12 hours    MEDICATIONS  (PRN):  HYDROmorphone  Injectable 0.5 milliGRAM(s) IV Push every 4 hours PRN Severe Pain (7 - 10)      Vital Signs Last 24 Hrs  T(C): 36.9 (30 Sep 2018 06:45), Max: 36.9 (30 Sep 2018 06:45)  T(F): 98.5 (30 Sep 2018 06:45), Max: 98.5 (30 Sep 2018 06:45)  HR: 82 (30 Sep 2018 06:45) (70 - 82)  BP: 102/68 (30 Sep 2018 06:45) (102/68 - 115/53)  BP(mean): --  RR: 18 (30 Sep 2018 06:45) (18 - 18)  SpO2: 98% (30 Sep 2018 06:45) (98% - 100%)  nc/at  s1s2  cta  soft, nt, nd no guarding or rebound  no c/c/e    CBC Full  -  ( 30 Sep 2018 08:11 )  WBC Count : 12.97 K/uL  Hemoglobin : 9.0 g/dL  Hematocrit : 28.9 %  Platelet Count - Automated : 161 K/uL  Mean Cell Volume : 82.6 fl  Mean Cell Hemoglobin : 25.7 pg  Mean Cell Hemoglobin Concentration : 31.1 gm/dL  Auto Neutrophil # : x  Auto Lymphocyte # : x  Auto Monocyte # : x  Auto Eosinophil # : x  Auto Basophil # : x  Auto Neutrophil % : x  Auto Lymphocyte % : x  Auto Monocyte % : x  Auto Eosinophil % : x  Auto Basophil % : x    09-30    139  |  107  |  7   ----------------------------<  97  3.0<L>   |  21<L>  |  0.40<L>    Ca    7.8<L>      30 Sep 2018 07:15
[facility] [place]:[id number],   78yFemale followed for:  No Known Allergies    PAST MEDICAL & SURGICAL HISTORY:  Rectal mass  Gangrene of foot  Coronary artery disease involving native coronary artery of native heart without angina pectoris  Status post above knee amputation of right lower extremity    FAMILY HISTORY:  No pertinent family history in first degree relatives    MEDICATIONS  (STANDING):  atorvastatin 40 milliGRAM(s) Oral at bedtime  carvedilol 6.25 milliGRAM(s) Oral every 12 hours  dronabinol 2.5 milliGRAM(s) Oral two times a day  ertapenem  IVPB 1000 milliGRAM(s) IV Intermittent every 24 hours  heparin  Injectable 5000 Unit(s) SubCutaneous every 12 hours  mirtazapine 15 milliGRAM(s) Oral at bedtime  morphine ER Tablet 15 milliGRAM(s) Oral every 12 hours  sodium chloride 0.9%. 1000 milliLiter(s) (70 mL/Hr) IV Continuous <Continuous>    MEDICATIONS  (PRN):  HYDROmorphone  Injectable 0.5 milliGRAM(s) IV Push every 4 hours PRN Severe Pain (7 - 10)      Vital Signs Last 24 Hrs  T(C): 36.3 (28 Sep 2018 11:30), Max: 36.7 (27 Sep 2018 17:48)  T(F): 97.4 (28 Sep 2018 11:30), Max: 98.1 (27 Sep 2018 17:48)  HR: 81 (28 Sep 2018 11:30) (81 - 92)  BP: 111/74 (28 Sep 2018 11:30) (98/66 - 111/74)  BP(mean): --  RR: 19 (28 Sep 2018 11:30) (16 - 19)  SpO2: 98% (28 Sep 2018 11:30) (98% - 98%)  nc/at  s1s2  cta  soft, nt, nd no guarding or rebound, ostomy  no c/c/e    CBC Full  -  ( 28 Sep 2018 12:46 )  WBC Count : 12.6 K/uL  Hemoglobin : 8.1 g/dL  Hematocrit : 26.4 %  Platelet Count - Automated : 224 K/uL  Mean Cell Volume : 84.8 fl  Mean Cell Hemoglobin : 26.1 pg  Mean Cell Hemoglobin Concentration : 30.7 gm/dL  Auto Neutrophil # : x  Auto Lymphocyte # : x  Auto Monocyte # : x  Auto Eosinophil # : x  Auto Basophil # : x  Auto Neutrophil % : x  Auto Lymphocyte % : x  Auto Monocyte % : x  Auto Eosinophil % : x  Auto Basophil % : x    09-28    132<L>  |  100  |  17  ----------------------------<  55<L>  4.2   |  19<L>  |  0.50    Ca    8.4      28 Sep 2018 07:07    TPro  7.0  /  Alb  2.2<L>  /  TBili  0.4  /  DBili  x   /  AST  9<L>  /  ALT  6<L>  /  AlkPhos  128<H>  09-27    PT/INR - ( 27 Sep 2018 13:10 )   PT: 12.6 sec;   INR: 1.16 ratio         PTT - ( 27 Sep 2018 13:10 )  PTT:29.1 sec
[facility] [place]:[id number],   78yFemale followed for:  No Known Allergies    PAST MEDICAL & SURGICAL HISTORY:  Rectal mass  Gangrene of foot  Coronary artery disease involving native coronary artery of native heart without angina pectoris  Status post above knee amputation of right lower extremity    FAMILY HISTORY:  No pertinent family history in first degree relatives    MEDICATIONS  (STANDING):  atorvastatin 40 milliGRAM(s) Oral at bedtime  carvedilol 6.25 milliGRAM(s) Oral every 12 hours  dronabinol 2.5 milliGRAM(s) Oral two times a day  ertapenem  IVPB 1000 milliGRAM(s) IV Intermittent every 24 hours  heparin  Injectable 5000 Unit(s) SubCutaneous every 12 hours  mirtazapine 15 milliGRAM(s) Oral at bedtime  morphine ER Tablet 15 milliGRAM(s) Oral every 12 hours  sodium chloride 0.9%. 1000 milliLiter(s) (70 mL/Hr) IV Continuous <Continuous>    MEDICATIONS  (PRN):  HYDROmorphone  Injectable 0.5 milliGRAM(s) IV Push every 4 hours PRN Severe Pain (7 - 10)      Vital Signs Last 24 Hrs  T(C): 36.2 (29 Sep 2018 04:44), Max: 36.3 (28 Sep 2018 11:30)  T(F): 97.2 (29 Sep 2018 04:44), Max: 97.4 (28 Sep 2018 11:30)  HR: 73 (29 Sep 2018 04:44) (73 - 81)  BP: 94/60 (29 Sep 2018 04:44) (80/45 - 111/74)  BP(mean): --  RR: 16 (29 Sep 2018 04:44) (16 - 19)  SpO2: 97% (29 Sep 2018 04:44) (93% - 98%)  nc/at  s1s2  cta  soft, nt, nd no guarding or rebound  no c/c/e    CBC Full  -  ( 29 Sep 2018 08:03 )  WBC Count : 11.09 K/uL  Hemoglobin : 8.1 g/dL  Hematocrit : 26.0 %  Platelet Count - Automated : 161 K/uL  Mean Cell Volume : 83.1 fl  Mean Cell Hemoglobin : 25.9 pg  Mean Cell Hemoglobin Concentration : 31.2 gm/dL  Auto Neutrophil # : x  Auto Lymphocyte # : x  Auto Monocyte # : x  Auto Eosinophil # : x  Auto Basophil # : x  Auto Neutrophil % : x  Auto Lymphocyte % : x  Auto Monocyte % : x  Auto Eosinophil % : x  Auto Basophil % : x    09-29    133<L>  |  105  |  12  ----------------------------<  59<L>  3.5   |  18<L>  |  0.42<L>    Ca    8.1<L>      29 Sep 2018 06:28    TPro  7.0  /  Alb  2.2<L>  /  TBili  0.4  /  DBili  x   /  AST  9<L>  /  ALT  6<L>  /  AlkPhos  128<H>  09-27    PT/INR - ( 27 Sep 2018 13:10 )   PT: 12.6 sec;   INR: 1.16 ratio         PTT - ( 27 Sep 2018 13:10 )  PTT:29.1 sec
[facility] [place]:[id number],   78yFemale followed for:  No Known Allergies    PAST MEDICAL & SURGICAL HISTORY:  Rectal mass  Gangrene of foot  Coronary artery disease involving native coronary artery of native heart without angina pectoris  Status post above knee amputation of right lower extremity    FAMILY HISTORY:  No pertinent family history in first degree relatives    MEDICATIONS  (STANDING):  atorvastatin 40 milliGRAM(s) Oral at bedtime  carvedilol 6.25 milliGRAM(s) Oral every 12 hours  dextrose 5% + sodium chloride 0.9%. 1000 milliLiter(s) (75 mL/Hr) IV Continuous <Continuous>  dronabinol 2.5 milliGRAM(s) Oral two times a day  ertapenem  IVPB 1000 milliGRAM(s) IV Intermittent every 24 hours  heparin  Injectable 5000 Unit(s) SubCutaneous every 12 hours  mirtazapine 15 milliGRAM(s) Oral at bedtime  morphine ER Tablet 15 milliGRAM(s) Oral every 12 hours    MEDICATIONS  (PRN):  HYDROmorphone  Injectable 0.5 milliGRAM(s) IV Push every 4 hours PRN Severe Pain (7 - 10)      Vital Signs Last 24 Hrs  T(C): 36.3 (01 Oct 2018 05:14), Max: 36.6 (30 Sep 2018 21:53)  T(F): 97.3 (01 Oct 2018 05:14), Max: 97.9 (30 Sep 2018 21:53)  HR: 70 (01 Oct 2018 05:14) (70 - 80)  BP: 104/66 (01 Oct 2018 05:14) (99/60 - 121/90)  BP(mean): --  RR: 17 (01 Oct 2018 05:14) (16 - 18)  SpO2: 98% (01 Oct 2018 05:14) (96% - 98%)  nc/at  s1s2  cta  soft, nt, nd no guarding or rebound  no c/c/e    CBC Full  -  ( 30 Sep 2018 08:11 )  WBC Count : 12.97 K/uL  Hemoglobin : 9.0 g/dL  Hematocrit : 28.9 %  Platelet Count - Automated : 161 K/uL  Mean Cell Volume : 82.6 fl  Mean Cell Hemoglobin : 25.7 pg  Mean Cell Hemoglobin Concentration : 31.1 gm/dL  Auto Neutrophil # : x  Auto Lymphocyte # : x  Auto Monocyte # : x  Auto Eosinophil # : x  Auto Basophil # : x  Auto Neutrophil % : x  Auto Lymphocyte % : x  Auto Monocyte % : x  Auto Eosinophil % : x  Auto Basophil % : x    10-01    137  |  112<H>  |  4<L>  ----------------------------<  115<H>  3.4<L>   |  18<L>  |  0.35<L>    Ca    7.3<L>      01 Oct 2018 07:06  Phos  1.8     10-01  Mg     1.7     10-01
Follow Up:  colorectal carcinoma with history of abscess    Interval History/ROS: rouses to touch    Allergies  No Known Allergies    ANTIMICROBIALS:  ertapenem  IVPB 1000 every 24 hours    OTHER MEDS:  MEDICATIONS  (STANDING):  atorvastatin 40 at bedtime  carvedilol 6.25 every 12 hours  dronabinol 2.5 two times a day  heparin  Injectable 5000 every 12 hours  HYDROmorphone  Injectable 0.5 every 4 hours PRN  mirtazapine 15 at bedtime  morphine ER Tablet 15 every 12 hours      Vital Signs Last 24 Hrs  T(C): 36.9 (30 Sep 2018 06:45), Max: 36.9 (30 Sep 2018 06:45)  T(F): 98.5 (30 Sep 2018 06:45), Max: 98.5 (30 Sep 2018 06:45)  HR: 82 (30 Sep 2018 06:45) (70 - 82)  BP: 102/68 (30 Sep 2018 06:45) (102/68 - 115/53)  BP(mean): --  RR: 18 (30 Sep 2018 06:45) (18 - 18)  SpO2: 98% (30 Sep 2018 06:45) (98% - 100%)    PHYSICAL EXAM:  General: chronically ill appearing, cachectic  Neurology: A&Ox3, nonfocal  Respiratory: Clear to auscultation bilaterally  CV: RRR, S1S2, no murmurs, rubs or gallops  Abdominal: marked tenderness lower abd  Extremities: s/p  right BKA  Line Sites: Clear  Skin: No rash                        9.0    12.97 )-----------( 161      ( 30 Sep 2018 08:11 )             28.9     09-30    139  |  107  |  7   ----------------------------<  97  3.0<L>   |  21<L>  |  0.40<L>    Ca    7.8<L>      30 Sep 2018 07:15    MICROBIOLOGY:  .Blood Blood-Peripheral  09-27-18   No growth to date.  --  --    RADIOLOGY:  < from: CT Abdomen and Pelvis w/ Oral Cont and w/ IV Cont (09.27.18 @ 17:23) >  Marked progression of known rectal mass with new moderate bilateral   hydronephrosis. Asymmetric urothelial enhancement on the left can be   secondary to an infectious or inflammatory process. Likely involvement of   the base of the urinary bladder with rectal mass. Air within the urinary   bladder can be secondary to recent instrumentation or fistulization.  Indeterminate new peripheral hypodensity in the spleen.    < end of copied text >      Wilfredo eLrma MD; Division of Infectious Disease; Pager: [phone]; nights and weekends: [phone]
[place]  ID # [id number]  HPI:  78F Nursing home patient with locally advanced rectal cancer s/p palliative RT that was aborted 2/2 development of pelvic abscesses treated with prolonged course of IV Zosyn, hx right AKA for LE gangrene, CAD, HTN, DM, who is s/p diverting colostomy for palliation that presented with  failure to thrive and also because the "Nursing home physician" wanted the  pt to be admitted for peg placement, per  family's  request . As per the patient's daughter the patient has had poor appetite, difficulty swallowing ( she keeps food in her mouth and does not swallow it ). As per EMR, " Pt has GERD with esophagitis which also contributes to difficulty feeding patient."  She takes morphine 15mg ext release,  for pain management.  As per Onc, "Patient has been deemed not a candidate for disease modifying therapy and hospice appropriate in the past. Patient now presents from nursing home for PEG placement per family request. Patient is cachectic with failure to thrive, weight loss. Patient had advanced cognitive dysfunction and is an unreliable historian." Palliative care was called for GOC in regards to PEG placement.      Today, the patient was confused. She was mumbling. She did not know where she was and appeared to be having visual hallucination. She was trying to catch something in the air. She was also talking to her self. She initially indicated pain but she was not able to express it.         PERTINENT PM/SXH:   Rectal mass  Gangrene of foot  Coronary artery disease involving native coronary artery of native heart without angina pectoris    Status post above knee amputation of right lower extremity  No significant past surgical history    FAMILY HISTORY:  No pertinent family history in first degree relatives    ITEMS NOT CHECKED ARE NOT PRESENT    SOCIAL HISTORY:   Significant other/partner: Single  [ ]  Children: 3  [ ]  Anabaptism/Spirituality:  Substance hx:  [ ]   Tobacco hx:  [ ]   Alcohol hx: [ ]   Home Opioid hx:  [ ] I-Stop Reference No:  Living Situation: [ ]Home  [ x]Long term care  [ ]Rehab [ ]Other    ADVANCE DIRECTIVES:    DNR  Yes  MOLST  [ ]  Living Will  [ ]   DECISION MAKER(s):  [ ] Health Care Proxy(s)  [ x] Surrogate(s)  [ ] Guardian           Name(s): Phone Number(s): Children. However, her daughter, Louann Edmondson  indicated to be her HCP. [phone]    BASELINE (I)ADL(s) (prior to admission):  [place]: [ ]Total  [ ] Moderate [ x]Dependent    Allergies    No Known Allergies    Intolerances    MEDICATIONS  (STANDING):  atorvastatin 40 milliGRAM(s) Oral at bedtime  carvedilol 6.25 milliGRAM(s) Oral every 12 hours  dextrose 5% + sodium chloride 0.9%. 1000 milliLiter(s) (75 mL/Hr) IV Continuous <Continuous>  dronabinol 2.5 milliGRAM(s) Oral two times a day  heparin  Injectable 5000 Unit(s) SubCutaneous every 12 hours  levoFLOXacin IVPB 250 milliGRAM(s) IV Intermittent every 24 hours  mirtazapine 15 milliGRAM(s) Oral at bedtime  morphine   Solution 5 milliGRAM(s) Oral every 6 hours    MEDICATIONS  (PRN):  HYDROmorphone  Injectable 0.5 milliGRAM(s) IV Push every 4 hours PRN Severe Pain (7 - 10)  Not able to obtain ROS due to confusion [x}        Pain (Impact on QOL):    Location -         Minimal acceptable level (0-10 scale):                    Aggravating factors -  Quality -  Radiation -  Severity (0-10 scale) -    Timing -    PAIN AD Score: 2    [email or web address] (press ctrl +  left click to view)    Dyspnea:                           [ ]Mild [ ]Moderate [ ]Severe  Anxiety:                             [ ]Mild [ ]Moderate [ ]Severe  Fatigue:                             [ ]Mild [ ]Moderate [ ]Severe  Nausea:                             [ ]Mild [ ]Moderate [ ]Severe  Loss of appetite:              [ ]Mild [ ]Moderate [ ]Severe  Constipation:                    [ ]Mild [ ]Moderate [ ]Severe    Other Symptoms:  [ ]All other review of systems negative     Karnofsky Performance Score/Palliative Performance Status Version 2: 20        %    http://palliative.info/resource_material/[email or web address].pdf  PHYSICAL EXAM:  Vital Signs Last 24 Hrs  T(C): 36.3 (28 Sep 2018 11:30), Max: 36.7 (28 Sep 2018 06:09)  T(F): 97.4 (28 Sep 2018 11:30), Max: 98.1 (28 Sep 2018 06:09)  HR: 81 (28 Sep 2018 11:30) (81 - 86)  BP: 111/74 (28 Sep 2018 11:30) (99/64 - 111/74)  BP(mean): --  RR: 19 (28 Sep 2018 11:30) (16 - 19)  SpO2: 98% (28 Sep 2018 11:30) (98% - 98%) I&O's Summary    27 Sep 2018 07:01  -  28 Sep 2018 07:00  --------------------------------------------------------  IN: 0 mL / OUT: 1 mL / NET: -1 mL    28 Sep 2018 07:01  -  28 Sep 2018 17:54  --------------------------------------------------------  IN: 390 mL / OUT: 400 mL / NET: -10 mL    GENERAL:  [x ]Alert  [x ]Oriented x 1   [ ]Lethargic  [ ]Cachexia  [ ]Unarousable  [ ]Verbal  [ ]Non-Verbal  Behavioral:   [ ] Anxiety  [ ] Delirium [ ] Agitation [ ] Other  HEENT:  [ ]Normal   [x ]Dry mouth   [ ]ET Tube/Trach  [ ]Oral lesions  PULMONARY:   [x ]Clear [ ]Tachypnea  [ ]Audible excessive secretions   [ ]Rhonchi        [ ]Right [ ]Left [ ]Bilateral  [ ]Crackles        [ ]Right [ ]Left [ ]Bilateral  [ ]Wheezing     [ ]Right [ ]Left [ ]Bilateral  CARDIOVASCULAR:    [x ]Regular [ ]Irregular [ ]Tachy  [ ]Robert [ ]Murmur [ ]Other  GASTROINTESTINAL:  [ ]Soft  [ ]Distended   [ ]+BS  [ ]Non tender [ ]Tender  [ ]PEG [ ]OGT/ NGT  Last BM:   09-27-18 @ 07:01  -  09-28-18 @ 07:00  --------------------------------------------------------  OUT: 1 mL    GENITOURINARY:  [x ]Normal [ ] Incontinent   [ ]Oliguria/Anuria   [ ]Saba  MUSCULOSKELETAL:   [ ]Normal   [x ]Weakness  [ ]Bed/Wheelchair bound [ ]Edema  NEUROLOGIC:   [ ]No focal deficits  [x ] Cognitive impairment  [ ] Dysphagia [ ]Dysarthria [ ] Paresis [x ]Other: Delirium   SKIN:   [ ]Normal   [x ]Pressure ulcer(s): Stage II Sacrum   [ ]Rash      LABS:                           7.3    10.8  )-----------( 146      ( 04 Oct 2018 09:48 )             23.2   10-03    139  |  111<H>  |  <4<L>  ----------------------------<  201<H>  3.9   |  19<L>  |  0.32<L>    Ca    6.8<L>      03 Oct 2018 05:37                        RADIOLOGY & ADDITIONAL STUDIES:  < from: CT Abdomen and Pelvis w/ Oral Cont and w/ IV Cont (09.27.18 @ 17:23) >  IMPRESSION:  Marked progression of known rectal mass with new moderate bilateral   hydronephrosis. Asymmetric urothelial enhancement on the left can be   secondary to an infectious or inflammatory process. Likely involvement of   the base of the urinary bladder with rectal mass. Air within the urinary   bladder can be secondary to recent instrumentation or fistulization.  Indeterminate new peripheral hypodensity in the spleen.                KIMMIE ANGEL M.D. ATTENDING RADIOLOGIST  This document has been electronically signed. Sep 28 2018 10:17AM    < end of copied text >    PROTEIN CALORIE MALNUTRITION PRESENT: [ ] Yes [ ] No  [ ] PPSV2 < or = to 30% [ ] significant weight loss  [ ] poor nutritional intake [ ] catabolic state [ ] anasarca     Albumin, Serum: 2.2 g/dL (09-27-18 @ 13:10)  Artificial Nutrition [ ]     REFERRALS:   [ ]Chaplaincy  [ ] Hospice  [ ]Child Life  [ ]Social Work  [ ]Case management [ ]Holistic Therapy   Goals of Care Discussion Document:
INTERVAL HPI/OVERNIGHT EVENTS: no significant changes, family deciding re-hospice    MEDICATIONS  (STANDING):  atorvastatin 40 milliGRAM(s) Oral at bedtime  carvedilol 6.25 milliGRAM(s) Oral every 12 hours  dextrose 5% + sodium chloride 0.9%. 1000 milliLiter(s) (75 mL/Hr) IV Continuous <Continuous>  dronabinol 2.5 milliGRAM(s) Oral two times a day  heparin  Injectable 5000 Unit(s) SubCutaneous every 12 hours  levoFLOXacin IVPB 250 milliGRAM(s) IV Intermittent every 24 hours  mirtazapine 15 milliGRAM(s) Oral at bedtime  morphine   Solution 5 milliGRAM(s) Oral every 6 hours    MEDICATIONS  (PRN):  HYDROmorphone  Injectable 0.5 milliGRAM(s) IV Push every 4 hours PRN Severe Pain (7 - 10)      Allergies    No Known Allergies    Intolerances            PHYSICAL EXAM:   Vital Signs:  Vital Signs Last 24 Hrs  T(C): 36.3 (02 Oct 2018 04:52), Max: 36.7 (01 Oct 2018 21:29)  T(F): 97.3 (02 Oct 2018 04:52), Max: 98 (01 Oct 2018 21:29)  HR: 74 (02 Oct 2018 04:52) (69 - 96)  BP: 116/86 (02 Oct 2018 04:52) (116/86 - 133/75)  BP(mean): --  RR: 18 (02 Oct 2018 04:52) (18 - 18)  SpO2: 99% (02 Oct 2018 04:52) (96% - 99%)  Daily     Daily Weight in k (01 Oct 2018 11:23)    GENERAL:  no distress  HEENT:  NC/AT,  anicteric  CHEST:   no increased effort, breath sounds clear  HEART:  Regular rhythm  ABDOMEN:  Soft,diffuse mild tenderness, ostomy functioning  EXTEREMITIES:  no cyanosis      LABS:                        7.5    10.30 )-----------( 111      ( 01 Oct 2018 07:46 )             24.1     10-01    137  |  112<H>  |  4<L>  ----------------------------<  115<H>  3.4<L>   |  18<L>  |  0.35<L>    Ca    7.3<L>      01 Oct 2018 07:06  Phos  1.8     10-  Mg     1.7     10-            RADIOLOGY & ADDITIONAL TESTS:
INTERVAL HPI/OVERNIGHT EVENTS: stable, no plan for peg per hospice note    MEDICATIONS  (STANDING):  atorvastatin 40 milliGRAM(s) Oral at bedtime  carvedilol 6.25 milliGRAM(s) Oral every 12 hours  dextrose 5% + sodium chloride 0.9%. 1000 milliLiter(s) (75 mL/Hr) IV Continuous <Continuous>  heparin  Injectable 5000 Unit(s) SubCutaneous every 12 hours  levoFLOXacin IVPB 250 milliGRAM(s) IV Intermittent every 24 hours  mirtazapine 15 milliGRAM(s) Oral at bedtime  morphine   Solution 5 milliGRAM(s) Oral every 6 hours    MEDICATIONS  (PRN):  HYDROmorphone  Injectable 0.5 milliGRAM(s) IV Push every 4 hours PRN Severe Pain (7 - 10)      Allergies    No Known Allergies    Intolerances            PHYSICAL EXAM:   Vital Signs:  Vital Signs Last 24 Hrs  T(C): 36.6 (05 Oct 2018 04:56), Max: 36.7 (04 Oct 2018 20:31)  T(F): 97.8 (05 Oct 2018 04:56), Max: 98 (04 Oct 2018 20:31)  HR: 85 (05 Oct 2018 04:56) (72 - 85)  BP: 113/73 (05 Oct 2018 04:56) (110/75 - 117/61)  BP(mean): --  RR: 18 (05 Oct 2018 04:56) (18 - 18)  SpO2: 96% (05 Oct 2018 04:56) (95% - 98%)  Daily     Daily     GENERAL:  no distress  HEENT:  NC/AT,  anicteric  CHEST:   no increased effort, breath sounds clear  HEART:  Regular rhythm  ABDOMEN:  Soft, non-tender, non-distended, normoactive bowel sounds,  no masses ,no hepato-splenomegaly, no signs of chronic liver disease  EXTEREMITIES:  no cyanosis      LABS:                        7.3    10.8  )-----------( 146      ( 04 Oct 2018 09:48 )             23.2                 RADIOLOGY & ADDITIONAL TESTS:
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,leucocytosis    Interval history:Minimal response to stimuli  Not tolerating po  No acute events      ROS:  Not obtainable      Antimicrobials:    ertapenem  IVPB 1000 milliGRAM(s) IV Intermittent every 24 hours    Other medications reviewed    Vital Signs Last 24 Hrs  T(C): 36.3 (10-01-18 @ 05:14), Max: 36.6 (09-30-18 @ 21:53)  T(F): 97.3 (10-01-18 @ 05:14), Max: 97.9 (09-30-18 @ 21:53)  HR: 70 (10-01-18 @ 05:14) (70 - 80)  BP: 104/66 (10-01-18 @ 05:14) (99/60 - 121/90)  BP(mean): --  RR: 17 (10-01-18 @ 05:14) (16 - 18)  SpO2: 98% (10-01-18 @ 05:14) (96% - 98%)    Physical Exam:    Cachectic    HEENT PERRLA   No oral exudate or erythema    Chest Good AE,CTA    CVS RRR S1 S2 WNl No murmur or rub or gallop    Abd soft BS normal  colostomy No tenderness no masses  Saba     IV site no erythema tenderness or discharge  R AKA site no erythema or tenderness    CNS alert awake but no verbal response     Lab Data:                          7.5    10.30 )-----------( 111      ( 01 Oct 2018 07:46 )             24.1       10-01    137  |  112<H>  |  4<L>  ----------------------------<  115<H>  3.4<L>   |  18<L>  |  0.35<L>    Ca    7.3<L>      01 Oct 2018 07:06  Phos  1.8     10-01  Mg     1.7     10-01          Culture - Blood (collected 27 Sep 2018 18:58)  Source: .Blood Blood-Peripheral  Preliminary Report (28 Sep 2018 19:01):    No growth to date.    Culture - Blood (collected 27 Sep 2018 18:58)  Source: .Blood Blood-Peripheral  Preliminary Report (28 Sep 2018 19:01):    No growth to date.
Patient is a 78y old  Female who presents with a chief complaint of sent for    peg/ admitted  by  marcial mario in past (03 Oct 2018 10:43)    Being followed by ID for fistula    Interval history:Comfortable  No acute events      ROS:  Not obtainable       Antimicrobials:    levoFLOXacin IVPB 250 milliGRAM(s) IV Intermittent every 24 hours    Other medications reviewed    Vital Signs Last 24 Hrs  T(C): 36.6 (10-03-18 @ 04:56), Max: 36.6 (10-02-18 @ 17:19)  T(F): 97.9 (10-03-18 @ 04:56), Max: 97.9 (10-02-18 @ 17:19)  HR: 81 (10-03-18 @ 04:56) (73 - 87)  BP: 130/58 (10-03-18 @ 04:56) (119/51 - 141/64)  BP(mean): --  RR: 18 (10-03-18 @ 04:56) (18 - 18)  SpO2: 97% (10-03-18 @ 04:56) (94% - 100%)    Physical Exam:    cachectic    HEENT PERRLA   No oral exudate or erythema    Chest Good AE,CTA    CVS RRR S1 S2 WNl No murmur or rub or gallop    Abd soft BS normal colostomy no tenderness    IV site no erythema tenderness or discharge  R AKA    CNS alert awake but no verbal response    Lab Data:                          8.3    10.52 )-----------( 116      ( 03 Oct 2018 07:43 )             26.8       10-03    139  |  111<H>  |  <4<L>  ----------------------------<  201<H>  3.9   |  19<L>  |  0.32<L>    Ca    6.8<L>      03 Oct 2018 05:37  Phos  2.1     10-02
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, CRS ca, leucocytosis    Interval history:comfortable  minimal verbal response-unintelligible even with   No acute events  No acute events      ROS:  Not obtainable       Antimicrobials:    ertapenem  IVPB 1000 milliGRAM(s) IV Intermittent every 24 hours    Other medications reviewed    Vital Signs Last 24 Hrs  T(C): 36.3 (09-28-18 @ 11:30), Max: 37.7 (09-27-18 @ 16:29)  T(F): 97.4 (09-28-18 @ 11:30), Max: 99.9 (09-27-18 @ 16:29)  HR: 81 (09-28-18 @ 11:30) (81 - 92)  BP: 111/74 (09-28-18 @ 11:30) (98/66 - 132/59)  BP(mean): --  RR: 19 (09-28-18 @ 11:30) (16 - 19)  SpO2: 98% (09-28-18 @ 11:30) (98% - 100%)    Physical Exam:        HEENT PERRLA   Cachectic    No oral exudate or erythema    Chest Good AE,CTA    CVS RRR S1 S2 WNl No murmur or rub or gallop    Abd soft BS normal No tenderness colostomy   Saba    IV site no erythema tenderness or discharge  s/p R AKA     CNS alert awake but no intelligible verbal response       Lab Data:                          8.1    12.6  )-----------( 224      ( 28 Sep 2018 12:46 )             26.4   WBC Count: 12.6 (09-28-18 @ 12:46)  WBC Count: 16.6 (09-27-18 @ 13:10)      09-28    132<L>  |  100  |  17  ----------------------------<  55<L>  4.2   |  19<L>  |  0.50    Ca    8.4      28 Sep 2018 07:07    TPro  7.0  /  Alb  2.2<L>  /  TBili  0.4  /  DBili  x   /  AST  9<L>  /  ALT  6<L>  /  AlkPhos  128<H>  09-27        Blood Cx testing    < from: CT Abdomen and Pelvis w/ Oral Cont and w/ IV Cont (09.27.18 @ 17:23) >    IMPRESSION:  Marked progression of known rectal mass with new moderate bilateral   hydronephrosis. Asymmetric urothelial enhancement on the left can be   secondary to an infectious or inflammatory process. Likely involvement of   the base of the urinary bladder with rectal mass. Air within the urinary   bladder can be secondary to recent instrumentation or fistulization.  Indeterminate new peripheral hypodensity in the spleen.          < end of copied text >      Urinalysis + Microscopic Examination (09.27.18 @ 15:15)    Glucose Qualitative, Urine: Negative    Blood, Urine: Large    Urine Appearance: Turbid    Bilirubin: Negative    Color: Light Orange    Urobilinogen: Normal    Specific Gravity: 1.020: Performed by Specific Gravity Meter    Protein, Urine: >300 mg/dl    pH Urine: >8.0    Leukocyte Esterase Concentration: Large    Nitrite: Negative    Ketone - Urine: Trace    Red Blood Cell - Urine: >50 /hpf    White Blood Cell - Urine: >50 /hpf    Triple Phosphate Crystals: Few    Bacteria: Many
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,CRS ca with bladder fistula    Interval history:comfortable  some verbal response but incomprehensible  No acute events      ROS:  Noobserved cough or diarrhea  Otherwise ROS unobtainable    Antimicrobials:    levoFLOXacin IVPB 250 milliGRAM(s) IV Intermittent every 24 hours    Other medications reviewed    Vital Signs Last 24 Hrs  T(C): 36.3 (10-02-18 @ 04:52), Max: 36.7 (10-01-18 @ 21:29)  T(F): 97.3 (10-02-18 @ 04:52), Max: 98 (10-01-18 @ 21:29)  HR: 74 (10-02-18 @ 04:52) (69 - 96)  BP: 116/86 (10-02-18 @ 04:52) (116/86 - 133/75)  BP(mean): --  RR: 18 (10-02-18 @ 04:52) (18 - 18)  SpO2: 99% (10-02-18 @ 04:52) (96% - 99%)    Physical Exam:        HEENT PERRLA   No oral exudate or erythema    Chest Good AE,CTA    CVS RRR S1 S2 WNl No murmur or rub or gallop    Abd soft BS normal No tenderness colostomy  No masses  Saba    IV site no erythema tenderness or discharge    CNS alert awake but does not follow commands      Lab Data:                          7.5    10.30 )-----------( 111      ( 01 Oct 2018 07:46 )             24.1       10-01    137  |  112<H>  |  4<L>  ----------------------------<  115<H>  3.4<L>   |  18<L>  |  0.35<L>    Ca    7.3<L>      01 Oct 2018 07:06  Phos  1.8     10-01  Mg     1.7     10-01    Culture - Blood (09.27.18 @ 18:58)    Specimen Source: .Blood Blood-Peripheral    Culture Results:   No growth to date.          Culture - Blood (collected 27 Sep 2018 18:58)  Source: .Blood Blood-Peripheral  Preliminary Report (28 Sep 2018 19:01):    No growth to date.    Culture - Blood (collected 27 Sep 2018 18:58)  Source: .Blood Blood-Peripheral  Preliminary Report (28 Sep 2018 19:01):    No growth to date.
be d bound/ weak  REVIEW OF SYSTEMS:  GEN: no fever,    no chills  RESP: no SOB,   no cough  CVS: no chest pain,   no palpitations  GI: no abdominal pain,   no nausea,   no vomiting,   no constipation,   no diarrhea  : no dysuria,   no frequency  NEURO: no headache,   no dizziness  PSYCH: no depression,   not anxious  Derm : no rash    MEDICATIONS  (STANDING):  atorvastatin 40 milliGRAM(s) Oral at bedtime  carvedilol 6.25 milliGRAM(s) Oral every 12 hours  dronabinol 2.5 milliGRAM(s) Oral two times a day  ertapenem  IVPB 1000 milliGRAM(s) IV Intermittent every 24 hours  heparin  Injectable 5000 Unit(s) SubCutaneous every 12 hours  mirtazapine 15 milliGRAM(s) Oral at bedtime  morphine ER Tablet 15 milliGRAM(s) Oral every 12 hours  sodium chloride 0.9%. 1000 milliLiter(s) (70 mL/Hr) IV Continuous <Continuous>    MEDICATIONS  (PRN):      Vital Signs Last 24 Hrs  T(C): 36.7 (28 Sep 2018 06:09), Max: 37.7 (27 Sep 2018 16:29)  T(F): 98.1 (28 Sep 2018 06:09), Max: 99.9 (27 Sep 2018 16:29)  HR: 81 (28 Sep 2018 06:09) (81 - 92)  BP: 99/64 (28 Sep 2018 06:09) (98/66 - 132/59)  BP(mean): --  RR: 16 (28 Sep 2018 06:09) (16 - 17)  SpO2: 98% (28 Sep 2018 06:09) (98% - 100%)  CAPILLARY BLOOD GLUCOSE        I&O's Summary    27 Sep 2018 07:01  -  28 Sep 2018 07:00  --------------------------------------------------------  IN: 0 mL / OUT: 1 mL / NET: -1 mL        PHYSICAL EXAM:  HEAD:  Atraumatic, Normocephalic  NECK: Supple, No   JVD  CHEST/LUNG:   no     rales,     no,    rhonchi  HEART: Regular rate and rhythm;         murmur  ABDOMEN: Soft, Nontender, ;   EXTREMITIES:        edema  NEUROLOGY:  alert    LABS:                        10.4   16.6  )-----------( 217      ( 27 Sep 2018 13:10 )             33.4         132<L>  |  100  |  17  ----------------------------<  55<L>  4.2   |  19<L>  |  0.50    Ca    8.4      28 Sep 2018 07:07    TPro  7.0  /  Alb  2.2<L>  /  TBili  0.4  /  DBili  x   /  AST  9<L>  /  ALT  6<L>  /  AlkPhos  128<H>      PT/INR - ( 27 Sep 2018 13:10 )   PT: 12.6 sec;   INR: 1.16 ratio         PTT - ( 27 Sep 2018 13:10 )  PTT:29.1 sec      Urinalysis Basic - ( 27 Sep 2018 15:15 )    Color: Light Orange / Appearance: Turbid / S.020 / pH: x  Gluc: x / Ketone: Trace  / Bili: Negative / Urobili: Normal   Blood: x / Protein: >300 mg/dl / Nitrite: Negative   Leuk Esterase: Large / RBC: >50 /hpf / WBC >50 /hpf   Sq Epi: x / Non Sq Epi: x / Bacteria: Many                      Consultant(s) Notes Reviewed:      Care Discussed with Consultants/Other Providers:
bed bound/ cachexia    REVIEW OF SYSTEMS:  GEN: no fever,    no chills  RESP: no SOB,   no cough  CVS: no chest pain,   no palpitations  GI: no abdominal pain,   no nausea,   no vomiting,   no constipation,   no diarrhea  : no dysuria,   no frequency  NEURO: no headache,   no dizziness  PSYCH: no depression,   not anxious  Derm : no rash    MEDICATIONS  (STANDING):  atorvastatin 40 milliGRAM(s) Oral at bedtime  carvedilol 6.25 milliGRAM(s) Oral every 12 hours  dextrose 5% + sodium chloride 0.9%. 1000 milliLiter(s) (75 mL/Hr) IV Continuous <Continuous>  dronabinol 2.5 milliGRAM(s) Oral two times a day  heparin  Injectable 5000 Unit(s) SubCutaneous every 12 hours  levoFLOXacin IVPB 250 milliGRAM(s) IV Intermittent every 24 hours  mirtazapine 15 milliGRAM(s) Oral at bedtime  morphine   Solution 5 milliGRAM(s) Oral every 6 hours    MEDICATIONS  (PRN):  HYDROmorphone  Injectable 0.5 milliGRAM(s) IV Push every 4 hours PRN Severe Pain (7 - 10)      Vital Signs Last 24 Hrs  T(C): 36.6 (03 Oct 2018 04:56), Max: 36.6 (02 Oct 2018 17:19)  T(F): 97.9 (03 Oct 2018 04:56), Max: 97.9 (02 Oct 2018 17:19)  HR: 81 (03 Oct 2018 04:56) (73 - 87)  BP: 130/58 (03 Oct 2018 04:56) (119/51 - 141/64)  BP(mean): --  RR: 18 (03 Oct 2018 04:56) (18 - 18)  SpO2: 97% (03 Oct 2018 04:56) (94% - 100%)  CAPILLARY BLOOD GLUCOSE        I&O's Summary    02 Oct 2018 07:01  -  03 Oct 2018 07:00  --------------------------------------------------------  IN: 2400 mL / OUT: 1870 mL / NET: 530 mL        PHYSICAL EXAM:  HEAD:  Atraumatic, Normocephalic  NECK: Supple, No   JVD  CHEST/LUNG:   no     rales,     no,    rhonchi  HEART: Regular rate and rhythm;         murmur  ABDOMEN: Soft, Nontender, ;   EXTREMITIES:        edema  NEUROLOGY:  alert    LABS:                        7.4    8.8   )-----------( 183      ( 02 Oct 2018 10:39 )             24.0     10-03    139  |  111<H>  |  <4<L>  ----------------------------<  201<H>  3.9   |  19<L>  |  0.32<L>    Ca    6.8<L>      03 Oct 2018 05:37  Phos  2.1     10-02                              Consultant(s) Notes Reviewed:      Care Discussed with Consultants/Other Providers:
in bed/ weak    REVIEW OF SYSTEMS:  GEN: no fever,    no chills  RESP: no SOB,   no cough  CVS: no chest pain,   no palpitations  GI: no abdominal pain,   no nausea,   no vomiting,   no constipation,   no diarrhea  : no dysuria,   no frequency  NEURO: no headache,   no dizziness  PSYCH: no depression,   not anxious  Derm : no rash    MEDICATIONS  (STANDING):  atorvastatin 40 milliGRAM(s) Oral at bedtime  carvedilol 6.25 milliGRAM(s) Oral every 12 hours  dextrose 5% + sodium chloride 0.9%. 1000 milliLiter(s) (75 mL/Hr) IV Continuous <Continuous>  dronabinol 2.5 milliGRAM(s) Oral two times a day  heparin  Injectable 5000 Unit(s) SubCutaneous every 12 hours  levoFLOXacin IVPB 250 milliGRAM(s) IV Intermittent every 24 hours  mirtazapine 15 milliGRAM(s) Oral at bedtime  morphine   Solution 5 milliGRAM(s) Oral every 6 hours    MEDICATIONS  (PRN):  HYDROmorphone  Injectable 0.5 milliGRAM(s) IV Push every 4 hours PRN Severe Pain (7 - 10)      Vital Signs Last 24 Hrs  T(C): 36.3 (02 Oct 2018 04:52), Max: 36.7 (01 Oct 2018 21:29)  T(F): 97.3 (02 Oct 2018 04:52), Max: 98 (01 Oct 2018 21:29)  HR: 74 (02 Oct 2018 04:52) (69 - 96)  BP: 116/86 (02 Oct 2018 04:52) (116/86 - 133/75)  BP(mean): --  RR: 18 (02 Oct 2018 04:52) (18 - 18)  SpO2: 99% (02 Oct 2018 04:52) (96% - 99%)  CAPILLARY BLOOD GLUCOSE        I&O's Summary    01 Oct 2018 07:01  -  02 Oct 2018 07:00  --------------------------------------------------------  IN: 2520 mL / OUT: 900 mL / NET: 1620 mL        PHYSICAL EXAM:  HEAD:  Atraumatic, Normocephalic  NECK: Supple, No   JVD  CHEST/LUNG:   no     rales,     no,    rhonchi  HEART: Regular rate and rhythm;         murmur  ABDOMEN: Soft, Nontender, ;   EXTREMITIES:        edema  NEUROLOGY:  alert    LABS:                        7.5    10.30 )-----------( 111      ( 01 Oct 2018 07:46 )             24.1     10-01    137  |  112<H>  |  4<L>  ----------------------------<  115<H>  3.4<L>   |  18<L>  |  0.35<L>    Ca    7.3<L>      01 Oct 2018 07:06  Phos  1.8     10-01  Mg     1.7     10-01                              Consultant(s) Notes Reviewed:      Care Discussed with Consultants/Other Providers:
listless/ weak    REVIEW OF SYSTEMS:  GEN: no fever,    no chills  RESP: no SOB,   no cough  CVS: no chest pain,   no palpitations  GI: no abdominal pain,   no nausea,   no vomiting,   no constipation,   no diarrhea  : no dysuria,   no frequency  NEURO: no headache,   no dizziness  PSYCH: no depression,   not anxious  Derm : no rash    MEDICATIONS  (STANDING):  atorvastatin 40 milliGRAM(s) Oral at bedtime  carvedilol 6.25 milliGRAM(s) Oral every 12 hours  dextrose 5% + sodium chloride 0.9%. 1000 milliLiter(s) (75 mL/Hr) IV Continuous <Continuous>  dronabinol 2.5 milliGRAM(s) Oral two times a day  heparin  Injectable 5000 Unit(s) SubCutaneous every 12 hours  levoFLOXacin IVPB 250 milliGRAM(s) IV Intermittent every 24 hours  mirtazapine 15 milliGRAM(s) Oral at bedtime  morphine   Solution 5 milliGRAM(s) Oral every 6 hours    MEDICATIONS  (PRN):  HYDROmorphone  Injectable 0.5 milliGRAM(s) IV Push every 4 hours PRN Severe Pain (7 - 10)      Vital Signs Last 24 Hrs  T(C): 36.8 (03 Oct 2018 20:12), Max: 36.8 (03 Oct 2018 20:12)  T(F): 98.2 (03 Oct 2018 20:12), Max: 98.2 (03 Oct 2018 20:12)  HR: 84 (03 Oct 2018 20:12) (75 - 84)  BP: 115/64 (03 Oct 2018 20:12) (99/64 - 115/64)  BP(mean): --  RR: 18 (03 Oct 2018 20:12) (18 - 18)  SpO2: 99% (03 Oct 2018 20:12) (97% - 99%)  CAPILLARY BLOOD GLUCOSE        I&O's Summary    03 Oct 2018 07:01  -  04 Oct 2018 07:00  --------------------------------------------------------  IN: 120 mL / OUT: 850 mL / NET: -730 mL        PHYSICAL EXAM:  HEAD:  Atraumatic, Normocephalic  NECK: Supple, No   JVD  CHEST/LUNG:   no     rales,     no,    rhonchi  HEART: Regular rate and rhythm;         murmur  ABDOMEN: Soft, Nontender, ;   EXTREMITIES:        edema  NEUROLOGY:  alert    LABS:                        8.3    10.52 )-----------( 116      ( 03 Oct 2018 07:43 )             26.8     10-03    139  |  111<H>  |  <4<L>  ----------------------------<  201<H>  3.9   |  19<L>  |  0.32<L>    Ca    6.8<L>      03 Oct 2018 05:37  Phos  2.1     10-02                              Consultant(s) Notes Reviewed:      Care Discussed with Consultants/Other Providers:
weak  REVIEW OF SYSTEMS:  GEN: no fever,    no chills  RESP: no SOB,   no cough  CVS: no chest pain,   no palpitations  GI: no abdominal pain,   no nausea,   no vomiting,   no constipation,   no diarrhea  : no dysuria,   no frequency  NEURO: no headache,   no dizziness  PSYCH: no depression,   not anxious  Derm : no rash    MEDICATIONS  (STANDING):  atorvastatin 40 milliGRAM(s) Oral at bedtime  carvedilol 6.25 milliGRAM(s) Oral every 12 hours  dextrose 5% + sodium chloride 0.9%. 1000 milliLiter(s) (75 mL/Hr) IV Continuous <Continuous>  heparin  Injectable 5000 Unit(s) SubCutaneous every 12 hours  levoFLOXacin IVPB 250 milliGRAM(s) IV Intermittent every 24 hours  mirtazapine 15 milliGRAM(s) Oral at bedtime  morphine   Solution 5 milliGRAM(s) Oral every 6 hours    MEDICATIONS  (PRN):  HYDROmorphone  Injectable 0.5 milliGRAM(s) IV Push every 4 hours PRN Severe Pain (7 - 10)      Vital Signs Last 24 Hrs  T(C): 36.6 (05 Oct 2018 04:56), Max: 36.7 (04 Oct 2018 20:31)  T(F): 97.8 (05 Oct 2018 04:56), Max: 98 (04 Oct 2018 20:31)  HR: 85 (05 Oct 2018 04:56) (72 - 85)  BP: 113/73 (05 Oct 2018 04:56) (110/75 - 117/61)  BP(mean): --  RR: 18 (05 Oct 2018 04:56) (18 - 18)  SpO2: 96% (05 Oct 2018 04:56) (95% - 98%)  CAPILLARY BLOOD GLUCOSE        I&O's Summary    04 Oct 2018 07:01  -  05 Oct 2018 07:00  --------------------------------------------------------  IN: 1875 mL / OUT: 2150 mL / NET: -275 mL        PHYSICAL EXAM:  HEAD:  Atraumatic, Normocephalic  NECK: Supple, No   JVD  CHEST/LUNG:   no     rales,     no,    rhonchi  HEART: Regular rate and rhythm;         murmur  ABDOMEN: Soft, Nontender, ;   EXTREMITIES:        edema  NEUROLOGY:  alert    LABS:                        7.3    10.8  )-----------( 146      ( 04 Oct 2018 09:48 )             23.2                                   Consultant(s) Notes Reviewed:      Care Discussed with Consultants/Other Providers:
weak/ be d bound  no pain    REVIEW OF SYSTEMS:  GEN: no fever,    no chills  RESP: no SOB,   no cough  CVS: no chest pain,   no palpitations  GI: no abdominal pain,   no nausea,   no vomiting,   no constipation,   no diarrhea  : no dysuria,   no frequency  NEURO: no headache,   no dizziness  PSYCH: no depression,   not anxious  Derm : no rash    MEDICATIONS  (STANDING):  atorvastatin 40 milliGRAM(s) Oral at bedtime  carvedilol 6.25 milliGRAM(s) Oral every 12 hours  dextrose 5% + sodium chloride 0.9%. 1000 milliLiter(s) (75 mL/Hr) IV Continuous <Continuous>  dronabinol 2.5 milliGRAM(s) Oral two times a day  ertapenem  IVPB 1000 milliGRAM(s) IV Intermittent every 24 hours  heparin  Injectable 5000 Unit(s) SubCutaneous every 12 hours  mirtazapine 15 milliGRAM(s) Oral at bedtime  morphine ER Tablet 15 milliGRAM(s) Oral every 12 hours    MEDICATIONS  (PRN):  HYDROmorphone  Injectable 0.5 milliGRAM(s) IV Push every 4 hours PRN Severe Pain (7 - 10)      Vital Signs Last 24 Hrs  T(C): 36.9 (30 Sep 2018 06:45), Max: 36.9 (30 Sep 2018 06:45)  T(F): 98.5 (30 Sep 2018 06:45), Max: 98.5 (30 Sep 2018 06:45)  HR: 82 (30 Sep 2018 06:45) (70 - 82)  BP: 102/68 (30 Sep 2018 06:45) (102/68 - 115/53)  BP(mean): --  RR: 18 (30 Sep 2018 06:45) (18 - 18)  SpO2: 98% (30 Sep 2018 06:45) (98% - 100%)  CAPILLARY BLOOD GLUCOSE        I&O's Summary    29 Sep 2018 07:01  -  30 Sep 2018 07:00  --------------------------------------------------------  IN: 960 mL / OUT: 450 mL / NET: 510 mL        PHYSICAL EXAM:  HEAD:  Atraumatic, Normocephalic  NECK: Supple, No   JVD  CHEST/LUNG:   no     rales,     no,    rhonchi  HEART: Regular rate and rhythm;         murmur  ABDOMEN: Soft, Nontender, ;   EXTREMITIES:   no     edema  NEUROLOGY:  alert    LABS:                        8.1    11.09 )-----------( 161      ( 29 Sep 2018 08:03 )             26.0     09-29    133<L>  |  105  |  12  ----------------------------<  59<L>  3.5   |  18<L>  |  0.42<L>    Ca    8.1<L>      29 Sep 2018 06:28                              Consultant(s) Notes Reviewed:      Care Discussed with Consultants/Other Providers:
weak/ bed bound  not in pain  REVIEW OF SYSTEMS:  GEN: no fever,    no chills  RESP: no SOB,   no cough  CVS: no chest pain,   no palpitations  GI: no abdominal pain,   no nausea,   no vomiting,   no constipation,   no diarrhea  : no dysuria,   no frequency  NEURO: no headache,   no dizziness  PSYCH: no depression,   not anxious  Derm : no rash    MEDICATIONS  (STANDING):  atorvastatin 40 milliGRAM(s) Oral at bedtime  carvedilol 6.25 milliGRAM(s) Oral every 12 hours  dextrose 5% + sodium chloride 0.9%. 1000 milliLiter(s) (75 mL/Hr) IV Continuous <Continuous>  dronabinol 2.5 milliGRAM(s) Oral two times a day  ertapenem  IVPB 1000 milliGRAM(s) IV Intermittent every 24 hours  heparin  Injectable 5000 Unit(s) SubCutaneous every 12 hours  mirtazapine 15 milliGRAM(s) Oral at bedtime  morphine ER Tablet 15 milliGRAM(s) Oral every 12 hours    MEDICATIONS  (PRN):  HYDROmorphone  Injectable 0.5 milliGRAM(s) IV Push every 4 hours PRN Severe Pain (7 - 10)      Vital Signs Last 24 Hrs  T(C): 36.3 (01 Oct 2018 05:14), Max: 36.6 (30 Sep 2018 21:53)  T(F): 97.3 (01 Oct 2018 05:14), Max: 97.9 (30 Sep 2018 21:53)  HR: 70 (01 Oct 2018 05:14) (70 - 80)  BP: 104/66 (01 Oct 2018 05:14) (99/60 - 121/90)  BP(mean): --  RR: 17 (01 Oct 2018 05:14) (16 - 18)  SpO2: 98% (01 Oct 2018 05:14) (96% - 98%)  CAPILLARY BLOOD GLUCOSE        I&O's Summary    30 Sep 2018 07:01  -  01 Oct 2018 07:00  --------------------------------------------------------  IN: 1275 mL / OUT: 500 mL / NET: 775 mL        PHYSICAL EXAM:  HEAD:  Atraumatic, Normocephalic  NECK: Supple, No   JVD  CHEST/LUNG:   no     rales,     no,    rhonchi  HEART: Regular rate and rhythm;         murmur  ABDOMEN: Soft, Nontender, ;   EXTREMITIES:        edema  NEUROLOGY:  alert    LABS:                        9.0    12.97 )-----------( 161      ( 30 Sep 2018 08:11 )             28.9     09-30    139  |  107  |  7   ----------------------------<  97  3.0<L>   |  21<L>  |  0.40<L>    Ca    7.8<L>      30 Sep 2018 07:15                              Consultant(s) Notes Reviewed:      Care Discussed with Consultants/Other Providers:
weak/ be d  bound  REVIEW OF SYSTEMS:  GEN: no fever,    no chills  RESP: no SOB,   no cough  CVS: no chest pain,   no palpitations  GI: no abdominal pain,   no nausea,   no vomiting,   no constipation,   no diarrhea  : no dysuria,   no frequency  NEURO: no headache,   no dizziness  PSYCH: no depression,   not anxious  Derm : no rash    MEDICATIONS  (STANDING):  atorvastatin 40 milliGRAM(s) Oral at bedtime  carvedilol 6.25 milliGRAM(s) Oral every 12 hours  dronabinol 2.5 milliGRAM(s) Oral two times a day  ertapenem  IVPB 1000 milliGRAM(s) IV Intermittent every 24 hours  heparin  Injectable 5000 Unit(s) SubCutaneous every 12 hours  mirtazapine 15 milliGRAM(s) Oral at bedtime  morphine ER Tablet 15 milliGRAM(s) Oral every 12 hours  sodium chloride 0.9%. 1000 milliLiter(s) (70 mL/Hr) IV Continuous <Continuous>    MEDICATIONS  (PRN):  HYDROmorphone  Injectable 0.5 milliGRAM(s) IV Push every 4 hours PRN Severe Pain (7 - 10)      Vital Signs Last 24 Hrs  T(C): 36.2 (29 Sep 2018 04:44), Max: 36.3 (28 Sep 2018 11:30)  T(F): 97.2 (29 Sep 2018 04:44), Max: 97.4 (28 Sep 2018 11:30)  HR: 73 (29 Sep 2018 04:44) (73 - 81)  BP: 94/60 (29 Sep 2018 04:44) (80/45 - 111/74)  BP(mean): --  RR: 16 (29 Sep 2018 04:44) (16 - 19)  SpO2: 97% (29 Sep 2018 04:44) (93% - 98%)  CAPILLARY BLOOD GLUCOSE        I&O's Summary    28 Sep 2018 07:01  -  29 Sep 2018 07:00  --------------------------------------------------------  IN: 1330 mL / OUT: 951 mL / NET: 379 mL        PHYSICAL EXAM:  HEAD:  Atraumatic, Normocephalic  NECK: Supple, No   JVD  CHEST/LUNG:   no     rales,     no,    rhonchi  HEART: Regular rate and rhythm;         murmur  ABDOMEN: Soft, Nontender, ;   EXTREMITIES:    no    edema  NEUROLOGY:  alert    LABS:                        8.1    11.09 )-----------( 161      ( 29 Sep 2018 08:03 )             26.0         133<L>  |  105  |  12  ----------------------------<  59<L>  3.5   |  18<L>  |  0.42<L>    Ca    8.1<L>      29 Sep 2018 06:28    TPro  7.0  /  Alb  2.2<L>  /  TBili  0.4  /  DBili  x   /  AST  9<L>  /  ALT  6<L>  /  AlkPhos  128<H>      PT/INR - ( 27 Sep 2018 13:10 )   PT: 12.6 sec;   INR: 1.16 ratio         PTT - ( 27 Sep 2018 13:10 )  PTT:29.1 sec      Urinalysis Basic - ( 27 Sep 2018 15:15 )    Color: Light Orange / Appearance: Turbid / S.020 / pH: x  Gluc: x / Ketone: Trace  / Bili: Negative / Urobili: Normal   Blood: x / Protein: >300 mg/dl / Nitrite: Negative   Leuk Esterase: Large / RBC: >50 /hpf / WBC >50 /hpf   Sq Epi: x / Non Sq Epi: x / Bacteria: Many                      Consultant(s) Notes Reviewed:      Care Discussed with Consultants/Other Providers:

## 2018-10-05 NOTE — DISCHARGE NOTE ADULT - SECONDARY DIAGNOSIS.
Coronary artery disease involving native coronary artery of native heart without angina pectoris Rectal cancer Leukocytosis Indwelling urethral catheter present

## 2018-10-05 NOTE — DISCHARGE NOTE ADULT - MEDICATION SUMMARY - MEDICATIONS TO STOP TAKING
I will STOP taking the medications listed below when I get home from the hospital:    folic acid 1 mg oral tablet  -- 1 tab(s) by mouth once a day    oxyCODONE 5 mg oral tablet  -- 1 tab(s) by mouth every 4 hours, As needed, Moderate Pain (4 - 6)    metoclopramide 10 mg oral tablet  -- 1 tab(s) by mouth every 6 hours, As needed, nausea    senna oral tablet  -- 2 tab(s) by mouth once a day (at bedtime)    potassium chloride 20 mEq oral tablet, extended release  -- 1 tab(s) by mouth once a day    polyethylene glycol 3350 oral powder for reconstitution  -- 17 gram(s) by mouth once a day    docusate sodium 100 mg oral capsule  -- 1 cap(s) by mouth 2 times a day    dronabinol 2.5 mg oral capsule  -- 1 cap(s) by mouth 2 times a day    famotidine 20 mg oral tablet  -- 1 tab(s) by mouth once a day    Zofran 4 mg oral tablet  -- 1 tab(s) by mouth 2 times a day PRIOR TO   PAIN MEDS.    heparin 5000 units/0.5 mL injectable solution  -- 5000 unit(s) injectable every 12 hours.  DVT PROPHYLAXIS    Vitamin D3 1000 intl units oral tablet  -- 1 tab(s) by mouth once a day    NovoLOG FlexPen 100 units/mL injectable solution  -- inject subcutaneously as per sliding scale    Thermotabs  -- 1 tab(s) by mouth once a day

## 2018-10-05 NOTE — CHART NOTE - NSCHARTNOTEFT_GEN_A_CORE
Pt with failure to thrive, poor oral intake, no PEG per family, now hospice. Pt may receive IV hydration per medical management at facility.       Nadia Jurado NP Medicine [id number]

## 2018-10-05 NOTE — PROGRESS NOTE ADULT - ASSESSMENT
pt with htn, hld, dm 2, cad, htn,   pad, right femoral A graft. occluded,/ gangrene.  right  leg  amputation ,    rectal cancer, s/p RT/ colostomy  also  with lung mass on ct,  s/p   ab.  for  rectal abscesses, on prior  admission    now  admitted  for  peg, at  family's request, hence  pt   sent by  n home  dr,  to  er    pt  with  elevated  wbc/ decreasing now on ab/  hyponatremia improved  h/o  rectal  abscesses / collections on levaquin per  ID  pt  is  dnr/  palliative  careto    f/p  with family/  pt  is  ideal   candidate  for  hospice  pt  with c/c  cachexia/ from malignancy/ bed  bound/ functional quadriplegia   ct prelim, with  entero vesicluar  fistula/ surg and  urology  eval, noted, no intervention  given pts  rectal cancer  and  pts  poor  performance  status/ agree  with  oncology  for  supportive/ comfort care/ encourage   levaquin for  7 days, per  ID  hospice as  per pallaitive care pt with htn, hld, dm 2, cad, htn,   pad, right femoral A graft. occluded,/ gangrene.  right  leg  amputation ,    rectal cancer, s/p RT/ colostomy  also  with lung mass on ct,  s/p   ab.  for  rectal abscesses, on prior  admission    now  admitted  for  peg, at  family's request, hence  pt   sent by  n home  dr,  to  er    pt  with  elevated  wbc/ decreasing now on ab/  hyponatremia improved  h/o  rectal  abscesses / collections on levaquin per  ID  pt  is  dnr/  palliative  careto    f/p  with family/  pt  is  ideal   candidate  for  hospice  pt  with c/c  cachexia/ from malignancy/ bed  bound/ functional quadriplegia   ct prelim, with  entero vesicluar  fistula/ surg and  urology  eval, noted, no intervention  given pts  rectal cancer  and  pts  poor  performance  status/ agree  with  oncology  for  supportive/ comfort care/ encourage   levaquin for  7 days, per  ID  hospice as  per pallaitive care  awaiting hb from today

## 2018-10-05 NOTE — DISCHARGE NOTE ADULT - CARE PLAN
Principal Discharge DX:	Failure to thrive in adult  Goal:	Stable  Assessment and plan of treatment:	Continue with diet and supplements as tolerated.  Follow-up with your primary care physician/hospice care team .  Secondary Diagnosis:	Coronary artery disease involving native coronary artery of native heart without angina pectoris  Assessment and plan of treatment:	Continue with medication as prescribed by your doctor.  Follow-up with your primary care physician/hospice care team .  Secondary Diagnosis:	Rectal cancer  Assessment and plan of treatment:	Continue with pain regimen as prescribed by your doctor.  Follow-up with your primary care physician/hospice care team .  Secondary Diagnosis:	Leukocytosis  Assessment and plan of treatment:	Continue with Levaquin thru Oct 9th.  Follow-up with your primary care physician/hospice care team . Principal Discharge DX:	Failure to thrive in adult  Goal:	Stable  Assessment and plan of treatment:	Continue with diet and supplements as tolerated.  Follow-up with your primary care physician/hospice care team .  Secondary Diagnosis:	Coronary artery disease involving native coronary artery of native heart without angina pectoris  Assessment and plan of treatment:	Continue with medication as prescribed by your doctor.  Follow-up with your primary care physician/hospice care team .  Secondary Diagnosis:	Rectal cancer  Assessment and plan of treatment:	Continue with pain regimen as prescribed by your doctor.  Follow-up with your primary care physician/hospice care team .  Secondary Diagnosis:	Leukocytosis  Assessment and plan of treatment:	Continue with Levaquin thru Oct 9th.  Follow-up with your primary care physician/hospice care team .  Secondary Diagnosis:	Indwelling urethral catheter present  Assessment and plan of treatment:	Follow-up with primary care physician /hospice care team for continued management of indwelling buck catheter for urinary retention/comfort care.

## 2018-10-05 NOTE — DISCHARGE NOTE ADULT - MEDICATION SUMMARY - MEDICATIONS TO TAKE
I will START or STAY ON the medications listed below when I get home from the hospital:    acetaminophen 325 mg oral tablet  -- 2 tab(s) by mouth every 6 hours, As needed, Mild Pain (1 - 3)  -- Indication: For Pain    morphine 10 mg/5 mL oral solution  -- 2.5 milliliter(s) by mouth every 6 hours  -- Indication: For Pain    mirtazapine 15 mg oral tablet  -- 1 tab(s) by mouth once a day (at bedtime)  -- Indication: For depression    atorvastatin 40 mg oral tablet  -- 1 tab(s) by mouth once a day (at bedtime)  -- Indication: For high cholesterol    carvedilol 6.25 mg oral tablet  -- 1 tab(s) by mouth every 12 hours  -- Indication: For hypertension    levoFLOXacin 250 mg oral tablet  -- 1 tab(s) by mouth once a day thru Oct 9th  -- Indication: For Fistulas, leukocytosis

## 2018-10-05 NOTE — PROGRESS NOTE ADULT - REASON FOR ADMISSION
sent for    peg/
sent for    peg
sent for    peg/ admitted  by  marcial mario in past
sent for peg
fistula
sent for    peg
sent for    peg/
sent for    peg/ admitted  by  marcial mario in past

## 2018-10-05 NOTE — DISCHARGE NOTE ADULT - PATIENT PORTAL LINK FT
You can access the [facility] Patient Portal, offered by [facility], by registering with the following website: [email or web address]

## 2018-11-20 NOTE — DISCHARGE NOTE ADULT - DO YOU HAVE DIFFICULTY CLIMBING STAIRS
Discussion/Summary   YOur labs look Stable rheumatology labs look OK, woul dfollow with rheumatology        Verified Results  CBC WITH AUTOMATED DIFFERENTIAL 23Mar2018 12:01AM LUHBETSY     Test Name Result Flag Reference   WHITE BLOOD COUNT 5.5 K/mcL  4.2-11.0   RED CELL COUNT 4.22 mil/mcL L 4.50-5.90   HEMOGLOBIN 12.9 g/dl L 13.0-17.0   HEMATOCRIT 39.6 %  39.0-51.0   MEAN CORPUSCULAR VOLUME 93.8 fL  78.0-100.0   MEAN CORPUSCULAR HEMOGLOBIN 30.6 pg  26.0-34.0   MEAN CORPUSCULAR HGB CONC 32.6 g/dl  32.0-36.5   RDW-CV 13.3 %  11.0-15.0   PLATELET COUNT 144 K/mcL  140-450   JEAN MARIE% 77 %     LYM% 16 %     MON% 6 %     EOS% 1 %     BASO% 0 %     JAEN MARIE ABS 4.2 K/mcL  1.8-7.7   LYM ABS 0.9 K/mcL L 1.0-4.0   MON ABS 0.3 K/mcL  0.3-0.9   EOS ABS 0.1 K/mcL  0.1-0.5   BASO ABS 0.0 K/mcL  0.0-0.3   DIFF TYPE      AUTOMATED DIFFERENTIAL   PERCENT NRBC 0 %  0   PERCENT IMMATURE GRANULOCYTES 0 %     ABSOLUTE IMMATURE GRANULOCYTES 0.0 K/mcL  0-0.2     COMP METABOLIC PNL 23Mar2018 12:01AM LUHBETSY MORENO     Test Name Result Flag Reference   SODIUM 144 mmol/L  135-145   POTASSIUM 4.5 mmol/L  3.4-5.1   CHLORIDE 107 mmol/L     CARBON DIOXIDE 27 mmol/L  21-32   ANION GAP 14 mmol/L  10-20   GLUCOSE 108 mg/dl H 65-99   BUN 12 mg/dl  6-20   CREATININE 0.79 mg/dl  0.67-1.17   GFR EST.AFRICAN AMER >90     eGFR results = or >90 mL/min/1.73m2 = Normal kidney function.   GFR EST.NONAFRI AMER 85     eGFR 60 - 89 mL/min/1.73m2 = Mild decrease in kidney function.   BUN/CREATININE RATIO 15  7-25   BILIRUBIN TOTAL 1.0 mg/dl  0.2-1.0   GOT/AST 28 Units/L  <38   ALKALINE PHOSPHATASE 89 Units/L     ALBUMIN 3.8 g/dl  3.6-5.1   TOTAL PROTEIN 6.0 g/dl L 6.4-8.2   GLOBULIN (CALCULATED) 2.2 g/dl  2.0-4.0   A/G RATIO 1.7  1.0-2.4   CALCIUM 9.0 mg/dl  8.4-10.2   GPT/ALT 30 Units/L  <79   FASTING STATUS UNKNOWN hrs       TSH 23Mar2018 12:01AM BETSY ARVIZU     Test Name Result Flag Reference   TSH 2.118 mcUnits/mL  0.350-5.000     VITAMIN  D,25 HYDROXY 23Mar2018 12:01AM BETSY ARVIZU     Test Name Result Flag Reference   VIT D,25 HYDROXY 34.1 ng/ml  30.0-100.0   <20  ng/mL=Vitamin D deficiency  20-29  ng/mL=Vitamin D insufficiency   ng/mL=Optimal Vitamin D  >150 ng/mL=Possible toxicity     RBC SED RATE 23Mar2018 12:01AM BETSY ARVIZU     Test Name Result Flag Reference   RBC SED RATE 6 mm/hr  0-20     URIC ACID 23Mar2018 12:01AM BETSY ARVIZU     Test Name Result Flag Reference   URIC ACID 5.1 mg/dl  3.5-7.2     LIPID PNL 23Mar2018 12:01AM BETSY ARVIZU     Test Name Result Flag Reference   FASTING STATUS UNKNOWN hrs     CHOLESTEROL 161 mg/dl  <200   Desirable            <200  Borderline High      200 to 239  High                 >=240   HDL CHOLESTEROL 67 mg/dl  >39   Low            <40  Borderline Low 40 to 49  Near Optimal   50 to 59  Optimal        >=60   TRIGLYCERIDES 54 mg/dl  <150   Normal                   <150  Borderline High          150 to 199  High                     200 to 499  Very High                >=500   LDL CHOLESTEROL (CALCULATED) 83 mg/dl  <130   OPTIMAL               <100  NEAR OPTIMAL          100-129  BORDERLINE HIGH       130-159  HIGH                  160-189  VERY HIGH             >=190   NON-HDL CHOLESTEROL 94 mg/dl     Therapeutic Target:  CHD and risk equivalents <130  Multiple risk factors    <160  0 to 1 risk factors      <190   CHOLESTEROL/HDL RATIO 2.4  <4.5     SHELLIE SCREEN WITH AB AND IFA REFLEX 23Mar2018 12:01AM BETSY ARVIZU     Test Name Result Flag Reference   SHELLIE NEGATIVE  NEGATIVE   Specimen is negative for the following antinuclear antibodies: dsDNA, Chromatin, Ribosomal P, SSA, SSB, Sm, Sm/RNP, RNP, Scl-70, Cailin-1 and Centromere.     CYCLIC CITRUL PEP AB 23Mar2018 12:01AM BETSY ARVIZU     Test Name Result Flag Reference   CYCLIC CITRUL PEP AB 3 Units  <20   Approximately 70% of patients with Rheumatoid Arthritis(RA) are positive for CCP AB while only 2% of random blood donors are  positive. The diagnostic value of CCP AB in juvenile RA patients has not been determined.     CPK - CREATINE KINASE 23Mar2018 12:01AM BETSY ARVIZU     Test Name Result Flag Reference   Creatine Kinase 171 Units/L       CRP 23Mar2018 12:01AM BETSY ARVIZU     Test Name Result Flag Reference   C-REACTIVE PROTEIN <0.3 mg/dl  <1.0     RPR 23Mar2018 12:01AM BETSY ARVIZU     Test Name Result Flag Reference   RPR NONREACTIVE  NONREACTIVE   See Directory of Services for interpretation of syphilis testing algorithm.     LYME IGG/IGM AB SCREEN 23Mar2018 12:01AM BETSY ARVIZU     Test Name Result Flag Reference   LYME IGG/IGM AB SCREEN NEGATIVE  NEGATIVE        Yes

## 2018-12-26 PROCEDURE — 71045 X-RAY EXAM CHEST 1 VIEW: CPT

## 2018-12-26 PROCEDURE — 93005 ELECTROCARDIOGRAM TRACING: CPT

## 2018-12-26 PROCEDURE — 83735 ASSAY OF MAGNESIUM: CPT

## 2018-12-26 PROCEDURE — P9016: CPT

## 2018-12-26 PROCEDURE — 83605 ASSAY OF LACTIC ACID: CPT

## 2018-12-26 PROCEDURE — 82962 GLUCOSE BLOOD TEST: CPT

## 2018-12-26 PROCEDURE — 85027 COMPLETE CBC AUTOMATED: CPT

## 2018-12-26 PROCEDURE — 86901 BLOOD TYPING SEROLOGIC RH(D): CPT

## 2018-12-26 PROCEDURE — 71260 CT THORAX DX C+: CPT

## 2018-12-26 PROCEDURE — 84100 ASSAY OF PHOSPHORUS: CPT

## 2018-12-26 PROCEDURE — 84132 ASSAY OF SERUM POTASSIUM: CPT

## 2018-12-26 PROCEDURE — 86900 BLOOD TYPING SEROLOGIC ABO: CPT

## 2018-12-26 PROCEDURE — 86850 RBC ANTIBODY SCREEN: CPT

## 2018-12-26 PROCEDURE — 80048 BASIC METABOLIC PNL TOTAL CA: CPT

## 2018-12-26 PROCEDURE — 74177 CT ABD & PELVIS W/CONTRAST: CPT

## 2018-12-26 PROCEDURE — 85730 THROMBOPLASTIN TIME PARTIAL: CPT

## 2018-12-26 PROCEDURE — 86923 COMPATIBILITY TEST ELECTRIC: CPT

## 2018-12-26 PROCEDURE — 80053 COMPREHEN METABOLIC PANEL: CPT

## 2018-12-26 PROCEDURE — 80202 ASSAY OF VANCOMYCIN: CPT

## 2018-12-26 PROCEDURE — 36430 TRANSFUSION BLD/BLD COMPNT: CPT

## 2018-12-26 PROCEDURE — 82803 BLOOD GASES ANY COMBINATION: CPT

## 2018-12-26 PROCEDURE — 97110 THERAPEUTIC EXERCISES: CPT

## 2018-12-26 PROCEDURE — 97163 PT EVAL HIGH COMPLEX 45 MIN: CPT

## 2018-12-26 PROCEDURE — 97530 THERAPEUTIC ACTIVITIES: CPT

## 2018-12-26 PROCEDURE — 85610 PROTHROMBIN TIME: CPT

## 2018-12-26 PROCEDURE — 87040 BLOOD CULTURE FOR BACTERIA: CPT

## 2018-12-26 PROCEDURE — 82435 ASSAY OF BLOOD CHLORIDE: CPT

## 2018-12-26 PROCEDURE — 99285 EMERGENCY DEPT VISIT HI MDM: CPT

## 2018-12-26 PROCEDURE — 82330 ASSAY OF CALCIUM: CPT

## 2018-12-26 PROCEDURE — 82947 ASSAY GLUCOSE BLOOD QUANT: CPT

## 2018-12-26 PROCEDURE — 87150 DNA/RNA AMPLIFIED PROBE: CPT

## 2018-12-26 PROCEDURE — 81001 URINALYSIS AUTO W/SCOPE: CPT

## 2018-12-26 PROCEDURE — 84295 ASSAY OF SERUM SODIUM: CPT

## 2018-12-26 PROCEDURE — 90686 IIV4 VACC NO PRSV 0.5 ML IM: CPT

## 2018-12-26 PROCEDURE — 99285 EMERGENCY DEPT VISIT HI MDM: CPT | Mod: 25

## 2018-12-26 PROCEDURE — 85014 HEMATOCRIT: CPT

## 2019-01-15 NOTE — DIETITIAN INITIAL EVALUATION ADULT. - FACTORS AFF FOOD INTAKE
Orthostatic BP Reading:    Lying:   BP:115/54   P:60  Sitting: BP:108/58  P:62  Standing: BP:106/56  P:67    Comment: Readings taken on left arm, with standard adult cuff. Bhavesh Wright is a 5year old female here today for follow-up of headaches. The dizziness is occurring several times per week with occasional vomiting. Mom reports that the patient has been having increased migraines with nausea and vomiting. After the patient's LOV in 5/2017 it was recommended that the patient's anxiety be addressed. Mom states that the patient started taking Prozac 10/10 and the headaches subsided for months. The headaches have now returned since the patient returned to school after the holiday break. Mom feels that this may again be related to the stress of returning to school. Mom reports that the patient typically drinks 50 ounces of water every day. Medication verified and med list updated  PCP:  Diallo Oneal MD      Pt needs letter for school: Yes  Medications reviewed and updated. Denies known Latex allergy or symptoms of Latex sensitivity. Tobacco history verified. change in mental status/difficulty feeding self/difficulty swallowing

## 2019-06-08 NOTE — ED ADULT NURSE NOTE - NURSING ED PRESSURE ULCER LOCATION 1
Patient Education        Preventing Falls: Care Instructions  Your Care Instructions    Getting around your home safely can be a challenge if you have injuries or health problems that make it easy for you to fall. Loose rugs and furniture in walkways are among the dangers for many older people who have problems walking or who have poor eyesight. People who have conditions such as arthritis, osteoporosis, or dementia also have to be careful not to fall. You can make your home safer with a few simple measures. Follow-up care is a key part of your treatment and safety. Be sure to make and go to all appointments, and call your doctor if you are having problems. It's also a good idea to know your test results and keep a list of the medicines you take. How can you care for yourself at home? Taking care of yourself  · You may get dizzy if you do not drink enough water. To prevent dehydration, drink plenty of fluids, enough so that your urine is light yellow or clear like water. Choose water and other caffeine-free clear liquids. If you have kidney, heart, or liver disease and have to limit fluids, talk with your doctor before you increase the amount of fluids you drink. · Exercise regularly to improve your strength, muscle tone, and balance. Walk if you can. Swimming may be a good choice if you cannot walk easily. · Have your vision and hearing checked each year or any time you notice a change. If you have trouble seeing and hearing, you might not be able to avoid objects and could lose your balance. · Know the side effects of the medicines you take. Ask your doctor or pharmacist whether the medicines you take can affect your balance. Sleeping pills or sedatives can affect your balance. · Limit the amount of alcohol you drink. Alcohol can impair your balance and other senses. · Ask your doctor whether calluses or corns on your feet need to be removed.  If you wear loose-fitting shoes because of calluses or corns, you can lose your balance and fall. · Talk to your doctor if you have numbness in your feet. Preventing falls at home  · Remove raised doorway thresholds, throw rugs, and clutter. Repair loose carpet or raised areas in the floor. · Move furniture and electrical cords to keep them out of walking paths. · Use nonskid floor wax, and wipe up spills right away, especially on ceramic tile floors. · If you use a walker or cane, put rubber tips on it. If you use crutches, clean the bottoms of them regularly with an abrasive pad, such as steel wool. · Keep your house well lit, especially [place] Ev[place], and outside walkways. Use night-lights in areas such as hallways and bathrooms. Add extra light switches or use remote switches (such as switches that go on or off when you clap your hands) to make it easier to turn lights on if you have to get up during the night. · Install sturdy handrails on stairways. · Move items in your cabinets so that the things you use a lot are on the lower shelves (about waist level). · Keep a cordless phone and a flashlight with new batteries by your bed. If possible, put a phone in each of the main rooms of your house, or carry a cell phone in case you fall and cannot reach a phone. Or, you can wear a device around your neck or wrist. You push a button that sends a signal for help. · Wear low-heeled shoes that fit well and give your feet good support. Use footwear with nonskid soles. Check the heels and soles of your shoes for wear. Repair or replace worn heels or soles. · Do not wear socks without shoes on wood floors. · Walk on the grass when the sidewalks are slippery. If you live in an area that gets snow and ice in the winter, sprinkle salt on slippery steps and sidewalks. Preventing falls in the bath  · Install grab bars and nonskid mats inside and outside your shower or tub and near the toilet and sinks. · Use shower chairs and bath benches.   · Use a hand-held shower head that will allow you to sit while showering. · Get into a tub or shower by putting the weaker leg in first. Get out of a tub or shower with your strong side first.  · Repair loose toilet seats and consider installing a raised toilet seat to make getting on and off the toilet easier. · Keep your bathroom door unlocked while you are in the shower. Where can you learn more? Go to http://nathalia-kam.info/. Enter [phone] [id number] [phone] in the search box to learn more about \"Preventing Falls: Care Instructions. \"  Current as of: 2018  Content Version: 11.8  © 5997-0572 [employer]. Care instructions adapted under license by [employer] (which disclaims liability or warranty for this information). If you have questions about a medical condition or this instruction, always ask your healthcare professional. [place] [address] any warranty or liability for your use of this information. FitnessManager Activation    Thank you for requesting access to FitnessManager. Please follow the instructions below to securely access and download your online medical record. F[employer]Manager allows you to send messages to your doctor, view your test results, renew your prescriptions, schedule appointments, and more. How Do I Sign Up? 1. In your internet browser, go to [email or web address]  2. Click on the First Time User? Click Here link in the Sign In box. You will be redirect to the New Member Sign Up page. 3. Enter your FitnessManager Access Code exactly as it appears below. You will not need to use this code after youve completed the sign-up process. If you do not sign up before the expiration date, you must request a new code. FitnessManager Access Code: Activation code not generated  Current FitnessManager Status: Active (This is the date your FitnessManager access code will )    4. Enter the last four digits of your Social Security Number (xxxx) and Date of Birth (mm/dd/yyyy) as indicated and click Submit. You will be taken to the next sign-up page. 5. Create a [employer] Gr[employer] ID. This will be your [employer] login ID and cannot be changed, so think of one that is secure and easy to remember. 6. Create a [employer] password. You can change your password at any time. 7. Enter your Password Reset Question and Answer. This can be used at a later time if you forget your password. 8. Enter your e-mail address. You will receive e-mail notification when new information is available in [address]. 9. Click Sign Up. You can now view and download portions of your medical record. 10. Click the Download Summary menu link to download a portable copy of your medical information. Additional Information    If you have questions, please visit the Frequently Asked Questions section of the [employer] website at [email or web address] [email or web address] Remember, [employer] is NOT to be used for urgent needs. For medical emergencies, dial 911. Complete all medications as prescribed. Follow-up with ortho in 1 week. Return to the ED immediately for any new or worsening symptoms. sacrum

## 2019-08-21 NOTE — CONSULT NOTE ADULT - PROBLEM/RECOMMENDATION-1
Patient Name: Italia Crump    MRN: [id number] : 1937 Sex: female Surgeon: Mat Velasco M.D. PCP: Alexis Barrios MD    Patient Phone #: [phone] (home)   Call Back Info: Ok to leave response (including medical information) with family member or on answering machine Work Comp: NO   Age: 81 year old   Surgery Date: 10/16/19 Arrival Time: 5:30am  Surgery Time: 7:30am      [facility]    Procedure(s) with Diagnosis Code(s): Ortho:     Diagnosis: osteoarthritis of the hip  Diagnosis Code: M16.10     CPT Code: 01209    Procedure:  Right Total Hip Arthroplasty       Body part: hip Side: Right    Consent to Read: As Above    Anesthesia: Choice  Position of patient on table: Lateral side    Language Spoken: English  Total Hearing Loss? no          Previous Pre-Op Testing:   None    Laboratory Services: Fasting (8-10 hours) CBC/PLT  Comp Panel  Is patient a diabetic (Type I or Type II): Yes - Has patient had a HgbA1c resulted in the last 90 days?  No - include a HgbA1c in pre-admission lab testing  X-Ray Service:N/A  EKG Service: N/A  Location of Lab and X-ray: N/A    Orders to be called to hospital: Blood bank hold    H & P: By Dr. Barrios on 19. [facility] MD? Yes    Insurance Information: FSC:   Payor/Plan Subscr  Sex Relation Sub. Ins. ID Effective Group Num    Payor: MEDICARE / Plan: PARTA AND B / Product Type: MEDICARE                 Physician Orders:   NPO  Anesthesia to Pre-Medicate  Consent to be signed for above procedure(s)  Antibiotics:  Yes -  Ancef (cefazolin) 2 grams IVPB   [facility] Total Joint  MRSA/MSSA screening done in total joint class 10-14 days prior to surgery  If MRSA/MSSA positive - Mupirocin 2% intranasal twice a day for 5 days      Comments: 36 MM Metal Heads, Michelle trilogy cup, metal on cross-linked polyethylene.    Medial/lateral tapered stem proximally porous-coated femoral stem,     : Renetta    Date: 9/3/19       MD 
Signature:  _________________________________   Dictation #: [id number]      Date: _________________________________ Time: _____________________________________    Route to Business Office preop orders pool.  Exception: Interventional pain injections route to ASC pain pool  
DISPLAY PLAN FREE TEXT
DISPLAY PLAN FREE TEXT

## 2019-12-06 NOTE — PROGRESS NOTE ADULT - PROBLEM SELECTOR PLAN 7
Alert RN Fanta aware of pt's status. Will follow.    - Continue with statin therapy  - Low cholesterol diet

## 2020-02-26 NOTE — PROGRESS NOTE ADULT - ASSESSMENT
78 year old female with perforated rectal cancer POD 1 s/p creation of end sigmoid loop colostomy  -Patient is tolerating clear liquid diet  -Would advance diet once ostomy functional  -Pain control  -OOB and ambulate  -Ostomy care  -Continue care per primary team, colorectal surgery to follow Tetracycline Counseling: Patient counseled regarding possible photosensitivity and increased risk for sunburn.  Patient instructed to avoid sunlight, if possible.  When exposed to sunlight, patients should wear protective clothing, sunglasses, and sunscreen.  The patient was instructed to call the office immediately if the following severe adverse effects occur:  hearing changes, easy bruising/bleeding, severe headache, or vision changes.  The patient verbalized understanding of the proper use and possible adverse effects of tetracycline.  All of the patient's questions and concerns were addressed. Patient understands to avoid pregnancy while on therapy due to potential birth defects.

## 2020-08-11 NOTE — ED ADULT NURSE NOTE - GENITOURINARY ASSESSMENT
WDL

## 2020-10-14 NOTE — CHART NOTE - NSCHARTNOTEFT_GEN_A_CORE
MEDICINE NP    Notified by RN patient with temperature . Seen and examined patient at bedside. Patient is alert, NAD. Denies HA, CP, SOB, cough, N/V, or abd pain.    VITAL SIGNS:  Vital Signs Last 24 Hrs  T(C): 38.3 (24 Jun 2018 21:25), Max: 38.3 (24 Jun 2018 21:25)  T(F): 101 (24 Jun 2018 21:25), Max: 101 (24 Jun 2018 21:25)  HR: 88 (24 Jun 2018 21:23) (66 - 88)  BP: 155/65 (24 Jun 2018 21:23) (102/64 - 155/65)  BP(mean): --  RR: 18 (24 Jun 2018 21:23) (18 - 18)  SpO2: 96% (24 Jun 2018 21:23) (95% - 97%)      LABORATORY:                          9.8    9.08  )-----------( 345      ( 23 Jun 2018 08:13 )             30.5       06-24    132<L>  |  93<L>  |  <4<L>  ----------------------------<  102<H>  3.7   |  28  |  0.37<L>    Ca    8.2<L>      24 Jun 2018 06:35  Phos  1.5     06-24  Mg     1.7     06-24            MICROBIOLOGY:           RADIOLOGY:          PHYSICAL EXAM:    Constitutional: AOx3. NAD.    Respiratory: clear lungs bilaterally. No wheezing, rhonchi, or crackles.    Cardiovascular: S1 S2. No murmurs.    Gastrointestinal: BS X4 active. soft. nontender.    Extremities/Vascular: +2 pulses bilaterally. No BLE edema.      ASSESSMENT/PLAN:   HPI:  78 YOF p/w chills and fever. Pt has hx of colon cancer with rectal abscess being treated outpatient with picc line and zosyn. Pt endorses mild abdominal pain lower quadrant and rectal pain. Pt denies back pain, denies cough. (08 Jun 2018 15:36)         Fever  -tylenol and cooling measures prn for pyrexia  -BC x2, UA/UC  -CXR  s/p vanco, zosyn  -F/U primary team in       Daniela Neely, [facility]  [id number] MEDICINE NP    Notified by RN patient with temperature 101 . Seen and examined patient at bedside. Patient is alert, NAD. Bulgarian speaking, confused per daughter from AM notes unable to use  phone.     VITAL SIGNS:  Vital Signs Last 24 Hrs  T(C): 38.3 (24 Jun 2018 21:25), Max: 38.3 (24 Jun 2018 21:25)  T(F): 101 (24 Jun 2018 21:25), Max: 101 (24 Jun 2018 21:25)  HR: 88 (24 Jun 2018 21:23) (66 - 88)  BP: 155/65 (24 Jun 2018 21:23) (102/64 - 155/65)  RR: 18 (24 Jun 2018 21:23) (18 - 18)  SpO2: 96% (24 Jun 2018 21:23) (95% - 97%)    LABORATORY:                          9.8    9.08  )-----------( 345      ( 23 Jun 2018 08:13 )             30.5       06-24    132<L>  |  93<L>  |  <4<L>  ----------------------------<  102<H>  3.7   |  28  |  0.37<L>    Ca    8.2<L>      24 Jun 2018 06:35  Phos  1.5     06-24  Mg     1.7     06-24    MICROBIOLOGY:   Culture - Blood (06.08.18 @ 20:16)    Specimen Source: .Blood Blood-Peripheral    Culture Results: No growth at 5 days.  Culture - Blood (06.08.18 @ 16:27)    Specimen Source: .Blood Blood    Culture Results: No growth at 5 days.    RADIOLOGY:  6/8 CT chest w/ IV CT: IMPRESSION: Known rectal neoplasm. Slight interval improvement in fluid   collections to the left of the rectum. Stable spiculated nodule in the right upper lobe, which is suspicious for   neoplasm. Surrounding groundglass opacities may be infectious.    PHYSICAL EXAM:  Constitutional: alert, NAD. confused per daughter from AM notes  Respiratory: mild rhonchi bilaterally  Cardiovascular: S1 S2. No murmurs.  Gastrointestinal: BS X4 active. soft. nontender.  Extremities/Vascular: +2 pulses bilaterally. No BLE edema.    ASSESSMENT/PLAN:   78 YOF p/w chills and fever. Pt has hx of colon cancer with rectal abscess being treated outpatient with picc line and zosyn. S/P POD4 colostomy creation and antibiotic treatment for pelvic abscess. Now p/w fever. Hemodynamically stable.      Fever  -Tylenol 650mg PO x1 and cooling measures prn for pyrexia  -BC x2, UA   -CXR  -s/p vanco, zosyn during hospital course  -F/U primary team in [facility]  [id number] no chest pain, no cough, and no shortness of breath.

## 2020-11-30 NOTE — DISCHARGE NOTE ADULT - PHYSICIAN SECTION COMPLETE
Arrived to the [facility]. 1 unit platelets completed. Patient tolerated well. Any issues or concerns during appointment: none. Discharged ambulatory.
Yes

## 2021-01-06 NOTE — CONSULT NOTE ADULT - PROBLEM SELECTOR RECOMMENDATION 5
CC: F/U COVID    Saw/spoke to patient. No fevers, no chills. No new complaints.    Allergies  penicillin (Rash)    ANTIMICROBIALS:  Off    PE:    Vital Signs Last 24 Hrs  T(C): 36.4 (06 Jan 2021 11:34), Max: 36.6 (05 Jan 2021 20:56)  T(F): 97.6 (06 Jan 2021 11:34), Max: 97.8 (05 Jan 2021 20:56)  HR: 71 (06 Jan 2021 11:34) (60 - 76)  BP: 105/67 (06 Jan 2021 11:34) (105/67 - 117/77)  RR: 20 (06 Jan 2021 09:44) (18 - 20)  SpO2: 92% (06 Jan 2021 11:34) (92% - 97%)    Gen: AOx3, NAD, non-toxic  CV: S1+S2 normal, nontachycardic  Resp: Clear bilat, no resp distress, no crackles/wheezes, NC 3L  Abd: Soft, nontender, +BS  Ext: No LE edema, no wounds    LABS:                        12.7   12.34 )-----------( 368      ( 06 Jan 2021 06:52 )             38.6     01-06    138  |  101  |  20  ----------------------------<  179<H>  4.3   |  24  |  0.61    Ca    9.3      06 Jan 2021 06:52    TPro  6.6  /  Alb  3.2<L>  /  TBili  0.4  /  DBili  x   /  AST  46<H>  /  ALT  45  /  AlkPhos  57  01-05    MICROBIOLOGY:    12/30 COVID +    RADIOLOGY:    1/5 XR:      FINDINGS:    The heart is normal in size. Improving increased markings in the bilateral mid to lower lungs.. The apices and hemidiaphragms are unremarkable. Visualized osseous structures are within normal limits.    IMPRESSION:    Improving increased markings in the bilateral mid to lower lungs CC: F/U for COVID    Saw/spoke to patient. No fevers, no chills. No new complaints. Unchanged.    Allergies  penicillin (Rash)    ANTIMICROBIALS:  Off    PE:    Vital Signs Last 24 Hrs  T(C): 36.4 (04 Jan 2021 11:58), Max: 36.7 (04 Jan 2021 05:00)  T(F): 97.5 (04 Jan 2021 11:58), Max: 98.1 (04 Jan 2021 05:00)  HR: 71 (04 Jan 2021 11:58) (59 - 77)  BP: 101/63 (04 Jan 2021 11:58) (101/63 - 156/94)  RR: 18 (04 Jan 2021 11:58) (18 - 20)  SpO2: 93% (04 Jan 2021 11:58) (90% - 98%)    Gen: AOx3, NAD, non-toxic  CV: S1+S2 normal, nontachycardic  Resp: Clear bilat, no resp distress, no crackles/wheezes, NC 3L  Abd: Soft, nontender, +BS  Ext: No LE edema, no wounds    LABS:                        12.0   5.97  )-----------( 266      ( 04 Jan 2021 07:13 )             37.2     01-04    139  |  103  |  14  ----------------------------<  142<H>  4.0   |  25  |  0.61    Ca    8.6      04 Jan 2021 07:16    TPro  x   /  Alb  x   /  TBili  x   /  DBili  <0.1  /  AST  x   /  ALT  x   /  AlkPhos  x   01-04    MICROBIOLOGY:    COVID+    RADIOLOGY:    12/30 XR:    Impression:    Poor inspiratory effort. The heart is normal in size. Ill-defined opacities are seen in both lower lobe and pneumonia cannot be ruled out entirely. No pleural effusion. No pneumothorax. No acute bony pathology could be identified.   [facility] - [facility] [place] - PROGRESS NOTE    CHIEF COMPLAINT: COVID-19 related pneumonia; hypoxemia; asthma exacerbation    INTERVAL HISTORY: able to walk to the bathroom wearing oxygen; feeling better - stronger; some old blood in sputum; occasional cough without chest congestion or wheeze; itchy eyes better with artificial tears; pruritis improves with benadryl; good appetite; mild headache; no fevers, chills or sweats; no chest pain/pressure or palpitations      REVIEW OF SYSTEMS:  Constitutional: As per interval history  HEENT: Within normal limits  CV: As per interval history  Resp: As per interval history  GI: Within normal limits   : Within normal limits  Musculoskeletal: Within normal limits  Skin: Within normal limits  Neurological: Within normal limits  Psychiatric: Within normal limits  Endocrine: Within normal limits  Hematologic/Lymphatic: Within normal limits  Allergic/Immunologic: Within normal limits    MEDICATIONS:     Pulmonary "  benzonatate 200 milliGRAM(s) Oral three times a day  budesonide 160 MICROgram(s)/formoterol 4.5 MICROgram(s) Inhaler 2 Puff(s) Inhalation two times a day  guaifenesin/dextromethorphan  Syrup 10 milliLiter(s) Oral four times a day    Anti-microbials:    Cardiovascular:    Other:  artificial  tears Solution 1 Drop(s) Both EYES four times a day  dexAMETHasone  Injectable 6 milliGRAM(s) IV Push daily  enoxaparin Injectable 40 milliGRAM(s) SubCutaneous every 12 hours  influenza   Vaccine 0.5 milliLiter(s) IntraMuscular once  pantoprazole    Tablet 40 milliGRAM(s) Oral before breakfast    MEDICATIONS  (PRN):  acetaminophen   Tablet .. 650 milliGRAM(s) Oral every 6 hours PRN Temp greater or equal to 38.5C (101.3F), Mild Pain (1 - 3)  diphenhydrAMINE 25 milliGRAM(s) Oral every 6 hours PRN Rash and/or Itching        OBJECTIVE:    I&O's Detail    05 Jan 2021 07:01  -  06 Jan 2021 07:00  --------------------------------------------------------  IN:    Oral Fluid: 1630 mL  Total IN: 1630 mL    OUT:  Total OUT: 0 mL    Total NET: 1630 mL      06 Jan 2021 07:01  -  06 Jan 2021 13:42  --------------------------------------------------------  IN:    Oral Fluid: 240 mL  Total IN: 240 mL    OUT:  Total OUT: 0 mL    Total NET: 240 mL    PHYSICAL EXAM:       ICU Vital Signs Last 24 Hrs  T(C): 36.4 (06 Jan 2021 11:34), Max: 36.6 (05 Jan 2021 20:56)  T(F): 97.6 (06 Jan 2021 11:34), Max: 97.8 (05 Jan 2021 20:56)  HR: 71 (06 Jan 2021 11:34) (60 - 76)  BP: 105/67 (06 Jan 2021 11:34) (105/67 - 117/77)  BP(mean): --  ABP: --  ABP(mean): --  RR: 20 (06 Jan 2021 09:44) (18 - 20)  SpO2: 92% (06 Jan 2021 11:34) (92% - 97%) on 2lpm nasal canula     General: Awake. Alert. Cooperative. No distress. Appears stated age. Obese. 	  HEENT: Atraumatic. Normocephalic. Anicteric. Normal oral mucosa. PERRL. EOMI.  Neck: Supple. Trachea midline. Thyroid without enlargement/tenderness/nodules. No carotid bruit. No JVD.	  Cardiovascular: Regular rate and rhythm. S1 S2 normal. No murmurs, rubs or gallops.  Respiratory: Respirations unlabored. Clear to auscultation and percussion bilaterally. No curvature.  Abdomen: Soft. Non-tender. Non-distended. No organomegaly. No masses. Normal bowel sounds.  Extremities: Warm to touch. No clubbing or cyanosis. No pedal edema.  Pulses: 2+ peripheral pulses all extremities.	  Skin: Normal skin color. No rashes or lesions. No ecchymoses. No cyanosis. Warm to touch.  Lymph Nodes: Cervical, supraclavicular and axillary nodes normal  Neurological: Motor and sensory examination equal and normal. A and O x 3  Psychiatry: Appropriate mood and affect.    LABS:                          12.7   12.34 )-----------( 368      ( 06 Jan 2021 06:52 )             38.6     CBC    WBC  12.34 <==, 7.12 <==, 5.97 <==, 4.44 <==, 2.39 <==    Hemoglobin  12.7 <<==, 12.2 <<==, 12.0 <<==, 12.4 <<==, 13.5 <<==    Hematocrit  38.6 <==, 37.6 <==, 37.2 <==, 37.0 <==, 40.9 <==    Platelets  368 <==, 297 <==, 266 <==, 227 <==, 165 <==      138  |  101  |  20  ----------------------------<  179<H>    01-06  4.3   |  24  |  0.61      LYTES    sodium  138 <==, 137 <==, 139 <==, 141 <==, 137 <==    potassium   4.3 <==, 4.4 <==, 4.0 <==, 3.8 <==, 3.9 <==    chloride  101 <==, 102 <==, 103 <==, 105 <==, 104 <==    carbon dioxide  24 <==, 26 <==, 25 <==, 25 <==, 21 <==    =============================================================================================  RENAL FUNCTION:    Creatinine:   0.61  <<==, 0.64  <<==, 0.61  <<==, 0.62  <<==, 0.66  <<==, 0.74  <<==, 0.79  <<==    BUN:   20 <==, 18 <==, 14 <==, 13 <==, 18 <==    ============================================================================================    calcium   9.3 <==, 9.0 <==, 8.6 <==, 8.7 <==, 9.1 <==    ============================================================================================  LFTs    AST:   46 <== , 42 <== , 43 <== , 41 <== , 38 <==     ALT:  45  <== , 38  <== , 30  <== , 22  <== , 21  <==     AP:  57  <=, 56  <=, 57  <=, 58  <=, 55  <=    Bili:  0.4  <=, 0.3  <=, 0.4  <=, 0.4  <=, 0.3  <=    D-Dimer Assay, Quantitative: <150 ng/mL DDU (01.02.21 @ 07:30)   D-Dimer Assay, Quantitative: 165 ng/mL DDU (12.30.20 @ 12:10)     C-Reactive Protein, Serum: 0.86 mg/dL (01.02.21 @ 07:30)   C-Reactive Protein, Serum: 3.59 mg/dL (12.30.20 @ 12:10)     Ferritin, Serum: 1268 ng/mL (01.02.21 @ 10:37)   Ferritin, Serum: 598 ng/mL (12.30.20 @ 17:35)       MICROBIOLOGY:     COVID-19 PCR . (12.30.20 @ 12:10)   COVID-19 PCR: Detected: You can help in the fight against COVID-19. [employer] may contact   you to see if you are interested in voluntarily participating in one of   our clinical trials.   This test has been validated by [employer] to be accurate;   though it has not been FDA cleared/approved by the usual pathway.   As with all laboratory tests, results should be correlated with clinical   findings.   https://www.fda.gov/media/571283/download   https://www.fda.gov/media/661705/download     RADIOLOGY:  [x] Chest radiographs reviewed and interpreted by me    < from: Xray Chest 1 View- PORTABLE-Urgent (Xray Chest 1 View- PORTABLE-Urgent .) (01.05.21 @ 13:50) >    EXAM:  XR CHEST PORTABLE URGENT 1V                          PROCEDURE DATE:  01/05/2021      INTERPRETATION:  HISTORY: Follow-up hypoxemia    TECHNIQUE: Single frontal view of the chest with comparison to 12/30/2020    FINDINGS:    The heart is normal in size. Improving increased markings in the bilateral mid to lower lungs.. The apices and hemidiaphragms are unremarkable. Visualized osseous structures are within normal limits.      IMPRESSION:    Improving increased markings in the bilateral mid to lower lungs    LUIS FOSTER M.D., ATTENDING RADIOLOGIST  This document has been electronically signed. Jan 5 2021  3:31PM    < end of copied text >  ---------------------------------------------------------------------------------------------------------------    < from: Xray Chest 1 View- PORTABLE-Urgent (12.30.20 @ 12:17) >    EXAM:  XR CHEST PORTABLE URGENT 1V                          PROCEDURE DATE:  12/30/2020      INTERPRETATION:  A single chest x-ray was obtained on December 30, 2020.    Indication: Cough. Shortness of breath. Fever.    Impression:    Poor inspiratory effort. The heart is normal in size. Ill-defined opacities are seen in both lower lobe and pneumonia cannot be ruled out entirely. No pleural effusion. No pneumothorax. No acute bony pathology could be identified.    RAYA SONI MD; Attending Radiologist  This document has been electronically signed. Dec 30 2020  1:28PM    < end of copied text >  ---------------------------------------------------------------------------------------------------------------           Patient is a 60y old  Female who presents with a chief complaint of     SUBJECTIVE / OVERNIGHT EVENTS: c/o sob but improving  Review of Systems  chest pain no  palpitations no  sob yes  nausea no  headache no    MEDICATIONS  (STANDING):  benzonatate 200 milliGRAM(s) Oral three times a day  budesonide 160 MICROgram(s)/formoterol 4.5 MICROgram(s) Inhaler 2 Puff(s) Inhalation two times a day  dexAMETHasone  Injectable 6 milliGRAM(s) IV Push daily  enoxaparin Injectable 40 milliGRAM(s) SubCutaneous every 12 hours  guaifenesin/dextromethorphan  Syrup 10 milliLiter(s) Oral four times a day  hydrocodone/homatropine Syrup 5 milliLiter(s) Oral <User Schedule>  influenza   Vaccine 0.5 milliLiter(s) IntraMuscular once  pantoprazole    Tablet 40 milliGRAM(s) Oral before breakfast  remdesivir  IVPB 100 milliGRAM(s) IV Intermittent every 24 hours  remdesivir  IVPB   IV Intermittent     MEDICATIONS  (PRN):  acetaminophen   Tablet .. 650 milliGRAM(s) Oral every 6 hours PRN Temp greater or equal to 38.5C (101.3F), Mild Pain (1 - 3)      Vital Signs Last 24 Hrs  T(C): 36.6 (31 Dec 2020 17:38), Max: 36.6 (30 Dec 2020 21:20)  T(F): 97.9 (31 Dec 2020 17:38), Max: 97.9 (30 Dec 2020 21:20)  HR: 62 (31 Dec 2020 17:38) (57 - 74)  BP: 112/66 (31 Dec 2020 17:38) (104/71 - 115/79)  BP(mean): --  RR: 20 (31 Dec 2020 17:38) (20 - 22)  SpO2: 94% (31 Dec 2020 17:38) (93% - 95%)    PHYSICAL EXAM:  GENERAL: NAD   HEAD:  Atraumatic, Normocephalic  EYES: EOMI, PERRLA, conjunctiva and sclera clear  NECK: Supple, No JVD  CHEST/LUNG: Clear to auscultation bilaterally; No wheeze  HEART: Regular rate and rhythm; No murmurs, rubs, or gallops  ABDOMEN: Soft, Nontender, Nondistended; Bowel sounds present   EXTREMITIES:  2+ Peripheral Pulses, No clubbing, cyanosis, or edema  PSYCH: AAOx3  NEUROLOGY: non-focal  SKIN: No rashes or lesions    LABS:                        13.5   2.39  )-----------( 165      ( 31 Dec 2020 06:40 )             40.9     12-31    137  |  104  |  18  ----------------------------<  166<H>  3.9   |  21<L>  |  0.79    Ca    9.1      31 Dec 2020 06:37    TPro  8.2  /  Alb  3.9  /  TBili  0.6  /  DBili  x   /  AST  64<H>  /  ALT  27  /  AlkPhos  64  12-30                RADIOLOGY & ADDITIONAL TESTS:    Imaging Personally Reviewed:    Consultant(s) Notes Reviewed:      Care Discussed with Consultants/Other Providers:   Patient is a 60y old  Female who presents with a chief complaint of     SUBJECTIVE / OVERNIGHT EVENTS: feels better  Review of Systems  chest pain no  palpitations no  sob improving   nausea no  headache no    MEDICATIONS  (STANDING):  benzonatate 200 milliGRAM(s) Oral three times a day  budesonide 160 MICROgram(s)/formoterol 4.5 MICROgram(s) Inhaler 2 Puff(s) Inhalation two times a day  dexAMETHasone  Injectable 6 milliGRAM(s) IV Push daily  enoxaparin Injectable 40 milliGRAM(s) SubCutaneous every 12 hours  guaifenesin/dextromethorphan  Syrup 10 milliLiter(s) Oral four times a day  influenza   Vaccine 0.5 milliLiter(s) IntraMuscular once  pantoprazole    Tablet 40 milliGRAM(s) Oral before breakfast  remdesivir  IVPB 100 milliGRAM(s) IV Intermittent every 24 hours  remdesivir  IVPB   IV Intermittent     MEDICATIONS  (PRN):  acetaminophen   Tablet .. 650 milliGRAM(s) Oral every 6 hours PRN Temp greater or equal to 38.5C (101.3F), Mild Pain (1 - 3)      Vital Signs Last 24 Hrs  T(C): 36.4 (02 Jan 2021 12:42), Max: 36.7 (01 Jan 2021 22:04)  T(F): 97.6 (02 Jan 2021 12:42), Max: 98.1 (01 Jan 2021 22:04)  HR: 63 (02 Jan 2021 12:42) (63 - 66)  BP: 109/72 (02 Jan 2021 12:42) (109/72 - 119/71)  BP(mean): --  RR: 20 (02 Jan 2021 12:42) (19 - 20)  SpO2: 93% (02 Jan 2021 12:42) (91% - 93%)    PHYSICAL EXAM:  GENERAL: NAD, well-developed  HEAD:  Atraumatic, Normocephalic  EYES: EOMI, PERRLA, conjunctiva and sclera clear  NECK: Supple, No JVD  CHEST/LUNG: Clear to auscultation bilaterally; No wheeze  HEART: Regular rate and rhythm; No murmurs, rubs, or gallops  ABDOMEN: Soft, Nontender, Nondistended; Bowel sounds present  EXTREMITIES:  2+ Peripheral Pulses, No clubbing, cyanosis, or edema  PSYCH: AAOx3  NEUROLOGY: non-focal  SKIN: No rashes or lesions    LABS:    01-02    x   |  x   |  x   ----------------------------<  x   x    |  x   |  0.66      TPro  6.5  /  Alb  3.1<L>  /  TBili  0.4  /  DBili  <0.1  /  AST  41<H>  /  ALT  22  /  AlkPhos  58  01-02                RADIOLOGY & ADDITIONAL TESTS:    Imaging Personally Reviewed:    Consultant(s) Notes Reviewed:      Care Discussed with Consultants/Other Providers:   Patient is a 60y old  Female who presents with a chief complaint of COVID, cough, headache, nausea, and body aches x 3-4 days (05 Jan 2021 14:53)      SUBJECTIVE / OVERNIGHT EVENTS: Comfortable without new complaints. + cough with blood tinged sputum.  Review of Systems  chest pain no  palpitations no  sob no  nausea no  headache no    MEDICATIONS  (STANDING):  artificial  tears Solution 1 Drop(s) Both EYES four times a day  benzonatate 200 milliGRAM(s) Oral three times a day  budesonide 160 MICROgram(s)/formoterol 4.5 MICROgram(s) Inhaler 2 Puff(s) Inhalation two times a day  dexAMETHasone  Injectable 6 milliGRAM(s) IV Push daily  enoxaparin Injectable 40 milliGRAM(s) SubCutaneous every 12 hours  guaifenesin/dextromethorphan  Syrup 10 milliLiter(s) Oral four times a day  influenza   Vaccine 0.5 milliLiter(s) IntraMuscular once  pantoprazole    Tablet 40 milliGRAM(s) Oral before breakfast    MEDICATIONS  (PRN):  acetaminophen   Tablet .. 650 milliGRAM(s) Oral every 6 hours PRN Temp greater or equal to 38.5C (101.3F), Mild Pain (1 - 3)  diphenhydrAMINE 25 milliGRAM(s) Oral every 6 hours PRN Rash and/or Itching      Vital Signs Last 24 Hrs  T(C): 36.6 (05 Jan 2021 12:26), Max: 36.6 (04 Jan 2021 22:44)  T(F): 97.9 (05 Jan 2021 12:26), Max: 97.9 (05 Jan 2021 12:26)  HR: 71 (05 Jan 2021 12:26) (63 - 72)  BP: 104/65 (05 Jan 2021 12:26) (104/65 - 118/76)  BP(mean): --  RR: 18 (05 Jan 2021 12:26) (18 - 18)  SpO2: 92% (05 Jan 2021 12:26) (90% - 94%)    PHYSICAL EXAM:  GENERAL: NAD   HEAD:  Atraumatic, Normocephalic  EYES: EOMI, PERRLA, conjunctiva and sclera clear  NECK: Supple, No JVD  CHEST/LUNG: Clear to auscultation bilaterally; No wheeze  HEART: Regular rate and rhythm; No murmurs, rubs, or gallops  ABDOMEN: Soft, Nontender, Nondistended; Bowel sounds present  EXTREMITIES:  2+ Peripheral Pulses, No clubbing, cyanosis, or edema  PSYCH: Anxious  NEUROLOGY: non-focal  SKIN: No rashes or lesions    LABS:                        12.2   7.12  )-----------( 297      ( 05 Jan 2021 07:09 )             37.6     01-05    137  |  102  |  18  ----------------------------<  169<H>  4.4   |  26  |  0.64    Ca    9.0      05 Jan 2021 07:06    TPro  6.6  /  Alb  3.2<L>  /  TBili  0.4  /  DBili  x   /  AST  46<H>  /  ALT  45  /  AlkPhos  57  01-05                RADIOLOGY & ADDITIONAL TESTS:    Imaging Personally Reviewed:    Consultant(s) Notes Reviewed:      Care Discussed with Consultants/Other Providers:   [facility] - [facility] - PROGRESS NOTE    CHIEF COMPLAINT: COVID-19 related pneumonia; hypoxemia; asthma exacerbation    INTERVAL HISTORY: feeling better; no shortness of breath or hypoxemia on a nasal canula - oxygen desaturation -> 86% on room air at rest; some old blood in sputum; occasional cough without chest congestion or wheeze; itchy eyes better with artificial tears; pruritis improves with benadryl; good appetite; mild headache; no fevers, chills or sweats; no chest pain/pressure or palpitations    REVIEW OF SYSTEMS:  Constitutional: As per interval history  HEENT: Within normal limits  CV: As per interval history  Resp: As per interval history  GI: Within normal limits   : Within normal limits  Musculoskeletal: Within normal limits  Skin: Within normal limits  Neurological: Within normal limits  Psychiatric: Within normal limits  Endocrine: Within normal limits  Hematologic/Lymphatic: Within normal limits  Allergic/Immunologic: Within normal limits    MEDICATIONS:     Pulmonary "  benzonatate 200 milliGRAM(s) Oral three times a day  budesonide 160 MICROgram(s)/formoterol 4.5 MICROgram(s) Inhaler 2 Puff(s) Inhalation two times a day  guaifenesin/dextromethorphan  Syrup 10 milliLiter(s) Oral four times a day    Anti-microbials:    Cardiovascular:    Other:  artificial  tears Solution 1 Drop(s) Both EYES four times a day  dexAMETHasone  Injectable 6 milliGRAM(s) IV Push daily  enoxaparin Injectable 40 milliGRAM(s) SubCutaneous every 12 hours  influenza   Vaccine 0.5 milliLiter(s) IntraMuscular once  pantoprazole    Tablet 40 milliGRAM(s) Oral before breakfast    MEDICATIONS  (PRN):  acetaminophen   Tablet .. 650 milliGRAM(s) Oral every 6 hours PRN Temp greater or equal to 38.5C (101.3F), Mild Pain (1 - 3)  diphenhydrAMINE 25 milliGRAM(s) Oral every 6 hours PRN Rash and/or Itching        OBJECTIVE:    I&O's Detail    04 Jan 2021 07:01  -  05 Jan 2021 07:00  --------------------------------------------------------  IN:    Oral Fluid: 960 mL  Total IN: 960 mL    OUT:  Total OUT: 0 mL    Total NET: 960 mL    PHYSICAL EXAM:       ICU Vital Signs Last 24 Hrs  T(C): 36.2 (05 Jan 2021 05:47), Max: 36.6 (04 Jan 2021 22:44)  T(F): 97.2 (05 Jan 2021 05:47), Max: 97.8 (04 Jan 2021 22:44)  HR: 67 (05 Jan 2021 05:47) (63 - 72)  BP: 106/68 (05 Jan 2021 05:47) (101/63 - 118/76)  BP(mean): --  ABP: --  ABP(mean): --  RR: 18 (05 Jan 2021 05:47) (18 - 20)  SpO2: 90% (05 Jan 2021 05:47) (86% -> room air at rest - 94% 2lpm nasal canula at rest)     General: Awake. Alert. Cooperative. No distress. Appears stated age. Obese. 	  HEENT: Atraumatic. Normocephalic. Anicteric. Normal oral mucosa. PERRL. EOMI.  Neck: Supple. Trachea midline. Thyroid without enlargement/tenderness/nodules. No carotid bruit. No JVD.	  Cardiovascular: Regular rate and rhythm. S1 S2 normal. No murmurs, rubs or gallops.  Respiratory: Respirations unlabored. Clear to auscultation and percussion bilaterally. No curvature.  Abdomen: Soft. Non-tender. Non-distended. No organomegaly. No masses. Normal bowel sounds.  Extremities: Warm to touch. No clubbing or cyanosis. No pedal edema.  Pulses: 2+ peripheral pulses all extremities.	  Skin: Normal skin color. No rashes or lesions. No ecchymoses. No cyanosis. Warm to touch.  Lymph Nodes: Cervical, supraclavicular and axillary nodes normal  Neurological: Motor and sensory examination equal and normal. A and O x 3  Psychiatry: Appropriate mood and affect.    LABS:                          12.2   7.12  )-----------( 297      ( 05 Jan 2021 07:09 )             37.6     CBC    WBC  7.12 <==, 5.97 <==, 4.44 <==, 2.39 <==, 3.01 <==    Hemoglobin  12.2 <<==, 12.0 <<==, 12.4 <<==, 13.5 <<==, 14.2 <<==    Hematocrit  37.6 <==, 37.2 <==, 37.0 <==, 40.9 <==, 42.3 <==    Platelets  297 <==, 266 <==, 227 <==, 165 <==, 185 <==      137  |  102  |  18  ----------------------------<  169<H>    01-05  4.4   |  26  |  0.64      LYTES    sodium  137 <==, 139 <==, 141 <==, 137 <==, 136 <==    potassium   4.4 <==, 4.0 <==, 3.8 <==, 3.9 <==, 4.5 <==    chloride  102 <==, 103 <==, 105 <==, 104 <==, 101 <==    carbon dioxide  26 <==, 25 <==, 25 <==, 21 <==, 20 <==    =============================================================================================  RENAL FUNCTION:    Creatinine:   0.64  <<==, 0.61  <<==, 0.62  <<==, 0.66  <<==, 0.74  <<==, 0.79  <<==, 1.09  <<==    BUN:   18 <==, 14 <==, 13 <==, 18 <==, 19 <==    ============================================================================================    calcium   9.0 <==, 8.6 <==, 8.7 <==, 9.1 <==, 8.9 <==  ============================================================================================  LFTs    AST:   46 <== , 42 <== , 43 <== , 41 <== , 38 <== , 64 <==     ALT:  45  <== , 38  <== , 30  <== , 22  <== , 21  <== , 27  <==     AP:  57  <=, 56  <=, 57  <=, 58  <=, 55  <=, 64  <=    Bili:  0.4  <=, 0.3  <=, 0.4  <=, 0.4  <=, 0.3  <=, 0.6  <=    D-Dimer Assay, Quantitative: <150 ng/mL DDU (01.02.21 @ 07:30)   D-Dimer Assay, Quantitative: 165 ng/mL DDU (12.30.20 @ 12:10)     C-Reactive Protein, Serum: 0.86 mg/dL (01.02.21 @ 07:30)   C-Reactive Protein, Serum: 3.59 mg/dL (12.30.20 @ 12:10)     Ferritin, Serum: 1268 ng/mL (01.02.21 @ 10:37)   Ferritin, Serum: 598 ng/mL (12.30.20 @ 17:35)     COVID-19 PCR . (12.30.20 @ 12:10)   COVID-19 PCR: Detected: You can help in the fight against COVID-19. [employer] may contact   you to see if you are interested in voluntarily participating in one of   our clinical trials.   This test has been validated by [employer] to be accurate;   though it has not been FDA cleared/approved by the usual pathway.   As with all laboratory tests, results should be correlated with clinical   findings.   https://www.fda.gov/media/225758/download   https://www.fda.gov/media/903261/download     RADIOLOGY:  [x] Chest radiographs reviewed and interpreted by me    < from: Xray Chest 1 View- PORTABLE-Urgent (12.30.20 @ 12:17) >    EXAM:  XR CHEST PORTABLE URGENT 1V                          PROCEDURE DATE:  12/30/2020      INTERPRETATION:  A single chest x-ray was obtained on December 30, 2020.    Indication: Cough. Shortness of breath. Fever.    Impression:    Poor inspiratory effort. The heart is normal in size. Ill-defined opacities are seen in both lower lobe and pneumonia cannot be ruled out entirely. No pleural effusion. No pneumothorax. No acute bony pathology could be identified.    RAYA SONI MD; Attending Radiologist  This document has been electronically signed. Dec 30 2020  1:28PM    < end of copied text >  ---------------------------------------------------------------------------------------------------------------       Patient is a 60y old  Female who presents with a chief complaint of COVID, cough, headache, nausea, and body aches x 3-4 days (05 Jan 2021 14:53)      SUBJECTIVE / OVERNIGHT EVENTS: Comfortable without new complaints.  Review of Systems  chest pain no  palpitations no  sob no  nausea no  headache no    MEDICATIONS  (STANDING):  artificial  tears Solution 1 Drop(s) Both EYES four times a day  benzonatate 200 milliGRAM(s) Oral three times a day  budesonide 160 MICROgram(s)/formoterol 4.5 MICROgram(s) Inhaler 2 Puff(s) Inhalation two times a day  dexAMETHasone  Injectable 6 milliGRAM(s) IV Push daily  enoxaparin Injectable 40 milliGRAM(s) SubCutaneous every 12 hours  guaifenesin/dextromethorphan  Syrup 10 milliLiter(s) Oral four times a day  influenza   Vaccine 0.5 milliLiter(s) IntraMuscular once  pantoprazole    Tablet 40 milliGRAM(s) Oral before breakfast    MEDICATIONS  (PRN):  acetaminophen   Tablet .. 650 milliGRAM(s) Oral every 6 hours PRN Temp greater or equal to 38.5C (101.3F), Mild Pain (1 - 3)  diphenhydrAMINE 25 milliGRAM(s) Oral every 6 hours PRN Rash and/or Itching      Vital Signs Last 24 Hrs  T(C): 36.7 (06 Jan 2021 17:08), Max: 36.7 (06 Jan 2021 17:08)  T(F): 98 (06 Jan 2021 17:08), Max: 98 (06 Jan 2021 17:08)  HR: 67 (06 Jan 2021 17:08) (60 - 76)  BP: 125/76 (06 Jan 2021 17:08) (105/67 - 125/76)  BP(mean): --  RR: 18 (06 Jan 2021 17:08) (18 - 20)  SpO2: 95% (06 Jan 2021 17:08) (92% - 97%)    PHYSICAL EXAM:  GENERAL: NAD, well-developed  HEAD:  Atraumatic, Normocephalic  EYES: EOMI, PERRLA, conjunctiva and sclera clear  NECK: Supple, No JVD  CHEST/LUNG: Clear to auscultation bilaterally; No wheeze  HEART: Regular rate and rhythm; No murmurs, rubs, or gallops  ABDOMEN: Soft, Nontender, Nondistended; Bowel sounds present  EXTREMITIES:  2+ Peripheral Pulses, No clubbing, cyanosis, or edema  PSYCH: AAOx3  NEUROLOGY: non-focal  SKIN: No rashes or lesions    LABS:                        12.7   12.34 )-----------( 368      ( 06 Jan 2021 06:52 )             38.6     01-06    138  |  101  |  20  ----------------------------<  179<H>  4.3   |  24  |  0.61    Ca    9.3      06 Jan 2021 06:52    TPro  6.6  /  Alb  3.2<L>  /  TBili  0.4  /  DBili  x   /  AST  46<H>  /  ALT  45  /  AlkPhos  57  01-05                RADIOLOGY & ADDITIONAL TESTS:    Imaging Personally Reviewed:    Consultant(s) Notes Reviewed:      Care Discussed with Consultants/Other Providers:   CC: F/U COVID    Saw/spoke to patient. No fevers, no chills. No new complaints.    Allergies  penicillin (Rash)    ANTIMICROBIALS:  remdesivir  IVPB 100 every 24 hours  remdesivir  IVPB      PE:    Vital Signs Last 24 Hrs  T(C): 36.4 (31 Dec 2020 10:15), Max: 36.8 (30 Dec 2020 14:05)  T(F): 97.5 (31 Dec 2020 10:15), Max: 98.3 (30 Dec 2020 14:05)  HR: 66 (31 Dec 2020 10:15) (57 - 74)  BP: 115/79 (31 Dec 2020 10:15) (104/71 - 115/79)  RR: 20 (31 Dec 2020 10:15) (18 - 22)  SpO2: 93% (31 Dec 2020 10:15) (93% - 96%)    Gen: AOx3, NAD, non-toxic  CV: S1+S2 normal, nontachycardic  Resp: Clear bilat, no resp distress, no crackles/wheezes, NC 6  Abd: Soft, nontender, +BS  Ext: No LE edema, no wounds    LABS:                        13.5   2.39  )-----------( 165      ( 31 Dec 2020 06:40 )             40.9     12-31    137  |  104  |  18  ----------------------------<  166<H>  3.9   |  21<L>  |  0.79    Ca    9.1      31 Dec 2020 06:37    TPro  8.2  /  Alb  3.9  /  TBili  0.6  /  DBili  x   /  AST  64<H>  /  ALT  27  /  AlkPhos  64  12-30    MICROBIOLOGY:    COVID+    RADIOLOGY:    10/30 XR:    Impression:    Poor inspiratory effort. The heart is normal in size. Ill-defined opacities are seen in both lower lobe and pneumonia cannot be ruled out entirely. No pleural effusion. No pneumothorax. No acute bony pathology could be identified. CC: F/U for COVID    Saw/spoke to patient. No fevers, no chills. No new complaints.    Allergies  penicillin (Rash)    ANTIMICROBIALS:  Off    PE:    Vital Signs Last 24 Hrs  T(C): 36.6 (05 Jan 2021 12:26), Max: 36.6 (04 Jan 2021 22:44)  T(F): 97.9 (05 Jan 2021 12:26), Max: 97.9 (05 Jan 2021 12:26)  HR: 71 (05 Jan 2021 12:26) (63 - 72)  BP: 104/65 (05 Jan 2021 12:26) (104/65 - 118/76)  RR: 18 (05 Jan 2021 12:26) (18 - 20)  SpO2: 92% (05 Jan 2021 12:26) (86% - 94%)    Gen: AOx3, NAD, non-toxic  CV: S1+S2 normal, nontachycardic  Resp: Clear bilat, no resp distress, no crackles/wheezes  Abd: Soft, nontender, +BS  Ext: No LE edema, no wounds    LABS:                        12.2   7.12  )-----------( 297      ( 05 Jan 2021 07:09 )             37.6     01-05    137  |  102  |  18  ----------------------------<  169<H>  4.4   |  26  |  0.64    Ca    9.0      05 Jan 2021 07:06    TPro  6.6  /  Alb  3.2<L>  /  TBili  0.4  /  DBili  x   /  AST  46<H>  /  ALT  45  /  AlkPhos  57  01-05    MICROBIOLOGY:    12/30 COVID+    RADIOLOGY:    12/30 XR:    Impression:    Poor inspiratory effort. The heart is normal in size. Ill-defined opacities are seen in both lower lobe and pneumonia cannot be ruled out entirely. No pleural effusion. No pneumothorax. No acute bony pathology could be identified. [facility] - [facility] - PROGRESS NOTE    CHIEF COMPLAINT: COVID-19 related pneumonia; hypoxemia; asthma exacerbation    INTERVAL HISTORY: feeling somewhat better; less shortness of breath now on a nasal canula @ 4lpm; decreased cough without sputum production; improved chest congestion and wheeze; appetite somewhat improved with less abdominal discomfort and resolved diarrhea; very tired napping for most of the day; improved headache; no fevers, chills or sweats; no chest pain/pressure or palpitations    REVIEW OF SYSTEMS:  Constitutional: As per interval history  HEENT: Within normal limits  CV: As per interval history  Resp: As per interval history  GI: As per interval history  : Within normal limits  Musculoskeletal: Within normal limits  Skin: Within normal limits  Neurological: Within normal limits  Psychiatric: Within normal limits  Endocrine: Within normal limits  Hematologic/Lymphatic: Within normal limits  Allergic/Immunologic: Within normal limits    MEDICATIONS:     Pulmonary "  benzonatate 200 milliGRAM(s) Oral three times a day  budesonide 160 MICROgram(s)/formoterol 4.5 MICROgram(s) Inhaler 2 Puff(s) Inhalation two times a day  guaifenesin/dextromethorphan  Syrup 10 milliLiter(s) Oral four times a day  hydrocodone/homatropine Syrup 5 milliLiter(s) Oral <User Schedule>    Anti-microbials:  remdesivir  IVPB 100 milliGRAM(s) IV Intermittent every 24 hours  remdesivir  IVPB   IV Intermittent     Cardiovascular:    Other:  dexAMETHasone  Injectable 6 milliGRAM(s) IV Push daily  enoxaparin Injectable 40 milliGRAM(s) SubCutaneous every 12 hours  influenza   Vaccine 0.5 milliLiter(s) IntraMuscular once  pantoprazole    Tablet 40 milliGRAM(s) Oral before breakfast    MEDICATIONS  (PRN):  acetaminophen   Tablet .. 650 milliGRAM(s) Oral every 6 hours PRN Temp greater or equal to 38.5C (101.3F), Mild Pain (1 - 3)        OBJECTIVE:    I&O's Detail    31 Dec 2020 07:01  -  01 Jan 2021 07:00  --------------------------------------------------------  IN:    Oral Fluid: 940 mL  Total IN: 940 mL    OUT:    Voided (mL): 0 mL  Total OUT: 0 mL    Total NET: 940 mL      01 Jan 2021 07:01  -  01 Jan 2021 12:50  --------------------------------------------------------  IN:    Oral Fluid: 240 mL  Total IN: 240 mL    OUT:  Total OUT: 0 mL    Total NET: 240 mL    PHYSICAL EXAM:       ICU Vital Signs Last 24 Hrs  T(C): 36.7 (01 Jan 2021 12:17), Max: 36.8 (31 Dec 2020 21:42)  T(F): 98 (01 Jan 2021 12:17), Max: 98.2 (31 Dec 2020 21:42)  HR: 61 (01 Jan 2021 12:17) (60 - 69)  BP: 114/74 (01 Jan 2021 12:17) (112/66 - 122/56)  BP(mean): 6 (01 Jan 2021 04:47) (6 - 6)  ABP: --  ABP(mean): --  RR: 19 (01 Jan 2021 12:17) (19 - 20)  SpO2: 92% (01 Jan 2021 12:17) (92% - 95%) on 4lpm nasal canula     General: Awake. Alert. Cooperative. No distress. Appears stated age. Obese. 	  HEENT:   Atraumatic. Normocephalic. Anicteric. Normal oral mucosa. PERRL. EOMI.  Neck: Supple. Trachea midline. Thyroid without enlargement/tenderness/nodules. No carotid bruit. No JVD.	  Cardiovascular: Regular rate and rhythm. S1 S2 normal. No murmurs, rubs or gallops.  Respiratory: Respirations unlabored. Clear to auscultation and percussion bilaterally. No curvature.  Abdomen: Soft. Non-tender. Non-distended. No organomegaly. No masses. Normal bowel sounds.  Extremities: Warm to touch. No clubbing or cyanosis. No pedal edema.  Pulses: 2+ peripheral pulses all extremities.	  Skin: Normal skin color. No rashes or lesions. No ecchymoses. No cyanosis. Warm to touch.  Lymph Nodes: Cervical, supraclavicular and axillary nodes normal  Neurological: Motor and sensory examination equal and normal. A and O x 3  Psychiatry: Appropriate mood and affect.    LABS:                          13.5   2.39  )-----------( 165      ( 31 Dec 2020 06:40 )             40.9     CBC    WBC  2.39 <==, 3.01 <==    Hemoglobin  13.5 <<==, 14.2 <<==    Hematocrit  40.9 <==, 42.3 <==    Platelets  165 <==, 185 <==      x   |  x   |  x   ----------------------------<  x     01-01  x    |  x   |  0.74      LYTES    sodium  137 <==, 136 <==    potassium   3.9 <==, 4.5 <==    chloride  104 <==, 101 <==    carbon dioxide  21 <==, 20 <==    =============================================================================================  RENAL FUNCTION:    Creatinine:   0.74  <<==, 0.79  <<==, 1.09  <<==    BUN:   18 <==, 19 <==    ============================================================================================    calcium   9.1 <==, 8.9 <==    ============================================================================================  LFTs    AST:   38 <== , 64 <==     ALT:  21  <== , 27  <==     AP:  55  <=, 64  <=    Bili:  0.3  <=, 0.6  <=    Procalcitonin, Serum: 0.17 ng/mL (12-30 @ 12:10)    Ferritin, Serum: 598 ng/mL (12.30.20 @ 17:35)   D-Dimer Assay, Quantitative: 165 ng/mL DDU (12-30 @ 12:10)  C-Reactive Protein, Serum: 3.59 mg/dL (12.30.20 @ 12:10)     MICROBIOLOGY:     COVID-19 PCR . (12.30.20 @ 12:10)   COVID-19 PCR: Detected: You can help in the fight against COVID-19. [employer] may contact   you to see if you are interested in voluntarily participating in one of   our clinical trials.   This test has been validated by [employer] to be accurate;   though it has not been FDA cleared/approved by the usual pathway.   As with all laboratory tests, results should be correlated with clinical   findings.   https://www.fda.gov/media/253150/download   https://www.fda.gov/media/529729/download       RADIOLOGY:  [x ] Chest radiographs reviewed and interpreted by me  < from: Xray Chest 1 View- PORTABLE-Urgent (12.30.20 @ 12:17) >    EXAM:  XR CHEST PORTABLE URGENT 1V                          PROCEDURE DATE:  12/30/2020      INTERPRETATION:  A single chest x-ray was obtained on December 30, 2020.    Indication: Cough. Shortness of breath. Fever.    Impression:    Poor inspiratory effort. The heart is normal in size. Ill-defined opacities are seen in both lower lobe and pneumonia cannot be ruled out entirely. No pleural effusion. No pneumothorax. No acute bony pathology could be identified.    RAYA SONI MD; Attending Radiologist  This document has been electronically signed. Dec 30 2020  1:28PM    < end of copied text >  ---------------------------------------------------------------------------------------------------------------             [facility] - PROGRESS NOTE    CHIEF COMPLAINT: COVID-19 related pneumonia; hypoxemia; asthma exacerbation    INTERVAL HISTORY: feeling better; no shortness of breath or hypoxemia on 1.5 lpm via nasal canula; some old blood in sputum; occasional cough without chest congestion or wheeze; itchy eyes and skin; better appetite; mild headache; no fevers, chills or sweats; no chest pain/pressure or palpitations    REVIEW OF SYSTEMS:  Constitutional: As per interval history  HEENT: Within normal limits  CV: As per interval history  Resp: As per interval history  GI: Within normal limits   : Within normal limits  Musculoskeletal: Within normal limits  Skin: Within normal limits  Neurological: Within normal limits  Psychiatric: Within normal limits  Endocrine: Within normal limits  Hematologic/Lymphatic: Within normal limits  Allergic/Immunologic: Within normal limits      MEDICATIONS:     Pulmonary "  benzonatate 200 milliGRAM(s) Oral three times a day  budesonide 160 MICROgram(s)/formoterol 4.5 MICROgram(s) Inhaler 2 Puff(s) Inhalation two times a day  guaifenesin/dextromethorphan  Syrup 10 milliLiter(s) Oral four times a day    Anti-microbials:    Cardiovascular:    Other:  dexAMETHasone  Injectable 6 milliGRAM(s) IV Push daily  enoxaparin Injectable 40 milliGRAM(s) SubCutaneous every 12 hours  influenza   Vaccine 0.5 milliLiter(s) IntraMuscular once  pantoprazole    Tablet 40 milliGRAM(s) Oral before breakfast    MEDICATIONS  (PRN):  acetaminophen   Tablet .. 650 milliGRAM(s) Oral every 6 hours PRN Temp greater or equal to 38.5C (101.3F), Mild Pain (1 - 3)  diphenhydrAMINE 25 milliGRAM(s) Oral every 6 hours PRN Rash and/or Itching        OBJECTIVE:    I&O's Detail    03 Jan 2021 07:01  -  04 Jan 2021 07:00  --------------------------------------------------------  IN:    Oral Fluid: 1200 mL  Total IN: 1200 mL    OUT:    Voided (mL): 0 mL  Total OUT: 0 mL    Total NET: 1200 mL      04 Jan 2021 07:01  -  04 Jan 2021 14:09  --------------------------------------------------------  IN:    Oral Fluid: 240 mL  Total IN: 240 mL    OUT:  Total OUT: 0 mL    Total NET: 240 mL    PHYSICAL EXAM:       ICU Vital Signs Last 24 Hrs  T(C): 36.4 (04 Jan 2021 11:58), Max: 36.7 (04 Jan 2021 05:00)  T(F): 97.5 (04 Jan 2021 11:58), Max: 98.1 (04 Jan 2021 05:00)  HR: 71 (04 Jan 2021 11:58) (59 - 77)  BP: 101/63 (04 Jan 2021 11:58) (101/63 - 156/94)  BP(mean): --  ABP: --  ABP(mean): --  RR: 18 (04 Jan 2021 13:41) (18 - 20)  SpO2: 92% (04 Jan 2021 13:41) (90% - 98%) on 1.5 lpm nasal canula     General: Awake. Alert. Cooperative. No distress. Appears stated age. Obese. 	  HEENT: Atraumatic. Normocephalic. Anicteric. Normal oral mucosa. PERRL. EOMI.  Neck: Supple. Trachea midline. Thyroid without enlargement/tenderness/nodules. No carotid bruit. No JVD.	  Cardiovascular: Regular rate and rhythm. S1 S2 normal. No murmurs, rubs or gallops.  Respiratory: Respirations unlabored. Clear to auscultation and percussion bilaterally. No curvature.  Abdomen: Soft. Non-tender. Non-distended. No organomegaly. No masses. Normal bowel sounds.  Extremities: Warm to touch. No clubbing or cyanosis. No pedal edema.  Pulses: 2+ peripheral pulses all extremities.	  Skin: Normal skin color. No rashes or lesions. No ecchymoses. No cyanosis. Warm to touch.  Lymph Nodes: Cervical, supraclavicular and axillary nodes normal  Neurological: Motor and sensory examination equal and normal. A and O x 3  Psychiatry: Appropriate mood and affect.    LABS:                          12.0   5.97  )-----------( 266      ( 04 Jan 2021 07:13 )             37.2     CBC    WBC  5.97 <==, 4.44 <==, 2.39 <==, 3.01 <==    Hemoglobin  12.0 <<==, 12.4 <<==, 13.5 <<==, 14.2 <<==    Hematocrit  37.2 <==, 37.0 <==, 40.9 <==, 42.3 <==    Platelets  266 <==, 227 <==, 165 <==, 185 <==      139  |  103  |  14  ----------------------------<  142<H>    01-04  4.0   |  25  |  0.61      LYTES    sodium  139 <==, 141 <==, 137 <==, 136 <==    potassium   4.0 <==, 3.8 <==, 3.9 <==, 4.5 <==    chloride  103 <==, 105 <==, 104 <==, 101 <==    carbon dioxide  25 <==, 25 <==, 21 <==, 20 <==    =============================================================================================  RENAL FUNCTION:    Creatinine:   0.61  <<==, 0.62  <<==, 0.66  <<==, 0.74  <<==, 0.79  <<==, 1.09  <<==    BUN:   14 <==, 13 <==, 18 <==, 19 <==    ============================================================================================    calcium   8.6 <==, 8.7 <==, 9.1 <==, 8.9 <==    ===========================================================================================  LFTs    AST:   42 <== , 43 <== , 41 <== , 38 <== , 64 <==     ALT:  38  <== , 30  <== , 22  <== , 21  <== , 27  <==     AP:  56  <=, 57  <=, 58  <=, 55  <=, 64  <=    Bili:  0.3  <=, 0.4  <=, 0.4  <=, 0.3  <=, 0.6  <=    D-Dimer Assay, Quantitative: <150 ng/mL DDU (01.02.21 @ 07:30)   D-Dimer Assay, Quantitative: 165 ng/mL DDU (12.30.20 @ 12:10)     C-Reactive Protein, Serum: 0.86 mg/dL (01.02.21 @ 07:30)   C-Reactive Protein, Serum: 3.59 mg/dL (12.30.20 @ 12:10)     Ferritin, Serum: 1268 ng/mL (01.02.21 @ 10:37)   Ferritin, Serum: 598 ng/mL (12.30.20 @ 17:35)   MICROBIOLOGY:     COVID-19 PCR . (12.30.20 @ 12:10)   COVID-19 PCR: Detected: You can help in the fight against COVID-19. [employer] may contact   you to see if you are interested in voluntarily participating in one of   our clinical trials.   This test has been validated by [employer] to be accurate;   though it has not been FDA cleared/approved by the usual pathway.   As with all laboratory tests, results should be correlated with clinical   findings.   https://www.fda.gov/media/274652/download   https://www.fda.gov/media/120895/download     RADIOLOGY:  [x] Chest radiographs reviewed and interpreted by me    < from: Xray Chest 1 View- PORTABLE-Urgent (12.30.20 @ 12:17) >    EXAM:  XR CHEST PORTABLE URGENT 1V                          PROCEDURE DATE:  12/30/2020      INTERPRETATION:  A single chest x-ray was obtained on December 30, 2020.    Indication: Cough. Shortness of breath. Fever.    Impression:    Poor inspiratory effort. The heart is normal in size. Ill-defined opacities are seen in both lower lobe and pneumonia cannot be ruled out entirely. No pleural effusion. No pneumothorax. No acute bony pathology could be identified.    RAYA SONI MD; Attending Radiologist  This document has been electronically signed. Dec 30 2020  1:28PM    < end of copied text >  ---------------------------------------------------------------------------------------------------------------         Patient is a 60y old  Female who presents with a chief complaint of     SUBJECTIVE / OVERNIGHT EVENTS: No new complaints.   Review of Systems  chest pain no  palpitations no  sob improving   nausea no  headache no    MEDICATIONS  (STANDING):  benzonatate 200 milliGRAM(s) Oral three times a day  budesonide 160 MICROgram(s)/formoterol 4.5 MICROgram(s) Inhaler 2 Puff(s) Inhalation two times a day  dexAMETHasone  Injectable 6 milliGRAM(s) IV Push daily  enoxaparin Injectable 40 milliGRAM(s) SubCutaneous every 12 hours  guaifenesin/dextromethorphan  Syrup 10 milliLiter(s) Oral four times a day  influenza   Vaccine 0.5 milliLiter(s) IntraMuscular once  pantoprazole    Tablet 40 milliGRAM(s) Oral before breakfast  remdesivir  IVPB 100 milliGRAM(s) IV Intermittent every 24 hours  remdesivir  IVPB   IV Intermittent     MEDICATIONS  (PRN):  acetaminophen   Tablet .. 650 milliGRAM(s) Oral every 6 hours PRN Temp greater or equal to 38.5C (101.3F), Mild Pain (1 - 3)      Vital Signs Last 24 Hrs  T(C): 36.7 (01 Jan 2021 12:17), Max: 36.8 (31 Dec 2020 21:42)  T(F): 98 (01 Jan 2021 12:17), Max: 98.2 (31 Dec 2020 21:42)  HR: 61 (01 Jan 2021 12:17) (60 - 69)  BP: 114/74 (01 Jan 2021 12:17) (112/66 - 122/56)  BP(mean): 6 (01 Jan 2021 04:47) (6 - 6)  RR: 19 (01 Jan 2021 12:17) (19 - 20)  SpO2: 92% (01 Jan 2021 12:17) (92% - 95%)    PHYSICAL EXAM:  GENERAL: NAD, well-developed  HEAD:  Atraumatic, Normocephalic  EYES: EOMI, PERRLA, conjunctiva and sclera clear  NECK: Supple, No JVD  CHEST/LUNG: few crackles to auscultation bilaterally; No wheeze  HEART: Regular rate and rhythm; No murmurs, rubs, or gallops  ABDOMEN: Soft, Nontender, Nondistended; Bowel sounds present  EXTREMITIES:  2+ Peripheral Pulses, No clubbing, cyanosis, or edema  PSYCH: AAOx3  NEUROLOGY: non-focal  SKIN: No rashes or lesions    LABS:                        13.5   2.39  )-----------( 165      ( 31 Dec 2020 06:40 )             40.9     01-01    x   |  x   |  x   ----------------------------<  x   x    |  x   |  0.74    Ca    9.1      31 Dec 2020 06:37    TPro  6.8  /  Alb  3.3  /  TBili  0.3  /  DBili  <0.1  /  AST  38  /  ALT  21  /  AlkPhos  55  01-01                RADIOLOGY & ADDITIONAL TESTS:    Imaging Personally Reviewed:    Consultant(s) Notes Reviewed:      Care Discussed with Consultants/Other Providers:   Patient is a 60y old  Female who presents with a chief complaint of     SUBJECTIVE / OVERNIGHT EVENTS: feels better + cough  Review of Systems  chest pain no  palpitations no  sob improving   nausea no  headache no    MEDICATIONS  (STANDING):  benzonatate 200 milliGRAM(s) Oral three times a day  budesonide 160 MICROgram(s)/formoterol 4.5 MICROgram(s) Inhaler 2 Puff(s) Inhalation two times a day  dexAMETHasone  Injectable 6 milliGRAM(s) IV Push daily  enoxaparin Injectable 40 milliGRAM(s) SubCutaneous every 12 hours  guaifenesin/dextromethorphan  Syrup 10 milliLiter(s) Oral four times a day  influenza   Vaccine 0.5 milliLiter(s) IntraMuscular once  pantoprazole    Tablet 40 milliGRAM(s) Oral before breakfast    MEDICATIONS  (PRN):  acetaminophen   Tablet .. 650 milliGRAM(s) Oral every 6 hours PRN Temp greater or equal to 38.5C (101.3F), Mild Pain (1 - 3)  diphenhydrAMINE 25 milliGRAM(s) Oral every 6 hours PRN Rash and/or Itching      Vital Signs Last 24 Hrs  T(C): 36.6 (03 Jan 2021 11:20), Max: 36.7 (02 Jan 2021 22:33)  T(F): 97.8 (03 Jan 2021 11:20), Max: 98.1 (02 Jan 2021 22:33)  HR: 81 (03 Jan 2021 13:30) (62 - 81)  BP: 107/67 (03 Jan 2021 11:20) (107/67 - 121/80)  BP(mean): --  RR: 20 (03 Jan 2021 13:30) (18 - 20)  SpO2: 94% (03 Jan 2021 13:30) (90% - 94%)    PHYSICAL EXAM:  GENERAL: NAD, well-developed   HEAD:  Atraumatic, Normocephalic  EYES: EOMI, PERRLA, conjunctiva and sclera clear  NECK: Supple, No JVD  CHEST/LUNG: Clear to auscultation bilaterally; No wheeze  HEART: Regular rate and rhythm; No murmurs, rubs, or gallops  ABDOMEN: Soft, Nontender, Nondistended; Bowel sounds present  EXTREMITIES:  2+ Peripheral Pulses, No clubbing, cyanosis, or edema  PSYCH: AAOx3  NEUROLOGY: non-focal  SKIN: No rashes or lesions    LABS:                        12.4   4.44  )-----------( 227      ( 03 Jan 2021 06:36 )             37.0     01-03    141  |  105  |  13  ----------------------------<  125<H>  3.8   |  25  |  0.62    Ca    8.7      03 Jan 2021 06:32    TPro  6.8  /  Alb  3.1<L>  /  TBili  0.4  /  DBili  0.1  /  AST  43<H>  /  ALT  30  /  AlkPhos  57  01-03                RADIOLOGY & ADDITIONAL TESTS:    Imaging Personally Reviewed:    Consultant(s) Notes Reviewed:      Care Discussed with Consultants/Other Providers:   Patient is a 60y old  Female who presents with a chief complaint of     SUBJECTIVE / OVERNIGHT EVENTS: feels better. Still SOB and needs O2 supplement. + cough with blood tinged sputum.   Review of Systems  chest pain no  palpitations no  sob no  nausea no  headache no    MEDICATIONS  (STANDING):  artificial  tears Solution 1 Drop(s) Both EYES four times a day  benzonatate 200 milliGRAM(s) Oral three times a day  budesonide 160 MICROgram(s)/formoterol 4.5 MICROgram(s) Inhaler 2 Puff(s) Inhalation two times a day  dexAMETHasone  Injectable 6 milliGRAM(s) IV Push daily  enoxaparin Injectable 40 milliGRAM(s) SubCutaneous every 12 hours  guaifenesin/dextromethorphan  Syrup 10 milliLiter(s) Oral four times a day  influenza   Vaccine 0.5 milliLiter(s) IntraMuscular once  pantoprazole    Tablet 40 milliGRAM(s) Oral before breakfast    MEDICATIONS  (PRN):  acetaminophen   Tablet .. 650 milliGRAM(s) Oral every 6 hours PRN Temp greater or equal to 38.5C (101.3F), Mild Pain (1 - 3)  diphenhydrAMINE 25 milliGRAM(s) Oral every 6 hours PRN Rash and/or Itching      Vital Signs Last 24 Hrs  T(C): 36.4 (04 Jan 2021 17:23), Max: 36.7 (04 Jan 2021 05:00)  T(F): 97.5 (04 Jan 2021 17:23), Max: 98.1 (04 Jan 2021 05:00)  HR: 72 (04 Jan 2021 17:23) (59 - 72)  BP: 114/76 (04 Jan 2021 17:23) (101/63 - 156/94)  BP(mean): --  RR: 18 (04 Jan 2021 17:23) (18 - 20)  SpO2: 94% (04 Jan 2021 17:23) (86% - 98%)    PHYSICAL EXAM:  GENERAL: NAD, well-developed  HEAD:  Atraumatic, Normocephalic  EYES: EOMI, PERRLA, conjunctiva and sclera clear  NECK: Supple, No JVD  CHEST/LUNG: Clear to auscultation bilaterally; No wheeze  HEART: Regular rate and rhythm; No murmurs, rubs, or gallops  ABDOMEN: Soft, Nontender, Nondistended; Bowel sounds present  EXTREMITIES:  2+ Peripheral Pulses, No clubbing, cyanosis, or edema  PSYCH: AAOx3  NEUROLOGY: non-focal  SKIN: No rashes or lesions    LABS:                        12.0   5.97  )-----------( 266      ( 04 Jan 2021 07:13 )             37.2     01-04    139  |  103  |  14  ----------------------------<  142<H>  4.0   |  25  |  0.61    Ca    8.6      04 Jan 2021 07:16    TPro  x   /  Alb  x   /  TBili  x   /  DBili  <0.1  /  AST  x   /  ALT  x   /  AlkPhos  x   01-04                RADIOLOGY & ADDITIONAL TESTS:    Imaging Personally Reviewed:    Consultant(s) Notes Reviewed:      Care Discussed with Consultants/Other Providers:   overall prognosis is poor  spoke with both daughter and son   agree to hospice at [facility]  discussed with cm and primary team  will follow prn

## 2021-09-24 NOTE — BEHAVIORAL HEALTH ASSESSMENT NOTE - RISK ASSESSMENT
No
Risk factors include acute and chronic medical issues.  Protective factors include stable domicile, social support, no past psych hosp or SA, no substance use.    Pt does not present an acute risk of harm to self or others.  Pt does not require inpatient psychiatric hospitalization.

## 2021-11-02 NOTE — H&P ADULT - PSH
Status post above knee amputation of right lower extremity Solaraze Pregnancy And Lactation Text: This medication is Pregnancy Category B and is considered safe. There is some data to suggest avoiding during the third trimester. It is unknown if this medication is excreted in breast milk.

## 2022-07-26 NOTE — PATIENT PROFILE ADULT. - FUNCTIONAL SCREEN CURRENT LEVEL: BATHING, MLM
[FreeTextEntry1] : Pt presenting today for a f.u visit for fibromyalgia. No acute complaints today. \par Off Lyrica- reports getting very drowsy. \par On gabapentin 300mg in AM, 600 mg in PM. Tolerating medication well. No side effects. \par Also taking nortriptyline 100 mg daily, tizanidine prn per neurology. Feels like tizanidine no longer helping. \par Takes NSAIDS, Tylenol prn for pain\par denies fever, chills, denies chest pain, chest palpitations, denies sob, denies nausea, vomiting, abdominal pain, denies rashes.  (4) completely dependent

## 2022-08-23 NOTE — PROVIDER CONTACT NOTE (MEDICATION) - SITUATION
Psychiatric Medication Management - Behavioral Health   Valencia Real 23 y o  female MRN: [id number]    Reason for Visit:   Chief Complaint   Patient presents with    Depression    Anxiety       Subjective:    [age] y/o female, domiciled with boyfriend in home in [place], completed an associate's degree at [employer]- currently enrolled full-time at [employer] (studying English), does some part-time jobs, [address] significant for h/o depression and anxiety, previously in outpatient therapy for a few months, no past psychiatric hospitalizations, 1 self-aborted past suicide attempt (plan to hang self about 6 months ago), no h/o self-injurious behaviors, no h/o physical aggression, no significant PMH, no active substance abuse, presents to [facility] outpatient [facility] on referral from PCP for concerns about depression and anxiety, with patient reporting "I think I need help with my self-esteem" and father reporting "I'd like her to feel better about herself, understand that it is okay to feel bad or sad "     On problem-focused interview:  1   MDD-  Pateint reports that she has been doing well, reports she is feeling "good "  She reports her sleep has been alright, some trouble falling asleep at times  She reports that she is able to stay asleep okay  Patient reports that she is 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 [place], reports that she is working per [employer] at [employer]  She reports that she took a job as a       2  EMILY- She reports that she has some stress due to multiple jobs she is doing as well as being a full-time student  She reports some increased stress recently  She reports trying to be a support for a friend that is in a bad family situation  Patient reports that she is trying to help her friend but that has also caused some stress in her house    Patient reports that she tries to talk to others when stressed, started seeing a therapist   Patient rates her anxiety about 5/10 intensity  Patient reports that she had one panic attack within the past week  Review Of Systems:     Constitutional Negative   ENT Negative   Cardiovascular Negative   Respiratory Negative   Gastrointestinal Nausea   Genitourinary Negative   Musculoskeletal Myalgias   Integumentary Negative   Neurological Negative   Endocrine Negative     Past Medical History:   Patient Active Problem List   Diagnosis    Closed fracture of left side of occipital bone (HCC)    Acne    Generalized anxiety disorder    Current moderate episode of major depressive disorder without prior episode (HCC)    Chronic pain of both knees    Canker sores oral    Tinea pedis of both feet    Tachycardia       Allergies: No Known Allergies    Past Surgical History:   Past Surgical History:   Procedure Laterality Date    APPENDECTOMY         Past Psychiatric History:    H/o depression and anxiety, previously in outpatient therapy for a few months, no past psychiatric hospitalizations, 1 past suicide attempts (plan to hang self about 6 months ago), no h/o self-injurious behaviors, no h/o physical aggression  Previously in outpatient therapy with Dr Mera Corea on weekly basis        Past Medication Trials: Zoloft up to 150 mg daily (ineffective after the concussion), Wellbutrin  mg daily (ineffective)     Family Psychiatric History:   Brother- Anxiety   Sister- OCD tendencies  Mother- Depression (Lexapro)  Father- Anger (Effexor XR)  Mat  Side- Depression     No FH of suicide     Social History:   Lives with parents, 2 siblings  Caresse Mode works as a , mother works as a nurse in health system   Firearm in home- denies access to firearm      Substance Abuse:   Vaping- nearly everyday, quit in 8/2021  Cannabis- no use for past 2 years  Alcohol- 1 drink per week     No cigarette use     Traumatic History: Denies any h/o physical or sexual abuse      The following portions of the patient's history were reviewed and updated as appropriate: allergies, current medications, past family history, past medical history, past social history, past surgical history and problem list     Objective: There were no vitals filed for this visit  Weight (last 2 days)     None          Mental status:  Appearance sitting comfortably in chair, dressed in casual clothing, adequate hygiene and grooming, cooperative with interview, fairly well related   Mood "good"   Affect Appears generally euthymic, stable, mood-congruent   Speech Normal rate, rhythm, and volume   Thought Processes Linear and goal directed   Associations intact associations   Hallucinations Denies any auditory or visual hallucinations   Thought Content No passive or active suicidal or homicidal ideation, intent, or plan  Orientation Oriented to person, place, time, and situation   Recent and Remote Memory Grossly intact   Attention Span and Concentration Concentration intact   Intellect Appears to be of Average Intelligence   Insight Insight intact   Judgement judgment was intact   Muscle Strength Muscle strength and tone were normal   Language Within normal limits   Fund of Knowledge Age appropriate   Pain None     PHQ-A Depression Screening    Feeling down, depressed, irritable or hopeless: 1 - several days  Little interest or pleasure in doing things: 0 - not at all  Trouble falling or staying asleep, or sleeping too much: 1 - several days  Poor appetite or overeatin - not at all  Feeling tired or having little energy: 2 - more than half the days  Feeling bad about yourself - or that you are a failure or have let yourself or your family down: 0 - not at all  Trouble concentrating on things, such as reading the newspaper or watching television: 2 - more than half the days  Moving or speaking so slowly that other people could have noticed   Or the opposite - being so fidgety or restless that you have been moving around a lot Pt eating lunch, wont take any pills, explained what they were for and patient still refusing more than usual: 0 - not at all  Thoughts that you would be better off dead, or of hurting yourself in some way: 0 - not at all            [address] Most Recent Value   Over the last 2 weeks, how often have you been bothered by any of the following problems? Feeling nervous, anxious, or on edge 1   Not being able to stop or control worrying 2   Worrying too much about different things 2   Trouble relaxing 1   Being so restless that it is hard to sit still 3   Becoming easily annoyed or irritable 1   Feeling afraid as if something awful might happen 2   EMILY-7 Total Score 12           Assessment/Plan:       Diagnoses and all orders for this visit:    Generalized anxiety disorder    Current moderate episode of major depressive disorder without prior episode ([facility] [address] )    Other orders  -     drospirenone-ethinyl estradiol (BERTRAM) 3-0 02 MG per tablet; Take 1 tablet by mouth daily          Diagnosis: 1  Major Depressive Disorder- single episode, moderate severity, 2  Generalized Anxiety Disorder, r/o social anxiety disorder, 3   R/o ADHD- inattentive type        Treatment Recommendations:    21-1 y/o female, domiciled with boyfriend in home in [place], completed an associate's degree at [employer]- currently enrolled full-time at [employer] (studying English), does some part-time jobs, [facility] significant for h/o depression and anxiety, previously in outpatient therapy for a few months, no past psychiatric hospitalizations, 1 self-aborted past suicide attempt (plan to hang self), no h/o self-injurious behaviors, no h/o physical aggression, no significant PMH, no active substance abuse, presents to [facility] on referral from PCP for concerns about depression and anxiety,with patient reporting "I think I need help with my self-esteem" and father reporting "I'd like her to feel better about herself, understand that it is okay to feel bad or sad "     On assessment today, patient overall remaining stable, some increased anxiety recently with psychosocial stressors but managing it relatively well, minimal depressive symptoms, no current SI, in psychosocial context of attending community college, recently moving in with boyfriend, high intellectual functioning   No current passive or active SI, intent, or plan        In terms of suicide risk assessment, patient with minimal depressive symptoms, 1 past suicide attempt; however, patient is currently future-oriented, denies any current passive or active suicidal ideation, intent, or plan, has coping skills, no FH of suicide, no active substance use, no access to firearms, no global insomnia or psychic anxiety   Therefore, despite risk factors, patient is not currently an imminent risk of harm to self or others above chronic baseline risk and is appropriate for outpatient level of care at this time       Plan:  1  MDD- Will continue Effexor XR 75 mg daily given concerns about night sweats, dry mouth, monitor symptoms at lower dosage  PHQ-A score of 6, mild depression (8/23/22)  2  Anxiety- Will continue Effexor XR to continue helping with anxiety symptoms  Encouraged individual psychotherapy to help with anxiety symptoms- placd a referral for psychotherapy  Will continue Hydroxyzine 25 mg qhs prn sleep, anxiety   EMILY-7 score of 12, moderate anxiety (8/23/22)  3  Medical- Continue to f/u with GI regarding chronic abdominal issues   F/u with primary care provider for on-going medical care  4  Will transfer to resident clinic    Risks, Benefits And Possible Side Effects Of Medications:  Risks, benefits, and possible side effects of medications explained to patient and family, they verbalize understanding and Reviewed risks/benefits and side effects of antidepressant medications including black box warning on antidepressants, patient and family verbalize understanding      Psychotherapy Provided: Supportive psychotherapy provided  Counseling was provided during the session today for 16 minutes  Medications, treatment progress and treatment plan reviewed with Brenda  Recent stressor including family issues, school stress, social difficulties, everyday stressors and ongoing anxiety discussed with Brenda  Coping strategies including getting into a good routine, improving self-esteem, increasing motivation, stress reduction, spending time with family and spending time with friends reviewed with Brenda  Reassurance and supportive therapy provided

## 2022-09-15 NOTE — PROGRESS NOTE ADULT - PROBLEM SELECTOR PLAN 4
f/u card rec Wartpeel Counseling:  I discussed with the patient the risks of Wartpeel including but not limited to erythema, scaling, itching, weeping, crusting, and pain.

## 2022-10-12 NOTE — DISCHARGE NOTE ADULT - NS MD DC FALL RISK RISK
Alert-The patient is alert, awake and responds to voice. The patient is oriented to time, place, and person. The triage nurse is able to obtain subjective information.
For information on Fall & Injury Prevention, visit [email or web address]

## 2022-10-19 NOTE — PROGRESS NOTE ADULT - PROVIDER SPECIALTY LIST ADULT
Gastroenterology Xolair Pregnancy And Lactation Text: This medication is Pregnancy Category B and is considered safe during pregnancy. This medication is excreted in breast milk.

## 2023-07-12 NOTE — H&P ADULT. - SKIN/BREAST
Impression: Dry eye syndrome of bilateral lacrimal glands: H04.123.  Plan:
Impression: Nonexudative age-related macular degeneration, bilateral, early dry stage: H35.3131. OCT ordered and done today. Drusen stable to previous scans. Plan: Discussed DX and possible outcomes with pt in detail Explained to pt that there is no treatment for Dry AMD
Discussed Vitamin/AREDS with pt, do not recommend them at this time due to early stage. Recommend sunglasses outside, no smoking and a good healthy diet. Will monitor yearly with DFE and OCT Pt voices understanding.
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 Plan: Vitreomacular adhesion (VMT) is a human medical condition where the vitreous gel (or simply vitreous, AKA vitreous humour) of the human eye adheres to the retina in an abnormally strong manner. As the eye ages, it is common for the vitreous to separate from the retina.
details…

## 2023-08-11 NOTE — PROVIDER CONTACT NOTE (CRITICAL VALUE NOTIFICATION) - RECOMMENDATIONS
Patient admitted with fever has blood culture drawn on 6/24 result came back critical. Postive blood culture perilimanry growth anerobic and arobic bottle growth positive cocxy and cluster. NP notified patients on IV antibiotic. Patient is a  83 woman  with PMHx Alzheimer's dementia, HTN, HLD presenting with agitation and decreased appetite from home, received PRN Ativan 1mg PO this morning, consulted fro delirium, agitation, worsening dementia.     Pt. seen in ED, pleasantly confused,  AAOX1, poor historian, has impaired attention and impoverished thought process, denies SI and HI, overall unable to engage in meaningful interview due to cognitive limitations. Concerns for delirium on top of underlying dementia    8/11-- overall calm, no behavioral issues    PLAN:  1- Continue current observation   2- Cont. Seroquel 25mg PO at bedtime  3-Mild agitation; Seroquel 12.5mg-25mg po q6h prn  4-Severe agitation Zyprexa 1.25mg IM q6h prn  5-HOLD antipsychotics if qtc >500    6--Supportive treatment of delirium: 1) Minimize use of benzodiazepines, opioids, anticholinergics, and other deliriogenic agents whenever possible.  2) Maintain sleep wake cycle.  3) Provide frequent reorientation and redirection.  4) Family member at bedside if possible. 5) Provide corrective lenses/hearing aids if required

## 2024-01-05 NOTE — PHYSICAL THERAPY INITIAL EVALUATION ADULT - MD/RN NOTIFIED
Pleat blood work for Semen sodium, this must be completed at the hospital lab  Continue to decrease alcohol intake I recommend complete cessation  [facility]  Return in 1 year sooner if needed  
yes

## 2024-04-07 NOTE — DIETITIAN INITIAL EVALUATION ADULT. - PROBLEM SELECTOR PLAN 1
NPO after Midnight. PPI infusion. Gi planning EGD in AM. H/H q 6hrs . Transfuse if Hgb<8.5 G
Handoff

## 2024-10-02 NOTE — CONSULT NOTE ADULT - PROBLEM SELECTOR RECOMMENDATION 3
ACT requested. C/w Zofran 4mg IV Q6 hours PRN. Pt is having BMs. Continue with bowel regimen, Colace 100mg BID, Senna 2tabs QHS.

## 2024-12-05 NOTE — H&P ADULT - HISTORY OF PRESENT ILLNESS
Called and spoke with pt regarding results.  Advised pt that Dr Barbosa would also review the results with him at his upcoming visit in detail including review of images.  All questions answered.  Advised pt that Dr Barbosa plans to continue with the same treatment.    
Yuliet-- patient has viewed PET results on [facility] and would like call to discuss. Appt with MR not until 12/18/24.  
78 year old woman with CAD, PAD s/p RLE amputation presents this admission with abdominal pain, bleeding per rectum for the past few months.  She has also noted decreased appetite and about a 7lb weight loss.  She was admitted to a hospital in [facility] a few years ago requiring ICU level care for her severe PAD and complications of vascular procedures.  At that time, her daughter believes she may have undergone a colonoscopy but she is not sure.  She has noted blood staining the toilet water and some clots on the toilet paper intermittently in the past few months and sometimes holds her mother's aspirin during these times.  She takes plavix daily.  She has also had decreased appetite with intermittent nausea in the past few months.  No shortness of breath, chest pain at this time.  Complains of mild right lower abdominal pain

## 2025-04-03 NOTE — PATIENT PROFILE ADULT. - PRO PAIN EXPRESSION
"Subjective:      Patient ID: Lilliana Huang is a 19 y.o. female.    Vitals:  height is 5' 2" (1.575 m) and weight is 59 kg (130 lb). Her oral temperature is 99.3 °F (37.4 °C). Her blood pressure is 115/74 and her pulse is 79. Her respiration is 14 and oxygen saturation is 96%.     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 The current episode started in the past 7 days. The problem has been gradually worsening. The pain is at a severity of 5/10. The pain is moderate. Associated symptoms include a discharge, frequency and urgency. Pertinent negatives include no chills, flank pain, hematuria or rash. Her past medical history is significant for recurrent UTIs and STD.       Constitution: Positive for fatigue. Negative for chills, sweating and fever.        Chronic   Genitourinary:  Positive for dysuria, frequency and urgency. Negative for flank pain and hematuria.   Musculoskeletal:  Positive for back pain.        Chronic   Skin:  Negative for color change, pale and rash.      Objective:     Physical Exam   Constitutional: She is oriented to person, place, and time.  Non-toxic appearance. She does not appear ill. No distress.   HENT:   Head: Normocephalic and atraumatic.   Mouth/Throat: Mucous membranes are moist.   Eyes: Conjunctivae are normal.   Cardiovascular: Normal rate.   Pulmonary/Chest: Effort normal.   Abdominal: Normal appearance. Soft. flat abdomen There is no abdominal tenderness. There is no rebound, no guarding, no left CVA tenderness and no right CVA tenderness. No hernia.   Neurological: She is alert and oriented to person, place, and time.   Skin: Skin is warm, dry and not diaphoretic.   Psychiatric: Her behavior is normal. Mood normal.   Nursing note and vitals reviewed.      Assessment:     1. Complicated UTI " (urinary tract infection)    2. Dysuria        Plan:     Office Visit on 04/03/2025   Component Date Value Ref Range Status    Color, POC UA 04/03/2025 Orange (A)  Yellow, Straw, Colorless Final    Clarity, POC UA 04/03/2025 Clear  Clear Final    Glucose, POC UA 04/03/2025 100 (A)  Negative Final    Bilirubin, POC UA 04/03/2025 Negative  Negative Final    Ketones, POC UA 04/03/2025 Trace (A)  Negative Final    Spec Grav POC UA 04/03/2025 1.020  1.005 - 1.030 Final    Blood, POC UA 04/03/2025 Negative  Negative Final    pH, POC UA 04/03/2025 5.0  5.0 - 8.0 Final    Protein, POC UA 04/03/2025 30 (A)  Negative Final    Urobilinogen, POC UA 04/03/2025 2.0 (A)  <=1.0 Final    Nitrite, POC UA 04/03/2025 Positive (A)  Negative Final    WBC, POC UA 04/03/2025 Large (A)  Negative Final     ]  Complicated UTI (urinary tract infection)  -     Urine Culture High Risk  -     ciprofloxacin HCl (CIPRO) 500 MG tablet; Take 1 tablet (500 mg total) by mouth every 12 (twelve) hours. for 7 days  Dispense: 14 tablet; Refill: 0  -     phenazopyridine (PYRIDIUM) 200 MG tablet; Take 1 tablet (200 mg total) by mouth 3 (three) times daily as needed for Pain (urinary discomfort).  Dispense: 6 tablet; Refill: 0    Dysuria  -     POCT Urinalysis(Instrument)  -     phenazopyridine (PYRIDIUM) 200 MG tablet; Take 1 tablet (200 mg total) by mouth 3 (three) times daily as needed for Pain (urinary discomfort).  Dispense: 6 tablet; Refill: 0        Patient Instructions   Please follow up with your primary care provider within 2-5 days if your signs and symptoms have not resolved or worsen.     If your condition worsens or fails to improve we recommend that you receive another evaluation at the emergency room immediately or contact your primary medical clinic to discuss your concerns.   You must understand that you have received an Urgent Care treatment only and that you may be released before all of your medical problems are known or treated. You, the  patient, will arrange for follow up care as instructed.     Please take antibiotics to completion.  Increase oral fluids.  Rest.    We sent your urine sample to lab for urine  culture. We will notify you of these results once received.                     verbalization